# Patient Record
Sex: MALE | Race: WHITE | NOT HISPANIC OR LATINO | URBAN - METROPOLITAN AREA
[De-identification: names, ages, dates, MRNs, and addresses within clinical notes are randomized per-mention and may not be internally consistent; named-entity substitution may affect disease eponyms.]

---

## 2017-04-21 ENCOUNTER — INPATIENT (INPATIENT)
Facility: HOSPITAL | Age: 62
LOS: 0 days | Discharge: ANOTHER HOSPITAL | End: 2017-04-22
Attending: INTERNAL MEDICINE | Admitting: INTERNAL MEDICINE
Payer: MEDICARE

## 2017-04-21 VITALS
WEIGHT: 240.08 LBS | OXYGEN SATURATION: 98 % | HEIGHT: 68 IN | RESPIRATION RATE: 34 BRPM | HEART RATE: 88 BPM | SYSTOLIC BLOOD PRESSURE: 235 MMHG | DIASTOLIC BLOOD PRESSURE: 146 MMHG

## 2017-04-21 LAB
ALBUMIN SERPL ELPH-MCNC: 3.7 G/DL — SIGNIFICANT CHANGE UP (ref 3.3–5)
ALP SERPL-CCNC: 191 U/L — HIGH (ref 40–120)
ALT FLD-CCNC: 44 U/L — SIGNIFICANT CHANGE UP (ref 12–78)
ANION GAP SERPL CALC-SCNC: 13 MMOL/L — SIGNIFICANT CHANGE UP (ref 5–17)
APTT BLD: 38.2 SEC — HIGH (ref 27.5–37.4)
AST SERPL-CCNC: 46 U/L — HIGH (ref 15–37)
BASE EXCESS BLDA CALC-SCNC: -6.7 MMOL/L — LOW (ref -2–2)
BASOPHILS # BLD AUTO: 0.2 K/UL — SIGNIFICANT CHANGE UP (ref 0–0.2)
BASOPHILS NFR BLD AUTO: 1.4 % — SIGNIFICANT CHANGE UP (ref 0–2)
BILIRUB SERPL-MCNC: 0.7 MG/DL — SIGNIFICANT CHANGE UP (ref 0.2–1.2)
BLOOD GAS COMMENTS: SIGNIFICANT CHANGE UP
BLOOD GAS COMMENTS: SIGNIFICANT CHANGE UP
BLOOD GAS SOURCE: SIGNIFICANT CHANGE UP
BUN SERPL-MCNC: 31 MG/DL — HIGH (ref 7–23)
CALCIUM SERPL-MCNC: 8.6 MG/DL — SIGNIFICANT CHANGE UP (ref 8.5–10.1)
CHLORIDE SERPL-SCNC: 108 MMOL/L — SIGNIFICANT CHANGE UP (ref 96–108)
CK MB BLD-MCNC: 3.3 % — SIGNIFICANT CHANGE UP (ref 0–3.5)
CK MB CFR SERPL CALC: 5.1 NG/ML — HIGH (ref 0.5–3.6)
CK SERPL-CCNC: 155 U/L — SIGNIFICANT CHANGE UP (ref 26–308)
CO2 SERPL-SCNC: 23 MMOL/L — SIGNIFICANT CHANGE UP (ref 22–31)
CREAT SERPL-MCNC: 2.31 MG/DL — HIGH (ref 0.5–1.3)
EOSINOPHIL # BLD AUTO: 0.5 K/UL — SIGNIFICANT CHANGE UP (ref 0–0.5)
EOSINOPHIL NFR BLD AUTO: 4.4 % — SIGNIFICANT CHANGE UP (ref 0–6)
GLUCOSE SERPL-MCNC: 216 MG/DL — HIGH (ref 70–99)
HCO3 BLDA-SCNC: 24 MMOL/L — SIGNIFICANT CHANGE UP (ref 21–29)
HCT VFR BLD CALC: 53.1 % — HIGH (ref 39–50)
HGB BLD-MCNC: 18 G/DL — HIGH (ref 13–17)
HOROWITZ INDEX BLDA+IHG-RTO: 70 — SIGNIFICANT CHANGE UP
INR BLD: 1.07 RATIO — SIGNIFICANT CHANGE UP (ref 0.88–1.16)
LYMPHOCYTES # BLD AUTO: 2.5 K/UL — SIGNIFICANT CHANGE UP (ref 1–3.3)
LYMPHOCYTES # BLD AUTO: 20.9 % — SIGNIFICANT CHANGE UP (ref 13–44)
MCHC RBC-ENTMCNC: 30.2 PG — SIGNIFICANT CHANGE UP (ref 27–34)
MCHC RBC-ENTMCNC: 33.9 GM/DL — SIGNIFICANT CHANGE UP (ref 32–36)
MCV RBC AUTO: 89.3 FL — SIGNIFICANT CHANGE UP (ref 80–100)
MONOCYTES # BLD AUTO: 0.8 K/UL — SIGNIFICANT CHANGE UP (ref 0–0.9)
MONOCYTES NFR BLD AUTO: 6.6 % — SIGNIFICANT CHANGE UP (ref 2–14)
NEUTROPHILS # BLD AUTO: 7.9 K/UL — HIGH (ref 1.8–7.4)
NEUTROPHILS NFR BLD AUTO: 66.7 % — SIGNIFICANT CHANGE UP (ref 43–77)
NT-PROBNP SERPL-SCNC: 2610 PG/ML — HIGH (ref 0–125)
PCO2 BLDA: 66 MMHG — HIGH (ref 32–46)
PH BLD: 7.18 — CRITICAL LOW (ref 7.35–7.45)
PLATELET # BLD AUTO: 226 K/UL — SIGNIFICANT CHANGE UP (ref 150–400)
PO2 BLDA: 116 MMHG — HIGH (ref 74–108)
POTASSIUM SERPL-MCNC: 4.2 MMOL/L — SIGNIFICANT CHANGE UP (ref 3.5–5.3)
POTASSIUM SERPL-SCNC: 4.2 MMOL/L — SIGNIFICANT CHANGE UP (ref 3.5–5.3)
PROT SERPL-MCNC: 8.7 GM/DL — HIGH (ref 6–8.3)
PROTHROM AB SERPL-ACNC: 11.7 SEC — SIGNIFICANT CHANGE UP (ref 9.8–12.7)
RBC # BLD: 5.95 M/UL — HIGH (ref 4.2–5.8)
RBC # FLD: 14.9 % — SIGNIFICANT CHANGE UP (ref 11–15)
SAO2 % BLDA: 96 % — SIGNIFICANT CHANGE UP (ref 92–96)
SODIUM SERPL-SCNC: 144 MMOL/L — SIGNIFICANT CHANGE UP (ref 135–145)
TROPONIN I SERPL-MCNC: 0.83 NG/ML — HIGH (ref 0.01–0.04)
WBC # BLD: 11.8 K/UL — HIGH (ref 3.8–10.5)
WBC # FLD AUTO: 11.8 K/UL — HIGH (ref 3.8–10.5)

## 2017-04-21 PROCEDURE — 99291 CRITICAL CARE FIRST HOUR: CPT

## 2017-04-21 PROCEDURE — 71010: CPT | Mod: 26

## 2017-04-21 RX ORDER — HEPARIN SODIUM 5000 [USP'U]/ML
6000 INJECTION INTRAVENOUS; SUBCUTANEOUS EVERY 6 HOURS
Qty: 0 | Refills: 0 | Status: DISCONTINUED | OUTPATIENT
Start: 2017-04-21 | End: 2017-04-22

## 2017-04-21 RX ORDER — HEPARIN SODIUM 5000 [USP'U]/ML
INJECTION INTRAVENOUS; SUBCUTANEOUS
Qty: 25000 | Refills: 0 | Status: DISCONTINUED | OUTPATIENT
Start: 2017-04-21 | End: 2017-04-22

## 2017-04-21 RX ORDER — NITROGLYCERIN 6.5 MG
5 CAPSULE, EXTENDED RELEASE ORAL
Qty: 50 | Refills: 0 | Status: DISCONTINUED | OUTPATIENT
Start: 2017-04-21 | End: 2017-04-22

## 2017-04-21 RX ORDER — CLOPIDOGREL BISULFATE 75 MG/1
600 TABLET, FILM COATED ORAL ONCE
Qty: 0 | Refills: 0 | Status: COMPLETED | OUTPATIENT
Start: 2017-04-21 | End: 2017-04-21

## 2017-04-21 RX ORDER — NITROGLYCERIN 6.5 MG
0.4 CAPSULE, EXTENDED RELEASE ORAL
Qty: 0 | Refills: 0 | Status: DISCONTINUED | OUTPATIENT
Start: 2017-04-21 | End: 2017-04-22

## 2017-04-21 RX ORDER — SODIUM CHLORIDE 9 MG/ML
3 INJECTION INTRAMUSCULAR; INTRAVENOUS; SUBCUTANEOUS ONCE
Qty: 0 | Refills: 0 | Status: COMPLETED | OUTPATIENT
Start: 2017-04-21 | End: 2017-04-21

## 2017-04-21 RX ORDER — HEPARIN SODIUM 5000 [USP'U]/ML
5000 INJECTION INTRAVENOUS; SUBCUTANEOUS ONCE
Qty: 0 | Refills: 0 | Status: COMPLETED | OUTPATIENT
Start: 2017-04-21 | End: 2017-04-21

## 2017-04-21 RX ORDER — ASPIRIN/CALCIUM CARB/MAGNESIUM 324 MG
325 TABLET ORAL ONCE
Qty: 0 | Refills: 0 | Status: COMPLETED | OUTPATIENT
Start: 2017-04-21 | End: 2017-04-21

## 2017-04-21 RX ORDER — FUROSEMIDE 40 MG
20 TABLET ORAL ONCE
Qty: 0 | Refills: 0 | Status: COMPLETED | OUTPATIENT
Start: 2017-04-21 | End: 2017-04-21

## 2017-04-21 RX ADMIN — HEPARIN SODIUM 5000 UNIT(S): 5000 INJECTION INTRAVENOUS; SUBCUTANEOUS at 23:09

## 2017-04-21 RX ADMIN — CLOPIDOGREL BISULFATE 600 MILLIGRAM(S): 75 TABLET, FILM COATED ORAL at 22:57

## 2017-04-21 RX ADMIN — Medication 0.4 MILLIGRAM(S): at 22:57

## 2017-04-21 RX ADMIN — Medication 20 MILLIGRAM(S): at 23:55

## 2017-04-21 RX ADMIN — Medication 325 MILLIGRAM(S): at 22:57

## 2017-04-21 RX ADMIN — SODIUM CHLORIDE 3 MILLILITER(S): 9 INJECTION INTRAMUSCULAR; INTRAVENOUS; SUBCUTANEOUS at 22:57

## 2017-04-21 RX ADMIN — Medication 1.5 MICROGRAM(S)/MIN: at 23:17

## 2017-04-21 RX ADMIN — HEPARIN SODIUM 1000 UNIT(S)/HR: 5000 INJECTION INTRAVENOUS; SUBCUTANEOUS at 23:57

## 2017-04-21 NOTE — ED ADULT NURSE NOTE - OBJECTIVE STATEMENT
Patient BIBA in acute respiratory distress. Severely diaphoretic. Denies pain. + retractions: rales bilaterally to apices, no cough. B/P elevated . CPAP switched to BiPAP on arrival. Patient denies Pain. EKG consistent with STEMI shown to Dr. Michelle. Right sided EKG done.

## 2017-04-21 NOTE — ED ADULT TRIAGE NOTE - CHIEF COMPLAINT QUOTE
biba sudden onset sob denies chest pain pt diaphoretic b/l rales audible pt in respiratory distress cpap in place per ems state r/a sat 60's increased to 92% on 100% o2 via cpap sl ntg x 1

## 2017-04-21 NOTE — ED PROVIDER NOTE - MEDICAL DECISION MAKING DETAILS
Patient with APE secondary to STEMI. Labs sent. Patient on BiPAP, received nitro, asa and plavix with improvement. Patient now transferred to [facility] CCU for further management. Patient with APE secondary to Hypertensive Emergency. Labs and imaging reviewed. Patient on BiPAP, received nitro, asa and plavix with improvement. Patient was to be transferred to cath lab for inferior STEMI however most recent EKG has normalized along with condition of patient. Patient now admitted to ICU for further management.

## 2017-04-21 NOTE — ED PROVIDER NOTE - CARE PLAN
Principal Discharge DX:	STEMI (ST elevation myocardial infarction)  Secondary Diagnosis:	Acute pulmonary edema Principal Discharge DX:	Hypertensive emergency  Secondary Diagnosis:	Acute pulmonary edema

## 2017-04-21 NOTE — ED ADULT NURSE REASSESSMENT NOTE - NS ED NURSE REASSESS COMMENT FT1
Patient breathing and B/P improved with Tridal drip. Heparin bolus of 5000unnits given IVP as per Dr. Michelle

## 2017-04-21 NOTE — ED ADULT NURSE REASSESSMENT NOTE - NS ED NURSE REASSESS COMMENT FT1
Patient transfer cancelled after patient improved and EKG normalized with Tridil at 40mcg/min . Patient breathing easier. Evaluated by ICU PA. For admission to ICU

## 2017-04-21 NOTE — PROVIDER CONTACT NOTE (EICU) - RECOMMENDATIONS
-- Admit to ICU   -- BP management with Nitro gtt  -- medical management of ACS -- ASA, BB, Hep gtt, Echo -- cardiology consult, serial EKGs.  Pt needs LHC once medically stable in the setting of reversible EKG changes. continue to follow cardiac enzymes  -- NARCISO - likely due to Hypertensive crisis but send urine studies, + parks  -- pt critical, needs close ICU monitoring for BP management, respiratory management and concern for arrhythmia.   -- discussed with ICU PA

## 2017-04-21 NOTE — PROVIDER CONTACT NOTE (EICU) - BACKGROUND
60yo with history of HTN, CAD s/p CABG 2005, Active smoker brought to the ED in respiratory distress.  Pt called EMS after developing SOB this evening, by the time EMS arrived he was in respiratory failure, tripoding, tachypnic.  Supported with BIPAP on transfer to the ED.  He was also hypertensive with SBP > 220/120, treated with nitro.  EKG in the ED also showed ST Elevation in inferior leads per ED physician.  STEMI code was called.  Pt was medically managed with ASA/Plavix, Nitro gtt and BiPAP support. Heparin bolus was given.  ST elevation reverted and at this time transfer for to cath lab is being deferred.  ICU team called for further management of patient.     Current VS reviewed /90 RR improved to 20s.  Pt on BiPAP  Labs reviewed ABG 7  18/66/116  TNI 0.8  BNP >2000  CXR pulm infiltrates.  Cr 2.3

## 2017-04-21 NOTE — ED PROVIDER NOTE - OBJECTIVE STATEMENT
Pertinent PMH/PSH/FHx/SHx and Review of Systems contained within:  62 yo m with PMH MI s/p CABG in 2005 BIBEMS for respiratory distress. As per patient, he doing housework when he suddenly became short of breath. Patient denies chest pain. No fever/chills, No photophobia/eye pain/changes in vision, No ear pain/sore throat/dysphagia, No chest pain/palpitations, no cough/wheeze/stridor, No abdominal pain, No N/V/D, no dysuria/frequency/discharge, No neck/back pain, no rash, no changes in neurological status/function.

## 2017-04-21 NOTE — ED ADULT NURSE REASSESSMENT NOTE - NS ED NURSE REASSESS COMMENT FT1
Report  Given to Marti from [facility];  Mel  from Transfer center and Olivia from the CCU.

## 2017-04-21 NOTE — ED PROVIDER NOTE - PSH
History of coronary artery stent placement  1 stent 2008, 2 stents 2009 in [facility]  S/P quadruple vessel bypass  [facility]

## 2017-04-21 NOTE — ED PROVIDER NOTE - CRITICAL CARE PROVIDED
interpretation of diagnostic studies/documentation/consult w/ pt's family directly relating to pts condition/additional history taking/direct patient care (not related to procedure)/consultation with other physicians

## 2017-04-21 NOTE — ED ADULT NURSE NOTE - PSH
History of coronary artery stent placement  1 stent 2008, 2 stents 2009 in [facility]  S/P quadruple vessel bypass  [facility]

## 2017-04-22 ENCOUNTER — INPATIENT (INPATIENT)
Facility: HOSPITAL | Age: 62
LOS: 8 days | Discharge: HOME | DRG: 280 | End: 2017-05-01
Attending: INTERNAL MEDICINE | Admitting: INTERNAL MEDICINE
Payer: MEDICARE

## 2017-04-22 VITALS
OXYGEN SATURATION: 100 % | HEIGHT: 67 IN | DIASTOLIC BLOOD PRESSURE: 95 MMHG | TEMPERATURE: 98 F | WEIGHT: 227.96 LBS | RESPIRATION RATE: 24 BRPM | SYSTOLIC BLOOD PRESSURE: 175 MMHG | HEART RATE: 56 BPM

## 2017-04-22 VITALS
SYSTOLIC BLOOD PRESSURE: 155 MMHG | HEART RATE: 65 BPM | OXYGEN SATURATION: 99 % | TEMPERATURE: 97 F | DIASTOLIC BLOOD PRESSURE: 95 MMHG | RESPIRATION RATE: 20 BRPM

## 2017-04-22 DIAGNOSIS — I21.4 NON-ST ELEVATION (NSTEMI) MYOCARDIAL INFARCTION: ICD-10-CM

## 2017-04-22 DIAGNOSIS — I16.1 HYPERTENSIVE EMERGENCY: ICD-10-CM

## 2017-04-22 DIAGNOSIS — J81.0 ACUTE PULMONARY EDEMA: ICD-10-CM

## 2017-04-22 DIAGNOSIS — N17.9 ACUTE KIDNEY FAILURE, UNSPECIFIED: ICD-10-CM

## 2017-04-22 DIAGNOSIS — I21.01 ST ELEVATION (STEMI) MYOCARDIAL INFARCTION INVOLVING LEFT MAIN CORONARY ARTERY: ICD-10-CM

## 2017-04-22 DIAGNOSIS — Z29.9 ENCOUNTER FOR PROPHYLACTIC MEASURES, UNSPECIFIED: ICD-10-CM

## 2017-04-22 DIAGNOSIS — E78.5 HYPERLIPIDEMIA, UNSPECIFIED: ICD-10-CM

## 2017-04-22 LAB
ALBUMIN SERPL ELPH-MCNC: 3.3 G/DL — SIGNIFICANT CHANGE UP (ref 3.3–5)
ALBUMIN SERPL ELPH-MCNC: 3.9 G/DL — SIGNIFICANT CHANGE UP (ref 3.3–5)
ALBUMIN SERPL ELPH-MCNC: 3.9 G/DL — SIGNIFICANT CHANGE UP (ref 3.3–5)
ALP SERPL-CCNC: 143 U/L — HIGH (ref 40–120)
ALP SERPL-CCNC: 154 U/L — HIGH (ref 40–120)
ALP SERPL-CCNC: 158 U/L — HIGH (ref 40–120)
ALT FLD-CCNC: 31 U/L RC — SIGNIFICANT CHANGE UP (ref 10–45)
ALT FLD-CCNC: 33 U/L RC — SIGNIFICANT CHANGE UP (ref 10–45)
ALT FLD-CCNC: 40 U/L — SIGNIFICANT CHANGE UP (ref 12–78)
ANION GAP SERPL CALC-SCNC: 11 MMOL/L — SIGNIFICANT CHANGE UP (ref 5–17)
ANION GAP SERPL CALC-SCNC: 16 MMOL/L — SIGNIFICANT CHANGE UP (ref 5–17)
ANION GAP SERPL CALC-SCNC: 18 MMOL/L — HIGH (ref 5–17)
APTT BLD: 43.1 SEC — HIGH (ref 27.5–37.4)
APTT BLD: 55.7 SEC — HIGH (ref 27.5–37.4)
APTT BLD: 60.9 SEC — HIGH (ref 27.5–37.4)
AST SERPL-CCNC: 52 U/L — HIGH (ref 10–40)
AST SERPL-CCNC: 67 U/L — HIGH (ref 10–40)
AST SERPL-CCNC: 91 U/L — HIGH (ref 15–37)
BASE EXCESS BLDA CALC-SCNC: -0.4 MMOL/L — SIGNIFICANT CHANGE UP (ref -2–2)
BASOPHILS # BLD AUTO: 0 K/UL — SIGNIFICANT CHANGE UP (ref 0–0.2)
BASOPHILS # BLD AUTO: 0.1 K/UL — SIGNIFICANT CHANGE UP (ref 0–0.2)
BASOPHILS # BLD AUTO: 0.1 K/UL — SIGNIFICANT CHANGE UP (ref 0–0.2)
BASOPHILS NFR BLD AUTO: 0.2 % — SIGNIFICANT CHANGE UP (ref 0–2)
BASOPHILS NFR BLD AUTO: 0.5 % — SIGNIFICANT CHANGE UP (ref 0–2)
BASOPHILS NFR BLD AUTO: 0.7 % — SIGNIFICANT CHANGE UP (ref 0–2)
BILIRUB SERPL-MCNC: 0.9 MG/DL — SIGNIFICANT CHANGE UP (ref 0.2–1.2)
BILIRUB SERPL-MCNC: 1.2 MG/DL — SIGNIFICANT CHANGE UP (ref 0.2–1.2)
BILIRUB SERPL-MCNC: 1.2 MG/DL — SIGNIFICANT CHANGE UP (ref 0.2–1.2)
BLD GP AB SCN SERPL QL: NEGATIVE — SIGNIFICANT CHANGE UP
BLOOD GAS COMMENTS: SIGNIFICANT CHANGE UP
BLOOD GAS SOURCE: SIGNIFICANT CHANGE UP
BUN SERPL-MCNC: 33 MG/DL — HIGH (ref 7–23)
BUN SERPL-MCNC: 35 MG/DL — HIGH (ref 7–23)
BUN SERPL-MCNC: 35 MG/DL — HIGH (ref 7–23)
CALCIUM SERPL-MCNC: 8.3 MG/DL — LOW (ref 8.5–10.1)
CALCIUM SERPL-MCNC: 9.2 MG/DL — SIGNIFICANT CHANGE UP (ref 8.4–10.5)
CALCIUM SERPL-MCNC: 9.3 MG/DL — SIGNIFICANT CHANGE UP (ref 8.4–10.5)
CHLORIDE SERPL-SCNC: 105 MMOL/L — SIGNIFICANT CHANGE UP (ref 96–108)
CHLORIDE SERPL-SCNC: 107 MMOL/L — SIGNIFICANT CHANGE UP (ref 96–108)
CHLORIDE SERPL-SCNC: 110 MMOL/L — HIGH (ref 96–108)
CHOLEST SERPL-MCNC: 167 MG/DL — SIGNIFICANT CHANGE UP (ref 10–199)
CHOLEST SERPL-MCNC: 167 MG/DL — SIGNIFICANT CHANGE UP (ref 10–199)
CK MB BLD-MCNC: 12.2 % — HIGH (ref 0–3.5)
CK MB BLD-MCNC: 13.7 % — HIGH (ref 0–3.5)
CK MB CFR SERPL CALC: 31.7 NG/ML — HIGH (ref 0–6.7)
CK MB CFR SERPL CALC: 61.5 NG/ML — HIGH (ref 0–6.7)
CK SERPL-CCNC: 260 U/L — HIGH (ref 30–200)
CK SERPL-CCNC: 449 U/L — HIGH (ref 30–200)
CK SERPL-CCNC: 590 U/L — HIGH (ref 26–308)
CO2 SERPL-SCNC: 19 MMOL/L — LOW (ref 22–31)
CO2 SERPL-SCNC: 21 MMOL/L — LOW (ref 22–31)
CO2 SERPL-SCNC: 24 MMOL/L — SIGNIFICANT CHANGE UP (ref 22–31)
CREAT SERPL-MCNC: 2.16 MG/DL — HIGH (ref 0.5–1.3)
CREAT SERPL-MCNC: 2.17 MG/DL — HIGH (ref 0.5–1.3)
CREAT SERPL-MCNC: 2.18 MG/DL — HIGH (ref 0.5–1.3)
EOSINOPHIL # BLD AUTO: 0.1 K/UL — SIGNIFICANT CHANGE UP (ref 0–0.5)
EOSINOPHIL # BLD AUTO: 0.2 K/UL — SIGNIFICANT CHANGE UP (ref 0–0.5)
EOSINOPHIL # BLD AUTO: 0.2 K/UL — SIGNIFICANT CHANGE UP (ref 0–0.5)
EOSINOPHIL NFR BLD AUTO: 1.2 % — SIGNIFICANT CHANGE UP (ref 0–6)
EOSINOPHIL NFR BLD AUTO: 1.6 % — SIGNIFICANT CHANGE UP (ref 0–6)
EOSINOPHIL NFR BLD AUTO: 1.9 % — SIGNIFICANT CHANGE UP (ref 0–6)
GLUCOSE SERPL-MCNC: 105 MG/DL — HIGH (ref 70–99)
GLUCOSE SERPL-MCNC: 119 MG/DL — HIGH (ref 70–99)
GLUCOSE SERPL-MCNC: 89 MG/DL — SIGNIFICANT CHANGE UP (ref 70–99)
HBA1C BLD-MCNC: 5.6 % — SIGNIFICANT CHANGE UP (ref 4–5.6)
HBA1C BLD-MCNC: 5.8 % — HIGH (ref 4–5.6)
HCO3 BLDA-SCNC: 24 MMOL/L — SIGNIFICANT CHANGE UP (ref 21–29)
HCT VFR BLD CALC: 45.8 % — SIGNIFICANT CHANGE UP (ref 39–50)
HCT VFR BLD CALC: 47.4 % — SIGNIFICANT CHANGE UP (ref 39–50)
HCT VFR BLD CALC: 47.7 % — SIGNIFICANT CHANGE UP (ref 39–50)
HDLC SERPL-MCNC: 36 MG/DL — LOW (ref 40–125)
HDLC SERPL-MCNC: 37 MG/DL — LOW (ref 40–125)
HGB BLD-MCNC: 15.4 G/DL — SIGNIFICANT CHANGE UP (ref 13–17)
HGB BLD-MCNC: 15.9 G/DL — SIGNIFICANT CHANGE UP (ref 13–17)
HGB BLD-MCNC: 16 G/DL — SIGNIFICANT CHANGE UP (ref 13–17)
HOROWITZ INDEX BLDA+IHG-RTO: 40 — SIGNIFICANT CHANGE UP
INR BLD: 1.21 RATIO — HIGH (ref 0.88–1.16)
INR BLD: 1.22 RATIO — HIGH (ref 0.88–1.16)
LIPID PNL WITH DIRECT LDL SERPL: 116 MG/DL — SIGNIFICANT CHANGE UP
LIPID PNL WITH DIRECT LDL SERPL: 119 MG/DL — SIGNIFICANT CHANGE UP
LYMPHOCYTES # BLD AUTO: 1.3 K/UL — SIGNIFICANT CHANGE UP (ref 1–3.3)
LYMPHOCYTES # BLD AUTO: 1.5 K/UL — SIGNIFICANT CHANGE UP (ref 1–3.3)
LYMPHOCYTES # BLD AUTO: 1.5 K/UL — SIGNIFICANT CHANGE UP (ref 1–3.3)
LYMPHOCYTES # BLD AUTO: 10.4 % — LOW (ref 13–44)
LYMPHOCYTES # BLD AUTO: 10.8 % — LOW (ref 13–44)
LYMPHOCYTES # BLD AUTO: 12.1 % — LOW (ref 13–44)
MAGNESIUM SERPL-MCNC: 2.1 MG/DL — SIGNIFICANT CHANGE UP (ref 1.6–2.6)
MAGNESIUM SERPL-MCNC: 2.2 MG/DL — SIGNIFICANT CHANGE UP (ref 1.6–2.6)
MAGNESIUM SERPL-MCNC: 2.3 MG/DL — SIGNIFICANT CHANGE UP (ref 1.8–2.4)
MCHC RBC-ENTMCNC: 30 PG — SIGNIFICANT CHANGE UP (ref 27–34)
MCHC RBC-ENTMCNC: 30 PG — SIGNIFICANT CHANGE UP (ref 27–34)
MCHC RBC-ENTMCNC: 30.1 PG — SIGNIFICANT CHANGE UP (ref 27–34)
MCHC RBC-ENTMCNC: 33.4 GM/DL — SIGNIFICANT CHANGE UP (ref 32–36)
MCHC RBC-ENTMCNC: 33.6 GM/DL — SIGNIFICANT CHANGE UP (ref 32–36)
MCHC RBC-ENTMCNC: 33.6 GM/DL — SIGNIFICANT CHANGE UP (ref 32–36)
MCV RBC AUTO: 89.2 FL — SIGNIFICANT CHANGE UP (ref 80–100)
MCV RBC AUTO: 89.6 FL — SIGNIFICANT CHANGE UP (ref 80–100)
MCV RBC AUTO: 89.7 FL — SIGNIFICANT CHANGE UP (ref 80–100)
MONOCYTES # BLD AUTO: 0.8 K/UL — SIGNIFICANT CHANGE UP (ref 0–0.9)
MONOCYTES # BLD AUTO: 0.8 K/UL — SIGNIFICANT CHANGE UP (ref 0–0.9)
MONOCYTES # BLD AUTO: 1 K/UL — HIGH (ref 0–0.9)
MONOCYTES NFR BLD AUTO: 6.5 % — SIGNIFICANT CHANGE UP (ref 2–14)
MONOCYTES NFR BLD AUTO: 6.6 % — SIGNIFICANT CHANGE UP (ref 2–14)
MONOCYTES NFR BLD AUTO: 7.2 % — SIGNIFICANT CHANGE UP (ref 2–14)
NEUTROPHILS # BLD AUTO: 11.5 K/UL — HIGH (ref 1.8–7.4)
NEUTROPHILS # BLD AUTO: 9.4 K/UL — HIGH (ref 1.8–7.4)
NEUTROPHILS # BLD AUTO: 9.6 K/UL — HIGH (ref 1.8–7.4)
NEUTROPHILS NFR BLD AUTO: 79.2 % — HIGH (ref 43–77)
NEUTROPHILS NFR BLD AUTO: 80.4 % — HIGH (ref 43–77)
NEUTROPHILS NFR BLD AUTO: 80.8 % — HIGH (ref 43–77)
NT-PROBNP SERPL-SCNC: 5392 PG/ML — HIGH (ref 0–125)
PCO2 BLDA: 40 MMHG — SIGNIFICANT CHANGE UP (ref 32–46)
PH BLD: 7.39 — SIGNIFICANT CHANGE UP (ref 7.35–7.45)
PHOSPHATE SERPL-MCNC: 2.4 MG/DL — LOW (ref 2.5–4.5)
PHOSPHATE SERPL-MCNC: 3.3 MG/DL — SIGNIFICANT CHANGE UP (ref 2.5–4.5)
PHOSPHATE SERPL-MCNC: 3.8 MG/DL — SIGNIFICANT CHANGE UP (ref 2.5–4.5)
PLATELET # BLD AUTO: 158 K/UL — SIGNIFICANT CHANGE UP (ref 150–400)
PLATELET # BLD AUTO: 168 K/UL — SIGNIFICANT CHANGE UP (ref 150–400)
PLATELET # BLD AUTO: 168 K/UL — SIGNIFICANT CHANGE UP (ref 150–400)
PO2 BLDA: 110 MMHG — HIGH (ref 74–108)
POTASSIUM SERPL-MCNC: 3.3 MMOL/L — LOW (ref 3.5–5.3)
POTASSIUM SERPL-MCNC: 3.7 MMOL/L — SIGNIFICANT CHANGE UP (ref 3.5–5.3)
POTASSIUM SERPL-MCNC: 3.7 MMOL/L — SIGNIFICANT CHANGE UP (ref 3.5–5.3)
POTASSIUM SERPL-SCNC: 3.3 MMOL/L — LOW (ref 3.5–5.3)
POTASSIUM SERPL-SCNC: 3.7 MMOL/L — SIGNIFICANT CHANGE UP (ref 3.5–5.3)
POTASSIUM SERPL-SCNC: 3.7 MMOL/L — SIGNIFICANT CHANGE UP (ref 3.5–5.3)
PROCALCITONIN SERPL-MCNC: 0.17 NG/ML — HIGH (ref 0–0.05)
PROT SERPL-MCNC: 7.5 G/DL — SIGNIFICANT CHANGE UP (ref 6–8.3)
PROT SERPL-MCNC: 7.7 GM/DL — SIGNIFICANT CHANGE UP (ref 6–8.3)
PROT SERPL-MCNC: 7.8 G/DL — SIGNIFICANT CHANGE UP (ref 6–8.3)
PROTHROM AB SERPL-ACNC: 13.2 SEC — HIGH (ref 9.8–12.7)
PROTHROM AB SERPL-ACNC: 13.4 SEC — HIGH (ref 9.8–12.7)
RBC # BLD: 5.11 M/UL — SIGNIFICANT CHANGE UP (ref 4.2–5.8)
RBC # BLD: 5.28 M/UL — SIGNIFICANT CHANGE UP (ref 4.2–5.8)
RBC # BLD: 5.35 M/UL — SIGNIFICANT CHANGE UP (ref 4.2–5.8)
RBC # FLD: 14 % — SIGNIFICANT CHANGE UP (ref 10.3–14.5)
RBC # FLD: 14 % — SIGNIFICANT CHANGE UP (ref 10.3–14.5)
RBC # FLD: 14.9 % — SIGNIFICANT CHANGE UP (ref 11–15)
RH IG SCN BLD-IMP: POSITIVE — SIGNIFICANT CHANGE UP
SAO2 % BLDA: 98 % — HIGH (ref 92–96)
SODIUM SERPL-SCNC: 142 MMOL/L — SIGNIFICANT CHANGE UP (ref 135–145)
SODIUM SERPL-SCNC: 144 MMOL/L — SIGNIFICANT CHANGE UP (ref 135–145)
SODIUM SERPL-SCNC: 145 MMOL/L — SIGNIFICANT CHANGE UP (ref 135–145)
TOTAL CHOLESTEROL/HDL RATIO MEASUREMENT: 4.5 RATIO — SIGNIFICANT CHANGE UP (ref 3.4–9.6)
TOTAL CHOLESTEROL/HDL RATIO MEASUREMENT: 4.6 RATIO — SIGNIFICANT CHANGE UP (ref 3.4–9.6)
TRIGL SERPL-MCNC: 55 MG/DL — SIGNIFICANT CHANGE UP (ref 10–149)
TRIGL SERPL-MCNC: 74 MG/DL — SIGNIFICANT CHANGE UP (ref 10–149)
TROPONIN I SERPL-MCNC: 27 NG/ML — HIGH (ref 0.01–0.04)
TROPONIN T SERPL-MCNC: 1.2 NG/ML — HIGH (ref 0–0.06)
TROPONIN T SERPL-MCNC: 1.45 NG/ML — HIGH (ref 0–0.06)
TSH SERPL-MCNC: 1.28 UIU/ML — SIGNIFICANT CHANGE UP (ref 0.27–4.2)
WBC # BLD: 11.6 K/UL — HIGH (ref 3.8–10.5)
WBC # BLD: 12.1 K/UL — HIGH (ref 3.8–10.5)
WBC # BLD: 14.4 K/UL — HIGH (ref 3.8–10.5)
WBC # FLD AUTO: 11.6 K/UL — HIGH (ref 3.8–10.5)
WBC # FLD AUTO: 12.1 K/UL — HIGH (ref 3.8–10.5)
WBC # FLD AUTO: 14.4 K/UL — HIGH (ref 3.8–10.5)

## 2017-04-22 PROCEDURE — 93306 TTE W/DOPPLER COMPLETE: CPT | Mod: 26

## 2017-04-22 PROCEDURE — 99223 1ST HOSP IP/OBS HIGH 75: CPT | Mod: GC

## 2017-04-22 PROCEDURE — 93010 ELECTROCARDIOGRAM REPORT: CPT

## 2017-04-22 RX ORDER — CLOPIDOGREL BISULFATE 75 MG/1
75 TABLET, FILM COATED ORAL DAILY
Qty: 0 | Refills: 0 | Status: DISCONTINUED | OUTPATIENT
Start: 2017-04-22 | End: 2017-05-01

## 2017-04-22 RX ORDER — ATORVASTATIN CALCIUM 80 MG/1
1 TABLET, FILM COATED ORAL
Qty: 0 | Refills: 0 | COMMUNITY

## 2017-04-22 RX ORDER — NICARDIPINE HYDROCHLORIDE 30 MG/1
5 CAPSULE, EXTENDED RELEASE ORAL
Qty: 40 | Refills: 0 | Status: DISCONTINUED | OUTPATIENT
Start: 2017-04-22 | End: 2017-04-22

## 2017-04-22 RX ORDER — ASPIRIN/CALCIUM CARB/MAGNESIUM 324 MG
325 TABLET ORAL DAILY
Qty: 0 | Refills: 0 | Status: DISCONTINUED | OUTPATIENT
Start: 2017-04-22 | End: 2017-04-22

## 2017-04-22 RX ORDER — ATORVASTATIN CALCIUM 80 MG/1
40 TABLET, FILM COATED ORAL AT BEDTIME
Qty: 0 | Refills: 0 | Status: DISCONTINUED | OUTPATIENT
Start: 2017-04-22 | End: 2017-04-22

## 2017-04-22 RX ORDER — NICARDIPINE HYDROCHLORIDE 30 MG/1
5 CAPSULE, EXTENDED RELEASE ORAL
Qty: 50 | Refills: 0 | Status: DISCONTINUED | OUTPATIENT
Start: 2017-04-22 | End: 2017-04-22

## 2017-04-22 RX ORDER — ASPIRIN/CALCIUM CARB/MAGNESIUM 324 MG
1 TABLET ORAL
Qty: 0 | Refills: 0 | COMMUNITY
Start: 2017-04-22

## 2017-04-22 RX ORDER — HEPARIN SODIUM 5000 [USP'U]/ML
INJECTION INTRAVENOUS; SUBCUTANEOUS
Qty: 25000 | Refills: 0 | Status: DISCONTINUED | OUTPATIENT
Start: 2017-04-22 | End: 2017-04-22

## 2017-04-22 RX ORDER — POTASSIUM CHLORIDE 20 MEQ
40 PACKET (EA) ORAL DAILY
Qty: 0 | Refills: 0 | Status: COMPLETED | OUTPATIENT
Start: 2017-04-22 | End: 2017-04-22

## 2017-04-22 RX ORDER — CLOPIDOGREL BISULFATE 75 MG/1
75 TABLET, FILM COATED ORAL DAILY
Qty: 0 | Refills: 0 | Status: DISCONTINUED | OUTPATIENT
Start: 2017-04-22 | End: 2017-04-22

## 2017-04-22 RX ORDER — HEPARIN SODIUM 5000 [USP'U]/ML
6000 INJECTION INTRAVENOUS; SUBCUTANEOUS EVERY 6 HOURS
Qty: 0 | Refills: 0 | Status: DISCONTINUED | OUTPATIENT
Start: 2017-04-22 | End: 2017-04-22

## 2017-04-22 RX ORDER — ISOSORBIDE DINITRATE 5 MG/1
10 TABLET ORAL
Qty: 0 | Refills: 0 | COMMUNITY

## 2017-04-22 RX ORDER — HEPARIN SODIUM 5000 [USP'U]/ML
4000 INJECTION INTRAVENOUS; SUBCUTANEOUS EVERY 6 HOURS
Qty: 0 | Refills: 0 | Status: DISCONTINUED | OUTPATIENT
Start: 2017-04-22 | End: 2017-04-24

## 2017-04-22 RX ORDER — IBUPROFEN 200 MG
1 TABLET ORAL
Qty: 0 | Refills: 0 | COMMUNITY

## 2017-04-22 RX ORDER — NYSTATIN CREAM 100000 [USP'U]/G
1 CREAM TOPICAL EVERY 8 HOURS
Qty: 0 | Refills: 0 | Status: DISCONTINUED | OUTPATIENT
Start: 2017-04-22 | End: 2017-05-01

## 2017-04-22 RX ORDER — INFLUENZA VIRUS VACCINE 15; 15; 15; 15 UG/.5ML; UG/.5ML; UG/.5ML; UG/.5ML
0.5 SUSPENSION INTRAMUSCULAR ONCE
Qty: 0 | Refills: 0 | Status: DISCONTINUED | OUTPATIENT
Start: 2017-04-22 | End: 2017-04-22

## 2017-04-22 RX ORDER — MORPHINE SULFATE 50 MG/1
2 CAPSULE, EXTENDED RELEASE ORAL ONCE
Qty: 0 | Refills: 0 | Status: DISCONTINUED | OUTPATIENT
Start: 2017-04-22 | End: 2017-04-22

## 2017-04-22 RX ORDER — CARVEDILOL PHOSPHATE 80 MG/1
25 CAPSULE, EXTENDED RELEASE ORAL EVERY 12 HOURS
Qty: 0 | Refills: 0 | Status: DISCONTINUED | OUTPATIENT
Start: 2017-04-22 | End: 2017-04-22

## 2017-04-22 RX ORDER — LISINOPRIL 2.5 MG/1
1 TABLET ORAL
Qty: 0 | Refills: 0 | COMMUNITY

## 2017-04-22 RX ORDER — HYDRALAZINE HCL 50 MG
10 TABLET ORAL ONCE
Qty: 0 | Refills: 0 | Status: COMPLETED | OUTPATIENT
Start: 2017-04-22 | End: 2017-04-22

## 2017-04-22 RX ORDER — HEPARIN SODIUM 5000 [USP'U]/ML
INJECTION INTRAVENOUS; SUBCUTANEOUS
Qty: 25000 | Refills: 0 | Status: DISCONTINUED | OUTPATIENT
Start: 2017-04-22 | End: 2017-04-24

## 2017-04-22 RX ORDER — NITROGLYCERIN 6.5 MG
0 CAPSULE, EXTENDED RELEASE ORAL
Qty: 0 | Refills: 0 | COMMUNITY
Start: 2017-04-22

## 2017-04-22 RX ORDER — ACETAMINOPHEN 500 MG
650 TABLET ORAL EVERY 6 HOURS
Qty: 0 | Refills: 0 | Status: DISCONTINUED | OUTPATIENT
Start: 2017-04-22 | End: 2017-05-01

## 2017-04-22 RX ORDER — HEPARIN SODIUM 5000 [USP'U]/ML
0 INJECTION INTRAVENOUS; SUBCUTANEOUS
Qty: 0 | Refills: 0 | COMMUNITY
Start: 2017-04-22

## 2017-04-22 RX ORDER — SODIUM,POTASSIUM PHOSPHATES 278-250MG
1 POWDER IN PACKET (EA) ORAL
Qty: 0 | Refills: 0 | Status: DISCONTINUED | OUTPATIENT
Start: 2017-04-22 | End: 2017-04-23

## 2017-04-22 RX ORDER — SODIUM NITROPRUSSIDE 50 MG/2ML
10 INJECTION INTRAVENOUS
Qty: 200 | Refills: 0 | Status: DISCONTINUED | OUTPATIENT
Start: 2017-04-22 | End: 2017-04-22

## 2017-04-22 RX ORDER — ATORVASTATIN CALCIUM 80 MG/1
40 TABLET, FILM COATED ORAL AT BEDTIME
Qty: 0 | Refills: 0 | Status: DISCONTINUED | OUTPATIENT
Start: 2017-04-22 | End: 2017-04-23

## 2017-04-22 RX ORDER — NITROGLYCERIN 6.5 MG
60 CAPSULE, EXTENDED RELEASE ORAL
Qty: 50 | Refills: 0 | Status: DISCONTINUED | OUTPATIENT
Start: 2017-04-22 | End: 2017-04-24

## 2017-04-22 RX ADMIN — ATORVASTATIN CALCIUM 40 MILLIGRAM(S): 80 TABLET, FILM COATED ORAL at 21:59

## 2017-04-22 RX ADMIN — HEPARIN SODIUM 1200 UNIT(S)/HR: 5000 INJECTION INTRAVENOUS; SUBCUTANEOUS at 21:21

## 2017-04-22 RX ADMIN — HEPARIN SODIUM 1000 UNIT(S)/HR: 5000 INJECTION INTRAVENOUS; SUBCUTANEOUS at 07:39

## 2017-04-22 RX ADMIN — Medication 18 MICROGRAM(S)/MIN: at 21:42

## 2017-04-22 RX ADMIN — Medication 1.5 MICROGRAM(S)/MIN: at 08:05

## 2017-04-22 RX ADMIN — CLOPIDOGREL BISULFATE 75 MILLIGRAM(S): 75 TABLET, FILM COATED ORAL at 08:32

## 2017-04-22 RX ADMIN — HEPARIN SODIUM 1000 UNIT(S)/HR: 5000 INJECTION INTRAVENOUS; SUBCUTANEOUS at 14:14

## 2017-04-22 RX ADMIN — Medication 18 MICROGRAM(S)/MIN: at 16:41

## 2017-04-22 RX ADMIN — Medication 10 MILLIGRAM(S): at 08:05

## 2017-04-22 RX ADMIN — Medication 1 TABLET(S): at 21:59

## 2017-04-22 RX ADMIN — Medication 40 MILLIEQUIVALENT(S): at 21:43

## 2017-04-22 RX ADMIN — MORPHINE SULFATE 2 MILLIGRAM(S): 50 CAPSULE, EXTENDED RELEASE ORAL at 08:05

## 2017-04-22 RX ADMIN — MORPHINE SULFATE 2 MILLIGRAM(S): 50 CAPSULE, EXTENDED RELEASE ORAL at 07:35

## 2017-04-22 RX ADMIN — Medication 650 MILLIGRAM(S): at 23:47

## 2017-04-22 RX ADMIN — Medication 325 MILLIGRAM(S): at 08:07

## 2017-04-22 RX ADMIN — NYSTATIN CREAM 1 APPLICATION(S): 100000 CREAM TOPICAL at 21:42

## 2017-04-22 NOTE — H&P ADULT. - FAMILY HISTORY
Father  Still living? No  Family history of early CAD, Age at diagnosis: Age Unknown  Family history of cerebrovascular accident (CVA), Age at diagnosis: Age Unknown

## 2017-04-22 NOTE — DISCHARGE NOTE ADULT - HOSPITAL COURSE
Patient is a 61 year old male with pMH of HTN, HLD, and CAD s/p CABG in 2005 (@[facility])  with stents x3, (last time about 1-2 years ago), Ex smoker stopped 2 weeks ago, presented to ED with acute shortness of breath, and diaphoresis. As per patient, was watching television and eating pizza (2 slices), at around 9pm and suddenly became worsening sob, and had prior cough with productive sputum. EMS put patient on bipap machine and brought patient to ED. On arrival to ED, with BP of 235/146 and EKG initial with inferior leads ST elevations, pt was given nitro SL , and started on nitro drip and ASA 325mg and loading plavix,  and heparin bolus given. Transfer center was called for cath. However after all tx initiated, on repeat of EKG, ST elevation  inferior wall had resolved. Per ER provider transfer for cathlab is now deferred after spoken to [facility] cardiology fellow. Pt is being admitted to critical care for further observation. Patient this am 0715 with chest pain non radiating over left chest. on Heparin and nitroglycerin gtt. EKG changes AVL V5 V6,  Given Morphine 2 mg ivp pain improved. Troponin to 27.0 -  Transfer center notified and patient to transfer to [place] for cath.

## 2017-04-22 NOTE — DISCHARGE NOTE ADULT - PLAN OF CARE
to be chest pain free as per cardiology at [facility] maintain sbp < 135 low salt diet when appropriate , follow up with cardiology

## 2017-04-22 NOTE — H&P ADULT. - PROBLEM SELECTOR PLAN 3
Suspected 2/2 from HTN emergency  - c/w BiPAP. get ABG. titrate to lower oxygen supplementation w/ goal SpO2 94-96%

## 2017-04-22 NOTE — H&P ADULT. - HISTORY OF PRESENT ILLNESS
61M w/ CAD s/p CABG(2005,Adrián) w/ subsequent stents (2008,2010,Lady), 40pack year smoker(quit 2wks prior), HTN, & HLD presented to [facility] for acute sob when lifting a box and was found to have HTN emergency (235/146), EKG w/ initial inferior lead ST elevations which resolved, and troponemia and therefore was given , plavix load, nitro gtt, and heparin gtt. 61M w/ CAD s/p CABG(2005,Adrián) w/ subsequent stents (2008,2010,Esther-), 40pack year smoker(quit 2wks prior), HTN, & HLD presented to [facility] for acute sob when lifting a box and was found to have HTN emergency (235/146), EKG w/ initial inferior lead ST elevations which resolved, and troponemia and therefore was given , plavix load, nitro gtt, heparin gtt, BiPAP and was transferred to [facility] CCU to optimize prior to cardiac catheterization. Mr. Colon describes his presenting illness beginning approx 2 week ago when he noticed he was developing a dry cough after [employer] which became progressive w/ associated sob. Because of the cough he stopped smoking cigarettes 2 weeks ago. Also during this time, he noticed muscle pain in his legs for which he started taking 800mg of advil daily through presentation to [place]. The night prior to transfer the pt was lifting a box which he described as not too heavy and developed acute sob which was associated with an episode of diarrhea but not associated w/ CP. On review, the patient did noticed decreased visual acuity b/l over the last couple of weeks. He denies fevers, chills, sick contacts, dysuria, hematuria, diarrhea prior previous night or bleeding from rectum, or swelling in his leg. 61M w/ CAD s/p CABG(2005,Adrián) w/ subsequent stents (2008,2010,Esther-), 40pack year smoker(quit 2wks prior), HTN, & HLD presented to [facility] for acute sob when lifting a box and was found to have HTN emergency (235/146), EKG w/ initial inferior lead ST elevations which resolved, and troponemia and therefore was given , plavix load, nitro gtt, heparin gtt, BiPAP and was transferred to [facility] CCU to optimize prior to cardiac catheterization. Mr. Colon describes his presenting illness beginning approx 2 week ago when he noticed he was developing a dry cough after [employer] which became progressive w/ associated sob. Because of the cough he stopped smoking cigarettes 2 weeks ago. Also during this time, he noticed muscle pain in his legs for which he started taking 800mg of advil daily through presentation to [place]. The night prior to transfer the pt was lifting a box which he described as not too heavy and developed acute sob which was associated with an episode of diarrhea but not associated w/ CP. On review, the patient did noticed decreased visual acuity b/l over the last couple of weeks. He denies fevers, chills, sick contacts, dysuria, hematuria, diarrhea prior previous night or bleeding from rectum, or swelling in his leg. The patient is compliant w/ his meds and notes being told to stop ASA approx 3mo prior.

## 2017-04-22 NOTE — H&P ADULT. - PROBLEM SELECTOR PLAN 3
- on heparin drip  -resume atorvastatin, ACE, BB  -ECHO  -serial cardiac enzymes, ekg  -transfer center called, advised to defer cath lab now  -will call cardiology for consult.

## 2017-04-22 NOTE — H&P ADULT. - ASSESSMENT
Patient is a 61 year old male with pMH of HTN, HLD, and CAD s/p CABG in 2005 (@[facility])  with stents x3, (last time about 1-2 years ago), Ex smoker , being admitted to critical care for Hypertensive Emergency with NARCISO, Pulmonary edema, and NSTEMI.

## 2017-04-22 NOTE — DISCHARGE NOTE ADULT - CARE PLAN
Principal Discharge DX:	NSTEMI (non-ST elevated myocardial infarction)  Goal:	to be chest pain free  Instructions for follow-up, activity and diet:	as per cardiology at [facility]  Secondary Diagnosis:	Hypertensive emergency  Goal:	maintain sbp < 135  Instructions for follow-up, activity and diet:	low salt diet when appropriate , follow up with cardiology

## 2017-04-22 NOTE — H&P ADULT. - PROBLEM SELECTOR PLAN 1
Pt noted to have HTN emergency w/ presenting BP of 235/146 w/ SOB, EKG changes w/ elevated CE, & NARCISO on CKD  - started on Nitroprusside gtt w/ BP presenting at 175/95 and w/ BiPAP for mixed hypercarbic hypoxic respiratory failure.  - c/w nitroprusside gtt w/ plan to titrate to PO med. goal is 20% reduction in first 24hr  - c/w BiPAP for now. repeating abg and if corrected will titrate down w/ goal SpO2 of 94-96% Pt noted to have HTN emergency w/ presenting BP of 235/146 w/ SOB, EKG changes w/ elevated CE, & NARCISO on CKD, & tranaminitis  - started on Nitroprusside gtt w/ BP presenting at 175/95 and w/ BiPAP for mixed hypercarbic hypoxic respiratory failure.  - c/w nitroprusside gtt w/ plan to titrate to PO med. goal is 20% reduction in first 24hr  - c/w BiPAP for now. repeating abg and if corrected will titrate down w/ goal SpO2 of 94-96%  - monitor transaminitis w/ cmp Pt noted to have HTN emergency w/ presenting BP of 235/146 w/ SOB, EKG changes w/ elevated CE, & NARCISO on CKD, & tranaminitis  - started on Nitroglycerin gtt w/ BP presenting at 175/95 and w/ BiPAP for mixed hypercarbic hypoxic respiratory failure.  - c/w nitroprusside gtt w/ plan to titrate to PO med. goal is 20% reduction in first 24hr  - c/w BiPAP for now. repeating abg and if corrected will titrate down w/ goal SpO2 of 94-96%  - monitor transaminitis w/ cmp

## 2017-04-22 NOTE — DISCHARGE NOTE ADULT - PATIENT PORTAL LINK FT
“You can access the [facility] Patient Portal, offered by [facility], by registering with the following website: [email or web address]”

## 2017-04-22 NOTE — H&P ADULT. - PROBLEM SELECTOR PLAN 2
Patient noted to have elevated CE w/ EKG changes as described.  - c/w heparin gtt and monitor CE every 6hr, cont w/ tele, cont monitor for CP or worsened sob  -pt received [id number] & palvix load and currently feels better. When he can tolerate will pursue cardiac catheterization

## 2017-04-22 NOTE — H&P ADULT. - HISTORY OF PRESENT ILLNESS
Patient is a 61 year old male with pMH of HTN, HLD, and CAD s/p CABG in 2005 (@[facility])  with stents x3, (last time about 1-2 years ago), Ex smoker stopped 2 weeks ago, presented to ED with acute shortness of breath, and diaphoresis. As per patient, was watching television and eating pizza (2 slices), at around 9pm and suddenly became worsening sob, and had prior cough with productive sputum. EMS put patient on bipap machine and brought patient to ED. On arrival to ED, with BP of 235/146 and EKG initial with inferior leads ST elevations, pt was given nitro SL , and started on nitro drip and ASA 325mg and loading plavix,  and heparin bolus given. Transfer center was called for cath. However after all tx initiated, on repeat of EKG, ST elevation had resolved. Per ER provider transfer for cathlab is now deferred after spoken to [facility] cardiology fellow. Pt is being admitted to critical care for further observation.

## 2017-04-22 NOTE — H&P ADULT. - PSH
History of coronary artery stent placement  1 stent 2008, 2 stents 2009 in [facility]  S/P quadruple vessel bypass  [facility]

## 2017-04-22 NOTE — H&P ADULT. - PROBLEM SELECTOR PLAN 4
Hx of CAD s/p CABG and subsequent stents  - pt has received ASA and loading dose plavix in anticipation for cath  - post-cath care to include asa, plavix, statin. hold ace-i for heather. hold beta blocker until decipher if HF given CXR from [facility]

## 2017-04-22 NOTE — H&P ADULT. - RS GEN PE MLT RESP DETAILS PC
clear to auscultation bilaterally/respirations non-labored/breath sounds equal/good air movement/normal/airway patent

## 2017-04-22 NOTE — H&P ADULT. - ASSESSMENT
61M w/ CAD s/p CABG(2005,Adrián) w/ subsequent stents (2008,2010,Lady), 40pack year smoker(quit 2wks prior), HTN, & HLD presented to [facility] w/ sob & found to have HTN emergency (235/146), EKG w/ initial inferior lead ST elevations which resolved, and troponemia and therefore was given , plavix load, nitro gtt, heparin gtt, BiPAP and was transferred to [facility] CCU to optimize prior to cardiac catheterization 61M w/ CAD s/p CABG(2005,Adrián) w/ subsequent stents (2008,2010,Lady), 40pack year smoker(quit 2wks prior), HTN, & HLD presented to [facility] w/ sob & found to have HTN emergency (235/146) w/ NSTEMI and therefore transferred to [facility] CCU to optimize prior to cardiac catheterization w/ continued monitoring of NARCISO and mixed hypercarbic/hypoxic respiratory failure 61M w/ CAD s/p CABG(2005,Adrián) w/ subsequent stents (2008,2010,Esther-), 40pack year smoker(quit 2wks prior), HTN, & HLD presented to [facility] w/ sob & found to have HTN emergency (235/146) w/ NSTEMI and therefore transferred to [facility] CCU to optimize prior to cardiac catheterization w/ continued monitoring of NARCISO, transaminitis and mixed hypercarbic/hypoxic respiratory failure

## 2017-04-22 NOTE — H&P ADULT. - PROBLEM SELECTOR PLAN 1
-admit to critical care  -on nitro drip to titrate to SBP between 160-170 for the first 24hours  -and resume home bp meds

## 2017-04-22 NOTE — H&P ADULT. - PROBLEM SELECTOR PROBLEM 4
R/O Coronary artery disease involving native heart without angina pectoris, unspecified vessel or lesion type

## 2017-04-22 NOTE — H&P ADULT. - PSH
History of coronary artery stent placement  1 stent 2008, 2 stents 2009 in [facility]  S/P quadruple vessel bypass  [facility]

## 2017-04-22 NOTE — H&P ADULT. - PROBLEM SELECTOR PLAN 5
NARCISO on CKD Stage III(suspected per prev Cr charted)  - pt likely narciso from pre-renal physiology/ATN related to HTN emergency  - avoid nephrotoxic agents when possible. monitor BUN/Cr and strict I/O

## 2017-04-22 NOTE — PROVIDER CONTACT NOTE (EICU) - BACKGROUND
Pt admitted with hypertensive emergency, ACS, Resp failure  Currently having chest pain and rising trop fro 0.8 to 27  transfer center called for transfer to [facility] for LHC.    [facility] and si[facility] transfer being arranged for transfer to [facility] CCU and cardiac intervention,   Case discussed with Dr Villalba.  Transfer process underway,

## 2017-04-23 LAB
ALBUMIN SERPL ELPH-MCNC: 3.5 G/DL — SIGNIFICANT CHANGE UP (ref 3.3–5)
ALBUMIN SERPL ELPH-MCNC: 3.7 G/DL — SIGNIFICANT CHANGE UP (ref 3.3–5)
ALP SERPL-CCNC: 134 U/L — HIGH (ref 40–120)
ALP SERPL-CCNC: 135 U/L — HIGH (ref 40–120)
ALT FLD-CCNC: 25 U/L RC — SIGNIFICANT CHANGE UP (ref 10–45)
ALT FLD-CCNC: 27 U/L RC — SIGNIFICANT CHANGE UP (ref 10–45)
ANION GAP SERPL CALC-SCNC: 14 MMOL/L — SIGNIFICANT CHANGE UP (ref 5–17)
ANION GAP SERPL CALC-SCNC: 15 MMOL/L — SIGNIFICANT CHANGE UP (ref 5–17)
APPEARANCE UR: ABNORMAL
APTT BLD: 55.2 SEC — HIGH (ref 27.5–37.4)
APTT BLD: 58.6 SEC — HIGH (ref 27.5–37.4)
AST SERPL-CCNC: 34 U/L — SIGNIFICANT CHANGE UP (ref 10–40)
AST SERPL-CCNC: 40 U/L — SIGNIFICANT CHANGE UP (ref 10–40)
BASOPHILS # BLD AUTO: 0.1 K/UL — SIGNIFICANT CHANGE UP (ref 0–0.2)
BASOPHILS NFR BLD AUTO: 0.5 % — SIGNIFICANT CHANGE UP (ref 0–2)
BILIRUB SERPL-MCNC: 1.4 MG/DL — HIGH (ref 0.2–1.2)
BILIRUB SERPL-MCNC: 1.5 MG/DL — HIGH (ref 0.2–1.2)
BILIRUB UR-MCNC: NEGATIVE — SIGNIFICANT CHANGE UP
BUN SERPL-MCNC: 29 MG/DL — HIGH (ref 7–23)
BUN SERPL-MCNC: 33 MG/DL — HIGH (ref 7–23)
CALCIUM SERPL-MCNC: 9 MG/DL — SIGNIFICANT CHANGE UP (ref 8.4–10.5)
CALCIUM SERPL-MCNC: 9.1 MG/DL — SIGNIFICANT CHANGE UP (ref 8.4–10.5)
CHLORIDE SERPL-SCNC: 104 MMOL/L — SIGNIFICANT CHANGE UP (ref 96–108)
CHLORIDE SERPL-SCNC: 105 MMOL/L — SIGNIFICANT CHANGE UP (ref 96–108)
CK MB BLD-MCNC: 9.6 % — HIGH (ref 0–3.5)
CK MB CFR SERPL CALC: 16.6 NG/ML — HIGH (ref 0–6.7)
CK SERPL-CCNC: 173 U/L — SIGNIFICANT CHANGE UP (ref 30–200)
CO2 SERPL-SCNC: 21 MMOL/L — LOW (ref 22–31)
CO2 SERPL-SCNC: 21 MMOL/L — LOW (ref 22–31)
COLOR SPEC: YELLOW — SIGNIFICANT CHANGE UP
CREAT SERPL-MCNC: 1.98 MG/DL — HIGH (ref 0.5–1.3)
CREAT SERPL-MCNC: 2.08 MG/DL — HIGH (ref 0.5–1.3)
DIFF PNL FLD: NEGATIVE — SIGNIFICANT CHANGE UP
EOSINOPHIL # BLD AUTO: 0.2 K/UL — SIGNIFICANT CHANGE UP (ref 0–0.5)
EOSINOPHIL NFR BLD AUTO: 2 % — SIGNIFICANT CHANGE UP (ref 0–6)
EPI CELLS # UR: SIGNIFICANT CHANGE UP /HPF
GLUCOSE SERPL-MCNC: 105 MG/DL — HIGH (ref 70–99)
GLUCOSE SERPL-MCNC: 132 MG/DL — HIGH (ref 70–99)
GLUCOSE UR QL: NEGATIVE — SIGNIFICANT CHANGE UP
HCT VFR BLD CALC: 44.1 % — SIGNIFICANT CHANGE UP (ref 39–50)
HGB BLD-MCNC: 14.8 G/DL — SIGNIFICANT CHANGE UP (ref 13–17)
INR BLD: 1.22 RATIO — HIGH (ref 0.88–1.16)
KETONES UR-MCNC: NEGATIVE — SIGNIFICANT CHANGE UP
LEUKOCYTE ESTERASE UR-ACNC: NEGATIVE — SIGNIFICANT CHANGE UP
LYMPHOCYTES # BLD AUTO: 1.6 K/UL — SIGNIFICANT CHANGE UP (ref 1–3.3)
LYMPHOCYTES # BLD AUTO: 13.8 % — SIGNIFICANT CHANGE UP (ref 13–44)
MAGNESIUM SERPL-MCNC: 2 MG/DL — SIGNIFICANT CHANGE UP (ref 1.6–2.6)
MAGNESIUM SERPL-MCNC: 2.1 MG/DL — SIGNIFICANT CHANGE UP (ref 1.6–2.6)
MCHC RBC-ENTMCNC: 30 PG — SIGNIFICANT CHANGE UP (ref 27–34)
MCHC RBC-ENTMCNC: 33.7 GM/DL — SIGNIFICANT CHANGE UP (ref 32–36)
MCV RBC AUTO: 89.1 FL — SIGNIFICANT CHANGE UP (ref 80–100)
MONOCYTES # BLD AUTO: 0.8 K/UL — SIGNIFICANT CHANGE UP (ref 0–0.9)
MONOCYTES NFR BLD AUTO: 7.4 % — SIGNIFICANT CHANGE UP (ref 2–14)
NEUTROPHILS # BLD AUTO: 8.5 K/UL — HIGH (ref 1.8–7.4)
NEUTROPHILS NFR BLD AUTO: 76.3 % — SIGNIFICANT CHANGE UP (ref 43–77)
NITRITE UR-MCNC: NEGATIVE — SIGNIFICANT CHANGE UP
PH UR: 5.5 — SIGNIFICANT CHANGE UP (ref 5–8)
PHOSPHATE SERPL-MCNC: 2.4 MG/DL — LOW (ref 2.5–4.5)
PHOSPHATE SERPL-MCNC: 3.2 MG/DL — SIGNIFICANT CHANGE UP (ref 2.5–4.5)
PLATELET # BLD AUTO: 160 K/UL — SIGNIFICANT CHANGE UP (ref 150–400)
POTASSIUM SERPL-MCNC: 3.4 MMOL/L — LOW (ref 3.5–5.3)
POTASSIUM SERPL-MCNC: 3.8 MMOL/L — SIGNIFICANT CHANGE UP (ref 3.5–5.3)
POTASSIUM SERPL-SCNC: 3.4 MMOL/L — LOW (ref 3.5–5.3)
POTASSIUM SERPL-SCNC: 3.8 MMOL/L — SIGNIFICANT CHANGE UP (ref 3.5–5.3)
PROT SERPL-MCNC: 6.9 G/DL — SIGNIFICANT CHANGE UP (ref 6–8.3)
PROT SERPL-MCNC: 7.1 G/DL — SIGNIFICANT CHANGE UP (ref 6–8.3)
PROT UR-MCNC: 100 MG/DL
PROTHROM AB SERPL-ACNC: 13.3 SEC — HIGH (ref 9.8–12.7)
RBC # BLD: 4.95 M/UL — SIGNIFICANT CHANGE UP (ref 4.2–5.8)
RBC # FLD: 14 % — SIGNIFICANT CHANGE UP (ref 10.3–14.5)
RBC CASTS # UR COMP ASSIST: ABNORMAL /HPF (ref 0–2)
SODIUM SERPL-SCNC: 139 MMOL/L — SIGNIFICANT CHANGE UP (ref 135–145)
SODIUM SERPL-SCNC: 141 MMOL/L — SIGNIFICANT CHANGE UP (ref 135–145)
SP GR SPEC: 1.01 — SIGNIFICANT CHANGE UP (ref 1.01–1.02)
TROPONIN T SERPL-MCNC: 1.19 NG/ML — HIGH (ref 0–0.06)
UROBILINOGEN FLD QL: NEGATIVE — SIGNIFICANT CHANGE UP
WBC # BLD: 11.2 K/UL — HIGH (ref 3.8–10.5)
WBC # FLD AUTO: 11.2 K/UL — HIGH (ref 3.8–10.5)

## 2017-04-23 PROCEDURE — 93010 ELECTROCARDIOGRAM REPORT: CPT

## 2017-04-23 PROCEDURE — 71010: CPT | Mod: 26

## 2017-04-23 PROCEDURE — 99233 SBSQ HOSP IP/OBS HIGH 50: CPT | Mod: GC

## 2017-04-23 RX ORDER — CARVEDILOL PHOSPHATE 80 MG/1
6.25 CAPSULE, EXTENDED RELEASE ORAL ONCE
Qty: 0 | Refills: 0 | Status: COMPLETED | OUTPATIENT
Start: 2017-04-23 | End: 2017-04-23

## 2017-04-23 RX ORDER — POTASSIUM CHLORIDE 20 MEQ
40 PACKET (EA) ORAL ONCE
Qty: 0 | Refills: 0 | Status: COMPLETED | OUTPATIENT
Start: 2017-04-23 | End: 2017-04-23

## 2017-04-23 RX ORDER — CARVEDILOL PHOSPHATE 80 MG/1
6.25 CAPSULE, EXTENDED RELEASE ORAL EVERY 12 HOURS
Qty: 0 | Refills: 0 | Status: DISCONTINUED | OUTPATIENT
Start: 2017-04-23 | End: 2017-04-23

## 2017-04-23 RX ORDER — AMLODIPINE BESYLATE 2.5 MG/1
5 TABLET ORAL EVERY 12 HOURS
Qty: 0 | Refills: 0 | Status: DISCONTINUED | OUTPATIENT
Start: 2017-04-23 | End: 2017-04-23

## 2017-04-23 RX ORDER — ISOSORBIDE DINITRATE 5 MG/1
10 TABLET ORAL THREE TIMES A DAY
Qty: 0 | Refills: 0 | Status: DISCONTINUED | OUTPATIENT
Start: 2017-04-23 | End: 2017-04-23

## 2017-04-23 RX ORDER — HYDRALAZINE HCL 50 MG
50 TABLET ORAL THREE TIMES A DAY
Qty: 0 | Refills: 0 | Status: DISCONTINUED | OUTPATIENT
Start: 2017-04-23 | End: 2017-04-23

## 2017-04-23 RX ORDER — ALPRAZOLAM 0.25 MG
0.25 TABLET ORAL ONCE
Qty: 0 | Refills: 0 | Status: DISCONTINUED | OUTPATIENT
Start: 2017-04-23 | End: 2017-04-23

## 2017-04-23 RX ORDER — ATORVASTATIN CALCIUM 80 MG/1
80 TABLET, FILM COATED ORAL AT BEDTIME
Qty: 0 | Refills: 0 | Status: DISCONTINUED | OUTPATIENT
Start: 2017-04-23 | End: 2017-05-01

## 2017-04-23 RX ORDER — HYDRALAZINE HCL 50 MG
5 TABLET ORAL ONCE
Qty: 0 | Refills: 0 | Status: COMPLETED | OUTPATIENT
Start: 2017-04-23 | End: 2017-04-23

## 2017-04-23 RX ORDER — CARVEDILOL PHOSPHATE 80 MG/1
12.5 CAPSULE, EXTENDED RELEASE ORAL EVERY 12 HOURS
Qty: 0 | Refills: 0 | Status: DISCONTINUED | OUTPATIENT
Start: 2017-04-23 | End: 2017-04-24

## 2017-04-23 RX ORDER — HYDRALAZINE HCL 50 MG
100 TABLET ORAL EVERY 8 HOURS
Qty: 0 | Refills: 0 | Status: DISCONTINUED | OUTPATIENT
Start: 2017-04-23 | End: 2017-04-27

## 2017-04-23 RX ORDER — ASPIRIN/CALCIUM CARB/MAGNESIUM 324 MG
81 TABLET ORAL DAILY
Qty: 0 | Refills: 0 | Status: DISCONTINUED | OUTPATIENT
Start: 2017-04-23 | End: 2017-05-01

## 2017-04-23 RX ORDER — ISOSORBIDE DINITRATE 5 MG/1
20 TABLET ORAL THREE TIMES A DAY
Qty: 0 | Refills: 0 | Status: DISCONTINUED | OUTPATIENT
Start: 2017-04-23 | End: 2017-05-01

## 2017-04-23 RX ORDER — SODIUM CHLORIDE 9 MG/ML
500 INJECTION INTRAMUSCULAR; INTRAVENOUS; SUBCUTANEOUS ONCE
Qty: 0 | Refills: 0 | Status: DISCONTINUED | OUTPATIENT
Start: 2017-04-23 | End: 2017-04-23

## 2017-04-23 RX ORDER — HYDRALAZINE HCL 50 MG
25 TABLET ORAL THREE TIMES A DAY
Qty: 0 | Refills: 0 | Status: DISCONTINUED | OUTPATIENT
Start: 2017-04-23 | End: 2017-04-23

## 2017-04-23 RX ADMIN — ISOSORBIDE DINITRATE 20 MILLIGRAM(S): 5 TABLET ORAL at 14:55

## 2017-04-23 RX ADMIN — Medication 40 MILLIEQUIVALENT(S): at 04:27

## 2017-04-23 RX ADMIN — HEPARIN SODIUM 1200 UNIT(S)/HR: 5000 INJECTION INTRAVENOUS; SUBCUTANEOUS at 04:27

## 2017-04-23 RX ADMIN — Medication 50 MILLIGRAM(S): at 09:05

## 2017-04-23 RX ADMIN — Medication 81 MILLIGRAM(S): at 10:53

## 2017-04-23 RX ADMIN — NYSTATIN CREAM 1 APPLICATION(S): 100000 CREAM TOPICAL at 05:07

## 2017-04-23 RX ADMIN — ISOSORBIDE DINITRATE 10 MILLIGRAM(S): 5 TABLET ORAL at 09:57

## 2017-04-23 RX ADMIN — ISOSORBIDE DINITRATE 20 MILLIGRAM(S): 5 TABLET ORAL at 21:10

## 2017-04-23 RX ADMIN — CARVEDILOL PHOSPHATE 12.5 MILLIGRAM(S): 80 CAPSULE, EXTENDED RELEASE ORAL at 17:16

## 2017-04-23 RX ADMIN — Medication 650 MILLIGRAM(S): at 12:02

## 2017-04-23 RX ADMIN — ATORVASTATIN CALCIUM 80 MILLIGRAM(S): 80 TABLET, FILM COATED ORAL at 21:09

## 2017-04-23 RX ADMIN — Medication 650 MILLIGRAM(S): at 06:15

## 2017-04-23 RX ADMIN — Medication 650 MILLIGRAM(S): at 13:21

## 2017-04-23 RX ADMIN — Medication 650 MILLIGRAM(S): at 00:17

## 2017-04-23 RX ADMIN — Medication 18 MICROGRAM(S)/MIN: at 21:10

## 2017-04-23 RX ADMIN — Medication 18 MICROGRAM(S)/MIN: at 05:08

## 2017-04-23 RX ADMIN — CLOPIDOGREL BISULFATE 75 MILLIGRAM(S): 75 TABLET, FILM COATED ORAL at 10:53

## 2017-04-23 RX ADMIN — NYSTATIN CREAM 1 APPLICATION(S): 100000 CREAM TOPICAL at 21:11

## 2017-04-23 RX ADMIN — Medication 650 MILLIGRAM(S): at 05:45

## 2017-04-23 RX ADMIN — Medication 50 MILLIGRAM(S): at 17:16

## 2017-04-23 RX ADMIN — Medication 25 MILLIGRAM(S): at 01:30

## 2017-04-23 RX ADMIN — CARVEDILOL PHOSPHATE 6.25 MILLIGRAM(S): 80 CAPSULE, EXTENDED RELEASE ORAL at 10:53

## 2017-04-23 RX ADMIN — HEPARIN SODIUM 1200 UNIT(S)/HR: 5000 INJECTION INTRAVENOUS; SUBCUTANEOUS at 13:15

## 2017-04-23 RX ADMIN — Medication 0.25 MILLIGRAM(S): at 21:32

## 2017-04-23 RX ADMIN — Medication 5 MILLIGRAM(S): at 05:45

## 2017-04-23 RX ADMIN — NYSTATIN CREAM 1 APPLICATION(S): 100000 CREAM TOPICAL at 13:17

## 2017-04-24 LAB
ALBUMIN SERPL ELPH-MCNC: 3.8 G/DL — SIGNIFICANT CHANGE UP (ref 3.3–5)
ALP SERPL-CCNC: 136 U/L — HIGH (ref 40–120)
ALT FLD-CCNC: 20 U/L RC — SIGNIFICANT CHANGE UP (ref 10–45)
ANION GAP SERPL CALC-SCNC: 16 MMOL/L — SIGNIFICANT CHANGE UP (ref 5–17)
APTT BLD: 57.2 SEC — HIGH (ref 27.5–37.4)
AST SERPL-CCNC: 20 U/L — SIGNIFICANT CHANGE UP (ref 10–40)
BILIRUB SERPL-MCNC: 1.4 MG/DL — HIGH (ref 0.2–1.2)
BUN SERPL-MCNC: 24 MG/DL — HIGH (ref 7–23)
CALCIUM SERPL-MCNC: 9.2 MG/DL — SIGNIFICANT CHANGE UP (ref 8.4–10.5)
CHLORIDE SERPL-SCNC: 104 MMOL/L — SIGNIFICANT CHANGE UP (ref 96–108)
CO2 SERPL-SCNC: 18 MMOL/L — LOW (ref 22–31)
CREAT SERPL-MCNC: 1.86 MG/DL — HIGH (ref 0.5–1.3)
GLUCOSE SERPL-MCNC: 102 MG/DL — HIGH (ref 70–99)
HCT VFR BLD CALC: 48.9 % — SIGNIFICANT CHANGE UP (ref 39–50)
HGB BLD-MCNC: 15.6 G/DL — SIGNIFICANT CHANGE UP (ref 13–17)
MAGNESIUM SERPL-MCNC: 2.2 MG/DL — SIGNIFICANT CHANGE UP (ref 1.6–2.6)
MCHC RBC-ENTMCNC: 28.4 PG — SIGNIFICANT CHANGE UP (ref 27–34)
MCHC RBC-ENTMCNC: 31.8 GM/DL — LOW (ref 32–36)
MCV RBC AUTO: 89.1 FL — SIGNIFICANT CHANGE UP (ref 80–100)
PHOSPHATE SERPL-MCNC: 3 MG/DL — SIGNIFICANT CHANGE UP (ref 2.5–4.5)
PLATELET # BLD AUTO: 169 K/UL — SIGNIFICANT CHANGE UP (ref 150–400)
POTASSIUM SERPL-MCNC: 3.6 MMOL/L — SIGNIFICANT CHANGE UP (ref 3.5–5.3)
POTASSIUM SERPL-SCNC: 3.6 MMOL/L — SIGNIFICANT CHANGE UP (ref 3.5–5.3)
PROT SERPL-MCNC: 7.7 G/DL — SIGNIFICANT CHANGE UP (ref 6–8.3)
RBC # BLD: 5.49 M/UL — SIGNIFICANT CHANGE UP (ref 4.2–5.8)
RBC # FLD: 14.5 % — SIGNIFICANT CHANGE UP (ref 10.3–14.5)
SODIUM SERPL-SCNC: 138 MMOL/L — SIGNIFICANT CHANGE UP (ref 135–145)
WBC # BLD: 10.5 K/UL — SIGNIFICANT CHANGE UP (ref 3.8–10.5)
WBC # FLD AUTO: 10.5 K/UL — SIGNIFICANT CHANGE UP (ref 3.8–10.5)

## 2017-04-24 PROCEDURE — 93010 ELECTROCARDIOGRAM REPORT: CPT

## 2017-04-24 PROCEDURE — 99233 SBSQ HOSP IP/OBS HIGH 50: CPT | Mod: GC

## 2017-04-24 RX ORDER — HYDRALAZINE HCL 50 MG
10 TABLET ORAL ONCE
Qty: 0 | Refills: 0 | Status: COMPLETED | OUTPATIENT
Start: 2017-04-24 | End: 2017-04-24

## 2017-04-24 RX ORDER — HYDRALAZINE HCL 50 MG
100 TABLET ORAL ONCE
Qty: 0 | Refills: 0 | Status: COMPLETED | OUTPATIENT
Start: 2017-04-24 | End: 2017-04-24

## 2017-04-24 RX ORDER — CARVEDILOL PHOSPHATE 80 MG/1
25 CAPSULE, EXTENDED RELEASE ORAL EVERY 12 HOURS
Qty: 0 | Refills: 0 | Status: DISCONTINUED | OUTPATIENT
Start: 2017-04-24 | End: 2017-05-01

## 2017-04-24 RX ORDER — POTASSIUM CHLORIDE 20 MEQ
40 PACKET (EA) ORAL ONCE
Qty: 0 | Refills: 0 | Status: COMPLETED | OUTPATIENT
Start: 2017-04-24 | End: 2017-04-24

## 2017-04-24 RX ADMIN — CARVEDILOL PHOSPHATE 25 MILLIGRAM(S): 80 CAPSULE, EXTENDED RELEASE ORAL at 17:04

## 2017-04-24 RX ADMIN — NYSTATIN CREAM 1 APPLICATION(S): 100000 CREAM TOPICAL at 20:22

## 2017-04-24 RX ADMIN — Medication 650 MILLIGRAM(S): at 01:30

## 2017-04-24 RX ADMIN — ISOSORBIDE DINITRATE 20 MILLIGRAM(S): 5 TABLET ORAL at 20:26

## 2017-04-24 RX ADMIN — Medication 10 MILLIGRAM(S): at 06:06

## 2017-04-24 RX ADMIN — Medication 100 MILLIGRAM(S): at 13:11

## 2017-04-24 RX ADMIN — Medication 81 MILLIGRAM(S): at 11:31

## 2017-04-24 RX ADMIN — CARVEDILOL PHOSPHATE 25 MILLIGRAM(S): 80 CAPSULE, EXTENDED RELEASE ORAL at 06:06

## 2017-04-24 RX ADMIN — Medication 100 MILLIGRAM(S): at 08:31

## 2017-04-24 RX ADMIN — HEPARIN SODIUM 1200 UNIT(S)/HR: 5000 INJECTION INTRAVENOUS; SUBCUTANEOUS at 06:37

## 2017-04-24 RX ADMIN — ISOSORBIDE DINITRATE 20 MILLIGRAM(S): 5 TABLET ORAL at 13:11

## 2017-04-24 RX ADMIN — NYSTATIN CREAM 1 APPLICATION(S): 100000 CREAM TOPICAL at 06:06

## 2017-04-24 RX ADMIN — CLOPIDOGREL BISULFATE 75 MILLIGRAM(S): 75 TABLET, FILM COATED ORAL at 11:31

## 2017-04-24 RX ADMIN — Medication 200 MILLIGRAM(S): at 17:44

## 2017-04-24 RX ADMIN — Medication 100 MILLIGRAM(S): at 20:28

## 2017-04-24 RX ADMIN — ISOSORBIDE DINITRATE 20 MILLIGRAM(S): 5 TABLET ORAL at 05:35

## 2017-04-24 RX ADMIN — Medication 100 MILLIGRAM(S): at 00:50

## 2017-04-24 RX ADMIN — ATORVASTATIN CALCIUM 80 MILLIGRAM(S): 80 TABLET, FILM COATED ORAL at 20:25

## 2017-04-24 RX ADMIN — Medication 650 MILLIGRAM(S): at 00:50

## 2017-04-24 RX ADMIN — NYSTATIN CREAM 1 APPLICATION(S): 100000 CREAM TOPICAL at 13:11

## 2017-04-24 RX ADMIN — Medication 40 MILLIEQUIVALENT(S): at 06:37

## 2017-04-24 NOTE — DIETITIAN INITIAL EVALUATION ADULT. - NS AS NUTRI INTERV ED CONTENT
Discussed heart healthy diet; identified foods high in sodium and fats, limit sodium intake, increased consumption of lean meats, fruit and vegetables, healthy fats and oils, cooking tips, and healthy snacking options.

## 2017-04-24 NOTE — DIETITIAN INITIAL EVALUATION ADULT. - ORAL INTAKE PTA
good/Pt reports consuming regular diet at home, states he has been eating more than usual 2/2 recent Easter celebration

## 2017-04-24 NOTE — DIETITIAN INITIAL EVALUATION ADULT. - PROBLEM SELECTOR PLAN 1
Pt noted to have HTN emergency w/ presenting BP of 235/146 w/ SOB, EKG changes w/ elevated CE, & NARCISO on CKD, & tranaminitis  - started on Nitroglycerin gtt w/ BP presenting at 175/95 and w/ BiPAP for mixed hypercarbic hypoxic respiratory failure.  - c/w nitroprusside gtt w/ plan to titrate to PO med. goal is 20% reduction in first 24hr  - c/w BiPAP for now. repeating abg and if corrected will titrate down w/ goal SpO2 of 94-96%  - monitor transaminitis w/ cmp

## 2017-04-24 NOTE — DIETITIAN INITIAL EVALUATION ADULT. - OTHER INFO
Pt seen in CCU for LOS, reports UBW of 230pounds and denies any recent wt fluctuations. Pt reports good appetite and po intakes, noted 75% per flowsheet. No GI distress, but last BM 4 days ago. Pt denies feeling constipated and states he is not having BM 2/2 feeling uncomfortable using the bathroom in the hospital. pt denies chewing/swallowing difficulties. NKFA. PTA did not take any supplements. Pt receptive to [facility] diet education provided.

## 2017-04-24 NOTE — DIETITIAN INITIAL EVALUATION ADULT. - ENERGY NEEDS
Height: 67 inches, Weight: 227 pounds  BMI: 35.5kg/m2 IBW: 148 pounds (+/-10%), %IBW: 153%  Pertinent Info: Per chart, 60 y/o male admitted with HTN urgency and NSTEMI, pending cardiac cath. +1 generalized edema, no pressure ulcers noted at this time.

## 2017-04-24 NOTE — DIETITIAN INITIAL EVALUATION ADULT. - PROBLEM SELECTOR PLAN 2
Patient noted to have elevated CE w/ EKG changes as described.  - c/w heparin gtt and monitor CE every 6hr, cont w/ tele, cont monitor for CP or worsened sob  -pt received [id number] & palvix load and currently feels better. When he can tolerate will pursue cardiac catheterization

## 2017-04-25 DIAGNOSIS — N17.9 ACUTE KIDNEY FAILURE, UNSPECIFIED: ICD-10-CM

## 2017-04-25 DIAGNOSIS — Z87.891 PERSONAL HISTORY OF NICOTINE DEPENDENCE: ICD-10-CM

## 2017-04-25 DIAGNOSIS — I16.0 HYPERTENSIVE URGENCY: ICD-10-CM

## 2017-04-25 DIAGNOSIS — I25.2 OLD MYOCARDIAL INFARCTION: ICD-10-CM

## 2017-04-25 DIAGNOSIS — I25.10 ATHEROSCLEROTIC HEART DISEASE OF NATIVE CORONARY ARTERY WITHOUT ANGINA PECTORIS: ICD-10-CM

## 2017-04-25 DIAGNOSIS — Z95.5 PRESENCE OF CORONARY ANGIOPLASTY IMPLANT AND GRAFT: ICD-10-CM

## 2017-04-25 DIAGNOSIS — Z95.1 PRESENCE OF AORTOCORONARY BYPASS GRAFT: ICD-10-CM

## 2017-04-25 DIAGNOSIS — I21.4 NON-ST ELEVATION (NSTEMI) MYOCARDIAL INFARCTION: ICD-10-CM

## 2017-04-25 DIAGNOSIS — J81.1 CHRONIC PULMONARY EDEMA: ICD-10-CM

## 2017-04-25 DIAGNOSIS — R61 GENERALIZED HYPERHIDROSIS: ICD-10-CM

## 2017-04-25 DIAGNOSIS — J96.90 RESPIRATORY FAILURE, UNSPECIFIED, UNSPECIFIED WHETHER WITH HYPOXIA OR HYPERCAPNIA: ICD-10-CM

## 2017-04-25 DIAGNOSIS — E78.5 HYPERLIPIDEMIA, UNSPECIFIED: ICD-10-CM

## 2017-04-25 DIAGNOSIS — L40.9 PSORIASIS, UNSPECIFIED: ICD-10-CM

## 2017-04-25 LAB
ALBUMIN SERPL ELPH-MCNC: 3.8 G/DL — SIGNIFICANT CHANGE UP (ref 3.3–5)
ALP SERPL-CCNC: 137 U/L — HIGH (ref 40–120)
ALT FLD-CCNC: 22 U/L — SIGNIFICANT CHANGE UP (ref 10–45)
ANION GAP SERPL CALC-SCNC: 17 MMOL/L — SIGNIFICANT CHANGE UP (ref 5–17)
APPEARANCE UR: CLEAR — SIGNIFICANT CHANGE UP
AST SERPL-CCNC: 37 U/L — SIGNIFICANT CHANGE UP (ref 10–40)
BILIRUB SERPL-MCNC: 1.4 MG/DL — HIGH (ref 0.2–1.2)
BILIRUB UR-MCNC: NEGATIVE — SIGNIFICANT CHANGE UP
BUN SERPL-MCNC: 29 MG/DL — HIGH (ref 7–23)
CALCIUM SERPL-MCNC: 9.2 MG/DL — SIGNIFICANT CHANGE UP (ref 8.4–10.5)
CHLORIDE SERPL-SCNC: 105 MMOL/L — SIGNIFICANT CHANGE UP (ref 96–108)
CO2 SERPL-SCNC: 18 MMOL/L — LOW (ref 22–31)
COLOR SPEC: YELLOW — SIGNIFICANT CHANGE UP
CREAT SERPL-MCNC: 1.93 MG/DL — HIGH (ref 0.5–1.3)
DIFF PNL FLD: NEGATIVE — SIGNIFICANT CHANGE UP
GLUCOSE SERPL-MCNC: 91 MG/DL — SIGNIFICANT CHANGE UP (ref 70–99)
GLUCOSE UR QL: NEGATIVE MG/DL — SIGNIFICANT CHANGE UP
HCT VFR BLD CALC: 47.4 % — SIGNIFICANT CHANGE UP (ref 39–50)
HGB BLD-MCNC: 15.9 G/DL — SIGNIFICANT CHANGE UP (ref 13–17)
KETONES UR-MCNC: NEGATIVE — SIGNIFICANT CHANGE UP
LEUKOCYTE ESTERASE UR-ACNC: NEGATIVE — SIGNIFICANT CHANGE UP
MAGNESIUM SERPL-MCNC: 2.2 MG/DL — SIGNIFICANT CHANGE UP (ref 1.6–2.6)
MCHC RBC-ENTMCNC: 30.3 PG — SIGNIFICANT CHANGE UP (ref 27–34)
MCHC RBC-ENTMCNC: 33.5 GM/DL — SIGNIFICANT CHANGE UP (ref 32–36)
MCV RBC AUTO: 90.5 FL — SIGNIFICANT CHANGE UP (ref 80–100)
NITRITE UR-MCNC: NEGATIVE — SIGNIFICANT CHANGE UP
PH UR: 5.5 — SIGNIFICANT CHANGE UP (ref 5–8)
PHOSPHATE SERPL-MCNC: 4 MG/DL — SIGNIFICANT CHANGE UP (ref 2.5–4.5)
PLATELET # BLD AUTO: 185 K/UL — SIGNIFICANT CHANGE UP (ref 150–400)
POTASSIUM SERPL-MCNC: 4.4 MMOL/L — SIGNIFICANT CHANGE UP (ref 3.5–5.3)
POTASSIUM SERPL-SCNC: 4.4 MMOL/L — SIGNIFICANT CHANGE UP (ref 3.5–5.3)
PROT SERPL-MCNC: 8 G/DL — SIGNIFICANT CHANGE UP (ref 6–8.3)
PROT UR-MCNC: 30 MG/DL
RBC # BLD: 5.24 M/UL — SIGNIFICANT CHANGE UP (ref 4.2–5.8)
RBC # FLD: 15.8 % — HIGH (ref 10.3–14.5)
SODIUM SERPL-SCNC: 140 MMOL/L — SIGNIFICANT CHANGE UP (ref 135–145)
SP GR SPEC: 1.01 — SIGNIFICANT CHANGE UP (ref 1.01–1.02)
UROBILINOGEN FLD QL: NEGATIVE MG/DL — SIGNIFICANT CHANGE UP
WBC # BLD: 10.12 K/UL — SIGNIFICANT CHANGE UP (ref 3.8–10.5)
WBC # FLD AUTO: 10.12 K/UL — SIGNIFICANT CHANGE UP (ref 3.8–10.5)

## 2017-04-25 RX ORDER — FUROSEMIDE 40 MG
20 TABLET ORAL
Qty: 0 | Refills: 0 | Status: COMPLETED | OUTPATIENT
Start: 2017-04-25 | End: 2017-04-26

## 2017-04-25 RX ORDER — AMLODIPINE BESYLATE 2.5 MG/1
5 TABLET ORAL DAILY
Qty: 0 | Refills: 0 | Status: DISCONTINUED | OUTPATIENT
Start: 2017-04-25 | End: 2017-05-01

## 2017-04-25 RX ADMIN — Medication 100 MILLIGRAM(S): at 21:19

## 2017-04-25 RX ADMIN — Medication 650 MILLIGRAM(S): at 08:29

## 2017-04-25 RX ADMIN — CARVEDILOL PHOSPHATE 25 MILLIGRAM(S): 80 CAPSULE, EXTENDED RELEASE ORAL at 17:38

## 2017-04-25 RX ADMIN — Medication 100 MILLIGRAM(S): at 14:38

## 2017-04-25 RX ADMIN — CLOPIDOGREL BISULFATE 75 MILLIGRAM(S): 75 TABLET, FILM COATED ORAL at 11:31

## 2017-04-25 RX ADMIN — Medication 20 MILLIGRAM(S): at 17:40

## 2017-04-25 RX ADMIN — Medication 81 MILLIGRAM(S): at 11:32

## 2017-04-25 RX ADMIN — CARVEDILOL PHOSPHATE 25 MILLIGRAM(S): 80 CAPSULE, EXTENDED RELEASE ORAL at 05:14

## 2017-04-25 RX ADMIN — NYSTATIN CREAM 1 APPLICATION(S): 100000 CREAM TOPICAL at 05:15

## 2017-04-25 RX ADMIN — ISOSORBIDE DINITRATE 20 MILLIGRAM(S): 5 TABLET ORAL at 14:38

## 2017-04-25 RX ADMIN — Medication 100 MILLIGRAM(S): at 05:14

## 2017-04-25 RX ADMIN — Medication 650 MILLIGRAM(S): at 09:05

## 2017-04-25 RX ADMIN — ISOSORBIDE DINITRATE 20 MILLIGRAM(S): 5 TABLET ORAL at 21:19

## 2017-04-25 RX ADMIN — ATORVASTATIN CALCIUM 80 MILLIGRAM(S): 80 TABLET, FILM COATED ORAL at 21:19

## 2017-04-25 RX ADMIN — ISOSORBIDE DINITRATE 20 MILLIGRAM(S): 5 TABLET ORAL at 05:14

## 2017-04-25 RX ADMIN — AMLODIPINE BESYLATE 5 MILLIGRAM(S): 2.5 TABLET ORAL at 08:29

## 2017-04-26 LAB
ANION GAP SERPL CALC-SCNC: 18 MMOL/L — HIGH (ref 5–17)
BUN SERPL-MCNC: 36 MG/DL — HIGH (ref 7–23)
CALCIUM SERPL-MCNC: 9.4 MG/DL — SIGNIFICANT CHANGE UP (ref 8.4–10.5)
CHLORIDE SERPL-SCNC: 105 MMOL/L — SIGNIFICANT CHANGE UP (ref 96–108)
CO2 SERPL-SCNC: 18 MMOL/L — LOW (ref 22–31)
CREAT ?TM UR-MCNC: 104 MG/DL — SIGNIFICANT CHANGE UP
CREAT ?TM UR-MCNC: 106 MG/DL — SIGNIFICANT CHANGE UP
CREAT SERPL-MCNC: 2.16 MG/DL — HIGH (ref 0.5–1.3)
EOSINOPHIL NFR URNS MANUAL: NEGATIVE — SIGNIFICANT CHANGE UP
GLUCOSE SERPL-MCNC: 89 MG/DL — SIGNIFICANT CHANGE UP (ref 70–99)
HCT VFR BLD CALC: 48.6 % — SIGNIFICANT CHANGE UP (ref 39–50)
HGB BLD-MCNC: 15.9 G/DL — SIGNIFICANT CHANGE UP (ref 13–17)
MCHC RBC-ENTMCNC: 29.5 PG — SIGNIFICANT CHANGE UP (ref 27–34)
MCHC RBC-ENTMCNC: 32.7 GM/DL — SIGNIFICANT CHANGE UP (ref 32–36)
MCV RBC AUTO: 90.2 FL — SIGNIFICANT CHANGE UP (ref 80–100)
PLATELET # BLD AUTO: 203 K/UL — SIGNIFICANT CHANGE UP (ref 150–400)
POTASSIUM SERPL-MCNC: 4.7 MMOL/L — SIGNIFICANT CHANGE UP (ref 3.5–5.3)
POTASSIUM SERPL-SCNC: 4.7 MMOL/L — SIGNIFICANT CHANGE UP (ref 3.5–5.3)
PROT ?TM UR-MCNC: 62 MG/DL — HIGH (ref 0–12)
PROT/CREAT UR-RTO: 0.6 RATIO — HIGH (ref 0–0.2)
RBC # BLD: 5.39 M/UL — SIGNIFICANT CHANGE UP (ref 4.2–5.8)
RBC # FLD: 15.7 % — HIGH (ref 10.3–14.5)
SODIUM SERPL-SCNC: 141 MMOL/L — SIGNIFICANT CHANGE UP (ref 135–145)
UUN UR-MCNC: 544 MG/DL — SIGNIFICANT CHANGE UP
UUN UR-MCNC: 650 MG/DL — SIGNIFICANT CHANGE UP
WBC # BLD: 10.95 K/UL — HIGH (ref 3.8–10.5)
WBC # FLD AUTO: 10.95 K/UL — HIGH (ref 3.8–10.5)

## 2017-04-26 PROCEDURE — 76770 US EXAM ABDO BACK WALL COMP: CPT | Mod: 26

## 2017-04-26 RX ADMIN — ISOSORBIDE DINITRATE 20 MILLIGRAM(S): 5 TABLET ORAL at 13:04

## 2017-04-26 RX ADMIN — CLOPIDOGREL BISULFATE 75 MILLIGRAM(S): 75 TABLET, FILM COATED ORAL at 11:38

## 2017-04-26 RX ADMIN — ATORVASTATIN CALCIUM 80 MILLIGRAM(S): 80 TABLET, FILM COATED ORAL at 21:06

## 2017-04-26 RX ADMIN — Medication 20 MILLIGRAM(S): at 05:27

## 2017-04-26 RX ADMIN — CARVEDILOL PHOSPHATE 25 MILLIGRAM(S): 80 CAPSULE, EXTENDED RELEASE ORAL at 17:07

## 2017-04-26 RX ADMIN — ISOSORBIDE DINITRATE 20 MILLIGRAM(S): 5 TABLET ORAL at 21:07

## 2017-04-26 RX ADMIN — Medication 100 MILLIGRAM(S): at 05:26

## 2017-04-26 RX ADMIN — ISOSORBIDE DINITRATE 20 MILLIGRAM(S): 5 TABLET ORAL at 05:26

## 2017-04-26 RX ADMIN — Medication 100 MILLIGRAM(S): at 21:06

## 2017-04-26 RX ADMIN — Medication 81 MILLIGRAM(S): at 11:38

## 2017-04-26 RX ADMIN — Medication 100 MILLIGRAM(S): at 13:04

## 2017-04-26 RX ADMIN — AMLODIPINE BESYLATE 5 MILLIGRAM(S): 2.5 TABLET ORAL at 05:27

## 2017-04-26 RX ADMIN — CARVEDILOL PHOSPHATE 25 MILLIGRAM(S): 80 CAPSULE, EXTENDED RELEASE ORAL at 05:26

## 2017-04-27 LAB
ANION GAP SERPL CALC-SCNC: 10 MMOL/L — SIGNIFICANT CHANGE UP (ref 5–17)
BUN SERPL-MCNC: 45 MG/DL — HIGH (ref 7–23)
CALCIUM SERPL-MCNC: 9.6 MG/DL — SIGNIFICANT CHANGE UP (ref 8.4–10.5)
CHLORIDE SERPL-SCNC: 102 MMOL/L — SIGNIFICANT CHANGE UP (ref 96–108)
CO2 SERPL-SCNC: 23 MMOL/L — SIGNIFICANT CHANGE UP (ref 22–31)
CREAT SERPL-MCNC: 2.38 MG/DL — HIGH (ref 0.5–1.3)
GLUCOSE SERPL-MCNC: 99 MG/DL — SIGNIFICANT CHANGE UP (ref 70–99)
HCT VFR BLD CALC: 46.8 % — SIGNIFICANT CHANGE UP (ref 39–50)
HGB BLD-MCNC: 15.4 G/DL — SIGNIFICANT CHANGE UP (ref 13–17)
MCHC RBC-ENTMCNC: 28.9 PG — SIGNIFICANT CHANGE UP (ref 27–34)
MCHC RBC-ENTMCNC: 32.9 GM/DL — SIGNIFICANT CHANGE UP (ref 32–36)
MCV RBC AUTO: 88 FL — SIGNIFICANT CHANGE UP (ref 80–100)
PLATELET # BLD AUTO: 211 K/UL — SIGNIFICANT CHANGE UP (ref 150–400)
POTASSIUM SERPL-MCNC: 4.2 MMOL/L — SIGNIFICANT CHANGE UP (ref 3.5–5.3)
POTASSIUM SERPL-SCNC: 4.2 MMOL/L — SIGNIFICANT CHANGE UP (ref 3.5–5.3)
RBC # BLD: 5.32 M/UL — SIGNIFICANT CHANGE UP (ref 4.2–5.8)
RBC # FLD: 15.6 % — HIGH (ref 10.3–14.5)
SODIUM SERPL-SCNC: 135 MMOL/L — SIGNIFICANT CHANGE UP (ref 135–145)
WBC # BLD: 11.25 K/UL — HIGH (ref 3.8–10.5)
WBC # FLD AUTO: 11.25 K/UL — HIGH (ref 3.8–10.5)

## 2017-04-27 PROCEDURE — 71020: CPT | Mod: 26

## 2017-04-27 RX ORDER — HYDRALAZINE HCL 50 MG
50 TABLET ORAL THREE TIMES A DAY
Qty: 0 | Refills: 0 | Status: DISCONTINUED | OUTPATIENT
Start: 2017-04-27 | End: 2017-05-01

## 2017-04-27 RX ORDER — SODIUM CHLORIDE 9 MG/ML
1000 INJECTION INTRAMUSCULAR; INTRAVENOUS; SUBCUTANEOUS
Qty: 0 | Refills: 0 | Status: COMPLETED | OUTPATIENT
Start: 2017-04-27 | End: 2017-04-27

## 2017-04-27 RX ADMIN — SODIUM CHLORIDE 75 MILLILITER(S): 9 INJECTION INTRAMUSCULAR; INTRAVENOUS; SUBCUTANEOUS at 18:43

## 2017-04-27 RX ADMIN — Medication 81 MILLIGRAM(S): at 12:57

## 2017-04-27 RX ADMIN — AMLODIPINE BESYLATE 5 MILLIGRAM(S): 2.5 TABLET ORAL at 05:18

## 2017-04-27 RX ADMIN — ISOSORBIDE DINITRATE 20 MILLIGRAM(S): 5 TABLET ORAL at 12:57

## 2017-04-27 RX ADMIN — ATORVASTATIN CALCIUM 80 MILLIGRAM(S): 80 TABLET, FILM COATED ORAL at 21:07

## 2017-04-27 RX ADMIN — Medication 100 MILLIGRAM(S): at 05:18

## 2017-04-27 RX ADMIN — CARVEDILOL PHOSPHATE 25 MILLIGRAM(S): 80 CAPSULE, EXTENDED RELEASE ORAL at 05:18

## 2017-04-27 RX ADMIN — CLOPIDOGREL BISULFATE 75 MILLIGRAM(S): 75 TABLET, FILM COATED ORAL at 12:57

## 2017-04-27 RX ADMIN — Medication 50 MILLIGRAM(S): at 21:06

## 2017-04-27 RX ADMIN — ISOSORBIDE DINITRATE 20 MILLIGRAM(S): 5 TABLET ORAL at 05:18

## 2017-04-27 RX ADMIN — ISOSORBIDE DINITRATE 20 MILLIGRAM(S): 5 TABLET ORAL at 21:06

## 2017-04-27 RX ADMIN — CARVEDILOL PHOSPHATE 25 MILLIGRAM(S): 80 CAPSULE, EXTENDED RELEASE ORAL at 16:58

## 2017-04-28 LAB
ANION GAP SERPL CALC-SCNC: 15 MMOL/L — SIGNIFICANT CHANGE UP (ref 5–17)
BUN SERPL-MCNC: 41 MG/DL — HIGH (ref 7–23)
CALCIUM SERPL-MCNC: 8.6 MG/DL — SIGNIFICANT CHANGE UP (ref 8.4–10.5)
CHLORIDE SERPL-SCNC: 102 MMOL/L — SIGNIFICANT CHANGE UP (ref 96–108)
CK MB CFR SERPL CALC: 2.2 NG/ML — SIGNIFICANT CHANGE UP (ref 0–6.7)
CK SERPL-CCNC: 88 U/L — SIGNIFICANT CHANGE UP (ref 30–200)
CO2 SERPL-SCNC: 19 MMOL/L — LOW (ref 22–31)
CREAT SERPL-MCNC: 2.23 MG/DL — HIGH (ref 0.5–1.3)
GLUCOSE SERPL-MCNC: 97 MG/DL — SIGNIFICANT CHANGE UP (ref 70–99)
HCT VFR BLD CALC: 44.9 % — SIGNIFICANT CHANGE UP (ref 39–50)
HGB BLD-MCNC: 14.4 G/DL — SIGNIFICANT CHANGE UP (ref 13–17)
MCHC RBC-ENTMCNC: 28.4 PG — SIGNIFICANT CHANGE UP (ref 27–34)
MCHC RBC-ENTMCNC: 32.1 GM/DL — SIGNIFICANT CHANGE UP (ref 32–36)
MCV RBC AUTO: 88.6 FL — SIGNIFICANT CHANGE UP (ref 80–100)
PLATELET # BLD AUTO: 205 K/UL — SIGNIFICANT CHANGE UP (ref 150–400)
POTASSIUM SERPL-MCNC: 4.2 MMOL/L — SIGNIFICANT CHANGE UP (ref 3.5–5.3)
POTASSIUM SERPL-SCNC: 4.2 MMOL/L — SIGNIFICANT CHANGE UP (ref 3.5–5.3)
RBC # BLD: 5.07 M/UL — SIGNIFICANT CHANGE UP (ref 4.2–5.8)
RBC # FLD: 15.5 % — HIGH (ref 10.3–14.5)
SODIUM SERPL-SCNC: 136 MMOL/L — SIGNIFICANT CHANGE UP (ref 135–145)
TROPONIN T SERPL-MCNC: 0.56 NG/ML — HIGH (ref 0–0.06)
WBC # BLD: 10.61 K/UL — HIGH (ref 3.8–10.5)
WBC # FLD AUTO: 10.61 K/UL — HIGH (ref 3.8–10.5)

## 2017-04-28 RX ORDER — HEPARIN SODIUM 5000 [USP'U]/ML
5000 INJECTION INTRAVENOUS; SUBCUTANEOUS EVERY 12 HOURS
Qty: 0 | Refills: 0 | Status: DISCONTINUED | OUTPATIENT
Start: 2017-04-28 | End: 2017-05-01

## 2017-04-28 RX ADMIN — HEPARIN SODIUM 5000 UNIT(S): 5000 INJECTION INTRAVENOUS; SUBCUTANEOUS at 17:01

## 2017-04-28 RX ADMIN — Medication 650 MILLIGRAM(S): at 13:44

## 2017-04-28 RX ADMIN — Medication 81 MILLIGRAM(S): at 08:18

## 2017-04-28 RX ADMIN — Medication 650 MILLIGRAM(S): at 13:02

## 2017-04-28 RX ADMIN — ATORVASTATIN CALCIUM 80 MILLIGRAM(S): 80 TABLET, FILM COATED ORAL at 21:10

## 2017-04-28 RX ADMIN — Medication 50 MILLIGRAM(S): at 21:12

## 2017-04-28 RX ADMIN — ISOSORBIDE DINITRATE 20 MILLIGRAM(S): 5 TABLET ORAL at 05:04

## 2017-04-28 RX ADMIN — ISOSORBIDE DINITRATE 20 MILLIGRAM(S): 5 TABLET ORAL at 13:02

## 2017-04-28 RX ADMIN — Medication 50 MILLIGRAM(S): at 13:02

## 2017-04-28 RX ADMIN — CARVEDILOL PHOSPHATE 25 MILLIGRAM(S): 80 CAPSULE, EXTENDED RELEASE ORAL at 05:04

## 2017-04-28 RX ADMIN — CLOPIDOGREL BISULFATE 75 MILLIGRAM(S): 75 TABLET, FILM COATED ORAL at 08:18

## 2017-04-28 RX ADMIN — AMLODIPINE BESYLATE 5 MILLIGRAM(S): 2.5 TABLET ORAL at 05:04

## 2017-04-28 RX ADMIN — CARVEDILOL PHOSPHATE 25 MILLIGRAM(S): 80 CAPSULE, EXTENDED RELEASE ORAL at 17:01

## 2017-04-28 RX ADMIN — Medication 50 MILLIGRAM(S): at 05:04

## 2017-04-28 RX ADMIN — ISOSORBIDE DINITRATE 20 MILLIGRAM(S): 5 TABLET ORAL at 21:10

## 2017-04-29 LAB
ANION GAP SERPL CALC-SCNC: 21 MMOL/L — HIGH (ref 5–17)
BUN SERPL-MCNC: 35 MG/DL — HIGH (ref 7–23)
CALCIUM SERPL-MCNC: 9.5 MG/DL — SIGNIFICANT CHANGE UP (ref 8.4–10.5)
CHLORIDE SERPL-SCNC: 104 MMOL/L — SIGNIFICANT CHANGE UP (ref 96–108)
CO2 SERPL-SCNC: 13 MMOL/L — LOW (ref 22–31)
CREAT SERPL-MCNC: 2.01 MG/DL — HIGH (ref 0.5–1.3)
GLUCOSE SERPL-MCNC: 82 MG/DL — SIGNIFICANT CHANGE UP (ref 70–99)
HCT VFR BLD CALC: 44.5 % — SIGNIFICANT CHANGE UP (ref 39–50)
HGB BLD-MCNC: 14.6 G/DL — SIGNIFICANT CHANGE UP (ref 13–17)
MCHC RBC-ENTMCNC: 30 PG — SIGNIFICANT CHANGE UP (ref 27–34)
MCHC RBC-ENTMCNC: 32.8 GM/DL — SIGNIFICANT CHANGE UP (ref 32–36)
MCV RBC AUTO: 91.6 FL — SIGNIFICANT CHANGE UP (ref 80–100)
PLATELET # BLD AUTO: 196 K/UL — SIGNIFICANT CHANGE UP (ref 150–400)
POTASSIUM SERPL-MCNC: 4.7 MMOL/L — SIGNIFICANT CHANGE UP (ref 3.5–5.3)
POTASSIUM SERPL-SCNC: 4.7 MMOL/L — SIGNIFICANT CHANGE UP (ref 3.5–5.3)
RBC # BLD: 4.86 M/UL — SIGNIFICANT CHANGE UP (ref 4.2–5.8)
RBC # FLD: 15.6 % — HIGH (ref 10.3–14.5)
SODIUM SERPL-SCNC: 138 MMOL/L — SIGNIFICANT CHANGE UP (ref 135–145)
WBC # BLD: 9.9 K/UL — SIGNIFICANT CHANGE UP (ref 3.8–10.5)
WBC # FLD AUTO: 9.9 K/UL — SIGNIFICANT CHANGE UP (ref 3.8–10.5)

## 2017-04-29 RX ADMIN — AMLODIPINE BESYLATE 5 MILLIGRAM(S): 2.5 TABLET ORAL at 05:06

## 2017-04-29 RX ADMIN — ISOSORBIDE DINITRATE 20 MILLIGRAM(S): 5 TABLET ORAL at 13:59

## 2017-04-29 RX ADMIN — ISOSORBIDE DINITRATE 20 MILLIGRAM(S): 5 TABLET ORAL at 21:00

## 2017-04-29 RX ADMIN — HEPARIN SODIUM 5000 UNIT(S): 5000 INJECTION INTRAVENOUS; SUBCUTANEOUS at 05:08

## 2017-04-29 RX ADMIN — Medication 50 MILLIGRAM(S): at 13:59

## 2017-04-29 RX ADMIN — CARVEDILOL PHOSPHATE 25 MILLIGRAM(S): 80 CAPSULE, EXTENDED RELEASE ORAL at 18:53

## 2017-04-29 RX ADMIN — CARVEDILOL PHOSPHATE 25 MILLIGRAM(S): 80 CAPSULE, EXTENDED RELEASE ORAL at 05:06

## 2017-04-29 RX ADMIN — CLOPIDOGREL BISULFATE 75 MILLIGRAM(S): 75 TABLET, FILM COATED ORAL at 12:56

## 2017-04-29 RX ADMIN — HEPARIN SODIUM 5000 UNIT(S): 5000 INJECTION INTRAVENOUS; SUBCUTANEOUS at 18:53

## 2017-04-29 RX ADMIN — ATORVASTATIN CALCIUM 80 MILLIGRAM(S): 80 TABLET, FILM COATED ORAL at 21:00

## 2017-04-29 RX ADMIN — Medication 50 MILLIGRAM(S): at 05:06

## 2017-04-29 RX ADMIN — ISOSORBIDE DINITRATE 20 MILLIGRAM(S): 5 TABLET ORAL at 05:06

## 2017-04-29 RX ADMIN — Medication 50 MILLIGRAM(S): at 21:00

## 2017-04-29 RX ADMIN — Medication 81 MILLIGRAM(S): at 12:56

## 2017-04-30 LAB
ANION GAP SERPL CALC-SCNC: 14 MMOL/L — SIGNIFICANT CHANGE UP (ref 5–17)
ANION GAP SERPL CALC-SCNC: 17 MMOL/L — SIGNIFICANT CHANGE UP (ref 5–17)
BUN SERPL-MCNC: 32 MG/DL — HIGH (ref 7–23)
BUN SERPL-MCNC: 33 MG/DL — HIGH (ref 7–23)
CALCIUM SERPL-MCNC: 8.9 MG/DL — SIGNIFICANT CHANGE UP (ref 8.4–10.5)
CALCIUM SERPL-MCNC: 9.5 MG/DL — SIGNIFICANT CHANGE UP (ref 8.4–10.5)
CHLORIDE SERPL-SCNC: 102 MMOL/L — SIGNIFICANT CHANGE UP (ref 96–108)
CHLORIDE SERPL-SCNC: 102 MMOL/L — SIGNIFICANT CHANGE UP (ref 96–108)
CO2 SERPL-SCNC: 17 MMOL/L — LOW (ref 22–31)
CO2 SERPL-SCNC: 20 MMOL/L — LOW (ref 22–31)
CREAT SERPL-MCNC: 1.88 MG/DL — HIGH (ref 0.5–1.3)
CREAT SERPL-MCNC: 1.89 MG/DL — HIGH (ref 0.5–1.3)
GLUCOSE SERPL-MCNC: 159 MG/DL — HIGH (ref 70–99)
GLUCOSE SERPL-MCNC: 90 MG/DL — SIGNIFICANT CHANGE UP (ref 70–99)
HCT VFR BLD CALC: 45.3 % — SIGNIFICANT CHANGE UP (ref 39–50)
HGB BLD-MCNC: 14.6 G/DL — SIGNIFICANT CHANGE UP (ref 13–17)
MCHC RBC-ENTMCNC: 28.3 PG — SIGNIFICANT CHANGE UP (ref 27–34)
MCHC RBC-ENTMCNC: 32.2 GM/DL — SIGNIFICANT CHANGE UP (ref 32–36)
MCV RBC AUTO: 88 FL — SIGNIFICANT CHANGE UP (ref 80–100)
PLATELET # BLD AUTO: 234 K/UL — SIGNIFICANT CHANGE UP (ref 150–400)
POTASSIUM SERPL-MCNC: 4.1 MMOL/L — SIGNIFICANT CHANGE UP (ref 3.5–5.3)
POTASSIUM SERPL-MCNC: 4.4 MMOL/L — SIGNIFICANT CHANGE UP (ref 3.5–5.3)
POTASSIUM SERPL-SCNC: 4.1 MMOL/L — SIGNIFICANT CHANGE UP (ref 3.5–5.3)
POTASSIUM SERPL-SCNC: 4.4 MMOL/L — SIGNIFICANT CHANGE UP (ref 3.5–5.3)
RBC # BLD: 5.15 M/UL — SIGNIFICANT CHANGE UP (ref 4.2–5.8)
RBC # FLD: 15.1 % — HIGH (ref 10.3–14.5)
SODIUM SERPL-SCNC: 136 MMOL/L — SIGNIFICANT CHANGE UP (ref 135–145)
SODIUM SERPL-SCNC: 136 MMOL/L — SIGNIFICANT CHANGE UP (ref 135–145)
TROPONIN T SERPL-MCNC: 0.16 NG/ML — HIGH (ref 0–0.06)
WBC # BLD: 8.92 K/UL — SIGNIFICANT CHANGE UP (ref 3.8–10.5)
WBC # FLD AUTO: 8.92 K/UL — SIGNIFICANT CHANGE UP (ref 3.8–10.5)

## 2017-04-30 PROCEDURE — 78452 HT MUSCLE IMAGE SPECT MULT: CPT | Mod: 26

## 2017-04-30 PROCEDURE — 93018 CV STRESS TEST I&R ONLY: CPT

## 2017-04-30 PROCEDURE — 93016 CV STRESS TEST SUPVJ ONLY: CPT

## 2017-04-30 RX ORDER — ACETYLCYSTEINE 200 MG/ML
1200 VIAL (ML) MISCELLANEOUS
Qty: 0 | Refills: 0 | Status: DISCONTINUED | OUTPATIENT
Start: 2017-04-30 | End: 2017-05-01

## 2017-04-30 RX ADMIN — ISOSORBIDE DINITRATE 20 MILLIGRAM(S): 5 TABLET ORAL at 16:28

## 2017-04-30 RX ADMIN — CLOPIDOGREL BISULFATE 75 MILLIGRAM(S): 75 TABLET, FILM COATED ORAL at 11:45

## 2017-04-30 RX ADMIN — Medication 81 MILLIGRAM(S): at 11:45

## 2017-04-30 RX ADMIN — HEPARIN SODIUM 5000 UNIT(S): 5000 INJECTION INTRAVENOUS; SUBCUTANEOUS at 18:12

## 2017-04-30 RX ADMIN — Medication 1200 MILLIGRAM(S): at 21:06

## 2017-04-30 RX ADMIN — CARVEDILOL PHOSPHATE 25 MILLIGRAM(S): 80 CAPSULE, EXTENDED RELEASE ORAL at 18:12

## 2017-04-30 RX ADMIN — Medication 1200 MILLIGRAM(S): at 12:38

## 2017-04-30 RX ADMIN — Medication 50 MILLIGRAM(S): at 21:06

## 2017-04-30 RX ADMIN — ISOSORBIDE DINITRATE 20 MILLIGRAM(S): 5 TABLET ORAL at 21:06

## 2017-04-30 RX ADMIN — HEPARIN SODIUM 5000 UNIT(S): 5000 INJECTION INTRAVENOUS; SUBCUTANEOUS at 05:43

## 2017-04-30 RX ADMIN — Medication 50 MILLIGRAM(S): at 16:28

## 2017-04-30 RX ADMIN — AMLODIPINE BESYLATE 5 MILLIGRAM(S): 2.5 TABLET ORAL at 05:43

## 2017-04-30 RX ADMIN — ATORVASTATIN CALCIUM 80 MILLIGRAM(S): 80 TABLET, FILM COATED ORAL at 21:06

## 2017-04-30 RX ADMIN — Medication 50 MILLIGRAM(S): at 05:43

## 2017-04-30 RX ADMIN — NYSTATIN CREAM 1 APPLICATION(S): 100000 CREAM TOPICAL at 16:28

## 2017-04-30 RX ADMIN — NYSTATIN CREAM 1 APPLICATION(S): 100000 CREAM TOPICAL at 21:06

## 2017-04-30 RX ADMIN — ISOSORBIDE DINITRATE 20 MILLIGRAM(S): 5 TABLET ORAL at 05:43

## 2017-05-01 ENCOUNTER — TRANSCRIPTION ENCOUNTER (OUTPATIENT)
Age: 62
End: 2017-05-01

## 2017-05-01 VITALS
SYSTOLIC BLOOD PRESSURE: 134 MMHG | RESPIRATION RATE: 18 BRPM | OXYGEN SATURATION: 96 % | DIASTOLIC BLOOD PRESSURE: 74 MMHG | TEMPERATURE: 98 F | HEART RATE: 54 BPM

## 2017-05-01 PROCEDURE — 87205 SMEAR GRAM STAIN: CPT

## 2017-05-01 PROCEDURE — 85027 COMPLETE CBC AUTOMATED: CPT

## 2017-05-01 PROCEDURE — 86850 RBC ANTIBODY SCREEN: CPT

## 2017-05-01 PROCEDURE — 82570 ASSAY OF URINE CREATININE: CPT

## 2017-05-01 PROCEDURE — 78452 HT MUSCLE IMAGE SPECT MULT: CPT

## 2017-05-01 PROCEDURE — 86900 BLOOD TYPING SEROLOGIC ABO: CPT

## 2017-05-01 PROCEDURE — 76770 US EXAM ABDO BACK WALL COMP: CPT

## 2017-05-01 PROCEDURE — 93005 ELECTROCARDIOGRAM TRACING: CPT

## 2017-05-01 PROCEDURE — 80048 BASIC METABOLIC PNL TOTAL CA: CPT

## 2017-05-01 PROCEDURE — 93306 TTE W/DOPPLER COMPLETE: CPT

## 2017-05-01 PROCEDURE — 84156 ASSAY OF PROTEIN URINE: CPT

## 2017-05-01 PROCEDURE — 94660 CPAP INITIATION&MGMT: CPT

## 2017-05-01 PROCEDURE — 84484 ASSAY OF TROPONIN QUANT: CPT

## 2017-05-01 PROCEDURE — 71046 X-RAY EXAM CHEST 2 VIEWS: CPT

## 2017-05-01 PROCEDURE — 80053 COMPREHEN METABOLIC PANEL: CPT

## 2017-05-01 PROCEDURE — 82553 CREATINE MB FRACTION: CPT

## 2017-05-01 PROCEDURE — 83735 ASSAY OF MAGNESIUM: CPT

## 2017-05-01 PROCEDURE — 84443 ASSAY THYROID STIM HORMONE: CPT

## 2017-05-01 PROCEDURE — 85730 THROMBOPLASTIN TIME PARTIAL: CPT

## 2017-05-01 PROCEDURE — 71045 X-RAY EXAM CHEST 1 VIEW: CPT

## 2017-05-01 PROCEDURE — 80061 LIPID PANEL: CPT

## 2017-05-01 PROCEDURE — 81001 URINALYSIS AUTO W/SCOPE: CPT

## 2017-05-01 PROCEDURE — 85610 PROTHROMBIN TIME: CPT

## 2017-05-01 PROCEDURE — 82550 ASSAY OF CK (CPK): CPT

## 2017-05-01 PROCEDURE — 86901 BLOOD TYPING SEROLOGIC RH(D): CPT

## 2017-05-01 PROCEDURE — 83036 HEMOGLOBIN GLYCOSYLATED A1C: CPT

## 2017-05-01 PROCEDURE — 84100 ASSAY OF PHOSPHORUS: CPT

## 2017-05-01 PROCEDURE — 84540 ASSAY OF URINE/UREA-N: CPT

## 2017-05-01 PROCEDURE — 93017 CV STRESS TEST TRACING ONLY: CPT

## 2017-05-01 RX ORDER — ATORVASTATIN CALCIUM 80 MG/1
1 TABLET, FILM COATED ORAL
Qty: 30 | Refills: 0
Start: 2017-05-01 | End: 2017-05-31

## 2017-05-01 RX ORDER — ATORVASTATIN CALCIUM 80 MG/1
1 TABLET, FILM COATED ORAL
Qty: 0 | Refills: 0 | COMMUNITY
Start: 2017-05-01

## 2017-05-01 RX ORDER — ACETAMINOPHEN 500 MG
2 TABLET ORAL
Qty: 0 | Refills: 0 | COMMUNITY
Start: 2017-05-01

## 2017-05-01 RX ORDER — ASPIRIN/CALCIUM CARB/MAGNESIUM 324 MG
1 TABLET ORAL
Qty: 0 | Refills: 0 | DISCHARGE
Start: 2017-05-01

## 2017-05-01 RX ORDER — ISOSORBIDE DINITRATE 5 MG/1
1 TABLET ORAL
Qty: 90 | Refills: 0 | OUTPATIENT
Start: 2017-05-01 | End: 2017-05-31

## 2017-05-01 RX ORDER — HYDRALAZINE HCL 50 MG
1 TABLET ORAL
Qty: 0 | Refills: 0 | COMMUNITY
Start: 2017-05-01 | End: 2017-05-31

## 2017-05-01 RX ORDER — AMLODIPINE BESYLATE 2.5 MG/1
1 TABLET ORAL
Qty: 0 | Refills: 0 | COMMUNITY
Start: 2017-05-01

## 2017-05-01 RX ORDER — AMLODIPINE BESYLATE 2.5 MG/1
1 TABLET ORAL
Qty: 30 | Refills: 0 | OUTPATIENT
Start: 2017-05-01 | End: 2017-05-31

## 2017-05-01 RX ORDER — HYDRALAZINE HCL 50 MG
1 TABLET ORAL
Qty: 90 | Refills: 0 | OUTPATIENT
Start: 2017-05-01 | End: 2017-05-31

## 2017-05-01 RX ORDER — HYDRALAZINE HCL 50 MG
1 TABLET ORAL
Qty: 0 | Refills: 0 | COMMUNITY
Start: 2017-05-01

## 2017-05-01 RX ORDER — AMLODIPINE BESYLATE 2.5 MG/1
10 TABLET ORAL DAILY
Qty: 0 | Refills: 0 | Status: DISCONTINUED | OUTPATIENT
Start: 2017-05-01 | End: 2017-05-01

## 2017-05-01 RX ADMIN — HEPARIN SODIUM 5000 UNIT(S): 5000 INJECTION INTRAVENOUS; SUBCUTANEOUS at 05:59

## 2017-05-01 RX ADMIN — NYSTATIN CREAM 1 APPLICATION(S): 100000 CREAM TOPICAL at 06:00

## 2017-05-01 RX ADMIN — Medication 50 MILLIGRAM(S): at 06:00

## 2017-05-01 RX ADMIN — AMLODIPINE BESYLATE 10 MILLIGRAM(S): 2.5 TABLET ORAL at 05:59

## 2017-05-01 RX ADMIN — CARVEDILOL PHOSPHATE 25 MILLIGRAM(S): 80 CAPSULE, EXTENDED RELEASE ORAL at 05:59

## 2017-05-01 RX ADMIN — Medication 50 MILLIGRAM(S): at 13:57

## 2017-05-01 RX ADMIN — ISOSORBIDE DINITRATE 20 MILLIGRAM(S): 5 TABLET ORAL at 13:57

## 2017-05-01 RX ADMIN — CLOPIDOGREL BISULFATE 75 MILLIGRAM(S): 75 TABLET, FILM COATED ORAL at 13:58

## 2017-05-01 RX ADMIN — ISOSORBIDE DINITRATE 20 MILLIGRAM(S): 5 TABLET ORAL at 06:00

## 2017-05-01 RX ADMIN — Medication 81 MILLIGRAM(S): at 13:57

## 2017-05-01 RX ADMIN — Medication 1200 MILLIGRAM(S): at 13:58

## 2017-05-01 NOTE — DISCHARGE NOTE ADULT - CARE PROVIDER_API CALL
Bijan Benavidez (BRIGITTE), Cardiology  [address] [place] [address]  Phone: [phone]  Fax: [phone]    Elliot Mayen (BRIGITTE), Internal Medicine; Nephrology  [address] [place] [address]  Phone: [phone]  Fax: [phone]

## 2017-05-01 NOTE — DISCHARGE NOTE ADULT - MEDICATION SUMMARY - MEDICATIONS TO TAKE
I will START or STAY ON the medications listed below when I get home from the hospital:    acetaminophen 325 mg oral tablet  -- 2 tab(s) by mouth every 6 hours, As needed, Moderate Pain (4 - 6)  -- Indication: For Pain    aspirin 81 mg oral delayed release tablet  -- 1 tab(s) by mouth once a day  -- Indication: For CAD (coronary artery disease)    isosorbide dinitrate 20 mg oral tablet  -- 1 tab(s) by mouth 3 times a day  -- Indication: For NSTEMI (non-ST elevated myocardial infarction)    atorvastatin 80 mg oral tablet  -- 1 tab(s) by mouth once a day (at bedtime)  -- Indication: For Hyperlipidemia, unspecified hyperlipidemia type    clopidogrel 75 mg oral tablet  -- 1 tab(s) by mouth once a day  -- Indication: For CAD (coronary artery disease)    carvedilol 25 mg oral tablet  -- 1 tab(s) by mouth every 12 hours  -- Indication: For NSTEMI (non-ST elevated myocardial infarction)    amLODIPine 10 mg oral tablet  -- 1 tab(s) by mouth once a day  -- Indication: For High Blood Pressure    hydrALAZINE 50 mg oral tablet  -- 1 tab(s) by mouth 3 times a day  -- Indication: For High Blood pressure

## 2017-05-01 NOTE — DISCHARGE NOTE ADULT - REASON FOR ADMISSION
61M w/ progressive SOB on exertion with hypertensive emergency. 61M w/ progressive SOB on exertion with hypertensive emergency and NSTEMI

## 2017-05-01 NOTE — DISCHARGE NOTE ADULT - ADDITIONAL INSTRUCTIONS
Follow up with Dr. Thakkar (cardiology) [phone] within 1 week of discharge     Follow up with Dr. Moyer (Nephrology) [phone] with 10 days of discharge

## 2017-05-01 NOTE — DISCHARGE NOTE ADULT - MEDICATION SUMMARY - MEDICATIONS TO STOP TAKING
I will STOP taking the medications listed below when I get home from the hospital:    aspirin 325 mg oral tablet  -- 1 tab(s) by mouth once a day    heparin 100 units/mL-D5% intravenous solution  --  intravenous    lisinopril 20 mg oral tablet  -- 1 tab(s) by mouth once a day    atorvastatin 20 mg oral tablet  -- 1 tab(s) by mouth once a day    isosorbide dinitrate 10 mg oral tablet  -- 10 milligram(s) by mouth 3 times a day    Advil 200 mg oral tablet  -- 1 tab(s) by mouth every 6 hours, As Needed for pain

## 2017-05-01 NOTE — DISCHARGE NOTE ADULT - PLAN OF CARE
Resolution of symptoms, medication compliance Call your doctor if you have unusual chest pain, pressure, or discomfort, shortness of breath, nausea, vomiting, burping, heartburn, tingling upper body parts, sweating, cold, clammy skin, racing heartbeat  Call 911 if you think you are having a heart attack  Take all cardiac medications as prescribed - notify your doctor if you have any side effects  Follow cardiac diet - avoid fatty & fried foods, don't eat too much red meat, eat lots of fruits & vegetables, dairy products should be low fat  Lose weight if you are overweight  Become more active with walking, gardening, or any other activity that gets you to move Low salt diet  Activity as tolerated.  Take all medication as prescribed.  Follow up with your medical doctor for routine blood pressure monitoring at your next visit.  Notify your doctor if you have any of the following symptoms:   Dizziness, Lightheadedness, Blurry vision, Headache, Chest pain, Shortness of breath Coronary artery disease is a condition where the arteries the supply the heart muscle get clogges with fatty deposits & puts you at risk for a heart attack  Call your doctor if you have any new pain, pressure, or discomfort in the center of your chest, pain, tingling or discomfort in arms, back, neck, jaw, or stomach, shortness of breath, nausea, vomiting, burping or heartburn, sweating, cold and clammy skin, racing or abnormal heartbeat for more than 10 minutes or if they keep coming & going.  Call 911 and do not tr to get to hospital by care  You can help yourself with lefestyle changes (quitting smoking if you smoke), eat lots of fruits & vegetables & low fat dairy products, not a lot of meat & fatty foods, walk or some form of physical activity most days of the week, lose weight if you are overweight  Take your cardiac medication as prescribed to lower cholesterol, to lower blood pressure, aspirin to prevent blood clots, and diabetes control  Make sure to keep appointments with doctor for cardiac follow up care Avoid taking (NSAIDs) - (ex: Ibuprofen, Advil, Celebrex, Naprosyn)  Avoid taking any nephrotoxic agents (can harm kidneys) - Intravenous contrast for diagnostic testing, combination cold medications.  Have all medications adjusted for your renal function by your Health Care Provider.  Blood pressure control is important.  Take all medication as prescribed.

## 2017-05-01 NOTE — DISCHARGE NOTE ADULT - CARE PLAN
Principal Discharge DX:	NSTEMI (non-ST elevated myocardial infarction)  Goal:	Resolution of symptoms, medication compliance  Instructions for follow-up, activity and diet:	Call your doctor if you have unusual chest pain, pressure, or discomfort, shortness of breath, nausea, vomiting, burping, heartburn, tingling upper body parts, sweating, cold, clammy skin, racing heartbeat  Call 911 if you think you are having a heart attack  Take all cardiac medications as prescribed - notify your doctor if you have any side effects  Follow cardiac diet - avoid fatty & fried foods, don't eat too much red meat, eat lots of fruits & vegetables, dairy products should be low fat  Lose weight if you are overweight  Become more active with walking, gardening, or any other activity that gets you to move  Secondary Diagnosis:	Hypertensive emergency  Secondary Diagnosis:	CAD (coronary artery disease)  Secondary Diagnosis:	NARCISO (acute kidney injury) Principal Discharge DX:	NSTEMI (non-ST elevated myocardial infarction)  Goal:	Resolution of symptoms, medication compliance  Instructions for follow-up, activity and diet:	Call your doctor if you have unusual chest pain, pressure, or discomfort, shortness of breath, nausea, vomiting, burping, heartburn, tingling upper body parts, sweating, cold, clammy skin, racing heartbeat  Call 911 if you think you are having a heart attack  Take all cardiac medications as prescribed - notify your doctor if you have any side effects  Follow cardiac diet - avoid fatty & fried foods, don't eat too much red meat, eat lots of fruits & vegetables, dairy products should be low fat  Lose weight if you are overweight  Become more active with walking, gardening, or any other activity that gets you to move  Secondary Diagnosis:	Hypertensive emergency  Instructions for follow-up, activity and diet:	Low salt diet  Activity as tolerated.  Take all medication as prescribed.  Follow up with your medical doctor for routine blood pressure monitoring at your next visit.  Notify your doctor if you have any of the following symptoms:   Dizziness, Lightheadedness, Blurry vision, Headache, Chest pain, Shortness of breath  Secondary Diagnosis:	CAD (coronary artery disease)  Instructions for follow-up, activity and diet:	Coronary artery disease is a condition where the arteries the supply the heart muscle get clogges with fatty deposits & puts you at risk for a heart attack  Call your doctor if you have any new pain, pressure, or discomfort in the center of your chest, pain, tingling or discomfort in arms, back, neck, jaw, or stomach, shortness of breath, nausea, vomiting, burping or heartburn, sweating, cold and clammy skin, racing or abnormal heartbeat for more than 10 minutes or if they keep coming & going.  Call 911 and do not tr to get to hospital by care  You can help yourself with lefestyle changes (quitting smoking if you smoke), eat lots of fruits & vegetables & low fat dairy products, not a lot of meat & fatty foods, walk or some form of physical activity most days of the week, lose weight if you are overweight  Take your cardiac medication as prescribed to lower cholesterol, to lower blood pressure, aspirin to prevent blood clots, and diabetes control  Make sure to keep appointments with doctor for cardiac follow up care  Secondary Diagnosis:	NARCISO (acute kidney injury)  Instructions for follow-up, activity and diet:	Avoid taking (NSAIDs) - (ex: Ibuprofen, Advil, Celebrex, Naprosyn)  Avoid taking any nephrotoxic agents (can harm kidneys) - Intravenous contrast for diagnostic testing, combination cold medications.  Have all medications adjusted for your renal function by your Health Care Provider.  Blood pressure control is important.  Take all medication as prescribed.

## 2017-05-01 NOTE — DISCHARGE NOTE ADULT - MEDICATION SUMMARY - MEDICATIONS TO CHANGE
I will SWITCH the dose or number of times a day I take the medications listed below when I get home from the hospital:    nitroglycerin  --

## 2017-05-01 NOTE — DISCHARGE NOTE ADULT - PATIENT PORTAL LINK FT
“You can access the [facility] Patient Portal, offered by [facility], by registering with the following website: [email or web address]”

## 2017-05-01 NOTE — DISCHARGE NOTE ADULT - HOSPITAL COURSE
Attending to complete 60 y/o male PMH CAD s/p CABG(2005,Adrián) with subsequent stents (2008,2010,Lady), 40pack year smoker (quit 2wks prior), HTN, & HLD presented to [facility] w/ sob & found to have HTN emergency (235/146) w/ NSTEMI and therefore transferred to [facility] CCU to optimize prior to cardiac catheterization w/ continued monitoring of NARCISO and mixed hypercarbic/hypoxic respiratory failure    Dx: HTN emergency - improved        NSTEMI        NARCISO on CKD 3, awaiting improvement for cardiac cath.    s/p ccu stay, pt had echo / stress test which were consistent with ischemic cardiomyopathy , cardiac meds were optimized and dec ed with out pt cards f/u , poss cardiac cath in future if siymptoms recur as per cards , cardiac cath deferred at present as per cards and pt

## 2017-12-13 ENCOUNTER — INPATIENT (INPATIENT)
Facility: HOSPITAL | Age: 62
LOS: 5 days | Discharge: HOME | DRG: 280 | End: 2017-12-19
Attending: INTERNAL MEDICINE | Admitting: INTERNAL MEDICINE
Payer: MEDICARE

## 2017-12-13 VITALS
HEART RATE: 96 BPM | RESPIRATION RATE: 38 BRPM | DIASTOLIC BLOOD PRESSURE: 109 MMHG | TEMPERATURE: 98 F | SYSTOLIC BLOOD PRESSURE: 137 MMHG | OXYGEN SATURATION: 89 %

## 2017-12-13 LAB
ALBUMIN SERPL ELPH-MCNC: 4.1 G/DL — SIGNIFICANT CHANGE UP (ref 3.3–5)
ALP SERPL-CCNC: 172 U/L — HIGH (ref 40–120)
ALT FLD-CCNC: 159 U/L RC — HIGH (ref 10–45)
ANION GAP SERPL CALC-SCNC: 20 MMOL/L — HIGH (ref 5–17)
APTT BLD: 29.5 SEC — SIGNIFICANT CHANGE UP (ref 27.5–37.4)
AST SERPL-CCNC: 197 U/L — HIGH (ref 10–40)
BASOPHILS # BLD AUTO: 0 K/UL — SIGNIFICANT CHANGE UP (ref 0–0.2)
BASOPHILS NFR BLD AUTO: 0.3 % — SIGNIFICANT CHANGE UP (ref 0–2)
BILIRUB SERPL-MCNC: 0.8 MG/DL — SIGNIFICANT CHANGE UP (ref 0.2–1.2)
BUN SERPL-MCNC: 26 MG/DL — HIGH (ref 7–23)
CALCIUM SERPL-MCNC: 8.8 MG/DL — SIGNIFICANT CHANGE UP (ref 8.4–10.5)
CHLORIDE SERPL-SCNC: 104 MMOL/L — SIGNIFICANT CHANGE UP (ref 96–108)
CO2 SERPL-SCNC: 16 MMOL/L — LOW (ref 22–31)
CREAT SERPL-MCNC: 1.86 MG/DL — HIGH (ref 0.5–1.3)
EOSINOPHIL # BLD AUTO: 0.1 K/UL — SIGNIFICANT CHANGE UP (ref 0–0.5)
EOSINOPHIL NFR BLD AUTO: 0.3 % — SIGNIFICANT CHANGE UP (ref 0–6)
GAS PNL BLDV: SIGNIFICANT CHANGE UP
GLUCOSE SERPL-MCNC: 239 MG/DL — HIGH (ref 70–99)
HCT VFR BLD CALC: 44.1 % — SIGNIFICANT CHANGE UP (ref 39–50)
HGB BLD-MCNC: 14.2 G/DL — SIGNIFICANT CHANGE UP (ref 13–17)
INR BLD: 1.07 RATIO — SIGNIFICANT CHANGE UP (ref 0.88–1.16)
LYMPHOCYTES # BLD AUTO: 1.9 K/UL — SIGNIFICANT CHANGE UP (ref 1–3.3)
LYMPHOCYTES # BLD AUTO: 11 % — LOW (ref 13–44)
MCHC RBC-ENTMCNC: 32.1 GM/DL — SIGNIFICANT CHANGE UP (ref 32–36)
MCHC RBC-ENTMCNC: 32.3 PG — SIGNIFICANT CHANGE UP (ref 27–34)
MCV RBC AUTO: 101 FL — HIGH (ref 80–100)
MONOCYTES # BLD AUTO: 0.6 K/UL — SIGNIFICANT CHANGE UP (ref 0–0.9)
MONOCYTES NFR BLD AUTO: 3.6 % — SIGNIFICANT CHANGE UP (ref 2–14)
NEUTROPHILS # BLD AUTO: 14.6 K/UL — HIGH (ref 1.8–7.4)
NEUTROPHILS NFR BLD AUTO: 84.8 % — HIGH (ref 43–77)
NT-PROBNP SERPL-SCNC: 2712 PG/ML — HIGH (ref 0–300)
PLATELET # BLD AUTO: 230 K/UL — SIGNIFICANT CHANGE UP (ref 150–400)
POTASSIUM SERPL-MCNC: 4.1 MMOL/L — SIGNIFICANT CHANGE UP (ref 3.5–5.3)
POTASSIUM SERPL-SCNC: 4.1 MMOL/L — SIGNIFICANT CHANGE UP (ref 3.5–5.3)
PROT SERPL-MCNC: 7.4 G/DL — SIGNIFICANT CHANGE UP (ref 6–8.3)
PROTHROM AB SERPL-ACNC: 11.6 SEC — SIGNIFICANT CHANGE UP (ref 9.8–12.7)
RBC # BLD: 4.39 M/UL — SIGNIFICANT CHANGE UP (ref 4.2–5.8)
RBC # FLD: 15.7 % — HIGH (ref 10.3–14.5)
SODIUM SERPL-SCNC: 140 MMOL/L — SIGNIFICANT CHANGE UP (ref 135–145)
TROPONIN T SERPL-MCNC: 0.02 NG/ML — SIGNIFICANT CHANGE UP (ref 0–0.06)
WBC # BLD: 17.2 K/UL — HIGH (ref 3.8–10.5)
WBC # FLD AUTO: 17.2 K/UL — HIGH (ref 3.8–10.5)

## 2017-12-13 PROCEDURE — 71010: CPT | Mod: 26

## 2017-12-13 PROCEDURE — 99291 CRITICAL CARE FIRST HOUR: CPT

## 2017-12-13 RX ORDER — FUROSEMIDE 40 MG
40 TABLET ORAL ONCE
Qty: 0 | Refills: 0 | Status: COMPLETED | OUTPATIENT
Start: 2017-12-13 | End: 2017-12-13

## 2017-12-13 RX ORDER — ASPIRIN/CALCIUM CARB/MAGNESIUM 324 MG
325 TABLET ORAL ONCE
Qty: 0 | Refills: 0 | Status: DISCONTINUED | OUTPATIENT
Start: 2017-12-13 | End: 2017-12-13

## 2017-12-13 RX ORDER — ASPIRIN/CALCIUM CARB/MAGNESIUM 324 MG
243 TABLET ORAL DAILY
Qty: 0 | Refills: 0 | Status: DISCONTINUED | OUTPATIENT
Start: 2017-12-13 | End: 2017-12-14

## 2017-12-13 RX ORDER — NITROGLYCERIN 6.5 MG
0.4 CAPSULE, EXTENDED RELEASE ORAL ONCE
Qty: 0 | Refills: 0 | Status: COMPLETED | OUTPATIENT
Start: 2017-12-13 | End: 2017-12-13

## 2017-12-13 RX ORDER — TICAGRELOR 90 MG/1
180 TABLET ORAL ONCE
Qty: 0 | Refills: 0 | Status: COMPLETED | OUTPATIENT
Start: 2017-12-13 | End: 2017-12-13

## 2017-12-13 RX ADMIN — Medication 243 MILLIGRAM(S): at 20:29

## 2017-12-13 RX ADMIN — Medication 0.4 MILLIGRAM(S): at 19:59

## 2017-12-13 RX ADMIN — Medication 40 MILLIGRAM(S): at 19:41

## 2017-12-13 RX ADMIN — TICAGRELOR 180 MILLIGRAM(S): 90 TABLET ORAL at 20:04

## 2017-12-13 RX ADMIN — Medication 40 MILLIGRAM(S): at 22:06

## 2017-12-13 NOTE — ED PROVIDER NOTE - CRITICAL CARE PROVIDED
direct patient care (not related to procedure)/interpretation of diagnostic studies/consultation with other physicians/consult w/ pt's family directly relating to pts condition

## 2017-12-13 NOTE — ED PROVIDER NOTE - PROGRESS NOTE DETAILS
MAREN Liz MD: Per Cardiology, we will obtain emergent echo to help determine whether pt requires cath tonight or tomorrow. Patient stable, breathing comfortably and speaking in complete sentences on bipap. Admitted to Dr. Vieyra.  Jackelyn Fowler, PGY3 Noam KUMAR: Patient rassessed and reprots improvement of his resp status. No new symptoms, Denies CP or palpitations at this time. Dr Vieyra called and she is asking to call Hospitalists as they are doing the admission for her. Lab called with trop of 5.  Alerted cardiology.  Ordered repeat EKG.  Assessed patient ad bedside, complains of general discomfort and dyspnea but no chest pain. Told about elevated troponin, cardiology to see shortly.  Jackelyn Fowler, PGY3

## 2017-12-13 NOTE — ED PROVIDER NOTE - MEDICAL DECISION MAKING DETAILS
MAREN Liz MD: 61 y/o male with PMH CAD s/p CABG and stents is BIB EMS for respiratory distress. Per pt, he had CP since early this AM and gradually worsening dyspnea. He called daughter this evening to say he wasn't feeling well, and she found him in respiratory distress, appearing cyanotic. EMS stated SpO2 in 70's despite CPAP. EKG with ASHOK in AVR, V1, V2, V3, STEMI alert called and cardiology arrived within minutes. They recommended ASA, brillinta for possible cath.  DDx: ACS, flash pulmonary edema, CHF exacerbation  Plan: Cardiology STEMI consult, basic labs, trop, bnp, cxr

## 2017-12-13 NOTE — ED ADULT NURSE NOTE - OBJECTIVE STATEMENT
63 yo M pmh HLD, HTN, CAD, CABG, came to ED by ambulance from home c/o chest pain, SOB, tachypnea starting this morning, 12/13/17.  Pt states that he has been having SOB for the past two weeks and the left pectoral chest pain had started in the morning around the time he woke up, and has progressively gotten worse throughout the day until he called EMS.   Pt states that he also felt nauseous as the symptoms progressed. As per EMS, pt was sating in the low 80s and placed pt on CPAP for transport to ED.  Pt received 1 baby ASA prior to arrival to ED.  Denies Back pain, radiation of pain, fevers/chills, v/d, lightheadedness, dizziness.  A&Ox4, pt placed on cardiac monitor, EKG done at bedside.  Pt visible in respiratory distress, placed on BiPAP.  diminished sound in lungs bilaterally.  +peripheral pulses bilaterally.   Family present at bedside.  Safety and comfort maintained.  Will continue to monitor.

## 2017-12-13 NOTE — CHART NOTE - NSCHARTNOTEFT_GEN_A_CORE
Re-evaluated patient, he appears more comfortable and is easily speaking in full sentences without any observed distress. Reports improvement in SOB and near resolution of chest discomfort. Initial Trop 0.02; TTE done  - Updated Dr. Block re initial negative troponin; No plan for cardiac cath overnight.   - f/u TTE   - Trend enzymes and serial EKGs  - c/w diuresis and will titrate off bipap when able   - Telemetry   - No need for CCU at this time.   - Cardiology to follow    MARYURI Bah [id number]

## 2017-12-13 NOTE — CONSULT NOTE ADULT - SUBJECTIVE AND OBJECTIVE BOX
Registration Time: 7:35pm  Called by ED: 7:45pm  Saw patient at bedside: 7:48pm  Called Cath Attendin:52pm     Patient seen and evaluated at bedside    Outpatient Cardiologist:  Dr. KARLA Mckeon    Chief Complaint: SOB and CP    HPI:    60 yo gentleman w/CAD s/p CABG(,WANDAJudyJean) w/ subsequent stents (,,[facility]-), heavy smoker,  HTN, & HLD p/w CP and SOB. Reports substernal CP started earlier this morning, intermittent nonexertional. The pain has lasted on/off all day. Associated with nausea. No diaphoresis, lightheadedness, or palpitations. Reports associated SOB and notes LE edema and increasing orthopnea/SOB for weeks.   ROS otherwise negative for fevers/chills, sick contacts, medication changes, dysuria, or change in urination.      PMH:   Psoriasis  HLD (hyperlipidemia)  CAD (coronary artery disease)  Hypertension      PSH:   History of coronary artery stent placement  S/P quadruple vessel bypass      Medications:   aspirin  chewable 243 milliGRAM(s) Oral daily    Home Meds:   ASA 81mg qd  Atorvastatin 80mg qd  Coreg 25mg BID  Lasix 40mg qd  Hydralazine 50mg TID  Isosorbide dinitrate 20mg TID  Prednisone 30mg qd  Pantoprazole 20mg qd       Allergies:  No Known Allergies      FAMILY HISTORY:  Family history of cerebrovascular accident (CVA) (Father)  Family history of early CAD (Father): father who  of CVA.      Social: Heavy smoking history     Review of Systems:  REVIEW OF SYSTEMS:    CONSTITUTIONAL: No weakness, fevers or chills  EYES/ENT: No visual changes;  No dysphagia  NECK: No pain or stiffness  RESPIRATORY: No cough, wheezing, hemoptysis;   CARDIOVASCULAR: No palpitations;   GASTROINTESTINAL: No abdominal or epigastric pain. No  vomiting, or hematemesis; No diarrhea or constipation. No melena or hematochezia.  BACK: No back pain  GENITOURINARY: No dysuria, frequency or hematuria  NEUROLOGICAL: No numbness or weakness  SKIN: No itching, burning, rashes, or lesions   All other review of systems is negative unless indicated above.    Physical Exam:  T(F): 97.7 (12-13), Max: 97.7 (12-13)  HR: 81 (12-13) (81 - 96)  BP: 143/91 (12-13) (122/87 - 143/91)  RR: 26 (12-13)  SpO2: 99% (12-13)  GENERAL: Able to speak in full sentences on bipap, family member is at bedside  HEAD:  Atraumatic, Normocephalic  ENT: EOMI, PERRLA, conjunctiva and sclera clear, Neck supple, Difficult to appreciate JVD, moist mucosa  CHEST/LUNG: Good air entry, slightly decreased at bases   BACK: No spinal tenderness  HEART: Regular rate and rhythm; No murmurs, rubs, or gallops  ABDOMEN: Soft, Nontender, Nondistended; Bowel sounds present  EXTREMITIES:  No clubbing, cyanosis, or edema  PSYCH: Nl behavior, nl affect  NEUROLOGY: AAOx3, non-focal, cranial nerves intact  SKIN: Normal color, No rashes or lesions  LINES:    Cardiovascular Diagnostic Testing:    ECG: Personally reviewed; NSR HR 87 noted to have ASHOK in V2 and AVR     Stress Testing:    Cath: < from: Cardiac Cath Lab - Adult (03.23.15 @ 09:16) >    LM:   --  LM: The vessel was medium to large sized. Angiography showed mild  atherosclerosis with no flow limiting lesions.  LAD:   --  Mid LAD: There was a diffuse 100 % stenosis at a site with no  prior intervention, just after S1. There was TIFFANIE grade 0 flow through the  vessel (no flow) and a moderate-sized vascular territory distal to the  lesion. This lesion is achronic total occlusion.  --  D1: There was a diffuse 95 % stenosis at a site with no prior  intervention. The lesion was irregularly contoured. There was TIFFANIE grade 3  flow through the vessel (brisk flow) and a small vascular territory distal  to the lesion.  CX:   --  Mid circumflex: There was a diffuse 99 % stenosis at a site with  no prior intervention. The lesion was irregularly contoured and hazy.  There was TIFFANIE grade 2 flow through the vessel (partial perfusion) and a  moderate-sized vascular territory distal to the lesion.  RCA:   --  Proximal RCA: There was a diffuse 95 % stenosis at a site with  no prior intervention. There was TIFFANIE grade 3 flow through the vessel  (brisk flow) and a moderate-sized vascular territory distal to the lesion.  It does not appear amenable to intervention.  --  RPDA: There was a tubular 95 % stenosis at a site with no prior  intervention. The lesion was irregularly contoured and hazy. There was  TIFFANIE grade 3 flow through the vessel (brisk flow) and asmall vascular  territory distal to the lesion. It does not appear amenable to  intervention.  --  RPLS: There was a diffuse 99 % stenosis at a site with no prior  intervention. The lesion was irregularly contoured and hazy. There was  TIFFANIE grade 2 flow through the vessel (partial perfusion) and a  moderate-sized vascular territory distal to the lesion. It does not appear  amenable to intervention.  GRAFTS:   --  Graft to the LAD: The graft was a medium sized LIMA. Graft  angiography showed no evidence of disease and no degeneration. Distal  vessel angiography showed a medium sized vessel and multiple discrete  lesions. Distal LAD feeds collaterals to the diagonal and the distal RCA.  --  Graft to the 2nd diagonal: The graft was a saphenous vein graft from  the aorta. There was a 100 % stenosis at the proximal anastomosis.  --  Graft to the 1st obtuse marginal: The graft was a saphenous vein graft  from the aorta. There was a 100 % stenosis at the proximal anastomosis.  There was a 100 % stenosis in the middle third of the graft.  COMPLICATIONS: There were no complications.  DIAGNOSTIC IMPRESSIONS: Severe native vessel disease  LIMA to LAD patent, but native LAD has severe tandem lesions  All remaining grafts are occluded  DIAGNOSTIC RECOMMENDATIONS: In view of severe diffuse native vessel disease  recommend to optimize anti-anginal therapy.  If patient fails medical therapy then PCI to the native vessels can be  considered.  INTERVENTIONAL IMPRESSIONS: Severe native vessel disease  LIMA to LAD patent, but native LAD has severe tandem lesions  All remaining grafts are occluded  INTERVENTIONAL RECOMMENDATIONS: In view of severe diffuse native vessel  disease recommend to optimize anti-anginal therapy.  If patient fails medical therapy then PCI to the native vessels can be  considered.    < from: Transthoracic Echocardiogram (17 @ 14:08) >  ------------------------------------------------------------------------  Dimensions:    Normal Values:  LA:     4.6    2.0 - 4.0 cm  Ao:     3.4    2.0 - 3.8 cm  SEPTUM: 1.0    0.6 - 1.2 cm  PWT:    1.2    0.6 - 1.1 cm  LVIDd:  5.7    3.0 - 5.6 cm  LVIDs:  4.5    1.8 - 4.0 cm  Derived variables:  LVMI: 121 g/m2  RWT: 0.42  Doppler Peak Velocity (m/sec): AoV=1.6  ------------------------------------------------------------------------  Observations:  Mitral Valve: Mitral annular calcification. Tethered mitral  valve leaflets. Minimal mitral regurgitation.  Aortic Valve/Aorta: Calcified trileaflet aortic valve with  normal opening. Mild-moderate aorticregurgitation.  Aortic Root: 3.4 cm.  Left Atrium: Normal left atrium.  LA volume index = 34  cc/m2.  Left Ventricle: Moderate to severe segmental left  ventricular systolic dysfunction. The lateral, basal to mid  inferior and basal to mid inferoseptal walls are  hypokinetic.  Eccentric left ventricular hypertrophy  (dilated left ventricle with normal relative wall  thickness).  Right Heart: Normal right atrium. The right ventricle is  not well visualized grossly decreased function. Normal  tricuspid valve. Normal pulmonic valve. Minimal pulmonic  regurgitation.  Pericardium/Pleura: Normal pericardium with no pericardial  effusion.  Hemodynamic: Estimated right atrial pressure is 8 mm Hg.  ------------------------------------------------------------------------  Conclusions:  1. Mitral annular calcification. Tethered mitral valve  leaflets. Minimal mitral regurgitation.  2. Calcified trileaflet aortic valve with normal opening.  Mild-moderate aortic regurgitation.  3. Eccentric left ventricular hypertrophy (dilated left  ventricle with normal relative wall thickness).  4. Moderate to severe segmental left ventricular systolic  dysfunction. The lateral, basal to mid inferior and basal  to mid inferoseptal walls are hypokinetic.  5. Theright ventricle is not well visualized grossly  decreased function.    CXR w/cephalization concerning for volume overload; no consolidates     Labs: Personally reviewed                        14.2   17.2  )-----------( 230      ( 13 Dec 2017 20:03 )             44.1         140  |  104  |  26<H>  ----------------------------<  239<H>  4.1   |  16<L>  |  1.86<H>    Ca    8.8      13 Dec 2017 20:03    TPro  7.4  /  Alb  4.1  /  TBili  0.8  /  DBili  x   /  AST  197<H>  /  ALT  159<H>  /  AlkPhos  172<H>  12    PT/INR - ( 13 Dec 2017 20:03 )   PT: 11.6 sec;   INR: 1.07 ratio         PTT - ( 13 Dec 2017 20:03 )  PTT:29.5 sec  CARDIAC MARKERS ( 13 Dec 2017 20:03 )  x     / 0.02 ng/mL / x     / x     / x          Serum Pro-Brain Natriuretic Peptide: 2712 pg/mL ( @ 20:03)    A/P:   61M w/CAD s/p CABG(,Adrián) last PCI , sCHF,  HTN, & HLD p/w CP and SOB cardiology consulted for cath consult for ECG changes found to be in respiratory distress requiring bipap with elevated BNP and EKG with ASHOK in AVR, V1 likely constellation of symptoms related to volume overload.     #Chest Pain- may be related to ischemia in setting of volume overload (on exam and elev BNP); EKG does not meet STEMI criteria; Known CAD per last cath  with plan to attempt optimal medical management. Cr 1.8  - Recommend Lasix 80mg x1 and monitor strict I&Os  - Telemetry   - c/w home antihypertensive regimen   - Stat TTE to eval if any wall motion abnormalities  - Trend troponins and serial EKGs  - ACS management-- s/p ASA load; Brillanta load and rec heparin gtt; High dose statin Atorva 80mg   - Case d/w interventionalist Dr. Block; no role for urgent cardiac cath at this time  - Recommendations and plan of care discussed with ED team Dr. Gloria Bah   [id number] Registration Time: 7:35pm  Called by ED: 7:45pm  Saw patient at bedside: 7:48pm  Called Cath Attendin:52pm     Patient seen and evaluated at bedside    Outpatient Cardiologist:  Dr. KARLA Mckeon    Chief Complaint: SOB and CP    HPI:    62 yo gentleman w/CAD s/p CABG(,WANDAJudyJean) w/ subsequent stents (,,[facility]-), heavy smoker,  HTN, & HLD p/w CP and SOB. Reports substernal CP started earlier this morning, intermittent nonexertional. The pain has lasted on/off all day. Associated with nausea. No diaphoresis, lightheadedness, or palpitations. Reports associated SOB and notes LE edema and increasing orthopnea/SOB for weeks.   ROS otherwise negative for fevers/chills, sick contacts, medication changes, dysuria, or change in urination.      PMH:   Psoriasis  HLD (hyperlipidemia)  CAD (coronary artery disease)  Hypertension      PSH:   History of coronary artery stent placement  S/P quadruple vessel bypass      Medications:   aspirin  chewable 243 milliGRAM(s) Oral daily    Home Meds:   ASA 81mg qd  Atorvastatin 80mg qd  Coreg 25mg BID  Lasix 40mg qd  Hydralazine 50mg TID  Isosorbide dinitrate 20mg TID  Prednisone 30mg qd  Pantoprazole 20mg qd       Allergies:  No Known Allergies      FAMILY HISTORY:  Family history of cerebrovascular accident (CVA) (Father)  Family history of early CAD (Father): father who  of CVA.      Social: Heavy smoking history     Review of Systems:  REVIEW OF SYSTEMS:    CONSTITUTIONAL: No weakness, fevers or chills  EYES/ENT: No visual changes;  No dysphagia  NECK: No pain or stiffness  RESPIRATORY: No cough, wheezing, hemoptysis;   CARDIOVASCULAR: No palpitations;   GASTROINTESTINAL: No abdominal or epigastric pain. No  vomiting, or hematemesis; No diarrhea or constipation. No melena or hematochezia.  BACK: No back pain  GENITOURINARY: No dysuria, frequency or hematuria  NEUROLOGICAL: No numbness or weakness  SKIN: No itching, burning, rashes, or lesions   All other review of systems is negative unless indicated above.    Physical Exam:  T(F): 97.7 (12-13), Max: 97.7 (12-13)  HR: 81 (12-13) (81 - 96)  BP: 143/91 (12-13) (122/87 - 143/91)  RR: 26 (12-13)  SpO2: 99% (12-13)  GENERAL: Able to speak in full sentences on bipap, family member is at bedside  HEAD:  Atraumatic, Normocephalic  ENT: EOMI, PERRLA, conjunctiva and sclera clear, Neck supple, Difficult to appreciate JVD, moist mucosa  CHEST/LUNG: Good air entry, slightly decreased at bases   BACK: No spinal tenderness  HEART: Regular rate and rhythm; No murmurs, rubs, or gallops  ABDOMEN: Soft, Nontender, Nondistended; Bowel sounds present  EXTREMITIES:  No clubbing, cyanosis, or edema  PSYCH: Nl behavior, nl affect  NEUROLOGY: AAOx3, non-focal, cranial nerves intact  SKIN: Normal color, No rashes or lesions  LINES:    Cardiovascular Diagnostic Testing:    ECG: Personally reviewed; NSR HR 87 noted to have ASHOK in V2 and AVR     Stress Testing:    Cath: < from: Cardiac Cath Lab - Adult (03.23.15 @ 09:16) >    LM:   --  LM: The vessel was medium to large sized. Angiography showed mild  atherosclerosis with no flow limiting lesions.  LAD:   --  Mid LAD: There was a diffuse 100 % stenosis at a site with no  prior intervention, just after S1. There was TIFFANIE grade 0 flow through the  vessel (no flow) and a moderate-sized vascular territory distal to the  lesion. This lesion is achronic total occlusion.  --  D1: There was a diffuse 95 % stenosis at a site with no prior  intervention. The lesion was irregularly contoured. There was TIFFANIE grade 3  flow through the vessel (brisk flow) and a small vascular territory distal  to the lesion.  CX:   --  Mid circumflex: There was a diffuse 99 % stenosis at a site with  no prior intervention. The lesion was irregularly contoured and hazy.  There was TIFFANIE grade 2 flow through the vessel (partial perfusion) and a  moderate-sized vascular territory distal to the lesion.  RCA:   --  Proximal RCA: There was a diffuse 95 % stenosis at a site with  no prior intervention. There was TIFFANIE grade 3 flow through the vessel  (brisk flow) and a moderate-sized vascular territory distal to the lesion.  It does not appear amenable to intervention.  --  RPDA: There was a tubular 95 % stenosis at a site with no prior  intervention. The lesion was irregularly contoured and hazy. There was  TIFFANIE grade 3 flow through the vessel (brisk flow) and asmall vascular  territory distal to the lesion. It does not appear amenable to  intervention.  --  RPLS: There was a diffuse 99 % stenosis at a site with no prior  intervention. The lesion was irregularly contoured and hazy. There was  TIFFANIE grade 2 flow through the vessel (partial perfusion) and a  moderate-sized vascular territory distal to the lesion. It does not appear  amenable to intervention.  GRAFTS:   --  Graft to the LAD: The graft was a medium sized LIMA. Graft  angiography showed no evidence of disease and no degeneration. Distal  vessel angiography showed a medium sized vessel and multiple discrete  lesions. Distal LAD feeds collaterals to the diagonal and the distal RCA.  --  Graft to the 2nd diagonal: The graft was a saphenous vein graft from  the aorta. There was a 100 % stenosis at the proximal anastomosis.  --  Graft to the 1st obtuse marginal: The graft was a saphenous vein graft  from the aorta. There was a 100 % stenosis at the proximal anastomosis.  There was a 100 % stenosis in the middle third of the graft.  COMPLICATIONS: There were no complications.  DIAGNOSTIC IMPRESSIONS: Severe native vessel disease  LIMA to LAD patent, but native LAD has severe tandem lesions  All remaining grafts are occluded  DIAGNOSTIC RECOMMENDATIONS: In view of severe diffuse native vessel disease  recommend to optimize anti-anginal therapy.  If patient fails medical therapy then PCI to the native vessels can be  considered.  INTERVENTIONAL IMPRESSIONS: Severe native vessel disease  LIMA to LAD patent, but native LAD has severe tandem lesions  All remaining grafts are occluded  INTERVENTIONAL RECOMMENDATIONS: In view of severe diffuse native vessel  disease recommend to optimize anti-anginal therapy.  If patient fails medical therapy then PCI to the native vessels can be  considered.    < from: Transthoracic Echocardiogram (17 @ 14:08) >  ------------------------------------------------------------------------  Dimensions:    Normal Values:  LA:     4.6    2.0 - 4.0 cm  Ao:     3.4    2.0 - 3.8 cm  SEPTUM: 1.0    0.6 - 1.2 cm  PWT:    1.2    0.6 - 1.1 cm  LVIDd:  5.7    3.0 - 5.6 cm  LVIDs:  4.5    1.8 - 4.0 cm  Derived variables:  LVMI: 121 g/m2  RWT: 0.42  Doppler Peak Velocity (m/sec): AoV=1.6  ------------------------------------------------------------------------  Observations:  Mitral Valve: Mitral annular calcification. Tethered mitral  valve leaflets. Minimal mitral regurgitation.  Aortic Valve/Aorta: Calcified trileaflet aortic valve with  normal opening. Mild-moderate aorticregurgitation.  Aortic Root: 3.4 cm.  Left Atrium: Normal left atrium.  LA volume index = 34  cc/m2.  Left Ventricle: Moderate to severe segmental left  ventricular systolic dysfunction. The lateral, basal to mid  inferior and basal to mid inferoseptal walls are  hypokinetic.  Eccentric left ventricular hypertrophy  (dilated left ventricle with normal relative wall  thickness).  Right Heart: Normal right atrium. The right ventricle is  not well visualized grossly decreased function. Normal  tricuspid valve. Normal pulmonic valve. Minimal pulmonic  regurgitation.  Pericardium/Pleura: Normal pericardium with no pericardial  effusion.  Hemodynamic: Estimated right atrial pressure is 8 mm Hg.  ------------------------------------------------------------------------  Conclusions:  1. Mitral annular calcification. Tethered mitral valve  leaflets. Minimal mitral regurgitation.  2. Calcified trileaflet aortic valve with normal opening.  Mild-moderate aortic regurgitation.  3. Eccentric left ventricular hypertrophy (dilated left  ventricle with normal relative wall thickness).  4. Moderate to severe segmental left ventricular systolic  dysfunction. The lateral, basal to mid inferior and basal  to mid inferoseptal walls are hypokinetic.  5. Theright ventricle is not well visualized grossly  decreased function.    CXR w/cephalization concerning for volume overload; no consolidates     Labs: Personally reviewed                        14.2   17.2  )-----------( 230      ( 13 Dec 2017 20:03 )             44.1         140  |  104  |  26<H>  ----------------------------<  239<H>  4.1   |  16<L>  |  1.86<H>    Ca    8.8      13 Dec 2017 20:03    TPro  7.4  /  Alb  4.1  /  TBili  0.8  /  DBili  x   /  AST  197<H>  /  ALT  159<H>  /  AlkPhos  172<H>  12    PT/INR - ( 13 Dec 2017 20:03 )   PT: 11.6 sec;   INR: 1.07 ratio         PTT - ( 13 Dec 2017 20:03 )  PTT:29.5 sec  CARDIAC MARKERS ( 13 Dec 2017 20:03 )  x     / 0.02 ng/mL / x     / x     / x          Serum Pro-Brain Natriuretic Peptide: 2712 pg/mL ( @ 20:03)    A/P:   61M w/CAD s/p CABG(,Adrián) last PCI , sCHF,  HTN, & HLD p/w CP and SOB cardiology consulted for cath consult for ECG changes found to be in respiratory distress requiring bipap with elevated BNP and EKG with ASHOK in AVR, V1 likely constellation of symptoms related to volume overload.     #Chest Pain- may be related to demand ischemia in setting of volume overload (on exam and elev BNP); EKG does not meet STEMI criteria; Known CAD per last cath  with plan to attempt optimal medical management. Cr 1.8; Elevated LFTs may suggest congestive hepatopathy  - Recommend Lasix 80mg BID and monitor strict I&Os;  - Telemetry   - c/w home antihypertensive regimen   - Stat TTE to eval if any wall motion abnormalities  - Initial trop 0.02; Trend troponins and serial EKGs  - ACS management-- s/p ASA load; Brillanta load and rec heparin gtt; High dose statin Atorva 80mg   - Case d/w interventionalist Dr. Block; no role for urgent cardiac cath at this time  - Recommendations and plan of care discussed with ED team Dr. Gloria Bah   [id number]

## 2017-12-14 DIAGNOSIS — E87.6 HYPOKALEMIA: ICD-10-CM

## 2017-12-14 DIAGNOSIS — N18.3 CHRONIC KIDNEY DISEASE, STAGE 3 (MODERATE): ICD-10-CM

## 2017-12-14 DIAGNOSIS — R07.9 CHEST PAIN, UNSPECIFIED: ICD-10-CM

## 2017-12-14 DIAGNOSIS — I50.9 HEART FAILURE, UNSPECIFIED: ICD-10-CM

## 2017-12-14 DIAGNOSIS — Z29.9 ENCOUNTER FOR PROPHYLACTIC MEASURES, UNSPECIFIED: ICD-10-CM

## 2017-12-14 DIAGNOSIS — Z79.899 OTHER LONG TERM (CURRENT) DRUG THERAPY: ICD-10-CM

## 2017-12-14 DIAGNOSIS — I10 ESSENTIAL (PRIMARY) HYPERTENSION: ICD-10-CM

## 2017-12-14 DIAGNOSIS — I49.9 CARDIAC ARRHYTHMIA, UNSPECIFIED: ICD-10-CM

## 2017-12-14 DIAGNOSIS — D72.829 ELEVATED WHITE BLOOD CELL COUNT, UNSPECIFIED: ICD-10-CM

## 2017-12-14 DIAGNOSIS — I25.10 ATHEROSCLEROTIC HEART DISEASE OF NATIVE CORONARY ARTERY WITHOUT ANGINA PECTORIS: ICD-10-CM

## 2017-12-14 LAB
ALBUMIN SERPL ELPH-MCNC: 4.1 G/DL — SIGNIFICANT CHANGE UP (ref 3.3–5)
ALP SERPL-CCNC: 163 U/L — HIGH (ref 40–120)
ALT FLD-CCNC: 156 U/L — HIGH (ref 10–45)
AMYLASE P1 CFR SERPL: 64 U/L — SIGNIFICANT CHANGE UP (ref 25–125)
ANION GAP SERPL CALC-SCNC: 16 MMOL/L — SIGNIFICANT CHANGE UP (ref 5–17)
ANION GAP SERPL CALC-SCNC: 23 MMOL/L — HIGH (ref 5–17)
APPEARANCE UR: CLEAR — SIGNIFICANT CHANGE UP
APTT BLD: 31.7 SEC — SIGNIFICANT CHANGE UP (ref 27.5–37.4)
AST SERPL-CCNC: 302 U/L — HIGH (ref 10–40)
BACTERIA # UR AUTO: ABNORMAL /HPF
BASE EXCESS BLDA CALC-SCNC: 5.1 MMOL/L — HIGH (ref -2–2)
BASOPHILS # BLD AUTO: 0.02 K/UL — SIGNIFICANT CHANGE UP (ref 0–0.2)
BASOPHILS NFR BLD AUTO: 0.1 % — SIGNIFICANT CHANGE UP (ref 0–2)
BILIRUB SERPL-MCNC: 0.8 MG/DL — SIGNIFICANT CHANGE UP (ref 0.2–1.2)
BILIRUB UR-MCNC: NEGATIVE — SIGNIFICANT CHANGE UP
BUN SERPL-MCNC: 27 MG/DL — HIGH (ref 7–23)
BUN SERPL-MCNC: 31 MG/DL — HIGH (ref 7–23)
CALCIUM SERPL-MCNC: 8.8 MG/DL — SIGNIFICANT CHANGE UP (ref 8.4–10.5)
CALCIUM SERPL-MCNC: 9.2 MG/DL — SIGNIFICANT CHANGE UP (ref 8.4–10.5)
CHLORIDE SERPL-SCNC: 103 MMOL/L — SIGNIFICANT CHANGE UP (ref 96–108)
CHLORIDE SERPL-SCNC: 99 MMOL/L — SIGNIFICANT CHANGE UP (ref 96–108)
CK MB BLD-MCNC: 16.2 % — HIGH (ref 0–3.5)
CK MB CFR SERPL CALC: 3.8 NG/ML — SIGNIFICANT CHANGE UP (ref 0–6.7)
CK MB CFR SERPL CALC: 383.9 NG/ML — HIGH (ref 0–6.7)
CK SERPL-CCNC: 2374 U/L — HIGH (ref 30–200)
CK SERPL-CCNC: 64 U/L — SIGNIFICANT CHANGE UP (ref 30–200)
CO2 BLDA-SCNC: 30 MMOL/L — SIGNIFICANT CHANGE UP (ref 22–30)
CO2 SERPL-SCNC: 17 MMOL/L — LOW (ref 22–31)
CO2 SERPL-SCNC: 26 MMOL/L — SIGNIFICANT CHANGE UP (ref 22–31)
COLOR SPEC: SIGNIFICANT CHANGE UP
CREAT SERPL-MCNC: 1.69 MG/DL — HIGH (ref 0.5–1.3)
CREAT SERPL-MCNC: 2 MG/DL — HIGH (ref 0.5–1.3)
DIFF PNL FLD: NEGATIVE — SIGNIFICANT CHANGE UP
EOSINOPHIL # BLD AUTO: 0.11 K/UL — SIGNIFICANT CHANGE UP (ref 0–0.5)
EOSINOPHIL NFR BLD AUTO: 0.6 % — SIGNIFICANT CHANGE UP (ref 0–6)
EPI CELLS # UR: SIGNIFICANT CHANGE UP /HPF
GAS PNL BLDA: SIGNIFICANT CHANGE UP
GAS PNL BLDA: SIGNIFICANT CHANGE UP
GLUCOSE SERPL-MCNC: 107 MG/DL — HIGH (ref 70–99)
GLUCOSE SERPL-MCNC: 132 MG/DL — HIGH (ref 70–99)
GLUCOSE UR QL: NEGATIVE — SIGNIFICANT CHANGE UP
HCO3 BLDA-SCNC: 28 MMOL/L — SIGNIFICANT CHANGE UP (ref 21–29)
HCT VFR BLD CALC: 40.6 % — SIGNIFICANT CHANGE UP (ref 39–50)
HCT VFR BLD CALC: 43.9 % — SIGNIFICANT CHANGE UP (ref 39–50)
HGB BLD-MCNC: 13.3 G/DL — SIGNIFICANT CHANGE UP (ref 13–17)
HGB BLD-MCNC: 13.7 G/DL — SIGNIFICANT CHANGE UP (ref 13–17)
HOROWITZ INDEX BLDA+IHG-RTO: 32 — SIGNIFICANT CHANGE UP
HYALINE CASTS # UR AUTO: ABNORMAL
IMM GRANULOCYTES NFR BLD AUTO: 0.3 % — SIGNIFICANT CHANGE UP (ref 0–1.5)
KETONES UR-MCNC: NEGATIVE — SIGNIFICANT CHANGE UP
LEUKOCYTE ESTERASE UR-ACNC: NEGATIVE — SIGNIFICANT CHANGE UP
LIDOCAIN IGE QN: 122 U/L — HIGH (ref 7–60)
LYMPHOCYTES # BLD AUTO: 1.42 K/UL — SIGNIFICANT CHANGE UP (ref 1–3.3)
LYMPHOCYTES # BLD AUTO: 7.8 % — LOW (ref 13–44)
MAGNESIUM SERPL-MCNC: 2.1 MG/DL — SIGNIFICANT CHANGE UP (ref 1.6–2.6)
MAGNESIUM SERPL-MCNC: 2.3 MG/DL — SIGNIFICANT CHANGE UP (ref 1.6–2.6)
MCHC RBC-ENTMCNC: 30 PG — SIGNIFICANT CHANGE UP (ref 27–34)
MCHC RBC-ENTMCNC: 31.2 GM/DL — LOW (ref 32–36)
MCHC RBC-ENTMCNC: 31.5 PG — SIGNIFICANT CHANGE UP (ref 27–34)
MCHC RBC-ENTMCNC: 32.7 GM/DL — SIGNIFICANT CHANGE UP (ref 32–36)
MCV RBC AUTO: 96.3 FL — SIGNIFICANT CHANGE UP (ref 80–100)
MCV RBC AUTO: 96.3 FL — SIGNIFICANT CHANGE UP (ref 80–100)
MONOCYTES # BLD AUTO: 0.55 K/UL — SIGNIFICANT CHANGE UP (ref 0–0.9)
MONOCYTES NFR BLD AUTO: 3 % — SIGNIFICANT CHANGE UP (ref 2–14)
NEUTROPHILS # BLD AUTO: 16.15 K/UL — HIGH (ref 1.8–7.4)
NEUTROPHILS NFR BLD AUTO: 88.2 % — HIGH (ref 43–77)
NITRITE UR-MCNC: NEGATIVE — SIGNIFICANT CHANGE UP
PCO2 BLDA: 38 MMHG — SIGNIFICANT CHANGE UP (ref 32–46)
PH BLDA: 7.48 — HIGH (ref 7.35–7.45)
PH UR: 6 — SIGNIFICANT CHANGE UP (ref 5–8)
PHOSPHATE SERPL-MCNC: 5.1 MG/DL — HIGH (ref 2.5–4.5)
PLATELET # BLD AUTO: 164 K/UL — SIGNIFICANT CHANGE UP (ref 150–400)
PLATELET # BLD AUTO: 218 K/UL — SIGNIFICANT CHANGE UP (ref 150–400)
PO2 BLDA: 75 MMHG — SIGNIFICANT CHANGE UP (ref 74–108)
POTASSIUM SERPL-MCNC: 3 MMOL/L — LOW (ref 3.5–5.3)
POTASSIUM SERPL-MCNC: 3.2 MMOL/L — LOW (ref 3.5–5.3)
POTASSIUM SERPL-MCNC: 3.4 MMOL/L — LOW (ref 3.5–5.3)
POTASSIUM SERPL-SCNC: 3 MMOL/L — LOW (ref 3.5–5.3)
POTASSIUM SERPL-SCNC: 3.2 MMOL/L — LOW (ref 3.5–5.3)
POTASSIUM SERPL-SCNC: 3.4 MMOL/L — LOW (ref 3.5–5.3)
PROT SERPL-MCNC: 7.4 G/DL — SIGNIFICANT CHANGE UP (ref 6–8.3)
PROT UR-MCNC: 30 MG/DL
RAPID RVP RESULT: SIGNIFICANT CHANGE UP
RBC # BLD: 4.22 M/UL — SIGNIFICANT CHANGE UP (ref 4.2–5.8)
RBC # BLD: 4.56 M/UL — SIGNIFICANT CHANGE UP (ref 4.2–5.8)
RBC # FLD: 15.8 % — HIGH (ref 10.3–14.5)
RBC # FLD: 16.6 % — HIGH (ref 10.3–14.5)
RBC CASTS # UR COMP ASSIST: SIGNIFICANT CHANGE UP /HPF (ref 0–2)
SAO2 % BLDA: 96 % — SIGNIFICANT CHANGE UP (ref 92–96)
SODIUM SERPL-SCNC: 141 MMOL/L — SIGNIFICANT CHANGE UP (ref 135–145)
SODIUM SERPL-SCNC: 143 MMOL/L — SIGNIFICANT CHANGE UP (ref 135–145)
SP GR SPEC: 1.01 — LOW (ref 1.01–1.02)
TROPONIN T SERPL-MCNC: 5.18 NG/ML — HIGH (ref 0–0.06)
UROBILINOGEN FLD QL: NEGATIVE — SIGNIFICANT CHANGE UP
WBC # BLD: 14 K/UL — HIGH (ref 3.8–10.5)
WBC # BLD: 18.31 K/UL — HIGH (ref 3.8–10.5)
WBC # FLD AUTO: 14 K/UL — HIGH (ref 3.8–10.5)
WBC # FLD AUTO: 18.31 K/UL — HIGH (ref 3.8–10.5)
WBC UR QL: SIGNIFICANT CHANGE UP /HPF (ref 0–5)

## 2017-12-14 PROCEDURE — 93455 CORONARY ART/GRFT ANGIO S&I: CPT | Mod: 26

## 2017-12-14 PROCEDURE — 99223 1ST HOSP IP/OBS HIGH 75: CPT

## 2017-12-14 PROCEDURE — 93567 NJX CAR CTH SPRVLV AORTGRPHY: CPT

## 2017-12-14 PROCEDURE — 99222 1ST HOSP IP/OBS MODERATE 55: CPT | Mod: GC

## 2017-12-14 PROCEDURE — 99223 1ST HOSP IP/OBS HIGH 75: CPT | Mod: GC

## 2017-12-14 PROCEDURE — 93010 ELECTROCARDIOGRAM REPORT: CPT

## 2017-12-14 RX ORDER — CARVEDILOL PHOSPHATE 80 MG/1
25 CAPSULE, EXTENDED RELEASE ORAL EVERY 12 HOURS
Qty: 0 | Refills: 0 | Status: DISCONTINUED | OUTPATIENT
Start: 2017-12-14 | End: 2017-12-19

## 2017-12-14 RX ORDER — POTASSIUM CHLORIDE 20 MEQ
20 PACKET (EA) ORAL ONCE
Qty: 0 | Refills: 0 | Status: COMPLETED | OUTPATIENT
Start: 2017-12-14 | End: 2017-12-14

## 2017-12-14 RX ORDER — HYDRALAZINE HCL 50 MG
50 TABLET ORAL
Qty: 0 | Refills: 0 | Status: DISCONTINUED | OUTPATIENT
Start: 2017-12-14 | End: 2017-12-15

## 2017-12-14 RX ORDER — ALPRAZOLAM 0.25 MG
0.5 TABLET ORAL ONCE
Qty: 0 | Refills: 0 | Status: DISCONTINUED | OUTPATIENT
Start: 2017-12-14 | End: 2017-12-14

## 2017-12-14 RX ORDER — HEPARIN SODIUM 5000 [USP'U]/ML
4000 INJECTION INTRAVENOUS; SUBCUTANEOUS EVERY 6 HOURS
Qty: 0 | Refills: 0 | Status: DISCONTINUED | OUTPATIENT
Start: 2017-12-14 | End: 2017-12-14

## 2017-12-14 RX ORDER — TICAGRELOR 90 MG/1
90 TABLET ORAL
Qty: 0 | Refills: 0 | Status: DISCONTINUED | OUTPATIENT
Start: 2017-12-14 | End: 2017-12-19

## 2017-12-14 RX ORDER — ISOSORBIDE MONONITRATE 60 MG/1
60 TABLET, EXTENDED RELEASE ORAL DAILY
Qty: 0 | Refills: 0 | Status: DISCONTINUED | OUTPATIENT
Start: 2017-12-14 | End: 2017-12-19

## 2017-12-14 RX ORDER — ASPIRIN/CALCIUM CARB/MAGNESIUM 324 MG
81 TABLET ORAL DAILY
Qty: 0 | Refills: 0 | Status: DISCONTINUED | OUTPATIENT
Start: 2017-12-14 | End: 2017-12-19

## 2017-12-14 RX ORDER — HEPARIN SODIUM 5000 [USP'U]/ML
4000 INJECTION INTRAVENOUS; SUBCUTANEOUS ONCE
Qty: 0 | Refills: 0 | Status: COMPLETED | OUTPATIENT
Start: 2017-12-14 | End: 2017-12-14

## 2017-12-14 RX ORDER — HEPARIN SODIUM 5000 [USP'U]/ML
INJECTION INTRAVENOUS; SUBCUTANEOUS
Qty: 25000 | Refills: 0 | Status: DISCONTINUED | OUTPATIENT
Start: 2017-12-14 | End: 2017-12-14

## 2017-12-14 RX ORDER — PANTOPRAZOLE SODIUM 20 MG/1
40 TABLET, DELAYED RELEASE ORAL
Qty: 0 | Refills: 0 | Status: DISCONTINUED | OUTPATIENT
Start: 2017-12-14 | End: 2017-12-19

## 2017-12-14 RX ORDER — SERTRALINE 25 MG/1
0 TABLET, FILM COATED ORAL
Qty: 30 | Refills: 0 | COMMUNITY

## 2017-12-14 RX ORDER — FUROSEMIDE 40 MG
80 TABLET ORAL EVERY 12 HOURS
Qty: 0 | Refills: 0 | Status: DISCONTINUED | OUTPATIENT
Start: 2017-12-14 | End: 2017-12-17

## 2017-12-14 RX ORDER — CARVEDILOL PHOSPHATE 80 MG/1
12.5 CAPSULE, EXTENDED RELEASE ORAL EVERY 12 HOURS
Qty: 0 | Refills: 0 | Status: DISCONTINUED | OUTPATIENT
Start: 2017-12-14 | End: 2017-12-14

## 2017-12-14 RX ORDER — FUROSEMIDE 40 MG
80 TABLET ORAL EVERY 12 HOURS
Qty: 0 | Refills: 0 | Status: DISCONTINUED | OUTPATIENT
Start: 2017-12-14 | End: 2017-12-14

## 2017-12-14 RX ORDER — POTASSIUM CHLORIDE 20 MEQ
40 PACKET (EA) ORAL ONCE
Qty: 0 | Refills: 0 | Status: COMPLETED | OUTPATIENT
Start: 2017-12-14 | End: 2017-12-14

## 2017-12-14 RX ORDER — SERTRALINE 25 MG/1
50 TABLET, FILM COATED ORAL DAILY
Qty: 0 | Refills: 0 | Status: DISCONTINUED | OUTPATIENT
Start: 2017-12-14 | End: 2017-12-19

## 2017-12-14 RX ORDER — ACETYLCYSTEINE 200 MG/ML
1200 VIAL (ML) MISCELLANEOUS EVERY 12 HOURS
Qty: 0 | Refills: 0 | Status: COMPLETED | OUTPATIENT
Start: 2017-12-14 | End: 2017-12-15

## 2017-12-14 RX ORDER — CLOPIDOGREL BISULFATE 75 MG/1
75 TABLET, FILM COATED ORAL DAILY
Qty: 0 | Refills: 0 | Status: DISCONTINUED | OUTPATIENT
Start: 2017-12-14 | End: 2017-12-14

## 2017-12-14 RX ORDER — ATORVASTATIN CALCIUM 80 MG/1
80 TABLET, FILM COATED ORAL AT BEDTIME
Qty: 0 | Refills: 0 | Status: DISCONTINUED | OUTPATIENT
Start: 2017-12-14 | End: 2017-12-19

## 2017-12-14 RX ADMIN — CARVEDILOL PHOSPHATE 25 MILLIGRAM(S): 80 CAPSULE, EXTENDED RELEASE ORAL at 17:35

## 2017-12-14 RX ADMIN — Medication 81 MILLIGRAM(S): at 17:01

## 2017-12-14 RX ADMIN — HEPARIN SODIUM 4000 UNIT(S): 5000 INJECTION INTRAVENOUS; SUBCUTANEOUS at 07:38

## 2017-12-14 RX ADMIN — HEPARIN SODIUM 1000 UNIT(S)/HR: 5000 INJECTION INTRAVENOUS; SUBCUTANEOUS at 07:39

## 2017-12-14 RX ADMIN — PANTOPRAZOLE SODIUM 40 MILLIGRAM(S): 20 TABLET, DELAYED RELEASE ORAL at 17:35

## 2017-12-14 RX ADMIN — Medication 20 MILLIEQUIVALENT(S): at 21:21

## 2017-12-14 RX ADMIN — Medication 50 MILLIGRAM(S): at 17:35

## 2017-12-14 RX ADMIN — Medication 0.5 MILLIGRAM(S): at 17:54

## 2017-12-14 RX ADMIN — Medication 20 MILLIEQUIVALENT(S): at 23:33

## 2017-12-14 RX ADMIN — Medication 80 MILLIGRAM(S): at 06:19

## 2017-12-14 RX ADMIN — Medication 80 MILLIGRAM(S): at 18:23

## 2017-12-14 RX ADMIN — Medication 50 MILLIGRAM(S): at 06:19

## 2017-12-14 RX ADMIN — CARVEDILOL PHOSPHATE 12.5 MILLIGRAM(S): 80 CAPSULE, EXTENDED RELEASE ORAL at 02:44

## 2017-12-14 RX ADMIN — Medication 1200 MILLIGRAM(S): at 21:20

## 2017-12-14 RX ADMIN — SERTRALINE 50 MILLIGRAM(S): 25 TABLET, FILM COATED ORAL at 17:02

## 2017-12-14 RX ADMIN — ISOSORBIDE MONONITRATE 60 MILLIGRAM(S): 60 TABLET, EXTENDED RELEASE ORAL at 17:01

## 2017-12-14 RX ADMIN — Medication 40 MILLIEQUIVALENT(S): at 18:23

## 2017-12-14 RX ADMIN — ATORVASTATIN CALCIUM 80 MILLIGRAM(S): 80 TABLET, FILM COATED ORAL at 21:21

## 2017-12-14 RX ADMIN — TICAGRELOR 90 MILLIGRAM(S): 90 TABLET ORAL at 17:35

## 2017-12-14 NOTE — H&P ADULT - PROBLEM SELECTOR PLAN 2
Patient symptoms concerning for ACS. Cath consult called by ED team.  Spoke with cardiology fellow, Dr. Bah patient s/p Aspirin and Brilinta load with possible cath yet patient did not need urgent cath after assessment of cardiology. Ok for patient to continue Aspirin and Plavix. Trend CE. If, CE elevated, per cardiology initiate heparin trip.  EKG concerning for changes. Trop 0.02  Trend CE Patient symptoms concerning for ACS. Cath consult called by ED team.  Spoke with cardiology fellow, Dr. Bah patient s/p Aspirin and Brilinta load in anticipation of possible cath prior to their assessment. Yet patient did not need urgent cath after their evaluation.  Per cards, ok for patient to continue Aspirin and Plavix. Trend CE. If, CE elevated, per cardiology, initiate heparin trip.  EKG concerning for changes. Trop 0.02  Trend CE

## 2017-12-14 NOTE — CONSULT NOTE ADULT - SUBJECTIVE AND OBJECTIVE BOX
Patient is a 62y old  Male who presents with a chief complaint of Chest pain, shortness of breath (14 Dec 2017 04:31)    From HPI" HPI:  61 yo man with PMH of CAD, with history of CABG  with history stent (last stent in ), HTN, psoriasis, pemphigus vulgaris? (recently diagnosed after last hospitalization), presenting from home in setting of chest pain and shortness of breath. Chest pain new onset today, located substernally, not related to exertion and has been intermittent throughout the day. Patient also endorses associated symptoms of nausea without episodes of emesis. With regards to shortness of breath it has been present ~ 2 weeks and is usually noticeable with exertion. Endorses abdominal bloating, LE edema, and symptoms of orthopnea. Patient compliant with medications (14 Dec 2017 04:31)  "    Above verified-agree with above unless noted below.    ID consulted     PAST MEDICAL & SURGICAL HISTORY:  Psoriasis  HLD (hyperlipidemia)  CAD (coronary artery disease): 3 stents  Hypertension  History of coronary artery stent placement: 1 stent , 2 stents  in [facility]  S/P quadruple vessel bypass:       Social history:     Smoking,    ETOH,      IVDU       FAMILY HISTORY:  Family history of cerebrovascular accident (CVA)  Family history of early CAD (Father): father who  of CVA.  - Reviewed,Non contributory   REVIEW OF SYSTEMS  General:	Denies any malaise fatigue or chills. Fevers absent    Skin:No rash  	  Ophthalmologic:Denies any visual complaints,discharge redness or photophobia  	  ENMT:No nasal discharge,headache,sinus congestion or throat pain.No dental complaints    Respiratory and Thorax:No cough,sputum or chest pain.Denies shortness of breath  	  Cardiovascular:	No chest pain,palpitaions or dizziness    Gastrointestinal:	NO nausea,abdominal pain or diarrhea.    Genitourinary:	No dysuria,frequency. No flank pain    Musculoskeletal:	No joint swelling or pain.No weakness    Neurological:No confusion,diziness.No extremity weakness.No bladder or bowel incontinence	    Psychiatric:No delusions or hallucinations	    Hematology/Lymphatics:	No LN swelling.No gum bleeding     Endocrine:	No recent weight gain or loss.No abnormal heat/cold intolerance    Allergic/Immunologic:	No hives or rash   Allergies    No Known Allergies    Intolerances        Antimicrobials:      Other medications reviewed      Vital Signs Last 24 Hrs  T(C): 36.3 (14 Dec 2017 05:51), Max: 36.5 (13 Dec 2017 19:38)  T(F): 97.4 (14 Dec 2017 05:51), Max: 97.7 (13 Dec 2017 19:38)  HR: 74 (14 Dec 2017 07:41) (67 - 96)  BP: 127/71 (14 Dec 2017 07:41) (121/95 - 143/91)  BP(mean): --  RR: 26 (14 Dec 2017 07:41) (17 - 38)  SpO2: 100% (14 Dec 2017 07:41) (89% - 100%)    PHYSICAL EXAM:Pleasant patient in no acute distress.      Constitutional:Comfortable.Awake and alert  No cachexia     Eyes:PERRL EOMI.NO discharge or conjunctival injection    ENMT:No sinus tenderness.No thrush.No pharyngeal exudate or erythema.Fair dental hygiene    Neck:Supple,No LN,no JVD      Respiratory:Good air entry bilaterally,CTA    Cardiovascular:S1 S2 wnl, No murmurs,rub or gallops    Gastrointestinal:Soft BS(+) no tenderness no masses ,No rebound or guarding    Genitourinary:No CVA tendereness     Rectal:    Extremities:No cyanosis,clubbing or edema.    Vascular:peripheral pulses felt    Neurological:AAO X 3,No grossly focal deficits    Skin:No rash     Lymph Nodes:No palpable LNs    Musculoskeletal:No joint swelling or LOM    Psychiatric:Affect normal.          Labs:                            13.7   18.31 )-----------( 218      ( 14 Dec 2017 07:51 )             43.9             140  |  104  |  26<H>  ----------------------------<  239<H>  4.1   |  16<L>  |  1.86<H>    Ca    8.8      13 Dec 2017 20:03    TPro  7.4  /  Alb  4.1  /  TBili  0.8  /  DBili  x   /  AST  197<H>  /  ALT  159<H>  /  AlkPhos  172<H>   HPI: 61 yo man with PMH of CAD, s/p CABG and PCI, HTN, pemphigus vulgaris, presented from home with new onset chest pain and shortness of breath. ID service consulted for leukocytosis. Chest pain started yesterday, located substernally and has been intermittent throughout the day. Patient also endorses associated symptoms of nausea without episodes of emesis. SOB has been present ~ 2 weeks and is usually exertional. Patient also endorses chronic intermittent nonproductive cough, LE edema and orthopnea. Patient compliant with medications and has no recent sick contact or travel history. Patient otherwise denied f/c, diarrhea, constipation.    In the ER, patient was initially diagnosed with HF exacerbation with demand ischemia. Trop continue to trend up along with persistent sx and EKG changes. Taken to cath lab given suspicion for ant. MI. No abx given in the ER.    PAST MEDICAL & SURGICAL HISTORY:  Psoriasis  HLD (hyperlipidemia)  CAD (coronary artery disease): 3 stents  Hypertension  History of coronary artery stent placement: 1 stent , 2 stents  in [facility]  S/P quadruple vessel bypass:     Social history:       40 pack year smoking       FAMILY HISTORY:  Family history of cerebrovascular accident (CVA)  Family history of early CAD (Father): father who  of CVA.    REVIEW OF SYSTEMS:    CONSTITUTIONAL: No weakness, fevers or chills  EYES/ENT: No visual changes;  No vertigo or throat pain   NECK: No pain or stiffness  RESPIRATORY: exertional dyspnea orthopnea  CARDIOVASCULAR: substernal chest pain  GASTROINTESTINAL: No abdominal or epigastric pain. No nausea, vomiting, or hematemesis; No diarrhea or constipation. No melena or hematochezia.  GENITOURINARY: No dysuria, frequency or hematuria  NEUROLOGICAL: No numbness or weakness  SKIN: No itching, burning, rashes, or lesions   All other review of systems is negative unless indicated above.    Allergies  No Known Allergies    Antimicrobials: None  Other medications reviewed    Vital Signs Last 24 Hrs  T(C): 36.3 (14 Dec 2017 05:51), Max: 36.5 (13 Dec 2017 19:38)  T(F): 97.4 (14 Dec 2017 05:51), Max: 97.7 (13 Dec 2017 19:38)  HR: 74 (14 Dec 2017 07:41) (67 - 96)  BP: 127/71 (14 Dec 2017 07:41) (121/95 - 143/91)  BP(mean): --  GENERAL: NAD  HEAD:  Atraumatic, Normocephalic  EYES: EOMI, PERRLA, conjunctiva and sclera clear  NECK: Supple, No JVD  CHEST/LUNG: Clear to auscultation bilaterally; no crackles ausculated  HEART: Regular rate and rhythm; No murmurs, rubs, or gallops  ABDOMEN: Soft, Nontender, Nondistended; Bowel sounds present  EXTREMITIES:  1+ LE swelling bilaterally  PSYCH: AAOx3  NEUROLOGY: non-focal  SKIN: No rashes or lesions    Labs:                   13.7   18.31 )-----------( 218      ( 14 Dec 2017 07:51 )             43.9     12-    140  |  104  |  26<H>  ----------------------------<  239<H>  4.1   |  16<L>  |  1.86<H>    Ca    8.8      13 Dec 2017 20:03    TPro  7.4  /  Alb  4.1  /  TBili  0.8  /  DBili  x   /  AST  197<H>  /  ALT  159<H>  /  AlkPhos  172<H>      CXR: pulm edema without focal consolidation.

## 2017-12-14 NOTE — H&P ADULT - PSH
History of coronary artery stent placement  1 stent 2008, 2 stents 2009 in [facility]  S/P quadruple vessel bypass  [facility]

## 2017-12-14 NOTE — H&P ADULT - ATTENDING COMMENTS
Dr. Emile Vieyra accepted patient's case from the ED and requested in house hospitalist team to complete admission. Patient previously unknown to me and I was assigned to case. Dr. Vieyra to assume care. Case discussed in detail with overnight ED NP, [phone]

## 2017-12-14 NOTE — ED ADULT NURSE REASSESSMENT NOTE - NS ED NURSE REASSESS COMMENT FT1
As per conversation with ED Attending Shalonda, pt is receiving second dose of lasix as per Cardiology orders.  Pt /84, Attending made aware, and is okay with administering medication.  Pt admitting status pending echo results.  Will continue to monitor.
Portable echocardiogram in progress.  Patient's daughter at bedside.
Pt got a skin tear on the left posterior aspect of the forearm, minimally bleeding.  Wound cleaned and dressed.  Pt denies pain at site.  Safety and comfort maintained.
Pt had an episode of non-sustained V-tach on cardiac monitor that lasted about 30 seconds.  Pt A&Ox4 with no new onset of symptoms.  ED Attending Shalonda made aware, and cardiology contacted.  No intervention needed at this time.  Will continue to monitor.
As per ED Attending Hunter, pt to have BiPAP taken off.  Pt placed on nasal cannula 4 L, tolerated well, sating at 98%
As per conversation with ED Attending Hunter, Pt EKGs were taken be cardiology team.  most recent EKG is in the pt's chart.
ED MD Fowler made aware of pt reoccurring non-sustained Vtach episodes.  MD Fowler will follow up with cardiology.  Pt is asymptomatic, resting comfortably.
Patient tachypneic and diaphoretic and no longer tolerating nasal canula.  ED Attending Hunter notified.  Respiratory called to place patient back on BIPAP.  Repeat EKG in progress.  Patient placed on non-rebreather awaiting respiratory therapist to arrive.
Pt lab results came back with elevated troponin and Creatine.  ED MD Fowler made aware.  Awaiting consult from cardiology.
Pt resting in room, no chest pain at this time, VSS, IV sites clear no redness or swelling, remains a&ox4, waiting for room disposition.

## 2017-12-14 NOTE — H&P ADULT - NSHPLABSRESULTS_GEN_ALL_CORE
Personally reviewed labs and noted in detail below    Personally reviewed CXR: no appreciable focal consolidations    Personally reviewed EKG

## 2017-12-14 NOTE — CHART NOTE - NSCHARTNOTEFT_GEN_A_CORE
2nd troponin 5.18, CKMB 3839 at 526am     Saw and evaluated the patient; He reports that overnight the chest pain improved and then he noted that recurred this morning describing now as substernal chest tightness without associated nausea/vomiting, lightheadedness or palpitations. Reports SOB, but improved from presentation.     Repeat EKG NSR HR 71 w/TWI on V2     Given ongoing chest discomfort and concerning EKG with now elevated troponins plan for [facility] this am; Case d/w Dr. Block and will f/u primary team.    - Consent in the chart for coronary angiogram; Given elevated Cr 1.8 discussed potential risk for further kidney injury related to contrast. Patient understood the procedure and agreed understanding risks/benefits.   - c/w ACS management   - Trend serial enzymes

## 2017-12-14 NOTE — CHART NOTE - NSCHARTNOTEFT_GEN_A_CORE
Medicine NP Episodic Note    Cardiac enzymes elevated, trop uptrended from 0.02 --> 5.18.  CCU consult and Pulmonary consult called per attending Dr. Vieyra.  Heparin drip initiated stat per Cardiology recommendations.  Plan is for emergent cath.  Close monitoring in progress.  Endorsed to primary team to f/u.    AMADA Kim-BC  [phone]

## 2017-12-14 NOTE — H&P ADULT - ASSESSMENT
63 yo man with PMH of CAD, with history of CABG 2005 with history stent (last stent in 2010), HTN, psoriasis, presenting from home in setting of chest pain and shortness of breath.

## 2017-12-14 NOTE — CONSULT NOTE ADULT - SUBJECTIVE AND OBJECTIVE BOX
HPI:  61 yo man with PMH of CAD, with history of CABG  with history stent (last stent in ), HTN, psoriasis, pemphigus vulgaris? (recently diagnosed after last hospitalization), presenting from home in setting of chest pain and shortness of breath. Chest pain new onset today, located substernally, not related to exertion and has been intermittent throughout the day. Patient also endorses associated symptoms of nausea without episodes of emesis. With regards to shortness of breath it has been present ~ 2 weeks and is usually noticeable with exertion. Endorses abdominal bloating, LE edema, and symptoms of orthopnea.    PAST MEDICAL & SURGICAL HISTORY:  Psoriasis  HLD (hyperlipidemia)  CAD (coronary artery disease): 3 stents  Hypertension  History of coronary artery stent placement: 1 stent , 2 stents  in [facility]  S/P quadruple vessel bypass:       FAMILY HISTORY:  Family history of cerebrovascular accident (CVA)  Family history of early CAD (Father): father who  of CVA.      SOCIAL HISTORY:  Smoking: [ ] Never Smoked [ ] Former Smoker (__ packs x ___ years) [ ] Current Smoker  (__ packs x ___ years)  Substance Use: [ ] Never Used [ ] Used ____  EtOH Use:  Marital Status: [ ] Single [ ]  [ ]  [ ]   Sexual History:   Occupation:  Recent Travel:  Country of Birth:  Advance Directives:    Allergies    No Known Allergies    Intolerances      HOME MEDICATIONS: As per Med rec    REVIEW OF SYSTEMS:  Constitutional: [ ] fevers [ ] chills [ ] weight loss [ ] weight gain  HEENT: [ ] dry eyes [ ] eye irritation [ ] postnasal drip [ ] nasal congestion  CV: [ ] chest pain [ ] orthopnea [ ] palpitations [ ] murmur  Resp: [ ] cough [ ] shortness of breath [ ] dyspnea [ ] wheezing [ ] sputum [ ] hemoptysis  GI: [ ] nausea [ ] vomiting [ ] diarrhea [ ] constipation [ ] abd pain [ ] dysphagia   : [ ] dysuria [ ] nocturia [ ] hematuria [ ] increased urinary frequency  Musculoskeletal: [ ] back pain [ ] myalgias [ ] arthralgias [ ] fracture  Skin: [ ] rash [ ] itch  Neurological: [ ] headache [ ] dizziness [ ] syncope [ ] weakness [ ] numbness  Psychiatric: [ ] anxiety [ ] depression  Endocrine: [ ] diabetes [ ] thyroid problem  Hematologic/Lymphatic: [ ] anemia [ ] bleeding problem  Allergic/Immunologic: [ ] itchy eyes [ ] nasal discharge [ ] hives [ ] angioedema  [ ] All other systems negative  [ ] Unable to assess ROS because ________    OBJECTIVE:  ICU Vital Signs Last 24 Hrs  T(C): 36.3 (14 Dec 2017 05:51), Max: 36.5 (13 Dec 2017 19:38)  T(F): 97.4 (14 Dec 2017 05:51), Max: 97.7 (13 Dec 2017 19:38)  HR: 74 (14 Dec 2017 07:41) (67 - 96)  BP: 127/71 (14 Dec 2017 07:41) (121/95 - 143/91)  BP(mean): --  ABP: --  ABP(mean): --  RR: 26 (14 Dec 2017 07:41) (17 - 38)  SpO2: 100% (14 Dec 2017 07:41) (89% - 100%)        CAPILLARY BLOOD GLUCOSE          PHYSICAL EXAM:  General:   HEENT:   Lymph Nodes:  Neck:   Respiratory:   Cardiovascular:   Abdomen:   Extremities:   Skin:   Neurological:  Psychiatry:    HOSPITAL MEDICATIONS:  MEDICATIONS  (STANDING):  aspirin enteric coated 81 milliGRAM(s) Oral daily  atorvastatin 80 milliGRAM(s) Oral at bedtime  carvedilol 25 milliGRAM(s) Oral every 12 hours  furosemide   Injectable 80 milliGRAM(s) IV Push every 12 hours  heparin  Infusion.  Unit(s)/Hr (10 mL/Hr) IV Continuous <Continuous>  hydrALAZINE 50 milliGRAM(s) Oral two times a day  isosorbide   mononitrate ER Tablet (IMDUR) 60 milliGRAM(s) Oral daily  sertraline 50 milliGRAM(s) Oral daily  ticagrelor 90 milliGRAM(s) Oral two times a day    MEDICATIONS  (PRN):  heparin  Injectable 4000 Unit(s) IV Push every 6 hours PRN For aPTT less than 40      LABS:                        13.7   18.31 )-----------( 218      ( 14 Dec 2017 07:51 )             43.9     Hgb Trend: 13.7<--, 14.2<--  12-13    140  |  104  |  26<H>  ----------------------------<  239<H>  4.1   |  16<L>  |  1.86<H>    Ca    8.8      13 Dec 2017 20:03    TPro  7.4  /  Alb  4.1  /  TBili  0.8  /  DBili  x   /  AST  197<H>  /  ALT  159<H>  /  AlkPhos  172<H>      Creatinine Trend: 1.86<--  PT/INR - ( 13 Dec 2017 20:03 )   PT: 11.6 sec;   INR: 1.07 ratio         PTT - ( 13 Dec 2017 20:03 )  PTT:29.5 sec  Urinalysis Basic - ( 14 Dec 2017 05:53 )    Color: PL Yellow / Appearance: Clear / S.008 / pH: x  Gluc: x / Ketone: Negative  / Bili: Negative / Urobili: Negative   Blood: x / Protein: 30 mg/dL / Nitrite: Negative   Leuk Esterase: Negative / RBC: 0-2 /HPF / WBC 0-2 /HPF   Sq Epi: x / Non Sq Epi: Occasional /HPF / Bacteria: Few /HPF      Arterial Blood Gas:   @ 05:26  7.44/35/102/24/97/.2  ABG lactate: --    Venous Blood Gas:   @ 20:02  7.23/49/69/20/89  VBG Lactate: 5.7      MICROBIOLOGY:     RADIOLOGY:  [ ] Reviewed and interpreted by me    PULMONARY FUNCTION TESTS:    EKG: HPI:  63 yo man with PMH of CAD, with history of CABG  with history stent (last stent in ), HTN, psoriasis, pemphigus vulgaris? (recently diagnosed after last hospitalization), presenting from home in setting of chest pain and shortness of breath. Chest pain new onset today, located substernally, not related to exertion and has been intermittent throughout the day. Patient also endorses associated symptoms of nausea without episodes of emesis. With regards to shortness of breath it has been present ~ 2 weeks and is usually noticeable with exertion. Endorses abdominal bloating, LE edema, and symptoms of orthopnea.    PAST MEDICAL & SURGICAL HISTORY:  Psoriasis  HLD (hyperlipidemia)  CAD (coronary artery disease): 3 stents  Hypertension  History of coronary artery stent placement: 1 stent , 2 stents  in [facility]  S/P quadruple vessel bypass:       FAMILY HISTORY:  Family history of cerebrovascular accident (CVA)  Family history of early CAD (Father): father who  of CVA.      SOCIAL HISTORY:  Smoking: [x] Never Smoked [ ] Former Smoker (__ packs x ___ years) [ ] Current Smoker  (__ packs x ___ years)  Substance Use: [ x] Never Used [ ] Used ____  EtOH Use: Denies  Marital Status: [ ] Single [x ]  [ ]  [ ]   Sexual History: Non contributory  Occupation: Worked in [employer]  Recent Travel: None  Country of Birth: [place]  Advance Directives: Full    Allergies    No Known Allergies    Intolerances      HOME MEDICATIONS: As per Med rec    REVIEW OF SYSTEMS:  Constitutional: [ ] fevers [ ] chills [ ] weight loss [x ] weight gain  HEENT: [ ] dry eyes [ ] eye irritation [ ] postnasal drip [ ] nasal congestion  CV: [x ] chest pain [ ] orthopnea [ ] palpitations [ ] murmur  Resp:  [ x] cough [x] shortness of breath [ ] dyspnea [ ] wheezing [ ] sputum [ ] hemoptysis  GI: [ ] nausea [ ] vomiting [ ] diarrhea [ ] constipation [ ] abd pain [ ] dysphagia   : [ ] dysuria [ ] nocturia [ ] hematuria [ ] increased urinary frequency  Musculoskeletal: [ ] back pain [ ] myalgias [ ] arthralgias [ ] fracture  Skin: [ ] rash [ ] itch  Neurological: [ ] headache [ ] dizziness [ ] syncope [ ] weakness [ ] numbness  Psychiatric: [ ] anxiety [ ] depression  Endocrine: [ ] diabetes [ ] thyroid problem  Hematologic/Lymphatic: [ ] anemia [ ] bleeding problem  Allergic/Immunologic: [ ] itchy eyes [ ] nasal discharge [ ] hives [ ] angioedema  [x ] All other systems negative  [ ] Unable to assess ROS because ________    OBJECTIVE:  ICU Vital Signs Last 24 Hrs  T(C): 36.3 (14 Dec 2017 05:51), Max: 36.5 (13 Dec 2017 19:38)  T(F): 97.4 (14 Dec 2017 05:51), Max: 97.7 (13 Dec 2017 19:38)  HR: 74 (14 Dec 2017 07:41) (67 - 96)  BP: 127/71 (14 Dec 2017 07:41) (121/95 - 143/91)  BP(mean): --  ABP: --  ABP(mean): --  RR: 26 (14 Dec 2017 07:41) (17 - 38)  SpO2: 100% (14 Dec 2017 07:41) (89% - 100%)        CAPILLARY BLOOD GLUCOSE          PHYSICAL EXAM:  General: Mild respiratory distress  HEENT: PERRLA  Lymph Nodes: No lymphadenopathy  Neck:  Supple  Respiratory: Crackles at the bases bilaterally  Cardiovascular: RRR  Abdomen: Distended abdomen  Extremities: 2/4 edema in LE bilaterally  Neurological: AAOx3, Nonfocal    HOSPITAL MEDICATIONS:  MEDICATIONS  (STANDING):  aspirin enteric coated 81 milliGRAM(s) Oral daily  atorvastatin 80 milliGRAM(s) Oral at bedtime  carvedilol 25 milliGRAM(s) Oral every 12 hours  furosemide   Injectable 80 milliGRAM(s) IV Push every 12 hours  heparin  Infusion.  Unit(s)/Hr (10 mL/Hr) IV Continuous <Continuous>  hydrALAZINE 50 milliGRAM(s) Oral two times a day  isosorbide   mononitrate ER Tablet (IMDUR) 60 milliGRAM(s) Oral daily  sertraline 50 milliGRAM(s) Oral daily  ticagrelor 90 milliGRAM(s) Oral two times a day    MEDICATIONS  (PRN):  heparin  Injectable 4000 Unit(s) IV Push every 6 hours PRN For aPTT less than 40      LABS:                        13.7   18.31 )-----------( 218      ( 14 Dec 2017 07:51 )             43.9     Hgb Trend: 13.7<--, 14.2<--      140  |  104  |  26<H>  ----------------------------<  239<H>  4.1   |  16<L>  |  1.86<H>    Ca    8.8      13 Dec 2017 20:03    TPro  7.4  /  Alb  4.1  /  TBili  0.8  /  DBili  x   /  AST  197<H>  /  ALT  159<H>  /  AlkPhos  172<H>      Creatinine Trend: 1.86<--  PT/INR - ( 13 Dec 2017 20:03 )   PT: 11.6 sec;   INR: 1.07 ratio         PTT - ( 13 Dec 2017 20:03 )  PTT:29.5 sec  Urinalysis Basic - ( 14 Dec 2017 05:53 )    Color: PL Yellow / Appearance: Clear / S.008 / pH: x  Gluc: x / Ketone: Negative  / Bili: Negative / Urobili: Negative   Blood: x / Protein: 30 mg/dL / Nitrite: Negative   Leuk Esterase: Negative / RBC: 0-2 /HPF / WBC 0-2 /HPF   Sq Epi: x / Non Sq Epi: Occasional /HPF / Bacteria: Few /HPF      Arterial Blood Gas:   @ 05:26    7.44/35/102/24/97/.2  ABG lactate: --    Venous Blood Gas:   @ 20:02  7.23/49/69/20/89  VBG Lactate: 5.7      MICROBIOLOGY:     RADIOLOGY:  [x ] Reviewed and interpreted by me    < from: Xray Chest 1 View AP/PA (17 @ 19:55) >  INTERPRETATION:  CLINICAL INDICATION: Respiratory distress, history of   cardiac disease.    EXAM: Frontal view of the chest with comparison made to chest radiograph   on 2017 2017    FINDINGS:   The left lower thorax is not fully imaged.  Status post median sternotomy. Multiple surgical clips overlie the mid   thorax.  The hilar vasculature is indistinct with mild prominence of the pulmonary   vasculature. There is no pleural effusion or pneumothorax.  The heart size cannot be accurately assessed in this projection.   No acute bony pathology.    IMPRESSION:   Mild pulmonary edema.    < end of copied text >

## 2017-12-14 NOTE — CONSULT NOTE ADULT - ATTENDING COMMENTS
Thank you for the courtesy of the consultation,I would be available for any further discussion if needed.  Bijan Benavidez MD,FACC.  [address] [place], [place] [id number] [phone]
61 yo M with CAD, Hx CABG, htn, pemphigus vulgars, CKD with NSTEMI and pulm edema, s/p LHC today (no PCI).    --dyspnea from pulm edema and dCHF  --IV diuresis for negative fluid balence  --BiPAP prn increased work of breathing  --plan discussed with pt
Pt seen and examined  Above verified  CAD-?NSTEMI s/p cath  pemphigus on steroids  No clinical s/s infectious process  Leucocytosis likely from steroids vs reactive to cardiac event/procedure  Hemodynamically stable  UA CXR negative  Would Check blood Cx  Observe clinically  No empiric abx for now  Tailor plan per course,results.  Will Follow.  Beeper [id number]/afterhours/No response-[id number]

## 2017-12-14 NOTE — H&P ADULT - NEUROLOGICAL DETAILS
cranial nerves intact/normal strength/alert and oriented x 3/responds to verbal commands/responds to pain

## 2017-12-14 NOTE — PROVIDER CONTACT NOTE (OTHER) - BACKGROUND
s/p dx cardiac cath left radial site benign, no hematoma, no bleeding, positive b/l radial pulses. pmhx heart failure, HTN, CAD, HLD

## 2017-12-14 NOTE — H&P ADULT - FAMILY HISTORY
Family history of cerebrovascular accident (CVA)     Father  Still living? Unknown  Family history of early CAD, Age at diagnosis: Age Unknown

## 2017-12-14 NOTE — CONSULT NOTE ADULT - PROBLEM SELECTOR RECOMMENDATION 9
- Likely reactive in nature in the context of no other clinical sx suggesting infection.  - CXR clear, UA negative, chronic mild cough likely associated with HF execration edema.  - Will hold off on abx at this time and continue to monitor for fever/leukocytosis trending.  - Will discuss plan with attending on rounds. - Likely reactive in nature in the context of no other clinical sx suggesting infection or chronic steroid use at home.  - CXR clear, UA negative, chronic mild cough likely associated with HF execration edema.  - Will hold off on abx at this time and continue to monitor for fever/leukocytosis trending.  - Recommend sending 2 x BCx.  - Plan discussed with ID attending Dr. Reyes.

## 2017-12-14 NOTE — CONSULT NOTE ADULT - SUBJECTIVE AND OBJECTIVE BOX
Patient is a 62y old  Male who presents with a chief complaint of CP and SOB (14 Dec 2017 16:13)  HPI:  61 yo man with PMH of CAD, with history of CABG in 2005 with history of PCI (last stent in ), HTN, psoriasis, pemphigus vulgaris? (recently diagnosed after last hospitalization), presenting from home in setting of chest pain and shortness of breath. Chest pain was of new onset today, located substernally, not related to exertion and has been intermittent throughout the day. Patient also endorses associated symptoms of nausea without episodes of emesis. With regards to shortness of breath it has been present ~ 2 weeks and is usually noticeable with exertion. Endorses abdominal bloating, LE edema, and symptoms of orthopnea. Patient compliant with medications. He was seen by Dr. Mckeon in the office on 17 and Zaroxylyn was added to his regimen due to increasing LE edema with SOB. Echo done 2017 with low nl EF and hypokinesis of the basal/mid inferolateral , inferior and lateral walls. (14 Dec 2017 04:31)      PAST MEDICAL & SURGICAL HISTORY:  Psoriasis  HLD (hyperlipidemia)  CAD (coronary artery disease): 3 stents  Hypertension  History of coronary artery stent placement: 1 stent , 2 stents  in [facility]  S/P quadruple vessel bypass: 2006  PAD  Pemphigus vulgaris    MEDICATIONS  (STANDING):  acetylcysteine  Oral Solution 1200 milliGRAM(s) Oral every 12 hours  aspirin enteric coated 81 milliGRAM(s) Oral daily  atorvastatin 80 milliGRAM(s) Oral at bedtime  carvedilol 25 milliGRAM(s) Oral every 12 hours  furosemide   Injectable 80 milliGRAM(s) IV Push every 12 hours  heparin  Infusion.  Unit(s)/Hr (10 mL/Hr) IV Continuous <Continuous>  hydrALAZINE 50 milliGRAM(s) Oral two times a day  isosorbide   mononitrate ER Tablet (IMDUR) 60 milliGRAM(s) Oral daily  pantoprazole    Tablet 40 milliGRAM(s) Oral two times a day before meals  potassium chloride    Tablet ER 20 milliEquivalent(s) Oral once  sertraline 50 milliGRAM(s) Oral daily  ticagrelor 90 milliGRAM(s) Oral two times a day  Prednisone 15 mg daily    MEDICATIONS  (PRN):  heparin  Injectable 4000 Unit(s) IV Push every 6 hours PRN For aPTT less than 40      FAMILY HISTORY:  Family history of cerebrovascular accident (CVA)  Family history of early CAD (Father): father who  of CVA.  Sister  at age 40 of an MI    SOCIAL HISTORY:     CIGARETTES: former smoker    REVIEW OF SYSTEMS  General: overall malaise	  Skin/Breast: psoriasis  Respiratory and Thorax: see HPI	  Cardiovascular: see HPI  Gastrointestinal:	 nausea  Genitourinary: nl  Musculoskeletal:	 nl  Neurological: nl  Psychiatric: h/o depression  	  Vital Signs Last 24 Hrs  T(C): 36.3 (14 Dec 2017 05:51), Max: 36.5 (13 Dec 2017 19:38)  T(F): 97.4 (14 Dec 2017 05:51), Max: 97.7 (13 Dec 2017 19:38)  HR: 78 (14 Dec 2017 18:05) (67 - 96)  BP: 125/94 (14 Dec 2017 18:05) (120/70 - 166/90)  BP(mean): --  RR: 17 (14 Dec 2017 18:05) (16 - 38)  SpO2: 96% (14 Dec 2017 18:05) (89% - 100%)    PHYSICAL EXAM:    Constitutional: WD, WN, obese in mild respiratory distress  Neck: no JVD noted  Respiratory: clear lungs anteriorly  Cardiovascular: RRR, 1/6 KATHERYN  Gastrointestinal: obese, mild distension, NT, no masses  Extremities: 2+ judi pitting edema  Vascular: intact pulses  Neurological: sleepy, but arousable, grossly nonfocal    TELEMETRY: RSR with PVC's and PAC's    ECG:< from: 12 Lead ECG (17 @ 19:47) >  Ventricular Rate 85 BPM    Atrial Rate 85 BPM    P-R Interval 186 ms    QRS Duration 102 ms     ms    QTc 487 ms    P Axis 80 degrees    R Axis 76 degrees    T Axis 179 degrees    Diagnosis Line NORMAL SINUS RHYTHM  ST ELEVATION CONSIDER ANTERIOR INJURY OR ACUTE INFARCT  PROLONGED QT  *** ** ** ** * ACUTE MI  ** ** ** **    < end of copied text >      LABS:                        13.7   18.31 )-----------( 218      ( 14 Dec 2017 07:51 )             43.9     12-14    143   |  x   |  x   ----------------------------<  x   3.2<L>   |  x   |  1.69    Ca    9.2      14 Dec 2017 07:25  Phos  5.1     12-14  Mg     2.1     12-14    TPro  7.4  /  Alb  4.1  /  TBili  0.8  /  DBili  x   /  AST  302<H>  /  ALT  156<H>  /  AlkPhos  163<H>  12-14    CARDIAC MARKERS ( 14 Dec 2017 05:26 )  x     / 5.18 ng/mL / 2374 U/L / x     / 383.9 ng/mL  CARDIAC MARKERS ( 13 Dec 2017 20:03 )  x     / 0.02 ng/mL / 64 U/L / x     / 3.8 ng/mL      PT/INR - ( 13 Dec 2017 20:03 )   PT: 11.6 sec;   INR: 1.07 ratio         PTT - ( 14 Dec 2017 14:02 )  PTT:31.7 sec  Urinalysis Basic - ( 14 Dec 2017 05:53 )    Color: PL Yellow / Appearance: Clear / S.008 / pH: x  Gluc: x / Ketone: Negative  / Bili: Negative / Urobili: Negative   Blood: x / Protein: 30 mg/dL / Nitrite: Negative   Leuk Esterase: Negative / RBC: 0-2 /HPF / WBC 0-2 /HPF   Sq Epi: x / Non Sq Epi: Occasional /HPF / Bacteria: Few /HPF      I&O's Summary    14 Dec 2017 07:01  -  14 Dec 2017 19:35  --------------------------------------------------------  IN: 600 mL / OUT: 2050 mL / NET: -1450 mL      BNPSerum Pro-Brain Natriuretic Peptide: 2712 pg/mL ( @ 20:03)    RADIOLOGY & ADDITIONAL STUDIES:< from: Xray Chest 1 View AP/PA (17 @ 19:55) >  EXAM:  CHEST SINGLE AP OR PA                            PROCEDURE DATE:  2017            INTERPRETATION:  CLINICAL INDICATION: Respiratory distress, history of   cardiac disease.    EXAM: Frontal view of the chest with comparison made to chest radiograph   on 2017    FINDINGS:   The left lower thorax is not fully imaged.  Status post median sternotomy. Multiple surgical clips overlie the mid   thorax.  The hilar vasculature is indistinct with mild prominence of the pulmonary   vasculature. There is no pleural effusion or pneumothorax.  The heart size cannot be accurately assessed in this projection.   No acute bony pathology.    IMPRESSION:   Mild pulmonary edema.                DARRION RIVAS M.D., RADIOLOGY RESIDENT  This document has been electronically signed.  JASWANT DYE M.D., ATTENDING RADIOLOGIST  This document has been electronically signed. Dec 14 2017  9:16AM    < end of copied text >    < from: Transthoracic Echocardiogram (17 @ 19:58) >  Patient name: THONY ANAYA  YOB: 1955   Age: 62 (M)   MR#: [id number]  Study Date: 2017  Location: [place] [address]  DSonographer: Jesus Ball RDCS  Study quality: Technically difficult  Referring Physician: Adali Liz MD  Blood Pressure: 143/98 mmHg  Height: 170 cm  Weight: 102 kg  BSA: 2.1 m2  Heart Rate: 94 mmHg  ------------------------------------------------------------------------  PROCEDURE: Transthoracic echocardiogram with 2-D, M-Mode  and complete spectral and color flow Doppler.  INDICATION: Abnormal electrocardiogram (ECG) (EKG) (R94.31)  ------------------------------------------------------------------------  Dimensions:    Normal Values:  LA:     5.4    2.0 - 4.0 cm  Ao:     3.0    2.0 - 3.8 cm  SEPTUM:        0.6 - 1.2 cm  PWT:           0.6 - 1.1 cm  LVIDd:         3.0 - 5.6 cm  LVIDs:         1.8 - 4.0 cm  Doppler Peak Velocity (m/sec): AoV=1.2  ------------------------------------------------------------------------  Observations:  Mitral Valve: Mitral valve not well visualized. Mild mitral  regurgitation.  Aortic Valve/Aorta: Aortic valve not well visualized;  appears calcified. Mild aortic regurgitation.  Peak left  ventricular outflow tract gradient equals 2 mm Hg.  Aortic Root: 3 cm.  Left Atrium: Mildly dilated left atrium.  LA volume index =  39 cc/m2.  Left Ventricle: Endocardium not well visualized; unable to  evaluate left ventricular systolic function. Consider  further evaluation with echo contrast , Definity if  clinically indicated.  Septal motion consistent with  cardiac surgery. The basal inferolateral wall is  hypokinetic.  The left ventricle appears enlarged.  Mild  diastolic dysfunction (Stage I).  Right Heart: Right atrium not well visualized. The right  ventricle is not well visualized. Tricuspid valve not well  visualized. No tricuspid regurgitation. Pulmonic valve not  well visualized. Minimal pulmonic regurgitation.  Pericardium/Pleura: Normal pericardium with trace  pericardial effusion.  ------------------------------------------------------------------------  Conclusions:  1. Mitral valve not well visualized. Mild mitral  regurgitation.  2. Aortic valve not well visualized; appears calcified.  Peak transaortic valve gradient equals 6 mm Hg. Mild aortic  regurgitation.  3. Mildly dilated left atrium.  LA volume index = 39 cc/m2.  4. The left ventricle appears enlarged.  5. Endocardium not well visualized; unable to evaluate left  ventricular systolic function. Consider further evaluation  withecho contrast , Definity if clinically indicated.  Septal motion consistent with cardiac surgery. The basal  inferolateral wall is hypokinetic.  6. Mild diastolic dysfunction (Stage I).  7. The right ventricle is not well visualized.  8. Normal pericardium with trace pericardial effusion.  *** Due to poor endocardial visualization on the current  study, direct comparison with echocardiogram of 2017,  is not possible.  Results discussed with Dr Bah.  ------------------------------------------------------------------------  Confirmed on  2017 - 12:56:41 by Ruben Thomas M.D.  ------------------------------------------------------------------------    < end of copied text >  < from: Cardiac Cath Lab - Adult (17 @ 12:57) >  Health system  Department of Cardiology  [address] [place] [address]  [phone]  Cath Lab Report -- Comprehensive Report  Patient: THONY ANAYA  Study date: 2017  Account number: [id number]  MR number: [id number]  : 1955  Gender: Male  Race: W  Case Physician(s):  Javad Block M.D.  Fellow:  Jesus Alberto Clifton M.D.  Referring Physician:  Charan Mckeon M.D.  INDICATIONS: Unstable angina - CCS4.  HISTORY: The patient has a history of coronary artery disease. The patient  has hypertension and medication-treated dyslipidemia.  PROCEDURE:  --  Left coronary angiography.  --  Right coronary angiography.  --  Bypass graft angiography.  --  Aortography.  TECHNIQUE: The risks and alternatives of theprocedures and conscious  sedation were explained to the patient and informed consent was obtained.  Cardiac catheterization performed electively.  Local anesthetic given. Left radial artery access. Left coronary artery  angiography. The vessel was injected utilizing a catheter. Right coronary  artery angiography. The vessel was injected utilizing a catheter. Graft  angiography. The vessel was injected utilizing a catheter. Aortography. A  catheter was placed and contrast was injected. RADIATION EXPOSURE: 5.9  min.  CONTRAST GIVEN: Omnipaque 71 ml.  MEDICATIONS GIVEN: Verapamil (Isoptin, Calan, Covera), 2.5 mg, IA.  CORONARY VESSELS: The coronary circulation is right dominant.  LM:   --  LM: Angiography showed minor luminal irregularities with no flow  limiting lesions.  LAD:   --  Proximal LAD: There was a 100 % stenosis.  CX:   --  Proximal circumflex: There was a 100 % stenosis.  RCA:   --  Mid RCA: There was a 100 % stenosis.  GRAFTS:   --  Graft to the mid LAD: The graft was a LIMA. Graft angiography  showed minor luminal irregularities.  AORTA: The root exhibited mild fibrocalcific change. No grafts visualized  COMPLICATIONS: There were no complications.  DIAGNOSTIC RECOMMENDATIONS: The patient should continue with the present  medications.  Prepared and signed by  Javad Block M.D.  Signed 2017 15:24:02  HEMODYNAMIC TABLES  Pressures:  Baseline  Pressures:  - HR: 75  Pressures:  - Rhythm:  Pressures:  -- Aortic Pressure (S/D/M): 136/93/112  O2 Sats:  Baseline  O2 Sats:  - HR: 75  O2 Sats:  - Rhythm:  O2 Sats:  -- AO: 13.6/99.5/18.4  Outputs:  Baseline  Outputs:  -- CALCULATIONS[age]  Outputs:  -- CALCULATIONS: Body Surface Area: 2.29  Outputs:  -- CALCULATIONS: Height in cm: 173.00  Outputs:  -- CALCULATIONS: Sex: Male  Outputs:  -- CALCULATIONS: Weight in k.90  Outputs:  -- OUTPUTS: O2 consumption: 306.86  Outputs:  -- OUTPUTS: Vo2 Indexed: 134.15    < end of copied text >    IMPRESSION:  Acute on chronic likely diastolic HF  NSTEMI  CAD  HTN  HLD     RECOMMENDATIONS:  Aggressive medical therapy for CAD/angina- no indication for PCI intervention  f/u CBC, BMP post cath  IV Lasix BID  Trend CE/troponins  Titrate up on antianginal if BP permits  Bipap/CPAP overnight  Repeat CXR in AM

## 2017-12-14 NOTE — H&P ADULT - NSHPSOCIALHISTORY_GEN_ALL_CORE
Lives alone  Daughter involved in care  Former smoker quit in April 2017 1ppd ~40 years  Occasional EtOH use

## 2017-12-14 NOTE — CONSULT NOTE ADULT - SUBJECTIVE AND OBJECTIVE BOX
Dr. Mayen  Office [phone]  Cell [phone]  Lorena CHEW  Cell [phone]    HPI:  63 yo man with PMH of CAD, with history of CABG  with history stent (last stent in ), HTN, psoriasis, pemphigus vulgaris? (recently diagnosed after last hospitalization), presenting from home in setting of chest pain and shortness of breath. Chest pain new onset today, located substernally, not related to exertion and has been intermittent throughout the day. Patient also endorses associated symptoms of nausea without episodes of emesis. With regards to shortness of breath it has been present ~ 2 weeks and is usually noticeable with exertion. Endorses abdominal bloating, LE edema, and symptoms of orthopnea. Patient compliant with medications        Allergies:  No Known Allergies      PAST MEDICAL & SURGICAL HISTORY:  Psoriasis  HLD (hyperlipidemia)  CAD (coronary artery disease): 3 stents  Hypertension  History of coronary artery stent placement: 1 stent , 2 stents  in [facility]  S/P quadruple vessel bypass:       Home Medications Reviewed    Hospital Medications:   MEDICATIONS  (STANDING):  acetylcysteine  Oral Solution 1200 milliGRAM(s) Oral every 12 hours  aspirin enteric coated 81 milliGRAM(s) Oral daily  atorvastatin 80 milliGRAM(s) Oral at bedtime  carvedilol 25 milliGRAM(s) Oral every 12 hours  furosemide   Injectable 80 milliGRAM(s) IV Push every 12 hours  heparin  Infusion.  Unit(s)/Hr (10 mL/Hr) IV Continuous <Continuous>  hydrALAZINE 50 milliGRAM(s) Oral two times a day  isosorbide   mononitrate ER Tablet (IMDUR) 60 milliGRAM(s) Oral daily  sertraline 50 milliGRAM(s) Oral daily  ticagrelor 90 milliGRAM(s) Oral two times a day      SOCIAL HISTORY:  Denies ETOh, Smoking,     FAMILY HISTORY:  Family history of cerebrovascular accident (CVA)  Family history of early CAD (Father): father who  of CVA.      REVIEW OF SYSTEMS:  CONSTITUTIONAL: No weakness, fevers or chills  EYES/ENT: No visual changes;  No vertigo or throat pain   NECK: No pain or stiffness  RESPIRATORY: as per HPI  CARDIOVASCULAR: as per HPI  GASTROINTESTINAL: No abdominal or epigastric pain. No nausea, vomiting, or hematemesis; No diarrhea or constipation. No melena or hematochezia.  GENITOURINARY: No dysuria, frequency, foamy urine, urinary urgency, incontinence or hematuria  NEUROLOGICAL: No numbness or weakness  SKIN: No itching, burning, rashes, or lesions   VASCULAR: No bilateral lower extremity edema.   All other review of systems is negative unless indicated above.    VITALS:  T(F): 97.4 (17 @ 05:51), Max: 97.7 (17 @ 19:38)  HR: 86 (17 @ 15:05)  BP: 129/89 (17 @ 15:05)  RR: 16 (17 @ 15:05)  SpO2: 97% (17 @ 15:05)  Wt(kg): --     @ 07:01  -   @ 15:14  --------------------------------------------------------  IN: 480 mL / OUT: 300 mL / NET: 180 mL        Weight (kg): 118.5 ( @ 07:16)    PHYSICAL EXAM:  Constitutional: NAD  HEENT: anicteric sclera, oropharynx clear, MMM  Neck: No JVD  Respiratory: bilateral basal crackles+  Cardiovascular: S1, S2, RRR  Gastrointestinal: BS+, soft, NT/ND  Extremities: No cyanosis or clubbing. 2+ peripheral edema  Neurological: A/O x 3, no focal deficits  Psychiatric: Normal mood, normal affect  : No CVA tenderness. No parks.   Skin: No rashes      LABS:      143  |  103  |  27<H>  ----------------------------<  107<H>  3.4<L>   |  17<L>  |  1.69<H>    Ca    9.2      14 Dec 2017 07:25  Phos  5.1       Mg     2.3         TPro  7.4  /  Alb  4.1  /  TBili  0.8  /  DBili      /  AST  302<H>  /  ALT  156<H>  /  AlkPhos  163<H>  12-14    Creatinine Trend: 1.69 <--, 1.86 <--                        13.7   18.31 )-----------( 218      ( 14 Dec 2017 07:51 )             43.9     Urine Studies:  Urinalysis Basic - ( 14 Dec 2017 05:53 )    Color: PL Yellow / Appearance: Clear / S.008 / pH:   Gluc:  / Ketone: Negative  / Bili: Negative / Urobili: Negative   Blood:  / Protein: 30 mg/dL / Nitrite: Negative   Leuk Esterase: Negative / RBC: 0-2 /HPF / WBC 0-2 /HPF   Sq Epi:  / Non Sq Epi: Occasional /HPF / Bacteria: Few /HPF          RADIOLOGY & ADDITIONAL STUDIES:

## 2017-12-14 NOTE — PROGRESS NOTE ADULT - SUBJECTIVE AND OBJECTIVE BOX
pt was evaluated by hospitalist earlier during the day , labs vitals, consults reviewed. cardiac cath today , ID/ RENAL/ Cards/ pulm f/u

## 2017-12-14 NOTE — CONSULT NOTE ADULT - PROBLEM SELECTOR RECOMMENDATION 9
Baseline Scr 1.8  Scr today is better than baseline likely sec to increased fluid status  Monitor renal function

## 2017-12-14 NOTE — CHART NOTE - NSCHARTNOTEFT_GEN_A_CORE
Pt s/p diagnostic [facility] cardiac cath LIMA to LAD up and SVG to OM1 and D@ down ( chronic ) hospital course c/b mild pulmonary edema s/p diuresed 2L now hypotensive SBP 80-90s and SOB. ABG ordered along with CBC, BMP and EKG. Dr Vieyra notified suggested call Cardiology.

## 2017-12-14 NOTE — H&P ADULT - PROBLEM SELECTOR PLAN 4
Patient with episodes of Vtach ranging from 8-13 beats and multiple PVCs  Monitor lytes  Continue to monitor on tele  Continue with coreg. Per cardiology team, can consider increasing coreg dose if needed.  Consider EP consult in AM

## 2017-12-14 NOTE — H&P ADULT - PROBLEM SELECTOR PLAN 5
Unclear source of leukocytosis. Afrebrile. + Cough  Check RVP  Check UA  F/U Final read of Xray. Primary day team to re-eval need for CT Chest in AM Unclear source of leukocytosis. Afrebrile. + Cough  Check RVP  Check UA and Urine culture  Monitor off abx as patient afebrile.   F/U Final read of Xray. Primary day team to re-eval need for CT Chest in AM

## 2017-12-14 NOTE — CONSULT NOTE ADULT - ASSESSMENT
61 yo man with PMH of CAD, with history of CABG 2005 with history stent (last stent in 2010), HTN, CKD, psoriasis, pemphigus vulgaris? (recently diagnosed after last hospitalization), presenting from home in setting of chest pain and shortness of breath.
66 year old male with hx of CAD and psoriasis who comes in with chest pain and found to have an NSTEMI. Currently awaiting cardiac cath.    - Please continue to diurese for heart failure IV and not PO.   - No primary pulmonary disease noted on CXR.  - Bipap PRN if needed.   - If unable to lay flat would sedate as per cardiology or get anesthesia involved for cath. The patient should not wait to have his cath because of dyspnea laying flat.     Edwardo Spicer DO MPH  Pulmonary Consult Service  [id number]
report of cath negative per patient , medical managment.  Has been having epigastric pain and bloating,likely stress induced gastritis, will rx bid ppi and follow with you.
· Assessment		  61 yo man with PMH of CAD, with history of CABG 2005 with history stent (last stent in 2010), HTN, psoriasis, presenting from home in setting of chest pain and shortness of breath.         Problem/Plan - 1:  ·  Problem: Acute decompensated heart failure.  Plan: Patient with prior nuclear stress test with EF 46% with reported hypokinesis  Possible baseline systolic heart failure  Patient's symptomology suggestive of acute HF in setting of elevated BNP      Problem/Plan - 2:  ·  Problem: Chest pain.  Plan: Patient symptoms concerning for ACS. Cath consult called by ED team.  Elevated troponins suggestive for NSTEMI-consideration for cath-intervention to native vessels once renal function improves.  Contonue on Brilinta 90mg BID
61 yo man with PMH of CAD, s/p CABG and PCI, HTN, pemphigus vulgaris, presented from home with new onset chest pain and shortness of breath. ID service consulted for leukocytosis

## 2017-12-14 NOTE — H&P ADULT - RS GEN PE MLT RESP DETAILS PC
airway patent/comfortable on BiPAP/no intercostal retractions/no rhonchi/good air movement/no wheezes/respirations non-labored/rales

## 2017-12-14 NOTE — H&P ADULT - HISTORY OF PRESENT ILLNESS
61 yo man with PMH of CAD, with history of CABG 2005 with history stent (last stent in 2010), HTN, psoriasis, pemphigus? (recently diagnosed after last hospitalization), presenting from home in setting of chest pain and shortness of breath. Chest pain new onset today, located substernally, not related to exertion and has been intermittent throughout the day. Patient also endorses associated symptoms of nausea without episodes of emesis. With regards to shortness of breath it has been present ~ 2 weeks and is usually noticeable with exertion. 61 yo man with PMH of CAD, with history of CABG 2005 with history stent (last stent in 2010), HTN, psoriasis, pemphigus vulgaris? (recently diagnosed after last hospitalization), presenting from home in setting of chest pain and shortness of breath. Chest pain new onset today, located substernally, not related to exertion and has been intermittent throughout the day. Patient also endorses associated symptoms of nausea without episodes of emesis. With regards to shortness of breath it has been present ~ 2 weeks and is usually noticeable with exertion. Endorses abdominal bloating, LE edema, and symptoms of orthopnea. Patient compliant with medications

## 2017-12-14 NOTE — H&P ADULT - PROBLEM SELECTOR PLAN 9
Primary day team to reconcile meds with PMD, Family, Pharmacy in AM with regards to medications. Meds obtained from electronic import. Unclear if patient on Prednisone or other medication for skin: pemphigus vulgaris

## 2017-12-14 NOTE — CONSULT NOTE ADULT - SUBJECTIVE AND OBJECTIVE BOX
Patient is a 62y old  Male who presents with a chief complaint of cp/sob (14 Dec 2017 16:13)      HPI:  63 yo man with PMH of CAD, with history of CABG  with history stent (last stent in ), HTN, psoriasis, pemphigus vulgaris? (recently diagnosed after last hospitalization), presenting from home in setting of chest pain and shortness of breath. Chest pain new onset today, located substernally, not related to exertion and has been intermittent throughout the day. Patient also endorses associated symptoms of nausea without episodes of emesis. With regards to shortness of breath it has been present ~ 2 weeks and is usually noticeable with exertion. Endorses abdominal bloating, LE edema, and symptoms of orthopnea. Patient compliant with medications (14 Dec 2017 04:31)      PAST MEDICAL & SURGICAL HISTORY:  Psoriasis  HLD (hyperlipidemia)  CAD (coronary artery disease): 3 stents  Hypertension  History of coronary artery stent placement: 1 stent , 2 stents  in [facility]  S/P quadruple vessel bypass:       MEDICATIONS  (STANDING):  acetylcysteine  Oral Solution 1200 milliGRAM(s) Oral every 12 hours  aspirin enteric coated 81 milliGRAM(s) Oral daily  atorvastatin 80 milliGRAM(s) Oral at bedtime  carvedilol 25 milliGRAM(s) Oral every 12 hours  furosemide   Injectable 80 milliGRAM(s) IV Push every 12 hours  heparin  Infusion.  Unit(s)/Hr (10 mL/Hr) IV Continuous <Continuous>  hydrALAZINE 50 milliGRAM(s) Oral two times a day  isosorbide   mononitrate ER Tablet (IMDUR) 60 milliGRAM(s) Oral daily  sertraline 50 milliGRAM(s) Oral daily  ticagrelor 90 milliGRAM(s) Oral two times a day      Allergies    No Known Allergies    Intolerances        SOCIAL HISTORY:  Denies ETOh,Smoking,     FAMILY HISTORY:  Family history of cerebrovascular accident (CVA)  Family history of early CAD (Father): father who  of CVA.      REVIEW OF SYSTEMS:    CONSTITUTIONAL: No weakness, fevers or chills  EYES/ENT: No visual changes;  No vertigo or throat pain   NECK: No pain or stiffness  RESPIRATORY: No cough, wheezing, hemoptysis; No shortness of breath  CARDIOVASCULAR: No chest pain or palpitations  GASTROINTESTINAL: No abdominal or epigastric pain. No nausea, vomiting, or hematemesis; No diarrhea or constipation. No melena or hematochezia.  GENITOURINARY: No dysuria, frequency or hematuria  NEUROLOGICAL: No numbness or weakness  SKIN: No itching, burning, rashes, or lesions   All other review of systems is negative unless indicated above.    VITAL:  T(C): , Max: 36.5 (17 @ 19:38)  T(F): , Max: 97.7 (17 @ 19:38)  HR: 79 (17 @ 16:05)  BP: 139/86 (17 @ 16:05)  BP(mean): --  RR: 16 (17 @ 16:05)  SpO2: 99% (17 @ 16:05)  Wt(kg): --    I and O's:     @ 07:01  -   @ 16:30  --------------------------------------------------------  IN: 480 mL / OUT: 1550 mL / NET: -1070 mL        Weight (kg): 118.5 ( @ 07:16)    PHYSICAL EXAM:    Constitutional: NAD  HEENT: PERRLA,   Neck: No JVD  Respiratory: CTA B/L  Cardiovascular: S1 and S2  Gastrointestinal: BS+, soft, NT/ND  Extremities: No peripheral edema  Neurological: A/O x 3, no focal deficits  Psychiatric: Normal mood, normal affect  : No Curtis  Skin: No rashes  Access: Not applicable  Back: No CVA tenderness    LABS:                        13.7   18.31 )-----------( 218      ( 14 Dec 2017 07:51 )             43.9         143  |  103  |  27<H>  ----------------------------<  107<H>  3.4<L>   |  17<L>  |  1.69<H>    Ca    9.2      14 Dec 2017 07:25  Phos  5.1       Mg     2.3         TPro  7.4  /  Alb  4.1  /  TBili  0.8  /  DBili  x   /  AST  302<H>  /  ALT  156<H>  /  AlkPhos  163<H>  12-14          RADIOLOGY & ADDITIONAL STUDIES:

## 2017-12-14 NOTE — CONSULT NOTE ADULT - SUBJECTIVE AND OBJECTIVE BOX
CHIEF COMPLAINT:Dyspnea/chest pain    HPI:-63 yo man well known from office and prior hospitalizations with PMH of CAD, with history of CABG  s/p [id number] in May with NSTEMI-cath deferred 2/2 NARCISO and NST suggestive for Ischemic cardiomyopathy-medical management, HTN, psoriasis, pemphigus vulgaris? (recently diagnosed after last hospitalization), presenting from home in setting of chest pain and shortness of breath. Chest pain new onset today, located substernally, not related to exertion and has been intermittent throughout the day. Patient also endorses associated symptoms of nausea without episodes of emesis. With regards to shortness of breath it has been present ~ 2 weeks and is usually noticeable with exertion. Endorses abdominal bloating, LE edema, and symptoms of orthopnea. Patient compliant with medications .Noted elevated troponins is on BiPAP  for possible cardiac Cath,prior cath-Severe native vessel disease GIAN to LAD patent, but native LAD has severe tandem lesions All remaining grafts occluded.      PAST MEDICAL & SURGICAL HISTORY:  Psoriasis  HLD (hyperlipidemia)  CAD (coronary artery disease): 3 stents  Hypertension  History of coronary artery stent placement: 1 stent , 2 stents  in [facility]  S/P quadruple vessel bypass:       MEDICATIONS  (STANDING):  aspirin enteric coated 81 milliGRAM(s) Oral daily  atorvastatin 80 milliGRAM(s) Oral at bedtime  carvedilol 25 milliGRAM(s) Oral every 12 hours  furosemide   Injectable 80 milliGRAM(s) IV Push every 12 hours  heparin  Infusion.  Unit(s)/Hr (10 mL/Hr) IV Continuous <Continuous>  hydrALAZINE 50 milliGRAM(s) Oral two times a day  isosorbide   mononitrate ER Tablet (IMDUR) 60 milliGRAM(s) Oral daily  sertraline 50 milliGRAM(s) Oral daily  ticagrelor 90 milliGRAM(s) Oral two times a day    MEDICATIONS  (PRN):  heparin  Injectable 4000 Unit(s) IV Push every 6 hours PRN For aPTT less than 40      FAMILY HISTORY:  Family history of cerebrovascular accident (CVA)  Family history of early CAD (Father): father who  of CVA.    No family history of premature coronary artery disease or sudden cardiac death    SOCIAL HISTORY:  Smoking-Former Smoker  Alcohol-Social  Ilicit Drug use-Denies    REVIEW OF SYSTEMS:  Constitutional: [ ] fever, [ ]weight loss, [x]fatigue Activity [ ] Bedbound,[ ] Ambulates [ ] Unassisted[ ] Cane/Walker [ ] Assistence.  Eyes: [ ] visual changes  Respiratory: [x]shortness of breath;  [ ] cough, [ ]wheezing, [ ]chills, [ ]hemoptysis  Cardiovascular: [x] chest pain, [ ]palpitations, [ ]dizziness,  [x]leg swelling[ ]orthopnea [ ]PND  Gastrointestinal: [ ] abdominal pain, [ ]nausea, [ ]vomiting,  [ ]diarrhea,[ ]constipation  Genitourinary: [ ] dysuria, [ ] hematuria  Neurologic: [ ] headaches [ ] tremors[ ] weakness  Skin: [ ] itching, [ ]burning, [ ] rashes  Endocrine: [ ] heat or cold intolerance  Musculoskeletal: [ ] joint pain or swelling; [ ] muscle, back, or extremity pain  Psychiatric: [ ] depression, [ ]anxiety, [ ]mood swings, or [ ]difficulty sleeping  Hematologic: [ ] easy bruising, [ ] bleeding gums       [ x] All others negative	  [ ] Unable to obtain    Vital Signs Last 24 Hrs  T(C): 36.3 (14 Dec 2017 05:51), Max: 36.5 (13 Dec 2017 19:38)  T(F): 97.4 (14 Dec 2017 05:51), Max: 97.7 (13 Dec 2017 19:38)  HR: 74 (14 Dec 2017 07:41) (67 - 96)  BP: 127/71 (14 Dec 2017 07:41) (121/95 - 143/91)  RR: 26 (14 Dec 2017 07:41) (17 - 38)  SpO2: 100% (14 Dec 2017 07:41) (89% - 100%)  I&O's Summary      PHYSICAL EXAM:  General: No acute distress BMI-33  HEENT: EOMI, PERRL[ ] Icteric  Neck: Supple, No JVD  Lungs: Fair air entry     [ ] Rhonchi [x] Wheezing  Heart: Regular rate and rhythm;[x] Murmurs- 3 /6 [x Systolic [ ] Diastolic [ ] Radiation,No rubs, or gallops  Abdomen: Nontender, bowel sounds present  Extremities: No clubbing, cyanosis, 1+ edema   Alert & Oriented X3, no focal deficits  Psychiatric: Normal affect  Skin: No rashes or lesions      LABS:  12-13    140  |  104  |  26<H>  ----------------------------<  239<H>  4.1   |  16<L>  |  1.86<H>    Ca    8.8      13 Dec 2017 20:03    TPro  7.4  /  Alb  4.1  /  TBili  0.8  /  DBili  x   /  AST  197<H>  /  ALT  159<H>  /  AlkPhos  172<H>      Creatinine Trend: 1.86<--                        13.7   18.31 )-----------( 218      ( 14 Dec 2017 07:51 )             43.9     PT/INR - ( 13 Dec 2017 20:03 )   PT: 11.6 sec;   INR: 1.07 ratio         PTT - ( 13 Dec 2017 20:03 )  PTT:29.5 sec    Lipid Panel:   Cardiac Enzymes: CARDIAC MARKERS ( 14 Dec 2017 05:26 )  x     / 5.18 ng/mL / 2374 U/L / x     / 383.9 ng/mL  CARDIAC MARKERS ( 13 Dec 2017 20:03 )  x     / 0.02 ng/mL / 64 U/L / x     / 3.8 ng/mL      Serum Pro-Brain Natriuretic Peptide: 2712 pg/mL (17 @ 20:03)        RADIOLOGY:   Xray Chest 1 View AP/PA (17 @ 19:55) >Low l;roger volumes No focal consolidation      ECG [my interpretation]:Sinus Rhythm at 92 bpm ASHOK v1-v3 ST dep V5-6    ECHO: Sudy Date: 2017 Moderate to severe segmental left entricular systolic dysfunction. The lateral, basal to mid nferior and basal to mid inferoseptal walls are hpokinetic.  Eccentric left ventricular hypertroph      STRESS TEST: he Lft ventricle was markdely dilated at baseline. evere, fixed defects in the inferolateral, lateral and nterolateral walls consistent with a large area of nfarction. The mid to distal anterior wall defects are ostly fixed with some reversibility consistent with nfarction and some dilma-infarct ischemia.LVEF =46%LVEDV = 210 ml.), revealing hypokinesis in aterolateral, inferolateral, lateral wall(s).

## 2017-12-15 DIAGNOSIS — N17.9 ACUTE KIDNEY FAILURE, UNSPECIFIED: ICD-10-CM

## 2017-12-15 LAB
ALBUMIN SERPL ELPH-MCNC: 3.5 G/DL — SIGNIFICANT CHANGE UP (ref 3.3–5)
ALP SERPL-CCNC: 121 U/L — HIGH (ref 40–120)
ALT FLD-CCNC: 93 U/L RC — HIGH (ref 10–45)
ANION GAP SERPL CALC-SCNC: 17 MMOL/L — SIGNIFICANT CHANGE UP (ref 5–17)
AST SERPL-CCNC: 115 U/L — HIGH (ref 10–40)
BASOPHILS # BLD AUTO: 0 K/UL — SIGNIFICANT CHANGE UP (ref 0–0.2)
BASOPHILS NFR BLD AUTO: 0 % — SIGNIFICANT CHANGE UP (ref 0–2)
BILIRUB DIRECT SERPL-MCNC: 0.3 MG/DL — HIGH (ref 0–0.2)
BILIRUB INDIRECT FLD-MCNC: 1.2 MG/DL — HIGH (ref 0.2–1)
BILIRUB SERPL-MCNC: 1.5 MG/DL — HIGH (ref 0.2–1.2)
BUN SERPL-MCNC: 34 MG/DL — HIGH (ref 7–23)
BUN SERPL-MCNC: 36 MG/DL — HIGH (ref 7–23)
CALCIUM SERPL-MCNC: 9 MG/DL — SIGNIFICANT CHANGE UP (ref 8.4–10.5)
CALCIUM SERPL-MCNC: 9.2 MG/DL — SIGNIFICANT CHANGE UP (ref 8.4–10.5)
CHLORIDE SERPL-SCNC: 101 MMOL/L — SIGNIFICANT CHANGE UP (ref 96–108)
CHLORIDE SERPL-SCNC: 101 MMOL/L — SIGNIFICANT CHANGE UP (ref 96–108)
CO2 SERPL-SCNC: 25 MMOL/L — SIGNIFICANT CHANGE UP (ref 22–31)
CO2 SERPL-SCNC: 28 MMOL/L — SIGNIFICANT CHANGE UP (ref 22–31)
CREAT SERPL-MCNC: 2.19 MG/DL — HIGH (ref 0.5–1.3)
CREAT SERPL-MCNC: 2.28 MG/DL — HIGH (ref 0.5–1.3)
CULTURE RESULTS: SIGNIFICANT CHANGE UP
EOSINOPHIL # BLD AUTO: 0.2 K/UL — SIGNIFICANT CHANGE UP (ref 0–0.5)
EOSINOPHIL NFR BLD AUTO: 1.8 % — SIGNIFICANT CHANGE UP (ref 0–6)
GLUCOSE SERPL-MCNC: 146 MG/DL — HIGH (ref 70–99)
GLUCOSE SERPL-MCNC: 84 MG/DL — SIGNIFICANT CHANGE UP (ref 70–99)
HCT VFR BLD CALC: 40.8 % — SIGNIFICANT CHANGE UP (ref 39–50)
HGB BLD-MCNC: 13.2 G/DL — SIGNIFICANT CHANGE UP (ref 13–17)
LYMPHOCYTES # BLD AUTO: 1.3 K/UL — SIGNIFICANT CHANGE UP (ref 1–3.3)
LYMPHOCYTES # BLD AUTO: 10.7 % — LOW (ref 13–44)
MAGNESIUM SERPL-MCNC: 2.2 MG/DL — SIGNIFICANT CHANGE UP (ref 1.6–2.6)
MCHC RBC-ENTMCNC: 31.5 PG — SIGNIFICANT CHANGE UP (ref 27–34)
MCHC RBC-ENTMCNC: 32.3 GM/DL — SIGNIFICANT CHANGE UP (ref 32–36)
MCV RBC AUTO: 97.4 FL — SIGNIFICANT CHANGE UP (ref 80–100)
MONOCYTES # BLD AUTO: 0.7 K/UL — SIGNIFICANT CHANGE UP (ref 0–0.9)
MONOCYTES NFR BLD AUTO: 5.5 % — SIGNIFICANT CHANGE UP (ref 2–14)
NEUTROPHILS # BLD AUTO: 10.1 K/UL — HIGH (ref 1.8–7.4)
NEUTROPHILS NFR BLD AUTO: 82 % — HIGH (ref 43–77)
PLATELET # BLD AUTO: 159 K/UL — SIGNIFICANT CHANGE UP (ref 150–400)
POTASSIUM SERPL-MCNC: 3.3 MMOL/L — LOW (ref 3.5–5.3)
POTASSIUM SERPL-MCNC: 3.8 MMOL/L — SIGNIFICANT CHANGE UP (ref 3.5–5.3)
POTASSIUM SERPL-SCNC: 3.3 MMOL/L — LOW (ref 3.5–5.3)
POTASSIUM SERPL-SCNC: 3.8 MMOL/L — SIGNIFICANT CHANGE UP (ref 3.5–5.3)
PROT SERPL-MCNC: 6.5 G/DL — SIGNIFICANT CHANGE UP (ref 6–8.3)
RBC # BLD: 4.19 M/UL — LOW (ref 4.2–5.8)
RBC # FLD: 15.7 % — HIGH (ref 10.3–14.5)
SODIUM SERPL-SCNC: 143 MMOL/L — SIGNIFICANT CHANGE UP (ref 135–145)
SODIUM SERPL-SCNC: 145 MMOL/L — SIGNIFICANT CHANGE UP (ref 135–145)
SPECIMEN SOURCE: SIGNIFICANT CHANGE UP
WBC # BLD: 12.4 K/UL — HIGH (ref 3.8–10.5)
WBC # FLD AUTO: 12.4 K/UL — HIGH (ref 3.8–10.5)

## 2017-12-15 PROCEDURE — 99233 SBSQ HOSP IP/OBS HIGH 50: CPT | Mod: GC

## 2017-12-15 PROCEDURE — 99232 SBSQ HOSP IP/OBS MODERATE 35: CPT

## 2017-12-15 PROCEDURE — 71010: CPT | Mod: 26

## 2017-12-15 PROCEDURE — 76700 US EXAM ABDOM COMPLETE: CPT | Mod: 26

## 2017-12-15 RX ORDER — ALPRAZOLAM 0.25 MG
0.5 TABLET ORAL EVERY 12 HOURS
Qty: 0 | Refills: 0 | Status: DISCONTINUED | OUTPATIENT
Start: 2017-12-15 | End: 2017-12-19

## 2017-12-15 RX ORDER — POTASSIUM CHLORIDE 20 MEQ
20 PACKET (EA) ORAL ONCE
Qty: 0 | Refills: 0 | Status: COMPLETED | OUTPATIENT
Start: 2017-12-15 | End: 2017-12-15

## 2017-12-15 RX ORDER — HEPARIN SODIUM 5000 [USP'U]/ML
5000 INJECTION INTRAVENOUS; SUBCUTANEOUS EVERY 12 HOURS
Qty: 0 | Refills: 0 | Status: DISCONTINUED | OUTPATIENT
Start: 2017-12-15 | End: 2017-12-19

## 2017-12-15 RX ORDER — POTASSIUM CHLORIDE 20 MEQ
40 PACKET (EA) ORAL ONCE
Qty: 0 | Refills: 0 | Status: COMPLETED | OUTPATIENT
Start: 2017-12-15 | End: 2017-12-15

## 2017-12-15 RX ADMIN — CARVEDILOL PHOSPHATE 25 MILLIGRAM(S): 80 CAPSULE, EXTENDED RELEASE ORAL at 17:58

## 2017-12-15 RX ADMIN — TICAGRELOR 90 MILLIGRAM(S): 90 TABLET ORAL at 05:16

## 2017-12-15 RX ADMIN — PANTOPRAZOLE SODIUM 40 MILLIGRAM(S): 20 TABLET, DELAYED RELEASE ORAL at 17:58

## 2017-12-15 RX ADMIN — TICAGRELOR 90 MILLIGRAM(S): 90 TABLET ORAL at 18:02

## 2017-12-15 RX ADMIN — ATORVASTATIN CALCIUM 80 MILLIGRAM(S): 80 TABLET, FILM COATED ORAL at 21:56

## 2017-12-15 RX ADMIN — SERTRALINE 50 MILLIGRAM(S): 25 TABLET, FILM COATED ORAL at 15:35

## 2017-12-15 RX ADMIN — CARVEDILOL PHOSPHATE 25 MILLIGRAM(S): 80 CAPSULE, EXTENDED RELEASE ORAL at 05:20

## 2017-12-15 RX ADMIN — Medication 0.5 MILLIGRAM(S): at 21:56

## 2017-12-15 RX ADMIN — Medication 81 MILLIGRAM(S): at 05:16

## 2017-12-15 RX ADMIN — Medication 1200 MILLIGRAM(S): at 05:16

## 2017-12-15 RX ADMIN — Medication 0.5 MILLIGRAM(S): at 09:21

## 2017-12-15 RX ADMIN — Medication 20 MILLIEQUIVALENT(S): at 08:15

## 2017-12-15 RX ADMIN — Medication 80 MILLIGRAM(S): at 09:21

## 2017-12-15 RX ADMIN — Medication 80 MILLIGRAM(S): at 21:56

## 2017-12-15 RX ADMIN — PANTOPRAZOLE SODIUM 40 MILLIGRAM(S): 20 TABLET, DELAYED RELEASE ORAL at 05:16

## 2017-12-15 RX ADMIN — HEPARIN SODIUM 5000 UNIT(S): 5000 INJECTION INTRAVENOUS; SUBCUTANEOUS at 17:58

## 2017-12-15 RX ADMIN — Medication 50 MILLIGRAM(S): at 05:20

## 2017-12-15 RX ADMIN — ISOSORBIDE MONONITRATE 60 MILLIGRAM(S): 60 TABLET, EXTENDED RELEASE ORAL at 15:34

## 2017-12-15 RX ADMIN — Medication 40 MILLIEQUIVALENT(S): at 09:20

## 2017-12-15 RX ADMIN — Medication 20 MILLIEQUIVALENT(S): at 17:59

## 2017-12-15 NOTE — PROGRESS NOTE ADULT - SUBJECTIVE AND OBJECTIVE BOX
Patient is a 62y old  Male who presents with a chief complaint of cp/sob (14 Dec 2017 16:13)    Being followed by ID for leucocytosis    Interval history:some chest tightness  No acute events      ROS:  No cough,SOB,CP  No N/V/D./abd pain  No other complaints      Antimicrobials:  None     Other medications reviewed    Vital Signs Last 24 Hrs  T(C): 36.6 (12-15-17 @ 12:12), Max: 36.6 (12-15-17 @ 12:12)  T(F): 97.9 (12-15-17 @ 12:12), Max: 97.9 (12-15-17 @ 12:12)  HR: 59 (12-15-17 @ 12:12) (59 - 86)  BP: 120/36 (12-15-17 @ 12:12) (77/63 - 166/90)  BP(mean): --  RR: 18 (12-15-17 @ 12:12) (16 - 18)  SpO2: 99% (12-15-17 @ 12:12) (95% - 100%)    Physical Exam:        HEENT PERRLA EOMI    No oral exudate or erythema    Chest Good AE,CTA    CVS RRR S1 S2 WNl No murmur or rub or gallop    Abd soft BS normal No tenderness no masses    IV site no erythema tenderness or discharge  cath site no erythema or tenderness    CNS AAO X 3 no focal    Lab Data:                          13.2   12.4  )-----------( 159      ( 15 Dec 2017 07:12 )             40.8       12-15    143  |  101  |  34<H>  ----------------------------<  146<H>  3.3<L>   |  25  |  2.19<H>    Ca    9.0      15 Dec 2017 07:12  Phos  5.1     12-14  Mg     2.1     12-14    TPro  6.5  /  Alb  3.5  /  TBili  1.5<H>  /  DBili  0.3<H>  /  AST  115<H>  /  ALT  93<H>  /  AlkPhos  121<H>  12-15        Culture - Urine (collected 14 Dec 2017 08:24)  Source: .Urine Clean Catch (Midstream)  Final Report (15 Dec 2017 12:31):    10,000 - 49,000 CFU/mL Coag Negative Staphylococcus

## 2017-12-15 NOTE — PROGRESS NOTE ADULT - SUBJECTIVE AND OBJECTIVE BOX
Patient is a 62y old  Male who presents with a chief complaint of cp/sob (14 Dec 2017 16:13) now s/p diagnostic cardiac cath via right radial          Allergies    No Known Allergies    Intolerances        Medications:  aspirin enteric coated 81 milliGRAM(s) Oral daily  atorvastatin 80 milliGRAM(s) Oral at bedtime  carvedilol 25 milliGRAM(s) Oral every 12 hours  furosemide   Injectable 80 milliGRAM(s) IV Push every 12 hours  hydrALAZINE 50 milliGRAM(s) Oral two times a day  isosorbide   mononitrate ER Tablet (IMDUR) 60 milliGRAM(s) Oral daily  pantoprazole    Tablet 40 milliGRAM(s) Oral two times a day before meals  sertraline 50 milliGRAM(s) Oral daily  ticagrelor 90 milliGRAM(s) Oral two times a day      Vitals:  T(C): 36.4 (12-15-17 @ 06:01), Max: 36.4 (12-15-17 @ 06:01)  HR: 65 (12-15-17 @ 06:01) (59 - 86)  BP: 114/66 (12-15-17 @ 06:01) (90/67 - 166/90)  BP(mean): --  RR: 18 (12-15-17 @ 06:01) (16 - 26)  SpO2: 97% (12-15-17 @ 06:01) (96% - 100%)  Wt(kg): --  Daily     Daily   I&O's Summary    14 Dec 2017 07:01  -  15 Dec 2017 07:00  --------------------------------------------------------  IN: 1340 mL / OUT: 2800 mL / NET: -1460 mL          Physical Exam:  Appearance: Normal  Eyes: PERRL, EOMI  Cardiovascular: S1S2, RRR, No M/R/G, no JVD, No Lower extremity edema  Procedural Access Site: Right radial No hematoma, Non-tender to palpation, 2+ pulse, No bruit, No Ecchymosis  Respiratory: Clear to auscultation bilaterally  Gastrointestinal: Soft, Non tender, Normal Bowel Sounds  Musculoskeletal: No clubbing, No joint deformity   Neurologic: Non-focal  Psychiatry: AAOx3, Mood & affect appropriate  Skin: No rashes, No ecchymoses, No cyanosis    12-14    141  |  99  |  31<H>  ----------------------------<  132<H>  3.0<L>   |  26  |  2.00<H>    Ca    8.8      14 Dec 2017 20:41  Phos  5.1     12-14  Mg     2.1     12-14    TPro  7.4  /  Alb  4.1  /  TBili  0.8  /  DBili  x   /  AST  302<H>  /  ALT  156<H>  /  AlkPhos  163<H>  12-14    PT/INR - ( 13 Dec 2017 20:03 )   PT: 11.6 sec;   INR: 1.07 ratio         PTT - ( 14 Dec 2017 14:02 )  PTT:31.7 sec  CARDIAC MARKERS ( 14 Dec 2017 05:26 )  x     / 5.18 ng/mL / 2374 U/L / x     / 383.9 ng/mL  CARDIAC MARKERS ( 13 Dec 2017 20:03 )  x     / 0.02 ng/mL / 64 U/L / x     / 3.8 ng/mL      Serum Pro-Brain Natriuretic Peptide: 2712 pg/mL (12-13 @ 20:03)    Interpretation of Telemetry: SB-SR 50-70bpm     ASSESSMENT/PLAN   Patient is a 62y old  Male who presents with a chief complaint of cp/sob (14 Dec 2017 16:13) now s/p diagnostic cardiac cath via right radial. Pt tolerated the procedure well. cardiac cath site benign. Overnight hypotensive 80-90SBP HR 70'S with SOB, ABG normal and occasional WCT  5-10beats throughout the night .  K+ 3 given potassium 40meQ, Mg 2.1. Creatinine 2.0  Pt placed on bipap overnight. No further events. Discharge planning pending

## 2017-12-15 NOTE — PROGRESS NOTE ADULT - ATTENDING COMMENTS
61 yo M with CAD, Hx CABG, htn, pemphigus vulgars, CKD with NSTEMI and pulm edema, s/p LHC 12/14 (medical management, no PCI).  Now with mild NARCISO.    --this evening dyspnea was markedly improved  continue BiPAP prn and during sleep  --cont IV diuresis for negative fluid balence  --likely has undiagnosed CHER and/or OHS, would benefit from home CPAP or BiPAP  will need outpt pulm f/u for sleep study  --plan discussed with pt

## 2017-12-15 NOTE — PROGRESS NOTE ADULT - SUBJECTIVE AND OBJECTIVE BOX
INTERVAL HPI/OVERNIGHT EVENTS: c/o abd bloating otherwise no GI complaints, no h/o liver issues    MEDICATIONS  (STANDING):  aspirin enteric coated 81 milliGRAM(s) Oral daily  atorvastatin 80 milliGRAM(s) Oral at bedtime  carvedilol 25 milliGRAM(s) Oral every 12 hours  furosemide   Injectable 80 milliGRAM(s) IV Push every 12 hours  hydrALAZINE 50 milliGRAM(s) Oral two times a day  isosorbide   mononitrate ER Tablet (IMDUR) 60 milliGRAM(s) Oral daily  pantoprazole    Tablet 40 milliGRAM(s) Oral two times a day before meals  potassium chloride    Tablet ER 20 milliEquivalent(s) Oral once  sertraline 50 milliGRAM(s) Oral daily  ticagrelor 90 milliGRAM(s) Oral two times a day    MEDICATIONS  (PRN):  ALPRAZolam 0.5 milliGRAM(s) Oral every 12 hours PRN anxiety      Allergies    No Known Allergies    Intolerances            PHYSICAL EXAM:   Vital Signs:  Vital Signs Last 24 Hrs  T(C): 36.4 (15 Dec 2017 06:01), Max: 36.4 (15 Dec 2017 06:01)  T(F): 97.6 (15 Dec 2017 06:01), Max: 97.6 (15 Dec 2017 06:01)  HR: 60 (15 Dec 2017 07:31) (59 - 86)  BP: 77/63 (15 Dec 2017 07:31) (77/63 - 166/90)  BP(mean): --  RR: 18 (15 Dec 2017 06:01) (16 - 18)  SpO2: 95% (15 Dec 2017 07:31) (95% - 100%)  Daily     Daily     GENERAL:  no distress  HEENT:  NC/AT,  anicteric  CHEST:   no increased effort, breath sounds clear  HEART:  Regular rhythm  ABDOMEN:  Soft, non-tender, mildly distended but difficult to asses as pt sitting up, normoactive bowel sounds,  no masses no obvious fluid wave  EXTEREMITIES:  no cyanosis      LABS:                        13.2   12.4  )-----------( 159      ( 15 Dec 2017 07:12 )             40.8     12-14    141  |  99  |  31<H>  ----------------------------<  132<H>  3.0<L>   |  26  |  2.00<H>    Ca    8.8      14 Dec 2017 20:41  Phos  5.1       Mg     2.1         TPro  7.4  /  Alb  4.1  /  TBili  0.8  /  DBili  x   /  AST  302<H>  /  ALT  156<H>  /  AlkPhos  163<H>      PT/INR - ( 13 Dec 2017 20:03 )   PT: 11.6 sec;   INR: 1.07 ratio         PTT - ( 14 Dec 2017 14:02 )  PTT:31.7 sec  Urinalysis Basic - ( 14 Dec 2017 05:53 )    Color: PL Yellow / Appearance: Clear / S.008 / pH: x  Gluc: x / Ketone: Negative  / Bili: Negative / Urobili: Negative   Blood: x / Protein: 30 mg/dL / Nitrite: Negative   Leuk Esterase: Negative / RBC: 0-2 /HPF / WBC 0-2 /HPF   Sq Epi: x / Non Sq Epi: Occasional /HPF / Bacteria: Few /HPF        RADIOLOGY & ADDITIONAL TESTS:

## 2017-12-15 NOTE — PROGRESS NOTE ADULT - SUBJECTIVE AND OBJECTIVE BOX
Dr. Mayen (Nephrology)  Office [phone]  Cell [phone]  [facility] CHEW  Cell [phone]      Patient is a 62y old  Male who presents with a chief complaint of cp/sob (14 Dec 2017 16:13)      Patient seen and examined at bedside. No chest pain/sob    VITALS:  T(F): 97.9 (12-15-17 @ 12:12), Max: 97.9 (12-15-17 @ 12:12)  HR: 63 (12-15-17 @ 15:52)  BP: 120/36 (12-15-17 @ 12:12)  RR: 18 (12-15-17 @ 12:12)  SpO2: 98% (12-15-17 @ 15:52)  Wt(kg): --    12-14 @ 07:01  -  12-15 @ 07:00  --------------------------------------------------------  IN: 1340 mL / OUT: 2800 mL / NET: -1460 mL    12-15 @ 07:01  -  12-15 @ 16:49  --------------------------------------------------------  IN: 240 mL / OUT: 1250 mL / NET: -1010 mL          PHYSICAL EXAM:  Constitutional: NAD  Neck: No JVD  Respiratory: CTAB, no wheezes, rales or rhonchi  Cardiovascular: S1, S2, RRR  Gastrointestinal: BS+, soft, NT/ND  Extremities: No peripheral edema    Hospital Medications:   MEDICATIONS  (STANDING):  aspirin enteric coated 81 milliGRAM(s) Oral daily  atorvastatin 80 milliGRAM(s) Oral at bedtime  carvedilol 25 milliGRAM(s) Oral every 12 hours  furosemide   Injectable 80 milliGRAM(s) IV Push every 12 hours  heparin  Injectable 5000 Unit(s) SubCutaneous every 12 hours  isosorbide   mononitrate ER Tablet (IMDUR) 60 milliGRAM(s) Oral daily  pantoprazole    Tablet 40 milliGRAM(s) Oral two times a day before meals  potassium chloride    Tablet ER 20 milliEquivalent(s) Oral once  sertraline 50 milliGRAM(s) Oral daily  ticagrelor 90 milliGRAM(s) Oral two times a day      LABS:  12-15    145  |  101  |  36<H>  ----------------------------<  84  3.8   |  28  |  2.28<H>    Ca    9.2      15 Dec 2017 14:37  Phos  5.1     12-14  Mg     2.2     12-15    TPro  6.5  /  Alb  3.5  /  TBili  1.5<H>  /  DBili  0.3<H>  /  AST  115<H>  /  ALT  93<H>  /  AlkPhos  121<H>  12-15    Creatinine Trend: 2.28 <--, 2.19 <--, 2.00 <--, 1.69 <--, 1.86 <--    Albumin, Serum: 3.5 g/dL (12-15 @ 07:12)                              13.2   12.4  )-----------( 159      ( 15 Dec 2017 07:12 )             40.8     Urine Studies:  Urinalysis - [12-14-17 @ 05:53]      Color PL Yellow / Appearance Clear / SG 1.008 / pH 6.0      Gluc Negative / Ketone Negative  / Bili Negative / Urobili Negative       Blood Negative / Protein 30 / Leuk Est Negative / Nitrite Negative      RBC 0-2 / WBC 0-2 / Hyaline 0-2 / Gran  / Sq Epi  / Non Sq Epi Occasional / Bacteria Few      HbA1c 5.6      [04-22-17 @ 15:04]  TSH 1.28      [04-22-17 @ 15:04]  Lipid: chol 167, TG 55, HDL 37,       [04-22-17 @ 15:04]        RADIOLOGY & ADDITIONAL STUDIES:

## 2017-12-15 NOTE — PROGRESS NOTE ADULT - PROBLEM SELECTOR PLAN 1
renal function worsening likely sec to contrast s/p angiogram 12/14, +parks good urine output. continue monitor

## 2017-12-15 NOTE — PROGRESS NOTE ADULT - SUBJECTIVE AND OBJECTIVE BOX
Interval Events: Patient was seen and examined at bedside. Continues to have episodes of ectopy on tele and continues to have dyspnea.     REVIEW OF SYSTEMS:  Constitutional: [ ] fevers [ ] chills [ ] weight loss [ ] weight gain  CV: [ ] chest pain [ ] orthopnea [ ] palpitations [ ] murmur  Resp: [ ] cough [ ] shortness of breath [ ] dyspnea [ ] wheezing [ ] sputum [ ] hemoptysis  [x ] All other systems negative  [ ] Unable to assess ROS because ________    OBJECTIVE:  ICU Vital Signs Last 24 Hrs  T(C): 36.6 (15 Dec 2017 12:12), Max: 36.6 (15 Dec 2017 12:12)  T(F): 97.9 (15 Dec 2017 12:12), Max: 97.9 (15 Dec 2017 12:12)  HR: 63 (15 Dec 2017 15:52) (59 - 78)  BP: 120/36 (15 Dec 2017 12:12) (77/63 - 127/81)  BP(mean): --  ABP: --  ABP(mean): --  RR: 18 (15 Dec 2017 12:12) (16 - 18)  SpO2: 98% (15 Dec 2017 15:52) (95% - 100%)        14 @ 07:  -  12-15 @ 07:00  --------------------------------------------------------  IN: 1340 mL / OUT: 2800 mL / NET: -1460 mL    12-15 @ 07:  -  12-15 @ 16:08  --------------------------------------------------------  IN: 240 mL / OUT: 1250 mL / NET: -1010 mL      CAPILLARY BLOOD GLUCOSE          PHYSICAL EXAM:  General: UNAD  HEENT: PERRLA  Lymph Nodes: No palpable lymphadenopathy  Neck: Supple  Respiratory: Crackles at the bases   Cardiovascular: RRR, Normal S1 and S2  Abdomen: SNT, +BS, No R/G/R  Extremities: no C/c/E  Neurological: AAOx3, No focal deficits      HOSPITAL MEDICATIONS:  MEDICATIONS  (STANDING):  aspirin enteric coated 81 milliGRAM(s) Oral daily  atorvastatin 80 milliGRAM(s) Oral at bedtime  carvedilol 25 milliGRAM(s) Oral every 12 hours  furosemide   Injectable 80 milliGRAM(s) IV Push every 12 hours  heparin  Injectable 5000 Unit(s) SubCutaneous every 12 hours  isosorbide   mononitrate ER Tablet (IMDUR) 60 milliGRAM(s) Oral daily  pantoprazole    Tablet 40 milliGRAM(s) Oral two times a day before meals  sertraline 50 milliGRAM(s) Oral daily  ticagrelor 90 milliGRAM(s) Oral two times a day    MEDICATIONS  (PRN):  ALPRAZolam 0.5 milliGRAM(s) Oral every 12 hours PRN anxiety      LABS:                        13.2   12.4  )-----------( 159      ( 15 Dec 2017 07:12 )             40.8     Hgb Trend: 13.2<--, 13.3<--, 13.7<--, 14.2<--  12-15    145  |  101  |  36<H>  ----------------------------<  84  3.8   |  28  |  2.28<H>    Ca    9.2      15 Dec 2017 14:37  Phos  5.1     12-14  Mg     2.2     12-15    TPro  6.5  /  Alb  3.5  /  TBili  1.5<H>  /  DBili  0.3<H>  /  AST  115<H>  /  ALT  93<H>  /  AlkPhos  121<H>  12-15    Creatinine Trend: 2.28<--, 2.19<--, 2.00<--, 1.69<--, 1.86<--  PT/INR - ( 13 Dec 2017 20:03 )   PT: 11.6 sec;   INR: 1.07 ratio         PTT - ( 14 Dec 2017 14:02 )  PTT:31.7 sec  Urinalysis Basic - ( 14 Dec 2017 05:53 )    Color: PL Yellow / Appearance: Clear / S.008 / pH: x  Gluc: x / Ketone: Negative  / Bili: Negative / Urobili: Negative   Blood: x / Protein: 30 mg/dL / Nitrite: Negative   Leuk Esterase: Negative / RBC: 0-2 /HPF / WBC 0-2 /HPF   Sq Epi: x / Non Sq Epi: Occasional /HPF / Bacteria: Few /HPF      Arterial Blood Gas:   @ 20:06  7.48/38/75/28/96/5.1  ABG lactate: --  Arterial Blood Gas:   @ 05:26  7.44/35/102/24/97/.2  ABG lactate: --    Venous Blood Gas:   @ 20:02  7.23/49/69/20/89  VBG Lactate: 5.7    MICROBIOLOGY:     RADIOLOGY:  [ ] Reviewed and interpreted by me

## 2017-12-15 NOTE — PROGRESS NOTE ADULT - ASSESSMENT
66 year old male with hx of CAD and psoriasis who comes in with chest pain and found to have an NSTEMI. Currently awaiting cardiac cath.    - Please continue to diurese for heart failure IV and not PO.   - No primary pulmonary disease noted on CXR.  - Bipap PRN if needed.   - No further pulmonary intervention as dyspnea is due to heart failure and NSTEMI.     Please call back with further questions.     Edwardo Spicer DO MPH  Pulmonary Consult Service  [phone]

## 2017-12-15 NOTE — PROGRESS NOTE ADULT - ASSESSMENT
61 yo man with PMH of CAD, with history of CABG 2005 with history stent (last stent in 2010), HTN, CKD, psoriasis, pemphigus vulgaris? (recently diagnosed after last hospitalization), presenting from home in setting of chest pain and shortness of breath.

## 2017-12-15 NOTE — PROGRESS NOTE ADULT - ASSESSMENT
66 year old male with hx of CAD and psoriasis who comes in with chest pain and found to have an NSTEMI.     cards - s/p cardiac cath - medical management as per cards - cont diuresis CHF team eval    hypoxic resp failure - bipap prn, supplemental oxygen , pt former smoker high likely copd + chf at present, discuss with pulm about nebs and if pts resp status doesn't improve poss ? steroids    Renal - monitor closely sec to diuresis     discussed management plan with pt

## 2017-12-15 NOTE — PROGRESS NOTE ADULT - SUBJECTIVE AND OBJECTIVE BOX
Subjective: Pt. now on Bipap due to hypoxia with hypotension and  increased ectopy. Denies CP and SOB improved on Bipap.    MEDICATIONS  (STANDING):  aspirin enteric coated 81 milliGRAM(s) Oral daily  atorvastatin 80 milliGRAM(s) Oral at bedtime  carvedilol 25 milliGRAM(s) Oral every 12 hours  furosemide   Injectable 80 milliGRAM(s) IV Push every 12 hours  hydrALAZINE 50 milliGRAM(s) Oral two times a day  isosorbide   mononitrate ER Tablet (IMDUR) 60 milliGRAM(s) Oral daily  pantoprazole    Tablet 40 milliGRAM(s) Oral two times a day before meals  sertraline 50 milliGRAM(s) Oral daily  ticagrelor 90 milliGRAM(s) Oral two times a day    MEDICATIONS  (PRN):  ALPRAZolam 0.5 milliGRAM(s) Oral every 12 hours PRN anxiety      Vital Signs Last 24 Hrs  T(C): 36.4 (15 Dec 2017 06:01), Max: 36.4 (15 Dec 2017 06:01)  T(F): 97.6 (15 Dec 2017 06:01), Max: 97.6 (15 Dec 2017 06:01)  HR: 59 (15 Dec 2017 09:47) (59 - 86)  BP: 77/63 (15 Dec 2017 07:31) (77/63 - 166/90)  BP(mean): --  RR: 18 (15 Dec 2017 06:01) (16 - 18)  SpO2: 100% (15 Dec 2017 09:47) (95% - 100%)    PHYSICAL EXAM:    Constitutional: Obese M in NAD  Neck: JVD not appreciated  Respiratory: ? basilar crackles justin  Cardiovascular: RRR with skips, 1/6 KATHERYN  Gastrointestinal: obese, NT  Extremities: Justin pitting edema  Neurological: alert, grossly nonfocal    TELEMETRY: SB with PVC's and PAC's; short runs of NSVT    ECG:      LABS:                        13.2   12.4  )-----------( 159      ( 15 Dec 2017 07:12 )             40.8     12-15    143  |  101  |  34<H>  ----------------------------<  146<H>  3.3<L>   |  25  |  2.19<H>    Ca    9.0      15 Dec 2017 07:12  Phos  5.1     12-14  Mg     2.1     12-14    TPro  6.5  /  Alb  3.5  /  TBili  1.5<H>  /  DBili  0.3<H>  /  AST  115<H>  /  ALT  93<H>  /  AlkPhos  121<H>  12-15    CARDIAC MARKERS ( 14 Dec 2017 05:26 )  x     / 5.18 ng/mL / 2374 U/L / x     / 383.9 ng/mL  CARDIAC MARKERS ( 13 Dec 2017 20:03 )  x     / 0.02 ng/mL / 64 U/L / x     / 3.8 ng/mL      PT/INR - ( 13 Dec 2017 20:03 )   PT: 11.6 sec;   INR: 1.07 ratio         PTT - ( 14 Dec 2017 14:02 )  PTT:31.7 sec  Urinalysis Basic - ( 14 Dec 2017 05:53 )    Color: PL Yellow / Appearance: Clear / S.008 / pH: x  Gluc: x / Ketone: Negative  / Bili: Negative / Urobili: Negative   Blood: x / Protein: 30 mg/dL / Nitrite: Negative   Leuk Esterase: Negative / RBC: 0-2 /HPF / WBC 0-2 /HPF   Sq Epi: x / Non Sq Epi: Occasional /HPF / Bacteria: Few /HPF      I&O's Summary    14 Dec 2017 07:01  -  15 Dec 2017 07:00  --------------------------------------------------------  IN: 1340 mL / OUT: 2800 mL / NET: -1460 mL      BNP  RADIOLOGY & ADDITIONAL STUDIES:    IMPRESSION:  Acute on chronic combined HF  NSTEMI  CAD  HLD  Obesity  CKD-stage 3-4    RECOMMENDATIONS:  Continue current meds with IV Lasix  Continue with Bipap  f/u CBC and BMP  Telemetry  Pulm f/u  Renal f/u  EP consult if recurrent NSVT 48 hours post event

## 2017-12-15 NOTE — PROGRESS NOTE ADULT - SUBJECTIVE AND OBJECTIVE BOX
chief complaint : shortness of breath         SUBJECTIVE / OVERNIGHT EVENTS: pt says hew feels better than before, denies chest pain, shortness of breath, nausea,v  pts family next to pts bedside      MEDICATIONS  (STANDING):  aspirin enteric coated 81 milliGRAM(s) Oral daily  atorvastatin 80 milliGRAM(s) Oral at bedtime  carvedilol 25 milliGRAM(s) Oral every 12 hours  furosemide   Injectable 80 milliGRAM(s) IV Push every 12 hours  heparin  Injectable 5000 Unit(s) SubCutaneous every 12 hours  isosorbide   mononitrate ER Tablet (IMDUR) 60 milliGRAM(s) Oral daily  pantoprazole    Tablet 40 milliGRAM(s) Oral two times a day before meals  sertraline 50 milliGRAM(s) Oral daily  ticagrelor 90 milliGRAM(s) Oral two times a day    MEDICATIONS  (PRN):  ALPRAZolam 0.5 milliGRAM(s) Oral every 12 hours PRN anxiety        CAPILLARY BLOOD GLUCOSE        I&O's Summary    14 Dec 2017 07:  -  15 Dec 2017 07:00  --------------------------------------------------------  IN: 1340 mL / OUT: 2800 mL / NET: -1460 mL    15 Dec 2017 07:  -  15 Dec 2017 22:56  --------------------------------------------------------  IN: 720 mL / OUT: 1800 mL / NET: -1080 mL        Constitutional: No fever, fatigue  Skin: No rash.  Eyes: No recent vision problems or eye pain.  ENT: No congestion, ear pain, or sore throat.  Cardiovascular: No chest pain or palpation.  Respiratory: No cough, shortness of breath, congestion, or wheezing.  Gastrointestinal: No abdominal pain, nausea, vomiting, or diarrhea.  Genitourinary: No dysuria.  Musculoskeletal: No joint swelling.  Neurologic: No headache.    PHYSICAL EXAM:  GENERAL: NAD  EYES: EOMI, PERRLA  NECK: Supple, No JVD  CHEST/LUNG: dec breath sounds at bases, fine ext wheezing + left upper  HEART:  S1 , S2 +  ABDOMEN: soft, bs+  EXTREMITIES:  no edema  NEUROLOGY:alert awake oriented       LABS:                        13.2   12.4  )-----------( 159      ( 15 Dec 2017 07:12 )             40.8     12-15    145  |  101  |  36<H>  ----------------------------<  84  3.8   |  28  |  2.28<H>    Ca    9.2      15 Dec 2017 14:37  Phos  5.1       Mg     2.2     12-15    TPro  6.5  /  Alb  3.5  /  TBili  1.5<H>  /  DBili  0.3<H>  /  AST  115<H>  /  ALT  93<H>  /  AlkPhos  121<H>  12-15    PTT - ( 14 Dec 2017 14:02 )  PTT:31.7 sec  CARDIAC MARKERS ( 14 Dec 2017 05:26 )  x     / 5.18 ng/mL / 2374 U/L / x     / 383.9 ng/mL      Urinalysis Basic - ( 14 Dec 2017 05:53 )    Color: PL Yellow / Appearance: Clear / S.008 / pH: x  Gluc: x / Ketone: Negative  / Bili: Negative / Urobili: Negative   Blood: x / Protein: 30 mg/dL / Nitrite: Negative   Leuk Esterase: Negative / RBC: 0-2 /HPF / WBC 0-2 /HPF   Sq Epi: x / Non Sq Epi: Occasional /HPF / Bacteria: Few /HPF        RADIOLOGY & ADDITIONAL TESTS:    Imaging Personally Reviewed:    Consultant(s) Notes Reviewed:      Care Discussed with Consultants/Other Providers:

## 2017-12-15 NOTE — PROGRESS NOTE ADULT - ASSESSMENT
61 yo   CAD-?NSTEMI s/p cath  pemphigus on steroids  No clinical s/s infectious process  Leucocytosis likely from steroids vs reactive to cardiac event/procedure  Hemodynamically stable  UA CXR negative  urine cx with CNSS but no urinary complaints-?colonization/contaminant  Would Check blood Cx  Observe clinically  No empiric abx for now  Tailor plan per course,results.  Infectious Diseases Service will cover over weekend.  Please call [phone] if issues

## 2017-12-16 LAB
ANA TITR SER: NEGATIVE — SIGNIFICANT CHANGE UP
ANION GAP SERPL CALC-SCNC: 14 MMOL/L — SIGNIFICANT CHANGE UP (ref 5–17)
BUN SERPL-MCNC: 36 MG/DL — HIGH (ref 7–23)
CALCIUM SERPL-MCNC: 8.8 MG/DL — SIGNIFICANT CHANGE UP (ref 8.4–10.5)
CHLORIDE SERPL-SCNC: 102 MMOL/L — SIGNIFICANT CHANGE UP (ref 96–108)
CO2 SERPL-SCNC: 26 MMOL/L — SIGNIFICANT CHANGE UP (ref 22–31)
CREAT SERPL-MCNC: 2.16 MG/DL — HIGH (ref 0.5–1.3)
FERRITIN SERPL-MCNC: 233 NG/ML — SIGNIFICANT CHANGE UP (ref 30–400)
GLUCOSE SERPL-MCNC: 126 MG/DL — HIGH (ref 70–99)
HAV IGM SER-ACNC: SIGNIFICANT CHANGE UP
HBV CORE IGM SER-ACNC: SIGNIFICANT CHANGE UP
HBV SURFACE AG SER-ACNC: SIGNIFICANT CHANGE UP
HCT VFR BLD CALC: 39 % — SIGNIFICANT CHANGE UP (ref 39–50)
HCV AB S/CO SERPL IA: 0.09 S/CO — SIGNIFICANT CHANGE UP
HCV AB SERPL-IMP: SIGNIFICANT CHANGE UP
HGB BLD-MCNC: 12.9 G/DL — LOW (ref 13–17)
IRON SATN MFR SERPL: 13 % — LOW (ref 16–55)
IRON SATN MFR SERPL: 30 UG/DL — LOW (ref 45–165)
MCHC RBC-ENTMCNC: 32 PG — SIGNIFICANT CHANGE UP (ref 27–34)
MCHC RBC-ENTMCNC: 33 GM/DL — SIGNIFICANT CHANGE UP (ref 32–36)
MCV RBC AUTO: 96.7 FL — SIGNIFICANT CHANGE UP (ref 80–100)
MITOCHONDRIA AB SER-ACNC: SIGNIFICANT CHANGE UP
PLATELET # BLD AUTO: 141 K/UL — LOW (ref 150–400)
POTASSIUM SERPL-MCNC: 3.2 MMOL/L — LOW (ref 3.5–5.3)
POTASSIUM SERPL-SCNC: 3.2 MMOL/L — LOW (ref 3.5–5.3)
RBC # BLD: 4.03 M/UL — LOW (ref 4.2–5.8)
RBC # FLD: 15.6 % — HIGH (ref 10.3–14.5)
SMOOTH MUSCLE AB SER-ACNC: SIGNIFICANT CHANGE UP
SODIUM SERPL-SCNC: 142 MMOL/L — SIGNIFICANT CHANGE UP (ref 135–145)
TIBC SERPL-MCNC: 227 UG/DL — SIGNIFICANT CHANGE UP (ref 220–430)
UIBC SERPL-MCNC: 197 UG/DL — SIGNIFICANT CHANGE UP (ref 110–370)
WBC # BLD: 12.6 K/UL — HIGH (ref 3.8–10.5)
WBC # FLD AUTO: 12.6 K/UL — HIGH (ref 3.8–10.5)

## 2017-12-16 RX ORDER — INFLUENZA VIRUS VACCINE 15; 15; 15; 15 UG/.5ML; UG/.5ML; UG/.5ML; UG/.5ML
0.5 SUSPENSION INTRAMUSCULAR ONCE
Qty: 0 | Refills: 0 | Status: DISCONTINUED | OUTPATIENT
Start: 2017-12-16 | End: 2017-12-19

## 2017-12-16 RX ORDER — POTASSIUM BICARBONATE 978 MG/1
25 TABLET, EFFERVESCENT ORAL ONCE
Qty: 0 | Refills: 0 | Status: COMPLETED | OUTPATIENT
Start: 2017-12-16 | End: 2017-12-16

## 2017-12-16 RX ADMIN — SERTRALINE 50 MILLIGRAM(S): 25 TABLET, FILM COATED ORAL at 13:48

## 2017-12-16 RX ADMIN — PANTOPRAZOLE SODIUM 40 MILLIGRAM(S): 20 TABLET, DELAYED RELEASE ORAL at 16:38

## 2017-12-16 RX ADMIN — HEPARIN SODIUM 5000 UNIT(S): 5000 INJECTION INTRAVENOUS; SUBCUTANEOUS at 16:42

## 2017-12-16 RX ADMIN — Medication 80 MILLIGRAM(S): at 22:08

## 2017-12-16 RX ADMIN — ISOSORBIDE MONONITRATE 60 MILLIGRAM(S): 60 TABLET, EXTENDED RELEASE ORAL at 12:06

## 2017-12-16 RX ADMIN — HEPARIN SODIUM 5000 UNIT(S): 5000 INJECTION INTRAVENOUS; SUBCUTANEOUS at 05:26

## 2017-12-16 RX ADMIN — Medication 80 MILLIGRAM(S): at 10:16

## 2017-12-16 RX ADMIN — Medication 81 MILLIGRAM(S): at 05:26

## 2017-12-16 RX ADMIN — CARVEDILOL PHOSPHATE 25 MILLIGRAM(S): 80 CAPSULE, EXTENDED RELEASE ORAL at 16:38

## 2017-12-16 RX ADMIN — ATORVASTATIN CALCIUM 80 MILLIGRAM(S): 80 TABLET, FILM COATED ORAL at 22:08

## 2017-12-16 RX ADMIN — POTASSIUM BICARBONATE 25 MILLIEQUIVALENT(S): 978 TABLET, EFFERVESCENT ORAL at 09:02

## 2017-12-16 RX ADMIN — TICAGRELOR 90 MILLIGRAM(S): 90 TABLET ORAL at 05:26

## 2017-12-16 RX ADMIN — Medication 0.5 MILLIGRAM(S): at 22:08

## 2017-12-16 RX ADMIN — POTASSIUM BICARBONATE 25 MILLIEQUIVALENT(S): 978 TABLET, EFFERVESCENT ORAL at 10:16

## 2017-12-16 RX ADMIN — PANTOPRAZOLE SODIUM 40 MILLIGRAM(S): 20 TABLET, DELAYED RELEASE ORAL at 05:26

## 2017-12-16 RX ADMIN — TICAGRELOR 90 MILLIGRAM(S): 90 TABLET ORAL at 16:38

## 2017-12-16 NOTE — PROGRESS NOTE ADULT - SUBJECTIVE AND OBJECTIVE BOX
THONY ANAYA  62y  Patient is a 62y old  Male who presents with a chief complaint of cp/sob (14 Dec 2017 16:13)    HPI:  This is a patient with chest pains and dyspnea. has CKD and likely has NARCISO also. He is passing urine well. No new complaints.  HEALTH ISSUES - PROBLEM Dx:  NARCISO (acute kidney injury): NARCISO (acute kidney injury)  Hypokalemia: Hypokalemia  Acute on chronic congestive heart failure, unspecified congestive heart failure type: Acute on chronic congestive heart failure, unspecified congestive heart failure type  Essential hypertension: Essential hypertension  Medication management: Medication management  Prophylactic measure: Prophylactic measure  CKD (chronic kidney disease) stage 3, GFR 30-59 ml/min: CKD (chronic kidney disease) stage 3, GFR 30-59 ml/min  Hypertension: Hypertension  Leukocytosis: Leukocytosis  Arrhythmia: Arrhythmia  CAD (coronary artery disease): CAD (coronary artery disease)  Chest pain: Chest pain  Acute decompensated heart failure: Acute decompensated heart failure        MEDICATIONS  (STANDING):  aspirin enteric coated 81 milliGRAM(s) Oral daily  atorvastatin 80 milliGRAM(s) Oral at bedtime  carvedilol 25 milliGRAM(s) Oral every 12 hours  furosemide   Injectable 80 milliGRAM(s) IV Push every 12 hours  heparin  Injectable 5000 Unit(s) SubCutaneous every 12 hours  influenza   Vaccine 0.5 milliLiter(s) IntraMuscular once  isosorbide   mononitrate ER Tablet (IMDUR) 60 milliGRAM(s) Oral daily  pantoprazole    Tablet 40 milliGRAM(s) Oral two times a day before meals  sertraline 50 milliGRAM(s) Oral daily  ticagrelor 90 milliGRAM(s) Oral two times a day    MEDICATIONS  (PRN):  ALPRAZolam 0.5 milliGRAM(s) Oral every 12 hours PRN anxiety    Vital Signs Last 24 Hrs  T(C): 36.6 (16 Dec 2017 15:45), Max: 37.2 (16 Dec 2017 14:41)  T(F): 97.9 (16 Dec 2017 15:45), Max: 98.9 (16 Dec 2017 14:41)  HR: 66 (16 Dec 2017 15:45) (58 - 71)  BP: 125/78 (16 Dec 2017 15:45) (102/62 - 128/84)  BP(mean): --  RR: 16 (16 Dec 2017 15:45) (16 - 18)  SpO2: 100% (16 Dec 2017 15:45) (97% - 100%)  Daily     Daily     PHYSICAL EXAM:  Constitutional:  He appears comfortable and not distressed. Not diaphoretic.    Neck:  The thyroid is normal. Trachea is midline.     Breasts: Normal examination.    Respiratory: The lungs are clear to auscultation. No dullness and expansion is normal.    Cardiovascular: S1 and S2 are normal. No mummurs, rubs or gallops are present.    Gastrointestinal: The abdomen is soft. No tenderness is present. No masses are present. Bowel sounds are normal.    Genitourinary: The bladder is not distended. No CVA tenderness is present.    Extremities: No edema is noted. No deformities are present.    Neurological: Cognition is normal. Tone, power and sensation are normal. Gait is steady.    Skin: No leasions are seen  or palpated.    Lymph Nodes: No lymphadenopathy is present.    Psychiatric: Mood is appropriate. No hallucinations or flight of ideas are noted.                              12.9   12.6  )-----------( 141      ( 16 Dec 2017 06:15 )             39.0     12-16    142  |  102  |  36<H>  ----------------------------<  126<H>  3.2<L>   |  26  |  2.16<H>    Ca    8.8      16 Dec 2017 06:15  Mg     2.2     12-15    TPro  6.5  /  Alb  3.5  /  TBili  1.5<H>  /  DBili  0.3<H>  /  AST  115<H>  /  ALT  93<H>  /  AlkPhos  121<H>  12-15    Urine Microscopic-Add On (NC) (12.14.17 @ 05:53)    Bacteria: Few /HPF    Epithelial Cells: Occasional /HPF    Red Blood Cell - Urine: 0-2 /HPF    White Blood Cell - Urine: 0-2 /HPF    Hyaline Casts: 0-2    Urinalysis (12.14.17 @ 05:53)    Glucose Qualitative, Urine: Negative    Blood, Urine: Negative    pH Urine: 6.0    Color: PL Yellow    Urine Appearance: Clear    Bilirubin: Negative    Ketone - Urine: Negative    Specific Gravity: 1.008    Protein, Urine: 30 mg/dL    Urobilinogen: Negative    Nitrite: Negative    Leukocyte Esterase Concentration: Negative    < from: US Abdomen Complete (12.15.17 @ 14:25) >  FINDINGS:    Liver: Within normal limits.    Bile ducts: Normal caliber. Common bile duct measures 3 mm.     Gallbladder: Gallstones. Negative sonographic Bagley sign.        Pancreas: Visualized portions are within normal limits.    Spleen: 12 cm. Within normal limits.    Right kidney: 12 cm. No hydronephrosis.        Left kidney: 13 cm.  No hydronephrosis. Multiple cysts measuring up to   3.5 cm.    Ascites: None.    Aorta and IVC: Visualized portions are within normal limits.    IMPRESSION:     Gallstones.      < end of copied text >

## 2017-12-16 NOTE — PROGRESS NOTE ADULT - ASSESSMENT
66 year old male with hx of CAD and psoriasis who comes in with chest pain and found to have an NSTEMI.     cards - s/p cardiac cath - medical management as per cards - cont diuresis,    hypoxic resp failure - bipap prn, supplemental oxygen , pt former smoker high likely copd + chf at present, discuss with pulm about nebs and if pts resp status doesn't improve poss ? steroids    Renal - monitor closely sec to diuresis     discussed management plan with pt

## 2017-12-16 NOTE — PROGRESS NOTE ADULT - SUBJECTIVE AND OBJECTIVE BOX
HPI:  61 yo male HTN, HLD, ASHD only recently dc'd smoking, recently started on Repatha s/p CABG 2005, s/p multiple remote PCI presented  with cp/sob +NSTEMI. Cardiac cath revealed LIMA patent all other grafts occluded-medical therapy.  Pt taken off bipap today-Feeling better.  PAST MEDICAL & SURGICAL HISTORY:  Psoriasis  HLD (hyperlipidemia)  CAD (coronary artery disease): 3 stents  Hypertension  History of coronary artery stent placement: 1 stent 2008, 2 stents 2009 in [facility]  S/P quadruple vessel bypass: 2006      Allergies    No Known Allergies    Intolerances          MEDICATIONS  (STANDING):  aspirin enteric coated 81 milliGRAM(s) Oral daily  atorvastatin 80 milliGRAM(s) Oral at bedtime  carvedilol 25 milliGRAM(s) Oral every 12 hours  furosemide   Injectable 80 milliGRAM(s) IV Push every 12 hours  heparin  Injectable 5000 Unit(s) SubCutaneous every 12 hours  influenza   Vaccine 0.5 milliLiter(s) IntraMuscular once  isosorbide   mononitrate ER Tablet (IMDUR) 60 milliGRAM(s) Oral daily  pantoprazole    Tablet 40 milliGRAM(s) Oral two times a day before meals  sertraline 50 milliGRAM(s) Oral daily  ticagrelor 90 milliGRAM(s) Oral two times a day    MEDICATIONS  (PRN):  ALPRAZolam 0.5 milliGRAM(s) Oral every 12 hours PRN anxiety            Vital Signs Last 24 Hrs  T(C): 36.6 (16 Dec 2017 15:45), Max: 37.2 (16 Dec 2017 14:41)  T(F): 97.9 (16 Dec 2017 15:45), Max: 98.9 (16 Dec 2017 14:41)  HR: 66 (16 Dec 2017 15:45) (58 - 71)  BP: 125/78 (16 Dec 2017 15:45) (102/62 - 128/84)  BP(mean): --  RR: 16 (16 Dec 2017 15:45) (16 - 18)  SpO2: 100% (16 Dec 2017 15:45) (97% - 100%)  Daily     Daily   I&O's Detail    15 Dec 2017 07:01  -  16 Dec 2017 07:00  --------------------------------------------------------  IN:    Oral Fluid: 820 mL  Total IN: 820 mL    OUT:    Indwelling Catheter - Urethral: 2600 mL    Voided: 500 mL  Total OUT: 3100 mL    Total NET: -2280 mL      16 Dec 2017 07:01  -  16 Dec 2017 16:16  --------------------------------------------------------  IN:    Oral Fluid: 480 mL  Total IN: 480 mL    OUT:    Indwelling Catheter - Urethral: 1200 mL  Total OUT: 1200 mL    Total NET: -720 mL          Physical Exam  Neck without obvious JVD  Lungs dec BS bases otherwise clear  cor s1s2 2/6 sys m llsb  Abd soft  Ext 2+edema bilaterally  LABS          CBC Full  -  ( 16 Dec 2017 06:15 )  WBC Count : 12.6 K/uL  Hemoglobin : 12.9 g/dL  Hematocrit : 39.0 %  Platelet Count - Automated : 141 K/uL  Mean Cell Volume : 96.7 fl  Mean Cell Hemoglobin : 32.0 pg  Mean Cell Hemoglobin Concentration : 33.0 gm/dL  Auto Neutrophil # : x  Auto Lymphocyte # : x  Auto Monocyte # : x  Auto Eosinophil # : x  Auto Basophil # : x  Auto Neutrophil % : x  Auto Lymphocyte % : x  Auto Monocyte % : x  Auto Eosinophil % : x  Auto Basophil % : x    12-16    142  |  102  |  36<H>  ----------------------------<  126<H>  3.2<L>   |  26  |  2.16<H>    Ca    8.8      16 Dec 2017 06:15  Mg     2.2     12-15    TPro  6.5  /  Alb  3.5  /  TBili  1.5<H>  /  DBili  0.3<H>  /  AST  115<H>  /  ALT  93<H>  /  AlkPhos  121<H>  12-15    ABG - ( 14 Dec 2017 20:06 )  pH: 7.48  /  pCO2: 38    /  pO2: 75    / HCO3: 28    / Base Excess: 5.1   /  SaO2: 96                  EKG:  < from: 12 Lead ECG (12.14.17 @ 20:01) >    Ventricular Rate 64 BPM    Atrial Rate 64 BPM    P-R Interval 140 ms    QRS Duration 114 ms     ms    QTc 542 ms    P Axis 59 degrees    R Axis -78 degrees    T Axis 34 degrees    Diagnosis Line SINUS RHYTHM WITH OCCASIONAL , AND CONSECUTIVE PREMATURE VENTRICULAR COMPLEXES AND FUSION COMPLEXES  RIGHT BUNDLE BRANCH BLOCK  LEFT ANTERIOR FASCICULAR BLOCK  *** BIFASCICULAR BLOCK ***  SEPTAL INFARCT , AGE UNDETERMINED  POSSIBLE LATERAL INFARCT , AGE UNDETERMINED  ABNORMAL ECG    < end of copied text >    CXR:  < from: Xray Chest 1 View AP -PORTABLE-Routine (12.15.17 @ 08:30) >    EXAM:  CHEST PORTABLE ROUTINE                            PROCEDURE DATE:  12/15/2017            INTERPRETATION:  CLINICAL INFORMATION: Shortness of breath; acute heart   failure    EXAM: AP view of the chest  12/15/2017 at 8:31 AM    COMPARISON: Radiograph from  12/13/2017 at 7:54 PM      FINDINGS:   The cardiomediastinal silhouette is not accurately evaluated on this   projection.   Status post median sternotomy.   Multiple surgical clips overlie the mid and left hemithorax.   The hilar vasculature is indistinct consistent with mild interstitial   pulmonary edema.  There is no pleural effusion or pneumothorax.    Degenerative changes of the thoracic spine are present.     IMPRESSION:   Mild pulmonary edema.     TTE  < from: Transthoracic Echocardiogram (12.13.17 @ 19:58) >    Patient name: THONY ANAYA  YOB: 1955   Age: 62 (M)   MR#: [id number]  Study Date: 12/13/2017  Location: [place] [address]  DSonographer: Jesus Ball RDCS  Study quality: Technically difficult  Referring Physician: Adali Liz MD  Blood Pressure: 143/98 mmHg  Height: 170 cm  Weight: 102 kg  BSA: 2.1 m2  Heart Rate: 94 mmHg  ------------------------------------------------------------------------  PROCEDURE: Transthoracic echocardiogram with 2-D, M-Mode  and complete spectral and color flow Doppler.  INDICATION: Abnormal electrocardiogram (ECG) (EKG) (R94.31)  ------------------------------------------------------------------------  Dimensions:    Normal Values:  LA:     5.4    2.0 - 4.0 cm  Ao:     3.0    2.0 - 3.8 cm  SEPTUM:        0.6 - 1.2 cm  PWT:           0.6 - 1.1 cm  LVIDd:         3.0 - 5.6 cm  LVIDs:         1.8 - 4.0 cm  Doppler Peak Velocity (m/sec): AoV=1.2  ------------------------------------------------------------------------  Observations:  Mitral Valve: Mitral valve not well visualized. Mild mitral  regurgitation.  Aortic Valve/Aorta: Aortic valve not well visualized;  appears calcified. Mild aortic regurgitation.  Peak left  ventricular outflow tract gradient equals 2 mm Hg.  Aortic Root: 3 cm.  Left Atrium: Mildly dilated left atrium.  LA volume index =  39 cc/m2.  Left Ventricle: Endocardium not well visualized; unable to  evaluate left ventricular systolic function. Consider  further evaluation with echo contrast , Definity if  clinically indicated.  Septal motion consistent with  cardiac surgery. The basal inferolateral wall is  hypokinetic.  The left ventricle appears enlarged.  Mild  diastolic dysfunction (Stage I).  Right Heart: Right atrium not well visualized. The right  ventricle is not well visualized. Tricuspid valve not well  visualized. No tricuspid regurgitation. Pulmonic valve not  well visualized. Minimal pulmonic regurgitation.  Pericardium/Pleura: Normal pericardium with trace  pericardial effusion.  ------------------------------------------------------------------------  Conclusions:  1. Mitral valve not well visualized. Mild mitral  regurgitation.  2. Aortic valve not well visualized; appears calcified.  Peak transaortic valve gradient equals 6 mm Hg. Mild aortic  regurgitation.  3. Mildly dilated left atrium.  LA volume index = 39 cc/m2.  4. The left ventricle appears enlarged.  5. Endocardium not well visualized; unable to evaluate left  ventricular systolic function. Consider further evaluation  withecho contrast , Definity if clinically indicated.    Previous echo EF 49%        Assessment and Plan:  61 yo male HTN, HLD, ASHD only recently dc'd smoking, recently started on Repatha s/p CABG 2005, s/p multiple remote PCI presented  with cp/sob +NSTEMI. Cardiac cath revealed LIMA patent all other grafts occluded-medical therapy.  Pt taken off bipap today-Feeling better.  Agree with current rx. Will evaluate need for IV lasix daily  Resume repatha as outpatient  KCL repletion

## 2017-12-16 NOTE — PROGRESS NOTE ADULT - SUBJECTIVE AND OBJECTIVE BOX
INTERVAL HPI/OVERNIGHT EVENTS: Feels better. Discussed sono shows gallstones, normal liver, no ascites, abd distention also improved    MEDICATIONS  (STANDING):  aspirin enteric coated 81 milliGRAM(s) Oral daily  atorvastatin 80 milliGRAM(s) Oral at bedtime  carvedilol 25 milliGRAM(s) Oral every 12 hours  furosemide   Injectable 80 milliGRAM(s) IV Push every 12 hours  heparin  Injectable 5000 Unit(s) SubCutaneous every 12 hours  influenza   Vaccine 0.5 milliLiter(s) IntraMuscular once  isosorbide   mononitrate ER Tablet (IMDUR) 60 milliGRAM(s) Oral daily  pantoprazole    Tablet 40 milliGRAM(s) Oral two times a day before meals  sertraline 50 milliGRAM(s) Oral daily  ticagrelor 90 milliGRAM(s) Oral two times a day    MEDICATIONS  (PRN):  ALPRAZolam 0.5 milliGRAM(s) Oral every 12 hours PRN anxiety      Allergies    No Known Allergies    Intolerances            PHYSICAL EXAM:   Vital Signs:  Vital Signs Last 24 Hrs  T(C): 36.4 (16 Dec 2017 05:25), Max: 36.4 (16 Dec 2017 05:25)  T(F): 97.5 (16 Dec 2017 05:25), Max: 97.5 (16 Dec 2017 05:25)  HR: 63 (16 Dec 2017 12:07) (58 - 71)  BP: 116/69 (16 Dec 2017 12:07) (102/62 - 128/84)  BP(mean): --  RR: 18 (16 Dec 2017 05:25) (17 - 19)  SpO2: 100% (16 Dec 2017 07:21) (95% - 100%)  Daily     Daily     GENERAL:  no distress  HEENT:  NC/AT,  anicteric  CHEST:   no increased effort, breath sounds clear  HEART:  Regular rhythm  ABDOMEN:  Soft, non-tender, non-distended, normoactive bowel sounds,  no masses ,limited exam due to obesity  EXTEREMITIES:  no cyanosis      LABS:                        12.9   12.6  )-----------( 141      ( 16 Dec 2017 06:15 )             39.0     12-16    142  |  102  |  36<H>  ----------------------------<  126<H>  3.2<L>   |  26  |  2.16<H>    Ca    8.8      16 Dec 2017 06:15  Mg     2.2     12-15    TPro  6.5  /  Alb  3.5  /  TBili  1.5<H>  /  DBili  0.3<H>  /  AST  115<H>  /  ALT  93<H>  /  AlkPhos  121<H>  12-15    PTT - ( 14 Dec 2017 14:02 )  PTT:31.7 sec      RADIOLOGY & ADDITIONAL TESTS:

## 2017-12-16 NOTE — PROGRESS NOTE ADULT - SUBJECTIVE AND OBJECTIVE BOX
chief complaint : shortness of breath         SUBJECTIVE / OVERNIGHT EVENTS: pt says hew feels better than before, denies chest pain, shortness of breath, nausea,v  pts family next to pts bedside    MEDICATIONS  (STANDING):  aspirin enteric coated 81 milliGRAM(s) Oral daily  atorvastatin 80 milliGRAM(s) Oral at bedtime  carvedilol 25 milliGRAM(s) Oral every 12 hours  furosemide   Injectable 80 milliGRAM(s) IV Push every 12 hours  heparin  Injectable 5000 Unit(s) SubCutaneous every 12 hours  influenza   Vaccine 0.5 milliLiter(s) IntraMuscular once  isosorbide   mononitrate ER Tablet (IMDUR) 60 milliGRAM(s) Oral daily  pantoprazole    Tablet 40 milliGRAM(s) Oral two times a day before meals  sertraline 50 milliGRAM(s) Oral daily  ticagrelor 90 milliGRAM(s) Oral two times a day    MEDICATIONS  (PRN):  ALPRAZolam 0.5 milliGRAM(s) Oral every 12 hours PRN anxiety      Vital Signs Last 24 Hrs  T(C): 36.7 (16 Dec 2017 21:02), Max: 37.2 (16 Dec 2017 14:41)  T(F): 98.1 (16 Dec 2017 21:02), Max: 98.9 (16 Dec 2017 14:41)  HR: 65 (16 Dec 2017 21:02) (58 - 66)  BP: 134/76 (16 Dec 2017 21:02) (102/62 - 134/76)  BP(mean): --  RR: 17 (16 Dec 2017 21:02) (16 - 18)  SpO2: 97% (16 Dec 2017 21:02) (97% - 100%)    Constitutional: No fever, fatigue  Skin: No rash.  Eyes: No recent vision problems or eye pain.  ENT: No congestion, ear pain, or sore throat.  Cardiovascular: No chest pain or palpation.  Respiratory: No cough, shortness of breath, congestion, or wheezing.  Gastrointestinal: No abdominal pain, nausea, vomiting, or diarrhea.  Genitourinary: No dysuria.  Musculoskeletal: No joint swelling.  Neurologic: No headache.    PHYSICAL EXAM:  GENERAL: NAD  EYES: EOMI, PERRLA  NECK: Supple, No JVD  CHEST/LUNG: dec breath sounds at bases, fine ext wheezing + left upper  HEART:  S1 , S2 +  ABDOMEN: soft, bs+  EXTREMITIES:  no edema  NEUROLOGY:alert awake oriented       LABS:      142  |  102  |  36<H>  ----------------------------<  126<H>  3.2<L>   |  26  |  2.16<H>    Ca    8.8      16 Dec 2017 06:15  Mg     2.2     12-15    TPro  6.5  /  Alb  3.5  /  TBili  1.5<H>  /  DBili  0.3<H>  /  AST  115<H>  /  ALT  93<H>  /  AlkPhos  121<H>  12-15    Creatinine Trend: 2.16 <--, 2.28 <--, 2.19 <--, 2.00 <--, 1.69 <--, 1.86 <--                        12.9   12.6  )-----------( 141      ( 16 Dec 2017 06:15 )             39.0     Urine Studies:  Urinalysis Basic - ( 14 Dec 2017 05:53 )    Color: PL Yellow / Appearance: Clear / S.008 / pH:   Gluc:  / Ketone: Negative  / Bili: Negative / Urobili: Negative   Blood:  / Protein: 30 mg/dL / Nitrite: Negative   Leuk Esterase: Negative / RBC: 0-2 /HPF / WBC 0-2 /HPF   Sq Epi:  / Non Sq Epi: Occasional /HPF / Bacteria: Few /HPF              LIVER FUNCTIONS - ( 15 Dec 2017 07:12 )  Alb: 3.5 g/dL / Pro: 6.5 g/dL / ALK PHOS: 121 U/L / ALT: 93 U/L RC / AST: 115 U/L / GGT: x

## 2017-12-16 NOTE — PROGRESS NOTE ADULT - ASSESSMENT
1.	NARCISO, stable. NON-Oliguric with clear urine.  2.	CKD of uncertain etiology.  3.	CAD, s/p cath.  4.	Hypertension.  5.	Abnormal LFTs, etiology uncertain.    PLAN:  1.	Follow up BMP.  2.	Repeat UA.  3.	Avoid nephrotoxins.  4.	Will follow up with you.

## 2017-12-16 NOTE — PROGRESS NOTE ADULT - ASSESSMENT
await remainder of serologic testing for other causes of liver disease  asymptomatic gallstones  iron deficiency

## 2017-12-17 LAB
ALBUMIN SERPL ELPH-MCNC: 3.8 G/DL — SIGNIFICANT CHANGE UP (ref 3.3–5)
ALP SERPL-CCNC: 111 U/L — SIGNIFICANT CHANGE UP (ref 40–120)
ALT FLD-CCNC: 59 U/L RC — HIGH (ref 10–45)
ANION GAP SERPL CALC-SCNC: 15 MMOL/L — SIGNIFICANT CHANGE UP (ref 5–17)
ANION GAP SERPL CALC-SCNC: 21 MMOL/L — HIGH (ref 5–17)
AST SERPL-CCNC: 36 U/L — SIGNIFICANT CHANGE UP (ref 10–40)
BILIRUB SERPL-MCNC: 0.9 MG/DL — SIGNIFICANT CHANGE UP (ref 0.2–1.2)
BUN SERPL-MCNC: 32 MG/DL — HIGH (ref 7–23)
BUN SERPL-MCNC: 34 MG/DL — HIGH (ref 7–23)
CALCIUM SERPL-MCNC: 8.8 MG/DL — SIGNIFICANT CHANGE UP (ref 8.4–10.5)
CALCIUM SERPL-MCNC: 9.4 MG/DL — SIGNIFICANT CHANGE UP (ref 8.4–10.5)
CHLORIDE SERPL-SCNC: 97 MMOL/L — SIGNIFICANT CHANGE UP (ref 96–108)
CHLORIDE SERPL-SCNC: 99 MMOL/L — SIGNIFICANT CHANGE UP (ref 96–108)
CO2 SERPL-SCNC: 26 MMOL/L — SIGNIFICANT CHANGE UP (ref 22–31)
CO2 SERPL-SCNC: 28 MMOL/L — SIGNIFICANT CHANGE UP (ref 22–31)
CREAT SERPL-MCNC: 2.26 MG/DL — HIGH (ref 0.5–1.3)
CREAT SERPL-MCNC: 2.26 MG/DL — HIGH (ref 0.5–1.3)
GLUCOSE SERPL-MCNC: 110 MG/DL — HIGH (ref 70–99)
GLUCOSE SERPL-MCNC: 96 MG/DL — SIGNIFICANT CHANGE UP (ref 70–99)
HCT VFR BLD CALC: 41.1 % — SIGNIFICANT CHANGE UP (ref 39–50)
HGB BLD-MCNC: 12.6 G/DL — LOW (ref 13–17)
MAGNESIUM SERPL-MCNC: 2.1 MG/DL — SIGNIFICANT CHANGE UP (ref 1.6–2.6)
MCHC RBC-ENTMCNC: 29.4 PG — SIGNIFICANT CHANGE UP (ref 27–34)
MCHC RBC-ENTMCNC: 30.7 GM/DL — LOW (ref 32–36)
MCV RBC AUTO: 95.8 FL — SIGNIFICANT CHANGE UP (ref 80–100)
PHOSPHATE SERPL-MCNC: 3.8 MG/DL — SIGNIFICANT CHANGE UP (ref 2.5–4.5)
PLATELET # BLD AUTO: 153 K/UL — SIGNIFICANT CHANGE UP (ref 150–400)
POTASSIUM SERPL-MCNC: 3.1 MMOL/L — LOW (ref 3.5–5.3)
POTASSIUM SERPL-MCNC: 3.4 MMOL/L — LOW (ref 3.5–5.3)
POTASSIUM SERPL-SCNC: 3.1 MMOL/L — LOW (ref 3.5–5.3)
POTASSIUM SERPL-SCNC: 3.4 MMOL/L — LOW (ref 3.5–5.3)
PROT SERPL-MCNC: 6.9 G/DL — SIGNIFICANT CHANGE UP (ref 6–8.3)
RBC # BLD: 4.29 M/UL — SIGNIFICANT CHANGE UP (ref 4.2–5.8)
RBC # FLD: 16.4 % — HIGH (ref 10.3–14.5)
SODIUM SERPL-SCNC: 142 MMOL/L — SIGNIFICANT CHANGE UP (ref 135–145)
SODIUM SERPL-SCNC: 144 MMOL/L — SIGNIFICANT CHANGE UP (ref 135–145)
WBC # BLD: 11.1 K/UL — HIGH (ref 3.8–10.5)
WBC # FLD AUTO: 11.1 K/UL — HIGH (ref 3.8–10.5)

## 2017-12-17 RX ORDER — FUROSEMIDE 40 MG
80 TABLET ORAL
Qty: 0 | Refills: 0 | Status: DISCONTINUED | OUTPATIENT
Start: 2017-12-17 | End: 2017-12-19

## 2017-12-17 RX ORDER — POTASSIUM CHLORIDE 20 MEQ
40 PACKET (EA) ORAL ONCE
Qty: 0 | Refills: 0 | Status: COMPLETED | OUTPATIENT
Start: 2017-12-17 | End: 2017-12-17

## 2017-12-17 RX ORDER — POTASSIUM CHLORIDE 20 MEQ
40 PACKET (EA) ORAL ONCE
Qty: 0 | Refills: 0 | Status: DISCONTINUED | OUTPATIENT
Start: 2017-12-17 | End: 2017-12-17

## 2017-12-17 RX ORDER — AMLODIPINE BESYLATE 2.5 MG/1
5 TABLET ORAL DAILY
Qty: 0 | Refills: 0 | Status: DISCONTINUED | OUTPATIENT
Start: 2017-12-17 | End: 2017-12-19

## 2017-12-17 RX ADMIN — PANTOPRAZOLE SODIUM 40 MILLIGRAM(S): 20 TABLET, DELAYED RELEASE ORAL at 18:03

## 2017-12-17 RX ADMIN — SERTRALINE 50 MILLIGRAM(S): 25 TABLET, FILM COATED ORAL at 12:07

## 2017-12-17 RX ADMIN — CARVEDILOL PHOSPHATE 25 MILLIGRAM(S): 80 CAPSULE, EXTENDED RELEASE ORAL at 18:04

## 2017-12-17 RX ADMIN — HEPARIN SODIUM 5000 UNIT(S): 5000 INJECTION INTRAVENOUS; SUBCUTANEOUS at 06:24

## 2017-12-17 RX ADMIN — HEPARIN SODIUM 5000 UNIT(S): 5000 INJECTION INTRAVENOUS; SUBCUTANEOUS at 18:04

## 2017-12-17 RX ADMIN — AMLODIPINE BESYLATE 5 MILLIGRAM(S): 2.5 TABLET ORAL at 12:06

## 2017-12-17 RX ADMIN — Medication 0.5 MILLIGRAM(S): at 18:04

## 2017-12-17 RX ADMIN — ISOSORBIDE MONONITRATE 60 MILLIGRAM(S): 60 TABLET, EXTENDED RELEASE ORAL at 12:07

## 2017-12-17 RX ADMIN — PANTOPRAZOLE SODIUM 40 MILLIGRAM(S): 20 TABLET, DELAYED RELEASE ORAL at 06:24

## 2017-12-17 RX ADMIN — Medication 40 MILLIEQUIVALENT(S): at 12:06

## 2017-12-17 RX ADMIN — Medication 40 MILLIEQUIVALENT(S): at 06:23

## 2017-12-17 RX ADMIN — ATORVASTATIN CALCIUM 80 MILLIGRAM(S): 80 TABLET, FILM COATED ORAL at 21:03

## 2017-12-17 RX ADMIN — Medication 80 MILLIGRAM(S): at 12:06

## 2017-12-17 RX ADMIN — TICAGRELOR 90 MILLIGRAM(S): 90 TABLET ORAL at 06:23

## 2017-12-17 RX ADMIN — TICAGRELOR 90 MILLIGRAM(S): 90 TABLET ORAL at 18:04

## 2017-12-17 RX ADMIN — CARVEDILOL PHOSPHATE 25 MILLIGRAM(S): 80 CAPSULE, EXTENDED RELEASE ORAL at 06:24

## 2017-12-17 RX ADMIN — Medication 80 MILLIGRAM(S): at 21:03

## 2017-12-17 RX ADMIN — Medication 81 MILLIGRAM(S): at 12:07

## 2017-12-17 NOTE — PROGRESS NOTE ADULT - ASSESSMENT
66 year old male with hx of CAD and psoriasis who comes in with chest pain and found to have an NSTEMI.     cards - s/p cardiac cath - medical management as per cards - cont diuresis,    hypoxic resp failure - improved significantly , o2 sat on ra/ exertion    Renal - monitor closely sec to diuresis     void trial tomorrow    discussed management plan with pt

## 2017-12-17 NOTE — PROGRESS NOTE ADULT - ASSESSMENT
asymptomatic gallstones, elevated LFTs resolving with diuresis likely due to passive congestion  low iron level  continue current care

## 2017-12-17 NOTE — PROGRESS NOTE ADULT - SUBJECTIVE AND OBJECTIVE BOX
INTERVAL HPI/OVERNIGHT EVENTS: feels better, LFTs almost normal, discussed gallstones, negative serologic evaluation    MEDICATIONS  (STANDING):  amLODIPine   Tablet 5 milliGRAM(s) Oral daily  aspirin enteric coated 81 milliGRAM(s) Oral daily  atorvastatin 80 milliGRAM(s) Oral at bedtime  carvedilol 25 milliGRAM(s) Oral every 12 hours  furosemide    Tablet 80 milliGRAM(s) Oral two times a day  heparin  Injectable 5000 Unit(s) SubCutaneous every 12 hours  influenza   Vaccine 0.5 milliLiter(s) IntraMuscular once  isosorbide   mononitrate ER Tablet (IMDUR) 60 milliGRAM(s) Oral daily  pantoprazole    Tablet 40 milliGRAM(s) Oral two times a day before meals  potassium chloride    Tablet ER 40 milliEquivalent(s) Oral once  sertraline 50 milliGRAM(s) Oral daily  ticagrelor 90 milliGRAM(s) Oral two times a day    MEDICATIONS  (PRN):  ALPRAZolam 0.5 milliGRAM(s) Oral every 12 hours PRN anxiety      Allergies    No Known Allergies    Intolerances            PHYSICAL EXAM:   Vital Signs:  Vital Signs Last 24 Hrs  T(C): 36.4 (17 Dec 2017 11:55), Max: 37.2 (16 Dec 2017 14:41)  T(F): 97.6 (17 Dec 2017 11:55), Max: 98.9 (16 Dec 2017 14:41)  HR: 57 (17 Dec 2017 11:55) (57 - 72)  BP: 142/89 (17 Dec 2017 11:55) (119/72 - 148/82)  BP(mean): --  RR: 18 (17 Dec 2017 11:55) (16 - 18)  SpO2: 99% (17 Dec 2017 11:55) (97% - 100%)  Daily     Daily Weight in k.7 (17 Dec 2017 07:23)    GENERAL:  no distress  HEENT:  NC/AT,  anicteric  CHEST:   no increased effort, breath sounds clear  HEART:  Regular rhythm  ABDOMEN:  Soft, non-tender, non-distended, normoactive bowel sounds,  no masses ,no hepato-splenomegaly, no signs of chronic liver disease  EXTEREMITIES:  no cyanosis      LABS:                        12.6   11.10 )-----------( 153      ( 17 Dec 2017 08:50 )             41.1         144  |  97  |  32<H>  ----------------------------<  96  3.4<L>   |  26  |  2.26<H>    Ca    9.4      17 Dec 2017 08:50  Phos  3.8       Mg     2.1         TPro  6.9  /  Alb  3.8  /  TBili  0.9  /  DBili  x   /  AST  36  /  ALT  59<H>  /  AlkPhos  111            RADIOLOGY & ADDITIONAL TESTS:

## 2017-12-17 NOTE — PROGRESS NOTE ADULT - ASSESSMENT
1.	NARCISO, likley due to decompensated CHF and diuresis. ATN is also possible as he had acute transaminitis. This is more likely due to congestive rather than ischemic, CardioRenal is more likely then ATN. This appears to be stable and the Diuretics are switched to PO now.  2.	CKD without significant proteinuria.  3.	Decompensated CHF. He is now on PO lasix.  4.	Suspected CHER, feels better with BiPAP.  5.	Suspected Pulmonary HTN.  6.	Acute Transaminitis, improving daily.    PLAN:  1.	Lasix PO.  2.	Supplement K and monitor Mg.  3.	Continue BiPAP.  4.	No RAAS blockade for nor now but it can be considered later once his Renal function is stable at baseline.

## 2017-12-17 NOTE — PROGRESS NOTE ADULT - SUBJECTIVE AND OBJECTIVE BOX
HPI:  61 yo male HTN, HLD, CKD, HFmrEF ASHD only recently dc'd smoking likely CHER recently started on Repatha s/p CABG , s/p multiple remote PCI presented  with cp/sob +NSTEMI. Cardiac cath revealed LIMA patent all other grafts occluded-medical therapy.  Pt required bipap at night and feels better this AM.  Was scheduled for sleep study prior to admission.	.  PAST MEDICAL & SURGICAL HISTORY:  Psoriasis  HLD (hyperlipidemia)  CAD (coronary artery disease): 3 stents  Hypertension  History of coronary artery stent placement: 1 stent , 2 stents  in [facility]  S/P quadruple vessel bypass:       Allergies    No Known Allergies    Intolerances          MEDICATIONS  (STANDING):  aspirin enteric coated 81 milliGRAM(s) Oral daily  atorvastatin 80 milliGRAM(s) Oral at bedtime  carvedilol 25 milliGRAM(s) Oral every 12 hours  furosemide   Injectable 80 milliGRAM(s) IV Push every 12 hours  heparin  Injectable 5000 Unit(s) SubCutaneous every 12 hours  influenza   Vaccine 0.5 milliLiter(s) IntraMuscular once  isosorbide   mononitrate ER Tablet (IMDUR) 60 milliGRAM(s) Oral daily  pantoprazole    Tablet 40 milliGRAM(s) Oral two times a day before meals  sertraline 50 milliGRAM(s) Oral daily  ticagrelor 90 milliGRAM(s) Oral two times a day    MEDICATIONS  (PRN):  ALPRAZolam 0.5 milliGRAM(s) Oral every 12 hours PRN anxiety            Vital Signs Last 24 Hrs  T(C): 36.9 (17 Dec 2017 04:53), Max: 37.2 (16 Dec 2017 14:41)  T(F): 98.5 (17 Dec 2017 04:53), Max: 98.9 (16 Dec 2017 14:41)  HR: 72 (17 Dec 2017 06:58) (60 - 72)  BP: 148/82 (17 Dec 2017 04:53) (116/69 - 148/82)  BP(mean): --  RR: 18 (17 Dec 2017 04:53) (16 - 18)  SpO2: 98% (17 Dec 2017 06:58) (97% - 100%)  Daily     Daily Weight in k.7 (17 Dec 2017 07:23)  I&O's Detail    16 Dec 2017 07:01  -  17 Dec 2017 07:00  --------------------------------------------------------  IN:    Oral Fluid: 950 mL  Total IN: 950 mL    OUT:    Indwelling Catheter - Urethral: 4400 mL  Total OUT: 4400 mL    Total NET: -3450 mL      17 Dec 2017 07:01  -  17 Dec 2017 11:01  --------------------------------------------------------  IN:    Oral Fluid: 360 mL  Total IN: 360 mL    OUT:  Total OUT: 0 mL    Total NET: 360 mL          Physical Exam  Neck without obvious JVD  Lungs dec BS bases otherwise clear  cor s1s2 2/6 sys m llsb  Abd soft  Ext trace edema bilaterally  LABS          CBC Full  -  ( 17 Dec 2017 08:50 )  WBC Count : 11.10 K/uL  Hemoglobin : 12.6 g/dL  Hematocrit : 41.1 %  Platelet Count - Automated : 153 K/uL  Mean Cell Volume : 95.8 fl  Mean Cell Hemoglobin : 29.4 pg  Mean Cell Hemoglobin Concentration : 30.7 gm/dL  Auto Neutrophil # : x  Auto Lymphocyte # : x  Auto Monocyte # : x  Auto Eosinophil # : x  Auto Basophil # : x  Auto Neutrophil % : x  Auto Lymphocyte % : x  Auto Monocyte % : x  Auto Eosinophil % : x  Auto Basophil % : x        144  |  97  |  32<H>  ----------------------------<  96  3.4<L>   |  26  |  2.26<H>    Ca    9.4      17 Dec 2017 08:50  Phos  3.8       Mg     2.1         TPro  6.9  /  Alb  3.8  /  TBili  0.9  /  DBili  x   /  AST  36  /  ALT  59<H>  /  AlkPhos  111          Assessment and Plan:  61 yo male HTN, HLD, CKD, HFmeEF, ASHD only recently dc'd smoking likely CHER recently started on Repatha s/p CABG , s/p multiple remote PCI presented  with cp/sob +NSTEMI. Cardiac cath revealed LIMA patent all other grafts occluded-medical therapy.  Pt required bipap at night and feels better this AM.  Was scheduled for sleep study prior to admission.  Change lasix to 80mg po bid  Hold ACE/ARB/MRA for now 2/2 acute on chronic CKD  Add amlodipine 5mg daily   Sleep study TBS as outpatient  KCL repletion

## 2017-12-17 NOTE — PROGRESS NOTE ADULT - SUBJECTIVE AND OBJECTIVE BOX
chief complaint : shortness of breath         SUBJECTIVE / OVERNIGHT EVENTS: pt says hew feels better than before, denies chest pain, shortness of breath, nausea,v  pts family next to pts bedside    MEDICATIONS  (STANDING):  amLODIPine   Tablet 5 milliGRAM(s) Oral daily  aspirin enteric coated 81 milliGRAM(s) Oral daily  atorvastatin 80 milliGRAM(s) Oral at bedtime  carvedilol 25 milliGRAM(s) Oral every 12 hours  furosemide    Tablet 80 milliGRAM(s) Oral two times a day  heparin  Injectable 5000 Unit(s) SubCutaneous every 12 hours  influenza   Vaccine 0.5 milliLiter(s) IntraMuscular once  isosorbide   mononitrate ER Tablet (IMDUR) 60 milliGRAM(s) Oral daily  pantoprazole    Tablet 40 milliGRAM(s) Oral two times a day before meals  sertraline 50 milliGRAM(s) Oral daily  ticagrelor 90 milliGRAM(s) Oral two times a day    MEDICATIONS  (PRN):  ALPRAZolam 0.5 milliGRAM(s) Oral every 12 hours PRN anxiety      Vital Signs Last 24 Hrs  T(C): 36.6 (17 Dec 2017 21:00), Max: 36.9 (17 Dec 2017 04:53)  T(F): 97.8 (17 Dec 2017 21:00), Max: 98.5 (17 Dec 2017 04:53)  HR: 61 (17 Dec 2017 21:00) (57 - 82)  BP: 124/74 (17 Dec 2017 21:00) (124/74 - 148/82)  BP(mean): --  RR: 18 (17 Dec 2017 21:00) (18 - 18)  SpO2: 98% (17 Dec 2017 21:00) (96% - 100%)    Constitutional: No fever, fatigue  Skin: No rash.  Eyes: No recent vision problems or eye pain.  ENT: No congestion, ear pain, or sore throat.  Cardiovascular: No chest pain or palpation.  Respiratory: No cough, shortness of breath, congestion, or wheezing.  Gastrointestinal: No abdominal pain, nausea, vomiting, or diarrhea.  Genitourinary: No dysuria.  Musculoskeletal: No joint swelling.  Neurologic: No headache.    PHYSICAL EXAM:  GENERAL: NAD  EYES: EOMI, PERRLA  NECK: Supple, No JVD  CHEST/LUNG: dec breath sounds at bases, fine ext wheezing + left upper  HEART:  S1 , S2 +  ABDOMEN: soft, bs+  EXTREMITIES:  no edema  NEUROLOGY:alert awake oriented       LABS:      144  |  97  |  32<H>  ----------------------------<  96  3.4<L>   |  26  |  2.26<H>    Ca    9.4      17 Dec 2017 08:50  Phos  3.8       Mg     2.1         TPro  6.9  /  Alb  3.8  /  TBili  0.9  /  DBili      /  AST  36  /  ALT  59<H>  /  AlkPhos  111      Creatinine Trend: 2.26 <--, 2.26 <--, 2.16 <--, 2.28 <--, 2.19 <--, 2.00 <--, 1.69 <--, 1.86 <--                        12.6   11.10 )-----------( 153      ( 17 Dec 2017 08:50 )             41.1     Urine Studies:  Urinalysis Basic - ( 14 Dec 2017 05:53 )    Color: PL Yellow / Appearance: Clear / S.008 / pH:   Gluc:  / Ketone: Negative  / Bili: Negative / Urobili: Negative   Blood:  / Protein: 30 mg/dL / Nitrite: Negative   Leuk Esterase: Negative / RBC: 0-2 /HPF / WBC 0-2 /HPF   Sq Epi:  / Non Sq Epi: Occasional /HPF / Bacteria: Few /HPF              LIVER FUNCTIONS - ( 17 Dec 2017 00:15 )  Alb: 3.8 g/dL / Pro: 6.9 g/dL / ALK PHOS: 111 U/L / ALT: 59 U/L RC / AST: 36 U/L / GGT: x

## 2017-12-18 LAB
ANION GAP SERPL CALC-SCNC: 17 MMOL/L — SIGNIFICANT CHANGE UP (ref 5–17)
BUN SERPL-MCNC: 30 MG/DL — HIGH (ref 7–23)
CALCIUM SERPL-MCNC: 9.8 MG/DL — SIGNIFICANT CHANGE UP (ref 8.4–10.5)
CHLORIDE SERPL-SCNC: 98 MMOL/L — SIGNIFICANT CHANGE UP (ref 96–108)
CO2 SERPL-SCNC: 26 MMOL/L — SIGNIFICANT CHANGE UP (ref 22–31)
CREAT SERPL-MCNC: 2.24 MG/DL — HIGH (ref 0.5–1.3)
GLUCOSE SERPL-MCNC: 102 MG/DL — HIGH (ref 70–99)
HCT VFR BLD CALC: 40.1 % — SIGNIFICANT CHANGE UP (ref 39–50)
HGB BLD-MCNC: 12.7 G/DL — LOW (ref 13–17)
MCHC RBC-ENTMCNC: 30.1 PG — SIGNIFICANT CHANGE UP (ref 27–34)
MCHC RBC-ENTMCNC: 31.7 GM/DL — LOW (ref 32–36)
MCV RBC AUTO: 95 FL — SIGNIFICANT CHANGE UP (ref 80–100)
PLATELET # BLD AUTO: 156 K/UL — SIGNIFICANT CHANGE UP (ref 150–400)
POTASSIUM SERPL-MCNC: 3.6 MMOL/L — SIGNIFICANT CHANGE UP (ref 3.5–5.3)
POTASSIUM SERPL-SCNC: 3.6 MMOL/L — SIGNIFICANT CHANGE UP (ref 3.5–5.3)
RBC # BLD: 4.22 M/UL — SIGNIFICANT CHANGE UP (ref 4.2–5.8)
RBC # FLD: 16.4 % — HIGH (ref 10.3–14.5)
SODIUM SERPL-SCNC: 141 MMOL/L — SIGNIFICANT CHANGE UP (ref 135–145)
WBC # BLD: 12.32 K/UL — HIGH (ref 3.8–10.5)
WBC # FLD AUTO: 12.32 K/UL — HIGH (ref 3.8–10.5)

## 2017-12-18 PROCEDURE — 99232 SBSQ HOSP IP/OBS MODERATE 35: CPT

## 2017-12-18 RX ADMIN — SERTRALINE 50 MILLIGRAM(S): 25 TABLET, FILM COATED ORAL at 11:33

## 2017-12-18 RX ADMIN — TICAGRELOR 90 MILLIGRAM(S): 90 TABLET ORAL at 18:29

## 2017-12-18 RX ADMIN — Medication 0.5 MILLIGRAM(S): at 21:11

## 2017-12-18 RX ADMIN — PANTOPRAZOLE SODIUM 40 MILLIGRAM(S): 20 TABLET, DELAYED RELEASE ORAL at 18:29

## 2017-12-18 RX ADMIN — ATORVASTATIN CALCIUM 80 MILLIGRAM(S): 80 TABLET, FILM COATED ORAL at 21:11

## 2017-12-18 RX ADMIN — HEPARIN SODIUM 5000 UNIT(S): 5000 INJECTION INTRAVENOUS; SUBCUTANEOUS at 06:22

## 2017-12-18 RX ADMIN — HEPARIN SODIUM 5000 UNIT(S): 5000 INJECTION INTRAVENOUS; SUBCUTANEOUS at 18:29

## 2017-12-18 RX ADMIN — TICAGRELOR 90 MILLIGRAM(S): 90 TABLET ORAL at 06:23

## 2017-12-18 RX ADMIN — CARVEDILOL PHOSPHATE 25 MILLIGRAM(S): 80 CAPSULE, EXTENDED RELEASE ORAL at 18:29

## 2017-12-18 RX ADMIN — CARVEDILOL PHOSPHATE 25 MILLIGRAM(S): 80 CAPSULE, EXTENDED RELEASE ORAL at 06:23

## 2017-12-18 RX ADMIN — Medication 80 MILLIGRAM(S): at 06:23

## 2017-12-18 RX ADMIN — Medication 81 MILLIGRAM(S): at 11:33

## 2017-12-18 RX ADMIN — ISOSORBIDE MONONITRATE 60 MILLIGRAM(S): 60 TABLET, EXTENDED RELEASE ORAL at 11:33

## 2017-12-18 RX ADMIN — Medication 80 MILLIGRAM(S): at 18:29

## 2017-12-18 RX ADMIN — PANTOPRAZOLE SODIUM 40 MILLIGRAM(S): 20 TABLET, DELAYED RELEASE ORAL at 06:23

## 2017-12-18 RX ADMIN — AMLODIPINE BESYLATE 5 MILLIGRAM(S): 2.5 TABLET ORAL at 06:23

## 2017-12-18 NOTE — PROGRESS NOTE ADULT - SUBJECTIVE AND OBJECTIVE BOX
Patient is a 62y old  Male who presents with a chief complaint of cp/sob (14 Dec 2017 16:13)    Being followed by ID for leucocytosis    Interval history:still on bipap but feels better  No acute events      ROS:  No cough,SOB,CP  No N/V/D./abd pain  No other complaints      Antimicrobials:None       Other medications reviewed    Vital Signs Last 24 Hrs  T(C): 36.6 (12-18-17 @ 11:29), Max: 37 (12-18-17 @ 04:37)  T(F): 97.9 (12-18-17 @ 11:29), Max: 98.6 (12-18-17 @ 04:37)  HR: 74 (12-18-17 @ 11:29) (55 - 82)  BP: 102/64 (12-18-17 @ 11:29) (102/64 - 147/92)  BP(mean): --  RR: 17 (12-18-17 @ 11:29) (17 - 18)  SpO2: 100% (12-18-17 @ 11:29) (95% - 100%)    Physical Exam:        HEENT PERRLA EOMI    No oral exudate or erythema    Chest Good AE,CTA    CVS RRR S1 S2 WNl No murmur or rub or gallop    Abd soft BS normal No tenderness no masses    IV site no erythema tenderness or discharge    Ext LE stasis -no tenderness    CNS AAO X 3 no focal    Lab Data:                          12.7   12.32 )-----------( 156      ( 18 Dec 2017 07:12 )             40.1       12-18    141  |  98  |  30<H>  ----------------------------<  102<H>  3.6   |  26  |  2.24<H>    Ca    9.8      18 Dec 2017 07:12  Phos  3.8     12-17  Mg     2.1     12-17    TPro  6.9  /  Alb  3.8  /  TBili  0.9  /  DBili  x   /  AST  36  /  ALT  59<H>  /  AlkPhos  111  12-17        Culture - Blood (collected 14 Dec 2017 21:53)  Source: .Blood Blood  Preliminary Report (15 Dec 2017 22:01):    No growth to date.    Culture - Blood (collected 14 Dec 2017 21:51)  Source: .Blood Blood-Peripheral  Preliminary Report (15 Dec 2017 22:01):    No growth to date.    Culture - Urine (collected 14 Dec 2017 08:24)  Source: .Urine Clean Catch (Midstream)  Final Report (15 Dec 2017 12:31):    10,000 - 49,000 CFU/mL Coag Negative Staphylococcus

## 2017-12-18 NOTE — PROGRESS NOTE ADULT - SUBJECTIVE AND OBJECTIVE BOX
chief complaint : shortness of breath         SUBJECTIVE / OVERNIGHT EVENTS: pt says hew feels better than before, denies chest pain, shortness of breath, nausea,v  pts family next to pts bedside    MEDICATIONS  (STANDING):  amLODIPine   Tablet 5 milliGRAM(s) Oral daily  aspirin enteric coated 81 milliGRAM(s) Oral daily  atorvastatin 80 milliGRAM(s) Oral at bedtime  carvedilol 25 milliGRAM(s) Oral every 12 hours  furosemide    Tablet 80 milliGRAM(s) Oral two times a day  heparin  Injectable 5000 Unit(s) SubCutaneous every 12 hours  influenza   Vaccine 0.5 milliLiter(s) IntraMuscular once  isosorbide   mononitrate ER Tablet (IMDUR) 60 milliGRAM(s) Oral daily  pantoprazole    Tablet 40 milliGRAM(s) Oral two times a day before meals  sertraline 50 milliGRAM(s) Oral daily  ticagrelor 90 milliGRAM(s) Oral two times a day    MEDICATIONS  (PRN):  ALPRAZolam 0.5 milliGRAM(s) Oral every 12 hours PRN anxiety      Vital Signs Last 24 Hrs  T(C): 36.9 (18 Dec 2017 20:45), Max: 37 (18 Dec 2017 04:37)  T(F): 98.5 (18 Dec 2017 20:45), Max: 98.6 (18 Dec 2017 04:37)  HR: 62 (18 Dec 2017 20:45) (55 - 81)  BP: 115/68 (18 Dec 2017 20:45) (102/64 - 147/92)  BP(mean): --  RR: 20 (18 Dec 2017 20:45) (17 - 24)  SpO2: 96% (18 Dec 2017 20:45) (90% - 100%)    Constitutional: No fever, fatigue  Skin: No rash.  Eyes: No recent vision problems or eye pain.  ENT: No congestion, ear pain, or sore throat.  Cardiovascular: No chest pain or palpation.  Respiratory: No cough, shortness of breath, congestion, or wheezing.  Gastrointestinal: No abdominal pain, nausea, vomiting, or diarrhea.  Genitourinary: No dysuria.  Musculoskeletal: No joint swelling.  Neurologic: No headache.    PHYSICAL EXAM:  GENERAL: NAD  EYES: EOMI, PERRLA  NECK: Supple, No JVD  CHEST/LUNG: dec breath sounds at bases, fine ext wheezing + left upper  HEART:  S1 , S2 +  ABDOMEN: soft, bs+  EXTREMITIES:  no edema  NEUROLOGY:alert awake oriented     LABS:      141  |  98  |  30<H>  ----------------------------<  102<H>  3.6   |  26  |  2.24<H>    Ca    9.8      18 Dec 2017 07:12  Phos  3.8       Mg     2.1         TPro  6.9  /  Alb  3.8  /  TBili  0.9  /  DBili      /  AST  36  /  ALT  59<H>  /  AlkPhos  111      Creatinine Trend: 2.24 <--, 2.26 <--, 2.26 <--, 2.16 <--, 2.28 <--, 2.19 <--, 2.00 <--, 1.69 <--, 1.86 <--                        12.7   12.32 )-----------( 156      ( 18 Dec 2017 07:12 )             40.1     Urine Studies:  Urinalysis Basic - ( 14 Dec 2017 05:53 )    Color: PL Yellow / Appearance: Clear / S.008 / pH:   Gluc:  / Ketone: Negative  / Bili: Negative / Urobili: Negative   Blood:  / Protein: 30 mg/dL / Nitrite: Negative   Leuk Esterase: Negative / RBC: 0-2 /HPF / WBC 0-2 /HPF   Sq Epi:  / Non Sq Epi: Occasional /HPF / Bacteria: Few /HPF              LIVER FUNCTIONS - ( 17 Dec 2017 00:15 )  Alb: 3.8 g/dL / Pro: 6.9 g/dL / ALK PHOS: 111 U/L / ALT: 59 U/L RC / AST: 36 U/L / GGT: x

## 2017-12-18 NOTE — PROGRESS NOTE ADULT - PROBLEM SELECTOR PLAN 1
possible sec to contrast, renal function is stable now. continue monitor possible sec to contrast, renal function is stable now. May be a new baseline. continue to monitor

## 2017-12-18 NOTE — PROGRESS NOTE ADULT - ASSESSMENT
66 year old male with hx of CAD and psoriasis who comes in with chest pain and found to have an NSTEMI.     cards - s/p cardiac cath - medical management as per cards - cont diuresis,    hypoxic resp failure - improved significantly , o2 sat on ra/ exertion    Renal - monitor closely sec to diuresis     void trial today    discussed management plan with pt

## 2017-12-18 NOTE — PROGRESS NOTE ADULT - SUBJECTIVE AND OBJECTIVE BOX
Dr. Mayen (Nephrology)  Office [phone]  Cell [phone]  Lore[facility] CHEW  Cell [phone]      Patient is a 62y old  Male who presents with a chief complaint of cp/sob (14 Dec 2017 16:13)      Patient seen and examined at bedside. No chest pain/sob    VITALS:  T(F): 97.9 (12-18-17 @ 11:29), Max: 98.6 (12-18-17 @ 04:37)  HR: 74 (12-18-17 @ 11:29)  BP: 102/64 (12-18-17 @ 11:29)  RR: 17 (12-18-17 @ 11:29)  SpO2: 100% (12-18-17 @ 11:29)  Wt(kg): --    12-17 @ 07:01  -  12-18 @ 07:00  --------------------------------------------------------  IN: 850 mL / OUT: 2600 mL / NET: -1750 mL    12-18 @ 07:01  -  12-18 @ 16:32  --------------------------------------------------------  IN: 720 mL / OUT: 250 mL / NET: 470 mL          PHYSICAL EXAM:  Constitutional: NAD  Neck: No JVD  Respiratory: CTAB, no wheezes, rales or rhonchi  Cardiovascular: S1, S2, RRR  Gastrointestinal: BS+, soft, NT/ND  Extremities: No peripheral edema    Hospital Medications:   MEDICATIONS  (STANDING):  amLODIPine   Tablet 5 milliGRAM(s) Oral daily  aspirin enteric coated 81 milliGRAM(s) Oral daily  atorvastatin 80 milliGRAM(s) Oral at bedtime  carvedilol 25 milliGRAM(s) Oral every 12 hours  furosemide    Tablet 80 milliGRAM(s) Oral two times a day  heparin  Injectable 5000 Unit(s) SubCutaneous every 12 hours  influenza   Vaccine 0.5 milliLiter(s) IntraMuscular once  isosorbide   mononitrate ER Tablet (IMDUR) 60 milliGRAM(s) Oral daily  pantoprazole    Tablet 40 milliGRAM(s) Oral two times a day before meals  sertraline 50 milliGRAM(s) Oral daily  ticagrelor 90 milliGRAM(s) Oral two times a day      LABS:  12-18    141  |  98  |  30<H>  ----------------------------<  102<H>  3.6   |  26  |  2.24<H>    Ca    9.8      18 Dec 2017 07:12  Phos  3.8     12-17  Mg     2.1     12-17    TPro  6.9  /  Alb  3.8  /  TBili  0.9  /  DBili      /  AST  36  /  ALT  59<H>  /  AlkPhos  111  12-17    Creatinine Trend: 2.24 <--, 2.26 <--, 2.26 <--, 2.16 <--, 2.28 <--, 2.19 <--, 2.00 <--, 1.69 <--, 1.86 <--                                12.7   12.32 )-----------( 156      ( 18 Dec 2017 07:12 )             40.1     Urine Studies:  Urinalysis - [12-14-17 @ 05:53]      Color PL Yellow / Appearance Clear / SG 1.008 / pH 6.0      Gluc Negative / Ketone Negative  / Bili Negative / Urobili Negative       Blood Negative / Protein 30 / Leuk Est Negative / Nitrite Negative      RBC 0-2 / WBC 0-2 / Hyaline 0-2 / Gran  / Sq Epi  / Non Sq Epi Occasional / Bacteria Few      Iron 30, TIBC 227, %sat 13      [12-16-17 @ 07:50]  Ferritin 233.0      [12-16-17 @ 07:50]  HbA1c 5.6      [04-22-17 @ 15:04]  TSH 1.28      [04-22-17 @ 15:04]  Lipid: chol 167, TG 55, HDL 37,       [04-22-17 @ 15:04]    HBsAg Nonreact      [12-16-17 @ 07:50]  HCV 0.09, Nonreact      [12-16-17 @ 07:50]    HELEN: titer Negative, pattern --      [12-16-17 @ 07:52]    RADIOLOGY & ADDITIONAL STUDIES:

## 2017-12-18 NOTE — PROGRESS NOTE ADULT - ASSESSMENT
63 yo   CAD-?NSTEMI s/p cath  pemphigus on steroids  No clinical s/s infectious process  Leucocytosis likely from steroids vs reactive to cardiac event/procedure  Hemodynamically stable  UA CXR negative  urine cx with CNS but no urinary complaints-?colonization/contaminant  Continue observation off abx  Tailor plan per course,results.  ID will follow as needed,please call [phone] if any questions or issues.

## 2017-12-18 NOTE — PROGRESS NOTE ADULT - SUBJECTIVE AND OBJECTIVE BOX
THONY ANAYA:[id number],   62yMale followed for:  No Known Allergies    PAST MEDICAL & SURGICAL HISTORY:  Psoriasis  HLD (hyperlipidemia)  CAD (coronary artery disease): 3 stents  Hypertension  History of coronary artery stent placement: 1 stent , 2 stents  in [facility]  S/P quadruple vessel bypass:     FAMILY HISTORY:  Family history of cerebrovascular accident (CVA)  Family history of early CAD (Father): father who  of CVA.    MEDICATIONS  (STANDING):  amLODIPine   Tablet 5 milliGRAM(s) Oral daily  aspirin enteric coated 81 milliGRAM(s) Oral daily  atorvastatin 80 milliGRAM(s) Oral at bedtime  carvedilol 25 milliGRAM(s) Oral every 12 hours  furosemide    Tablet 80 milliGRAM(s) Oral two times a day  heparin  Injectable 5000 Unit(s) SubCutaneous every 12 hours  influenza   Vaccine 0.5 milliLiter(s) IntraMuscular once  isosorbide   mononitrate ER Tablet (IMDUR) 60 milliGRAM(s) Oral daily  pantoprazole    Tablet 40 milliGRAM(s) Oral two times a day before meals  sertraline 50 milliGRAM(s) Oral daily  ticagrelor 90 milliGRAM(s) Oral two times a day    MEDICATIONS  (PRN):  ALPRAZolam 0.5 milliGRAM(s) Oral every 12 hours PRN anxiety      Vital Signs Last 24 Hrs  T(C): 36.6 (18 Dec 2017 11:29), Max: 37 (18 Dec 2017 04:37)  T(F): 97.9 (18 Dec 2017 11:29), Max: 98.6 (18 Dec 2017 04:37)  HR: 74 (18 Dec 2017 11:29) (55 - 82)  BP: 102/64 (18 Dec 2017 11:29) (102/64 - 147/92)  BP(mean): --  RR: 17 (18 Dec 2017 11:29) (17 - 18)  SpO2: 100% (18 Dec 2017 11:29) (95% - 100%)  nc/at  s1s2  cta  soft, nt, nd no guarding or rebound  no c/c/e    CBC Full  -  ( 18 Dec 2017 07:12 )  WBC Count : 12.32 K/uL  Hemoglobin : 12.7 g/dL  Hematocrit : 40.1 %  Platelet Count - Automated : 156 K/uL  Mean Cell Volume : 95.0 fl  Mean Cell Hemoglobin : 30.1 pg  Mean Cell Hemoglobin Concentration : 31.7 gm/dL  Auto Neutrophil # : x  Auto Lymphocyte # : x  Auto Monocyte # : x  Auto Eosinophil # : x  Auto Basophil # : x  Auto Neutrophil % : x  Auto Lymphocyte % : x  Auto Monocyte % : x  Auto Eosinophil % : x  Auto Basophil % : x        141  |  98  |  30<H>  ----------------------------<  102<H>  3.6   |  26  |  2.24<H>    Ca    9.8      18 Dec 2017 07:12  Phos  3.8       Mg     2.1         TPro  6.9  /  Alb  3.8  /  TBili  0.9  /  DBili  x   /  AST  36  /  ALT  59<H>  /  AlkPhos  111

## 2017-12-18 NOTE — PROGRESS NOTE ADULT - SUBJECTIVE AND OBJECTIVE BOX
Subjective; Pt. feeling much better with les SOB and no CP.  Continues on Bipap at night and would like to have at home.    MEDICATIONS  (STANDING):  amLODIPine   Tablet 5 milliGRAM(s) Oral daily  aspirin enteric coated 81 milliGRAM(s) Oral daily  atorvastatin 80 milliGRAM(s) Oral at bedtime  carvedilol 25 milliGRAM(s) Oral every 12 hours  furosemide    Tablet 80 milliGRAM(s) Oral two times a day  heparin  Injectable 5000 Unit(s) SubCutaneous every 12 hours  influenza   Vaccine 0.5 milliLiter(s) IntraMuscular once  isosorbide   mononitrate ER Tablet (IMDUR) 60 milliGRAM(s) Oral daily  pantoprazole    Tablet 40 milliGRAM(s) Oral two times a day before meals  sertraline 50 milliGRAM(s) Oral daily  ticagrelor 90 milliGRAM(s) Oral two times a day    MEDICATIONS  (PRN):  ALPRAZolam 0.5 milliGRAM(s) Oral every 12 hours PRN anxiety      Vital Signs Last 24 Hrs  T(C): 37 (18 Dec 2017 04:37), Max: 37 (18 Dec 2017 04:37)  T(F): 98.6 (18 Dec 2017 04:37), Max: 98.6 (18 Dec 2017 04:37)  HR: 81 (18 Dec 2017 05:52) (55 - 82)  BP: 147/92 (18 Dec 2017 04:37) (124/74 - 147/92)  BP(mean): --  RR: 17 (18 Dec 2017 04:37) (17 - 18)  SpO2: 98% (18 Dec 2017 05:52) (96% - 99%)    PHYSICAL EXAM:    Constitutional: WD, WN  Neck: no JVD  Respiratory: clear lungs  Cardiovascular: RRR with 1/6 KATHERYN  Gastrointestinal: obese  Extremities: Trace to 1+ judi edema  Neurological: nonfocal grossly    TELEMETRY: RSR with PVC's    ECG:      LABS:                        12.7   12.32 )-----------( 156      ( 18 Dec 2017 07:12 )             40.1     12-18    141  |  98  |  30<H>  ----------------------------<  102<H>  3.6   |  26  |  2.24<H>    Ca    9.8      18 Dec 2017 07:12  Phos  3.8     12-17  Mg     2.1     12-17    TPro  6.9  /  Alb  3.8  /  TBili  0.9  /  DBili  x   /  AST  36  /  ALT  59<H>  /  AlkPhos  111  12-17            I&O's Summary    17 Dec 2017 07:01  -  18 Dec 2017 07:00  --------------------------------------------------------  IN: 850 mL / OUT: 2600 mL / NET: -1750 mL    18 Dec 2017 07:01  -  18 Dec 2017 10:54  --------------------------------------------------------  IN: 240 mL / OUT: 0 mL / NET: 240 mL      BNP  RADIOLOGY & ADDITIONAL STUDIES:    IMPRESSION:  NSTEMI with mild LV dysfunction  HFrEF  HTN  NSVT post MI  HLD    RECOMMENDATIONS:  Continue current meds  f/u telemetry for further NSVT  f/u CBC and BMP  Now on po Lasix  Consider Bipap as outpt

## 2017-12-19 ENCOUNTER — TRANSCRIPTION ENCOUNTER (OUTPATIENT)
Age: 62
End: 2017-12-19

## 2017-12-19 VITALS — HEART RATE: 75 BPM | OXYGEN SATURATION: 97 %

## 2017-12-19 LAB
ANION GAP SERPL CALC-SCNC: 16 MMOL/L — SIGNIFICANT CHANGE UP (ref 5–17)
BUN SERPL-MCNC: 36 MG/DL — HIGH (ref 7–23)
CALCIUM SERPL-MCNC: 9.2 MG/DL — SIGNIFICANT CHANGE UP (ref 8.4–10.5)
CHLORIDE SERPL-SCNC: 96 MMOL/L — SIGNIFICANT CHANGE UP (ref 96–108)
CO2 SERPL-SCNC: 29 MMOL/L — SIGNIFICANT CHANGE UP (ref 22–31)
CREAT SERPL-MCNC: 2.46 MG/DL — HIGH (ref 0.5–1.3)
CULTURE RESULTS: SIGNIFICANT CHANGE UP
CULTURE RESULTS: SIGNIFICANT CHANGE UP
GLUCOSE SERPL-MCNC: 95 MG/DL — SIGNIFICANT CHANGE UP (ref 70–99)
HCT VFR BLD CALC: 41.8 % — SIGNIFICANT CHANGE UP (ref 39–50)
HGB BLD-MCNC: 13.8 G/DL — SIGNIFICANT CHANGE UP (ref 13–17)
MCHC RBC-ENTMCNC: 31.4 PG — SIGNIFICANT CHANGE UP (ref 27–34)
MCHC RBC-ENTMCNC: 32.9 GM/DL — SIGNIFICANT CHANGE UP (ref 32–36)
MCV RBC AUTO: 95.3 FL — SIGNIFICANT CHANGE UP (ref 80–100)
PLATELET # BLD AUTO: 149 K/UL — LOW (ref 150–400)
POTASSIUM SERPL-MCNC: 3.5 MMOL/L — SIGNIFICANT CHANGE UP (ref 3.5–5.3)
POTASSIUM SERPL-SCNC: 3.5 MMOL/L — SIGNIFICANT CHANGE UP (ref 3.5–5.3)
RBC # BLD: 4.38 M/UL — SIGNIFICANT CHANGE UP (ref 4.2–5.8)
RBC # FLD: 15.1 % — HIGH (ref 10.3–14.5)
SODIUM SERPL-SCNC: 141 MMOL/L — SIGNIFICANT CHANGE UP (ref 135–145)
SPECIMEN SOURCE: SIGNIFICANT CHANGE UP
SPECIMEN SOURCE: SIGNIFICANT CHANGE UP
WBC # BLD: 11.8 K/UL — HIGH (ref 3.8–10.5)
WBC # FLD AUTO: 11.8 K/UL — HIGH (ref 3.8–10.5)

## 2017-12-19 PROCEDURE — 84295 ASSAY OF SERUM SODIUM: CPT

## 2017-12-19 PROCEDURE — 83690 ASSAY OF LIPASE: CPT

## 2017-12-19 PROCEDURE — 83605 ASSAY OF LACTIC ACID: CPT

## 2017-12-19 PROCEDURE — 83735 ASSAY OF MAGNESIUM: CPT

## 2017-12-19 PROCEDURE — 82947 ASSAY GLUCOSE BLOOD QUANT: CPT

## 2017-12-19 PROCEDURE — 82728 ASSAY OF FERRITIN: CPT

## 2017-12-19 PROCEDURE — 85027 COMPLETE CBC AUTOMATED: CPT

## 2017-12-19 PROCEDURE — 76700 US EXAM ABDOM COMPLETE: CPT

## 2017-12-19 PROCEDURE — 87798 DETECT AGENT NOS DNA AMP: CPT

## 2017-12-19 PROCEDURE — 80048 BASIC METABOLIC PNL TOTAL CA: CPT

## 2017-12-19 PROCEDURE — 94660 CPAP INITIATION&MGMT: CPT

## 2017-12-19 PROCEDURE — 96376 TX/PRO/DX INJ SAME DRUG ADON: CPT

## 2017-12-19 PROCEDURE — 99291 CRITICAL CARE FIRST HOUR: CPT | Mod: 25

## 2017-12-19 PROCEDURE — 85730 THROMBOPLASTIN TIME PARTIAL: CPT

## 2017-12-19 PROCEDURE — 86381 MITOCHONDRIAL ANTIBODY EACH: CPT

## 2017-12-19 PROCEDURE — 82550 ASSAY OF CK (CPK): CPT

## 2017-12-19 PROCEDURE — 82150 ASSAY OF AMYLASE: CPT

## 2017-12-19 PROCEDURE — 80053 COMPREHEN METABOLIC PANEL: CPT

## 2017-12-19 PROCEDURE — 87633 RESP VIRUS 12-25 TARGETS: CPT

## 2017-12-19 PROCEDURE — 82553 CREATINE MB FRACTION: CPT

## 2017-12-19 PROCEDURE — 86038 ANTINUCLEAR ANTIBODIES: CPT

## 2017-12-19 PROCEDURE — 80076 HEPATIC FUNCTION PANEL: CPT

## 2017-12-19 PROCEDURE — 87581 M.PNEUMON DNA AMP PROBE: CPT

## 2017-12-19 PROCEDURE — 87486 CHLMYD PNEUM DNA AMP PROBE: CPT

## 2017-12-19 PROCEDURE — 82803 BLOOD GASES ANY COMBINATION: CPT

## 2017-12-19 PROCEDURE — 96374 THER/PROPH/DIAG INJ IV PUSH: CPT

## 2017-12-19 PROCEDURE — 81001 URINALYSIS AUTO W/SCOPE: CPT

## 2017-12-19 PROCEDURE — 99232 SBSQ HOSP IP/OBS MODERATE 35: CPT

## 2017-12-19 PROCEDURE — 93567 NJX CAR CTH SPRVLV AORTGRPHY: CPT

## 2017-12-19 PROCEDURE — 84100 ASSAY OF PHOSPHORUS: CPT

## 2017-12-19 PROCEDURE — 83550 IRON BINDING TEST: CPT

## 2017-12-19 PROCEDURE — C1769: CPT

## 2017-12-19 PROCEDURE — 82435 ASSAY OF BLOOD CHLORIDE: CPT

## 2017-12-19 PROCEDURE — C1887: CPT

## 2017-12-19 PROCEDURE — C1894: CPT

## 2017-12-19 PROCEDURE — 85014 HEMATOCRIT: CPT

## 2017-12-19 PROCEDURE — 80074 ACUTE HEPATITIS PANEL: CPT

## 2017-12-19 PROCEDURE — 93306 TTE W/DOPPLER COMPLETE: CPT

## 2017-12-19 PROCEDURE — 82330 ASSAY OF CALCIUM: CPT

## 2017-12-19 PROCEDURE — 84132 ASSAY OF SERUM POTASSIUM: CPT

## 2017-12-19 PROCEDURE — 93455 CORONARY ART/GRFT ANGIO S&I: CPT

## 2017-12-19 PROCEDURE — 93005 ELECTROCARDIOGRAM TRACING: CPT

## 2017-12-19 PROCEDURE — 71045 X-RAY EXAM CHEST 1 VIEW: CPT

## 2017-12-19 PROCEDURE — 83880 ASSAY OF NATRIURETIC PEPTIDE: CPT

## 2017-12-19 PROCEDURE — 84484 ASSAY OF TROPONIN QUANT: CPT

## 2017-12-19 PROCEDURE — 87040 BLOOD CULTURE FOR BACTERIA: CPT

## 2017-12-19 PROCEDURE — 85610 PROTHROMBIN TIME: CPT

## 2017-12-19 PROCEDURE — 87086 URINE CULTURE/COLONY COUNT: CPT

## 2017-12-19 PROCEDURE — 86255 FLUORESCENT ANTIBODY SCREEN: CPT

## 2017-12-19 RX ORDER — ALPRAZOLAM 0.25 MG
0 TABLET ORAL
Qty: 60 | Refills: 0 | COMMUNITY

## 2017-12-19 RX ORDER — FUROSEMIDE 40 MG
1 TABLET ORAL
Qty: 60 | Refills: 0 | OUTPATIENT
Start: 2017-12-19 | End: 2018-01-17

## 2017-12-19 RX ORDER — MIRTAZAPINE 45 MG/1
0 TABLET, ORALLY DISINTEGRATING ORAL
Qty: 30 | Refills: 0 | COMMUNITY

## 2017-12-19 RX ORDER — ALPRAZOLAM 0.25 MG
1 TABLET ORAL
Qty: 0 | Refills: 0 | DISCHARGE
Start: 2017-12-19

## 2017-12-19 RX ORDER — FUROSEMIDE 40 MG
40 TABLET ORAL
Qty: 0 | Refills: 0 | Status: DISCONTINUED | OUTPATIENT
Start: 2017-12-19 | End: 2017-12-19

## 2017-12-19 RX ORDER — TICAGRELOR 90 MG/1
1 TABLET ORAL
Qty: 60 | Refills: 0
Start: 2017-12-19 | End: 2018-01-17

## 2017-12-19 RX ORDER — FUROSEMIDE 40 MG
1 TABLET ORAL
Qty: 15 | Refills: 0
Start: 2017-12-19 | End: 2018-01-17

## 2017-12-19 RX ADMIN — PANTOPRAZOLE SODIUM 40 MILLIGRAM(S): 20 TABLET, DELAYED RELEASE ORAL at 06:52

## 2017-12-19 RX ADMIN — Medication 80 MILLIGRAM(S): at 06:51

## 2017-12-19 RX ADMIN — TICAGRELOR 90 MILLIGRAM(S): 90 TABLET ORAL at 06:52

## 2017-12-19 RX ADMIN — SERTRALINE 50 MILLIGRAM(S): 25 TABLET, FILM COATED ORAL at 11:44

## 2017-12-19 RX ADMIN — Medication 81 MILLIGRAM(S): at 11:44

## 2017-12-19 RX ADMIN — ISOSORBIDE MONONITRATE 60 MILLIGRAM(S): 60 TABLET, EXTENDED RELEASE ORAL at 11:44

## 2017-12-19 RX ADMIN — AMLODIPINE BESYLATE 5 MILLIGRAM(S): 2.5 TABLET ORAL at 06:52

## 2017-12-19 RX ADMIN — CARVEDILOL PHOSPHATE 25 MILLIGRAM(S): 80 CAPSULE, EXTENDED RELEASE ORAL at 06:52

## 2017-12-19 NOTE — DISCHARGE NOTE ADULT - SECONDARY DIAGNOSIS.
CAD (coronary artery disease) CKD (chronic kidney disease) stage 3, GFR 30-59 ml/min Essential hypertension Chest pain

## 2017-12-19 NOTE — DISCHARGE NOTE ADULT - MEDICATION SUMMARY - MEDICATIONS TO STOP TAKING
I will STOP taking the medications listed below when I get home from the hospital:    clopidogrel 75 mg oral tablet  -- 1 tab(s) by mouth once a day    hydrALAZINE 50 mg oral tablet  -- 1 tab(s) by mouth 2 times a day    MIRTAZAPINE 30 MG TABS    PREDNISONE 5 MG TABS    TORSEMIDE 20 MG TABS  -- orally once a day I will STOP taking the medications listed below when I get home from the hospital:    clopidogrel 75 mg oral tablet  -- 1 tab(s) by mouth once a day    hydrALAZINE 50 mg oral tablet  -- 1 tab(s) by mouth 2 times a day    MIRTAZAPINE 30 MG TABS    TORSEMIDE 20 MG TABS  -- orally once a day

## 2017-12-19 NOTE — DISCHARGE NOTE ADULT - CARE PROVIDER_API CALL
Min Quach), Cardiology; Internal Medicine  [address] [place] [address]  [address]  [place] [address]  Phone: [phone]  Fax: [phone]

## 2017-12-19 NOTE — PROGRESS NOTE ADULT - ASSESSMENT
63 yo   CAD-?NSTEMI s/p cath  pemphigus on steroids  No clinical s/s infectious process  Leucocytosis likely from steroids vs reactive to cardiac event/procedure  Hemodynamically stable  Continue observation off abx  Tailor plan per course,results.  ID will follow as needed,please call [phone] if any questions or issues.

## 2017-12-19 NOTE — PROGRESS NOTE ADULT - SUBJECTIVE AND OBJECTIVE BOX
HPI:    61 yo male HTN, HLD, CKD, HFmrEF ASHD only recently dc'd smoking likely CHER recently started on Repatha s/p CABG , s/p multiple remote PCI presented  with cp/sob +NSTEMI. Cardiac cath revealed LIMA patent all other grafts occluded-medical therapy.  Pt using bipap at night   PAST MEDICAL & SURGICAL HISTORY:  Psoriasis  HLD (hyperlipidemia)  CAD (coronary artery disease): 3 stents  Hypertension  History of coronary artery stent placement: 1 stent , 2 stents  in [facility]  S/P quadruple vessel bypass:       Allergies    No Known Allergies    Intolerances          MEDICATIONS  (STANDING):  amLODIPine   Tablet 5 milliGRAM(s) Oral daily  aspirin enteric coated 81 milliGRAM(s) Oral daily  atorvastatin 80 milliGRAM(s) Oral at bedtime  carvedilol 25 milliGRAM(s) Oral every 12 hours  furosemide    Tablet 80 milliGRAM(s) Oral two times a day  heparin  Injectable 5000 Unit(s) SubCutaneous every 12 hours  influenza   Vaccine 0.5 milliLiter(s) IntraMuscular once  isosorbide   mononitrate ER Tablet (IMDUR) 60 milliGRAM(s) Oral daily  pantoprazole    Tablet 40 milliGRAM(s) Oral two times a day before meals  sertraline 50 milliGRAM(s) Oral daily  ticagrelor 90 milliGRAM(s) Oral two times a day    MEDICATIONS  (PRN):  ALPRAZolam 0.5 milliGRAM(s) Oral every 12 hours PRN anxiety            Vital Signs Last 24 Hrs  T(C): 36.7 (19 Dec 2017 05:55), Max: 36.9 (18 Dec 2017 20:45)  T(F): 98.1 (19 Dec 2017 05:55), Max: 98.5 (18 Dec 2017 20:45)  HR: 62 (19 Dec 2017 05:55) (60 - 74)  BP: 125/78 (19 Dec 2017 05:55) (102/64 - 138/80)  BP(mean): --  RR: 20 (19 Dec 2017 05:55) (17 - 24)  SpO2: 98% (19 Dec 2017 05:55) (90% - 100%)  Daily     Daily Weight in k.5 (19 Dec 2017 07:21)  I&O's Detail    18 Dec 2017 07:01  -  19 Dec 2017 07:00  --------------------------------------------------------  IN:    Oral Fluid: 1350 mL  Total IN: 1350 mL    OUT:    Voided: 700 mL  Total OUT: 700 mL    Total NET: 650 mL      19 Dec 2017 07:01  -  19 Dec 2017 09:47  --------------------------------------------------------  IN:    Oral Fluid: 360 mL  Total IN: 360 mL    OUT:    Voided: 250 mL  Total OUT: 250 mL    Total NET: 110 mL          Physical Exam  Neck without JVD  Lungs clear  cor s1s2 2/6 nacho rusb  abd soft  ext without edema  LABS          CBC Full  -  ( 19 Dec 2017 06:04 )  WBC Count : 11.8 K/uL  Hemoglobin : 13.8 g/dL  Hematocrit : 41.8 %  Platelet Count - Automated : 149 K/uL  Mean Cell Volume : 95.3 fl  Mean Cell Hemoglobin : 31.4 pg  Mean Cell Hemoglobin Concentration : 32.9 gm/dL  Auto Neutrophil # : x  Auto Lymphocyte # : x  Auto Monocyte # : x  Auto Eosinophil # : x  Auto Basophil # : x  Auto Neutrophil % : x  Auto Lymphocyte % : x  Auto Monocyte % : x  Auto Eosinophil % : x  Auto Basophil % : x    12-19    141  |  96  |  36<H>  ----------------------------<  95  3.5   |  29  |  2.46<H>    Ca    9.2      19 Dec 2017 06:04        Assessment and Plan:  61 yo male HTN, HLD, CKD, HFmrEF ASHD only recently dc'd smoking likely CHER recently started on Repatha s/p CABG , s/p multiple remote PCI presented  with cp/sob +NSTEMI. Cardiac cath revealed LIMA patent all other grafts occluded-medical therapy.  Pt using bipap at night   Renal injury likely multifactorial 2/2 HF, contrrast, and diuresis  Decrease lasix to 40mg bid  Continue toHold ACE/ARB  Will likely require cpap as outpatient for presumed CHER

## 2017-12-19 NOTE — DISCHARGE NOTE ADULT - MEDICATION SUMMARY - MEDICATIONS TO CHANGE
I will SWITCH the dose or number of times a day I take the medications listed below when I get home from the hospital:    ALPRAZOLAM .25 MG TABS

## 2017-12-19 NOTE — DISCHARGE NOTE ADULT - ADDITIONAL INSTRUCTIONS
cath revealed LIMA patent all other grafts occluded-medical therapy.  Pt required bipap at night and feels better this AM.  Was scheduled for sleep study prior to admission.  Change lasix to 80mg po bid  Hold ACE/ARB/MRA for now 2/2 acute on chronic CKD  Add amlodipine 5mg daily   Follow up with PCP / Cardiology within 5-6 days to repeat BMP

## 2017-12-19 NOTE — PROGRESS NOTE ADULT - SUBJECTIVE AND OBJECTIVE BOX
INTERVAL HPI/OVERNIGHT EVENTS: Feels much better, looking forward to d/c today    MEDICATIONS  (STANDING):  amLODIPine   Tablet 5 milliGRAM(s) Oral daily  aspirin enteric coated 81 milliGRAM(s) Oral daily  atorvastatin 80 milliGRAM(s) Oral at bedtime  carvedilol 25 milliGRAM(s) Oral every 12 hours  furosemide    Tablet 80 milliGRAM(s) Oral two times a day  heparin  Injectable 5000 Unit(s) SubCutaneous every 12 hours  influenza   Vaccine 0.5 milliLiter(s) IntraMuscular once  isosorbide   mononitrate ER Tablet (IMDUR) 60 milliGRAM(s) Oral daily  pantoprazole    Tablet 40 milliGRAM(s) Oral two times a day before meals  sertraline 50 milliGRAM(s) Oral daily  ticagrelor 90 milliGRAM(s) Oral two times a day    MEDICATIONS  (PRN):  ALPRAZolam 0.5 milliGRAM(s) Oral every 12 hours PRN anxiety      Allergies    No Known Allergies    Intolerances            PHYSICAL EXAM:   Vital Signs:  Vital Signs Last 24 Hrs  T(C): 36.7 (19 Dec 2017 05:55), Max: 36.9 (18 Dec 2017 20:45)  T(F): 98.1 (19 Dec 2017 05:55), Max: 98.5 (18 Dec 2017 20:45)  HR: 62 (19 Dec 2017 05:55) (60 - 74)  BP: 125/78 (19 Dec 2017 05:55) (102/64 - 138/80)  BP(mean): --  RR: 20 (19 Dec 2017 05:55) (17 - 24)  SpO2: 98% (19 Dec 2017 05:55) (90% - 100%)  Daily     Daily Weight in k.5 (19 Dec 2017 07:21)    GENERAL:  no distress  HEENT:  NC/AT,  anicteric  CHEST:   no increased effort, breath sounds clear  HEART:  Regular rhythm  ABDOMEN:  Soft, non-tender, non-distended, normoactive bowel sounds,  no masses ,no hepato-splenomegaly, no signs of chronic liver disease  EXTEREMITIES:  no cyanosis      LABS:                        13.8   11.8  )-----------( 149      ( 19 Dec 2017 06:04 )             41.8     12-    141  |  96  |  36<H>  ----------------------------<  95  3.5   |  29  |  2.46<H>    Ca    9.2      19 Dec 2017 06:04  Phos  3.8     12-17  Mg     2.1     12-17            RADIOLOGY & ADDITIONAL TESTS:

## 2017-12-19 NOTE — PROGRESS NOTE ADULT - ASSESSMENT
stable for d/c from GI perspective, LFTs almost normal likely due to passive congestion in setting of MI and CHF  can f/u as outpt

## 2017-12-19 NOTE — DISCHARGE NOTE ADULT - PLAN OF CARE
Weigh yourself daily.  If you gain 3lbs in 3 days, or 5lbs in a week call your Health Care Provider.  Do not eat or drink foods containing more than 2000mg of salt (sodium) in your diet every day.  Call your Health Care Provider if you have any swelling or increased swelling in your feet, ankles, and/or stomach.  Take all of your medication as directed.  If you become dizzy call your Health Care Provider. Coronary artery disease is a condition where the arteries the supply the heart muscle get clogges with fatty deposits & puts you at risk for a heart attack  Call your doctor if you have any new pain, pressure, or discomfort in the center of your chest, pain, tingling or discomfort in arms, back, neck, jaw, or stomach, shortness of breath, nausea, vomiting, burping or heartburn, sweating, cold and clammy skin, racing or abnormal heartbeat for more than 10 minutes or if they keep coming & going.  Call 911 and do not tr to get to hospital by care  You can help yourself with lefestyle changes (quitting smoking if you smoke), eat lots of fruits & vegetables & low fat dairy products, not a lot of meat & fatty foods, walk or some form of physical activity most days of the week, lose weight if you are overweight  Take your cardiac medication as prescribed to lower cholesterol, to lower blood pressure, aspirin to prevent blood clots, and diabetes control  Make sure to keep appointments with doctor for cardiac follow up care Avoid taking (NSAIDs) - (ex: Ibuprofen, Advil, Celebrex, Naprosyn)  Avoid taking any nephrotoxic agents (can harm kidneys) - Intravenous contrast for diagnostic testing, combination cold medications.  Have all medications adjusted for your renal function by your Health Care Provider.  Blood pressure control is important.  Take all medication as prescribed. Low salt diet  Activity as tolerated.  Take all medication as prescribed.  Follow up with your medical doctor for routine blood pressure monitoring at your next visit.  Notify your doctor if you have any of the following symptoms:   Dizziness, Lightheadedness, Blurry vision, Headache, Chest pain, Shortness of breath HOME CARE INSTRUCTIONS  For the next few days, avoid physical activities that bring on chest pain. Continue physical activities as directed.  Do not smoke.  Avoid drinking alcohol.   Only take over-the-counter or prescription medicine for pain, discomfort, or fever as directed by your caregiver.  Follow your caregiver's suggestions for further testing if your chest pain does not go away.  Keep any follow-up appointments you made. If you do not go to an appointment, you could develop lasting (chronic) problems with pain. If there is any problem keeping an appointment, you must call to reschedule.   SEEK MEDICAL CARE IF:  You think you are having problems from the medicine you are taking. Read your medicine instructions carefully.  Your chest pain does not go away, even after treatment.  You develop a rash with blisters on your chest.  SEEK IMMEDIATE MEDICAL CARE IF:  You have increased chest pain or pain that spreads to your arm, neck, jaw, back, or abdomen.   You develop shortness of breath, an increasing cough, or you are coughing up blood.  You have severe back or abdominal pain, feel nauseous, or vomit.  You develop severe weakness, fainting, or chills.  You have a fever.  THIS IS AN EMERGENCY. Do not wait to see if the pain will go away. Get medical help at once. Call your local emergency services (_____________________). Do not drive yourself to the hospital. Resolution of symptom

## 2017-12-19 NOTE — DISCHARGE NOTE ADULT - CARE PLAN
Principal Discharge DX:	CHF exacerbation  Goal:	Resolution of symptom  Instructions for follow-up, activity and diet:	Weigh yourself daily.  If you gain 3lbs in 3 days, or 5lbs in a week call your Health Care Provider.  Do not eat or drink foods containing more than 2000mg of salt (sodium) in your diet every day.  Call your Health Care Provider if you have any swelling or increased swelling in your feet, ankles, and/or stomach.  Take all of your medication as directed.  If you become dizzy call your Health Care Provider.  Secondary Diagnosis:	CAD (coronary artery disease)  Instructions for follow-up, activity and diet:	Coronary artery disease is a condition where the arteries the supply the heart muscle get clogges with fatty deposits & puts you at risk for a heart attack  Call your doctor if you have any new pain, pressure, or discomfort in the center of your chest, pain, tingling or discomfort in arms, back, neck, jaw, or stomach, shortness of breath, nausea, vomiting, burping or heartburn, sweating, cold and clammy skin, racing or abnormal heartbeat for more than 10 minutes or if they keep coming & going.  Call 911 and do not tr to get to hospital by care  You can help yourself with lefestyle changes (quitting smoking if you smoke), eat lots of fruits & vegetables & low fat dairy products, not a lot of meat & fatty foods, walk or some form of physical activity most days of the week, lose weight if you are overweight  Take your cardiac medication as prescribed to lower cholesterol, to lower blood pressure, aspirin to prevent blood clots, and diabetes control  Make sure to keep appointments with doctor for cardiac follow up care  Secondary Diagnosis:	CKD (chronic kidney disease) stage 3, GFR 30-59 ml/min  Instructions for follow-up, activity and diet:	Avoid taking (NSAIDs) - (ex: Ibuprofen, Advil, Celebrex, Naprosyn)  Avoid taking any nephrotoxic agents (can harm kidneys) - Intravenous contrast for diagnostic testing, combination cold medications.  Have all medications adjusted for your renal function by your Health Care Provider.  Blood pressure control is important.  Take all medication as prescribed.  Secondary Diagnosis:	Essential hypertension  Instructions for follow-up, activity and diet:	Low salt diet  Activity as tolerated.  Take all medication as prescribed.  Follow up with your medical doctor for routine blood pressure monitoring at your next visit.  Notify your doctor if you have any of the following symptoms:   Dizziness, Lightheadedness, Blurry vision, Headache, Chest pain, Shortness of breath  Secondary Diagnosis:	Chest pain  Instructions for follow-up, activity and diet:	HOME CARE INSTRUCTIONS  For the next few days, avoid physical activities that bring on chest pain. Continue physical activities as directed.  Do not smoke.  Avoid drinking alcohol.   Only take over-the-counter or prescription medicine for pain, discomfort, or fever as directed by your caregiver.  Follow your caregiver's suggestions for further testing if your chest pain does not go away.  Keep any follow-up appointments you made. If you do not go to an appointment, you could develop lasting (chronic) problems with pain. If there is any problem keeping an appointment, you must call to reschedule.   SEEK MEDICAL CARE IF:  You think you are having problems from the medicine you are taking. Read your medicine instructions carefully.  Your chest pain does not go away, even after treatment.  You develop a rash with blisters on your chest.  SEEK IMMEDIATE MEDICAL CARE IF:  You have increased chest pain or pain that spreads to your arm, neck, jaw, back, or abdomen.   You develop shortness of breath, an increasing cough, or you are coughing up blood.  You have severe back or abdominal pain, feel nauseous, or vomit.  You develop severe weakness, fainting, or chills.  You have a fever.  THIS IS AN EMERGENCY. Do not wait to see if the pain will go away. Get medical help at once. Call your local emergency services (_____________________). Do not drive yourself to the hospital.

## 2017-12-19 NOTE — DISCHARGE NOTE ADULT - HOSPITAL COURSE
61 yo man with PMH of CAD, with history of CABG 2005 with history stent (last stent in 2010), HTN, psoriasis, pemphigus vulgaris? (recently diagnosed after last hospitalization), presenting from home in setting of chest pain and shortness of breath. Chest pain new onset today, located substernally, not related to exertion and has been intermittent throughout the day. Patient also endorses associated symptoms of nausea without episodes of emesis. With regards to shortness of breath it has been present ~ 2 weeks and is usually noticeable with exertion. Endorses abdominal bloating, LE edema, and symptoms of orthopnea.  DX: NSTEMI -  Cardiac cath revealed LIMA patent all other grafts occluded-medical therapy. Plavix changed to Brilinta. Continue with medical management. Pt required bipap at night and sleep apnea study will be done as outpatient feels better this AM.    CHF - Changed Lasix to 40 mg po every other day due to chronic CKD. BMP to be done 5-6 days after discharge.  Hold ACE/ARB/MRA for now 2/2 acute on chronic CKD. Add amlodipine 5mg daily. Patient symptomatically improved. Stable for discharge with follow up with PCP / Cardiology. within 5 days. 61 yo man with PMH of CAD, with history of CABG 2005 with history stent (last stent in 2010), HTN, psoriasis, pemphigus vulgaris? (recently diagnosed after last hospitalization), presenting from home in setting of chest pain and shortness of breath. Chest pain new onset today, located substernally, not related to exertion and has been intermittent throughout the day. Patient also endorses associated symptoms of nausea without episodes of emesis. With regards to shortness of breath it has been present ~ 2 weeks and is usually noticeable with exertion. Endorses abdominal bloating, LE edema, and symptoms of orthopnea.  DX: NSTEMI -  Cardiac cath revealed LIMA patent all other grafts occluded-medical therapy. Plavix changed to Brilinta. Continue with medical management. Pt required bipap at night and sleep apnea study will be done as outpatient feels better this AM.    CHF - Changed Lasix to 40 mg po every other day due to chronic CKD. BMP to be done 5-6 days after discath revealed LIMA patent all other grafts occluded-medical therapy.  Pt required bipap at night and feels better this AM.  Was scheduled for sleep study prior to admission.  Change lasix to 80mg po bid  Hold ACE/ARB/MRA for now 2/2 acute on chronic CKD  Add amlodipine 5mg daily   Sleep study TBS as outpatient  KCL repletion  charge.  Hold ACE/ARB/MRA for now 2/2 acute on chronic CKD. Add amlodipine 5mg daily. Patient symptomatically improved. Stable for discharge with follow up with PCP / Cardiology. within 5 days.

## 2017-12-19 NOTE — DISCHARGE NOTE ADULT - PATIENT PORTAL LINK FT
“You can access the [facility] Patient Portal, offered by [facility], by registering with the following website: [email or web address]”

## 2017-12-19 NOTE — PROGRESS NOTE ADULT - SUBJECTIVE AND OBJECTIVE BOX
Dr. Mayen (Nephrology)  Office [phone]  Cell [phone]  Lorena CHEW  Cell [phone]      Patient is a 62y old  Male who presents with a chief complaint of cp/sob (19 Dec 2017 13:02)      Patient seen and examined at bedside. No chest pain/sob    VITALS:  T(F): 98.1 (12-19-17 @ 11:15), Max: 98.5 (12-18-17 @ 20:45)  HR: 75 (12-19-17 @ 15:18)  BP: 135/77 (12-19-17 @ 11:15)  RR: 17 (12-19-17 @ 11:15)  SpO2: 97% (12-19-17 @ 15:18)  Wt(kg): --    12-18 @ 07:01  -  12-19 @ 07:00  --------------------------------------------------------  IN: 1350 mL / OUT: 700 mL / NET: 650 mL    12-19 @ 07:01  -  12-19 @ 15:53  --------------------------------------------------------  IN: 660 mL / OUT: 450 mL / NET: 210 mL          PHYSICAL EXAM:  Constitutional: NAD  Neck: No JVD  Respiratory: CTAB, no wheezes, rales or rhonchi  Cardiovascular: S1, S2, RRR  Gastrointestinal: BS+, soft, NT/ND  Extremities: No peripheral edema    Hospital Medications:   MEDICATIONS  (STANDING):  amLODIPine   Tablet 5 milliGRAM(s) Oral daily  aspirin enteric coated 81 milliGRAM(s) Oral daily  atorvastatin 80 milliGRAM(s) Oral at bedtime  carvedilol 25 milliGRAM(s) Oral every 12 hours  furosemide    Tablet 40 milliGRAM(s) Oral two times a day  heparin  Injectable 5000 Unit(s) SubCutaneous every 12 hours  influenza   Vaccine 0.5 milliLiter(s) IntraMuscular once  isosorbide   mononitrate ER Tablet (IMDUR) 60 milliGRAM(s) Oral daily  pantoprazole    Tablet 40 milliGRAM(s) Oral two times a day before meals  sertraline 50 milliGRAM(s) Oral daily  ticagrelor 90 milliGRAM(s) Oral two times a day      LABS:  12-19    141  |  96  |  36<H>  ----------------------------<  95  3.5   |  29  |  2.46<H>    Ca    9.2      19 Dec 2017 06:04      Creatinine Trend: 2.46 <--, 2.24 <--, 2.26 <--, 2.26 <--, 2.16 <--, 2.28 <--, 2.19 <--, 2.00 <--, 1.69 <--, 1.86 <--                                13.8   11.8  )-----------( 149      ( 19 Dec 2017 06:04 )             41.8     Urine Studies:  Urinalysis - [12-14-17 @ 05:53]      Color PL Yellow / Appearance Clear / SG 1.008 / pH 6.0      Gluc Negative / Ketone Negative  / Bili Negative / Urobili Negative       Blood Negative / Protein 30 / Leuk Est Negative / Nitrite Negative      RBC 0-2 / WBC 0-2 / Hyaline 0-2 / Gran  / Sq Epi  / Non Sq Epi Occasional / Bacteria Few      Iron 30, TIBC 227, %sat 13      [12-16-17 @ 07:50]  Ferritin 233.0      [12-16-17 @ 07:50]  HbA1c 5.6      [04-22-17 @ 15:04]  TSH 1.28      [04-22-17 @ 15:04]  Lipid: chol 167, TG 55, HDL 37,       [04-22-17 @ 15:04]    HBsAg Nonreact      [12-16-17 @ 07:50]  HCV 0.09, Nonreact      [12-16-17 @ 07:50]    HELEN: titer Negative, pattern --      [12-16-17 @ 07:52]    RADIOLOGY & ADDITIONAL STUDIES:

## 2017-12-19 NOTE — DISCHARGE NOTE ADULT - MEDICATION SUMMARY - MEDICATIONS TO TAKE
I will START or STAY ON the medications listed below when I get home from the hospital:    aspirin 81 mg oral delayed release tablet  -- 1 tab(s) by mouth once a day  -- Indication: For Prophylactic measure    isosorbide mononitrate 60 mg oral tablet, extended release  -- 1 tab(s) by mouth once a day (in the morning)  -- Indication: For CAD (coronary artery disease)    SERTRALINE HCL 50 MG TABS  -- orally once a day  -- Indication: For Depression    atorvastatin 80 mg oral tablet  -- 1 tab(s) by mouth once a day (at bedtime)  -- Indication: For Hypelipidemia    ticagrelor 90 mg oral tablet  -- 1 tab(s) by mouth 2 times a day  -- Indication: For Prophylactic measure    ALPRAZolam 0.5 mg oral tablet  -- 1 tab(s) by mouth every 12 hours, As needed, anxiety  -- Indication: For Anxiety    carvedilol 25 mg oral tablet  -- 1 tab(s) by mouth every 12 hours  -- Indication: For CHF exacerbation    amLODIPine 5 mg oral tablet  -- 1 tab(s) by mouth once a day  -- Indication: For Hypertension    furosemide 40 mg oral tablet  -- 1 tab(s) by mouth 2 times a day  -- Indication: For CHF exacerbation    pantoprazole 40 mg oral delayed release tablet  -- 1 tab(s) by mouth 2 times a day (before meals)  -- Indication: For Prophylactic measure GI I will START or STAY ON the medications listed below when I get home from the hospital:    PREDNISONE 5 MG TABS  -- 1  by mouth once a day   -- Indication: For Pemphigus vulgaris    aspirin 81 mg oral delayed release tablet  -- 1 tab(s) by mouth once a day  -- Indication: For Prophylactic measure    isosorbide mononitrate 60 mg oral tablet, extended release  -- 1 tab(s) by mouth once a day (in the morning)  -- Indication: For CAD (coronary artery disease)    SERTRALINE HCL 50 MG TABS  -- orally once a day  -- Indication: For Depression    atorvastatin 80 mg oral tablet  -- 1 tab(s) by mouth once a day (at bedtime)  -- Indication: For Hypelipidemia    ticagrelor 90 mg oral tablet  -- 1 tab(s) by mouth 2 times a day  -- Indication: For Prophylactic measure    ALPRAZolam 0.5 mg oral tablet  -- 1 tab(s) by mouth every 12 hours, As needed, anxiety  -- Indication: For Anxiety    carvedilol 25 mg oral tablet  -- 1 tab(s) by mouth every 12 hours  -- Indication: For CHF exacerbation    amLODIPine 5 mg oral tablet  -- 1 tab(s) by mouth once a day  -- Indication: For Hypertension    furosemide 40 mg oral tablet  -- 1 tab(s) by mouth every other day x 30 days   -- Indication: For Acute on chronic congestive heart failure, unspecified congestive heart failure type    pantoprazole 40 mg oral delayed release tablet  -- 1 tab(s) by mouth 2 times a day (before meals)  -- Indication: For Prophylactic measure GI

## 2017-12-19 NOTE — PROGRESS NOTE ADULT - SUBJECTIVE AND OBJECTIVE BOX
Patient is a 62y old  Male who presents with a chief complaint of cp/sob (14 Dec 2017 16:13)    Being followed by ID for leucocytosis    Interval history:feels better  No acute events      ROS:  No cough,SOB,CP  No N/V/D./abd pain  No other complaints      Antimicrobials:None       Other medications reviewed    Vital Signs Last 24 Hrs  T(C): 36.7 (12-19-17 @ 05:55), Max: 36.9 (12-18-17 @ 20:45)  T(F): 98.1 (12-19-17 @ 05:55), Max: 98.5 (12-18-17 @ 20:45)  HR: 62 (12-19-17 @ 05:55) (60 - 74)  BP: 125/78 (12-19-17 @ 05:55) (102/64 - 138/80)  BP(mean): --  RR: 20 (12-19-17 @ 05:55) (17 - 24)  SpO2: 98% (12-19-17 @ 05:55) (90% - 100%)    Physical Exam:        HEENT PERRLA EOMI    No oral exudate or erythema    Chest Good AE,CTA    CVS RRR S1 S2 WNl No murmur or rub or gallop    Abd soft BS normal No tenderness no masses    IV site no erythema tenderness or discharge    CNS AAO X 3 no focal    Lab Data:                          13.8   11.8  )-----------( 149      ( 19 Dec 2017 06:04 )             41.8       12-19    141  |  96  |  36<H>  ----------------------------<  95  3.5   |  29  |  2.46<H>    Ca    9.2      19 Dec 2017 06:04          Culture - Blood (collected 14 Dec 2017 21:53)  Source: .Blood Blood  Preliminary Report (15 Dec 2017 22:01):    No growth to date.    Culture - Blood (collected 14 Dec 2017 21:51)  Source: .Blood Blood-Peripheral  Preliminary Report (15 Dec 2017 22:01):    No growth to date.

## 2017-12-19 NOTE — PROGRESS NOTE ADULT - ASSESSMENT
63 yo man with PMH of CAD, with history of CABG 2005 with history stent (last stent in 2010), HTN, CKD, psoriasis, pemphigus vulgaris? (recently diagnosed after last hospitalization), presenting from home in setting of chest pain and shortness of breath.

## 2018-06-22 ENCOUNTER — APPOINTMENT (OUTPATIENT)
Dept: PULMONOLOGY | Facility: CLINIC | Age: 63
End: 2018-06-22
Payer: MEDICARE

## 2018-06-22 PROCEDURE — 36600 WITHDRAWAL OF ARTERIAL BLOOD: CPT | Mod: 59

## 2018-06-22 PROCEDURE — 82803 BLOOD GASES ANY COMBINATION: CPT

## 2019-01-28 NOTE — PATIENT PROFILE ADULT. - ASSIST WITH
Chief Complaint   Patient presents with     Pre-Op Exam     needing to complete physical for 4 month shot, will be going into OR     URI     on and off daily for x2 weeks     Mj Butler, EMT 1:45 PM on 1/28/2019.   standing/walking/toileting

## 2019-06-01 NOTE — ED PROVIDER NOTE - OBJECTIVE STATEMENT
61 yo m with history of stents, CAD, HTN, CABG presents with chief complaint of chest pain, shortness of breath 61 yo m with history of stents, CAD, HTN, CABG presents with chief complaint of chest pain, shortness of breath for the past 2 weeks. Patient developed worsening chest pain today. Took 81 mg of aspirin at home before calling EMS. EMS placed on cpap and brought to the ED. Patient in critical condition and required immediate attention due to possibility of life threatening condition. normal...

## 2019-09-09 NOTE — ED ADULT NURSE NOTE - CAS DISCH CONDITION
Called and requested refill(s): Patient  Medications: Amlodipine  Amount: 90 day supply  Pharmacy to be sent to: [facility], [place]  Call back number: [phone]  Okay to leave detailed voice message: yes     Improved

## 2019-12-24 ENCOUNTER — APPOINTMENT (OUTPATIENT)
Dept: DERMATOLOGY | Facility: CLINIC | Age: 64
End: 2019-12-24

## 2019-12-27 ENCOUNTER — INPATIENT (INPATIENT)
Facility: HOSPITAL | Age: 64
LOS: 13 days | Discharge: NURSING, REHAB OR LONG-TERM CARE | End: 2020-01-10
Attending: INTERNAL MEDICINE | Admitting: INTERNAL MEDICINE
Payer: MEDICARE

## 2019-12-27 VITALS — RESPIRATION RATE: 30 BRPM | DIASTOLIC BLOOD PRESSURE: 91 MMHG | HEART RATE: 57 BPM | SYSTOLIC BLOOD PRESSURE: 119 MMHG

## 2019-12-27 DIAGNOSIS — L10.0 PEMPHIGUS VULGARIS: ICD-10-CM

## 2019-12-27 DIAGNOSIS — I10 ESSENTIAL (PRIMARY) HYPERTENSION: ICD-10-CM

## 2019-12-27 DIAGNOSIS — J96.02 ACUTE RESPIRATORY FAILURE WITH HYPERCAPNIA: ICD-10-CM

## 2019-12-27 DIAGNOSIS — E78.5 HYPERLIPIDEMIA, UNSPECIFIED: ICD-10-CM

## 2019-12-27 DIAGNOSIS — I25.10 ATHEROSCLEROTIC HEART DISEASE OF NATIVE CORONARY ARTERY WITHOUT ANGINA PECTORIS: ICD-10-CM

## 2019-12-27 DIAGNOSIS — F32.9 MAJOR DEPRESSIVE DISORDER, SINGLE EPISODE, UNSPECIFIED: ICD-10-CM

## 2019-12-27 DIAGNOSIS — I50.9 HEART FAILURE, UNSPECIFIED: ICD-10-CM

## 2019-12-27 DIAGNOSIS — Z29.9 ENCOUNTER FOR PROPHYLACTIC MEASURES, UNSPECIFIED: ICD-10-CM

## 2019-12-27 DIAGNOSIS — N18.9 CHRONIC KIDNEY DISEASE, UNSPECIFIED: ICD-10-CM

## 2019-12-27 DIAGNOSIS — J81.0 ACUTE PULMONARY EDEMA: ICD-10-CM

## 2019-12-27 DIAGNOSIS — F41.9 ANXIETY DISORDER, UNSPECIFIED: ICD-10-CM

## 2019-12-27 LAB
ACANTHOCYTES BLD QL SMEAR: SIGNIFICANT CHANGE UP
ALBUMIN SERPL ELPH-MCNC: 3.5 G/DL — SIGNIFICANT CHANGE UP (ref 3.3–5)
ALBUMIN SERPL ELPH-MCNC: 3.6 G/DL — SIGNIFICANT CHANGE UP (ref 3.3–5)
ALP SERPL-CCNC: 219 U/L — HIGH (ref 40–120)
ALP SERPL-CCNC: 235 U/L — HIGH (ref 40–120)
ALT FLD-CCNC: 140 U/L — HIGH (ref 4–41)
ALT FLD-CCNC: 141 U/L — HIGH (ref 4–41)
ANION GAP SERPL CALC-SCNC: 15 MMO/L — HIGH (ref 7–14)
ANION GAP SERPL CALC-SCNC: 19 MMO/L — HIGH (ref 7–14)
ANION GAP SERPL CALC-SCNC: 20 MMO/L — HIGH (ref 7–14)
ANTIBODY ID 1_1: SIGNIFICANT CHANGE UP
APPEARANCE UR: SIGNIFICANT CHANGE UP
APTT BLD: 24.6 SEC — LOW (ref 27.5–36.3)
APTT BLD: 25.8 SEC — LOW (ref 27.5–36.3)
APTT BLD: 62.3 SEC — HIGH (ref 27.5–36.3)
AST SERPL-CCNC: 147 U/L — HIGH (ref 4–40)
AST SERPL-CCNC: 86 U/L — HIGH (ref 4–40)
B PERT DNA SPEC QL NAA+PROBE: NOT DETECTED — SIGNIFICANT CHANGE UP
BACTERIA # UR AUTO: NEGATIVE — SIGNIFICANT CHANGE UP
BASE EXCESS BLDA CALC-SCNC: 0.5 MMOL/L — SIGNIFICANT CHANGE UP
BASE EXCESS BLDA CALC-SCNC: 1.2 MMOL/L — SIGNIFICANT CHANGE UP
BASE EXCESS BLDV CALC-SCNC: -0.4 MMOL/L — SIGNIFICANT CHANGE UP
BASE EXCESS BLDV CALC-SCNC: -5.8 MMOL/L — SIGNIFICANT CHANGE UP
BASE EXCESS BLDV CALC-SCNC: 1.2 MMOL/L — SIGNIFICANT CHANGE UP
BASOPHILS # BLD AUTO: 0.05 K/UL — SIGNIFICANT CHANGE UP (ref 0–0.2)
BASOPHILS # BLD AUTO: 0.17 K/UL — SIGNIFICANT CHANGE UP (ref 0–0.2)
BASOPHILS NFR BLD AUTO: 0.2 % — SIGNIFICANT CHANGE UP (ref 0–2)
BASOPHILS NFR BLD AUTO: 0.9 % — SIGNIFICANT CHANGE UP (ref 0–2)
BASOPHILS NFR SPEC: 0 % — SIGNIFICANT CHANGE UP (ref 0–2)
BILIRUB SERPL-MCNC: 0.6 MG/DL — SIGNIFICANT CHANGE UP (ref 0.2–1.2)
BILIRUB SERPL-MCNC: 0.7 MG/DL — SIGNIFICANT CHANGE UP (ref 0.2–1.2)
BILIRUB UR-MCNC: NEGATIVE — SIGNIFICANT CHANGE UP
BLASTS # FLD: 0 % — SIGNIFICANT CHANGE UP (ref 0–0)
BLD GP AB SCN SERPL QL: POSITIVE — SIGNIFICANT CHANGE UP
BLOOD GAS ARTERIAL - FIO2: 30 — SIGNIFICANT CHANGE UP
BLOOD GAS ARTERIAL - FIO2: 30 — SIGNIFICANT CHANGE UP
BLOOD GAS VENOUS - CREATININE: 2.36 MG/DL — HIGH (ref 0.5–1.3)
BLOOD GAS VENOUS - FIO2: 21 — SIGNIFICANT CHANGE UP
BLOOD GAS VENOUS - FIO2: 21 — SIGNIFICANT CHANGE UP
BLOOD UR QL VISUAL: HIGH
BUN SERPL-MCNC: 33 MG/DL — HIGH (ref 7–23)
BUN SERPL-MCNC: 42 MG/DL — HIGH (ref 7–23)
BUN SERPL-MCNC: 43 MG/DL — HIGH (ref 7–23)
BURR CELLS BLD QL SMEAR: PRESENT — SIGNIFICANT CHANGE UP
C PNEUM DNA SPEC QL NAA+PROBE: NOT DETECTED — SIGNIFICANT CHANGE UP
CALCIUM SERPL-MCNC: 8 MG/DL — LOW (ref 8.4–10.5)
CALCIUM SERPL-MCNC: 8.5 MG/DL — SIGNIFICANT CHANGE UP (ref 8.4–10.5)
CALCIUM SERPL-MCNC: 8.7 MG/DL — SIGNIFICANT CHANGE UP (ref 8.4–10.5)
CHLORIDE BLDA-SCNC: 108 MMOL/L — SIGNIFICANT CHANGE UP (ref 96–108)
CHLORIDE BLDV-SCNC: 105 MMOL/L — SIGNIFICANT CHANGE UP (ref 96–108)
CHLORIDE BLDV-SCNC: 105 MMOL/L — SIGNIFICANT CHANGE UP (ref 96–108)
CHLORIDE SERPL-SCNC: 100 MMOL/L — SIGNIFICANT CHANGE UP (ref 98–107)
CHLORIDE SERPL-SCNC: 103 MMOL/L — SIGNIFICANT CHANGE UP (ref 98–107)
CHLORIDE SERPL-SCNC: 104 MMOL/L — SIGNIFICANT CHANGE UP (ref 98–107)
CK MB BLD-MCNC: 14.32 NG/ML — HIGH (ref 1–6.6)
CK MB BLD-MCNC: SIGNIFICANT CHANGE UP (ref 0–2.5)
CK SERPL-CCNC: 87 U/L — SIGNIFICANT CHANGE UP (ref 30–200)
CO2 SERPL-SCNC: 18 MMOL/L — LOW (ref 22–31)
CO2 SERPL-SCNC: 19 MMOL/L — LOW (ref 22–31)
CO2 SERPL-SCNC: 22 MMOL/L — SIGNIFICANT CHANGE UP (ref 22–31)
COLOR SPEC: YELLOW — SIGNIFICANT CHANGE UP
CORTIS SERPL-MCNC: 4.9 UG/DL — SIGNIFICANT CHANGE UP (ref 2.7–18.4)
CREAT SERPL-MCNC: 1.93 MG/DL — HIGH (ref 0.5–1.3)
CREAT SERPL-MCNC: 2.52 MG/DL — HIGH (ref 0.5–1.3)
CREAT SERPL-MCNC: 2.59 MG/DL — HIGH (ref 0.5–1.3)
DAT POLY-SP REAG RBC QL: NEGATIVE — SIGNIFICANT CHANGE UP
ELLIPTOCYTES BLD QL SMEAR: SLIGHT — SIGNIFICANT CHANGE UP
EOSINOPHIL # BLD AUTO: 0.05 K/UL — SIGNIFICANT CHANGE UP (ref 0–0.5)
EOSINOPHIL # BLD AUTO: 0.12 K/UL — SIGNIFICANT CHANGE UP (ref 0–0.5)
EOSINOPHIL NFR BLD AUTO: 0.2 % — SIGNIFICANT CHANGE UP (ref 0–6)
EOSINOPHIL NFR BLD AUTO: 0.6 % — SIGNIFICANT CHANGE UP (ref 0–6)
EOSINOPHIL NFR FLD: 1.8 % — SIGNIFICANT CHANGE UP (ref 0–6)
FLUAV H1 2009 PAND RNA SPEC QL NAA+PROBE: NOT DETECTED — SIGNIFICANT CHANGE UP
FLUAV H1 RNA SPEC QL NAA+PROBE: NOT DETECTED — SIGNIFICANT CHANGE UP
FLUAV H3 RNA SPEC QL NAA+PROBE: DETECTED — HIGH
FLUBV RNA SPEC QL NAA+PROBE: NOT DETECTED — SIGNIFICANT CHANGE UP
GAS PNL BLDV: 135 MMOL/L — LOW (ref 136–146)
GAS PNL BLDV: 138 MMOL/L — SIGNIFICANT CHANGE UP (ref 136–146)
GAS PNL BLDV: 140 MMOL/L — SIGNIFICANT CHANGE UP (ref 136–146)
GIANT PLATELETS BLD QL SMEAR: PRESENT — SIGNIFICANT CHANGE UP
GLUCOSE BLDA-MCNC: 74 MG/DL — SIGNIFICANT CHANGE UP (ref 70–99)
GLUCOSE BLDA-MCNC: 95 MG/DL — SIGNIFICANT CHANGE UP (ref 70–99)
GLUCOSE BLDC GLUCOMTR-MCNC: 97 MG/DL — SIGNIFICANT CHANGE UP (ref 70–99)
GLUCOSE BLDV-MCNC: 179 MG/DL — HIGH (ref 70–99)
GLUCOSE BLDV-MCNC: 315 MG/DL — HIGH (ref 70–99)
GLUCOSE BLDV-MCNC: 84 MG/DL — SIGNIFICANT CHANGE UP (ref 70–99)
GLUCOSE SERPL-MCNC: 123 MG/DL — HIGH (ref 70–99)
GLUCOSE SERPL-MCNC: 303 MG/DL — HIGH (ref 70–99)
GLUCOSE SERPL-MCNC: 95 MG/DL — SIGNIFICANT CHANGE UP (ref 70–99)
GLUCOSE UR-MCNC: NEGATIVE — SIGNIFICANT CHANGE UP
HADV DNA SPEC QL NAA+PROBE: NOT DETECTED — SIGNIFICANT CHANGE UP
HCO3 BLDA-SCNC: 23 MMOL/L — SIGNIFICANT CHANGE UP (ref 22–26)
HCO3 BLDA-SCNC: 24 MMOL/L — SIGNIFICANT CHANGE UP (ref 22–26)
HCO3 BLDV-SCNC: 17 MMOL/L — LOW (ref 20–27)
HCO3 BLDV-SCNC: 21 MMOL/L — SIGNIFICANT CHANGE UP (ref 20–27)
HCO3 BLDV-SCNC: 23 MMOL/L — SIGNIFICANT CHANGE UP (ref 20–27)
HCOV PNL SPEC NAA+PROBE: SIGNIFICANT CHANGE UP
HCT VFR BLD CALC: 48.8 % — SIGNIFICANT CHANGE UP (ref 39–50)
HCT VFR BLD CALC: 51.4 % — HIGH (ref 39–50)
HCT VFR BLDA CALC: 42.9 % — SIGNIFICANT CHANGE UP (ref 39–51)
HCT VFR BLDA CALC: 45.3 % — SIGNIFICANT CHANGE UP (ref 39–51)
HCT VFR BLDV CALC: 43.1 % — SIGNIFICANT CHANGE UP (ref 39–51)
HCT VFR BLDV CALC: 47 % — SIGNIFICANT CHANGE UP (ref 39–51)
HCT VFR BLDV CALC: 47.2 % — SIGNIFICANT CHANGE UP (ref 39–51)
HGB BLD-MCNC: 14.4 G/DL — SIGNIFICANT CHANGE UP (ref 13–17)
HGB BLD-MCNC: 15.1 G/DL — SIGNIFICANT CHANGE UP (ref 13–17)
HGB BLDA-MCNC: 14 G/DL — SIGNIFICANT CHANGE UP (ref 13–17)
HGB BLDA-MCNC: 14.8 G/DL — SIGNIFICANT CHANGE UP (ref 13–17)
HGB BLDV-MCNC: 14 G/DL — SIGNIFICANT CHANGE UP (ref 13–17)
HGB BLDV-MCNC: 15.3 G/DL — SIGNIFICANT CHANGE UP (ref 13–17)
HGB BLDV-MCNC: 15.4 G/DL — SIGNIFICANT CHANGE UP (ref 13–17)
HMPV RNA SPEC QL NAA+PROBE: DETECTED — HIGH
HPIV1 RNA SPEC QL NAA+PROBE: NOT DETECTED — SIGNIFICANT CHANGE UP
HPIV2 RNA SPEC QL NAA+PROBE: NOT DETECTED — SIGNIFICANT CHANGE UP
HPIV3 RNA SPEC QL NAA+PROBE: NOT DETECTED — SIGNIFICANT CHANGE UP
HPIV4 RNA SPEC QL NAA+PROBE: NOT DETECTED — SIGNIFICANT CHANGE UP
HYALINE CASTS # UR AUTO: HIGH
IMM GRANULOCYTES NFR BLD AUTO: 1 % — SIGNIFICANT CHANGE UP (ref 0–1.5)
IMM GRANULOCYTES NFR BLD AUTO: 4.8 % — HIGH (ref 0–1.5)
INR BLD: 0.97 — SIGNIFICANT CHANGE UP (ref 0.88–1.17)
INR BLD: 0.97 — SIGNIFICANT CHANGE UP (ref 0.88–1.17)
KETONES UR-MCNC: NEGATIVE — SIGNIFICANT CHANGE UP
LACTATE BLDA-SCNC: 2 MMOL/L — SIGNIFICANT CHANGE UP (ref 0.5–2)
LACTATE BLDA-SCNC: 2 MMOL/L — SIGNIFICANT CHANGE UP (ref 0.5–2)
LACTATE BLDA-SCNC: 2.3 MMOL/L — HIGH (ref 0.5–2)
LACTATE BLDV-MCNC: 1.6 MMOL/L — SIGNIFICANT CHANGE UP (ref 0.5–2)
LACTATE BLDV-MCNC: 5.7 MMOL/L — CRITICAL HIGH (ref 0.5–2)
LACTATE SERPL-SCNC: 1.3 MMOL/L — SIGNIFICANT CHANGE UP (ref 0.5–2)
LEUKOCYTE ESTERASE UR-ACNC: NEGATIVE — SIGNIFICANT CHANGE UP
LYMPHOCYTES # BLD AUTO: 18.8 % — SIGNIFICANT CHANGE UP (ref 13–44)
LYMPHOCYTES # BLD AUTO: 2.01 K/UL — SIGNIFICANT CHANGE UP (ref 1–3.3)
LYMPHOCYTES # BLD AUTO: 3.54 K/UL — HIGH (ref 1–3.3)
LYMPHOCYTES # BLD AUTO: 9.6 % — LOW (ref 13–44)
LYMPHOCYTES NFR SPEC AUTO: 8.1 % — LOW (ref 13–44)
MAGNESIUM SERPL-MCNC: 2.2 MG/DL — SIGNIFICANT CHANGE UP (ref 1.6–2.6)
MAGNESIUM SERPL-MCNC: 2.4 MG/DL — SIGNIFICANT CHANGE UP (ref 1.6–2.6)
MCHC RBC-ENTMCNC: 26.3 PG — LOW (ref 27–34)
MCHC RBC-ENTMCNC: 26.8 PG — LOW (ref 27–34)
MCHC RBC-ENTMCNC: 29.4 % — LOW (ref 32–36)
MCHC RBC-ENTMCNC: 29.5 % — LOW (ref 32–36)
MCV RBC AUTO: 89.2 FL — SIGNIFICANT CHANGE UP (ref 80–100)
MCV RBC AUTO: 91.1 FL — SIGNIFICANT CHANGE UP (ref 80–100)
METAMYELOCYTES # FLD: 2.7 % — HIGH (ref 0–1)
MONOCYTES # BLD AUTO: 0.32 K/UL — SIGNIFICANT CHANGE UP (ref 0–0.9)
MONOCYTES # BLD AUTO: 1.16 K/UL — HIGH (ref 0–0.9)
MONOCYTES NFR BLD AUTO: 1.5 % — LOW (ref 2–14)
MONOCYTES NFR BLD AUTO: 6.2 % — SIGNIFICANT CHANGE UP (ref 2–14)
MONOCYTES NFR BLD: 3.6 % — SIGNIFICANT CHANGE UP (ref 2–9)
MYELOCYTES NFR BLD: 0 % — SIGNIFICANT CHANGE UP (ref 0–0)
NEUTROPHIL AB SER-ACNC: 72.1 % — SIGNIFICANT CHANGE UP (ref 43–77)
NEUTROPHILS # BLD AUTO: 12.95 K/UL — HIGH (ref 1.8–7.4)
NEUTROPHILS # BLD AUTO: 18.39 K/UL — HIGH (ref 1.8–7.4)
NEUTROPHILS NFR BLD AUTO: 68.7 % — SIGNIFICANT CHANGE UP (ref 43–77)
NEUTROPHILS NFR BLD AUTO: 87.5 % — HIGH (ref 43–77)
NEUTS BAND # BLD: 0.9 % — SIGNIFICANT CHANGE UP (ref 0–6)
NITRITE UR-MCNC: NEGATIVE — SIGNIFICANT CHANGE UP
NRBC # BLD: 1 /100WBC — SIGNIFICANT CHANGE UP
NRBC # FLD: 0 K/UL — SIGNIFICANT CHANGE UP (ref 0–0)
NRBC # FLD: 0.02 K/UL — SIGNIFICANT CHANGE UP (ref 0–0)
NT-PROBNP SERPL-SCNC: 6710 PG/ML — SIGNIFICANT CHANGE UP
OTHER - HEMATOLOGY %: 0 — SIGNIFICANT CHANGE UP
OVALOCYTES BLD QL SMEAR: SLIGHT — SIGNIFICANT CHANGE UP
PCO2 BLDA: 54 MMHG — HIGH (ref 35–48)
PCO2 BLDA: 72 MMHG — CRITICAL HIGH (ref 35–48)
PCO2 BLDV: 75 MMHG — HIGH (ref 41–51)
PCO2 BLDV: 87 MMHG — CRITICAL HIGH (ref 41–51)
PCO2 BLDV: 91 MMHG — CRITICAL HIGH (ref 41–51)
PH BLDA: 7.22 PH — LOW (ref 7.35–7.45)
PH BLDA: 7.31 PH — LOW (ref 7.35–7.45)
PH BLDV: 7.05 PH — CRITICAL LOW (ref 7.32–7.43)
PH BLDV: 7.13 PH — CRITICAL LOW (ref 7.32–7.43)
PH BLDV: 7.21 PH — LOW (ref 7.32–7.43)
PH UR: 6 — SIGNIFICANT CHANGE UP (ref 5–8)
PHOSPHATE SERPL-MCNC: 5.2 MG/DL — HIGH (ref 2.5–4.5)
PHOSPHATE SERPL-MCNC: 5.2 MG/DL — HIGH (ref 2.5–4.5)
PLATELET # BLD AUTO: 239 K/UL — SIGNIFICANT CHANGE UP (ref 150–400)
PLATELET # BLD AUTO: 269 K/UL — SIGNIFICANT CHANGE UP (ref 150–400)
PLATELET COUNT - ESTIMATE: NORMAL — SIGNIFICANT CHANGE UP
PMV BLD: 10.8 FL — SIGNIFICANT CHANGE UP (ref 7–13)
PMV BLD: 11.4 FL — SIGNIFICANT CHANGE UP (ref 7–13)
PO2 BLDA: 106 MMHG — SIGNIFICANT CHANGE UP (ref 83–108)
PO2 BLDA: 119 MMHG — HIGH (ref 83–108)
PO2 BLDV: 111 MMHG — HIGH (ref 35–40)
PO2 BLDV: 56 MMHG — HIGH (ref 35–40)
PO2 BLDV: 61 MMHG — HIGH (ref 35–40)
POIKILOCYTOSIS BLD QL AUTO: SIGNIFICANT CHANGE UP
POTASSIUM BLDA-SCNC: 3.4 MMOL/L — SIGNIFICANT CHANGE UP (ref 3.4–4.5)
POTASSIUM BLDA-SCNC: 3.7 MMOL/L — SIGNIFICANT CHANGE UP (ref 3.4–4.5)
POTASSIUM BLDV-SCNC: 3.1 MMOL/L — LOW (ref 3.4–4.5)
POTASSIUM BLDV-SCNC: 4.2 MMOL/L — SIGNIFICANT CHANGE UP (ref 3.4–4.5)
POTASSIUM BLDV-SCNC: 6.2 MMOL/L — CRITICAL HIGH (ref 3.4–4.5)
POTASSIUM SERPL-MCNC: 3.6 MMOL/L — SIGNIFICANT CHANGE UP (ref 3.5–5.3)
POTASSIUM SERPL-MCNC: 4.7 MMOL/L — SIGNIFICANT CHANGE UP (ref 3.5–5.3)
POTASSIUM SERPL-MCNC: 5.3 MMOL/L — SIGNIFICANT CHANGE UP (ref 3.5–5.3)
POTASSIUM SERPL-SCNC: 3.6 MMOL/L — SIGNIFICANT CHANGE UP (ref 3.5–5.3)
POTASSIUM SERPL-SCNC: 4.7 MMOL/L — SIGNIFICANT CHANGE UP (ref 3.5–5.3)
POTASSIUM SERPL-SCNC: 5.3 MMOL/L — SIGNIFICANT CHANGE UP (ref 3.5–5.3)
PROMYELOCYTES # FLD: 0 % — SIGNIFICANT CHANGE UP (ref 0–0)
PROT SERPL-MCNC: 6.3 G/DL — SIGNIFICANT CHANGE UP (ref 6–8.3)
PROT SERPL-MCNC: 6.6 G/DL — SIGNIFICANT CHANGE UP (ref 6–8.3)
PROT UR-MCNC: 70 — SIGNIFICANT CHANGE UP
PROTHROM AB SERPL-ACNC: 10.8 SEC — SIGNIFICANT CHANGE UP (ref 9.8–13.1)
PROTHROM AB SERPL-ACNC: 11 SEC — SIGNIFICANT CHANGE UP (ref 9.8–13.1)
RBC # BLD: 5.47 M/UL — SIGNIFICANT CHANGE UP (ref 4.2–5.8)
RBC # BLD: 5.64 M/UL — SIGNIFICANT CHANGE UP (ref 4.2–5.8)
RBC # FLD: 17.9 % — HIGH (ref 10.3–14.5)
RBC # FLD: 18.1 % — HIGH (ref 10.3–14.5)
RBC CASTS # UR COMP ASSIST: HIGH (ref 0–?)
REVIEW TO FOLLOW: YES — SIGNIFICANT CHANGE UP
RH IG SCN BLD-IMP: POSITIVE — SIGNIFICANT CHANGE UP
RSV RNA SPEC QL NAA+PROBE: DETECTED — HIGH
RV+EV RNA SPEC QL NAA+PROBE: NOT DETECTED — SIGNIFICANT CHANGE UP
SAO2 % BLDA: 96.8 % — SIGNIFICANT CHANGE UP (ref 95–99)
SAO2 % BLDA: 98 % — SIGNIFICANT CHANGE UP (ref 95–99)
SAO2 % BLDV: 80.8 % — SIGNIFICANT CHANGE UP (ref 60–85)
SAO2 % BLDV: 81.3 % — SIGNIFICANT CHANGE UP (ref 60–85)
SAO2 % BLDV: 94.3 % — HIGH (ref 60–85)
SCHISTOCYTES BLD QL AUTO: SLIGHT — SIGNIFICANT CHANGE UP
SMUDGE CELLS # BLD: PRESENT — SIGNIFICANT CHANGE UP
SODIUM BLDA-SCNC: 138 MMOL/L — SIGNIFICANT CHANGE UP (ref 136–146)
SODIUM BLDA-SCNC: 143 MMOL/L — SIGNIFICANT CHANGE UP (ref 136–146)
SODIUM SERPL-SCNC: 138 MMOL/L — SIGNIFICANT CHANGE UP (ref 135–145)
SODIUM SERPL-SCNC: 141 MMOL/L — SIGNIFICANT CHANGE UP (ref 135–145)
SODIUM SERPL-SCNC: 141 MMOL/L — SIGNIFICANT CHANGE UP (ref 135–145)
SP GR SPEC: 1.01 — SIGNIFICANT CHANGE UP (ref 1–1.04)
SQUAMOUS # UR AUTO: SIGNIFICANT CHANGE UP
TROPONIN T, HIGH SENSITIVITY: 306 NG/L — CRITICAL HIGH (ref ?–14)
TROPONIN T, HIGH SENSITIVITY: 61 NG/L — CRITICAL HIGH (ref ?–14)
UROBILINOGEN FLD QL: NORMAL — SIGNIFICANT CHANGE UP
VARIANT LYMPHS # BLD: 10.8 % — SIGNIFICANT CHANGE UP
WBC # BLD: 18.85 K/UL — HIGH (ref 3.8–10.5)
WBC # BLD: 21.02 K/UL — HIGH (ref 3.8–10.5)
WBC # FLD AUTO: 18.85 K/UL — HIGH (ref 3.8–10.5)
WBC # FLD AUTO: 21.02 K/UL — HIGH (ref 3.8–10.5)
WBC UR QL: SIGNIFICANT CHANGE UP (ref 0–?)

## 2019-12-27 PROCEDURE — 93306 TTE W/DOPPLER COMPLETE: CPT | Mod: 26

## 2019-12-27 PROCEDURE — 71045 X-RAY EXAM CHEST 1 VIEW: CPT | Mod: 26

## 2019-12-27 PROCEDURE — 86077 PHYS BLOOD BANK SERV XMATCH: CPT

## 2019-12-27 PROCEDURE — 71045 X-RAY EXAM CHEST 1 VIEW: CPT | Mod: 26,77

## 2019-12-27 RX ORDER — ACETAMINOPHEN 500 MG
650 TABLET ORAL ONCE
Refills: 0 | Status: COMPLETED | OUTPATIENT
Start: 2019-12-27 | End: 2019-12-27

## 2019-12-27 RX ORDER — FENTANYL CITRATE 50 UG/ML
50 INJECTION INTRAVENOUS ONCE
Refills: 0 | Status: DISCONTINUED | OUTPATIENT
Start: 2019-12-27 | End: 2019-12-27

## 2019-12-27 RX ORDER — PHENYLEPHRINE HYDROCHLORIDE 10 MG/ML
0.1 INJECTION INTRAVENOUS
Qty: 40 | Refills: 0 | Status: DISCONTINUED | OUTPATIENT
Start: 2019-12-27 | End: 2019-12-30

## 2019-12-27 RX ORDER — FUROSEMIDE 40 MG
80 TABLET ORAL ONCE
Refills: 0 | Status: COMPLETED | OUTPATIENT
Start: 2019-12-27 | End: 2019-12-27

## 2019-12-27 RX ORDER — DEXTROSE 50 % IN WATER 50 %
15 SYRINGE (ML) INTRAVENOUS ONCE
Refills: 0 | Status: DISCONTINUED | OUTPATIENT
Start: 2019-12-27 | End: 2019-12-31

## 2019-12-27 RX ORDER — HYDROCORTISONE 20 MG
100 TABLET ORAL EVERY 8 HOURS
Refills: 0 | Status: DISCONTINUED | OUTPATIENT
Start: 2019-12-27 | End: 2020-01-03

## 2019-12-27 RX ORDER — TICAGRELOR 90 MG/1
90 TABLET ORAL EVERY 12 HOURS
Refills: 0 | Status: DISCONTINUED | OUTPATIENT
Start: 2019-12-27 | End: 2020-01-10

## 2019-12-27 RX ORDER — PIPERACILLIN AND TAZOBACTAM 4; .5 G/20ML; G/20ML
3.38 INJECTION, POWDER, LYOPHILIZED, FOR SOLUTION INTRAVENOUS ONCE
Refills: 0 | Status: DISCONTINUED | OUTPATIENT
Start: 2019-12-27 | End: 2019-12-27

## 2019-12-27 RX ORDER — SODIUM BICARBONATE 1 MEQ/ML
50 SYRINGE (ML) INTRAVENOUS ONCE
Refills: 0 | Status: COMPLETED | OUTPATIENT
Start: 2019-12-27 | End: 2019-12-27

## 2019-12-27 RX ORDER — SODIUM CHLORIDE 9 MG/ML
1000 INJECTION, SOLUTION INTRAVENOUS
Refills: 0 | Status: DISCONTINUED | OUTPATIENT
Start: 2019-12-27 | End: 2019-12-31

## 2019-12-27 RX ORDER — NOREPINEPHRINE BITARTRATE/D5W 8 MG/250ML
0.05 PLASTIC BAG, INJECTION (ML) INTRAVENOUS
Qty: 16 | Refills: 0 | Status: DISCONTINUED | OUTPATIENT
Start: 2019-12-27 | End: 2019-12-27

## 2019-12-27 RX ORDER — HEPARIN SODIUM 5000 [USP'U]/ML
10 INJECTION INTRAVENOUS; SUBCUTANEOUS
Qty: 25000 | Refills: 0 | Status: DISCONTINUED | OUTPATIENT
Start: 2019-12-27 | End: 2019-12-30

## 2019-12-27 RX ORDER — PIPERACILLIN AND TAZOBACTAM 4; .5 G/20ML; G/20ML
3.38 INJECTION, POWDER, LYOPHILIZED, FOR SOLUTION INTRAVENOUS EVERY 8 HOURS
Refills: 0 | Status: DISCONTINUED | OUTPATIENT
Start: 2019-12-27 | End: 2020-01-01

## 2019-12-27 RX ORDER — MIDAZOLAM HYDROCHLORIDE 1 MG/ML
0.02 INJECTION, SOLUTION INTRAMUSCULAR; INTRAVENOUS
Qty: 100 | Refills: 0 | Status: DISCONTINUED | OUTPATIENT
Start: 2019-12-27 | End: 2019-12-30

## 2019-12-27 RX ORDER — ASPIRIN/CALCIUM CARB/MAGNESIUM 324 MG
81 TABLET ORAL DAILY
Refills: 0 | Status: DISCONTINUED | OUTPATIENT
Start: 2019-12-27 | End: 2020-01-01

## 2019-12-27 RX ORDER — DEXTROSE 50 % IN WATER 50 %
25 SYRINGE (ML) INTRAVENOUS ONCE
Refills: 0 | Status: DISCONTINUED | OUTPATIENT
Start: 2019-12-27 | End: 2019-12-31

## 2019-12-27 RX ORDER — FENTANYL CITRATE 50 UG/ML
0.5 INJECTION INTRAVENOUS
Qty: 2500 | Refills: 0 | Status: DISCONTINUED | OUTPATIENT
Start: 2019-12-27 | End: 2019-12-30

## 2019-12-27 RX ORDER — ATORVASTATIN CALCIUM 80 MG/1
80 TABLET, FILM COATED ORAL AT BEDTIME
Refills: 0 | Status: DISCONTINUED | OUTPATIENT
Start: 2019-12-27 | End: 2020-01-10

## 2019-12-27 RX ORDER — FUROSEMIDE 40 MG
80 TABLET ORAL EVERY 12 HOURS
Refills: 0 | Status: DISCONTINUED | OUTPATIENT
Start: 2019-12-27 | End: 2019-12-29

## 2019-12-27 RX ORDER — INSULIN LISPRO 100/ML
VIAL (ML) SUBCUTANEOUS EVERY 6 HOURS
Refills: 0 | Status: DISCONTINUED | OUTPATIENT
Start: 2019-12-27 | End: 2019-12-31

## 2019-12-27 RX ORDER — PIPERACILLIN AND TAZOBACTAM 4; .5 G/20ML; G/20ML
3.38 INJECTION, POWDER, LYOPHILIZED, FOR SOLUTION INTRAVENOUS ONCE
Refills: 0 | Status: COMPLETED | OUTPATIENT
Start: 2019-12-27 | End: 2019-12-27

## 2019-12-27 RX ORDER — ISOSORBIDE MONONITRATE 60 MG/1
1 TABLET, EXTENDED RELEASE ORAL
Qty: 0 | Refills: 0 | DISCHARGE

## 2019-12-27 RX ORDER — CHLORHEXIDINE GLUCONATE 213 G/1000ML
15 SOLUTION TOPICAL EVERY 12 HOURS
Refills: 0 | Status: DISCONTINUED | OUTPATIENT
Start: 2019-12-27 | End: 2020-01-04

## 2019-12-27 RX ORDER — PIPERACILLIN AND TAZOBACTAM 4; .5 G/20ML; G/20ML
3.38 INJECTION, POWDER, LYOPHILIZED, FOR SOLUTION INTRAVENOUS EVERY 8 HOURS
Refills: 0 | Status: DISCONTINUED | OUTPATIENT
Start: 2019-12-27 | End: 2019-12-27

## 2019-12-27 RX ORDER — CHLORHEXIDINE GLUCONATE 213 G/1000ML
1 SOLUTION TOPICAL DAILY
Refills: 0 | Status: DISCONTINUED | OUTPATIENT
Start: 2019-12-27 | End: 2020-01-10

## 2019-12-27 RX ORDER — VANCOMYCIN HCL 1 G
1000 VIAL (EA) INTRAVENOUS ONCE
Refills: 0 | Status: COMPLETED | OUTPATIENT
Start: 2019-12-27 | End: 2019-12-27

## 2019-12-27 RX ORDER — LISINOPRIL 2.5 MG/1
1 TABLET ORAL
Qty: 0 | Refills: 0 | DISCHARGE

## 2019-12-27 RX ORDER — DEXTROSE 50 % IN WATER 50 %
12.5 SYRINGE (ML) INTRAVENOUS ONCE
Refills: 0 | Status: DISCONTINUED | OUTPATIENT
Start: 2019-12-27 | End: 2019-12-31

## 2019-12-27 RX ORDER — CALCIUM GLUCONATE 100 MG/ML
1 VIAL (ML) INTRAVENOUS ONCE
Refills: 0 | Status: COMPLETED | OUTPATIENT
Start: 2019-12-27 | End: 2019-12-27

## 2019-12-27 RX ORDER — PROPOFOL 10 MG/ML
10 INJECTION, EMULSION INTRAVENOUS
Qty: 1000 | Refills: 0 | Status: DISCONTINUED | OUTPATIENT
Start: 2019-12-27 | End: 2019-12-27

## 2019-12-27 RX ORDER — SERTRALINE 25 MG/1
50 TABLET, FILM COATED ORAL DAILY
Refills: 0 | Status: DISCONTINUED | OUTPATIENT
Start: 2019-12-27 | End: 2020-01-01

## 2019-12-27 RX ORDER — ETOMIDATE 2 MG/ML
20 INJECTION INTRAVENOUS ONCE
Refills: 0 | Status: COMPLETED | OUTPATIENT
Start: 2019-12-27 | End: 2019-12-27

## 2019-12-27 RX ORDER — PANTOPRAZOLE SODIUM 20 MG/1
40 TABLET, DELAYED RELEASE ORAL DAILY
Refills: 0 | Status: DISCONTINUED | OUTPATIENT
Start: 2019-12-27 | End: 2020-01-01

## 2019-12-27 RX ORDER — FUROSEMIDE 40 MG
80 TABLET ORAL ONCE
Refills: 0 | Status: DISCONTINUED | OUTPATIENT
Start: 2019-12-27 | End: 2019-12-27

## 2019-12-27 RX ORDER — ROCURONIUM BROMIDE 10 MG/ML
100 VIAL (ML) INTRAVENOUS ONCE
Refills: 0 | Status: COMPLETED | OUTPATIENT
Start: 2019-12-27 | End: 2019-12-27

## 2019-12-27 RX ORDER — GLUCAGON INJECTION, SOLUTION 0.5 MG/.1ML
1 INJECTION, SOLUTION SUBCUTANEOUS ONCE
Refills: 0 | Status: DISCONTINUED | OUTPATIENT
Start: 2019-12-27 | End: 2019-12-31

## 2019-12-27 RX ORDER — TICAGRELOR 90 MG/1
60 TABLET ORAL EVERY 12 HOURS
Refills: 0 | Status: DISCONTINUED | OUTPATIENT
Start: 2019-12-27 | End: 2019-12-27

## 2019-12-27 RX ADMIN — Medication 80 MILLIGRAM(S): at 14:50

## 2019-12-27 RX ADMIN — PANTOPRAZOLE SODIUM 40 MILLIGRAM(S): 20 TABLET, DELAYED RELEASE ORAL at 18:29

## 2019-12-27 RX ADMIN — Medication 80 MILLIGRAM(S): at 22:19

## 2019-12-27 RX ADMIN — FENTANYL CITRATE 6 MICROGRAM(S)/KG/HR: 50 INJECTION INTRAVENOUS at 22:19

## 2019-12-27 RX ADMIN — HEPARIN SODIUM 0.1 UNIT(S)/HR: 5000 INJECTION INTRAVENOUS; SUBCUTANEOUS at 14:51

## 2019-12-27 RX ADMIN — FENTANYL CITRATE 50 MICROGRAM(S): 50 INJECTION INTRAVENOUS at 10:27

## 2019-12-27 RX ADMIN — Medication 100 GRAM(S): at 08:44

## 2019-12-27 RX ADMIN — TICAGRELOR 90 MILLIGRAM(S): 90 TABLET ORAL at 18:29

## 2019-12-27 RX ADMIN — FENTANYL CITRATE 50 MICROGRAM(S): 50 INJECTION INTRAVENOUS at 11:13

## 2019-12-27 RX ADMIN — PROPOFOL 7.2 MICROGRAM(S)/KG/MIN: 10 INJECTION, EMULSION INTRAVENOUS at 07:57

## 2019-12-27 RX ADMIN — Medication 80 MILLIGRAM(S): at 07:26

## 2019-12-27 RX ADMIN — PIPERACILLIN AND TAZOBACTAM 200 GRAM(S): 4; .5 INJECTION, POWDER, LYOPHILIZED, FOR SOLUTION INTRAVENOUS at 11:09

## 2019-12-27 RX ADMIN — MIDAZOLAM HYDROCHLORIDE 2.4 MG/KG/HR: 1 INJECTION, SOLUTION INTRAMUSCULAR; INTRAVENOUS at 14:36

## 2019-12-27 RX ADMIN — Medication 100 MILLIGRAM(S): at 14:35

## 2019-12-27 RX ADMIN — Medication 30 MILLIGRAM(S): at 19:59

## 2019-12-27 RX ADMIN — Medication 250 MILLIGRAM(S): at 14:50

## 2019-12-27 RX ADMIN — Medication 100 MILLIGRAM(S): at 06:45

## 2019-12-27 RX ADMIN — PHENYLEPHRINE HYDROCHLORIDE 4.5 MICROGRAM(S)/KG/MIN: 10 INJECTION INTRAVENOUS at 22:00

## 2019-12-27 RX ADMIN — ETOMIDATE 20 MILLIGRAM(S): 2 INJECTION INTRAVENOUS at 06:42

## 2019-12-27 RX ADMIN — Medication 650 MILLIGRAM(S): at 10:00

## 2019-12-27 RX ADMIN — PIPERACILLIN AND TAZOBACTAM 25 GRAM(S): 4; .5 INJECTION, POWDER, LYOPHILIZED, FOR SOLUTION INTRAVENOUS at 18:08

## 2019-12-27 RX ADMIN — FENTANYL CITRATE 50 MICROGRAM(S): 50 INJECTION INTRAVENOUS at 10:10

## 2019-12-27 RX ADMIN — Medication 100 MILLIGRAM(S): at 22:19

## 2019-12-27 RX ADMIN — PHENYLEPHRINE HYDROCHLORIDE 4.5 MICROGRAM(S)/KG/MIN: 10 INJECTION INTRAVENOUS at 18:46

## 2019-12-27 RX ADMIN — Medication 50 MILLIEQUIVALENT(S): at 12:00

## 2019-12-27 RX ADMIN — Medication 650 MILLIGRAM(S): at 09:30

## 2019-12-27 RX ADMIN — FENTANYL CITRATE 6 MICROGRAM(S)/KG/HR: 50 INJECTION INTRAVENOUS at 14:36

## 2019-12-27 RX ADMIN — ATORVASTATIN CALCIUM 80 MILLIGRAM(S): 80 TABLET, FILM COATED ORAL at 22:18

## 2019-12-27 RX ADMIN — CHLORHEXIDINE GLUCONATE 15 MILLILITER(S): 213 SOLUTION TOPICAL at 18:08

## 2019-12-27 NOTE — ED PROCEDURE NOTE - CPROC ED INDICATIONS1
critical patient/mental status change/respiratory failure airway protection/critical patient/mental status change/respiratory failure

## 2019-12-27 NOTE — DIETITIAN INITIAL EVALUATION ADULT. - ENERGY NEEDS
IBW: 66.6 kg  Estimated Nutrition Need (11 - 14 kcals/kg ABW): 1320 - 1680 kcals/day  Estimated Protein Need (2.0 gms/kg IBW): 133 gms/day

## 2019-12-27 NOTE — ED PROVIDER NOTE - NS_BEDUNITTYPES_ED_ALL_ED
Received phone call from patient's wife regarding inability to have Aspira catheter placed. Wife reports the pigtail is causing pain and she's had to change the dressing twice. Rescheduled procedure for 3/27. Patient needs to arrive to [facility] Admitting at 10 am. Wife instructed to drain patient until Saturday, then clamp tubing. Patient is scheduled for a Hospice initial visit tomorrow. Phone call placed to Hospice to discuss rescheduling of procedure and admission into Hospice. Plan to keep appointment as scheduled tomorrow for patient/family to receive information. Will then determine if patient is able to be admitted into Hospice at hat time or will need to delay admission until Tuesday.
CCU

## 2019-12-27 NOTE — DIETITIAN INITIAL EVALUATION ADULT. - PERTINENT LABORATORY DATA
12-27 Na 141 mmol/L Glu 95 mg/dL K+ 3.6 mmol/L Cr 2.52 mg/dL<H> BUN 42 mg/dL<H> Phos 5.2 mg/dL<H>  12-27 @ 06:34 POCT 78 mg/dL

## 2019-12-27 NOTE — DIETITIAN INITIAL EVALUATION ADULT. - ADD RECOMMEND
1). Monitor tolerance to enteral feeding, once initiated. 2). Monitor weights, labs, BM's, skin integrity and edema. 3). Follow pt as per protocol.

## 2019-12-27 NOTE — CONSULT NOTE ADULT - ASSESSMENT
64M with CAD, stents and CABG (last [facility] was 12/2017 with patent LIMA but other grafts occluded, medical management), HTN, HLD, CKD, CHER, ?HF, and pemphygus vulgaris presents with acute SOB. admitted to ccu for acute resp failure s/p intubation    CKD (chronic kidney disease) stage 3-4  baseline ~ 1.8-2.2 fluctuates likely sec to chf  CKD likely sec to HTN/HF  currently at baseline  check UA, urine p/c ratio  monitor for now    Essential hypertension.    controlled.   care per CCU    acute resp failure  s/p intubated  agree with IV lasix  monitor i/o, weight  f/u cardio    CKD-MBD  check pth, phos level  monitor phos and calcemia daily 64M with CAD, stents and CABG (last [facility] was 12/2017 with patent LIMA but other grafts occluded, medical management), HTN, HLD, CKD, CHER, ?HF, and pemphygus vulgaris presents with acute SOB. admitted to ccu for acute resp failure s/p intubation    CKD (chronic kidney disease) stage 3-4  baseline ~ 1.8-2.2 fluctuates likely sec to chf  CKD likely sec to HTN/HF  currently at baseline  check UA, urine p/c ratio  monitor for now    Essential hypertension.    controlled.   care per CCU    Acute resp failure/ Hypervolemia  s/p intubated  agree with IV lasix  monitor i/o, weight  f/u cardio    CKD-MBD  check pth, phos level  monitor phos and calcemia daily

## 2019-12-27 NOTE — ED ADULT NURSE NOTE - PSH
History of coronary artery stent placement  1 stent 2008, 2 stents 2009 in [facility]  S/P quadruple vessel bypass  [facility] [place]

## 2019-12-27 NOTE — H&P ADULT - PROBLEM SELECTOR PLAN 7
As per family, patient reportedly received new treatment last week, rituxan? Will need to clarify. Also per med rec, is on prednisone 5mg qd  -Add on cortisol  -Cont to monitor

## 2019-12-27 NOTE — CONSULT NOTE ADULT - SUBJECTIVE AND OBJECTIVE BOX
[facility]   MD YENNY PIERCE DO MARYANNE SOURIAL, JEEVAN MARTINS    TEL:  OFFICE: [phone]  DR BARNHART CELL: [phone]  DR. STEVENSON CELL: [phone]  DR. ACEVEDO CELL: [phone]  YOANNA CUNNINGHAM CELL: [phone]    HPI:  Obtained via family and chart.   64M with CAD, stents and CABG (last [facility] was 2017 with patent LIMA but other grafts occluded, medical management), HTN, HLD, CKD, CHER, ?HF, and pemphygus vulgaris presents with acute SOB. Family reports patient was last seen well  at 5AM today. He was driving his son in law to work when he needed to pull over due to ? chest burning which was then relieved with water however he developed acute SOB and was brought to ER. In ER he was noted to be acutely SOB with diffuse crackles and minimally responsive, intubated for airway protection. Intubation was reported as traumatic and afterward he briefly lost pulses, received CPR with quick ROSC. Lasix 80 mg IV given X 1 and propofol started for sedation. Family states no recent sick contacts, cough, cold, or flulike symptoms, denies chest pain or SOB. EKG in ER notable for atrial fibrillation with VR 120s but no ischemic changes noted. Per his cardiologist Dr. Mckeon this is new a fib for him. Labs significant for elevated WBC and lactate, with rectal temp 100. K 6.2. Admitted to CCU for multifactorial acute resp failure.    patient follow up with dr. barnhart for ckd management however have not been following up since 2017      Allergies:  No Known Allergies      PAST MEDICAL & SURGICAL HISTORY:  CHF (congestive heart failure)  Psoriasis  HLD (hyperlipidemia)  CAD (coronary artery disease): 3 stents  Hypertension  History of coronary artery stent placement: 1 stent , 2 stents  in [facility]  S/P quadruple vessel bypass:       Home Medications Reviewed    Hospital Medications:   MEDICATIONS  (STANDING):  aspirin enteric coated 81 milliGRAM(s) Oral daily  atorvastatin 80 milliGRAM(s) Oral at bedtime  chlorhexidine 0.12% Liquid 15 milliLiter(s) Oral Mucosa every 12 hours  chlorhexidine 4% Liquid 1 Application(s) Topical daily  dextrose 5%. 1000 milliLiter(s) (50 mL/Hr) IV Continuous <Continuous>  dextrose 50% Injectable 12.5 Gram(s) IV Push once  dextrose 50% Injectable 25 Gram(s) IV Push once  dextrose 50% Injectable 25 Gram(s) IV Push once  fentaNYL   Infusion. 0.5 MICROgram(s)/kG/Hr (6 mL/Hr) IV Continuous <Continuous>  furosemide   Injectable 80 milliGRAM(s) IV Push every 12 hours  furosemide   Injectable 80 milliGRAM(s) IV Push once  heparin  Infusion 10 Unit(s)/Hr (0.1 mL/Hr) IV Continuous <Continuous>  hydrocortisone sodium succinate Injectable 100 milliGRAM(s) IV Push every 8 hours  insulin lispro (HumaLOG) corrective regimen sliding scale   SubCutaneous every 6 hours  midazolam Infusion 0.02 mG/kG/Hr (2.4 mL/Hr) IV Continuous <Continuous>  pantoprazole  Injectable 40 milliGRAM(s) IV Push daily  phenylephrine    Infusion 0.1 MICROgram(s)/kG/Min (4.5 mL/Hr) IV Continuous <Continuous>  piperacillin/tazobactam IVPB.. 3.375 Gram(s) IV Intermittent every 8 hours  sertraline 50 milliGRAM(s) Oral daily  ticagrelor 90 milliGRAM(s) Oral every 12 hours  vancomycin  IVPB 1000 milliGRAM(s) IV Intermittent once      SOCIAL HISTORY:  Denies ETOh, Smoking,     FAMILY HISTORY:  Family history of cerebrovascular accident (CVA)  Family history of early CAD: father who  of CVA.      REVIEW OF SYSTEMS:  patient is intubated and sedated, unable to obtain    VITALS:  T(F): 100 (19 @ 07:26), Max: 100 (19 @ 07:26)  HR: 78 (19 @ 13:45)  BP: 103/72 (19 @ 13:10)  RR: 17 (19 @ 13:45)  SpO2: 92% (19 @ 13:45)  Wt(kg): --     @ 07:01  -   @ 14:29  --------------------------------------------------------  IN: 100 mL / OUT: 325 mL / NET: -225 mL        Weight (kg): 120 ( @ 07:48)    PHYSICAL EXAM:  Constitutional: intubated  HEENT: anicteric sclera, oropharynx clear, MMM  Neck: No JVD  Respiratory: diffused rhonchi/wheeze  Cardiovascular: S1, S2, RRR  Gastrointestinal: BS+, soft, ND  Extremities: No cyanosis or clubbing. No peripheral edema  Neurological: A/O x 0  : No CVA tenderness. No parks.   Skin: No rashes    LABS:      138  |  100  |  33<H>  ----------------------------<  303<H>  5.3   |  18<L>  |  1.93<H>    Ca    8.7      27 Dec 2019 07:00    TPro  6.6  /  Alb  3.6  /  TBili  0.6  /  DBili      /  AST  147<H>  /  ALT  141<H>  /  AlkPhos  219<H>      Creatinine Trend: 1.93 <--                        14.4   21.02 )-----------( 269      ( 27 Dec 2019 13:00 )             48.8     Urine Studies:        RADIOLOGY & ADDITIONAL STUDIES:

## 2019-12-27 NOTE — CONSULT NOTE ADULT - SUBJECTIVE AND OBJECTIVE BOX
Date of Admission: 2019    CHIEF COMPLAINT: acute SOB    HISTORY OF PRESENT ILLNESS: Obtained via family and previous chart.   64M with CAD, stents and CABG (last [facility] was 2017 with patent LIMA but other grafts occluded, medical management), HTN, HLD, CKD, CHER, ?HF, and pemphygus vulgaris presents with acute SOB. Family reports patient was last seen well  at 5AM today. He was driving his son in law to work when he needed to pull over due to ? chest burning which was then relieved with water however he developed acute SOB and was brought to ER. In ER he was noted to be acutely SOB with diffuse crackles and minimally responsive, intubated for airway protection. Intubation was reported as traumatic and afterward he briefly lost pulses, received CPR with quick ROSC. Lasix 80 mg IV given X 1 and propofol started for sedation. Family states no recent sick contacts, cough, cold, or flulike symptoms, denies chest pain or SOB. EKG in ER notable for atrial fibrillation with VR 120s but no ischemic changes noted. Labs significant for elevated WBC and lactate, with rectal temp 100. K 6.2.      Allergies    No Known Allergies    Intolerances    	    MEDICATIONS:    piperacillin/tazobactam IVPB. 3.375 Gram(s) IV Intermittent Once  vancomycin  IVPB 1000 milliGRAM(s) IV Intermittent once      propofol Infusion 10 MICROgram(s)/kG/Min IV Continuous <Continuous>        calcium gluconate IVPB 1 Gram(s) IV Intermittent Once  chlorhexidine 0.12% Liquid 15 milliLiter(s) Oral Mucosa every 12 hours      PAST MEDICAL & SURGICAL HISTORY:  CHF (congestive heart failure)  Psoriasis  HLD (hyperlipidemia)  CAD (coronary artery disease): 3 stents  Hypertension  History of coronary artery stent placement: 1 stent , 2 stents  in [facility]  S/P quadruple vessel bypass:       FAMILY HISTORY:  Family history of cerebrovascular accident (CVA)  Family history of early CAD (Father): father who  of CVA.      SOCIAL HISTORY:    Smoker  unknown  Alcohol unknown  Drugs unknown      REVIEW OF SYSTEMS:  See HPI. Otherwise, 10 point ROS done and otherwise negative.    PHYSICAL EXAM:  T(C): 37.8 (19 @ 07:26), Max: 37.8 (19 @ 07:26)  HR: 118 (19 @ 08:19) (57 - 135)  BP: 168/102 (19 @ 08:19) (119/91 - 178/115)  RR: 16 (19 @ 08:19) (16 - 30)  SpO2: 96% (19 @ 08:19) (93% - 99%)  Wt(kg): --  I&O's Summary    27 Dec 2019 07:01  -  27 Dec 2019 08:32  --------------------------------------------------------  IN: 0 mL / OUT: 75 mL / NET: -75 mL        Appearance: intubated and sedated	  HEENT:   Normal oral mucosa, PERRL, EOMI	  Cardiovascular: nl S1S2, no M/R/C  Respiratory: diffuse crackles  Gastrointestinal:  Soft, Non-tender, + BS	  Skin: No rashes, No ecchymoses, No cyanosis	  Neurologic: sedated  Extremities: Normal range of motion, No clubbing, cyanosis or edema          LABS:	 	                        15.1   18.85 )-----------( 239      ( 27 Dec 2019 07:00 )             51.4       TELEMETRY: 	atrial fibrillation with       EC. 2017: NSR with RBBB/LAD           2. 2019: atrial fibrillation/flutter  	  PREVIOUS DIAGNOSTIC TESTING:    [ ] Echocardiogram:    Patient name: THONY ANAYA  YOB: 1955   Age: 62 (M)   MR#: [id number]  Study Date: 2017  Location: [place] [address]  DSonographer: Jesus Ball RDCS  Study quality: Technically difficult  Referring Physician: Adali Liz MD  Blood Pressure: 143/98 mmHg  Height: 170 cm  Weight: 102 kg  BSA: 2.1 m2  Heart Rate: 94 mmHg  ------------------------------------------------------------------------  PROCEDURE: Transthoracic echocardiogram with 2-D, M-Mode  and complete spectral and color flow Doppler.  INDICATION: Abnormal electrocardiogram (ECG) (EKG) (R94.31)  ------------------------------------------------------------------------  Dimensions:    Normal Values:  LA:     5.4    2.0 - 4.0 cm  Ao:     3.0    2.0 - 3.8 cm  SEPTUM:        0.6 - 1.2 cm  PWT:           0.6 - 1.1 cm  LVIDd:         3.0 - 5.6 cm  LVIDs:         1.8 - 4.0 cm  Doppler Peak Velocity (m/sec): AoV=1.2  ------------------------------------------------------------------------  Observations:  Mitral Valve: Mitral valve not well visualized. Mild mitral  regurgitation.  Aortic Valve/Aorta: Aortic valve not well visualized;  appears calcified. Mild aortic regurgitation.  Peak left  ventricular outflow tract gradient equals 2 mm Hg.  Aortic Root: 3 cm.  Left Atrium: Mildly dilated left atrium.  LA volume index =  39 cc/m2.  Left Ventricle: Endocardium not well visualized; unable to  evaluate left ventricular systolic function. Consider  further evaluation with echo contrast , Definity if  clinically indicated.  Septal motion consistent with  cardiac surgery. The basal inferolateral wall is  hypokinetic.  The left ventricle appears enlarged.  Mild  diastolic dysfunction (Stage I).  Right Heart: Right atrium not well visualized. The right  ventricle is not well visualized. Tricuspid valve not well  visualized. No tricuspid regurgitation. Pulmonic valve not  well visualized. Minimal pulmonic regurgitation.  Pericardium/Pleura: Normal pericardium with trace  pericardial effusion.  ------------------------------------------------------------------------  Conclusions:  1. Mitral valve not well visualized. Mild mitral  regurgitation.  2. Aortic valve not well visualized; appears calcified.  Peak transaortic valve gradient equals 6 mm Hg. Mild aortic  regurgitation.  3. Mildly dilated left atrium.  LA volume index = 39 cc/m2.  4. The left ventricle appears enlarged.  5. Endocardium not well visualized; unable to evaluate left  ventricular systolic function. Consider further evaluation  withecho contrast , Definity if clinically indicated.  Septal motion consistent with cardiac surgery. The basal  inferolateral wall is hypokinetic.  6. Mild diastolic dysfunction (Stage I).  7. The right ventricle is not well visualized.  8. Normal pericardium with trace pericardial effusion.  *** Due to poor endocardial visualization on the current  study, direct comparison with echocardiogram of 2017,  is not possible.  Results discussed with Dr Bah.  ------------------------------------------------------------------------  Confirmed on  2017 - 12:56:41 by Ruben Thomas M.D.  ------------------------------------------------------------------------    [ ]  Catheterization:  [facility]  Department of Cardiology  [address] [place] [address]  [phone]  Cath Lab Report -- Comprehensive Report  Patient: THONY ANAYA  Study date: 2017  Account number: [id number]  MR number: [id number]  : 1955  Gender: Male  Race: W  Case Physician(s):  Javad Block M.D.  Fellow:  Jesus Alberto Clifton M.D.  Referring Physician:  hCaran Mckeon M.D.  INDICATIONS: Unstable angina - CCS4.  HISTORY: The patient has a history of coronary artery disease. The patient  has hypertension and medication-treated dyslipidemia.  PROCEDURE:  --  Left coronary angiography.  --  Right coronary angiography.  --  Bypass graft angiography.  --  Aortography.  TECHNIQUE: The risks and alternatives of theprocedures and conscious  sedation were explained to the patient and informed consent was obtained.  Cardiac catheterization performed electively.  Local anesthetic given. Left radial artery access. Left coronary artery  angiography. The vessel was injected utilizing a catheter. Right coronary  artery angiography. The vessel was injected utilizing a catheter. Graft  angiography. The vessel was injected utilizing a catheter. Aortography. A  catheter was placed and contrast was injected. RADIATION EXPOSURE: 5.9  min.  CONTRAST GIVEN: Omnipaque 71 ml.  MEDICATIONS GIVEN: Verapamil (Isoptin, Calan, Covera), 2.5 mg, IA.  CORONARY VESSELS: The coronary circulation is right dominant.  LM:   --  LM: Angiography showed minor luminal irregularities with no flow  limiting lesions.  LAD:   --  Proximal LAD: There was a 100 % stenosis.  CX:   --  Proximal circumflex: There was a 100 % stenosis.  RCA:   --  Mid RCA: There was a 100 % stenosis.  GRAFTS:   --  Graft to the mid LAD: The graft was a LIMA. Graft angiography  showed minor luminal irregularities.  AORTA: The root exhibited mild fibrocalcific change. No grafts visualized  COMPLICATIONS: There were no complications.  DIAGNOSTIC RECOMMENDATIONS: The patient should continue with the present  medications.  Prepared and signed by  Javad Block M.D.  Signed 2017 15:24:02  HEMODYNAMIC TABLES  Pressures:  Baseline  Pressures:  - HR: 75  Pressures:  - Rhythm:  Pressures:  -- Aortic Pressure (S/D/M): 136/93/112  O2 Sats:  Baseline  O2 Sats:  - HR: 75  O2 Sats:  - Rhythm:  O2 Sats:  -- AO: 13.6/99.5/18.4  Outputs:  Baseline  Outputs:  -- CALCULATIONS[age]  Outputs:  -- CALCULATIONS: Body Surface Area: 2.29  Outputs:  -- CALCULATIONS: Height in cm: 173.00  Outputs:  -- CALCULATIONS: Sex: Male  Outputs:  -- CALCULATIONS: Weight in k.90  Outputs:  -- OUTPUTS: O2 consumption: 306.86  Outputs:  -- OUTPUTS: Vo2 Indexed: 134.15    [ ] Stress Test:  	  	  ASSESSMENT/PLAN: Date of Admission: 2019    CHIEF COMPLAINT: acute SOB    HISTORY OF PRESENT ILLNESS: Obtained via family and previous chart.   64M with CAD, stents and CABG (last [facility] was 2017 with patent LIMA but other grafts occluded, medical management), HTN, HLD, CKD, CHER, ?HF, and pemphigus vulgaris (s/p "infusion" last week per family, drug unclear) presents with acute SOB. Family reports patient was last seen well  at 5AM today. He was driving his son in law to work when he needed to pull over due to ? chest burning which was then relieved with water however he developed acute SOB and was brought to ER. In ER he was noted to be acutely SOB with diffuse crackles and minimally responsive, intubated for airway protection. Intubation was reported as traumatic and afterward he briefly lost pulses, received CPR with quick ROSC. Lasix 80 mg IV given X 1 and propofol started for sedation. Family states no recent sick contacts, cough, cold, or flulike symptoms, denies chest pain or SOB. EKG in ER notable for atrial fibrillation with VR 120s but no ischemic changes noted. Labs significant for elevated WBC and lactate, with rectal temp 100. K 6.2.      Allergies    No Known Allergies    Intolerances    	    MEDICATIONS:    piperacillin/tazobactam IVPB. 3.375 Gram(s) IV Intermittent Once  vancomycin  IVPB 1000 milliGRAM(s) IV Intermittent once      propofol Infusion 10 MICROgram(s)/kG/Min IV Continuous <Continuous>        calcium gluconate IVPB 1 Gram(s) IV Intermittent Once  chlorhexidine 0.12% Liquid 15 milliLiter(s) Oral Mucosa every 12 hours      PAST MEDICAL & SURGICAL HISTORY:  CHF (congestive heart failure)  Psoriasis  HLD (hyperlipidemia)  CAD (coronary artery disease): 3 stents  Hypertension  History of coronary artery stent placement: 1 stent , 2 stents  in [facility]  S/P quadruple vessel bypass:       FAMILY HISTORY:  Family history of cerebrovascular accident (CVA)  Family history of early CAD (Father): father who  of CVA.      SOCIAL HISTORY:    Smoker  unknown  Alcohol unknown  Drugs unknown      REVIEW OF SYSTEMS:  See HPI. Otherwise, 10 point ROS done and otherwise negative.    PHYSICAL EXAM:  T(C): 37.8 (19 @ 07:26), Max: 37.8 (19 @ 07:26)  HR: 118 (19 @ 08:19) (57 - 135)  BP: 168/102 (19 @ 08:19) (119/91 - 178/115)  RR: 16 (19 @ 08:19) (16 - 30)  SpO2: 96% (19 @ 08:19) (93% - 99%)  Wt(kg): --  I&O's Summary    27 Dec 2019 07:01  -  27 Dec 2019 08:32  --------------------------------------------------------  IN: 0 mL / OUT: 75 mL / NET: -75 mL        Appearance: intubated and sedated	  HEENT:   Normal oral mucosa, PERRL, EOMI	  Cardiovascular: nl S1S2, no M/R/C  Respiratory: diffuse crackles  Gastrointestinal:  Soft, Non-tender, + BS	  Skin: No rashes, No ecchymoses, No cyanosis	  Neurologic: sedated  Extremities: Normal range of motion, No clubbing, cyanosis or edema          LABS:	 	                        15.1   18.85 )-----------( 239      ( 27 Dec 2019 07:00 )             51.4       TELEMETRY: 	atrial fibrillation with       EC. 2017: NSR with RBBB/LAD           2. 2019: atrial fibrillation/flutter  	  PREVIOUS DIAGNOSTIC TESTING:    [ ] Echocardiogram:    Patient name: THONY ANAYA  YOB: 1955   Age: 62 (M)   MR#: [id number]  Study Date: 2017  Location: [place] [address]  DSonographer: Jesus Ball RDCS  Study quality: Technically difficult  Referring Physician: Adali Liz MD  Blood Pressure: 143/98 mmHg  Height: 170 cm  Weight: 102 kg  BSA: 2.1 m2  Heart Rate: 94 mmHg  ------------------------------------------------------------------------  PROCEDURE: Transthoracic echocardiogram with 2-D, M-Mode  and complete spectral and color flow Doppler.  INDICATION: Abnormal electrocardiogram (ECG) (EKG) (R94.31)  ------------------------------------------------------------------------  Dimensions:    Normal Values:  LA:     5.4    2.0 - 4.0 cm  Ao:     3.0    2.0 - 3.8 cm  SEPTUM:        0.6 - 1.2 cm  PWT:           0.6 - 1.1 cm  LVIDd:         3.0 - 5.6 cm  LVIDs:         1.8 - 4.0 cm  Doppler Peak Velocity (m/sec): AoV=1.2  ------------------------------------------------------------------------  Observations:  Mitral Valve: Mitral valve not well visualized. Mild mitral  regurgitation.  Aortic Valve/Aorta: Aortic valve not well visualized;  appears calcified. Mild aortic regurgitation.  Peak left  ventricular outflow tract gradient equals 2 mm Hg.  Aortic Root: 3 cm.  Left Atrium: Mildly dilated left atrium.  LA volume index =  39 cc/m2.  Left Ventricle: Endocardium not well visualized; unable to  evaluate left ventricular systolic function. Consider  further evaluation with echo contrast , Definity if  clinically indicated.  Septal motion consistent with  cardiac surgery. The basal inferolateral wall is  hypokinetic.  The left ventricle appears enlarged.  Mild  diastolic dysfunction (Stage I).  Right Heart: Right atrium not well visualized. The right  ventricle is not well visualized. Tricuspid valve not well  visualized. No tricuspid regurgitation. Pulmonic valve not  well visualized. Minimal pulmonic regurgitation.  Pericardium/Pleura: Normal pericardium with trace  pericardial effusion.  ------------------------------------------------------------------------  Conclusions:  1. Mitral valve not well visualized. Mild mitral  regurgitation.  2. Aortic valve not well visualized; appears calcified.  Peak transaortic valve gradient equals 6 mm Hg. Mild aortic  regurgitation.  3. Mildly dilated left atrium.  LA volume index = 39 cc/m2.  4. The left ventricle appears enlarged.  5. Endocardium not well visualized; unable to evaluate left  ventricular systolic function. Consider further evaluation  withecho contrast , Definity if clinically indicated.  Septal motion consistent with cardiac surgery. The basal  inferolateral wall is hypokinetic.  6. Mild diastolic dysfunction (Stage I).  7. The right ventricle is not well visualized.  8. Normal pericardium with trace pericardial effusion.  *** Due to poor endocardial visualization on the current  study, direct comparison with echocardiogram of 2017,  is not possible.  Results discussed with Dr Bah.  ------------------------------------------------------------------------  Confirmed on  2017 - 12:56:41 by Ruben Thomas M.D.  ------------------------------------------------------------------------    [ ]  Catheterization:  [facility]  Department of Cardiology  [address] [place] [address]  [phone]  Cath Lab Report -- Comprehensive Report  Patient: THONY ANAYA  Study date: 2017  Account number: [id number]  MR number: [id number]  : 1955  Gender: Male  Race: W  Case Physician(s):  Javad Block M.D.  Fellow:  Jesus Alberto Clifton M.D.  Referring Physician:  Charan Mckeon M.D.  INDICATIONS: Unstable angina - CCS4.  HISTORY: The patient has a history of coronary artery disease. The patient  has hypertension and medication-treated dyslipidemia.  PROCEDURE:  --  Left coronary angiography.  --  Right coronary angiography.  --  Bypass graft angiography.  --  Aortography.  TECHNIQUE: The risks and alternatives of theprocedures and conscious  sedation were explained to the patient and informed consent was obtained.  Cardiac catheterization performed electively.  Local anesthetic given. Left radial artery access. Left coronary artery  angiography. The vessel was injected utilizing a catheter. Right coronary  artery angiography. The vessel was injected utilizing a catheter. Graft  angiography. The vessel was injected utilizing a catheter. Aortography. A  catheter was placed and contrast was injected. RADIATION EXPOSURE: 5.9  min.  CONTRAST GIVEN: Omnipaque 71 ml.  MEDICATIONS GIVEN: Verapamil (Isoptin, Calan, Covera), 2.5 mg, IA.  CORONARY VESSELS: The coronary circulation is right dominant.  LM:   --  LM: Angiography showed minor luminal irregularities with no flow  limiting lesions.  LAD:   --  Proximal LAD: There was a 100 % stenosis.  CX:   --  Proximal circumflex: There was a 100 % stenosis.  RCA:   --  Mid RCA: There was a 100 % stenosis.  GRAFTS:   --  Graft to the mid LAD: The graft was a LIMA. Graft angiography  showed minor luminal irregularities.  AORTA: The root exhibited mild fibrocalcific change. No grafts visualized  COMPLICATIONS: There were no complications.  DIAGNOSTIC RECOMMENDATIONS: The patient should continue with the present  medications.  Prepared and signed by  Javad Block M.D.  Signed 2017 15:24:02  HEMODYNAMIC TABLES  Pressures:  Baseline  Pressures:  - HR: 75  Pressures:  - Rhythm:  Pressures:  -- Aortic Pressure (S/D/M): 136/93/112  O2 Sats:  Baseline  O2 Sats:  - HR: 75  O2 Sats:  - Rhythm:  O2 Sats:  -- AO: 13.6/99.5/18.4  Outputs:  Baseline  Outputs:  -- CALCULATIONS[age]  Outputs:  -- CALCULATIONS: Body Surface Area: 2.29  Outputs:  -- CALCULATIONS: Height in cm: 173.00  Outputs:  -- CALCULATIONS: Sex: Male  Outputs:  -- CALCULATIONS: Weight in k.90  Outputs:  -- OUTPUTS: O2 consumption: 306.86  Outputs:  -- OUTPUTS: Vo2 Indexed: 134.15    [ ] Stress Test:  	  	  ASSESSMENT/PLAN:

## 2019-12-27 NOTE — H&P ADULT - ASSESSMENT
64M with CAD, stents and CABG (last [facility] was 12/2017 with patent LIMA but other grafts occluded, medical management), HTN, HLD, CKD, CHER, ?HF, and pemphygus vulgaris presents with acute SOB found to be in multifactorial acute hypercapnic resp failure s/p intubation in the ED on 12/27. Patient with traumatic intubation, found to be in PEA with ROSC briefly per team (unknown duration). 64M with CAD, stents and CABG (last [facility] was 12/2017 with patent LIMA but other grafts occluded, medical management), HTN, HLD, CKD, CHER, ?HF, and pemphygus vulgaris presents with acute SOB found to be in multifactorial acute hypercapnic resp failure s/p intubation in the ED on 12/27. Patient with traumatic intubation, found to be in PEA with ROSC briefly per team (unknown duration) and now with new a fib.

## 2019-12-27 NOTE — DIETITIAN INITIAL EVALUATION ADULT. - PERTINENT MEDS FT
MEDICATIONS  (STANDING):  aspirin enteric coated 81 milliGRAM(s) Oral daily  atorvastatin 80 milliGRAM(s) Oral at bedtime  chlorhexidine 0.12% Liquid 15 milliLiter(s) Oral Mucosa every 12 hours  chlorhexidine 4% Liquid 1 Application(s) Topical daily  dextrose 5%. 1000 milliLiter(s) (50 mL/Hr) IV Continuous <Continuous>  dextrose 50% Injectable 12.5 Gram(s) IV Push once  dextrose 50% Injectable 25 Gram(s) IV Push once  dextrose 50% Injectable 25 Gram(s) IV Push once  fentaNYL   Infusion. 0.5 MICROgram(s)/kG/Hr (6 mL/Hr) IV Continuous <Continuous>  furosemide   Injectable 80 milliGRAM(s) IV Push every 12 hours  heparin  Infusion 10 Unit(s)/Hr (0.1 mL/Hr) IV Continuous <Continuous>  hydrocortisone sodium succinate Injectable 100 milliGRAM(s) IV Push every 8 hours  insulin lispro (HumaLOG) corrective regimen sliding scale   SubCutaneous every 6 hours  midazolam Infusion 0.02 mG/kG/Hr (2.4 mL/Hr) IV Continuous <Continuous>  pantoprazole  Injectable 40 milliGRAM(s) IV Push daily  phenylephrine    Infusion 0.1 MICROgram(s)/kG/Min (4.5 mL/Hr) IV Continuous <Continuous>  piperacillin/tazobactam IVPB.. 3.375 Gram(s) IV Intermittent every 8 hours  sertraline 50 milliGRAM(s) Oral daily  ticagrelor 90 milliGRAM(s) Oral every 12 hours

## 2019-12-27 NOTE — ED ADULT NURSE REASSESSMENT NOTE - NS ED NURSE REASSESS COMMENT FT1
Pt remains sedated. Family educated using teach back on need for central line, care of central line, and indication for removal. -ILDA Astudillo RN

## 2019-12-27 NOTE — ED ADULT TRIAGE NOTE - CHIEF COMPLAINT QUOTE
Pt arrives via family.  Respirations labored.  gurgling breaths.  unable to speak in full sentences.  unable to obtain SPO2.  pt diaphoretic.  % 02 applied.  CN aware.  taken to rm 13

## 2019-12-27 NOTE — ED ADULT NURSE NOTE - EXTENSIONS OF SELF_ADULT
None personally reviewed labs: CBC wnl  Comp wnl  trop negative  BNP 1854 VBG wnl except for lactate 2.6  UA large LE, +nitrite, 25-50wbc, few bacteria and epithelial cells

## 2019-12-27 NOTE — ED ADULT NURSE REASSESSMENT NOTE - NS ED NURSE REASSESS COMMENT FT1
Nurse Manager: Received patient in spot 13 at 0820. Pt sedated on Propofol infusing at 10mcg/kg/min. Intubated with 7.0 ETT that is at 23cm at the lip. Suctioned scant amount of blood tinged sputum. No gag reflex noted. Tolerating vent settings 16/500/40/10. Pt in atrial flutter on cardiac monitor at rate of 115-130. As per prior RN, no treatment ordered for BP at this time. Curtis to bedside drainage bag with 75cc clear yellow urine output. Daughter at beside. Emotional support provided. Awaiting disposition. APRIL Astudillo RN Nurse Manager: Received patient in spot 13 at 0820. Pt sedated on Propofol infusing at 10mcg/kg/min. Intubated with 7.0 ETT that is at 23cm at the lip. Suctioned scant amount of blood tinged sputum. No gag reflex noted. Tolerating vent settings 16/500/100/10. Pt in atrial flutter on cardiac monitor at rate of 115-130. As per prior RN, no treatment ordered for BP at this time. Curtis to bedside drainage bag with 75cc clear yellow urine output. Daughter at beside. Emotional support provided. Awaiting disposition. APRIL Astudillo RN

## 2019-12-27 NOTE — ED PROVIDER NOTE - PROGRESS NOTE DETAILS
patients hr ranging between 90's-130's, narrow complex irregular rhythm, o2 sat ok, bp initally elevated now improving (last sbp 150's), cbc returned with wbc 18, rectal temp performed after stabilization 100, cxr shows ett in good position, with pulmonary edema. Given critical status-will cover empirically with abx though initial presentation with acuity of onset of symptoms less c/w infection. vanc/zosyn ordered. Will not bolus fluids given pulmonary edema. Initial labs drawn during code-will repeat. CCU requesting formal stat echo in ed-micu evaluated-dispo will depend on echo findings. plan of care s/o to dr. blake. Eric KUMAR: Patient accepted by CCU; central line placed in ER aurelia garcia- patients hr ranging between 90's-130's, narrow complex irregular rhythm, o2 sat ok, bp initally elevated now improving (last sbp 150's), cbc returned with wbc 18, rectal temp performed after stabilization 100, cxr shows ett in good position, with pulmonary edema. Given critical status-will cover empirically with abx though initial presentation with acuity of onset of symptoms less c/w infection. vanc/zosyn ordered. Will not bolus fluids given pulmonary edema. Initial labs drawn during code-will repeat. CCU requesting formal stat echo in ed-micu evaluated-dispo will depend on echo findings. plan of care s/o to dr. blake.

## 2019-12-27 NOTE — CONSULT NOTE ADULT - ASSESSMENT
64M with CAD, stents and CABG (last [facility] was 12/2017 with patent LIMA but other grafts occluded, medical management), HTN, HLD, CKD, CHER, ?HF, and pemphygus vulgaris presents with acute SOB requiring intubation for airway protection with ? new atrial fibrillation in setting of unclear etiology.    D/W Dr. Mccall, CCU fellow:  unclear cardiac indication for CCU at this time. Would await repeat labs and check ECHO and then re-assess. 64M with CAD, stents and CABG (last [facility] was 12/2017 with patent LIMA but other grafts occluded, medical management), HTN, HLD, CKD, CHER, ?HF, and pemphigus vulgaris presents with acute SOB requiring intubation for airway protection with ? new atrial fibrillation in setting of unclear etiology.    D/W Dr. Mccall, CCU fellow:  unclear cardiac indication for CCU at this time. Would await repeat labs and check ECHO and then re-assess.

## 2019-12-27 NOTE — H&P ADULT - NSICDXPASTMEDICALHX_GEN_ALL_CORE_FT
PAST MEDICAL HISTORY:  CAD (coronary artery disease) 3 stents    CHF (congestive heart failure)     HLD (hyperlipidemia)     Hypertension     Psoriasis

## 2019-12-27 NOTE — ED ADULT NURSE REASSESSMENT NOTE - NS ED NURSE REASSESS COMMENT FT1
[id number] Facilitator Note:  pt received in room 13, intubated ET 7.0, Lip Line 23 Vent settings  FIO2 100%, RR 16, Peep 10.  Pt noted to be restless and alert, Propofol gtt to titration in progress via left AC 18g. Triple lumen central line placed as per protocol with time out prior to procedure.  Pt noted to be A Flutter on monitor.  Curtis to bedside drainage, draining clear, yellow urine.  Central line placement confirmed.  Positive blood return, flushing without difficulties. Propofol in progress via central line. Endorsed to ICU nurse, and nurse in the area.

## 2019-12-27 NOTE — ED PROVIDER NOTE - PMH
CAD (coronary artery disease)  3 stents  CHF (congestive heart failure)    HLD (hyperlipidemia)    Hypertension    Psoriasis

## 2019-12-27 NOTE — ED PROVIDER NOTE - CLINICAL SUMMARY MEDICAL DECISION MAKING FREE TEXT BOX
SEE HPI  Respiratory distress, Cardiac arrest-ACLS initiated with ROSC achieved  Likely in setting of flash pulmonary edema given diffuse rales and acuity of onset with patients history  Diurese. Will check labs, ekg, cxr, ccu consulted for recs and dispo, monitor, reass. SEE HPI  Respiratory distress, Cardiac arrest-ACLS initiated with ROSC achieved  Possibly in setting of flash pulmonary edema given diffuse rales and acuity of onset with patients history  Diurese. Will check labs, ekg, cxr, ccu consulted for recs and dispo, monitor, reass.

## 2019-12-27 NOTE — CHART NOTE - NSCHARTNOTEFT_GEN_A_CORE
Spoke with patient's outside cardiologist Dr. Charan Mckeon. He has no prior history of a fib prior to this presentation. He was seen in the clinic with Dr. Mckeon on 12/11/2019. Pt developed recurrent pemphigus 11/19 and was started on prednisone 60 QD. He also received a dose of rituxin last week for his pemphigus. He follows up with dermatologist Dr. Deloris Lay. ACS 4/2017; NSTEMI 12/2017. Last echo in 2017 with diastolic/systolic HF EF 42%. Baseline Cr was 1.66 at last office visit. A1c=5.9. WBC 12.57 on steroids. Holter 12/2017 with RSR, frequent PVC, PAC no runs. Nuclear ST 10/2018. Cardiac cath 12/2017: LIMA patent, 100% stenosis of LAD, mid RCA, and circumflex. ECHO 12/2017: LV normal in size with mildly reduced systolic function and hypokinesis of the basal-mid inferior, and lateral walls. EF 42%, moderate LVDD, LA moderately dilated, mild AR, mild MR, mild MO.

## 2019-12-27 NOTE — H&P ADULT - PROBLEM SELECTOR PLAN 1
Patient presented to ED with acute SOB with crackles and pCO2 elevated to 80-90's. Patient with hx of CHER per chart review, no formal diagnosis.   -Intubated 12/27. Initial intubation traumatic, patient was in PEA with ROSC after a reported few minutes although exact duration unknown. Patient with desat during intubation.   -Resp failure can be multifactorial 2/2 CHF vs sepsis vs CHER  -Brandin PRN    #A-fib  -New? family unaware of any prior diagnosis. Chart review does not mention either  -hep gtt  -Tele monitoring  -Daily serial EKG Patient presented to ED with acute SOB with crackles and pCO2 elevated to 80-90's. Patient with hx of CHER per chart review, no formal diagnosis.   -Intubated 12/27. Initial intubation traumatic, patient was in PEA with ROSC after a reported few minutes although exact duration unknown. Patient with desat during intubation.   -Resp failure can be multifactorial 2/2 CHF vs sepsis vs CHER  -Brandin PRN    #A-fib  -Per his cardiologist Dr. Mckeon this is new a fib for him.  -hep gtt  -Tele monitoring  -Daily serial EKG

## 2019-12-27 NOTE — H&P ADULT - NSICDXFAMILYHX_GEN_ALL_CORE_FT
FAMILY HISTORY:  Family history of cerebrovascular accident (CVA)  Family history of early CAD, father who  of CVA.

## 2019-12-27 NOTE — H&P ADULT - NSHPPHYSICALEXAM_GEN_ALL_CORE
Appearance: intubated and sedated	  HEENT:   Normal oral mucosa, PERRL, EOMI	  Cardiovascular: nl S1S2, no M/R/C  Respiratory: diffuse crackles  Gastrointestinal:  Soft, Non-tender, + BS	  Skin: No rashes, No ecchymoses, No cyanosis	  Neurologic: sedated  Extremities: Normal range of motion, No clubbing, cyanosis or edema

## 2019-12-27 NOTE — ED ADULT NURSE NOTE - OBJECTIVE STATEMENT
Float RN: Ambulatory to triage from home complaining of extreme SOB and MELCHOR. Patient has PMH of CHF. Breath sounds wet and gurgling, unable to obtain SPO2 despite patient arriving on NRB. Patient skin mottled and diaphoretic. Family members at bedside. Originally BiPAP was ordered with MD providers at bedside. Medicated with 100mg Rocuronium and 20 of Etomidate. Patient neck is short, tongue is large and was a difficult intubation. Chords visualized with glidescope, excessive mucus and blood. Second attempt at intubation successful by resident. Patient has L 20g UA PIV, 18g L AC, and R 22g IV in the top of his foot. Patient lost cardiac rhythm at 651, ACLS protocol activated, compressions started. Patient received 3 epi's, bicarb, 1" dermal nitro, ROSC at 658. Given 80mg lasix IVP post arrest, parks inserted for output measurement, Primary and float RN at bedside. Continuous cardiac monitoring in place, medicated as ordered with 10mcg/kg/min for sedation maintenance, safety maintained, needs attended, will continue to monitor.

## 2019-12-27 NOTE — ED PROVIDER NOTE - PSH
History of coronary artery stent placement  1 stent 2008, 2 stents 2009 in [facility]  S/P quadruple vessel bypass  [facility]– [place]

## 2019-12-27 NOTE — H&P ADULT - PROBLEM SELECTOR PLAN 4
Patient s/p CABG in 2006 with most recent cath in 2017 showing patent LIMA and grafts occluded, following with Dr. Mckeon  -tao/w Yamilka MCGRATH, statin

## 2019-12-27 NOTE — H&P ADULT - ATTENDING COMMENTS
Wesley Colon is a 64 year old man with CAD, prior PCI with stents, CABG (angiogram 12/2017 showed patent LIMA but occlusion of other grafts, treated with medical management), HTN, HLD, CKD, CHER, possible HF and pemphigus vulgaris (s/p rituximab infusion one week ago). He presented with acute chest pain and SOB while driving his son in law to work. In ED examination noted diffuse pulmonary crackles decreased responsiveness. He was intubated for airway protection. After difficult intubation, he was briefly pulseless (PEA), received CPR with quick ROSC. Lasix 80 mg IV given X 1 and propofol was started for sedation. ECG noted atrial fibrillation / flutter with RVR (120s bpm) with no ischemic ST-segment changes. Labs noted elevated WBC, lactate and initial serum potassium 6.2 mmol/L. Viral panel was positive for influenza AH1, respiratory syncytial virus, and hMVP. Initial blood cultures showed no growth at 24 hours. He received oseltamivir (Tamiflu) and was empirically treated with vancomycin and Zosyn. Phenylephrine infusion was started for hypotension and sedation was changed from propofol to fentanyl and midazolam. Telemetry in CCU noted atrial flutter with HR mostly 70-80’s bpm. IV heparin was initiated for anticoagulation. The plan for 12/28/19 included decreasing PEEP and FIO2 on respirator, maintaining furosemide at 80 mg IV twice daily and tapering phenylephrine as tolerable by BP. Standing regimen: EC aspirin enteric coated 81 mg daily, atorvastatin 80 mg daily at bedtime, fentanyl 0.5 mCg/kG/Hr (6 mL/Hr) continuous IV, furosemide 80 mg IV Push every 12 hours, heparin  infusion 10 Unit(s)/Hr (0.1 mL/Hr) continuous IV, hydrocortisone sodium succinate injectable 100 mg IV Push every 8 hours, insulin sliding scale coverage, midazolam infusion 0.02 mG/kG/Hr (2.4 mL/Hr) IV Continuous, oseltamivir Tamiflu) Suspension 30 mg by mouth twice daily, pantoprazole injectable 40 mg IV Push daily, phenylephrine infusion 0.1 mCg/kG/Min (4.5 mL/Hr) IV Continuous, piperacillin / tazobactam IVPB. 3.375 gm IV every 8 hours, sertraline 50 mg daily and ticagrelor (Brilinta) 90 mg by mouth twice daily

## 2019-12-27 NOTE — ED PROVIDER NOTE - OBJECTIVE STATEMENT
65 y/o m presenting with shortness of breath, ams. Patient was recently admitted for chf exacerbation. Was doing ok after dc-until last night. This morning was noted by family to appear very short of breath. Brought him to ed-on arrival to ed, patient was noted to be gurgling, minimally responsive. Bipap was called prior to patient arrival to room. On arrival to room, patient with eyes open, unresponsive, gurgling, crackles diffuse  throughout, irregular narrow complex tachycardic rhythm on monitor, mottled with purple discoloration to extremities and chest. Decision was made to intubate patient due to unresponsiveness, wob, need for ppv. Patient positioned appropriately-first attempt gastric intubation, removed and ventilated. During ventilation-patient became bradycardic-lost pulses-cpr immediately initiated with pea, successfully intubated, meds given as per code sheet-rosc achieved. CCU called. 65 y/o m presenting with shortness of breath, ams. Patient was recently admitted for chf exacerbation. Was doing ok after dc-until last night. This morning, was last seen normal approx 5am, as morning progressed was noted by family to appear very short of breath. Brought him to ed-on arrival to ed, patient was noted to be gurgling, minimally responsive. Bipap was called prior to patient arrival to room. On arrival to room, patient with eyes open, unresponsive, gurgling with secretions from his mouth, crackles diffuse  throughout, irregular narrow complex tachycardic rhythm on monitor, mottled with purple discoloration to extremities and chest. Decision was made to intubate patient due to unresponsiveness, wob, need for ppv. During ventilation/intubation-patient became bradycardic-lost pulses-cpr immediately initiated with pea, successfully intubated, meds given as per code sheet-rosc achieved. CCU/MICU called.

## 2019-12-27 NOTE — ED PROCEDURE NOTE - PROCEDURE ADDITIONAL DETAILS
Initial intubation attempted without good views with DL 2/2 body habitus and secretions, 2nd attempt with VL with good visualization of vocal cords; however mouth was full of secretions, cords were edematous and pt desaturated quickly, eventually losing pulses requiring compressions- Dr. Weil used VL for the 3rd attempt, cricoid pressure and suctioning allowed for successful intubation, ROSC occurred and pt was eventually stabilized  Leann Granados, PGY-3 EM Patient with profuse secretions despite suctioning.

## 2019-12-27 NOTE — ED PROCEDURE NOTE - ATTENDING CONTRIBUTION TO CARE
DONTAE Cisneros: I have personally performed a face to face bedside history and physical examination of this patient. I have discussed the history, examination, review of systems, assessment and plan of management with the resident. I have reviewed the electronic medical record and amended it to reflect my history, review of systems, physical exam, assessment and plan. I personally supervised this procedure performed by the resident and I agree with the above documentation.

## 2019-12-27 NOTE — ED PROVIDER NOTE - PHYSICAL EXAMINATION
GEN - obese, unresponsive  HEAD - NC/AT   EYES- PERRL  ENT: Airway patent, mmm, Oral cavity and pharynx normal. Blood noted in mouth and nares on initial exam.  NECK: Neck supple  PULMONARY - crackles diffusely, tachypneic, +accessory muscle use  CARDIAC -s1s2, tachy, irregular R  ABDOMEN - no mass, soft  BACK - normal spine  EXTREMITIES - FROM, symmetric pulses, capillary refill < 2 seconds, no edema   SKIN - +purple discoloration to hands/feet/chest, mottled, grey  NEUROLOGIC - unresponsive GEN - obese, unresponsive  HEAD - NC/AT   EYES- PERRL  ENT: Airway patent, mmm, Oral cavity and pharynx normal. Blood noted in mouth and nares on initial exam.  NECK: Neck supple  PULMONARY - crackles diffusely, tachypneic, +accessory muscle use  CARDIAC -s1s2, tachy, irregular R  ABDOMEN - no mass, soft  BACK - normal spine  EXTREMITIES - no deformity, mottled  SKIN - +purple discoloration to hands/feet/chest, mottled, grey  NEUROLOGIC - unresponsive

## 2019-12-27 NOTE — H&P ADULT - NSHPLABSRESULTS_GEN_ALL_CORE
CBC Full  -  ( 27 Dec 2019 07:00 )  WBC Count : 18.85 K/uL  RBC Count : 5.64 M/uL  Hemoglobin : 15.1 g/dL  Hematocrit : 51.4 %  Platelet Count - Automated : 239 K/uL  Mean Cell Volume : 91.1 fL  Mean Cell Hemoglobin : 26.8 pg  Mean Cell Hemoglobin Concentration : 29.4 %  Auto Neutrophil # : 12.95 K/uL  Auto Lymphocyte # : 3.54 K/uL  Auto Monocyte # : 1.16 K/uL  Auto Eosinophil # : 0.12 K/uL  Auto Basophil # : 0.17 K/uL  Auto Neutrophil % : 68.7 %  Auto Lymphocyte % : 18.8 %  Auto Monocyte % : 6.2 %  Auto Eosinophil % : 0.6 %  Auto Basophil % : 0.9 %    12-27    138  |  100  |  33<H>  ----------------------------<  303<H>  5.3   |  18<L>  |  1.93<H>    Ca    8.7      27 Dec 2019 07:00    TPro  6.6  /  Alb  3.6  /  TBili  0.6  /  DBili  x   /  AST  147<H>  /  ALT  141<H>  /  AlkPhos  219<H>  12-27    PT/INR - ( 27 Dec 2019 07:00 )   PT: 11.0 SEC;   INR: 0.97          PTT - ( 27 Dec 2019 07:00 )  PTT:24.6 SEC    VBG: pH - 7.13 pCO2 - 87 HCO3 - 21    CXR: Mild pulm edema

## 2019-12-27 NOTE — H&P ADULT - HISTORY OF PRESENT ILLNESS
Obtained via family and previous chart.   64M with CAD, stents and CABG (last [facility] was 12/2017 with patent LIMA but other grafts occluded, medical management), HTN, HLD, CKD, CHER, ?HF, and pemphygus vulgaris presents with acute SOB. Family reports patient was last seen well  at 5AM today. He was driving his son in law to work when he needed to pull over due to ? chest burning which was then relieved with water however he developed acute SOB and was brought to ER. In ER he was noted to be acutely SOB with diffuse crackles and minimally responsive, intubated for airway protection. Intubation was reported as traumatic and afterward he briefly lost pulses, received CPR with quick ROSC. Lasix 80 mg IV given X 1 and propofol started for sedation. Family states no recent sick contacts, cough, cold, or flulike symptoms, denies chest pain or SOB. EKG in ER notable for atrial fibrillation with VR 120s but no ischemic changes noted. Labs significant for elevated WBC and lactate, with rectal temp 100. K 6.2. Admitted to CCU for multifactorial acute resp failure. Obtained via family and previous chart.   64M with CAD, stents and CABG (last [facility] was 12/2017 with patent LIMA but other grafts occluded, medical management), HTN, HLD, CKD, CHER, ?HF, and pemphygus vulgaris presents with acute SOB. Family reports patient was last seen well  at 5AM today. He was driving his son in law to work when he needed to pull over due to ? chest burning which was then relieved with water however he developed acute SOB and was brought to ER. In ER he was noted to be acutely SOB with diffuse crackles and minimally responsive, intubated for airway protection. Intubation was reported as traumatic and afterward he briefly lost pulses, received CPR with quick ROSC. Lasix 80 mg IV given X 1 and propofol started for sedation. Family states no recent sick contacts, cough, cold, or flulike symptoms, denies chest pain or SOB. EKG in ER notable for atrial fibrillation with VR 120s but no ischemic changes noted. Per his cardiologist Dr. Mckeon this is new a fib for him. Labs significant for elevated WBC and lactate, with rectal temp 100. K 6.2. Admitted to CCU for multifactorial acute resp failure.

## 2019-12-27 NOTE — DIETITIAN INITIAL EVALUATION ADULT. - OTHER INFO
63 y/o male admitted with dx of acute respiratory failure. Pt is intubated and sedated - unable to be interviewed and family unavailable. Pt currently NPO, however MD note indicates possible plan for enteral feeding. If feeding is indicated, would suggest Nepro with a goal rate of 38 ml/hr x 24 hrs. This would provide 1641 kcals, 74 gms protein, 663 ml free H2O in a total volume of 912 ml/day. Recommend Prosource protein powder x 3/day (15 gms protein/pkg) to better meet pt's estimated protein need. This enteral regimen would offer pt 13.6 kcals/kg and 1.0 gm protein/kg of current wt. Pt has a h/o CKD with elevated BUN/Crea. RDN to remain available.

## 2019-12-27 NOTE — H&P ADULT - PROBLEM SELECTOR PLAN 2
Pt with TTE in the past with no reported EF. Based on NST with EF 46%. Patient presented w/ crackles  -c/w Lasix  -Hold BB, ACEi due to hypotension  -Obtain TTE  -Strict I/O  -Daily weight

## 2019-12-27 NOTE — H&P ADULT - NSICDXPASTSURGICALHX_GEN_ALL_CORE_FT
PAST SURGICAL HISTORY:  History of coronary artery stent placement 1 stent 2008, 2 stents 2009 in [facility]    S/P quadruple vessel bypass [facility] [place][note 2]

## 2019-12-28 DIAGNOSIS — I48.91 UNSPECIFIED ATRIAL FIBRILLATION: ICD-10-CM

## 2019-12-28 DIAGNOSIS — J11.1 INFLUENZA DUE TO UNIDENTIFIED INFLUENZA VIRUS WITH OTHER RESPIRATORY MANIFESTATIONS: ICD-10-CM

## 2019-12-28 DIAGNOSIS — I50.9 HEART FAILURE, UNSPECIFIED: ICD-10-CM

## 2019-12-28 LAB
ALBUMIN SERPL ELPH-MCNC: 3.2 G/DL — LOW (ref 3.3–5)
ALP SERPL-CCNC: 194 U/L — HIGH (ref 40–120)
ALT FLD-CCNC: 112 U/L — HIGH (ref 4–41)
ANION GAP SERPL CALC-SCNC: 16 MMO/L — HIGH (ref 7–14)
ANION GAP SERPL CALC-SCNC: 21 MMO/L — HIGH (ref 7–14)
APTT BLD: 87 SEC — HIGH (ref 27.5–36.3)
APTT BLD: 88.4 SEC — HIGH (ref 27.5–36.3)
AST SERPL-CCNC: 47 U/L — HIGH (ref 4–40)
BASE EXCESS BLDA CALC-SCNC: 0.6 MMOL/L — SIGNIFICANT CHANGE UP
BASE EXCESS BLDA CALC-SCNC: 1.7 MMOL/L — SIGNIFICANT CHANGE UP
BASE EXCESS BLDA CALC-SCNC: 3.5 MMOL/L — SIGNIFICANT CHANGE UP
BASE EXCESS BLDV CALC-SCNC: 3.4 MMOL/L — SIGNIFICANT CHANGE UP
BASOPHILS # BLD AUTO: 0.03 K/UL — SIGNIFICANT CHANGE UP (ref 0–0.2)
BASOPHILS NFR BLD AUTO: 0.2 % — SIGNIFICANT CHANGE UP (ref 0–2)
BILIRUB SERPL-MCNC: 1 MG/DL — SIGNIFICANT CHANGE UP (ref 0.2–1.2)
BLOOD GAS ARTERIAL - FIO2: 40 — SIGNIFICANT CHANGE UP
BLOOD GAS ARTERIAL - FIO2: 40 — SIGNIFICANT CHANGE UP
BLOOD GAS ARTERIAL - FIO2: 60 — SIGNIFICANT CHANGE UP
BUN SERPL-MCNC: 46 MG/DL — HIGH (ref 7–23)
BUN SERPL-MCNC: 49 MG/DL — HIGH (ref 7–23)
CALCIUM SERPL-MCNC: 7.9 MG/DL — LOW (ref 8.4–10.5)
CALCIUM SERPL-MCNC: 8 MG/DL — LOW (ref 8.4–10.5)
CHLORIDE BLDA-SCNC: 106 MMOL/L — SIGNIFICANT CHANGE UP (ref 96–108)
CHLORIDE SERPL-SCNC: 100 MMOL/L — SIGNIFICANT CHANGE UP (ref 98–107)
CHLORIDE SERPL-SCNC: 103 MMOL/L — SIGNIFICANT CHANGE UP (ref 98–107)
CO2 SERPL-SCNC: 20 MMOL/L — LOW (ref 22–31)
CO2 SERPL-SCNC: 24 MMOL/L — SIGNIFICANT CHANGE UP (ref 22–31)
CREAT SERPL-MCNC: 2.67 MG/DL — HIGH (ref 0.5–1.3)
CREAT SERPL-MCNC: 2.84 MG/DL — HIGH (ref 0.5–1.3)
EOSINOPHIL # BLD AUTO: 0.01 K/UL — SIGNIFICANT CHANGE UP (ref 0–0.5)
EOSINOPHIL NFR BLD AUTO: 0.1 % — SIGNIFICANT CHANGE UP (ref 0–6)
GAS PNL BLDV: 140 MMOL/L — SIGNIFICANT CHANGE UP (ref 136–146)
GLUCOSE BLDA-MCNC: 118 MG/DL — HIGH (ref 70–99)
GLUCOSE BLDA-MCNC: 127 MG/DL — HIGH (ref 70–99)
GLUCOSE BLDC GLUCOMTR-MCNC: 103 MG/DL — HIGH (ref 70–99)
GLUCOSE BLDC GLUCOMTR-MCNC: 111 MG/DL — HIGH (ref 70–99)
GLUCOSE BLDC GLUCOMTR-MCNC: 94 MG/DL — SIGNIFICANT CHANGE UP (ref 70–99)
GLUCOSE BLDC GLUCOMTR-MCNC: 95 MG/DL — SIGNIFICANT CHANGE UP (ref 70–99)
GLUCOSE BLDV-MCNC: 115 MG/DL — HIGH (ref 70–99)
GLUCOSE SERPL-MCNC: 121 MG/DL — HIGH (ref 70–99)
GLUCOSE SERPL-MCNC: 127 MG/DL — HIGH (ref 70–99)
HBA1C BLD-MCNC: 6.1 % — HIGH (ref 4–5.6)
HCO3 BLDA-SCNC: 24 MMOL/L — SIGNIFICANT CHANGE UP (ref 22–26)
HCO3 BLDA-SCNC: 25 MMOL/L — SIGNIFICANT CHANGE UP (ref 22–26)
HCO3 BLDA-SCNC: 27 MMOL/L — HIGH (ref 22–26)
HCO3 BLDV-SCNC: 26 MMOL/L — SIGNIFICANT CHANGE UP (ref 20–27)
HCT VFR BLD CALC: 40.8 % — SIGNIFICANT CHANGE UP (ref 39–50)
HCT VFR BLD CALC: 44.4 % — SIGNIFICANT CHANGE UP (ref 39–50)
HCT VFR BLDA CALC: 42.7 % — SIGNIFICANT CHANGE UP (ref 39–51)
HCT VFR BLDA CALC: 42.9 % — SIGNIFICANT CHANGE UP (ref 39–51)
HCT VFR BLDV CALC: 42.6 % — SIGNIFICANT CHANGE UP (ref 39–51)
HCV AB S/CO SERPL IA: 0.14 S/CO — SIGNIFICANT CHANGE UP (ref 0–0.99)
HCV AB SERPL-IMP: SIGNIFICANT CHANGE UP
HGB BLD-MCNC: 12.7 G/DL — LOW (ref 13–17)
HGB BLD-MCNC: 13.7 G/DL — SIGNIFICANT CHANGE UP (ref 13–17)
HGB BLDA-MCNC: 13.9 G/DL — SIGNIFICANT CHANGE UP (ref 13–17)
HGB BLDA-MCNC: 14 G/DL — SIGNIFICANT CHANGE UP (ref 13–17)
HGB BLDV-MCNC: 13.9 G/DL — SIGNIFICANT CHANGE UP (ref 13–17)
IMM GRANULOCYTES NFR BLD AUTO: 0.6 % — SIGNIFICANT CHANGE UP (ref 0–1.5)
INR BLD: 1.16 — SIGNIFICANT CHANGE UP (ref 0.88–1.17)
LYMPHOCYTES # BLD AUTO: 0.47 K/UL — LOW (ref 1–3.3)
LYMPHOCYTES # BLD AUTO: 2.6 % — LOW (ref 13–44)
MAGNESIUM SERPL-MCNC: 2.1 MG/DL — SIGNIFICANT CHANGE UP (ref 1.6–2.6)
MAGNESIUM SERPL-MCNC: 2.2 MG/DL — SIGNIFICANT CHANGE UP (ref 1.6–2.6)
MCHC RBC-ENTMCNC: 26.7 PG — LOW (ref 27–34)
MCHC RBC-ENTMCNC: 27.3 PG — SIGNIFICANT CHANGE UP (ref 27–34)
MCHC RBC-ENTMCNC: 30.9 % — LOW (ref 32–36)
MCHC RBC-ENTMCNC: 31.1 % — LOW (ref 32–36)
MCV RBC AUTO: 85.9 FL — SIGNIFICANT CHANGE UP (ref 80–100)
MCV RBC AUTO: 88.6 FL — SIGNIFICANT CHANGE UP (ref 80–100)
MONOCYTES # BLD AUTO: 0.33 K/UL — SIGNIFICANT CHANGE UP (ref 0–0.9)
MONOCYTES NFR BLD AUTO: 1.8 % — LOW (ref 2–14)
NEUTROPHILS # BLD AUTO: 16.98 K/UL — HIGH (ref 1.8–7.4)
NEUTROPHILS NFR BLD AUTO: 94.7 % — HIGH (ref 43–77)
NRBC # FLD: 0 K/UL — SIGNIFICANT CHANGE UP (ref 0–0)
NRBC # FLD: 0 K/UL — SIGNIFICANT CHANGE UP (ref 0–0)
PCO2 BLDA: 45 MMHG — SIGNIFICANT CHANGE UP (ref 35–48)
PCO2 BLDA: 47 MMHG — SIGNIFICANT CHANGE UP (ref 35–48)
PCO2 BLDA: 49 MMHG — HIGH (ref 35–48)
PCO2 BLDV: 56 MMHG — HIGH (ref 41–51)
PH BLDA: 7.36 PH — SIGNIFICANT CHANGE UP (ref 7.35–7.45)
PH BLDA: 7.36 PH — SIGNIFICANT CHANGE UP (ref 7.35–7.45)
PH BLDA: 7.41 PH — SIGNIFICANT CHANGE UP (ref 7.35–7.45)
PH BLDV: 7.33 PH — SIGNIFICANT CHANGE UP (ref 7.32–7.43)
PHOSPHATE SERPL-MCNC: 5.1 MG/DL — HIGH (ref 2.5–4.5)
PHOSPHATE SERPL-MCNC: 5.5 MG/DL — HIGH (ref 2.5–4.5)
PLATELET # BLD AUTO: 172 K/UL — SIGNIFICANT CHANGE UP (ref 150–400)
PLATELET # BLD AUTO: 200 K/UL — SIGNIFICANT CHANGE UP (ref 150–400)
PMV BLD: 10.8 FL — SIGNIFICANT CHANGE UP (ref 7–13)
PMV BLD: 10.9 FL — SIGNIFICANT CHANGE UP (ref 7–13)
PO2 BLDA: 70 MMHG — LOW (ref 83–108)
PO2 BLDA: 84 MMHG — SIGNIFICANT CHANGE UP (ref 83–108)
PO2 BLDA: 84 MMHG — SIGNIFICANT CHANGE UP (ref 83–108)
PO2 BLDV: 40 MMHG — SIGNIFICANT CHANGE UP (ref 35–40)
POTASSIUM BLDA-SCNC: 3.9 MMOL/L — SIGNIFICANT CHANGE UP (ref 3.4–4.5)
POTASSIUM BLDA-SCNC: 4.4 MMOL/L — SIGNIFICANT CHANGE UP (ref 3.4–4.5)
POTASSIUM BLDV-SCNC: 3.9 MMOL/L — SIGNIFICANT CHANGE UP (ref 3.4–4.5)
POTASSIUM SERPL-MCNC: 3.6 MMOL/L — SIGNIFICANT CHANGE UP (ref 3.5–5.3)
POTASSIUM SERPL-MCNC: 4.2 MMOL/L — SIGNIFICANT CHANGE UP (ref 3.5–5.3)
POTASSIUM SERPL-SCNC: 3.6 MMOL/L — SIGNIFICANT CHANGE UP (ref 3.5–5.3)
POTASSIUM SERPL-SCNC: 4.2 MMOL/L — SIGNIFICANT CHANGE UP (ref 3.5–5.3)
PROT SERPL-MCNC: 6 G/DL — SIGNIFICANT CHANGE UP (ref 6–8.3)
PROTHROM AB SERPL-ACNC: 12.9 SEC — SIGNIFICANT CHANGE UP (ref 9.8–13.1)
RBC # BLD: 4.75 M/UL — SIGNIFICANT CHANGE UP (ref 4.2–5.8)
RBC # BLD: 5.01 M/UL — SIGNIFICANT CHANGE UP (ref 4.2–5.8)
RBC # FLD: 17.8 % — HIGH (ref 10.3–14.5)
RBC # FLD: 17.8 % — HIGH (ref 10.3–14.5)
SAO2 % BLDA: 93.2 % — LOW (ref 95–99)
SAO2 % BLDA: 95.7 % — SIGNIFICANT CHANGE UP (ref 95–99)
SAO2 % BLDA: 96.1 % — SIGNIFICANT CHANGE UP (ref 95–99)
SAO2 % BLDV: 69.3 % — SIGNIFICANT CHANGE UP (ref 60–85)
SODIUM BLDA-SCNC: 139 MMOL/L — SIGNIFICANT CHANGE UP (ref 136–146)
SODIUM BLDA-SCNC: 139 MMOL/L — SIGNIFICANT CHANGE UP (ref 136–146)
SODIUM SERPL-SCNC: 141 MMOL/L — SIGNIFICANT CHANGE UP (ref 135–145)
SODIUM SERPL-SCNC: 143 MMOL/L — SIGNIFICANT CHANGE UP (ref 135–145)
SPECIMEN SOURCE: SIGNIFICANT CHANGE UP
VANCOMYCIN FLD-MCNC: 10.3 UG/ML — SIGNIFICANT CHANGE UP
WBC # BLD: 11.1 K/UL — HIGH (ref 3.8–10.5)
WBC # BLD: 17.93 K/UL — HIGH (ref 3.8–10.5)
WBC # FLD AUTO: 11.1 K/UL — HIGH (ref 3.8–10.5)
WBC # FLD AUTO: 17.93 K/UL — HIGH (ref 3.8–10.5)

## 2019-12-28 PROCEDURE — 99233 SBSQ HOSP IP/OBS HIGH 50: CPT | Mod: GC

## 2019-12-28 PROCEDURE — 71045 X-RAY EXAM CHEST 1 VIEW: CPT | Mod: 26

## 2019-12-28 RX ORDER — VANCOMYCIN HCL 1 G
1000 VIAL (EA) INTRAVENOUS ONCE
Refills: 0 | Status: COMPLETED | OUTPATIENT
Start: 2019-12-28 | End: 2019-12-28

## 2019-12-28 RX ORDER — DIPHENHYDRAMINE HYDROCHLORIDE AND LIDOCAINE HYDROCHLORIDE AND ALUMINUM HYDROXIDE AND MAGNESIUM HYDRO
15 KIT EVERY 6 HOURS
Refills: 0 | Status: DISCONTINUED | OUTPATIENT
Start: 2019-12-28 | End: 2019-12-28

## 2019-12-28 RX ORDER — VANCOMYCIN HCL 1 G
1000 VIAL (EA) INTRAVENOUS ONCE
Refills: 0 | Status: DISCONTINUED | OUTPATIENT
Start: 2019-12-28 | End: 2019-12-28

## 2019-12-28 RX ORDER — POTASSIUM CHLORIDE 20 MEQ
20 PACKET (EA) ORAL ONCE
Refills: 0 | Status: COMPLETED | OUTPATIENT
Start: 2019-12-28 | End: 2019-12-28

## 2019-12-28 RX ADMIN — PIPERACILLIN AND TAZOBACTAM 25 GRAM(S): 4; .5 INJECTION, POWDER, LYOPHILIZED, FOR SOLUTION INTRAVENOUS at 17:39

## 2019-12-28 RX ADMIN — HEPARIN SODIUM 0.1 UNIT(S)/HR: 5000 INJECTION INTRAVENOUS; SUBCUTANEOUS at 08:17

## 2019-12-28 RX ADMIN — ATORVASTATIN CALCIUM 80 MILLIGRAM(S): 80 TABLET, FILM COATED ORAL at 22:33

## 2019-12-28 RX ADMIN — Medication 80 MILLIGRAM(S): at 22:33

## 2019-12-28 RX ADMIN — Medication 30 MILLIGRAM(S): at 22:33

## 2019-12-28 RX ADMIN — TICAGRELOR 90 MILLIGRAM(S): 90 TABLET ORAL at 17:39

## 2019-12-28 RX ADMIN — PIPERACILLIN AND TAZOBACTAM 25 GRAM(S): 4; .5 INJECTION, POWDER, LYOPHILIZED, FOR SOLUTION INTRAVENOUS at 09:45

## 2019-12-28 RX ADMIN — Medication 30 MILLIGRAM(S): at 09:44

## 2019-12-28 RX ADMIN — TICAGRELOR 90 MILLIGRAM(S): 90 TABLET ORAL at 05:51

## 2019-12-28 RX ADMIN — MIDAZOLAM HYDROCHLORIDE 2.4 MG/KG/HR: 1 INJECTION, SOLUTION INTRAMUSCULAR; INTRAVENOUS at 08:19

## 2019-12-28 RX ADMIN — PHENYLEPHRINE HYDROCHLORIDE 4.5 MICROGRAM(S)/KG/MIN: 10 INJECTION INTRAVENOUS at 08:17

## 2019-12-28 RX ADMIN — Medication 100 MILLIGRAM(S): at 22:33

## 2019-12-28 RX ADMIN — Medication 20 MILLIEQUIVALENT(S): at 17:38

## 2019-12-28 RX ADMIN — Medication 100 MILLIGRAM(S): at 13:37

## 2019-12-28 RX ADMIN — Medication 250 MILLIGRAM(S): at 11:49

## 2019-12-28 RX ADMIN — PHENYLEPHRINE HYDROCHLORIDE 4.5 MICROGRAM(S)/KG/MIN: 10 INJECTION INTRAVENOUS at 02:12

## 2019-12-28 RX ADMIN — CHLORHEXIDINE GLUCONATE 1 APPLICATION(S): 213 SOLUTION TOPICAL at 09:00

## 2019-12-28 RX ADMIN — FENTANYL CITRATE 6 MICROGRAM(S)/KG/HR: 50 INJECTION INTRAVENOUS at 05:51

## 2019-12-28 RX ADMIN — Medication 100 MILLIGRAM(S): at 05:52

## 2019-12-28 RX ADMIN — Medication 80 MILLIGRAM(S): at 09:30

## 2019-12-28 RX ADMIN — HEPARIN SODIUM 0.1 UNIT(S)/HR: 5000 INJECTION INTRAVENOUS; SUBCUTANEOUS at 09:44

## 2019-12-28 RX ADMIN — Medication 81 MILLIGRAM(S): at 11:49

## 2019-12-28 RX ADMIN — PANTOPRAZOLE SODIUM 40 MILLIGRAM(S): 20 TABLET, DELAYED RELEASE ORAL at 11:46

## 2019-12-28 RX ADMIN — SERTRALINE 50 MILLIGRAM(S): 25 TABLET, FILM COATED ORAL at 11:47

## 2019-12-28 RX ADMIN — FENTANYL CITRATE 6 MICROGRAM(S)/KG/HR: 50 INJECTION INTRAVENOUS at 08:19

## 2019-12-28 RX ADMIN — PIPERACILLIN AND TAZOBACTAM 25 GRAM(S): 4; .5 INJECTION, POWDER, LYOPHILIZED, FOR SOLUTION INTRAVENOUS at 01:28

## 2019-12-28 RX ADMIN — CHLORHEXIDINE GLUCONATE 15 MILLILITER(S): 213 SOLUTION TOPICAL at 05:51

## 2019-12-28 NOTE — CONSULT NOTE ADULT - SUBJECTIVE AND OBJECTIVE BOX
64M with CAD, stents and CABG (last [facility] was 12/2017 with patent LIMA but other grafts occluded, medical management), HTN, HLD, CKD, CHER, ?HF, and pemphigus vulgaris presents with acute SOB requiring intubation for airway protection with new atrial fibrillation in setting of + influenza.    ENT called for evaluation of oral bleeding. Per primary team, nurse has been suctioning blood from oral cavity since yesterday, approximately 200cc suctioned. gb dropped from 13.7 to 12.7 over last 24 hours. Of note primary team describe the intubation as traumatic, unclear if there was any injury in oral cavity. Also, patient has history or pemphigus vulgaris with oral lesions that frequenly bleed, per family. Currently patient remains intubated with blood oral cavity drainage. Patient is also on a heparing drip for new onset a-fib    Vital Signs Last 24 Hrs  T(C): 36.6 (28 Dec 2019 17:00), Max: 37 (27 Dec 2019 20:00)  T(F): 97.8 (28 Dec 2019 17:00), Max: 98.6 (27 Dec 2019 20:00)  HR: 73 (28 Dec 2019 19:02) (64 - 87)  BP: 97/58 (27 Dec 2019 22:30) (97/58 - 97/58)  BP(mean): 65 (27 Dec 2019 22:30) (65 - 65)  RR: 20 (28 Dec 2019 18:00) (11 - 20)  SpO2: 94% (28 Dec 2019 19:02) (93% - 100%)    Intubated and sedated  [facility]  Nose clear bilaterally, no bleeding anteriorly  OC/OP: ETT in place, clots removed from oral cavity with suction, area of mucosal tear in left alveolobuccal groove actively bleeding  attempted to control bleed with silver nitrate, although bleeding slowed down, remained active  packed with wet kerlix  No other bleeding area noted    A/P 64M with oral cavity bleed after intubation. Source of bleeding identified in left alveolobuccal groove. packed with wet kerlix.  -instructed nurse to keep packing there for at least 24 hours, remove tomorrow, if patient still bleeding, can repack in the same area for another 24 hours, if not bleeding do not repack  -no further ENT intervention needed  -anticoagulation per primary team  -wean to extubate per primary team  -call/page with questions

## 2019-12-28 NOTE — PROGRESS NOTE ADULT - ASSESSMENT
64M with CAD, stents and CABG (last [facility] was 12/2017 with patent LIMA but other grafts occluded, medical management), HTN, HLD, CKD, CHER, ?HF, and pemphygus vulgaris presents with acute SOB. admitted to ccu for acute resp failure s/p intubation    CKD (chronic kidney disease) stage 3-4  baseline ~ 1.8-2.2 fluctuates likely sec to chf  CKD likely sec to HTN/HF  Slightly elevated today  Pt on IV diuretics  Pt non-oliguric. Advise to continue at present   UA with moderate blood and minimal protein   check urine p/c ratio  monitor for now    Essential hypertension.    controlled.   care per CCU    Acute resp failure/ Hypervolemia  s/p intubated  agree with diuretics   monitor i/o, weight, urine output   f/u cardio    CKD-MBD  check pth, Check vitamin D  Phos  borderline  Calcium low  monitor phos and wolf daily

## 2019-12-28 NOTE — PROGRESS NOTE ADULT - SUBJECTIVE AND OBJECTIVE BOX
[facility] [place] [facility]   MD Katerina Gallardo D.O. Fatima Sheikh, D.O. Ruoru Wong, PA    From 7 AM - 5 PM:  OFFICE: [phone]  Dr. Mayen cell: [phone]  Dr. Flowers cell: [phone]  Dr. Giordano cell: [phone]  JEEVAN Mccall cell: [phone]    From 5 PM - 7 AM: Answering Service: [phone]      RENAL FOLLOW UP NOTE  --------------------------------------------------------------------------------  HPI:  Pt seen and examined at bedside. Non-oliguric with good urine outout. Remains intubated     PAST HISTORY  --------------------------------------------------------------------------------  No significant changes to PMH, PSH, FHx, SHx, unless otherwise noted    ALLERGIES & MEDICATIONS  --------------------------------------------------------------------------------  Allergies    No Known Allergies    Intolerances      Standing Inpatient Medications  aspirin enteric coated 81 milliGRAM(s) Oral daily  atorvastatin 80 milliGRAM(s) Oral at bedtime  chlorhexidine 0.12% Liquid 15 milliLiter(s) Oral Mucosa every 12 hours  chlorhexidine 4% Liquid 1 Application(s) Topical daily  dextrose 5%. 1000 milliLiter(s) IV Continuous <Continuous>  dextrose 50% Injectable 12.5 Gram(s) IV Push once  dextrose 50% Injectable 25 Gram(s) IV Push once  dextrose 50% Injectable 25 Gram(s) IV Push once  fentaNYL   Infusion. 0.5 MICROgram(s)/kG/Hr IV Continuous <Continuous>  furosemide   Injectable 80 milliGRAM(s) IV Push every 12 hours  heparin  Infusion 10 Unit(s)/Hr IV Continuous <Continuous>  hydrocortisone sodium succinate Injectable 100 milliGRAM(s) IV Push every 8 hours  insulin lispro (HumaLOG) corrective regimen sliding scale   SubCutaneous every 6 hours  midazolam Infusion 0.02 mG/kG/Hr IV Continuous <Continuous>  oseltamivir Suspension 30 milliGRAM(s) Oral two times a day  pantoprazole  Injectable 40 milliGRAM(s) IV Push daily  phenylephrine    Infusion 0.1 MICROgram(s)/kG/Min IV Continuous <Continuous>  piperacillin/tazobactam IVPB.. 3.375 Gram(s) IV Intermittent every 8 hours  sertraline 50 milliGRAM(s) Oral daily  ticagrelor 90 milliGRAM(s) Oral every 12 hours    PRN Inpatient Medications  dextrose 40% Gel 15 Gram(s) Oral once PRN  glucagon  Injectable 1 milliGRAM(s) IntraMuscular once PRN      REVIEW OF SYSTEMS  --------------------------------------------------------------------------------  Unable to obtain     VITALS/PHYSICAL EXAM  --------------------------------------------------------------------------------  T(C): 36.6 (12-28-19 @ 17:00), Max: 37 (12-27-19 @ 20:00)  HR: 87 (12-28-19 @ 18:00) (64 - 87)  BP: 97/58 (12-27-19 @ 22:30) (97/58 - 97/58)  RR: 20 (12-28-19 @ 18:00) (11 - 20)  SpO2: 95% (12-28-19 @ 18:00) (93% - 100%)  Wt(kg): --  Height (cm): 170.2 (12-27-19 @ 14:00)  Weight (kg): 120 (12-27-19 @ 07:48)  BMI (kg/m2): 41.4 (12-27-19 @ 14:00)  BSA (m2): 2.28 (12-27-19 @ 14:00)      12-27-19 @ 07:01  -  12-28-19 @ 07:00  --------------------------------------------------------  IN: 1660.7 mL / OUT: 2695 mL / NET: -1034.3 mL    12-28-19 @ 07:01  -  12-28-19 @ 18:34  --------------------------------------------------------  IN: 1306.6 mL / OUT: 1675 mL / NET: -368.4 mL      Physical Exam:  	Gen: NAD +ET   	HEENT: MMM  	Pulm: Course BS  B/L  	CV: S1S2	Abd: Soft, +BS  	Ext: No LE edema B/L  	Skin: Warm and Dry   	    LABS/STUDIES  --------------------------------------------------------------------------------              12.7   11.10 >-----------<  172      [12-28-19 @ 15:45]              40.8     143  |  103  |  49  ----------------------------<  127      [12-28-19 @ 13:50]  3.6   |  24  |  2.84        Ca     7.9     [12-28-19 @ 13:50]      Mg     2.1     [12-28-19 @ 13:50]      Phos  5.1     [12-28-19 @ 13:50]    TPro  6.0  /  Alb  3.2  /  TBili  1.0  /  DBili  x   /  AST  47  /  ALT  112  /  AlkPhos  194  [12-28-19 @ 03:30]    PT/INR: PT 12.9 , INR 1.16       [12-28-19 @ 03:30]  PTT: 88.4       [12-28-19 @ 15:45]    CK 87      [12-27-19 @ 13:20]    Creatinine Trend:  SCr 2.84 [12-28 @ 13:50]  SCr 2.67 [12-28 @ 03:30]  SCr 2.59 [12-27 @ 21:15]  SCr 2.52 [12-27 @ 13:20]  SCr 1.93 [12-27 @ 07:00]    Urinalysis - [12-27-19 @ 15:54]      Color YELLOW / Appearance Lt TURBID / SG 1.015 / pH 6.0      Gluc NEGATIVE / Ketone NEGATIVE  / Bili NEGATIVE / Urobili NORMAL       Blood MODERATE / Protein 70 / Leuk Est NEGATIVE / Nitrite NEGATIVE      RBC 11-25 / WBC 5-10 / Hyaline 2+ / Gran  / Sq Epi FEW / Non Sq Epi  / Bacteria NEGATIVE      HbA1c 6.1      [12-28-19 @ 03:30]    HCV 0.14, Nonreactive Hepatitis C AB  S/CO Ratio                        Interpretation  < 1.00                                   Non-Reactive  1.00 - 4.99                         Weakly-Reactive  >= 5.00                                Reactive  Non-Reactive: [place] with a non-reactive HCV antibody  result is considered uninfected.  No further action is  needed unless recent infection is suspected.  In these  cases, consider repeat testing later to detect  seroconversion..  Weakly-Reactive: HCV antibody test is abnormal, HCV RNA  Qualitative test will follow.  Reactive: HCV antibody test is abnormal, HCV RNA  Qualitative test will follow.  Note: HCV antibody testing is performed on the [facility] system.      [12-28-19 @ 03:30]

## 2019-12-28 NOTE — PROGRESS NOTE ADULT - PROBLEM SELECTOR PLAN 7
Tamiflu started. Remains afebrile, blod cultures pending. Will continue empiric zosyn and vancomycin at present. Tamiflu started. Remains afebrile, blood cultures pending. Will continue empiric zosyn and vancomycin at present.

## 2019-12-28 NOTE — PROGRESS NOTE ADULT - ASSESSMENT
64M with CAD, stents and CABG (last [facility] was 12/2017 with patent LIMA but other grafts occluded, medical management), HTN, HLD, CKD, CHER, ?HF, and pemphigus vulgaris presents with acute SOB requiring intubation for airway protection with new atrial fibrillation in setting of + influenza.

## 2019-12-28 NOTE — PROGRESS NOTE ADULT - PROBLEM SELECTOR PLAN 1
Remains intubated and sedated, on fentanyl and versed.  Will wean ventilator settings today. PEEP and FiO2. Monitor ABGs and sats.

## 2019-12-28 NOTE — PROGRESS NOTE ADULT - PROBLEM SELECTOR PLAN 2
Continue lasix 80 mg IV BID, strict I & O please. CVP today 8-10 with goal of >5 and net negative output goal of 1L. Continue to hold ACE and BB at present until more euvolemic. ECHO with worsening LV function.

## 2019-12-28 NOTE — PROGRESS NOTE ADULT - SUBJECTIVE AND OBJECTIVE BOX
Subjective/Objective: Patient remains intubated and sedated, no overnight events reported.     MEDICATIONS  (STANDING):  aspirin enteric coated 81 milliGRAM(s) Oral daily  atorvastatin 80 milliGRAM(s) Oral at bedtime  chlorhexidine 0.12% Liquid 15 milliLiter(s) Oral Mucosa every 12 hours  chlorhexidine 4% Liquid 1 Application(s) Topical daily  dextrose 5%. 1000 milliLiter(s) (50 mL/Hr) IV Continuous <Continuous>  dextrose 50% Injectable 12.5 Gram(s) IV Push once  dextrose 50% Injectable 25 Gram(s) IV Push once  dextrose 50% Injectable 25 Gram(s) IV Push once  fentaNYL   Infusion. 0.5 MICROgram(s)/kG/Hr (6 mL/Hr) IV Continuous <Continuous>  furosemide   Injectable 80 milliGRAM(s) IV Push every 12 hours  heparin  Infusion 10 Unit(s)/Hr (0.1 mL/Hr) IV Continuous <Continuous>  hydrocortisone sodium succinate Injectable 100 milliGRAM(s) IV Push every 8 hours  insulin lispro (HumaLOG) corrective regimen sliding scale   SubCutaneous every 6 hours  midazolam Infusion 0.02 mG/kG/Hr (2.4 mL/Hr) IV Continuous <Continuous>  oseltamivir Suspension 30 milliGRAM(s) Oral two times a day  pantoprazole  Injectable 40 milliGRAM(s) IV Push daily  phenylephrine    Infusion 0.1 MICROgram(s)/kG/Min (4.5 mL/Hr) IV Continuous <Continuous>  piperacillin/tazobactam IVPB.. 3.375 Gram(s) IV Intermittent every 8 hours  sertraline 50 milliGRAM(s) Oral daily  ticagrelor 90 milliGRAM(s) Oral every 12 hours    MEDICATIONS  (PRN):  dextrose 40% Gel 15 Gram(s) Oral once PRN Blood Glucose LESS THAN 70 milliGRAM(s)/deciliter  glucagon  Injectable 1 milliGRAM(s) IntraMuscular once PRN Glucose LESS THAN 70 milligrams/deciliter          Vital Signs Last 24 Hrs  T(C): 36.7 (28 Dec 2019 08:00), Max: 37.1 (27 Dec 2019 13:45)  T(F): 98.1 (28 Dec 2019 08:00), Max: 98.7 (27 Dec 2019 13:45)  HR: 69 (28 Dec 2019 08:00) (64 - 109)  BP: 97/58 (27 Dec 2019 22:30) (94/58 - 151/107)  BP(mean): 65 (27 Dec 2019 22:30) (65 - 79)  RR: 20 (28 Dec 2019 08:00) (15 - 23)  SpO2: 99% (28 Dec 2019 08:00) (92% - 100%)  I&O's Detail    27 Dec 2019 07:01  -  28 Dec 2019 07:00  --------------------------------------------------------  IN:    fentaNYL Infusion.: 490 mL    heparin Infusion: 150 mL    IV PiggyBack: 200 mL    midazolam Infusion: 54.6 mL    phenylephrine   Infusion: 766.1 mL  Total IN: 1660.7 mL    OUT:    Indwelling Catheter - Urethral: 2695 mL  Total OUT: 2695 mL    Total NET: -1034.3 mL      28 Dec 2019 07:01  -  28 Dec 2019 08:51  --------------------------------------------------------  IN:    fentaNYL Infusion.: 31.8 mL    heparin Infusion: 10 mL    midazolam Infusion: 4.2 mL    phenylephrine   Infusion: 22.5 mL  Total IN: 68.5 mL    OUT:    Indwelling Catheter - Urethral: 200 mL  Total OUT: 200 mL    Total NET: -131.5 mL    PHYSICAL EXAM  GEN: NAD, skin W & D. Intubated and sedated  RESP: CTA ant  CV: S1S2 irreg, no M/C/R  GI: soft, NT/ND, BS +  EXT: no C/C/E  NEURO: intubated and sedated    EKG/ TELEM: afib/flutter with occ VPCs    LABS:                          13.7   17.93 )-----------( 200      ( 28 Dec 2019 03:30 )             44.4     PT/INR - ( 28 Dec 2019 03:30 )   PT: 12.9 SEC;   INR: 1.16          PTT - ( 28 Dec 2019 03:30 )  PTT:87.0 SEC  28 Dec 2019 03:30    141    |  100    |  46<H>  ----------------------------<  121<H>  4.2     |  20<L>  |  2.67<H>    27 Dec 2019 21:15    141    |  103    |  43<H>  ----------------------------<  123<H>  4.7     |  19<L>  |  2.59<H>    Ca    8.0<L>      28 Dec 2019 03:30  Ca    8.0<L>      27 Dec 2019 21:15  Phos  5.5<H>     28 Dec 2019 03:30  Phos  5.2<H>     27 Dec 2019 21:15  Mg     2.2       28 Dec 2019 03:30  Mg     2.2       27 Dec 2019 21:15    TPro  6.0    /  Alb  3.2<L>  /  TBili  1.0    /  DBili  x      /  AST  47<H>  /  ALT  112<H>  /  AlkPhos  194<H>  28 Dec 2019 03:30  TPro  6.3    /  Alb  3.5    /  TBili  0.7    /  DBili  x      /  AST  86<H>  /  ALT  140<H>  /  AlkPhos  235<H>  27 Dec 2019 13:20    CARDIAC MARKERS ( 27 Dec 2019 13:20 )  x     / x     / 87 u/L / 14.32 ng/mL / x          Troponin T, High Sensitivity: 306 ng/L (12-27-19 @ 13:20)  Troponin T, High Sensitivity: 61 ng/L (12-27-19 @ 07:00)    Creatine Kinase, Serum: 87 u/L (12-27-19 @ 13:20)    CKMB: 14.32 ng/mL (12-27-19 @ 13:20)      Hemoglobin A1C, Whole Blood: 6.1 % (12-28-19 @ 03:30)    Diagnostic testing:      Patient name: THONY ANAYA  YOB: 1955   Age: 64 (M)   MR#: [id number]  Study Date: 12/27/2019  Location: O/PSonographer: HANNAH Guadalupe  Study quality: Technically Fair  Referring Physician: Not Available Doctor, MD  Blood Pressure: 147/117 mmHg  Height: 170 cm  Weight: 120 kg  BSA: 2.3 m2  ------------------------------------------------------------------------  PROCEDURE: Transthoracic echocardiogram with 2-D, M-Mode  and complete spectral and color flow Doppler.  INDICATION: Abnormal electrocardiogram (ECG) (EKG) (R94.31)  ------------------------------------------------------------------------  DIMENSIONS:  Dimensions:     Normal Values:  LA:     5.2 cm    2.0 - 4.0 cm  Ao:     3.5 cm    2.0 - 3.8 cm  SEPTUM: 1.1cm    0.6 - 1.2 cm  PWT:    1.2 cm    0.6 - 1.1 cm  LVIDd:  5.7 cm    3.0 - 5.6 cm  LVIDs:    ---     1.8 - 4.0 cm  Derived Variables:  LVMI: 120 g/m2  RWT: 0.42  ------------------------------------------------------------------------  OBSERVATIONS:  Mitral Valve: Tethered mitral valve leaflets. Moderate  mitral regurgitation.  Aortic Root: Normal aortic root.  Aortic Valve: Calcified trileaflet aortic valve with normal  opening. Mild-moderate aortic regurgitation.  Left Atrium: Normal left atrium.  Left Ventricle: Endocardium not well visualized; grossly  severe  left ventricular systolic dysfunction. Moderate  left ventricular enlargement.  Right Heart: Normal right atrium. The right ventricle is  not well visualized; grossly normal right ventricular  systolic function. Normal tricuspid valve. Moderate  tricuspid regurgitation. Normal pulmonic valve.  Pericardium/PleuraNormal pericardium with no pericardial  effusion.  ------------------------------------------------------------------------  CONCLUSIONS:  1. Tethered mitral valve leaflets. Moderate mitral  regurgitation.  2. Calcified trileaflet aortic valve with normal opening.  Mild-moderate aortic regurgitation.  3. Moderate left ventricular enlargement.  4. Endocardium not wellvisualized; grossly severe  left  ventricular systolic dysfunction.  5. The right ventricle is not well visualized; grossly  normal right ventricular systolic function.  ------------------------------------------------------------------------  Confirmed on  12/27/2019 - 16:42:20 by DANTE Bravo  ------------------------------------------------------------------------

## 2019-12-29 DIAGNOSIS — I95.9 HYPOTENSION, UNSPECIFIED: ICD-10-CM

## 2019-12-29 DIAGNOSIS — Z29.9 ENCOUNTER FOR PROPHYLACTIC MEASURES, UNSPECIFIED: ICD-10-CM

## 2019-12-29 LAB
ALBUMIN SERPL ELPH-MCNC: 3 G/DL — LOW (ref 3.3–5)
ALP SERPL-CCNC: 134 U/L — HIGH (ref 40–120)
ALT FLD-CCNC: 72 U/L — HIGH (ref 4–41)
ANION GAP SERPL CALC-SCNC: 15 MMO/L — HIGH (ref 7–14)
APTT BLD: 75.9 SEC — HIGH (ref 27.5–36.3)
AST SERPL-CCNC: 27 U/L — SIGNIFICANT CHANGE UP (ref 4–40)
BASE EXCESS BLDA CALC-SCNC: 3.9 MMOL/L — SIGNIFICANT CHANGE UP
BASE EXCESS BLDV CALC-SCNC: 5.9 MMOL/L — SIGNIFICANT CHANGE UP
BILIRUB SERPL-MCNC: 0.8 MG/DL — SIGNIFICANT CHANGE UP (ref 0.2–1.2)
BLOOD GAS ARTERIAL - FIO2: 30 — SIGNIFICANT CHANGE UP
BUN SERPL-MCNC: 55 MG/DL — HIGH (ref 7–23)
CALCIUM SERPL-MCNC: 8.2 MG/DL — LOW (ref 8.4–10.5)
CHLORIDE SERPL-SCNC: 103 MMOL/L — SIGNIFICANT CHANGE UP (ref 98–107)
CO2 SERPL-SCNC: 24 MMOL/L — SIGNIFICANT CHANGE UP (ref 22–31)
CREAT SERPL-MCNC: 3.05 MG/DL — HIGH (ref 0.5–1.3)
GLUCOSE BLDA-MCNC: 117 MG/DL — HIGH (ref 70–99)
GLUCOSE BLDC GLUCOMTR-MCNC: 110 MG/DL — HIGH (ref 70–99)
GLUCOSE BLDC GLUCOMTR-MCNC: 116 MG/DL — HIGH (ref 70–99)
GLUCOSE BLDC GLUCOMTR-MCNC: 127 MG/DL — HIGH (ref 70–99)
GLUCOSE BLDC GLUCOMTR-MCNC: 99 MG/DL — SIGNIFICANT CHANGE UP (ref 70–99)
GLUCOSE SERPL-MCNC: 119 MG/DL — HIGH (ref 70–99)
HCO3 BLDA-SCNC: 28 MMOL/L — HIGH (ref 22–26)
HCO3 BLDV-SCNC: 28 MMOL/L — HIGH (ref 20–27)
HCT VFR BLD CALC: 40.4 % — SIGNIFICANT CHANGE UP (ref 39–50)
HCT VFR BLDA CALC: 38.8 % — LOW (ref 39–51)
HGB BLD-MCNC: 12.3 G/DL — LOW (ref 13–17)
HGB BLDA-MCNC: 12.6 G/DL — LOW (ref 13–17)
INR BLD: 1.1 — SIGNIFICANT CHANGE UP (ref 0.88–1.17)
LACTATE BLDA-SCNC: 1.7 MMOL/L — SIGNIFICANT CHANGE UP (ref 0.5–2)
MAGNESIUM SERPL-MCNC: 2.2 MG/DL — SIGNIFICANT CHANGE UP (ref 1.6–2.6)
MCHC RBC-ENTMCNC: 26.9 PG — LOW (ref 27–34)
MCHC RBC-ENTMCNC: 30.4 % — LOW (ref 32–36)
MCV RBC AUTO: 88.2 FL — SIGNIFICANT CHANGE UP (ref 80–100)
NRBC # FLD: 0 K/UL — SIGNIFICANT CHANGE UP (ref 0–0)
PCO2 BLDA: 45 MMHG — SIGNIFICANT CHANGE UP (ref 35–48)
PCO2 BLDV: 54 MMHG — HIGH (ref 41–51)
PH BLDA: 7.41 PH — SIGNIFICANT CHANGE UP (ref 7.35–7.45)
PH BLDV: 7.37 PH — SIGNIFICANT CHANGE UP (ref 7.32–7.43)
PHOSPHATE SERPL-MCNC: 4.8 MG/DL — HIGH (ref 2.5–4.5)
PLATELET # BLD AUTO: 168 K/UL — SIGNIFICANT CHANGE UP (ref 150–400)
PMV BLD: 11.8 FL — SIGNIFICANT CHANGE UP (ref 7–13)
PO2 BLDA: 99 MMHG — SIGNIFICANT CHANGE UP (ref 83–108)
PO2 BLDV: 44 MMHG — HIGH (ref 35–40)
POTASSIUM BLDA-SCNC: 3.3 MMOL/L — LOW (ref 3.4–4.5)
POTASSIUM SERPL-MCNC: 3.4 MMOL/L — LOW (ref 3.5–5.3)
POTASSIUM SERPL-SCNC: 3.4 MMOL/L — LOW (ref 3.5–5.3)
PROT SERPL-MCNC: 5.6 G/DL — LOW (ref 6–8.3)
PROTHROM AB SERPL-ACNC: 12.6 SEC — SIGNIFICANT CHANGE UP (ref 9.8–13.1)
RBC # BLD: 4.58 M/UL — SIGNIFICANT CHANGE UP (ref 4.2–5.8)
RBC # FLD: 18.2 % — HIGH (ref 10.3–14.5)
SAO2 % BLDA: 97.1 % — SIGNIFICANT CHANGE UP (ref 95–99)
SAO2 % BLDV: 71.1 % — SIGNIFICANT CHANGE UP (ref 60–85)
SODIUM BLDA-SCNC: 142 MMOL/L — SIGNIFICANT CHANGE UP (ref 136–146)
SODIUM SERPL-SCNC: 142 MMOL/L — SIGNIFICANT CHANGE UP (ref 135–145)
VANCOMYCIN FLD-MCNC: 13.8 UG/ML — SIGNIFICANT CHANGE UP
WBC # BLD: 9.25 K/UL — SIGNIFICANT CHANGE UP (ref 3.8–10.5)
WBC # FLD AUTO: 9.25 K/UL — SIGNIFICANT CHANGE UP (ref 3.8–10.5)

## 2019-12-29 PROCEDURE — 99233 SBSQ HOSP IP/OBS HIGH 50: CPT | Mod: GC

## 2019-12-29 PROCEDURE — 71045 X-RAY EXAM CHEST 1 VIEW: CPT | Mod: 26

## 2019-12-29 RX ORDER — POTASSIUM CHLORIDE 20 MEQ
10 PACKET (EA) ORAL
Refills: 0 | Status: COMPLETED | OUTPATIENT
Start: 2019-12-29 | End: 2019-12-29

## 2019-12-29 RX ORDER — FENTANYL CITRATE 50 UG/ML
25 INJECTION INTRAVENOUS ONCE
Refills: 0 | Status: DISCONTINUED | OUTPATIENT
Start: 2019-12-29 | End: 2019-12-29

## 2019-12-29 RX ORDER — POTASSIUM CHLORIDE 20 MEQ
40 PACKET (EA) ORAL ONCE
Refills: 0 | Status: COMPLETED | OUTPATIENT
Start: 2019-12-29 | End: 2019-12-29

## 2019-12-29 RX ADMIN — PIPERACILLIN AND TAZOBACTAM 25 GRAM(S): 4; .5 INJECTION, POWDER, LYOPHILIZED, FOR SOLUTION INTRAVENOUS at 11:10

## 2019-12-29 RX ADMIN — TICAGRELOR 90 MILLIGRAM(S): 90 TABLET ORAL at 06:32

## 2019-12-29 RX ADMIN — CHLORHEXIDINE GLUCONATE 1 APPLICATION(S): 213 SOLUTION TOPICAL at 06:31

## 2019-12-29 RX ADMIN — CHLORHEXIDINE GLUCONATE 15 MILLILITER(S): 213 SOLUTION TOPICAL at 18:22

## 2019-12-29 RX ADMIN — SERTRALINE 50 MILLIGRAM(S): 25 TABLET, FILM COATED ORAL at 11:10

## 2019-12-29 RX ADMIN — Medication 40 MILLIEQUIVALENT(S): at 06:31

## 2019-12-29 RX ADMIN — Medication 100 MILLIEQUIVALENT(S): at 16:17

## 2019-12-29 RX ADMIN — FENTANYL CITRATE 6 MICROGRAM(S)/KG/HR: 50 INJECTION INTRAVENOUS at 14:56

## 2019-12-29 RX ADMIN — FENTANYL CITRATE 25 MICROGRAM(S): 50 INJECTION INTRAVENOUS at 01:35

## 2019-12-29 RX ADMIN — PIPERACILLIN AND TAZOBACTAM 25 GRAM(S): 4; .5 INJECTION, POWDER, LYOPHILIZED, FOR SOLUTION INTRAVENOUS at 01:09

## 2019-12-29 RX ADMIN — FENTANYL CITRATE 6 MICROGRAM(S)/KG/HR: 50 INJECTION INTRAVENOUS at 01:30

## 2019-12-29 RX ADMIN — ATORVASTATIN CALCIUM 80 MILLIGRAM(S): 80 TABLET, FILM COATED ORAL at 21:41

## 2019-12-29 RX ADMIN — FENTANYL CITRATE 25 MICROGRAM(S): 50 INJECTION INTRAVENOUS at 01:29

## 2019-12-29 RX ADMIN — Medication 100 MILLIGRAM(S): at 06:31

## 2019-12-29 RX ADMIN — TICAGRELOR 90 MILLIGRAM(S): 90 TABLET ORAL at 18:22

## 2019-12-29 RX ADMIN — Medication 30 MILLIGRAM(S): at 06:32

## 2019-12-29 RX ADMIN — PIPERACILLIN AND TAZOBACTAM 25 GRAM(S): 4; .5 INJECTION, POWDER, LYOPHILIZED, FOR SOLUTION INTRAVENOUS at 18:18

## 2019-12-29 RX ADMIN — Medication 80 MILLIGRAM(S): at 06:31

## 2019-12-29 RX ADMIN — Medication 81 MILLIGRAM(S): at 11:10

## 2019-12-29 RX ADMIN — Medication 30 MILLIGRAM(S): at 19:51

## 2019-12-29 RX ADMIN — HEPARIN SODIUM 0.1 UNIT(S)/HR: 5000 INJECTION INTRAVENOUS; SUBCUTANEOUS at 11:11

## 2019-12-29 RX ADMIN — CHLORHEXIDINE GLUCONATE 15 MILLILITER(S): 213 SOLUTION TOPICAL at 06:31

## 2019-12-29 RX ADMIN — MIDAZOLAM HYDROCHLORIDE 2.4 MG/KG/HR: 1 INJECTION, SOLUTION INTRAMUSCULAR; INTRAVENOUS at 13:07

## 2019-12-29 RX ADMIN — MIDAZOLAM HYDROCHLORIDE 2.4 MG/KG/HR: 1 INJECTION, SOLUTION INTRAMUSCULAR; INTRAVENOUS at 03:43

## 2019-12-29 RX ADMIN — Medication 100 MILLIEQUIVALENT(S): at 17:28

## 2019-12-29 RX ADMIN — MIDAZOLAM HYDROCHLORIDE 2.4 MG/KG/HR: 1 INJECTION, SOLUTION INTRAMUSCULAR; INTRAVENOUS at 21:51

## 2019-12-29 RX ADMIN — MIDAZOLAM HYDROCHLORIDE 2.4 MG/KG/HR: 1 INJECTION, SOLUTION INTRAMUSCULAR; INTRAVENOUS at 13:06

## 2019-12-29 RX ADMIN — Medication 100 MILLIGRAM(S): at 21:41

## 2019-12-29 RX ADMIN — Medication 100 MILLIGRAM(S): at 13:10

## 2019-12-29 RX ADMIN — Medication 100 MILLIEQUIVALENT(S): at 16:52

## 2019-12-29 RX ADMIN — PANTOPRAZOLE SODIUM 40 MILLIGRAM(S): 20 TABLET, DELAYED RELEASE ORAL at 11:10

## 2019-12-29 NOTE — PROGRESS NOTE ADULT - ASSESSMENT
64M with CAD, stents and CABG (last [facility] was 12/2017 with patent LIMA but other grafts occluded, medical management), HTN, HLD, CKD, CHER, ?HF, and pemphigus vulgaris presents with acute SOB requiring intubation for airway protection with new atrial fibrillation in setting of + influenza.

## 2019-12-29 NOTE — PROGRESS NOTE ADULT - PROBLEM SELECTOR PLAN 4
Baseline reported at 1.8 to 2.0. Renal recs noted and appreciated. Continue to monitor Scr. Remains in aflutter and afib but with good rate control. Continue Heparin gtt for anticoagulation.

## 2019-12-29 NOTE — PROGRESS NOTE ADULT - ASSESSMENT
64M with CAD, stents and CABG (last [facility] was 12/2017 with patent LIMA but other grafts occluded, medical management), HTN, HLD, CKD, CHER, ?HF, and pemphygus vulgaris presents with acute SOB. admitted to ccu for acute resp failure s/p intubation    CKD (chronic kidney disease) stage 3-4  baseline ~ 1.8-2.2 fluctuates likely sec to chf  CKD likely sec to HTN/HF    SCr elevated today  Pt non-oliguric. Advise to continue diuresis as needed at present     UA with moderate blood and minimal protein   check urine p/c ratio  monitor for now    Essential hypertension.    controlled.   care per CCU    Acute resp failure/ Hypervolemia  s/p intubated  Improving volume status s/p diuretics   agree with diuretics as needed at present  monitor i/o, weight, urine output   f/u cardio    CKD-MBD  check pth, Check vitamin D  Phos acceptable   Calcium low  monitor phos and wolf daily

## 2019-12-29 NOTE — PROGRESS NOTE ADULT - SUBJECTIVE AND OBJECTIVE BOX
Seen and examined at bedside. Still having bleeding from oral cavity    Vital Signs Last 24 Hrs  T(C): 37.7 (29 Dec 2019 04:00), Max: 37.7 (29 Dec 2019 04:00)  T(F): 99.8 (29 Dec 2019 04:00), Max: 99.8 (29 Dec 2019 04:00)  HR: 89 (29 Dec 2019 07:37) (70 - 89)  BP: --  BP(mean): --  RR: 20 (29 Dec 2019 06:00) (11 - 20)  SpO2: 99% (29 Dec 2019 07:37) (93% - 99%)    Intubated and sedated  On [facility] vent  Nose clear bilaterally, no bleeding anteriorly  OC/OP: ETT in place, clots removed from oral cavity with suction, area of mucosal tear in left alveolobuccal groove actively bleeding  attempted to control bleed with silver nitrate, although bleeding slowed down, remained active  packed with wet kerlix  No other bleeding area noted    A/P 64M with oral cavity bleed after intubation. Source of bleeding identified in left alveolobuccal groove. packed with wet kerlix.  -leave packing in place, ENT will remove tommorw  -continue abx while packing in place  -anticoagulation per primary team  -call/page with questions

## 2019-12-29 NOTE — PROGRESS NOTE ADULT - PROBLEM SELECTOR PLAN 7
Tamiflu started. Remains afebrile, blood cultures pending. Will continue empiric zosyn and vancomycin at present. Continue solucortef.

## 2019-12-29 NOTE — PROGRESS NOTE ADULT - PROBLEM SELECTOR PLAN 2
Continue lasix 80 mg IV BID, strict I & O please. CVP today 8-10 with goal of >5 and net negative output goal of 1L. Continue to hold ACE and BB at present until more euvolemic. ECHO with worsening LV function. sepsis vs worsening ADHF   -leukocytosis on presentation, s/p vanc and zosyn. now on zosyn alone   -leukocytosis improved, Blood cx NGTD, UA negative nitrate, and no bacteria   -on Levophed, down titrate AT, consider giving ?

## 2019-12-29 NOTE — PROGRESS NOTE ADULT - PROBLEM SELECTOR PLAN 1
Remains intubated and sedated, on fentanyl and versed.  Will wean ventilator settings today. PEEP and FiO2. Monitor ABGs and sats. Remains intubated and sedated, on fentanyl and versed.  Will wean ventilator settings, PEEP and FiO2. Monitor ABGs and sats.  -currently on 40% FiO2 and PEEP 8, paO2 99  -will decrease to 35% and peep 5

## 2019-12-29 NOTE — PROGRESS NOTE ADULT - SUBJECTIVE AND OBJECTIVE BOX
Cheyanne Soriano PGY1   Page: [phone]    PATIENT: THONY ANAYA, MRN: [id number]    CHIEF COMPLAINT: Patient is a 64y old  Male who presents with a chief complaint of Acute resp failure (28 Dec 2019 19:41)      INTERVAL HISTORY/OVERNIGHT EVENTS: No overnight events. At bedside, patient has been sleeping and eating well. Denies N/V/D/C. Last BM x1 regular yesterday. Denies abdominal pain. Denies chest pain or SOB, cough. Has been ambulating with assistance. Oriented to person, place, and time. Breathing comfortably on room air.    REVIEW OF SYSTEMS:    Constitutional:     [ ] negative [ ] fevers [ ] chills [ ] weight loss [ ] weight gain  HEENT:                  [ ] negative [ ] dry eyes [ ] eye irritation [ ] postnasal drip [ ] nasal congestion  CV:                         [ ] negative  [ ] chest pain [ ] orthopnea [ ] palpitations [ ] murmur  Resp:                     [ ] negative [ ] cough [ ] shortness of breath [ ] dyspnea [ ] wheezing [ ] sputum [ ] hemoptysis  GI:                          [ ] negative [ ] nausea [ ] vomiting [ ] diarrhea [ ] constipation [ ] abd pain [ ] dysphagia   :                        [ ] negative [ ] dysuria [ ] nocturia [ ] hematuria [ ] increased urinary frequency  Musculoskeletal: [ ] negative [ ] back pain [ ] myalgias [ ] arthralgias [ ] fracture  Skin:                       [ ] negative [ ] rash [ ] itch  Neurological:        [ ] negative [ ] headache [ ] dizziness [ ] syncope [ ] weakness [ ] numbness  Psychiatric:           [ ] negative [ ] anxiety [ ] depression  Endocrine:            [ ] negative [ ] diabetes [ ] thyroid problem  Heme/Lymph:      [ ] negative [ ] anemia [ ] bleeding problem  Allergic/Immune: [ ] negative [ ] itchy eyes [ ] nasal discharge [ ] hives [ ] angioedema    [ ] All other systems negative  [ ] Unable to assess ROS because ________.    MEDICATIONS:  MEDICATIONS  (STANDING):  aspirin enteric coated 81 milliGRAM(s) Oral daily  atorvastatin 80 milliGRAM(s) Oral at bedtime  chlorhexidine 0.12% Liquid 15 milliLiter(s) Oral Mucosa every 12 hours  chlorhexidine 4% Liquid 1 Application(s) Topical daily  dextrose 5%. 1000 milliLiter(s) (50 mL/Hr) IV Continuous <Continuous>  dextrose 50% Injectable 12.5 Gram(s) IV Push once  dextrose 50% Injectable 25 Gram(s) IV Push once  dextrose 50% Injectable 25 Gram(s) IV Push once  fentaNYL   Infusion. 0.5 MICROgram(s)/kG/Hr (6 mL/Hr) IV Continuous <Continuous>  furosemide   Injectable 80 milliGRAM(s) IV Push every 12 hours  heparin  Infusion 10 Unit(s)/Hr (0.1 mL/Hr) IV Continuous <Continuous>  hydrocortisone sodium succinate Injectable 100 milliGRAM(s) IV Push every 8 hours  insulin lispro (HumaLOG) corrective regimen sliding scale   SubCutaneous every 6 hours  midazolam Infusion 0.02 mG/kG/Hr (2.4 mL/Hr) IV Continuous <Continuous>  oseltamivir Suspension 30 milliGRAM(s) Oral two times a day  pantoprazole  Injectable 40 milliGRAM(s) IV Push daily  phenylephrine    Infusion 0.1 MICROgram(s)/kG/Min (4.5 mL/Hr) IV Continuous <Continuous>  piperacillin/tazobactam IVPB.. 3.375 Gram(s) IV Intermittent every 8 hours  sertraline 50 milliGRAM(s) Oral daily  ticagrelor 90 milliGRAM(s) Oral every 12 hours    MEDICATIONS  (PRN):  dextrose 40% Gel 15 Gram(s) Oral once PRN Blood Glucose LESS THAN 70 milliGRAM(s)/deciliter  glucagon  Injectable 1 milliGRAM(s) IntraMuscular once PRN Glucose LESS THAN 70 milligrams/deciliter      ALLERGIES: Allergies    No Known Allergies    Intolerances        OBJECTIVE:  ICU Vital Signs Last 24 Hrs  T(C): 37.7 (29 Dec 2019 04:00), Max: 37.7 (29 Dec 2019 04:00)  T(F): 99.8 (29 Dec 2019 04:00), Max: 99.8 (29 Dec 2019 04:00)  HR: 89 (29 Dec 2019 07:37) (70 - 89)  BP: --  BP(mean): --  ABP: 110/68 (29 Dec 2019 06:00) (96/61 - 133/78)  ABP(mean): 82 (29 Dec 2019 06:00) (71 - 97)  RR: 20 (29 Dec 2019 06:00) (11 - 20)  SpO2: 99% (29 Dec 2019 07:37) (93% - 99%)    Mode: AC/ CMV (Assist Control/ Continuous Mandatory Ventilation)  RR (machine): 20  TV (machine): 500  FiO2: 40  PEEP: 8  ITime: 1  MAP: 15.5  PIP: 33    Adult Advanced Hemodynamics Last 24 Hrs  CVP(mm Hg): 9 (29 Dec 2019 06:00) (7 - 18)  CVP(cm H2O): --  CO: --  CI: --  PA: --  PA(mean): --  PCWP: --  SVR: --  SVRI: --  PVR: --  PVRI: --  CAPILLARY BLOOD GLUCOSE      POCT Blood Glucose.: 110 mg/dL (29 Dec 2019 06:27)  POCT Blood Glucose.: 103 mg/dL (28 Dec 2019 22:45)  POCT Blood Glucose.: 94 mg/dL (28 Dec 2019 17:24)  POCT Blood Glucose.: 111 mg/dL (28 Dec 2019 11:58)    CAPILLARY BLOOD GLUCOSE      POCT Blood Glucose.: 110 mg/dL (29 Dec 2019 06:27)    I&O's Summary    28 Dec 2019 07:01  -  29 Dec 2019 07:00  --------------------------------------------------------  IN: 2364.7 mL / OUT: 3465 mL / NET: -1100.3 mL      Daily     Daily Weight in k.8 (29 Dec 2019 06:00)    PHYSICAL EXAMINATION:  General: Comfortable, no acute distress, cooperative with exam.  HEENT: PERRLA, EOMI, moist mucous membranes.  Respiratory: CTAB, normal respiratory effort, no coughing, wheezes, crackles, or rales.  CV: RRR, S1S2, no murmurs, rubs or gallops. No JVD. Distal pulses intact.  Abdominal: Soft, nontender, nondistended, no rebound or guarding, normal bowel sounds.  Neurology: AOx3, no focal neuro defects, DOW x 4.  Extremities: No pitting edema, + Peripheral pulses.  Incisions:   Tubes:    LABS:  ABG - ( 29 Dec 2019 04:45 )  pH, Arterial: 7.41  pH, Blood: x     /  pCO2: 45    /  pO2: 99    / HCO3: 28    / Base Excess: 3.9   /  SaO2: 97.1                                    12.3   9.25  )-----------( 168      ( 29 Dec 2019 04:45 )             40.4         142  |  103  |  55<H>  ----------------------------<  119<H>  3.4<L>   |  24  |  3.05<H>    Ca    8.2<L>      29 Dec 2019 04:45  Phos  4.8       Mg     2.2         TPro  5.6<L>  /  Alb  3.0<L>  /  TBili  0.8  /  DBili  x   /  AST  27  /  ALT  72<H>  /  AlkPhos  134<H>      LIVER FUNCTIONS - ( 29 Dec 2019 04:45 )  Alb: 3.0 g/dL / Pro: 5.6 g/dL / ALK PHOS: 134 u/L / ALT: 72 u/L / AST: 27 u/L / GGT: x           PT/INR - ( 29 Dec 2019 04:45 )   PT: 12.6 SEC;   INR: 1.10          PTT - ( 29 Dec 2019 04:45 )  PTT:75.9 SEC    CARDIAC MARKERS ( 27 Dec 2019 13:20 )  x     / x     / 87 u/L / 14.32 ng/mL / x          Urinalysis Basic - ( 27 Dec 2019 15:54 )    Color: YELLOW / Appearance: Lt TURBID / S.015 / pH: 6.0  Gluc: NEGATIVE / Ketone: NEGATIVE  / Bili: NEGATIVE / Urobili: NORMAL   Blood: MODERATE / Protein: 70 / Nitrite: NEGATIVE   Leuk Esterase: NEGATIVE / RBC: 11-25 / WBC 5-10   Sq Epi: FEW / Non Sq Epi: x / Bacteria: NEGATIVE        TELEMETRY:     EKG:     IMAGING: Cheyanne Soriano PGY1   Page: [phone]    PATIENT: THONY ANAYA, MRN: [id number]    CHIEF COMPLAINT: Patient is a 64y old  Male who presents with a chief complaint of Acute resp failure (28 Dec 2019 19:41)      INTERVAL HISTORY/OVERNIGHT EVENTS: No overnight events    REVIEW OF SYSTEMS:  pt sedated and intubated     MEDICATIONS:  MEDICATIONS  (STANDING):  aspirin enteric coated 81 milliGRAM(s) Oral daily  atorvastatin 80 milliGRAM(s) Oral at bedtime  chlorhexidine 0.12% Liquid 15 milliLiter(s) Oral Mucosa every 12 hours  chlorhexidine 4% Liquid 1 Application(s) Topical daily  dextrose 5%. 1000 milliLiter(s) (50 mL/Hr) IV Continuous <Continuous>  dextrose 50% Injectable 12.5 Gram(s) IV Push once  dextrose 50% Injectable 25 Gram(s) IV Push once  dextrose 50% Injectable 25 Gram(s) IV Push once  fentaNYL   Infusion. 0.5 MICROgram(s)/kG/Hr (6 mL/Hr) IV Continuous <Continuous>  furosemide   Injectable 80 milliGRAM(s) IV Push every 12 hours  heparin  Infusion 10 Unit(s)/Hr (0.1 mL/Hr) IV Continuous <Continuous>  hydrocortisone sodium succinate Injectable 100 milliGRAM(s) IV Push every 8 hours  insulin lispro (HumaLOG) corrective regimen sliding scale   SubCutaneous every 6 hours  midazolam Infusion 0.02 mG/kG/Hr (2.4 mL/Hr) IV Continuous <Continuous>  oseltamivir Suspension 30 milliGRAM(s) Oral two times a day  pantoprazole  Injectable 40 milliGRAM(s) IV Push daily  phenylephrine    Infusion 0.1 MICROgram(s)/kG/Min (4.5 mL/Hr) IV Continuous <Continuous>  piperacillin/tazobactam IVPB.. 3.375 Gram(s) IV Intermittent every 8 hours  sertraline 50 milliGRAM(s) Oral daily  ticagrelor 90 milliGRAM(s) Oral every 12 hours    MEDICATIONS  (PRN):  dextrose 40% Gel 15 Gram(s) Oral once PRN Blood Glucose LESS THAN 70 milliGRAM(s)/deciliter  glucagon  Injectable 1 milliGRAM(s) IntraMuscular once PRN Glucose LESS THAN 70 milligrams/deciliter      ALLERGIES: Allergies    No Known Allergies    Intolerances        OBJECTIVE:  ICU Vital Signs Last 24 Hrs  T(C): 37.7 (29 Dec 2019 04:00), Max: 37.7 (29 Dec 2019 04:00)  T(F): 99.8 (29 Dec 2019 04:00), Max: 99.8 (29 Dec 2019 04:00)  HR: 89 (29 Dec 2019 07:37) (70 - 89)  BP: --  BP(mean): --  ABP: 110/68 (29 Dec 2019 06:00) (96/61 - 133/78)  ABP(mean): 82 (29 Dec 2019 06:00) (71 - 97)  RR: 20 (29 Dec 2019 06:00) (11 - 20)  SpO2: 99% (29 Dec 2019 07:37) (93% - 99%)    Mode: AC/ CMV (Assist Control/ Continuous Mandatory Ventilation)  RR (machine): 20  TV (machine): 500  FiO2: 40  PEEP: 8  ITime: 1  MAP: 15.5  PIP: 33      POCT Blood Glucose.: 110 mg/dL (29 Dec 2019 06:27)  POCT Blood Glucose.: 103 mg/dL (28 Dec 2019 22:45)  POCT Blood Glucose.: 94 mg/dL (28 Dec 2019 17:24)  POCT Blood Glucose.: 111 mg/dL (28 Dec 2019 11:58)    CAPILLARY BLOOD GLUCOSE      POCT Blood Glucose.: 110 mg/dL (29 Dec 2019 06:27)    I&O's Summary    28 Dec 2019 07:01  -  29 Dec 2019 07:00  --------------------------------------------------------  IN: 2364.7 mL / OUT: 3465 mL / NET: -1100.3 mL      Daily     Daily Weight in k.8 (29 Dec 2019 06:00)    PHYSICAL EXAMINATION:  General: sedated and intubated   HEENT: PERRLA, EOMI, moist mucous membranes.  Respiratory: intubated, CTAB   CV: Afib rhythm, S1S2, no murmurs, rubs or gallops. Distal pulses intact.  Abdominal: Soft, nondistended, no rebound or guarding, normal bowel sounds.  Neurology: pt sedated  Extremities: No pitting edema, + Peripheral pulses.  Incisions:   Tubes:    LABS:  ABG - ( 29 Dec 2019 04:45 )  pH, Arterial: 7.41  pH, Blood: x     /  pCO2: 45    /  pO2: 99    / HCO3: 28    / Base Excess: 3.9   /  SaO2: 97.1                                12.3   9.25  )-----------( 168      ( 29 Dec 2019 04:45 )             40.4         142  |  103  |  55<H>  ----------------------------<  119<H>  3.4<L>   |  24  |  3.05<H>    Ca    8.2<L>      29 Dec 2019 04:45  Phos  4.8       Mg     2.2         TPro  5.6<L>  /  Alb  3.0<L>  /  TBili  0.8  /  DBili  x   /  AST  27  /  ALT  72<H>  /  AlkPhos  134<H>      LIVER FUNCTIONS - ( 29 Dec 2019 04:45 )  Alb: 3.0 g/dL / Pro: 5.6 g/dL / ALK PHOS: 134 u/L / ALT: 72 u/L / AST: 27 u/L / GGT: x           PT/INR - ( 29 Dec 2019 04:45 )   PT: 12.6 SEC;   INR: 1.10          PTT - ( 29 Dec 2019 04:45 )  PTT:75.9 SEC    CARDIAC MARKERS ( 27 Dec 2019 13:20 )  x     / x     / 87 u/L / 14.32 ng/mL / x          Urinalysis Basic - ( 27 Dec 2019 15:54 )    Color: YELLOW / Appearance: Lt TURBID / S.015 / pH: 6.0  Gluc: NEGATIVE / Ketone: NEGATIVE  / Bili: NEGATIVE / Urobili: NORMAL   Blood: MODERATE / Protein: 70 / Nitrite: NEGATIVE   Leuk Esterase: NEGATIVE / RBC: 11-25 / WBC 5-10   Sq Epi: FEW / Non Sq Epi: x / Bacteria: NEGATIVE        TELEMETRY:     EKG:     IMAGING:

## 2019-12-29 NOTE — PROGRESS NOTE ADULT - SUBJECTIVE AND OBJECTIVE BOX
[facility] [place] [facility]   MD Katerina Gallardo D.O. Fatima Sheikh, D.O. Ruoru Wong, PA    From 7 AM - 5 PM:  OFFICE: [phone]  Dr. Mayen cell: [phone]  Dr. Flowers cell: [phone]  Dr. Giordano cell: [phone]  JEEVAN Mccall cell: [phone]    From 5 PM - 7 AM: Answering Service: [phone]      RENAL FOLLOW UP NOTE  --------------------------------------------------------------------------------  HPI: Pt seen and examined at bedside. Remains intubated. Still bleeding orally   Non-oliguric with good urine output     PAST HISTORY  --------------------------------------------------------------------------------  No significant changes to PMH, PSH, FHx, SHx, unless otherwise noted    ALLERGIES & MEDICATIONS  --------------------------------------------------------------------------------  Allergies    No Known Allergies    Intolerances      Standing Inpatient Medications  aspirin enteric coated 81 milliGRAM(s) Oral daily  atorvastatin 80 milliGRAM(s) Oral at bedtime  chlorhexidine 0.12% Liquid 15 milliLiter(s) Oral Mucosa every 12 hours  chlorhexidine 4% Liquid 1 Application(s) Topical daily  dextrose 5%. 1000 milliLiter(s) IV Continuous <Continuous>  dextrose 50% Injectable 12.5 Gram(s) IV Push once  dextrose 50% Injectable 25 Gram(s) IV Push once  dextrose 50% Injectable 25 Gram(s) IV Push once  fentaNYL   Infusion. 0.5 MICROgram(s)/kG/Hr IV Continuous <Continuous>  heparin  Infusion 10 Unit(s)/Hr IV Continuous <Continuous>  hydrocortisone sodium succinate Injectable 100 milliGRAM(s) IV Push every 8 hours  insulin lispro (HumaLOG) corrective regimen sliding scale   SubCutaneous every 6 hours  midazolam Infusion 0.02 mG/kG/Hr IV Continuous <Continuous>  oseltamivir Suspension 30 milliGRAM(s) Oral two times a day  pantoprazole  Injectable 40 milliGRAM(s) IV Push daily  phenylephrine    Infusion 0.1 MICROgram(s)/kG/Min IV Continuous <Continuous>  piperacillin/tazobactam IVPB.. 3.375 Gram(s) IV Intermittent every 8 hours  sertraline 50 milliGRAM(s) Oral daily  ticagrelor 90 milliGRAM(s) Oral every 12 hours    PRN Inpatient Medications  dextrose 40% Gel 15 Gram(s) Oral once PRN  glucagon  Injectable 1 milliGRAM(s) IntraMuscular once PRN      REVIEW OF SYSTEMS  --------------------------------------------------------------------------------  Unable to obtain     VITALS/PHYSICAL EXAM  --------------------------------------------------------------------------------  T(C): 37.7 (12-29-19 @ 04:00), Max: 37.7 (12-29-19 @ 04:00)  HR: 96 (12-29-19 @ 15:26) (71 - 105)  BP: --  RR: 20 (12-29-19 @ 16:00) (12 - 20)  SpO2: 96% (12-29-19 @ 16:00) (94% - 99%)  Wt(kg): --        12-28-19 @ 07:01  -  12-29-19 @ 07:00  --------------------------------------------------------  IN: 2464.1 mL / OUT: 3465 mL / NET: -1000.9 mL    12-29-19 @ 07:01  -  12-29-19 @ 17:50  --------------------------------------------------------  IN: 1097.4 mL / OUT: 1545 mL / NET: -447.6 mL      Physical Exam:  	Gen: NAD intuabted  	HEENT: MMM + ET blood  	Pulm: CTA B/L  	CV: S1S2  	Abd: Soft, +BS  	Ext: No LE edema B/L                      Neuro: Awake   	Skin: Warm and Dry   	    LABS/STUDIES--------------------------------------------------------------------------------              12.3   9.25  >-----------<  168      [12-29-19 @ 04:45]              40.4     142  |  103  |  55  ----------------------------<  119      [12-29-19 @ 04:45]  3.4   |  24  |  3.05        Ca     8.2     [12-29-19 @ 04:45]      Mg     2.2     [12-29-19 @ 04:45]      Phos  4.8     [12-29-19 @ 04:45]    TPro  5.6  /  Alb  3.0  /  TBili  0.8  /  DBili  x   /  AST  27  /  ALT  72  /  AlkPhos  134  [12-29-19 @ 04:45]    PT/INR: PT 12.6 , INR 1.10       [12-29-19 @ 04:45]  PTT: 75.9       [12-29-19 @ 04:45]    Creatinine Trend:  SCr 3.05 [12-29 @ 04:45]  SCr 2.84 [12-28 @ 13:50]  SCr 2.67 [12-28 @ 03:30]  SCr 2.59 [12-27 @ 21:15]  SCr 2.52 [12-27 @ 13:20]    Urinalysis - [12-27-19 @ 15:54]      Color YELLOW / Appearance Lt TURBID / SG 1.015 / pH 6.0      Gluc NEGATIVE / Ketone NEGATIVE  / Bili NEGATIVE / Urobili NORMAL       Blood MODERATE / Protein 70 / Leuk Est NEGATIVE / Nitrite NEGATIVE      RBC 11-25 / WBC 5-10 / Hyaline 2+ / Gran  / Sq Epi FEW / Non Sq Epi  / Bacteria NEGATIVE    HbA1c 6.1      [12-28-19 @ 03:30]    HCV 0.14, Nonreactive Hepatitis C AB  S/CO Ratio                        Interpretation  < 1.00                                   Non-Reactive  1.00 - 4.99                         Weakly-Reactive  >= 5.00                                Reactive  Non-Reactive: Aperson with a non-reactive HCV antibody  result is considered uninfected.  No further action is  needed unless recent infection is suspected.  In these  cases, consider repeat testing later to detect  seroconversion..  Weakly-Reactive: HCV antibody test is abnormal, HCV RNA  Qualitative test will follow.  Reactive: HCV antibody test is abnormal, HCV RNA  Qualitative test will follow.  Note: HCV antibody testing is performed on the [employer]   system.      [12-28-19 @ 03:30]

## 2019-12-29 NOTE — PROGRESS NOTE ADULT - PROBLEM SELECTOR PLAN 8
RVP +influenza A, +RSV, +hMPV   -Tamiflu started. Remains afebrile, blood cultures NGTD  - Will continue empiric zosyn at present given oral lesion that still requires packing

## 2019-12-29 NOTE — PROGRESS NOTE ADULT - PROBLEM SELECTOR PLAN 3
Remains in aflutter but with good rate control. Continue Heparin gtt for anticoagulation. ECHO with worsening LV function. S/p lasix 80 mg IV BID, CVP10 with goal of >5 and net negative.  -d/c lasix standing, will give as PRN   - Continue to hold ACE and BB at present given hypotension

## 2019-12-29 NOTE — PROGRESS NOTE ADULT - PROBLEM SELECTOR PLAN 6
Continue solucortef. Baseline reported at 1.8 to 2.0. Renal recs noted and appreciated. Continue to monitor Scr.  -avoid nephrotoxic agent

## 2019-12-30 LAB
ALBUMIN SERPL ELPH-MCNC: 2.7 G/DL — LOW (ref 3.3–5)
ALP SERPL-CCNC: 110 U/L — SIGNIFICANT CHANGE UP (ref 40–120)
ALT FLD-CCNC: 47 U/L — HIGH (ref 4–41)
ANION GAP SERPL CALC-SCNC: 15 MMO/L — HIGH (ref 7–14)
APPEARANCE UR: CLEAR — SIGNIFICANT CHANGE UP
APTT BLD: 76.4 SEC — HIGH (ref 27.5–36.3)
AST SERPL-CCNC: 21 U/L — SIGNIFICANT CHANGE UP (ref 4–40)
BACTERIA # UR AUTO: NEGATIVE — SIGNIFICANT CHANGE UP
BASE EXCESS BLDA CALC-SCNC: 4 MMOL/L — SIGNIFICANT CHANGE UP
BILIRUB SERPL-MCNC: 0.5 MG/DL — SIGNIFICANT CHANGE UP (ref 0.2–1.2)
BILIRUB UR-MCNC: NEGATIVE — SIGNIFICANT CHANGE UP
BLOOD UR QL VISUAL: HIGH
BUN SERPL-MCNC: 69 MG/DL — HIGH (ref 7–23)
CALCIUM SERPL-MCNC: 8.4 MG/DL — SIGNIFICANT CHANGE UP (ref 8.4–10.5)
CHLORIDE SERPL-SCNC: 107 MMOL/L — SIGNIFICANT CHANGE UP (ref 98–107)
CO2 SERPL-SCNC: 24 MMOL/L — SIGNIFICANT CHANGE UP (ref 22–31)
COLOR SPEC: YELLOW — SIGNIFICANT CHANGE UP
CREAT SERPL-MCNC: 3.27 MG/DL — HIGH (ref 0.5–1.3)
GLUCOSE BLDC GLUCOMTR-MCNC: 114 MG/DL — HIGH (ref 70–99)
GLUCOSE BLDC GLUCOMTR-MCNC: 114 MG/DL — HIGH (ref 70–99)
GLUCOSE BLDC GLUCOMTR-MCNC: 117 MG/DL — HIGH (ref 70–99)
GLUCOSE SERPL-MCNC: 128 MG/DL — HIGH (ref 70–99)
GLUCOSE UR-MCNC: NEGATIVE — SIGNIFICANT CHANGE UP
GRAM STN SPT: SIGNIFICANT CHANGE UP
HCO3 BLDA-SCNC: 28 MMOL/L — HIGH (ref 22–26)
HCT VFR BLD CALC: 37.7 % — LOW (ref 39–50)
HGB BLD-MCNC: 11.4 G/DL — LOW (ref 13–17)
HYALINE CASTS # UR AUTO: SIGNIFICANT CHANGE UP
KETONES UR-MCNC: NEGATIVE — SIGNIFICANT CHANGE UP
LACTATE SERPL-SCNC: 1.3 MMOL/L — SIGNIFICANT CHANGE UP (ref 0.5–2)
LEUKOCYTE ESTERASE UR-ACNC: NEGATIVE — SIGNIFICANT CHANGE UP
MAGNESIUM SERPL-MCNC: 2.6 MG/DL — SIGNIFICANT CHANGE UP (ref 1.6–2.6)
MCHC RBC-ENTMCNC: 26.6 PG — LOW (ref 27–34)
MCHC RBC-ENTMCNC: 30.2 % — LOW (ref 32–36)
MCV RBC AUTO: 87.9 FL — SIGNIFICANT CHANGE UP (ref 80–100)
NITRITE UR-MCNC: NEGATIVE — SIGNIFICANT CHANGE UP
NRBC # FLD: 0 K/UL — SIGNIFICANT CHANGE UP (ref 0–0)
PCO2 BLDA: 46 MMHG — SIGNIFICANT CHANGE UP (ref 35–48)
PH BLDA: 7.41 PH — SIGNIFICANT CHANGE UP (ref 7.35–7.45)
PH UR: 5.5 — SIGNIFICANT CHANGE UP (ref 5–8)
PHOSPHATE SERPL-MCNC: 3.8 MG/DL — SIGNIFICANT CHANGE UP (ref 2.5–4.5)
PLATELET # BLD AUTO: 125 K/UL — LOW (ref 150–400)
PMV BLD: 11.6 FL — SIGNIFICANT CHANGE UP (ref 7–13)
PO2 BLDA: 86 MMHG — SIGNIFICANT CHANGE UP (ref 83–108)
POTASSIUM SERPL-MCNC: 3.5 MMOL/L — SIGNIFICANT CHANGE UP (ref 3.5–5.3)
POTASSIUM SERPL-SCNC: 3.5 MMOL/L — SIGNIFICANT CHANGE UP (ref 3.5–5.3)
PROT SERPL-MCNC: 5.5 G/DL — LOW (ref 6–8.3)
PROT UR-MCNC: 20 — SIGNIFICANT CHANGE UP
RBC # BLD: 4.29 M/UL — SIGNIFICANT CHANGE UP (ref 4.2–5.8)
RBC # FLD: 18.2 % — HIGH (ref 10.3–14.5)
RBC CASTS # UR COMP ASSIST: HIGH (ref 0–?)
SAO2 % BLDA: 95.6 % — SIGNIFICANT CHANGE UP (ref 95–99)
SODIUM SERPL-SCNC: 146 MMOL/L — HIGH (ref 135–145)
SP GR SPEC: 1.02 — SIGNIFICANT CHANGE UP (ref 1–1.04)
SPECIMEN SOURCE: SIGNIFICANT CHANGE UP
SQUAMOUS # UR AUTO: SIGNIFICANT CHANGE UP
UROBILINOGEN FLD QL: NORMAL — SIGNIFICANT CHANGE UP
WBC # BLD: 7.47 K/UL — SIGNIFICANT CHANGE UP (ref 3.8–10.5)
WBC # FLD AUTO: 7.47 K/UL — SIGNIFICANT CHANGE UP (ref 3.8–10.5)
WBC UR QL: SIGNIFICANT CHANGE UP (ref 0–?)

## 2019-12-30 PROCEDURE — 99233 SBSQ HOSP IP/OBS HIGH 50: CPT

## 2019-12-30 PROCEDURE — 71045 X-RAY EXAM CHEST 1 VIEW: CPT | Mod: 26,76

## 2019-12-30 RX ORDER — POTASSIUM CHLORIDE 20 MEQ
40 PACKET (EA) ORAL ONCE
Refills: 0 | Status: COMPLETED | OUTPATIENT
Start: 2019-12-30 | End: 2019-12-30

## 2019-12-30 RX ORDER — ACETAMINOPHEN 500 MG
650 TABLET ORAL EVERY 6 HOURS
Refills: 0 | Status: DISCONTINUED | OUTPATIENT
Start: 2019-12-30 | End: 2020-01-10

## 2019-12-30 RX ORDER — HEPARIN SODIUM 5000 [USP'U]/ML
INJECTION INTRAVENOUS; SUBCUTANEOUS
Qty: 25000 | Refills: 0 | Status: DISCONTINUED | OUTPATIENT
Start: 2019-12-30 | End: 2019-12-30

## 2019-12-30 RX ORDER — FENTANYL CITRATE 50 UG/ML
0.5 INJECTION INTRAVENOUS
Qty: 2500 | Refills: 0 | Status: DISCONTINUED | OUTPATIENT
Start: 2019-12-30 | End: 2020-01-03

## 2019-12-30 RX ORDER — MIDAZOLAM HYDROCHLORIDE 1 MG/ML
0.02 INJECTION, SOLUTION INTRAMUSCULAR; INTRAVENOUS
Qty: 100 | Refills: 0 | Status: DISCONTINUED | OUTPATIENT
Start: 2019-12-30 | End: 2020-01-03

## 2019-12-30 RX ADMIN — Medication 81 MILLIGRAM(S): at 12:47

## 2019-12-30 RX ADMIN — TICAGRELOR 90 MILLIGRAM(S): 90 TABLET ORAL at 17:06

## 2019-12-30 RX ADMIN — MIDAZOLAM HYDROCHLORIDE 2.4 MG/KG/HR: 1 INJECTION, SOLUTION INTRAMUSCULAR; INTRAVENOUS at 16:52

## 2019-12-30 RX ADMIN — FENTANYL CITRATE 6 MICROGRAM(S)/KG/HR: 50 INJECTION INTRAVENOUS at 19:58

## 2019-12-30 RX ADMIN — Medication 650 MILLIGRAM(S): at 08:00

## 2019-12-30 RX ADMIN — Medication 650 MILLIGRAM(S): at 06:19

## 2019-12-30 RX ADMIN — CHLORHEXIDINE GLUCONATE 1 APPLICATION(S): 213 SOLUTION TOPICAL at 06:19

## 2019-12-30 RX ADMIN — TICAGRELOR 90 MILLIGRAM(S): 90 TABLET ORAL at 06:19

## 2019-12-30 RX ADMIN — Medication 100 MILLIGRAM(S): at 06:19

## 2019-12-30 RX ADMIN — PIPERACILLIN AND TAZOBACTAM 25 GRAM(S): 4; .5 INJECTION, POWDER, LYOPHILIZED, FOR SOLUTION INTRAVENOUS at 11:00

## 2019-12-30 RX ADMIN — PHENYLEPHRINE HYDROCHLORIDE 4.5 MICROGRAM(S)/KG/MIN: 10 INJECTION INTRAVENOUS at 08:05

## 2019-12-30 RX ADMIN — Medication 650 MILLIGRAM(S): at 20:00

## 2019-12-30 RX ADMIN — SERTRALINE 50 MILLIGRAM(S): 25 TABLET, FILM COATED ORAL at 12:48

## 2019-12-30 RX ADMIN — Medication 40 MILLIEQUIVALENT(S): at 10:05

## 2019-12-30 RX ADMIN — PANTOPRAZOLE SODIUM 40 MILLIGRAM(S): 20 TABLET, DELAYED RELEASE ORAL at 12:47

## 2019-12-30 RX ADMIN — PIPERACILLIN AND TAZOBACTAM 25 GRAM(S): 4; .5 INJECTION, POWDER, LYOPHILIZED, FOR SOLUTION INTRAVENOUS at 18:03

## 2019-12-30 RX ADMIN — PIPERACILLIN AND TAZOBACTAM 25 GRAM(S): 4; .5 INJECTION, POWDER, LYOPHILIZED, FOR SOLUTION INTRAVENOUS at 02:24

## 2019-12-30 RX ADMIN — Medication 650 MILLIGRAM(S): at 17:09

## 2019-12-30 RX ADMIN — Medication 30 MILLIGRAM(S): at 20:00

## 2019-12-30 RX ADMIN — Medication 100 MILLIGRAM(S): at 22:03

## 2019-12-30 RX ADMIN — FENTANYL CITRATE 6 MICROGRAM(S)/KG/HR: 50 INJECTION INTRAVENOUS at 07:55

## 2019-12-30 RX ADMIN — Medication 30 MILLIGRAM(S): at 06:18

## 2019-12-30 RX ADMIN — MIDAZOLAM HYDROCHLORIDE 2.4 MG/KG/HR: 1 INJECTION, SOLUTION INTRAMUSCULAR; INTRAVENOUS at 08:01

## 2019-12-30 RX ADMIN — ATORVASTATIN CALCIUM 80 MILLIGRAM(S): 80 TABLET, FILM COATED ORAL at 22:03

## 2019-12-30 RX ADMIN — MIDAZOLAM HYDROCHLORIDE 2.4 MG/KG/HR: 1 INJECTION, SOLUTION INTRAMUSCULAR; INTRAVENOUS at 19:59

## 2019-12-30 RX ADMIN — FENTANYL CITRATE 6 MICROGRAM(S)/KG/HR: 50 INJECTION INTRAVENOUS at 16:51

## 2019-12-30 RX ADMIN — HEPARIN SODIUM 0.1 UNIT(S)/HR: 5000 INJECTION INTRAVENOUS; SUBCUTANEOUS at 07:57

## 2019-12-30 RX ADMIN — Medication 100 MILLIGRAM(S): at 13:12

## 2019-12-30 NOTE — PROGRESS NOTE ADULT - PROBLEM SELECTOR PLAN 6
Baseline reported at 1.8 to 2.0. Renal recs noted and appreciated. Continue to monitor Scr.  -avoid nephrotoxic agent

## 2019-12-30 NOTE — PROGRESS NOTE ADULT - SUBJECTIVE AND OBJECTIVE BOX
Patient seen and examined, no acute events overnight. Continues to have L side mucosal bleeding. Febrile to 100.5 at 12a.    T(C): 38.1 (12-30-19 @ 00:00), Max: 38.1 (12-30-19 @ 00:00)  HR: 95 (12-30-19 @ 03:00) (72 - 105)  BP: --  RR: 25 (12-30-19 @ 03:00) (12 - 27)  SpO2: 96% (12-30-19 @ 03:00) (94% - 99%)  Intubated and sedated  On [facility] vent  Nose clear bilaterally, no bleeding anteriorly  OC/OP: ETT in place, area of mucosal tear in left buccal mucosa with persistent pooling, clots suctioned from posterior OP, no tongue laceration or trauma evident  Kerlix removed with significant saturation, L inferior alveolar mucosa anterior to RMT with diffuse bulky tissue/clot, removed with persistent ooze, surgicel packing placed at site - TAPED to face, kerlix replaced and TAPED to face superiorly  No other bleeding noted  Bite block placed to prevent trauma to tongue                          12.3   9.25  )-----------( 168      ( 29 Dec 2019 04:45 )             40.4     A/P 64M with oral cavity bleed after intubation. Source of bleeding identified in L inferior alveolar mucosa, packed with persistent slow ooze. On heparin gtt for new onset aflutter.   -consider holding AC if clinically appropriate due to persistent bleeding  -keep surgicel taped to face to prevent airway FB, will remove tomorrow or day after   -keep bite block  in place to prevent tongue edema  -oral care  -wet gauze to tongue to prevent dessication  -keep kerlix in place  -continue abx while packing in place  -call/page with questions  -ENT will follow

## 2019-12-30 NOTE — PROGRESS NOTE ADULT - SUBJECTIVE AND OBJECTIVE BOX
Cheyanne Soriano PGY1   Page: [phone]    PATIENT: THONY ANAYA, MRN: [id number]    CHIEF COMPLAINT: Patient is a 64y old  Male who presents with a chief complaint of Acute resp failure (30 Dec 2019 05:10)      INTERVAL HISTORY/OVERNIGHT EVENTS: No overnight events. At bedside, patient has been sleeping and eating well. Denies N/V/D/C. Last BM x1 regular yesterday. Denies abdominal pain. Denies chest pain or SOB, cough. Has been ambulating with assistance. Oriented to person, place, and time. Breathing comfortably on room air.    REVIEW OF SYSTEMS:    Constitutional:     [ ] negative [ ] fevers [ ] chills [ ] weight loss [ ] weight gain  HEENT:                  [ ] negative [ ] dry eyes [ ] eye irritation [ ] postnasal drip [ ] nasal congestion  CV:                         [ ] negative  [ ] chest pain [ ] orthopnea [ ] palpitations [ ] murmur  Resp:                     [ ] negative [ ] cough [ ] shortness of breath [ ] dyspnea [ ] wheezing [ ] sputum [ ] hemoptysis  GI:                          [ ] negative [ ] nausea [ ] vomiting [ ] diarrhea [ ] constipation [ ] abd pain [ ] dysphagia   :                        [ ] negative [ ] dysuria [ ] nocturia [ ] hematuria [ ] increased urinary frequency  Musculoskeletal: [ ] negative [ ] back pain [ ] myalgias [ ] arthralgias [ ] fracture  Skin:                       [ ] negative [ ] rash [ ] itch  Neurological:        [ ] negative [ ] headache [ ] dizziness [ ] syncope [ ] weakness [ ] numbness  Psychiatric:           [ ] negative [ ] anxiety [ ] depression  Endocrine:            [ ] negative [ ] diabetes [ ] thyroid problem  Heme/Lymph:      [ ] negative [ ] anemia [ ] bleeding problem  Allergic/Immune: [ ] negative [ ] itchy eyes [ ] nasal discharge [ ] hives [ ] angioedema    [ ] All other systems negative  [ ] Unable to assess ROS because ________.    MEDICATIONS:  MEDICATIONS  (STANDING):  aspirin enteric coated 81 milliGRAM(s) Oral daily  atorvastatin 80 milliGRAM(s) Oral at bedtime  chlorhexidine 0.12% Liquid 15 milliLiter(s) Oral Mucosa every 12 hours  chlorhexidine 4% Liquid 1 Application(s) Topical daily  dextrose 5%. 1000 milliLiter(s) (50 mL/Hr) IV Continuous <Continuous>  dextrose 50% Injectable 12.5 Gram(s) IV Push once  dextrose 50% Injectable 25 Gram(s) IV Push once  dextrose 50% Injectable 25 Gram(s) IV Push once  fentaNYL   Infusion. 0.5 MICROgram(s)/kG/Hr (6 mL/Hr) IV Continuous <Continuous>  heparin  Infusion 10 Unit(s)/Hr (0.1 mL/Hr) IV Continuous <Continuous>  hydrocortisone sodium succinate Injectable 100 milliGRAM(s) IV Push every 8 hours  insulin lispro (HumaLOG) corrective regimen sliding scale   SubCutaneous every 6 hours  midazolam Infusion 0.02 mG/kG/Hr (2.4 mL/Hr) IV Continuous <Continuous>  oseltamivir Suspension 30 milliGRAM(s) Oral two times a day  pantoprazole  Injectable 40 milliGRAM(s) IV Push daily  phenylephrine    Infusion 0.1 MICROgram(s)/kG/Min (4.5 mL/Hr) IV Continuous <Continuous>  piperacillin/tazobactam IVPB.. 3.375 Gram(s) IV Intermittent every 8 hours  potassium chloride   Powder 40 milliEquivalent(s) Oral once  sertraline 50 milliGRAM(s) Oral daily  ticagrelor 90 milliGRAM(s) Oral every 12 hours    MEDICATIONS  (PRN):  acetaminophen    Suspension .. 650 milliGRAM(s) Oral every 6 hours PRN Temp greater or equal to 38C (100.4F)  dextrose 40% Gel 15 Gram(s) Oral once PRN Blood Glucose LESS THAN 70 milliGRAM(s)/deciliter  glucagon  Injectable 1 milliGRAM(s) IntraMuscular once PRN Glucose LESS THAN 70 milligrams/deciliter      ALLERGIES: Allergies    No Known Allergies    Intolerances        OBJECTIVE:  ICU Vital Signs Last 24 Hrs  T(C): 38.1 (30 Dec 2019 04:00), Max: 38.1 (30 Dec 2019 00:00)  T(F): 100.5 (30 Dec 2019 04:00), Max: 100.5 (30 Dec 2019 00:00)  HR: 95 (30 Dec 2019 06:00) (76 - 105)  BP: --  BP(mean): --  ABP: 115/73 (30 Dec 2019 06:00) (94/63 - 123/74)  ABP(mean): 85 (30 Dec 2019 06:00) (70 - 89)  RR: 20 (30 Dec 2019 06:00) (12 - 27)  SpO2: 97% (30 Dec 2019 06:00) (94% - 99%)    Mode: AC/ CMV (Assist Control/ Continuous Mandatory Ventilation)  RR (machine): 20  TV (machine): 500  FiO2: 35  PEEP: 5  ITime: 1  MAP: 13  PIP: 33    Adult Advanced Hemodynamics Last 24 Hrs  CVP(mm Hg): 9 (30 Dec 2019 06:00) (8 - 17)  CVP(cm H2O): --  CO: --  CI: --  PA: --  PA(mean): --  PCWP: --  SVR: --  SVRI: --  PVR: --  PVRI: --  CAPILLARY BLOOD GLUCOSE      POCT Blood Glucose.: 117 mg/dL (30 Dec 2019 06:07)  POCT Blood Glucose.: 127 mg/dL (29 Dec 2019 23:35)  POCT Blood Glucose.: 116 mg/dL (29 Dec 2019 18:14)  POCT Blood Glucose.: 99 mg/dL (29 Dec 2019 11:24)    CAPILLARY BLOOD GLUCOSE      POCT Blood Glucose.: 117 mg/dL (30 Dec 2019 06:07)    I&O's Summary    29 Dec 2019 07:01  -  30 Dec 2019 07:00  --------------------------------------------------------  IN: 2762.9 mL / OUT: 2770 mL / NET: -7.1 mL      Daily     Daily Weight in k.2 (30 Dec 2019 05:00)    PHYSICAL EXAMINATION:  General: Comfortable, no acute distress, cooperative with exam.  HEENT: PERRLA, EOMI, moist mucous membranes.  Respiratory: CTAB, normal respiratory effort, no coughing, wheezes, crackles, or rales.  CV: RRR, S1S2, no murmurs, rubs or gallops. No JVD. Distal pulses intact.  Abdominal: Soft, nontender, nondistended, no rebound or guarding, normal bowel sounds.  Neurology: AOx3, no focal neuro defects, DOW x 4.  Extremities: No pitting edema, + Peripheral pulses.  Incisions:   Tubes:    LABS:  ABG - ( 30 Dec 2019 06:10 )  pH, Arterial: 7.41  pH, Blood: x     /  pCO2: 46    /  pO2: 86    / HCO3: 28    / Base Excess: 4.0   /  SaO2: 95.6                                    11.4   7.47  )-----------( 125      ( 30 Dec 2019 06:10 )             37.7         146<H>  |  107  |  69<H>  ----------------------------<  128<H>  3.5   |  24  |  3.27<H>    Ca    8.4      30 Dec 2019 06:10  Phos  3.8       Mg     2.6         TPro  5.5<L>  /  Alb  2.7<L>  /  TBili  0.5  /  DBili  x   /  AST  21  /  ALT  47<H>  /  AlkPhos  110      LIVER FUNCTIONS - ( 30 Dec 2019 06:10 )  Alb: 2.7 g/dL / Pro: 5.5 g/dL / ALK PHOS: 110 u/L / ALT: 47 u/L / AST: 21 u/L / GGT: x           PT/INR - ( 29 Dec 2019 04:45 )   PT: 12.6 SEC;   INR: 1.10          PTT - ( 30 Dec 2019 06:10 )  PTT:76.4 SEC        Urinalysis Basic - ( 30 Dec 2019 01:35 )    Color: YELLOW / Appearance: CLEAR / S.020 / pH: 5.5  Gluc: NEGATIVE / Ketone: NEGATIVE  / Bili: NEGATIVE / Urobili: NORMAL   Blood: MODERATE / Protein: 20 / Nitrite: NEGATIVE   Leuk Esterase: NEGATIVE / RBC: 26-50 / WBC 0-2   Sq Epi: OCC / Non Sq Epi: x / Bacteria: NEGATIVE        TELEMETRY:     EKG:     IMAGING: Cheyanne Soriano PGY1   Page: [phone]    PATIENT: THONY ANAYA, MRN: [id number]    CHIEF COMPLAINT: Patient is a 64y old  Male who presents with a chief complaint of Acute resp failure (30 Dec 2019 05:10)      INTERVAL HISTORY/OVERNIGHT EVENTS: No overnight events. off of pressors     REVIEW OF SYSTEMS:  limited 2/2 mental status     MEDICATIONS:  MEDICATIONS  (STANDING):  aspirin enteric coated 81 milliGRAM(s) Oral daily  atorvastatin 80 milliGRAM(s) Oral at bedtime  chlorhexidine 0.12% Liquid 15 milliLiter(s) Oral Mucosa every 12 hours  chlorhexidine 4% Liquid 1 Application(s) Topical daily  dextrose 5%. 1000 milliLiter(s) (50 mL/Hr) IV Continuous <Continuous>  dextrose 50% Injectable 12.5 Gram(s) IV Push once  dextrose 50% Injectable 25 Gram(s) IV Push once  dextrose 50% Injectable 25 Gram(s) IV Push once  fentaNYL   Infusion. 0.5 MICROgram(s)/kG/Hr (6 mL/Hr) IV Continuous <Continuous>  heparin  Infusion 10 Unit(s)/Hr (0.1 mL/Hr) IV Continuous <Continuous>  hydrocortisone sodium succinate Injectable 100 milliGRAM(s) IV Push every 8 hours  insulin lispro (HumaLOG) corrective regimen sliding scale   SubCutaneous every 6 hours  midazolam Infusion 0.02 mG/kG/Hr (2.4 mL/Hr) IV Continuous <Continuous>  oseltamivir Suspension 30 milliGRAM(s) Oral two times a day  pantoprazole  Injectable 40 milliGRAM(s) IV Push daily  phenylephrine    Infusion 0.1 MICROgram(s)/kG/Min (4.5 mL/Hr) IV Continuous <Continuous>  piperacillin/tazobactam IVPB.. 3.375 Gram(s) IV Intermittent every 8 hours  potassium chloride   Powder 40 milliEquivalent(s) Oral once  sertraline 50 milliGRAM(s) Oral daily  ticagrelor 90 milliGRAM(s) Oral every 12 hours    MEDICATIONS  (PRN):  acetaminophen    Suspension .. 650 milliGRAM(s) Oral every 6 hours PRN Temp greater or equal to 38C (100.4F)  dextrose 40% Gel 15 Gram(s) Oral once PRN Blood Glucose LESS THAN 70 milliGRAM(s)/deciliter  glucagon  Injectable 1 milliGRAM(s) IntraMuscular once PRN Glucose LESS THAN 70 milligrams/deciliter      ALLERGIES: Allergies    No Known Allergies    Intolerances        OBJECTIVE:  ICU Vital Signs Last 24 Hrs  T(C): 38.1 (30 Dec 2019 04:00), Max: 38.1 (30 Dec 2019 00:00)  T(F): 100.5 (30 Dec 2019 04:00), Max: 100.5 (30 Dec 2019 00:00)  HR: 95 (30 Dec 2019 06:00) (76 - 105)  BP: --  BP(mean): --  ABP: 115/73 (30 Dec 2019 06:00) (94/63 - 123/74)  ABP(mean): 85 (30 Dec 2019 06:00) (70 - 89)  RR: 20 (30 Dec 2019 06:00) (12 - 27)  SpO2: 97% (30 Dec 2019 06:00) (94% - 99%)    Mode: AC/ CMV (Assist Control/ Continuous Mandatory Ventilation)  RR (machine): 20  TV (machine): 500  FiO2: 35  PEEP: 5  ITime: 1  MAP: 13  PIP: 33    Adult Advanced Hemodynamics Last 24 Hrs  CVP(mm Hg): 9 (30 Dec 2019 06:00) (8 - 17)  CVP(cm H2O): --  CO: --  CI: --  PA: --  PA(mean): --  PCWP: --  SVR: --  SVRI: --  PVR: --  PVRI: --  CAPILLARY BLOOD GLUCOSE      POCT Blood Glucose.: 117 mg/dL (30 Dec 2019 06:07)  POCT Blood Glucose.: 127 mg/dL (29 Dec 2019 23:35)  POCT Blood Glucose.: 116 mg/dL (29 Dec 2019 18:14)  POCT Blood Glucose.: 99 mg/dL (29 Dec 2019 11:24)    CAPILLARY BLOOD GLUCOSE      POCT Blood Glucose.: 117 mg/dL (30 Dec 2019 06:07)    I&O's Summary    29 Dec 2019 07:01  -  30 Dec 2019 07:00  --------------------------------------------------------  IN: 2762.9 mL / OUT: 2770 mL / NET: -7.1 mL      Daily     Daily Weight in k.2 (30 Dec 2019 05:00)    PHYSICAL EXAMINATION:  General: intubated sedated   HEENT: oral cavity with packing in place  Respiratory: intubated, no coughing, wheezes, crackles, or rales.  CV: RRR, S1S2, no murmurs, rubs or gallops. No JVD. Distal pulses intact.  Abdominal: Soft, nontender, nondistended, no rebound or guarding, normal bowel sounds.  Neuro: sedated   Extremities: No pitting edema, + Peripheral pulses.  Incisions:   Tubes:    LABS:  ABG - ( 30 Dec 2019 06:10 )  pH, Arterial: 7.41  pH, Blood: x     /  pCO2: 46    /  pO2: 86    / HCO3: 28    / Base Excess: 4.0   /  SaO2: 95.6                                    11.4   7.47  )-----------( 125      ( 30 Dec 2019 06:10 )             37.7     12-30    146<H>  |  107  |  69<H>  ----------------------------<  128<H>  3.5   |  24  |  3.27<H>    Ca    8.4      30 Dec 2019 06:10  Phos  3.8     12-30  Mg     2.6     12-30    TPro  5.5<L>  /  Alb  2.7<L>  /  TBili  0.5  /  DBili  x   /  AST  21  /  ALT  47<H>  /  AlkPhos  110  12-30    LIVER FUNCTIONS - ( 30 Dec 2019 06:10 )  Alb: 2.7 g/dL / Pro: 5.5 g/dL / ALK PHOS: 110 u/L / ALT: 47 u/L / AST: 21 u/L / GGT: x           PT/INR - ( 29 Dec 2019 04:45 )   PT: 12.6 SEC;   INR: 1.10          PTT - ( 30 Dec 2019 06:10 )  PTT:76.4 SEC        Urinalysis Basic - ( 30 Dec 2019 01:35 )    Color: YELLOW / Appearance: CLEAR / S.020 / pH: 5.5  Gluc: NEGATIVE / Ketone: NEGATIVE  / Bili: NEGATIVE / Urobili: NORMAL   Blood: MODERATE / Protein: 20 / Nitrite: NEGATIVE   Leuk Esterase: NEGATIVE / RBC: 26-50 / WBC 0-2   Sq Epi: OCC / Non Sq Epi: x / Bacteria: NEGATIVE        TELEMETRY:     EKG:     IMAGING:

## 2019-12-30 NOTE — PROGRESS NOTE ADULT - PROBLEM SELECTOR PLAN 3
ECHO with worsening LV function. S/p lasix 80 mg IV BID, CVP10 with goal of >5 and net negative.  -d/c lasix standing, will give as PRN   - Continue to hold ACE and BB at present given hypotension ECHO with worsening LV function. S/p lasix 80 mg IV BID  - d/c lasix standing, will give as PRN   - Continue to hold ACE and BB at present given hypotension

## 2019-12-30 NOTE — PROGRESS NOTE ADULT - ASSESSMENT
64M with CAD, stents and CABG (last [facility] was 12/2017 with patent LIMA but other grafts occluded, medical management), HTN, HLD, CKD, CHER, ?HF, and pemphygus vulgaris presents with acute SOB. admitted to ccu for acute resp failure s/p intubation    CKD (chronic kidney disease) stage 3-4  baseline ~ 1.8-2.2 fluctuates likely sec to chf  CKD likely sec to HTN/HF    SCr elevated today  Pt non-oliguric. Advise to continue diuresis as needed at present   BP borderline. Avoid hypotension     UA with moderate blood and minimal protein   check urine p/c ratio  monitor for now  No role/ need for RRT at present     Essential hypertension.    controlled.   care per CCU    Acute resp failure/ Hypervolemia  s/p intubated  Improving volume status s/p diuretics   agree with diuretics as needed at present  monitor i/o, weight, urine output   f/u cardio    CKD-MBD  check pth, &  Vitamin D   Phos acceptable   Calcium low  monitor phos and wolf daily

## 2019-12-30 NOTE — PROGRESS NOTE ADULT - PROBLEM SELECTOR PLAN 2
sepsis vs worsening ADHF   -leukocytosis on presentation, s/p vanc and zosyn. now on zosyn alone   -leukocytosis improved, Blood cx NGTD, UA negative nitrate, and no bacteria   -on Levophed, down titrate AT, consider giving ? sepsis vs worsening ADHF   -RESOLVED  -leukocytosis on presentation, s/p vanc and zosyn. now on zosyn alone   -leukocytosis improved, Blood cx NGTD, UA negative nitrate, and no bacteria

## 2019-12-30 NOTE — PROGRESS NOTE ADULT - PROBLEM SELECTOR PLAN 4
Remains in aflutter and afib but with good rate control. Continue Heparin gtt for anticoagulation. Remains in aflutter and afib but with good rate control.   -have to hold Heparin gtt for anticoagulation because of bleeding

## 2019-12-30 NOTE — PROGRESS NOTE ADULT - ASSESSMENT
64M with CAD, stents and CABG (last [facility] was 12/2017 with patent LIMA but other grafts occluded, medical management), HTN, HLD, CKD, CHER, ?HF, and pemphigus vulgaris presents with acute SOB requiring intubation for airway protection with new atrial fibrillation in setting of + influenza.

## 2019-12-30 NOTE — PROGRESS NOTE ADULT - SUBJECTIVE AND OBJECTIVE BOX
[facility] [place] [facility]   MD Katerina Gallardo D.O. Fatima Sheikh, D.O. Ruoru Wong, PA    From 7 AM - 5 PM:  OFFICE: [phone]  Dr. Mayen cell: [phone]  Dr. Flowers cell: [phone]  Dr. Giordano cell: [phone]  JEEVAN Mccall cell: [phone]    From 5 PM - 7 AM: Answering Service: [phone]      RENAL FOLLOW UP NOTE  --------------------------------------------------------------------------------  HPI: Pt seen and examined at bedside in CCU    PAST HISTORY  --------------------------------------------------------------------------------  No significant changes to PMH, PSH, FHx, SHx, unless otherwise noted    ALLERGIES & MEDICATIONS  --------------------------------------------------------------------------------  Allergies    No Known Allergies    Intolerances      Standing Inpatient Medications  aspirin enteric coated 81 milliGRAM(s) Oral daily  atorvastatin 80 milliGRAM(s) Oral at bedtime  chlorhexidine 0.12% Liquid 15 milliLiter(s) Oral Mucosa every 12 hours  chlorhexidine 4% Liquid 1 Application(s) Topical daily  dextrose 5%. 1000 milliLiter(s) IV Continuous <Continuous>  dextrose 50% Injectable 12.5 Gram(s) IV Push once  dextrose 50% Injectable 25 Gram(s) IV Push once  dextrose 50% Injectable 25 Gram(s) IV Push once  fentaNYL   Infusion. 0.5 MICROgram(s)/kG/Hr IV Continuous <Continuous>  hydrocortisone sodium succinate Injectable 100 milliGRAM(s) IV Push every 8 hours  insulin lispro (HumaLOG) corrective regimen sliding scale   SubCutaneous every 6 hours  midazolam Infusion 0.02 mG/kG/Hr IV Continuous <Continuous>  oseltamivir Suspension 30 milliGRAM(s) Oral two times a day  pantoprazole  Injectable 40 milliGRAM(s) IV Push daily  phenylephrine    Infusion 0.1 MICROgram(s)/kG/Min IV Continuous <Continuous>  piperacillin/tazobactam IVPB.. 3.375 Gram(s) IV Intermittent every 8 hours  sertraline 50 milliGRAM(s) Oral daily  ticagrelor 90 milliGRAM(s) Oral every 12 hours    PRN Inpatient Medications  acetaminophen    Suspension .. 650 milliGRAM(s) Oral every 6 hours PRN  dextrose 40% Gel 15 Gram(s) Oral once PRN  glucagon  Injectable 1 milliGRAM(s) IntraMuscular once PRN      REVIEW OF SYSTEMS  --------------------------------------------------------------------------------  General: no fever  CVS: no chest pain  RESP: no sob, no cough  ABD: no abdominal pain  : no dysuria,  MSK: no edema     VITALS/PHYSICAL EXAM  --------------------------------------------------------------------------------  T(C): 37.9 (12-30-19 @ 10:00), Max: 38.1 (12-30-19 @ 00:00)  HR: 91 (12-30-19 @ 11:23) (82 - 105)  BP: --  RR: 27 (12-30-19 @ 10:00) (12 - 27)  SpO2: 97% (12-30-19 @ 11:23) (94% - 99%)  Wt(kg): --        12-29-19 @ 07:01  -  12-30-19 @ 07:00  --------------------------------------------------------  IN: 2800.4 mL / OUT: 2770 mL / NET: 30.4 mL    12-30-19 @ 07:01  -  12-30-19 @ 11:49  --------------------------------------------------------  IN: 56.6 mL / OUT: 0 mL / NET: 56.6 mL      Physical Exam:  	Gen: NAD intubated  	HEENT: MMM, bleeding at mouth   	Pulm: CTA B/L  	CV: S1S2  	Abd: Soft, +BS  	Ext: No LE edema B/L                      Neuro: Awake   	Skin: Warm and Dry       LABS/STUDIES  --------------------------------------------------------------------------------              11.4   7.47  >-----------<  125      [12-30-19 @ 06:10]              37.7     146  |  107  |  69  ----------------------------<  128      [12-30-19 @ 06:10]  3.5   |  24  |  3.27        Ca     8.4     [12-30-19 @ 06:10]      Mg     2.6     [12-30-19 @ 06:10]      Phos  3.8     [12-30-19 @ 06:10]    TPro  5.5  /  Alb  2.7  /  TBili  0.5  /  DBili  x   /  AST  21  /  ALT  47  /  AlkPhos  110  [12-30-19 @ 06:10]    PT/INR: PT 12.6 , INR 1.10       [12-29-19 @ 04:45]  PTT: 76.4       [12-30-19 @ 06:10]      Creatinine Trend:  SCr 3.27 [12-30 @ 06:10]  SCr 3.05 [12-29 @ 04:45]  SCr 2.84 [12-28 @ 13:50]  SCr 2.67 [12-28 @ 03:30]  SCr 2.59 [12-27 @ 21:15]    Urinalysis - [12-30-19 @ 01:35]      Color YELLOW / Appearance CLEAR / SG 1.020 / pH 5.5      Gluc NEGATIVE / Ketone NEGATIVE  / Bili NEGATIVE / Urobili NORMAL       Blood MODERATE / Protein 20 / Leuk Est NEGATIVE / Nitrite NEGATIVE      RBC 26-50 / WBC 0-2 / Hyaline 1+ / Gran  / Sq Epi OCC / Non Sq Epi  / Bacteria NEGATIVE      HbA1c 6.1      [12-28-19 @ 03:30]    HCV 0.14, Nonreactive Hepatitis C AB  S/CO Ratio                        Interpretation  < 1.00                                   Non-Reactive  1.00 - 4.99                         Weakly-Reactive  >= 5.00                                Reactive  Non-Reactive: [place] with a non-reactive HCV antibody  result is considered uninfected.  No further action is  needed unless recent infection is suspected.  In these  cases, consider repeat testing later to detect  seroconversion..  Weakly-Reactive: HCV antibody test is abnormal, HCV RNA  Qualitative test will follow.  Reactive: HCV antibody test is abnormal, HCV RNA  Qualitative test will follow.  Note: HCV antibody testing is performed on the [facility] system.      [12-28-19 @ 03:30]

## 2019-12-30 NOTE — PROGRESS NOTE ADULT - PROBLEM SELECTOR PLAN 1
Remains intubated and sedated, on fentanyl and versed.  Will wean ventilator settings, PEEP and FiO2. Monitor ABGs and sats.  -currently on 40% FiO2 and PEEP 8, paO2 99  -will decrease to 35% and peep 5 Remains intubated and sedated, on fentanyl and versed.  Will wean ventilator settings, PEEP and FiO2. Monitor ABGs and sats.  -currently on 35% FiO2 and PEEP 5, paO2 99  -will plan for cpap trial today

## 2019-12-31 DIAGNOSIS — E87.0 HYPEROSMOLALITY AND HYPERNATREMIA: ICD-10-CM

## 2019-12-31 LAB
ALBUMIN SERPL ELPH-MCNC: 2.9 G/DL — LOW (ref 3.3–5)
ALP SERPL-CCNC: 94 U/L — SIGNIFICANT CHANGE UP (ref 40–120)
ALT FLD-CCNC: 39 U/L — SIGNIFICANT CHANGE UP (ref 4–41)
ANION GAP SERPL CALC-SCNC: 15 MMO/L — HIGH (ref 7–14)
AST SERPL-CCNC: 30 U/L — SIGNIFICANT CHANGE UP (ref 4–40)
BASE EXCESS BLDA CALC-SCNC: 2.1 MMOL/L — SIGNIFICANT CHANGE UP
BASE EXCESS BLDV CALC-SCNC: 4.2 MMOL/L — SIGNIFICANT CHANGE UP
BILIRUB SERPL-MCNC: 0.4 MG/DL — SIGNIFICANT CHANGE UP (ref 0.2–1.2)
BLOOD GAS ARTERIAL - FIO2: 35 — SIGNIFICANT CHANGE UP
BUN SERPL-MCNC: 77 MG/DL — HIGH (ref 7–23)
CALCIUM SERPL-MCNC: 8.5 MG/DL — SIGNIFICANT CHANGE UP (ref 8.4–10.5)
CHLORIDE BLDA-SCNC: 111 MMOL/L — HIGH (ref 96–108)
CHLORIDE SERPL-SCNC: 110 MMOL/L — HIGH (ref 98–107)
CO2 SERPL-SCNC: 24 MMOL/L — SIGNIFICANT CHANGE UP (ref 22–31)
CREAT SERPL-MCNC: 2.99 MG/DL — HIGH (ref 0.5–1.3)
GLUCOSE BLDA-MCNC: 141 MG/DL — HIGH (ref 70–99)
GLUCOSE BLDC GLUCOMTR-MCNC: 114 MG/DL — HIGH (ref 70–99)
GLUCOSE BLDC GLUCOMTR-MCNC: 131 MG/DL — HIGH (ref 70–99)
GLUCOSE SERPL-MCNC: 142 MG/DL — HIGH (ref 70–99)
HCO3 BLDA-SCNC: 26 MMOL/L — SIGNIFICANT CHANGE UP (ref 22–26)
HCO3 BLDV-SCNC: 27 MMOL/L — SIGNIFICANT CHANGE UP (ref 20–27)
HCT VFR BLD CALC: 33.9 % — LOW (ref 39–50)
HCT VFR BLDA CALC: 33.3 % — LOW (ref 39–51)
HGB BLD-MCNC: 10.2 G/DL — LOW (ref 13–17)
HGB BLDA-MCNC: 10.8 G/DL — LOW (ref 13–17)
LACTATE BLDA-SCNC: 2.1 MMOL/L — HIGH (ref 0.5–2)
MAGNESIUM SERPL-MCNC: 2.7 MG/DL — HIGH (ref 1.6–2.6)
MCHC RBC-ENTMCNC: 26.5 PG — LOW (ref 27–34)
MCHC RBC-ENTMCNC: 30.1 % — LOW (ref 32–36)
MCV RBC AUTO: 88.1 FL — SIGNIFICANT CHANGE UP (ref 80–100)
NRBC # FLD: 0 K/UL — SIGNIFICANT CHANGE UP (ref 0–0)
PCO2 BLDA: 41 MMHG — SIGNIFICANT CHANGE UP (ref 35–48)
PCO2 BLDV: 51 MMHG — SIGNIFICANT CHANGE UP (ref 41–51)
PH BLDA: 7.42 PH — SIGNIFICANT CHANGE UP (ref 7.35–7.45)
PH BLDV: 7.37 PH — SIGNIFICANT CHANGE UP (ref 7.32–7.43)
PHOSPHATE SERPL-MCNC: 4.4 MG/DL — SIGNIFICANT CHANGE UP (ref 2.5–4.5)
PLATELET # BLD AUTO: 108 K/UL — LOW (ref 150–400)
PMV BLD: 11.4 FL — SIGNIFICANT CHANGE UP (ref 7–13)
PO2 BLDA: 201 MMHG — HIGH (ref 83–108)
PO2 BLDV: 42 MMHG — HIGH (ref 35–40)
POTASSIUM BLDA-SCNC: 3.4 MMOL/L — SIGNIFICANT CHANGE UP (ref 3.4–4.5)
POTASSIUM SERPL-MCNC: 3.7 MMOL/L — SIGNIFICANT CHANGE UP (ref 3.5–5.3)
POTASSIUM SERPL-SCNC: 3.7 MMOL/L — SIGNIFICANT CHANGE UP (ref 3.5–5.3)
PROT SERPL-MCNC: 5.3 G/DL — LOW (ref 6–8.3)
RBC # BLD: 3.85 M/UL — LOW (ref 4.2–5.8)
RBC # FLD: 17.9 % — HIGH (ref 10.3–14.5)
SAO2 % BLDA: 99.4 % — HIGH (ref 95–99)
SAO2 % BLDV: 69.2 % — SIGNIFICANT CHANGE UP (ref 60–85)
SODIUM BLDA-SCNC: 147 MMOL/L — HIGH (ref 136–146)
SODIUM SERPL-SCNC: 149 MMOL/L — HIGH (ref 135–145)
SPECIMEN SOURCE: SIGNIFICANT CHANGE UP
SPECIMEN SOURCE: SIGNIFICANT CHANGE UP
WBC # BLD: 8.08 K/UL — SIGNIFICANT CHANGE UP (ref 3.8–10.5)
WBC # FLD AUTO: 8.08 K/UL — SIGNIFICANT CHANGE UP (ref 3.8–10.5)

## 2019-12-31 PROCEDURE — 71045 X-RAY EXAM CHEST 1 VIEW: CPT | Mod: 26

## 2019-12-31 PROCEDURE — 99233 SBSQ HOSP IP/OBS HIGH 50: CPT

## 2019-12-31 RX ORDER — HYDRALAZINE HCL 50 MG
5 TABLET ORAL ONCE
Refills: 0 | Status: COMPLETED | OUTPATIENT
Start: 2019-12-31 | End: 2019-12-31

## 2019-12-31 RX ORDER — SODIUM CHLORIDE 9 MG/ML
1000 INJECTION INTRAMUSCULAR; INTRAVENOUS; SUBCUTANEOUS
Refills: 0 | Status: DISCONTINUED | OUTPATIENT
Start: 2019-12-31 | End: 2020-01-03

## 2019-12-31 RX ORDER — POTASSIUM CHLORIDE 20 MEQ
40 PACKET (EA) ORAL ONCE
Refills: 0 | Status: COMPLETED | OUTPATIENT
Start: 2019-12-31 | End: 2019-12-31

## 2019-12-31 RX ORDER — FENTANYL CITRATE 50 UG/ML
25 INJECTION INTRAVENOUS ONCE
Refills: 0 | Status: DISCONTINUED | OUTPATIENT
Start: 2019-12-31 | End: 2019-12-31

## 2019-12-31 RX ORDER — METOCLOPRAMIDE HCL 10 MG
5 TABLET ORAL EVERY 12 HOURS
Refills: 0 | Status: DISCONTINUED | OUTPATIENT
Start: 2019-12-31 | End: 2020-01-06

## 2019-12-31 RX ADMIN — Medication 40 MILLIEQUIVALENT(S): at 10:30

## 2019-12-31 RX ADMIN — PIPERACILLIN AND TAZOBACTAM 25 GRAM(S): 4; .5 INJECTION, POWDER, LYOPHILIZED, FOR SOLUTION INTRAVENOUS at 03:53

## 2019-12-31 RX ADMIN — PANTOPRAZOLE SODIUM 40 MILLIGRAM(S): 20 TABLET, DELAYED RELEASE ORAL at 11:14

## 2019-12-31 RX ADMIN — TICAGRELOR 90 MILLIGRAM(S): 90 TABLET ORAL at 17:51

## 2019-12-31 RX ADMIN — TICAGRELOR 90 MILLIGRAM(S): 90 TABLET ORAL at 06:29

## 2019-12-31 RX ADMIN — SERTRALINE 50 MILLIGRAM(S): 25 TABLET, FILM COATED ORAL at 11:15

## 2019-12-31 RX ADMIN — CHLORHEXIDINE GLUCONATE 1 APPLICATION(S): 213 SOLUTION TOPICAL at 11:14

## 2019-12-31 RX ADMIN — PIPERACILLIN AND TAZOBACTAM 25 GRAM(S): 4; .5 INJECTION, POWDER, LYOPHILIZED, FOR SOLUTION INTRAVENOUS at 17:50

## 2019-12-31 RX ADMIN — Medication 100 MILLIGRAM(S): at 13:02

## 2019-12-31 RX ADMIN — CHLORHEXIDINE GLUCONATE 15 MILLILITER(S): 213 SOLUTION TOPICAL at 17:50

## 2019-12-31 RX ADMIN — CHLORHEXIDINE GLUCONATE 15 MILLILITER(S): 213 SOLUTION TOPICAL at 06:40

## 2019-12-31 RX ADMIN — Medication 100 MILLIGRAM(S): at 22:05

## 2019-12-31 RX ADMIN — FENTANYL CITRATE 25 MICROGRAM(S): 50 INJECTION INTRAVENOUS at 00:56

## 2019-12-31 RX ADMIN — PIPERACILLIN AND TAZOBACTAM 25 GRAM(S): 4; .5 INJECTION, POWDER, LYOPHILIZED, FOR SOLUTION INTRAVENOUS at 10:54

## 2019-12-31 RX ADMIN — FENTANYL CITRATE 6 MICROGRAM(S)/KG/HR: 50 INJECTION INTRAVENOUS at 08:30

## 2019-12-31 RX ADMIN — MIDAZOLAM HYDROCHLORIDE 2.4 MG/KG/HR: 1 INJECTION, SOLUTION INTRAMUSCULAR; INTRAVENOUS at 08:33

## 2019-12-31 RX ADMIN — Medication 100 MILLIGRAM(S): at 06:29

## 2019-12-31 RX ADMIN — FENTANYL CITRATE 25 MICROGRAM(S): 50 INJECTION INTRAVENOUS at 01:56

## 2019-12-31 RX ADMIN — Medication 5 MILLIGRAM(S): at 23:56

## 2019-12-31 RX ADMIN — FENTANYL CITRATE 6 MICROGRAM(S)/KG/HR: 50 INJECTION INTRAVENOUS at 11:12

## 2019-12-31 RX ADMIN — Medication 5 MILLIGRAM(S): at 22:05

## 2019-12-31 RX ADMIN — MIDAZOLAM HYDROCHLORIDE 2.4 MG/KG/HR: 1 INJECTION, SOLUTION INTRAMUSCULAR; INTRAVENOUS at 22:09

## 2019-12-31 RX ADMIN — Medication 81 MILLIGRAM(S): at 12:57

## 2019-12-31 RX ADMIN — Medication 30 MILLIGRAM(S): at 08:32

## 2019-12-31 RX ADMIN — Medication 30 MILLIGRAM(S): at 17:51

## 2019-12-31 RX ADMIN — ATORVASTATIN CALCIUM 80 MILLIGRAM(S): 80 TABLET, FILM COATED ORAL at 22:06

## 2019-12-31 RX ADMIN — FENTANYL CITRATE 6 MICROGRAM(S)/KG/HR: 50 INJECTION INTRAVENOUS at 20:11

## 2019-12-31 NOTE — PROGRESS NOTE ADULT - SUBJECTIVE AND OBJECTIVE BOX
Cheyanne Soriano PGY1   Page: [phone]    PATIENT: THONY ANAYA, MRN: [id number]    CHIEF COMPLAINT: Patient is a 64y old  Male who presents with a chief complaint of Acute resp failure (30 Dec 2019 11:48)      INTERVAL HISTORY/OVERNIGHT EVENTS: No overnight events. At bedside, patient has been sleeping and eating well. Denies N/V/D/C. Last BM x1 regular yesterday. Denies abdominal pain. Denies chest pain or SOB, cough. Has been ambulating with assistance. Oriented to person, place, and time. Breathing comfortably on room air.    REVIEW OF SYSTEMS:    Constitutional:     [ ] negative [ ] fevers [ ] chills [ ] weight loss [ ] weight gain  HEENT:                  [ ] negative [ ] dry eyes [ ] eye irritation [ ] postnasal drip [ ] nasal congestion  CV:                         [ ] negative  [ ] chest pain [ ] orthopnea [ ] palpitations [ ] murmur  Resp:                     [ ] negative [ ] cough [ ] shortness of breath [ ] dyspnea [ ] wheezing [ ] sputum [ ] hemoptysis  GI:                          [ ] negative [ ] nausea [ ] vomiting [ ] diarrhea [ ] constipation [ ] abd pain [ ] dysphagia   :                        [ ] negative [ ] dysuria [ ] nocturia [ ] hematuria [ ] increased urinary frequency  Musculoskeletal: [ ] negative [ ] back pain [ ] myalgias [ ] arthralgias [ ] fracture  Skin:                       [ ] negative [ ] rash [ ] itch  Neurological:        [ ] negative [ ] headache [ ] dizziness [ ] syncope [ ] weakness [ ] numbness  Psychiatric:           [ ] negative [ ] anxiety [ ] depression  Endocrine:            [ ] negative [ ] diabetes [ ] thyroid problem  Heme/Lymph:      [ ] negative [ ] anemia [ ] bleeding problem  Allergic/Immune: [ ] negative [ ] itchy eyes [ ] nasal discharge [ ] hives [ ] angioedema    [ ] All other systems negative  [ ] Unable to assess ROS because ________.    MEDICATIONS:  MEDICATIONS  (STANDING):  aspirin enteric coated 81 milliGRAM(s) Oral daily  atorvastatin 80 milliGRAM(s) Oral at bedtime  chlorhexidine 0.12% Liquid 15 milliLiter(s) Oral Mucosa every 12 hours  chlorhexidine 4% Liquid 1 Application(s) Topical daily  dextrose 5%. 1000 milliLiter(s) (50 mL/Hr) IV Continuous <Continuous>  dextrose 50% Injectable 12.5 Gram(s) IV Push once  dextrose 50% Injectable 25 Gram(s) IV Push once  dextrose 50% Injectable 25 Gram(s) IV Push once  fentaNYL   Infusion. 0.5 MICROgram(s)/kG/Hr (6 mL/Hr) IV Continuous <Continuous>  hydrocortisone sodium succinate Injectable 100 milliGRAM(s) IV Push every 8 hours  insulin lispro (HumaLOG) corrective regimen sliding scale   SubCutaneous every 6 hours  midazolam Infusion 0.02 mG/kG/Hr (2.4 mL/Hr) IV Continuous <Continuous>  oseltamivir Suspension 30 milliGRAM(s) Oral two times a day  pantoprazole  Injectable 40 milliGRAM(s) IV Push daily  piperacillin/tazobactam IVPB.. 3.375 Gram(s) IV Intermittent every 8 hours  potassium chloride   Powder 40 milliEquivalent(s) Oral once  sertraline 50 milliGRAM(s) Oral daily  ticagrelor 90 milliGRAM(s) Oral every 12 hours    MEDICATIONS  (PRN):  acetaminophen    Suspension .. 650 milliGRAM(s) Oral every 6 hours PRN Temp greater or equal to 38C (100.4F)  dextrose 40% Gel 15 Gram(s) Oral once PRN Blood Glucose LESS THAN 70 milliGRAM(s)/deciliter  glucagon  Injectable 1 milliGRAM(s) IntraMuscular once PRN Glucose LESS THAN 70 milligrams/deciliter      ALLERGIES: Allergies    No Known Allergies    Intolerances        OBJECTIVE:  ICU Vital Signs Last 24 Hrs  T(C): 37.4 (31 Dec 2019 06:00), Max: 38.2 (30 Dec 2019 16:45)  T(F): 99.3 (31 Dec 2019 06:00), Max: 100.8 (30 Dec 2019 16:45)  HR: 74 (31 Dec 2019 07:09) (65 - 153)  BP: --  BP(mean): --  ABP: 137/75 (31 Dec 2019 07:00) (101/77 - 148/105)  ABP(mean): 95 (31 Dec 2019 07:00) (76 - 120)  RR: 20 (31 Dec 2019 07:00) (14 - 27)  SpO2: 98% (31 Dec 2019 07:09) (96% - 100%)    Mode: AC/ CMV (Assist Control/ Continuous Mandatory Ventilation)  RR (machine): 20  TV (machine): 500  FiO2: 35  PEEP: 5  MAP: 14  PIP: 38    Adult Advanced Hemodynamics Last 24 Hrs  CVP(mm Hg): 11 (31 Dec 2019 07:00) (8 - 34)  CVP(cm H2O): --  CO: --  CI: --  PA: --  PA(mean): --  PCWP: --  SVR: --  SVRI: --  PVR: --  PVRI: --  CAPILLARY BLOOD GLUCOSE      POCT Blood Glucose.: 131 mg/dL (31 Dec 2019 06:31)  POCT Blood Glucose.: 114 mg/dL (31 Dec 2019 01:12)  POCT Blood Glucose.: 114 mg/dL (30 Dec 2019 18:00)  POCT Blood Glucose.: 114 mg/dL (30 Dec 2019 11:40)    CAPILLARY BLOOD GLUCOSE      POCT Blood Glucose.: 131 mg/dL (31 Dec 2019 06:31)    I&O's Summary    30 Dec 2019 07:01  -  31 Dec 2019 07:00  --------------------------------------------------------  IN: 2211 mL / OUT: 1795 mL / NET: 416 mL      Daily     Daily     PHYSICAL EXAMINATION:  General: Comfortable, no acute distress, cooperative with exam.  HEENT: PERRLA, EOMI, moist mucous membranes.  Respiratory: CTAB, normal respiratory effort, no coughing, wheezes, crackles, or rales.  CV: RRR, S1S2, no murmurs, rubs or gallops. No JVD. Distal pulses intact.  Abdominal: Soft, nontender, nondistended, no rebound or guarding, normal bowel sounds.  Neurology: AOx3, no focal neuro defects, DOW x 4.  Extremities: No pitting edema, + Peripheral pulses.  Incisions:   Tubes:    LABS:  ABG - ( 31 Dec 2019 05:20 )  pH, Arterial: 7.42  pH, Blood: x     /  pCO2: 41    /  pO2: 201   / HCO3: 26    / Base Excess: 2.1   /  SaO2: 99.4                                    10.2   8.08  )-----------( 108      ( 31 Dec 2019 05:20 )             33.9     12-    149<H>  |  110<H>  |  77<H>  ----------------------------<  142<H>  3.7   |  24  |  2.99<H>    Ca    8.5      31 Dec 2019 05:20  Phos  4.4       Mg     2.7         TPro  5.3<L>  /  Alb  2.9<L>  /  TBili  0.4  /  DBili  x   /  AST  30  /  ALT  39  /  AlkPhos  94      LIVER FUNCTIONS - ( 31 Dec 2019 05:20 )  Alb: 2.9 g/dL / Pro: 5.3 g/dL / ALK PHOS: 94 u/L / ALT: 39 u/L / AST: 30 u/L / GGT: x           PTT - ( 30 Dec 2019 06:10 )  PTT:76.4 SEC        Urinalysis Basic - ( 30 Dec 2019 01:35 )    Color: YELLOW / Appearance: CLEAR / S.020 / pH: 5.5  Gluc: NEGATIVE / Ketone: NEGATIVE  / Bili: NEGATIVE / Urobili: NORMAL   Blood: MODERATE / Protein: 20 / Nitrite: NEGATIVE   Leuk Esterase: NEGATIVE / RBC: 26-50 / WBC 0-2   Sq Epi: OCC / Non Sq Epi: x / Bacteria: NEGATIVE        TELEMETRY:     EKG:     IMAGING: Cheyanne Soriano PGY1   Page: [phone]    PATIENT: THONY ANAYA, MRN: [id number]    CHIEF COMPLAINT: Patient is a 64y old  Male who presents with a chief complaint of Acute resp failure (30 Dec 2019 11:48)      INTERVAL HISTORY/OVERNIGHT EVENTS: febrile to 38.2, pan cx'ed     REVIEW OF SYSTEMS:  Limited 2/2 pt intubated and sedated       MEDICATIONS:  MEDICATIONS  (STANDING):  aspirin enteric coated 81 milliGRAM(s) Oral daily  atorvastatin 80 milliGRAM(s) Oral at bedtime  chlorhexidine 0.12% Liquid 15 milliLiter(s) Oral Mucosa every 12 hours  chlorhexidine 4% Liquid 1 Application(s) Topical daily  dextrose 5%. 1000 milliLiter(s) (50 mL/Hr) IV Continuous <Continuous>  dextrose 50% Injectable 12.5 Gram(s) IV Push once  dextrose 50% Injectable 25 Gram(s) IV Push once  dextrose 50% Injectable 25 Gram(s) IV Push once  fentaNYL   Infusion. 0.5 MICROgram(s)/kG/Hr (6 mL/Hr) IV Continuous <Continuous>  hydrocortisone sodium succinate Injectable 100 milliGRAM(s) IV Push every 8 hours  insulin lispro (HumaLOG) corrective regimen sliding scale   SubCutaneous every 6 hours  midazolam Infusion 0.02 mG/kG/Hr (2.4 mL/Hr) IV Continuous <Continuous>  oseltamivir Suspension 30 milliGRAM(s) Oral two times a day  pantoprazole  Injectable 40 milliGRAM(s) IV Push daily  piperacillin/tazobactam IVPB.. 3.375 Gram(s) IV Intermittent every 8 hours  potassium chloride   Powder 40 milliEquivalent(s) Oral once  sertraline 50 milliGRAM(s) Oral daily  ticagrelor 90 milliGRAM(s) Oral every 12 hours    MEDICATIONS  (PRN):  acetaminophen    Suspension .. 650 milliGRAM(s) Oral every 6 hours PRN Temp greater or equal to 38C (100.4F)  dextrose 40% Gel 15 Gram(s) Oral once PRN Blood Glucose LESS THAN 70 milliGRAM(s)/deciliter  glucagon  Injectable 1 milliGRAM(s) IntraMuscular once PRN Glucose LESS THAN 70 milligrams/deciliter      ALLERGIES: Allergies    No Known Allergies    Intolerances        OBJECTIVE:  ICU Vital Signs Last 24 Hrs  T(C): 37.4 (31 Dec 2019 06:00), Max: 38.2 (30 Dec 2019 16:45)  T(F): 99.3 (31 Dec 2019 06:00), Max: 100.8 (30 Dec 2019 16:45)  HR: 74 (31 Dec 2019 07:09) (65 - 153)  BP: --  BP(mean): --  ABP: 137/75 (31 Dec 2019 07:00) (101/77 - 148/105)  ABP(mean): 95 (31 Dec 2019 07:00) (76 - 120)  RR: 20 (31 Dec 2019 07:00) (14 - 27)  SpO2: 98% (31 Dec 2019 07:09) (96% - 100%)    Mode: AC/ CMV (Assist Control/ Continuous Mandatory Ventilation)  RR (machine): 20  TV (machine): 500  FiO2: 35  PEEP: 5  MAP: 14  PIP: 38      POCT Blood Glucose.: 131 mg/dL (31 Dec 2019 06:31)  POCT Blood Glucose.: 114 mg/dL (31 Dec 2019 01:12)  POCT Blood Glucose.: 114 mg/dL (30 Dec 2019 18:00)  POCT Blood Glucose.: 114 mg/dL (30 Dec 2019 11:40)    CAPILLARY BLOOD GLUCOSE      POCT Blood Glucose.: 131 mg/dL (31 Dec 2019 06:31)    I&O's Summary    30 Dec 2019 07:01  -  31 Dec 2019 07:00  --------------------------------------------------------  IN: 2211 mL / OUT: 1795 mL / NET: 416 mL      Daily     Daily     PHYSICAL EXAMINATION:  General: intubated and sedated   HEENT: PERRLA, EOMI, moist mucous membranes.  Respiratory: intubated, normal respiratory effort, no coughing, wheezes, crackles, or rales.  CV: afib rhythm, S1S2, no murmurs, rubs or gallops. No JVD. Distal pulses intact.  Abdominal: Soft, nontender, nondistended, no rebound or guarding, normal bowel sounds.  Neurology: sedated  Extremities: No pitting edema, + Peripheral pulses.  Incisions:   Tubes:    LABS:  ABG - ( 31 Dec 2019 05:20 )  pH, Arterial: 7.42  pH, Blood: x     /  pCO2: 41    /  pO2: 201   / HCO3: 26    / Base Excess: 2.1   /  SaO2: 99.4                                    10.2   8.08  )-----------( 108      ( 31 Dec 2019 05:20 )             33.9         149<H>  |  110<H>  |  77<H>  ----------------------------<  142<H>  3.7   |  24  |  2.99<H>    Ca    8.5      31 Dec 2019 05:20  Phos  4.4       Mg     2.7         TPro  5.3<L>  /  Alb  2.9<L>  /  TBili  0.4  /  DBili  x   /  AST  30  /  ALT  39  /  AlkPhos  94      LIVER FUNCTIONS - ( 31 Dec 2019 05:20 )  Alb: 2.9 g/dL / Pro: 5.3 g/dL / ALK PHOS: 94 u/L / ALT: 39 u/L / AST: 30 u/L / GGT: x           PTT - ( 30 Dec 2019 06:10 )  PTT:76.4 SEC        Urinalysis Basic - ( 30 Dec 2019 01:35 )    Color: YELLOW / Appearance: CLEAR / S.020 / pH: 5.5  Gluc: NEGATIVE / Ketone: NEGATIVE  / Bili: NEGATIVE / Urobili: NORMAL   Blood: MODERATE / Protein: 20 / Nitrite: NEGATIVE   Leuk Esterase: NEGATIVE / RBC: 26-50 / WBC 0-2   Sq Epi: OCC / Non Sq Epi: x / Bacteria: NEGATIVE        TELEMETRY:     EKG:     IMAGING: 20-Jan-2018 10:00

## 2019-12-31 NOTE — PROGRESS NOTE ADULT - PROBLEM SELECTOR PLAN 8
RVP +influenza A, +RSV, +hMPV   -Tamiflu started. Remains afebrile, blood cultures NGTD  - Will continue empiric zosyn at present given oral lesion that still requires packing RVP +influenza A, +RSV, +hMPV   -Tamiflu started. blood cultures NGTD  - Will continue empiric zosyn at present given oral lesion that still requires packing

## 2019-12-31 NOTE — PROGRESS NOTE ADULT - ASSESSMENT
64M with CAD, stents and CABG (last [facility] was 12/2017 with patent LIMA but other grafts occluded, medical management), HTN, HLD, CKD, CHER, ?HF, and pemphigus vulgaris presents with acute SOB requiring intubation for airway protection with new atrial fibrillation in setting of + influenza. 64M with CAD, stents and CABG (last [facility] was 12/2017 with patent LIMA but other grafts occluded, medical management), HTN, HLD, CKD, CHER, ?HF, and pemphigus vulgaris presents with acute SOB requiring intubation for airway protection with new atrial fibrillation in setting of + influenza. Hep gtt held because of oral bleeding, plan for possible extubation tomorrow

## 2019-12-31 NOTE — PROGRESS NOTE ADULT - SUBJECTIVE AND OBJECTIVE BOX
[facility]   MD Katerina Gallardo D.O. Fatima Sheikh, D.O. Ruoru Wong, PA    From 7 AM - 5 PM:  OFFICE: [phone]  Dr. Mayen cell: [phone]  Dr. Flowers cell: [phone]  Dr. Giordano cell: [phone]  JEEVAN Mccall cell: [phone]    From 5 PM - 7 AM: Answering Service: [phone]      RENAL FOLLOW UP NOTE  --------------------------------------------------------------------------------  HPI: Pt seen and examined at bedside. Remains intubated    PAST HISTORY  --------------------------------------------------------------------------------  No significant changes to PMH, PSH, FHx, SHx, unless otherwise noted    ALLERGIES & MEDICATIONS  --------------------------------------------------------------------------------  Allergies    No Known Allergies    Intolerances      Standing Inpatient Medications  aspirin enteric coated 81 milliGRAM(s) Oral daily  atorvastatin 80 milliGRAM(s) Oral at bedtime  chlorhexidine 0.12% Liquid 15 milliLiter(s) Oral Mucosa every 12 hours  chlorhexidine 4% Liquid 1 Application(s) Topical daily  fentaNYL   Infusion. 0.5 MICROgram(s)/kG/Hr IV Continuous <Continuous>  hydrocortisone sodium succinate Injectable 100 milliGRAM(s) IV Push every 8 hours  midazolam Infusion 0.02 mG/kG/Hr IV Continuous <Continuous>  oseltamivir Suspension 30 milliGRAM(s) Oral two times a day  pantoprazole  Injectable 40 milliGRAM(s) IV Push daily  piperacillin/tazobactam IVPB.. 3.375 Gram(s) IV Intermittent every 8 hours  sertraline 50 milliGRAM(s) Oral daily  ticagrelor 90 milliGRAM(s) Oral every 12 hours    PRN Inpatient Medications  acetaminophen    Suspension .. 650 milliGRAM(s) Oral every 6 hours PRN      REVIEW OF SYSTEMS  --------------------------------------------------------------------------------  Unable to obtain     VITALS/PHYSICAL EXAM  --------------------------------------------------------------------------------  T(C): 36.9 (12-31-19 @ 10:00), Max: 38.2 (12-30-19 @ 16:45)  HR: 69 (12-31-19 @ 11:50) (65 - 153)  BP: --  RR: 20 (12-31-19 @ 11:00) (14 - 22)  SpO2: 99% (12-31-19 @ 11:50) (96% - 100%)  Wt(kg): --        12-30-19 @ 07:01  -  12-31-19 @ 07:00  --------------------------------------------------------  IN: 2211 mL / OUT: 1795 mL / NET: 416 mL      Physical Exam:  	Gen: NAD intubated   	HEENT: MMM, blood oral cavity   	Pulm: CTA B/L  	CV: S1S2  	Abd: Soft, +BS  	Ext: No LE edema B/L                      Neuro: Arousable   	Skin: Warm and Dry       LABS/STUDIES  --------------------------------------------------------------------------------              10.2   8.08  >-----------<  108      [12-31-19 @ 05:20]              33.9     149  |  110  |  77  ----------------------------<  142      [12-31-19 @ 05:20]  3.7   |  24  |  2.99        Ca     8.5     [12-31-19 @ 05:20]      Mg     2.7     [12-31-19 @ 05:20]      Phos  4.4     [12-31-19 @ 05:20]    TPro  5.3  /  Alb  2.9  /  TBili  0.4  /  DBili  x   /  AST  30  /  ALT  39  /  AlkPhos  94  [12-31-19 @ 05:20]    PTT: 76.4       [12-30-19 @ 06:10]    Creatinine Trend:  SCr 2.99 [12-31 @ 05:20]  SCr 3.27 [12-30 @ 06:10]  SCr 3.05 [12-29 @ 04:45]  SCr 2.84 [12-28 @ 13:50]  SCr 2.67 [12-28 @ 03:30]    Urinalysis - [12-30-19 @ 01:35]      Color YELLOW / Appearance CLEAR / SG 1.020 / pH 5.5      Gluc NEGATIVE / Ketone NEGATIVE  / Bili NEGATIVE / Urobili NORMAL       Blood MODERATE / Protein 20 / Leuk Est NEGATIVE / Nitrite NEGATIVE      RBC 26-50 / WBC 0-2 / Hyaline 1+ / Gran  / Sq Epi OCC / Non Sq Epi  / Bacteria NEGATIVE      HbA1c 6.1      [12-28-19 @ 03:30]    HCV 0.14, Nonreactive Hepatitis C AB  S/CO Ratio                        Interpretation  < 1.00                                   Non-Reactive  1.00 - 4.99                         Weakly-Reactive  >= 5.00                                Reactive  Non-Reactive: Aperson with a non-reactive HCV antibody  result is considered uninfected.  No further action is  needed unless recent infection is suspected.  In these  cases, consider repeat testing later to detect  seroconversion..  Weakly-Reactive: HCV antibody test is abnormal, HCV RNA  Qualitative test will follow.  Reactive: HCV antibody test is abnormal, HCV RNA  Qualitative test will follow.  Note: HCV antibody testing is performed on the [employer]   system.      [12-28-19 @ 03:30]

## 2019-12-31 NOTE — PROGRESS NOTE ADULT - PROBLEM SELECTOR PLAN 5
Medical management at present. Continue DAPT with ASA and Brilinta, lipitor. -increase free water to 350cc q6hr through feeding tube

## 2019-12-31 NOTE — PROGRESS NOTE ADULT - PROBLEM SELECTOR PLAN 2
sepsis vs worsening ADHF   -RESOLVED  -leukocytosis on presentation, s/p vanc and zosyn. now on zosyn alone   -leukocytosis improved, Blood cx NGTD, UA negative nitrate, and no bacteria ECHO with worsening LV function. S/p lasix 80 mg IV BID  - d/c lasix standing, will give as PRN   - Continue to hold ACE and BB at present given hypotension

## 2019-12-31 NOTE — PROGRESS NOTE ADULT - SUBJECTIVE AND OBJECTIVE BOX
Patient seen and examined. Heparin gtt stopped for 24 hours. Will attempt extubation tomorrow if bleeding resolves.     T(C): 37.4 (12-31-19 @ 06:00), Max: 38.2 (12-30-19 @ 16:45)  HR: 68 (12-31-19 @ 08:00) (65 - 153)  BP: --  RR: 20 (12-31-19 @ 08:00) (14 - 27)  SpO2: 99% (12-31-19 @ 08:00) (96% - 100%)  Intubated and sedated  On [facility] vent  Nose clear bilaterally, no bleeding anteriorly  OC/OP: ETT in place, area of mucosal tear in left buccal mucosa with persistent pooling, clots suctioned from posterior OP, no tongue laceration or trauma evident  Kerlix removed with decreased amount of saturation, L inferior alveolar mucosa anterior to RMT with diffuse bulky tissue/clot, removed with persistent but significantly improved slow ooze, repacked with kerlix  No other bleeding noted  Bite block placed to prevent trauma to tongue                                   10.2   8.08  )-----------( 108      ( 31 Dec 2019 05:20 )             33.9       A/P 64M with oral cavity bleed after intubation. Source of bleeding is L inferior alveolar mucosa, improved but persistently oozing after stopping heparin gtt. Repacked.   -continue to hold hep gtt  - will re-eval tomorrow  -keep bite block  in place to prevent tongue edema  -oral care  -wet gauze to tongue to prevent dessication  -keep kerlix in place  -continue abx while packing in place  -call/page with questions  -ENT will follow

## 2019-12-31 NOTE — PROGRESS NOTE ADULT - ASSESSMENT
64M with CAD, stents and CABG (last [facility] was 12/2017 with patent LIMA but other grafts occluded, medical management), HTN, HLD, CKD, CHER, ?HF, and pemphygus vulgaris presents with acute SOB. admitted to ccu for acute resp failure s/p intubation    NARCISO on CKD (chronic kidney disease) stage 3-4  baseline ~ 1.8-2.2 fluctuates likely sec to chf  CKD likely sec to HTN/HF  Scr peaked at 3.27 on 12/30/19     SCr Improving today   Pt non-oliguric. Volume status improved  Advise to continue diuresis as needed at present. Off lasix   BP borderline. Avoid hypotension     UA with moderate blood and minimal protein   check urine p/c ratio  monitor for now  No role/ need for RRT at present     Essential hypertension.    controlled.   care per CCU    Acute resp failure/ Hypervolemia  s/p intubated  Improving volume status s/p diuretics   Remains intubated due to oral bleeding   agree with diuretics as needed at present  monitor i/o, weight, urine output   f/u cardio    CKD-MBD  check pth, &  Vitamin D   Phos acceptable   Calcium low  monitor phos and wolf daily

## 2019-12-31 NOTE — PROGRESS NOTE ADULT - PROBLEM SELECTOR PLAN 1
Remains intubated and sedated, on fentanyl and versed.  Will wean ventilator settings, PEEP and FiO2. Monitor ABGs and sats.  -currently on 35% FiO2 and PEEP 5, paO2 99  -will plan for cpap trial today Remains intubated and sedated, on fentanyl and versed.  Will wean ventilator settings, PEEP and FiO2. Monitor ABGs and sats.  -currently on 35% FiO2 and PEEP 5, paO2 99  - cpap trial

## 2019-12-31 NOTE — PROGRESS NOTE ADULT - PROBLEM SELECTOR PLAN 6
Baseline reported at 1.8 to 2.0. Renal recs noted and appreciated. Continue to monitor Scr.  -avoid nephrotoxic agent Baseline reported at 1.8 to 2.0. Renal recs noted and appreciated. Continue to monitor Scr. downtrending   -avoid nephrotoxic agent

## 2019-12-31 NOTE — PROGRESS NOTE ADULT - PROBLEM SELECTOR PLAN 4
Remains in aflutter and afib but with good rate control.   -have to hold Heparin gtt for anticoagulation because of bleeding Medical management at present. Continue DAPT with ASA and Brilinta, lipitor.

## 2019-12-31 NOTE — PROGRESS NOTE ADULT - PROBLEM SELECTOR PLAN 3
ECHO with worsening LV function. S/p lasix 80 mg IV BID  - d/c lasix standing, will give as PRN   - Continue to hold ACE and BB at present given hypotension Remains in aflutter and afib but with good rate control.   -have to hold Heparin gtt for anticoagulation because of bleeding

## 2020-01-01 DIAGNOSIS — I47.2 VENTRICULAR TACHYCARDIA: ICD-10-CM

## 2020-01-01 LAB
ALBUMIN SERPL ELPH-MCNC: 2.9 G/DL — LOW (ref 3.3–5)
ALBUMIN SERPL ELPH-MCNC: 3.2 G/DL — LOW (ref 3.3–5)
ALP SERPL-CCNC: 100 U/L — SIGNIFICANT CHANGE UP (ref 40–120)
ALP SERPL-CCNC: 94 U/L — SIGNIFICANT CHANGE UP (ref 40–120)
ALT FLD-CCNC: 39 U/L — SIGNIFICANT CHANGE UP (ref 4–41)
ALT FLD-CCNC: 41 U/L — SIGNIFICANT CHANGE UP (ref 4–41)
ANION GAP SERPL CALC-SCNC: 14 MMO/L — SIGNIFICANT CHANGE UP (ref 7–14)
ANION GAP SERPL CALC-SCNC: 14 MMO/L — SIGNIFICANT CHANGE UP (ref 7–14)
APTT BLD: 35.6 SEC — SIGNIFICANT CHANGE UP (ref 27.5–36.3)
AST SERPL-CCNC: 32 U/L — SIGNIFICANT CHANGE UP (ref 4–40)
AST SERPL-CCNC: 37 U/L — SIGNIFICANT CHANGE UP (ref 4–40)
BACTERIA BLD CULT: SIGNIFICANT CHANGE UP
BASE EXCESS BLDA CALC-SCNC: 1.1 MMOL/L — SIGNIFICANT CHANGE UP
BASE EXCESS BLDA CALC-SCNC: 2.8 MMOL/L — SIGNIFICANT CHANGE UP
BASE EXCESS BLDA CALC-SCNC: 3.9 MMOL/L — SIGNIFICANT CHANGE UP
BASE EXCESS BLDV CALC-SCNC: 4.9 MMOL/L — SIGNIFICANT CHANGE UP
BILIRUB SERPL-MCNC: 0.5 MG/DL — SIGNIFICANT CHANGE UP (ref 0.2–1.2)
BILIRUB SERPL-MCNC: 0.6 MG/DL — SIGNIFICANT CHANGE UP (ref 0.2–1.2)
BLOOD GAS ARTERIAL - FIO2: 35 — SIGNIFICANT CHANGE UP
BUN SERPL-MCNC: 70 MG/DL — HIGH (ref 7–23)
BUN SERPL-MCNC: 71 MG/DL — HIGH (ref 7–23)
CA-I BLDA-SCNC: 1.25 MMOL/L — SIGNIFICANT CHANGE UP (ref 1.15–1.29)
CALCIUM SERPL-MCNC: 8.6 MG/DL — SIGNIFICANT CHANGE UP (ref 8.4–10.5)
CALCIUM SERPL-MCNC: 8.6 MG/DL — SIGNIFICANT CHANGE UP (ref 8.4–10.5)
CHLORIDE BLDA-SCNC: 115 MMOL/L — HIGH (ref 96–108)
CHLORIDE SERPL-SCNC: 112 MMOL/L — HIGH (ref 98–107)
CHLORIDE SERPL-SCNC: 112 MMOL/L — HIGH (ref 98–107)
CK MB BLD-MCNC: 2.48 NG/ML — SIGNIFICANT CHANGE UP (ref 1–6.6)
CK MB BLD-MCNC: 3.28 NG/ML — SIGNIFICANT CHANGE UP (ref 1–6.6)
CK SERPL-CCNC: 279 U/L — HIGH (ref 30–200)
CK SERPL-CCNC: 289 U/L — HIGH (ref 30–200)
CO2 SERPL-SCNC: 24 MMOL/L — SIGNIFICANT CHANGE UP (ref 22–31)
CO2 SERPL-SCNC: 24 MMOL/L — SIGNIFICANT CHANGE UP (ref 22–31)
CREAT SERPL-MCNC: 2.19 MG/DL — HIGH (ref 0.5–1.3)
CREAT SERPL-MCNC: 2.45 MG/DL — HIGH (ref 0.5–1.3)
GLUCOSE BLDA-MCNC: 117 MG/DL — HIGH (ref 70–99)
GLUCOSE BLDA-MCNC: 148 MG/DL — HIGH (ref 70–99)
GLUCOSE SERPL-MCNC: 126 MG/DL — HIGH (ref 70–99)
GLUCOSE SERPL-MCNC: 152 MG/DL — HIGH (ref 70–99)
HCO3 BLDA-SCNC: 25 MMOL/L — SIGNIFICANT CHANGE UP (ref 22–26)
HCO3 BLDA-SCNC: 27 MMOL/L — HIGH (ref 22–26)
HCO3 BLDA-SCNC: 28 MMOL/L — HIGH (ref 22–26)
HCO3 BLDV-SCNC: 28 MMOL/L — HIGH (ref 20–27)
HCT VFR BLD CALC: 33.7 % — LOW (ref 39–50)
HCT VFR BLDA CALC: 33.8 % — LOW (ref 39–51)
HCT VFR BLDA CALC: 36 % — LOW (ref 39–51)
HGB BLD-MCNC: 10.5 G/DL — LOW (ref 13–17)
HGB BLDA-MCNC: 10.9 G/DL — LOW (ref 13–17)
HGB BLDA-MCNC: 11.7 G/DL — LOW (ref 13–17)
LACTATE BLDA-SCNC: 1.9 MMOL/L — SIGNIFICANT CHANGE UP (ref 0.5–2)
LACTATE BLDA-SCNC: 1.9 MMOL/L — SIGNIFICANT CHANGE UP (ref 0.5–2)
MAGNESIUM SERPL-MCNC: 2.7 MG/DL — HIGH (ref 1.6–2.6)
MAGNESIUM SERPL-MCNC: 2.7 MG/DL — HIGH (ref 1.6–2.6)
MCHC RBC-ENTMCNC: 27.1 PG — SIGNIFICANT CHANGE UP (ref 27–34)
MCHC RBC-ENTMCNC: 31.2 % — LOW (ref 32–36)
MCV RBC AUTO: 87.1 FL — SIGNIFICANT CHANGE UP (ref 80–100)
NRBC # FLD: 0 K/UL — SIGNIFICANT CHANGE UP (ref 0–0)
PCO2 BLDA: 37 MMHG — SIGNIFICANT CHANGE UP (ref 35–48)
PCO2 BLDA: 43 MMHG — SIGNIFICANT CHANGE UP (ref 35–48)
PCO2 BLDA: 47 MMHG — SIGNIFICANT CHANGE UP (ref 35–48)
PCO2 BLDV: 44 MMHG — SIGNIFICANT CHANGE UP (ref 41–51)
PH BLDA: 7.36 PH — SIGNIFICANT CHANGE UP (ref 7.35–7.45)
PH BLDA: 7.42 PH — SIGNIFICANT CHANGE UP (ref 7.35–7.45)
PH BLDA: 7.48 PH — HIGH (ref 7.35–7.45)
PH BLDV: 7.43 PH — SIGNIFICANT CHANGE UP (ref 7.32–7.43)
PHOSPHATE SERPL-MCNC: 3.2 MG/DL — SIGNIFICANT CHANGE UP (ref 2.5–4.5)
PHOSPHATE SERPL-MCNC: 5.3 MG/DL — HIGH (ref 2.5–4.5)
PLATELET # BLD AUTO: 102 K/UL — LOW (ref 150–400)
PMV BLD: 11.6 FL — SIGNIFICANT CHANGE UP (ref 7–13)
PO2 BLDA: 165 MMHG — HIGH (ref 83–108)
PO2 BLDA: 180 MMHG — HIGH (ref 83–108)
PO2 BLDA: 78 MMHG — LOW (ref 83–108)
PO2 BLDV: 37 MMHG — SIGNIFICANT CHANGE UP (ref 35–40)
POTASSIUM BLDA-SCNC: 3.4 MMOL/L — SIGNIFICANT CHANGE UP (ref 3.4–4.5)
POTASSIUM BLDA-SCNC: 3.6 MMOL/L — SIGNIFICANT CHANGE UP (ref 3.4–4.5)
POTASSIUM SERPL-MCNC: 3.6 MMOL/L — SIGNIFICANT CHANGE UP (ref 3.5–5.3)
POTASSIUM SERPL-MCNC: 3.6 MMOL/L — SIGNIFICANT CHANGE UP (ref 3.5–5.3)
POTASSIUM SERPL-SCNC: 3.6 MMOL/L — SIGNIFICANT CHANGE UP (ref 3.5–5.3)
POTASSIUM SERPL-SCNC: 3.6 MMOL/L — SIGNIFICANT CHANGE UP (ref 3.5–5.3)
PROT SERPL-MCNC: 5.6 G/DL — LOW (ref 6–8.3)
PROT SERPL-MCNC: 6 G/DL — SIGNIFICANT CHANGE UP (ref 6–8.3)
RBC # BLD: 3.87 M/UL — LOW (ref 4.2–5.8)
RBC # FLD: 17.4 % — HIGH (ref 10.3–14.5)
SAO2 % BLDA: 95.5 % — SIGNIFICANT CHANGE UP (ref 95–99)
SAO2 % BLDA: 98.5 % — SIGNIFICANT CHANGE UP (ref 95–99)
SAO2 % BLDA: 98.8 % — SIGNIFICANT CHANGE UP (ref 95–99)
SAO2 % BLDV: 64.3 % — SIGNIFICANT CHANGE UP (ref 60–85)
SODIUM BLDA-SCNC: 146 MMOL/L — SIGNIFICANT CHANGE UP (ref 136–146)
SODIUM BLDA-SCNC: 148 MMOL/L — HIGH (ref 136–146)
SODIUM SERPL-SCNC: 150 MMOL/L — HIGH (ref 135–145)
SODIUM SERPL-SCNC: 150 MMOL/L — HIGH (ref 135–145)
TROPONIN T, HIGH SENSITIVITY: 196 NG/L — CRITICAL HIGH (ref ?–14)
TROPONIN T, HIGH SENSITIVITY: 253 NG/L — CRITICAL HIGH (ref ?–14)
WBC # BLD: 8.38 K/UL — SIGNIFICANT CHANGE UP (ref 3.8–10.5)
WBC # FLD AUTO: 8.38 K/UL — SIGNIFICANT CHANGE UP (ref 3.8–10.5)

## 2020-01-01 PROCEDURE — 71045 X-RAY EXAM CHEST 1 VIEW: CPT | Mod: 26,76

## 2020-01-01 PROCEDURE — 99233 SBSQ HOSP IP/OBS HIGH 50: CPT

## 2020-01-01 RX ORDER — PANTOPRAZOLE SODIUM 20 MG/1
40 TABLET, DELAYED RELEASE ORAL DAILY
Refills: 0 | Status: DISCONTINUED | OUTPATIENT
Start: 2020-01-01 | End: 2020-01-06

## 2020-01-01 RX ORDER — AMIODARONE HYDROCHLORIDE 400 MG/1
150 TABLET ORAL ONCE
Refills: 0 | Status: COMPLETED | OUTPATIENT
Start: 2020-01-01 | End: 2020-01-01

## 2020-01-01 RX ORDER — ASPIRIN/CALCIUM CARB/MAGNESIUM 324 MG
81 TABLET ORAL DAILY
Refills: 0 | Status: DISCONTINUED | OUTPATIENT
Start: 2020-01-01 | End: 2020-01-10

## 2020-01-01 RX ORDER — MEROPENEM 1 G/30ML
1000 INJECTION INTRAVENOUS EVERY 12 HOURS
Refills: 0 | Status: DISCONTINUED | OUTPATIENT
Start: 2020-01-01 | End: 2020-01-04

## 2020-01-01 RX ORDER — LIDOCAINE HCL 20 MG/ML
2 VIAL (ML) INJECTION
Qty: 2 | Refills: 0 | Status: DISCONTINUED | OUTPATIENT
Start: 2020-01-01 | End: 2020-01-02

## 2020-01-01 RX ORDER — HEPARIN SODIUM 5000 [USP'U]/ML
9000 INJECTION INTRAVENOUS; SUBCUTANEOUS ONCE
Refills: 0 | Status: COMPLETED | OUTPATIENT
Start: 2020-01-01 | End: 2020-01-01

## 2020-01-01 RX ORDER — METOPROLOL TARTRATE 50 MG
25 TABLET ORAL
Refills: 0 | Status: DISCONTINUED | OUTPATIENT
Start: 2020-01-01 | End: 2020-01-02

## 2020-01-01 RX ORDER — POTASSIUM CHLORIDE 20 MEQ
10 PACKET (EA) ORAL
Refills: 0 | Status: COMPLETED | OUTPATIENT
Start: 2020-01-01 | End: 2020-01-01

## 2020-01-01 RX ORDER — HEPARIN SODIUM 5000 [USP'U]/ML
4000 INJECTION INTRAVENOUS; SUBCUTANEOUS EVERY 6 HOURS
Refills: 0 | Status: DISCONTINUED | OUTPATIENT
Start: 2020-01-01 | End: 2020-01-01

## 2020-01-01 RX ORDER — DEXMEDETOMIDINE HYDROCHLORIDE IN 0.9% SODIUM CHLORIDE 4 UG/ML
0.4 INJECTION INTRAVENOUS
Qty: 200 | Refills: 0 | Status: DISCONTINUED | OUTPATIENT
Start: 2020-01-01 | End: 2020-01-01

## 2020-01-01 RX ORDER — LIDOCAINE HCL 20 MG/ML
50 VIAL (ML) INJECTION ONCE
Refills: 0 | Status: COMPLETED | OUTPATIENT
Start: 2020-01-01 | End: 2020-01-01

## 2020-01-01 RX ORDER — LIDOCAINE HCL 20 MG/ML
100 VIAL (ML) INJECTION ONCE
Refills: 0 | Status: COMPLETED | OUTPATIENT
Start: 2020-01-01 | End: 2020-01-01

## 2020-01-01 RX ORDER — SERTRALINE 25 MG/1
50 TABLET, FILM COATED ORAL DAILY
Refills: 0 | Status: DISCONTINUED | OUTPATIENT
Start: 2020-01-01 | End: 2020-01-07

## 2020-01-01 RX ORDER — HEPARIN SODIUM 5000 [USP'U]/ML
1000 INJECTION INTRAVENOUS; SUBCUTANEOUS
Qty: 25000 | Refills: 0 | Status: DISCONTINUED | OUTPATIENT
Start: 2020-01-01 | End: 2020-01-02

## 2020-01-01 RX ORDER — HEPARIN SODIUM 5000 [USP'U]/ML
9000 INJECTION INTRAVENOUS; SUBCUTANEOUS EVERY 6 HOURS
Refills: 0 | Status: DISCONTINUED | OUTPATIENT
Start: 2020-01-01 | End: 2020-01-01

## 2020-01-01 RX ADMIN — Medication 25 MILLIGRAM(S): at 17:28

## 2020-01-01 RX ADMIN — Medication 5 MILLIGRAM(S): at 07:04

## 2020-01-01 RX ADMIN — Medication 100 MILLIGRAM(S): at 22:06

## 2020-01-01 RX ADMIN — ATORVASTATIN CALCIUM 80 MILLIGRAM(S): 80 TABLET, FILM COATED ORAL at 22:06

## 2020-01-01 RX ADMIN — Medication 100 MILLIEQUIVALENT(S): at 13:29

## 2020-01-01 RX ADMIN — PIPERACILLIN AND TAZOBACTAM 25 GRAM(S): 4; .5 INJECTION, POWDER, LYOPHILIZED, FOR SOLUTION INTRAVENOUS at 11:06

## 2020-01-01 RX ADMIN — Medication 15 MG/MIN: at 20:34

## 2020-01-01 RX ADMIN — Medication 30 MILLIGRAM(S): at 07:05

## 2020-01-01 RX ADMIN — PANTOPRAZOLE SODIUM 40 MILLIGRAM(S): 20 TABLET, DELAYED RELEASE ORAL at 13:11

## 2020-01-01 RX ADMIN — AMIODARONE HYDROCHLORIDE 618 MILLIGRAM(S): 400 TABLET ORAL at 08:55

## 2020-01-01 RX ADMIN — Medication 5 MILLIGRAM(S): at 17:27

## 2020-01-01 RX ADMIN — Medication 100 MILLIGRAM(S): at 08:54

## 2020-01-01 RX ADMIN — TICAGRELOR 90 MILLIGRAM(S): 90 TABLET ORAL at 07:04

## 2020-01-01 RX ADMIN — HEPARIN SODIUM 10 UNIT(S)/HR: 5000 INJECTION INTRAVENOUS; SUBCUTANEOUS at 22:06

## 2020-01-01 RX ADMIN — CHLORHEXIDINE GLUCONATE 1 APPLICATION(S): 213 SOLUTION TOPICAL at 12:16

## 2020-01-01 RX ADMIN — FENTANYL CITRATE 6 MICROGRAM(S)/KG/HR: 50 INJECTION INTRAVENOUS at 20:34

## 2020-01-01 RX ADMIN — TICAGRELOR 90 MILLIGRAM(S): 90 TABLET ORAL at 17:29

## 2020-01-01 RX ADMIN — Medication 100 MILLIEQUIVALENT(S): at 12:17

## 2020-01-01 RX ADMIN — MEROPENEM 100 MILLIGRAM(S): 1 INJECTION INTRAVENOUS at 17:27

## 2020-01-01 RX ADMIN — PIPERACILLIN AND TAZOBACTAM 25 GRAM(S): 4; .5 INJECTION, POWDER, LYOPHILIZED, FOR SOLUTION INTRAVENOUS at 02:27

## 2020-01-01 RX ADMIN — CHLORHEXIDINE GLUCONATE 15 MILLILITER(S): 213 SOLUTION TOPICAL at 17:27

## 2020-01-01 RX ADMIN — SERTRALINE 50 MILLIGRAM(S): 25 TABLET, FILM COATED ORAL at 13:29

## 2020-01-01 RX ADMIN — Medication 50 MILLIGRAM(S): at 08:55

## 2020-01-01 RX ADMIN — PANTOPRAZOLE SODIUM 40 MILLIGRAM(S): 20 TABLET, DELAYED RELEASE ORAL at 12:18

## 2020-01-01 RX ADMIN — Medication 81 MILLIGRAM(S): at 12:21

## 2020-01-01 RX ADMIN — Medication 100 MILLIEQUIVALENT(S): at 12:18

## 2020-01-01 RX ADMIN — Medication 100 MILLIGRAM(S): at 13:14

## 2020-01-01 RX ADMIN — HEPARIN SODIUM 9000 UNIT(S): 5000 INJECTION INTRAVENOUS; SUBCUTANEOUS at 13:35

## 2020-01-01 RX ADMIN — AMIODARONE HYDROCHLORIDE 618 MILLIGRAM(S): 400 TABLET ORAL at 08:56

## 2020-01-01 RX ADMIN — Medication 100 MILLIGRAM(S): at 07:04

## 2020-01-01 RX ADMIN — MIDAZOLAM HYDROCHLORIDE 2.4 MG/KG/HR: 1 INJECTION, SOLUTION INTRAMUSCULAR; INTRAVENOUS at 20:34

## 2020-01-01 NOTE — PROGRESS NOTE ADULT - SUBJECTIVE AND OBJECTIVE BOX
Patient seen and examined. Heparin gtt stopped. Packing removed. No further bleeding.     Intubated and sedated  On mech vent  Nose clear bilaterally, no bleeding anteriorly  OC/OP: ETT in place, dried blood, no further bleeding  No other bleeding noted    A/P 64M with oral cavity bleed after intubation. Source of bleeding is L inferior alveolar mucosa, improved but persistently oozing after stopping heparin gtt. Repacked.   - Extubate per CCU  -oral care  - No need for antibiotics from ORL perspective   -call/page with questions  -ENT will follow

## 2020-01-01 NOTE — PROVIDER CONTACT NOTE (CHANGE IN STATUS NOTIFICATION) - BACKGROUND
orally intubated lightly sedated on fentanyl and versed off when on CPAP was unable to tolerate desaturated RR 30 patient with numerous runs of V-Tach

## 2020-01-01 NOTE — PROGRESS NOTE ADULT - PROBLEM SELECTOR PLAN 4
Medical management at present. Continue DAPT with ASA and Brilinta, lipitor. Remains in aflutter and afib but with good rate control.   - Heparin gtt held for anticoagulation because of bleeding  -resumed today Remains in aflutter and afib but with good rate control.   - Heparin gtt held for anticoagulation because of bleeding, bleeding had stopped  -resumed hep gtt today, for added benefit for the ischemic heart dx

## 2020-01-01 NOTE — PROGRESS NOTE ADULT - PROBLEM SELECTOR PLAN 8
RVP +influenza A, +RSV, +hMPV   -Tamiflu started. blood cultures NGTD  - Will continue empiric zosyn at present given oral lesion that still requires packing Continue solucortef.

## 2020-01-01 NOTE — PROGRESS NOTE ADULT - PROBLEM SELECTOR PLAN 6
Baseline reported at 1.8 to 2.0. Renal recs noted and appreciated. Continue to monitor Scr. downtrending   -avoid nephrotoxic agent -increase free water to 500cc q6hr through feeding tube

## 2020-01-01 NOTE — PROVIDER CONTACT NOTE (CHANGE IN STATUS NOTIFICATION) - ACTION/TREATMENT ORDERED:
Amioderone 150 mg IVP times 2 given then Lido 100mg given then 50 mg given Placed Back on AC with fio2 of 70 % Zoll placed MD into assess and treat patient

## 2020-01-01 NOTE — CHART NOTE - NSCHARTNOTEFT_GEN_A_CORE
Patient with low urine output during day shift (60ml) and noted to have urine leaking around the parks.  Replaced with new parks using sterile technique by the CCU RN with 2000ml of urine output and several small blood clots.  Current parks patent with gabi urine noted most likely from clots will continue to monitor for urine to clear, patient in no acute distress.

## 2020-01-01 NOTE — PROVIDER CONTACT NOTE (CHANGE IN STATUS NOTIFICATION) - ACTION/TREATMENT ORDERED:
patient placed back on AC Fio2 increased to 50 mg , amioderone and lidocain given as ordered lido gtt ongoing ,

## 2020-01-01 NOTE — PROGRESS NOTE ADULT - PROBLEM SELECTOR PLAN 1
Remains intubated and sedated, on fentanyl and versed.  Will wean ventilator settings, PEEP and FiO2. Monitor ABGs and sats.  -currently on 35% FiO2 and PEEP 5, paO2 99  - cpap trial pt went into VT during CPAP trial, likely 2/2 hypoxia in the setting of severe ischemic disease  -s/p amio and lido bolus   -c/w lido drip at 2 will titrate to 1 by tonight   -rpt TTE, cxr, abg, cardiac enzyme  -switch zosyn to Cefepime and flagyl for salt reduction  -resume hep gtt   -resume bb pt went into VT during CPAP trial, likely 2/2 hypoxia in the setting of severe ischemic disease  -s/p amio and lido bolus   -c/w lido drip at 2 will titrate to 1 by tonight   -rpt TTE, cxr, abg, cardiac enzyme  -switch zosyn to meropenum for salt reduction  -resume hep gtt   -resume bb

## 2020-01-01 NOTE — PROGRESS NOTE ADULT - SUBJECTIVE AND OBJECTIVE BOX
Cheyanne Soriano PGY1   Page: [phone]    PATIENT: THONY ANAYA, MRN: [id number]    CHIEF COMPLAINT: Patient is a 64y old  Male who presents with a chief complaint of Acute resp failure (31 Dec 2019 12:41)      INTERVAL HISTORY/OVERNIGHT EVENTS: No overnight events. At bedside, patient has been sleeping and eating well. Denies N/V/D/C. Last BM x1 regular yesterday. Denies abdominal pain. Denies chest pain or SOB, cough. Has been ambulating with assistance. Oriented to person, place, and time. Breathing comfortably on room air.    REVIEW OF SYSTEMS:    Constitutional:     [ ] negative [ ] fevers [ ] chills [ ] weight loss [ ] weight gain  HEENT:                  [ ] negative [ ] dry eyes [ ] eye irritation [ ] postnasal drip [ ] nasal congestion  CV:                         [ ] negative  [ ] chest pain [ ] orthopnea [ ] palpitations [ ] murmur  Resp:                     [ ] negative [ ] cough [ ] shortness of breath [ ] dyspnea [ ] wheezing [ ] sputum [ ] hemoptysis  GI:                          [ ] negative [ ] nausea [ ] vomiting [ ] diarrhea [ ] constipation [ ] abd pain [ ] dysphagia   :                        [ ] negative [ ] dysuria [ ] nocturia [ ] hematuria [ ] increased urinary frequency  Musculoskeletal: [ ] negative [ ] back pain [ ] myalgias [ ] arthralgias [ ] fracture  Skin:                       [ ] negative [ ] rash [ ] itch  Neurological:        [ ] negative [ ] headache [ ] dizziness [ ] syncope [ ] weakness [ ] numbness  Psychiatric:           [ ] negative [ ] anxiety [ ] depression  Endocrine:            [ ] negative [ ] diabetes [ ] thyroid problem  Heme/Lymph:      [ ] negative [ ] anemia [ ] bleeding problem  Allergic/Immune: [ ] negative [ ] itchy eyes [ ] nasal discharge [ ] hives [ ] angioedema    [ ] All other systems negative  [ ] Unable to assess ROS because ________.    MEDICATIONS:  MEDICATIONS  (STANDING):  aspirin enteric coated 81 milliGRAM(s) Oral daily  atorvastatin 80 milliGRAM(s) Oral at bedtime  chlorhexidine 0.12% Liquid 15 milliLiter(s) Oral Mucosa every 12 hours  chlorhexidine 4% Liquid 1 Application(s) Topical daily  fentaNYL   Infusion. 0.5 MICROgram(s)/kG/Hr (6 mL/Hr) IV Continuous <Continuous>  hydrocortisone sodium succinate Injectable 100 milliGRAM(s) IV Push every 8 hours  metoclopramide Injectable 5 milliGRAM(s) IV Push every 12 hours  midazolam Infusion 0.02 mG/kG/Hr (2.4 mL/Hr) IV Continuous <Continuous>  pantoprazole  Injectable 40 milliGRAM(s) IV Push daily  piperacillin/tazobactam IVPB.. 3.375 Gram(s) IV Intermittent every 8 hours  sertraline 50 milliGRAM(s) Oral daily  sodium chloride 0.9%. 1000 milliLiter(s) (10 mL/Hr) IV Continuous <Continuous>  ticagrelor 90 milliGRAM(s) Oral every 12 hours    MEDICATIONS  (PRN):  acetaminophen    Suspension .. 650 milliGRAM(s) Oral every 6 hours PRN Temp greater or equal to 38C (100.4F)      ALLERGIES: Allergies    No Known Allergies    Intolerances        OBJECTIVE:  ICU Vital Signs Last 24 Hrs  T(C): 37.1 (2020 07:00), Max: 37.7 (2020 04:00)  T(F): 98.8 (2020 07:00), Max: 99.8 (2020 04:00)  HR: 101 (2020 08:11) (59 - 101)  BP: --  BP(mean): --  ABP: 166/99 (2020 08:00) (126/97 - 175/95)  ABP(mean): 121 (2020 08:00) (98 - 122)  RR: 20 (2020 08:00) (13 - 26)  SpO2: 100% (2020 08:11) (96% - 100%)    Mode: CPAP with PS  FiO2: 35  PEEP: 5  PS: 5  MAP: 6.3    Adult Advanced Hemodynamics Last 24 Hrs  CVP(mm Hg): 20 (2020 08:00) (9 - 23)  CVP(cm H2O): --  CO: --  CI: --  PA: --  PA(mean): --  PCWP: --  SVR: --  SVRI: --  PVR: --  PVRI: --  CAPILLARY BLOOD GLUCOSE        CAPILLARY BLOOD GLUCOSE      POCT Blood Glucose.: 131 mg/dL (31 Dec 2019 06:31)    I&O's Summary    31 Dec 2019 07:01  -  2020 07:00  --------------------------------------------------------  IN: 2529.5 mL / OUT: 940 mL / NET: 1589.5 mL      Daily     Daily Weight in k.4 (2020 06:00)    PHYSICAL EXAMINATION:  General: Comfortable, no acute distress, cooperative with exam.  HEENT: PERRLA, EOMI, moist mucous membranes.  Respiratory: CTAB, normal respiratory effort, no coughing, wheezes, crackles, or rales.  CV: RRR, S1S2, no murmurs, rubs or gallops. No JVD. Distal pulses intact.  Abdominal: Soft, nontender, nondistended, no rebound or guarding, normal bowel sounds.  Neurology: AOx3, no focal neuro defects, DOW x 4.  Extremities: No pitting edema, + Peripheral pulses.  Incisions:   Tubes:    LABS:  ABG - ( 2020 04:55 )  pH, Arterial: 7.48  pH, Blood: x     /  pCO2: 37    /  pO2: 78    / HCO3: 28    / Base Excess: 3.9   /  SaO2: 95.5                                    10.5   8.38  )-----------( 102      ( 2020 04:55 )             33.7     01-    150<H>  |  112<H>  |  70<H>  ----------------------------<  126<H>  3.6   |  24  |  2.19<H>    Ca    8.6      2020 04:55  Phos  3.2     01-  Mg     2.7     -    TPro  5.6<L>  /  Alb  2.9<L>  /  TBili  0.5  /  DBili  x   /  AST  32  /  ALT  39  /  AlkPhos  94      LIVER FUNCTIONS - ( 2020 04:55 )  Alb: 2.9 g/dL / Pro: 5.6 g/dL / ALK PHOS: 94 u/L / ALT: 39 u/L / AST: 32 u/L / GGT: x                       TELEMETRY:     EKG:     IMAGING: Cheyanne Soriano PGY1   Page: [phone]    PATIENT: THONY ANAYA, MRN: [id number]    CHIEF COMPLAINT: Patient is a 64y old  Male who presents with a chief complaint of Acute resp failure (31 Dec 2019 12:41)      INTERVAL HISTORY/OVERNIGHT EVENTS: No overnight events. Pt CPAP'ed this morning was initially pulling in good tidal volume, but quickly desats and went into VT, s/p Amio 150 x1 and lidocaine bolus, started on Lidocaine drip at 2     REVIEW OF SYSTEMS:  limited pt intubated and sedated       MEDICATIONS:  MEDICATIONS  (STANDING):  aspirin enteric coated 81 milliGRAM(s) Oral daily  atorvastatin 80 milliGRAM(s) Oral at bedtime  chlorhexidine 0.12% Liquid 15 milliLiter(s) Oral Mucosa every 12 hours  chlorhexidine 4% Liquid 1 Application(s) Topical daily  fentaNYL   Infusion. 0.5 MICROgram(s)/kG/Hr (6 mL/Hr) IV Continuous <Continuous>  hydrocortisone sodium succinate Injectable 100 milliGRAM(s) IV Push every 8 hours  metoclopramide Injectable 5 milliGRAM(s) IV Push every 12 hours  midazolam Infusion 0.02 mG/kG/Hr (2.4 mL/Hr) IV Continuous <Continuous>  pantoprazole  Injectable 40 milliGRAM(s) IV Push daily  piperacillin/tazobactam IVPB.. 3.375 Gram(s) IV Intermittent every 8 hours  sertraline 50 milliGRAM(s) Oral daily  sodium chloride 0.9%. 1000 milliLiter(s) (10 mL/Hr) IV Continuous <Continuous>  ticagrelor 90 milliGRAM(s) Oral every 12 hours    MEDICATIONS  (PRN):  acetaminophen    Suspension .. 650 milliGRAM(s) Oral every 6 hours PRN Temp greater or equal to 38C (100.4F)      ALLERGIES: Allergies    No Known Allergies    Intolerances        OBJECTIVE:  ICU Vital Signs Last 24 Hrs  T(C): 37.1 (2020 07:00), Max: 37.7 (2020 04:00)  T(F): 98.8 (2020 07:00), Max: 99.8 (2020 04:00)  HR: 101 (2020 08:11) (59 - 101)  BP: --  BP(mean): --  ABP: 166/99 (2020 08:00) (126/97 - 175/95)  ABP(mean): 121 (2020 08:00) (98 - 122)  RR: 20 (2020 08:00) (13 - 26)  SpO2: 100% (2020 08:11) (96% - 100%)    Mode: CPAP with PS  FiO2: 35  PEEP: 5  PS: 5  MAP: 6.3      POCT Blood Glucose.: 131 mg/dL (31 Dec 2019 06:31)    I&O's Summary    31 Dec 2019 07:01  -  2020 07:00  --------------------------------------------------------  IN: 2529.5 mL / OUT: 940 mL / NET: 1589.5 mL      Daily     Daily Weight in k.4 (2020 06:00)    PHYSICAL EXAMINATION:  General: Comfortable, no acute distress, cooperative with exam.  HEENT: PERRLA, EOMI, moist mucous membranes.  Respiratory: CTAB, normal respiratory effort, no coughing, wheezes, crackles, or rales.  CV: RRR, S1S2, no murmurs, rubs or gallops. No JVD. Distal pulses intact.  Abdominal: Soft, nontender, nondistended, no rebound or guarding, normal bowel sounds.  Neurology: AOx3, no focal neuro defects, DOW x 4.  Extremities: No pitting edema, + Peripheral pulses.  Incisions:   Tubes:    LABS:  ABG - ( 2020 04:55 )  pH, Arterial: 7.48  pH, Blood: x     /  pCO2: 37    /  pO2: 78    / HCO3: 28    / Base Excess: 3.9   /  SaO2: 95.5                       10.5   8.38  )-----------( 102      ( 2020 04:55 )             33.7         150<H>  |  112<H>  |  70<H>  ----------------------------<  126<H>  3.6   |  24  |  2.19<H>    Ca    8.6      2020 04:55  Phos  3.2       Mg     2.7         TPro  5.6<L>  /  Alb  2.9<L>  /  TBili  0.5  /  DBili  x   /  AST  32  /  ALT  39  /  AlkPhos  94      LIVER FUNCTIONS - ( 2020 04:55 )  Alb: 2.9 g/dL / Pro: 5.6 g/dL / ALK PHOS: 94 u/L / ALT: 39 u/L / AST: 32 u/L / GGT: x                       TELEMETRY:     EKG:     IMAGING: Cheyanne Soriano PGY1   Page: [phone]    PATIENT: THONY ANAYA, MRN: [id number]    CHIEF COMPLAINT: Patient is a 64y old  Male who presents with a chief complaint of Acute resp failure (31 Dec 2019 12:41)      INTERVAL HISTORY/OVERNIGHT EVENTS: No overnight events. Pt CPAP'ed this morning was initially pulling in good tidal volume, but quickly desats and went into VT, s/p Amio 150 x1 and lidocaine bolus, started on Lidocaine drip at 2     REVIEW OF SYSTEMS:  limited pt intubated and sedated       MEDICATIONS:  MEDICATIONS  (STANDING):  aspirin enteric coated 81 milliGRAM(s) Oral daily  atorvastatin 80 milliGRAM(s) Oral at bedtime  chlorhexidine 0.12% Liquid 15 milliLiter(s) Oral Mucosa every 12 hours  chlorhexidine 4% Liquid 1 Application(s) Topical daily  fentaNYL   Infusion. 0.5 MICROgram(s)/kG/Hr (6 mL/Hr) IV Continuous <Continuous>  hydrocortisone sodium succinate Injectable 100 milliGRAM(s) IV Push every 8 hours  metoclopramide Injectable 5 milliGRAM(s) IV Push every 12 hours  midazolam Infusion 0.02 mG/kG/Hr (2.4 mL/Hr) IV Continuous <Continuous>  pantoprazole  Injectable 40 milliGRAM(s) IV Push daily  piperacillin/tazobactam IVPB.. 3.375 Gram(s) IV Intermittent every 8 hours  sertraline 50 milliGRAM(s) Oral daily  sodium chloride 0.9%. 1000 milliLiter(s) (10 mL/Hr) IV Continuous <Continuous>  ticagrelor 90 milliGRAM(s) Oral every 12 hours    MEDICATIONS  (PRN):  acetaminophen    Suspension .. 650 milliGRAM(s) Oral every 6 hours PRN Temp greater or equal to 38C (100.4F)      ALLERGIES: Allergies    No Known Allergies    Intolerances        OBJECTIVE:  ICU Vital Signs Last 24 Hrs  T(C): 37.1 (2020 07:00), Max: 37.7 (2020 04:00)  T(F): 98.8 (2020 07:00), Max: 99.8 (2020 04:00)  HR: 101 (2020 08:11) (59 - 101)  BP: --  BP(mean): --  ABP: 166/99 (2020 08:00) (126/97 - 175/95)  ABP(mean): 121 (2020 08:00) (98 - 122)  RR: 20 (2020 08:00) (13 - 26)  SpO2: 100% (2020 08:11) (96% - 100%)    Mode: CPAP with PS  FiO2: 35  PEEP: 5  PS: 5  MAP: 6.3      POCT Blood Glucose.: 131 mg/dL (31 Dec 2019 06:31)    I&O's Summary    31 Dec 2019 07:01  -  2020 07:00  --------------------------------------------------------  IN: 2529.5 mL / OUT: 940 mL / NET: 1589.5 mL      Daily     Daily Weight in k.4 (2020 06:00)    PHYSICAL EXAMINATION:  General: Comfortable, no acute distress, cooperative with exam.  HEENT: PERRLA, EOMI, moist mucous membranes.  Respiratory: intubated normal respiratory effort, no coughing, wheezes, crackles, or rales.  CV: afib rhythm, no murmurs, rubs or gallops. No JVD. Distal pulses intact.  Abdominal: Soft, nontender, nondistended, no rebound or guarding, normal bowel sounds.  Neurology: sedated  Extremities: No pitting edema, + Peripheral pulses.  Incisions:   Tubes:    LABS:  ABG - ( 2020 04:55 )  pH, Arterial: 7.48  pH, Blood: x     /  pCO2: 37    /  pO2: 78    / HCO3: 28    / Base Excess: 3.9   /  SaO2: 95.5                       10.5   8.38  )-----------( 102      ( 2020 04:55 )             33.7     01-    150<H>  |  112<H>  |  70<H>  ----------------------------<  126<H>  3.6   |  24  |  2.19<H>    Ca    8.6      2020 04:55  Phos  3.2     -  Mg     2.7         TPro  5.6<L>  /  Alb  2.9<L>  /  TBili  0.5  /  DBili  x   /  AST  32  /  ALT  39  /  AlkPhos  94      LIVER FUNCTIONS - ( 2020 04:55 )  Alb: 2.9 g/dL / Pro: 5.6 g/dL / ALK PHOS: 94 u/L / ALT: 39 u/L / AST: 32 u/L / GGT: x                       TELEMETRY:     EKG:     IMAGING:

## 2020-01-01 NOTE — PROGRESS NOTE ADULT - PROBLEM SELECTOR PLAN 7
Continue solucortef. Baseline reported at 1.8 to 2.0. Renal recs noted and appreciated. Continue to monitor Scr. downtrending   -avoid nephrotoxic agent

## 2020-01-01 NOTE — PROGRESS NOTE ADULT - PROBLEM SELECTOR PLAN 2
ECHO with worsening LV function. S/p lasix 80 mg IV BID  - d/c lasix standing, will give as PRN   - Continue to hold ACE and BB at present given hypotension Remains intubated and sedated, on fentanyl and versed. pt couldn't tolerate cpap today.   -currently on 35% FiO2 and PEEP 5  -f/u abg   - cpap trial tomorrow Remains intubated and sedated, on fentanyl and versed. pt couldn't tolerate cpap today.   -currently on 50% FiO2 and PEEP 5  -f/u abg

## 2020-01-01 NOTE — PROVIDER CONTACT NOTE (CHANGE IN STATUS NOTIFICATION) - RECOMMENDATIONS
Zoll pads placed and kept at bedside Amioderone 150 mg IVP times two given Lido 100mg given followed by 50 mg given

## 2020-01-01 NOTE — PROGRESS NOTE ADULT - ASSESSMENT
64M with CAD, stents and CABG (last [facility] was 12/2017 with patent LIMA but other grafts occluded, medical management), HTN, HLD, CKD, CHER, ?HF, and pemphygus vulgaris presents with acute SOB. admitted to ccu for acute resp failure s/p intubation    NARCISO on CKD (chronic kidney disease) stage 3-4      SCr is improving   He is non-oliguric.   Advise to continue diuresis as needed at present. Off lasix   BP borderline. Avoid hypotension   No role/ need for RRT at present       Essential hypertension.    BP is controlled at present.  care per CCU    Acute Resp Failure.  Improving volume status s/p diuretics   Remains intubated due to oral bleeding   I agree with diuretics as needed at present  Continue to monitor his weight and urine output   f/u cardio    CKD-MBD  check pth, &  Vitamin D   Phos acceptable   Calcium low  monitor phos and wolf daily

## 2020-01-01 NOTE — PROGRESS NOTE ADULT - PROBLEM SELECTOR PLAN 3
Remains in aflutter and afib but with good rate control.   -have to hold Heparin gtt for anticoagulation because of bleeding ECHO with worsening LV function. S/p lasix 80 mg IV BID  - d/c lasix standing, will give as PRN   - resume bb today  - continue to hold ACEi

## 2020-01-01 NOTE — PROGRESS NOTE ADULT - ASSESSMENT
64M with CAD, stents and CABG (last [facility] was 12/2017 with patent LIMA but other grafts occluded, medical management), HTN, HLD, CKD, CHER, ?HF, and pemphigus vulgaris presents with acute SOB requiring intubation for airway protection with new atrial fibrillation in setting of + influenza. Hep gtt held because of oral bleeding, plan for possible extubation tomorrow 64M with CAD, stents and CABG (last [facility] was 12/2017 with patent LIMA but other grafts occluded, medical management), HTN, HLD, CKD, CHER, ?HF, and pemphigus vulgaris presents with acute SOB requiring intubation for airway protection with new atrial fibrillation in setting of + influenza.  Pt CPAP'ed this morning was initially pulling in good tidal volume, but quickly desats and went into VT, s/p Amio 150 x1 and lidocaine bolus, started on Lidocaine drip at 2, resumed hep gtt, switched zosyn to Cefepime and flagyl 64M with CAD, stents and CABG (last [facility] was 12/2017 with patent LIMA but other grafts occluded, medical management), HTN, HLD, CKD, CHER, ?HF, and pemphigus vulgaris presents with acute SOB requiring intubation for airway protection with new atrial fibrillation in setting of + influenza.  Pt CPAP'ed this morning was initially pulling in good tidal volume, but quickly desats and went into VT, s/p Amio 150 x1 and lidocaine bolus, started on Lidocaine drip at 2, resumed hep gtt, switched zosyn to meropenum for salt reduction

## 2020-01-01 NOTE — PROGRESS NOTE ADULT - PROBLEM SELECTOR PLAN 9
DVT: hep gtt held because of active bleeding   Diet: TF RVP +influenza A, +RSV, +hMPV   -Tamiflu started. blood cultures NGTD  - Will continue empiric zosyn at present given oral lesion that still requires packing

## 2020-01-01 NOTE — PROGRESS NOTE ADULT - PROBLEM SELECTOR PLAN 5
-increase free water to 350cc q6hr through feeding tube Medical management at present. Continue DAPT with ASA and Brilinta, lipitor.

## 2020-01-01 NOTE — PROGRESS NOTE ADULT - SUBJECTIVE AND OBJECTIVE BOX
THONY ANAYA  64y  Patient is a 64y old  Male who presents with a chief complaint of Acute resp failure (2020 09:22)    HPI:  This is a patient admitted for Acute Respiratory Failure due to CHF decompensation, developed NARCISO on CKD. Not Oliguric and is now Hypernatremic. He is still intubated.    HEALTH ISSUES - PROBLEM Dx:  Ventricular tachyarrhythmia: Ventricular tachyarrhythmia  Hypernatremia: Hypernatremia  Prophylactic measure: Prophylactic measure  Hypotension: Hypotension  Influenza: Influenza  Atrial fibrillation and flutter: Atrial fibrillation and flutter  Acute congestive heart failure, unspecified heart failure type: Acute congestive heart failure, unspecified heart failure type  Preventive measure: Preventive measure  Anxiety: Anxiety  Depression: Depression  Pemphigus vulgaris: Pemphigus vulgaris  Hypertension: Hypertension  HLD (hyperlipidemia): HLD (hyperlipidemia)  CAD (coronary artery disease): CAD (coronary artery disease)  CKD (chronic kidney disease): CKD (chronic kidney disease)  CHF (congestive heart failure): CHF (congestive heart failure)  Acute hypercapnic respiratory failure: Acute hypercapnic respiratory failure        MEDICATIONS  (STANDING):  aspirin  chewable 81 milliGRAM(s) Oral daily  atorvastatin 80 milliGRAM(s) Oral at bedtime  chlorhexidine 0.12% Liquid 15 milliLiter(s) Oral Mucosa every 12 hours  chlorhexidine 4% Liquid 1 Application(s) Topical daily  fentaNYL   Infusion. 0.5 MICROgram(s)/kG/Hr (6 mL/Hr) IV Continuous <Continuous>  hydrocortisone sodium succinate Injectable 100 milliGRAM(s) IV Push every 8 hours  lidocaine   Infusion 2 mG/Min (15 mL/Hr) IV Continuous <Continuous>  meropenem  IVPB 1000 milliGRAM(s) IV Intermittent every 12 hours  metoclopramide Injectable 5 milliGRAM(s) IV Push every 12 hours  metoprolol tartrate 25 milliGRAM(s) Oral two times a day  midazolam Infusion 0.02 mG/kG/Hr (2.4 mL/Hr) IV Continuous <Continuous>  pantoprazole  Injectable 40 milliGRAM(s) IV Push daily  sertraline Concentrate 50 milliGRAM(s) Oral daily  sodium chloride 0.9%. 1000 milliLiter(s) (10 mL/Hr) IV Continuous <Continuous>  ticagrelor 90 milliGRAM(s) Oral every 12 hours    MEDICATIONS  (PRN):  acetaminophen    Suspension .. 650 milliGRAM(s) Oral every 6 hours PRN Temp greater or equal to 38C (100.4F)    Vital Signs Last 24 Hrs  T(C): 37.3 (2020 16:00), Max: 37.7 (2020 04:00)  T(F): 99.2 (2020 16:00), Max: 99.8 (2020 04:00)  HR: 77 (2020 19:52) (59 - 137)  BP: 84/58 (2020 09:00) (84/58 - 84/58)  BP(mean): 64 (2020 09:00) (64 - 64)  RR: 20 (2020 19:00) (11 - 27)  SpO2: 100% (2020 19:52) (96% - 100%)  Daily     Daily Weight in k.4 (2020 06:00)    PHYSICAL EXAM:  Constitutional:  He appears comfortable and not distressed. Not diaphoretic.    Neck:  The thyroid is normal. Trachea is midline.     Respiratory: The lungs are clear to auscultation. No dullness and expansion is normal.    Cardiovascular: S1 and S2 are normal. No mummurs, rubs or gallops are present.    Gastrointestinal: The abdomen is soft. No tenderness is present. No masses are present. Bowel sounds are normal.    Genitourinary: The bladder is not distended. No CVA tenderness is present.    Extremities: No edema is noted. No deformities are present.    Neurological: Appears sedated. moves all ecxtremities.    Skin: No leasions are seen  or palpated.    Lymph Nodes: No lymphadenopathy is present.    Psychiatric: Mood is appropriate. No hallucinations or flight of ideas are noted.                              10.5   8.38  )-----------( 102      ( 2020 04:55 )             33.7     01-    150<H>  |  112<H>  |  71<H>  ----------------------------<  152<H>  3.6   |  24  |  2.45<H>    Ca    8.6      2020 09:15  Phos  5.3     -  Mg     2.7     -    TPro  6.0  /  Alb  3.2<L>  /  TBili  0.6  /  DBili  x   /  AST  37  /  ALT  41  /  AlkPhos  100

## 2020-01-01 NOTE — PROGRESS NOTE ADULT - PROBLEM SELECTOR PROBLEM 2
Acute congestive heart failure, unspecified heart failure type Acute hypercapnic respiratory failure

## 2020-01-02 LAB
ALBUMIN SERPL ELPH-MCNC: 2.9 G/DL — LOW (ref 3.3–5)
ALP SERPL-CCNC: 87 U/L — SIGNIFICANT CHANGE UP (ref 40–120)
ALT FLD-CCNC: 38 U/L — SIGNIFICANT CHANGE UP (ref 4–41)
ANION GAP SERPL CALC-SCNC: 13 MMO/L — SIGNIFICANT CHANGE UP (ref 7–14)
APPEARANCE UR: SIGNIFICANT CHANGE UP
APTT BLD: 50.6 SEC — HIGH (ref 27.5–36.3)
APTT BLD: 67.6 SEC — HIGH (ref 27.5–36.3)
AST SERPL-CCNC: 32 U/L — SIGNIFICANT CHANGE UP (ref 4–40)
BACTERIA # UR AUTO: NEGATIVE — SIGNIFICANT CHANGE UP
BASE EXCESS BLDA CALC-SCNC: 1.2 MMOL/L — SIGNIFICANT CHANGE UP
BILIRUB SERPL-MCNC: 0.5 MG/DL — SIGNIFICANT CHANGE UP (ref 0.2–1.2)
BILIRUB UR-MCNC: NEGATIVE — SIGNIFICANT CHANGE UP
BLOOD UR QL VISUAL: HIGH
BUN SERPL-MCNC: 76 MG/DL — HIGH (ref 7–23)
CA-I BLDA-SCNC: 1.2 MMOL/L — SIGNIFICANT CHANGE UP (ref 1.15–1.29)
CALCIUM SERPL-MCNC: 8.3 MG/DL — LOW (ref 8.4–10.5)
CHLORIDE SERPL-SCNC: 112 MMOL/L — HIGH (ref 98–107)
CK MB BLD-MCNC: 1.36 NG/ML — SIGNIFICANT CHANGE UP (ref 1–6.6)
CK MB BLD-MCNC: SIGNIFICANT CHANGE UP (ref 0–2.5)
CK SERPL-CCNC: 123 U/L — SIGNIFICANT CHANGE UP (ref 30–200)
CO2 SERPL-SCNC: 23 MMOL/L — SIGNIFICANT CHANGE UP (ref 22–31)
COLOR SPEC: YELLOW — SIGNIFICANT CHANGE UP
CREAT SERPL-MCNC: 2.67 MG/DL — HIGH (ref 0.5–1.3)
GLUCOSE BLDA-MCNC: 117 MG/DL — HIGH (ref 70–99)
GLUCOSE SERPL-MCNC: 115 MG/DL — HIGH (ref 70–99)
GLUCOSE UR-MCNC: NEGATIVE — SIGNIFICANT CHANGE UP
HCO3 BLDA-SCNC: 26 MMOL/L — SIGNIFICANT CHANGE UP (ref 22–26)
HCT VFR BLD CALC: 32.6 % — LOW (ref 39–50)
HCT VFR BLD CALC: 32.8 % — LOW (ref 39–50)
HCT VFR BLDA CALC: 31.6 % — LOW (ref 39–51)
HGB BLD-MCNC: 9.8 G/DL — LOW (ref 13–17)
HGB BLD-MCNC: 9.9 G/DL — LOW (ref 13–17)
HGB BLDA-MCNC: 10.2 G/DL — LOW (ref 13–17)
HYALINE CASTS # UR AUTO: NEGATIVE — SIGNIFICANT CHANGE UP
KETONES UR-MCNC: NEGATIVE — SIGNIFICANT CHANGE UP
LACTATE BLDA-SCNC: 1.7 MMOL/L — SIGNIFICANT CHANGE UP (ref 0.5–2)
LEUKOCYTE ESTERASE UR-ACNC: NEGATIVE — SIGNIFICANT CHANGE UP
MAGNESIUM SERPL-MCNC: 2.7 MG/DL — HIGH (ref 1.6–2.6)
MCHC RBC-ENTMCNC: 26.4 PG — LOW (ref 27–34)
MCHC RBC-ENTMCNC: 26.8 PG — LOW (ref 27–34)
MCHC RBC-ENTMCNC: 29.9 % — LOW (ref 32–36)
MCHC RBC-ENTMCNC: 30.4 % — LOW (ref 32–36)
MCV RBC AUTO: 88.3 FL — SIGNIFICANT CHANGE UP (ref 80–100)
MCV RBC AUTO: 88.4 FL — SIGNIFICANT CHANGE UP (ref 80–100)
NITRITE UR-MCNC: NEGATIVE — SIGNIFICANT CHANGE UP
NRBC # FLD: 0 K/UL — SIGNIFICANT CHANGE UP (ref 0–0)
NRBC # FLD: 0 K/UL — SIGNIFICANT CHANGE UP (ref 0–0)
PCO2 BLDA: 37 MMHG — SIGNIFICANT CHANGE UP (ref 35–48)
PH BLDA: 7.44 PH — SIGNIFICANT CHANGE UP (ref 7.35–7.45)
PH UR: 6 — SIGNIFICANT CHANGE UP (ref 5–8)
PHOSPHATE SERPL-MCNC: 4.7 MG/DL — HIGH (ref 2.5–4.5)
PLATELET # BLD AUTO: 106 K/UL — LOW (ref 150–400)
PLATELET # BLD AUTO: 98 K/UL — LOW (ref 150–400)
PMV BLD: 12 FL — SIGNIFICANT CHANGE UP (ref 7–13)
PMV BLD: 12.1 FL — SIGNIFICANT CHANGE UP (ref 7–13)
PO2 BLDA: 127 MMHG — HIGH (ref 83–108)
POTASSIUM BLDA-SCNC: 3.9 MMOL/L — SIGNIFICANT CHANGE UP (ref 3.4–4.5)
POTASSIUM SERPL-MCNC: 3.9 MMOL/L — SIGNIFICANT CHANGE UP (ref 3.5–5.3)
POTASSIUM SERPL-SCNC: 3.9 MMOL/L — SIGNIFICANT CHANGE UP (ref 3.5–5.3)
PROT SERPL-MCNC: 5.3 G/DL — LOW (ref 6–8.3)
PROT UR-MCNC: 30 — SIGNIFICANT CHANGE UP
RBC # BLD: 3.69 M/UL — LOW (ref 4.2–5.8)
RBC # BLD: 3.71 M/UL — LOW (ref 4.2–5.8)
RBC # FLD: 17.7 % — HIGH (ref 10.3–14.5)
RBC # FLD: 17.7 % — HIGH (ref 10.3–14.5)
RBC CASTS # UR COMP ASSIST: >50 — HIGH (ref 0–?)
SAO2 % BLDA: 98.4 % — SIGNIFICANT CHANGE UP (ref 95–99)
SODIUM BLDA-SCNC: 148 MMOL/L — HIGH (ref 136–146)
SODIUM SERPL-SCNC: 148 MMOL/L — HIGH (ref 135–145)
SP GR SPEC: 1.02 — SIGNIFICANT CHANGE UP (ref 1–1.04)
SQUAMOUS # UR AUTO: SIGNIFICANT CHANGE UP
TROPONIN T, HIGH SENSITIVITY: 248 NG/L — CRITICAL HIGH (ref ?–14)
UROBILINOGEN FLD QL: NORMAL — SIGNIFICANT CHANGE UP
WBC # BLD: 7.55 K/UL — SIGNIFICANT CHANGE UP (ref 3.8–10.5)
WBC # BLD: 8.27 K/UL — SIGNIFICANT CHANGE UP (ref 3.8–10.5)
WBC # FLD AUTO: 7.55 K/UL — SIGNIFICANT CHANGE UP (ref 3.8–10.5)
WBC # FLD AUTO: 8.27 K/UL — SIGNIFICANT CHANGE UP (ref 3.8–10.5)
WBC UR QL: SIGNIFICANT CHANGE UP (ref 0–?)

## 2020-01-02 PROCEDURE — 71045 X-RAY EXAM CHEST 1 VIEW: CPT | Mod: 26

## 2020-01-02 PROCEDURE — 99233 SBSQ HOSP IP/OBS HIGH 50: CPT

## 2020-01-02 PROCEDURE — 99223 1ST HOSP IP/OBS HIGH 75: CPT | Mod: GC

## 2020-01-02 PROCEDURE — 71045 X-RAY EXAM CHEST 1 VIEW: CPT | Mod: 26,77

## 2020-01-02 RX ORDER — METOPROLOL TARTRATE 50 MG
50 TABLET ORAL
Refills: 0 | Status: DISCONTINUED | OUTPATIENT
Start: 2020-01-02 | End: 2020-01-04

## 2020-01-02 RX ORDER — LIDOCAINE HCL 20 MG/ML
0.5 VIAL (ML) INJECTION
Qty: 2 | Refills: 0 | Status: DISCONTINUED | OUTPATIENT
Start: 2020-01-02 | End: 2020-01-02

## 2020-01-02 RX ORDER — LIDOCAINE HCL 20 MG/ML
1 VIAL (ML) INJECTION
Qty: 2 | Refills: 0 | Status: DISCONTINUED | OUTPATIENT
Start: 2020-01-02 | End: 2020-01-02

## 2020-01-02 RX ORDER — DEXMEDETOMIDINE HYDROCHLORIDE IN 0.9% SODIUM CHLORIDE 4 UG/ML
1 INJECTION INTRAVENOUS
Qty: 200 | Refills: 0 | Status: DISCONTINUED | OUTPATIENT
Start: 2020-01-02 | End: 2020-01-03

## 2020-01-02 RX ADMIN — MIDAZOLAM HYDROCHLORIDE 2.4 MG/KG/HR: 1 INJECTION, SOLUTION INTRAMUSCULAR; INTRAVENOUS at 06:26

## 2020-01-02 RX ADMIN — Medication 81 MILLIGRAM(S): at 11:29

## 2020-01-02 RX ADMIN — PANTOPRAZOLE SODIUM 40 MILLIGRAM(S): 20 TABLET, DELAYED RELEASE ORAL at 11:29

## 2020-01-02 RX ADMIN — Medication 15 MG/MIN: at 06:25

## 2020-01-02 RX ADMIN — Medication 5 MILLIGRAM(S): at 18:14

## 2020-01-02 RX ADMIN — FENTANYL CITRATE 6 MICROGRAM(S)/KG/HR: 50 INJECTION INTRAVENOUS at 03:56

## 2020-01-02 RX ADMIN — SERTRALINE 50 MILLIGRAM(S): 25 TABLET, FILM COATED ORAL at 14:38

## 2020-01-02 RX ADMIN — Medication 25 MILLIGRAM(S): at 06:25

## 2020-01-02 RX ADMIN — Medication 3.75 MG/MIN: at 14:00

## 2020-01-02 RX ADMIN — CHLORHEXIDINE GLUCONATE 15 MILLILITER(S): 213 SOLUTION TOPICAL at 18:14

## 2020-01-02 RX ADMIN — HEPARIN SODIUM 11 UNIT(S)/HR: 5000 INJECTION INTRAVENOUS; SUBCUTANEOUS at 08:17

## 2020-01-02 RX ADMIN — FENTANYL CITRATE 6 MICROGRAM(S)/KG/HR: 50 INJECTION INTRAVENOUS at 19:51

## 2020-01-02 RX ADMIN — CHLORHEXIDINE GLUCONATE 15 MILLILITER(S): 213 SOLUTION TOPICAL at 06:26

## 2020-01-02 RX ADMIN — FENTANYL CITRATE 6 MICROGRAM(S)/KG/HR: 50 INJECTION INTRAVENOUS at 08:17

## 2020-01-02 RX ADMIN — MIDAZOLAM HYDROCHLORIDE 2.4 MG/KG/HR: 1 INJECTION, SOLUTION INTRAMUSCULAR; INTRAVENOUS at 08:17

## 2020-01-02 RX ADMIN — Medication 650 MILLIGRAM(S): at 01:20

## 2020-01-02 RX ADMIN — TICAGRELOR 90 MILLIGRAM(S): 90 TABLET ORAL at 06:25

## 2020-01-02 RX ADMIN — Medication 100 MILLIGRAM(S): at 22:00

## 2020-01-02 RX ADMIN — TICAGRELOR 90 MILLIGRAM(S): 90 TABLET ORAL at 18:14

## 2020-01-02 RX ADMIN — FENTANYL CITRATE 6 MICROGRAM(S)/KG/HR: 50 INJECTION INTRAVENOUS at 11:28

## 2020-01-02 RX ADMIN — Medication 100 MILLIGRAM(S): at 14:39

## 2020-01-02 RX ADMIN — HEPARIN SODIUM 11 UNIT(S)/HR: 5000 INJECTION INTRAVENOUS; SUBCUTANEOUS at 11:28

## 2020-01-02 RX ADMIN — Medication 650 MILLIGRAM(S): at 12:52

## 2020-01-02 RX ADMIN — CHLORHEXIDINE GLUCONATE 1 APPLICATION(S): 213 SOLUTION TOPICAL at 11:29

## 2020-01-02 RX ADMIN — Medication 650 MILLIGRAM(S): at 04:44

## 2020-01-02 RX ADMIN — SODIUM CHLORIDE 10 MILLILITER(S): 9 INJECTION INTRAMUSCULAR; INTRAVENOUS; SUBCUTANEOUS at 06:26

## 2020-01-02 RX ADMIN — MIDAZOLAM HYDROCHLORIDE 2.4 MG/KG/HR: 1 INJECTION, SOLUTION INTRAMUSCULAR; INTRAVENOUS at 11:28

## 2020-01-02 RX ADMIN — MEROPENEM 100 MILLIGRAM(S): 1 INJECTION INTRAVENOUS at 06:24

## 2020-01-02 RX ADMIN — MEROPENEM 100 MILLIGRAM(S): 1 INJECTION INTRAVENOUS at 18:14

## 2020-01-02 RX ADMIN — Medication 15 MG/MIN: at 08:17

## 2020-01-02 RX ADMIN — SODIUM CHLORIDE 10 MILLILITER(S): 9 INJECTION INTRAMUSCULAR; INTRAVENOUS; SUBCUTANEOUS at 11:30

## 2020-01-02 RX ADMIN — Medication 100 MILLIGRAM(S): at 06:24

## 2020-01-02 RX ADMIN — Medication 50 MILLIGRAM(S): at 18:17

## 2020-01-02 RX ADMIN — ATORVASTATIN CALCIUM 80 MILLIGRAM(S): 80 TABLET, FILM COATED ORAL at 22:00

## 2020-01-02 RX ADMIN — Medication 7.5 MG/MIN: at 09:00

## 2020-01-02 RX ADMIN — Medication 5 MILLIGRAM(S): at 06:24

## 2020-01-02 RX ADMIN — Medication 650 MILLIGRAM(S): at 14:00

## 2020-01-02 NOTE — PROGRESS NOTE ADULT - PROBLEM SELECTOR PLAN 4
Remains in aflutter and afib but with good rate control.   - Heparin gtt held for anticoagulation because of bleeding, bleeding had stopped  -resumed hep gtt today, for added benefit for the ischemic heart dx Remains in aflutter and afib but with good rate control.   - Heparin gtt held for anticoagulation because of bleeding, bleeding had stopped  -resumed hep gtt, for added benefit for the ischemic heart dx

## 2020-01-02 NOTE — PROGRESS NOTE ADULT - SUBJECTIVE AND OBJECTIVE BOX
[facility]   MD YENNY PIERCE DO ANAM SIDDIQUI ANGELA WONG, PA    TEL:  OFFICE: [phone]  DR LEE CELL: [phone]  DR. STEVENSON CELL: [phone]  DR. GOODWIN CELL: [phone]  YOANNA CUNNINGHAM CELL: [phone]        Patient is a 64y old  Male who presents with a chief complaint of Acute resp failure (02 Jan 2020 08:52)      Patient seen and examined at bedside.    VITALS:  T(F): 101.3 (01-02-20 @ 12:00), Max: 101.3 (01-02-20 @ 12:00)  HR: 68 (01-02-20 @ 14:00)  BP: --  RR: 20 (01-02-20 @ 14:00)  SpO2: 99% (01-02-20 @ 14:00)  Wt(kg): --    01-01 @ 07:01  -  01-02 @ 07:00  --------------------------------------------------------  IN: 2310.7 mL / OUT: 3285 mL / NET: -974.3 mL    01-02 @ 07:01  -  01-02 @ 14:07  --------------------------------------------------------  IN: 305.8 mL / OUT: 450 mL / NET: -144.2 mL          PHYSICAL EXAM:  Constitutional: intubated  Neck: No JVD  Respiratory: CTAB, no wheezes, rales or rhonchi  Cardiovascular: S1, S2, RRR  Gastrointestinal: BS+, soft, NT/ND  Extremities: No peripheral edema    Hospital Medications:   MEDICATIONS  (STANDING):  aspirin  chewable 81 milliGRAM(s) Oral daily  atorvastatin 80 milliGRAM(s) Oral at bedtime  chlorhexidine 0.12% Liquid 15 milliLiter(s) Oral Mucosa every 12 hours  chlorhexidine 4% Liquid 1 Application(s) Topical daily  fentaNYL   Infusion. 0.5 MICROgram(s)/kG/Hr (6 mL/Hr) IV Continuous <Continuous>  heparin  Infusion 1000 Unit(s)/Hr (11 mL/Hr) IV Continuous <Continuous>  hydrocortisone sodium succinate Injectable 100 milliGRAM(s) IV Push every 8 hours  lidocaine   Infusion 0.5 mG/Min (3.75 mL/Hr) IV Continuous <Continuous>  meropenem  IVPB 1000 milliGRAM(s) IV Intermittent every 12 hours  metoclopramide Injectable 5 milliGRAM(s) IV Push every 12 hours  metoprolol tartrate 25 milliGRAM(s) Oral two times a day  midazolam Infusion 0.02 mG/kG/Hr (2.4 mL/Hr) IV Continuous <Continuous>  pantoprazole  Injectable 40 milliGRAM(s) IV Push daily  sertraline Concentrate 50 milliGRAM(s) Oral daily  sodium chloride 0.9%. 1000 milliLiter(s) (10 mL/Hr) IV Continuous <Continuous>  ticagrelor 90 milliGRAM(s) Oral every 12 hours      LABS:  01-02    148<H>  |  112<H>  |  76<H>  ----------------------------<  115<H>  3.9   |  23  |  2.67<H>    Ca    8.3<L>      02 Jan 2020 04:20  Phos  4.7     01-02  Mg     2.7     01-02    TPro  5.3<L>  /  Alb  2.9<L>  /  TBili  0.5  /  DBili      /  AST  32  /  ALT  38  /  AlkPhos  87  01-02    Creatinine Trend: 2.67 <--, 2.45 <--, 2.19 <--, 2.99 <--, 3.27 <--, 3.05 <--, 2.84 <--, 2.67 <--, 2.59 <--, 2.52 <--, 1.93 <--    Phosphorus Level, Serum: 4.7 mg/dL (01-02 @ 04:20)  Albumin, Serum: 2.9 g/dL (01-02 @ 04:20)                              9.8    8.27  )-----------( 106      ( 02 Jan 2020 04:20 )             32.8     Urine Studies:  Urinalysis - [01-02-20 @ 04:20]      Color YELLOW / Appearance Lt TURBID / SG 1.020 / pH 6.0      Gluc NEGATIVE / Ketone NEGATIVE  / Bili NEGATIVE / Urobili NORMAL       Blood LARGE / Protein 30 / Leuk Est NEGATIVE / Nitrite NEGATIVE      RBC >50 / WBC 3-5 / Hyaline NEGATIVE / Gran  / Sq Epi OCC / Non Sq Epi  / Bacteria NEGATIVE      HbA1c 6.1      [12-28-19 @ 03:30]    HCV 0.14, Nonreactive Hepatitis C AB  S/CO Ratio                        Interpretation  < 1.00                                   Non-Reactive  1.00 - 4.99                         Weakly-Reactive  >= 5.00                                Reactive  Non-Reactive: Aperson with a non-reactive HCV antibody  result is considered uninfected.  No further action is  needed unless recent infection is suspected.  In these  cases, consider repeat testing later to detect  seroconversion..  Weakly-Reactive: HCV antibody test is abnormal, HCV RNA  Qualitative test will follow.  Reactive: HCV antibody test is abnormal, HCV RNA  Qualitative test will follow.  Note: HCV antibody testing is performed on the [employer]   system.      [12-28-19 @ 03:30]      RADIOLOGY & ADDITIONAL STUDIES:

## 2020-01-02 NOTE — PROGRESS NOTE ADULT - PROBLEM SELECTOR PLAN 1
pt went into VT during CPAP trial, likely 2/2 hypoxia in the setting of severe ischemic disease  -s/p amio and lido bolus   -c/w lido drip at 2 will titrate to 1 by tonight   -rpt TTE, cxr, abg, cardiac enzyme  -switch zosyn to meropenum for salt reduction  -resume hep gtt   -resume bb pt went into VT during CPAP trial, likely 2/2 hypoxia in the setting of severe ischemic disease  -s/p amio and lido bolus   -c/w lido drip at 2 will titrate to 1 by tonight   -rpt TTE unchanged from prior, Trop elevated CK/CKMB wnl. likely will need non urgent ischemic eval when pt is more HDS   -switched zosyn to meropenum for salt reduction  -resumed hep gtt   -resumed bb

## 2020-01-02 NOTE — PROGRESS NOTE ADULT - PROBLEM SELECTOR PLAN 9
RVP +influenza A, +RSV, +hMPV   -Tamiflu started. blood cultures NGTD  - Will continue empiric zosyn at present given oral lesion that still requires packing

## 2020-01-02 NOTE — PROGRESS NOTE ADULT - SUBJECTIVE AND OBJECTIVE BOX
Cheyanne Soriano PGY1   Page: [phone]    PATIENT: THONY ANAYA, MRN: [id number]    CHIEF COMPLAINT: Patient is a 64y old  Male who presents with a chief complaint of Acute resp failure (2020 20:03)      INTERVAL HISTORY/OVERNIGHT EVENTS: febrile o/n, pan cx'ed, on Meropenum already. parks clogged, exchanged, and drained 2L     REVIEW OF SYSTEMS:  limited, pt intubated and sedated     MEDICATIONS:  MEDICATIONS  (STANDING):  aspirin  chewable 81 milliGRAM(s) Oral daily  atorvastatin 80 milliGRAM(s) Oral at bedtime  chlorhexidine 0.12% Liquid 15 milliLiter(s) Oral Mucosa every 12 hours  chlorhexidine 4% Liquid 1 Application(s) Topical daily  fentaNYL   Infusion. 0.5 MICROgram(s)/kG/Hr (6 mL/Hr) IV Continuous <Continuous>  heparin  Infusion 1000 Unit(s)/Hr (11 mL/Hr) IV Continuous <Continuous>  hydrocortisone sodium succinate Injectable 100 milliGRAM(s) IV Push every 8 hours  lidocaine   Infusion 1 mG/Min (7.5 mL/Hr) IV Continuous <Continuous>  meropenem  IVPB 1000 milliGRAM(s) IV Intermittent every 12 hours  metoclopramide Injectable 5 milliGRAM(s) IV Push every 12 hours  metoprolol tartrate 25 milliGRAM(s) Oral two times a day  midazolam Infusion 0.02 mG/kG/Hr (2.4 mL/Hr) IV Continuous <Continuous>  pantoprazole  Injectable 40 milliGRAM(s) IV Push daily  sertraline Concentrate 50 milliGRAM(s) Oral daily  sodium chloride 0.9%. 1000 milliLiter(s) (10 mL/Hr) IV Continuous <Continuous>  ticagrelor 90 milliGRAM(s) Oral every 12 hours    MEDICATIONS  (PRN):  acetaminophen    Suspension .. 650 milliGRAM(s) Oral every 6 hours PRN Temp greater or equal to 38C (100.4F)      ALLERGIES: Allergies    No Known Allergies    Intolerances        OBJECTIVE:  ICU Vital Signs Last 24 Hrs  T(C): 37.7 (2020 08:00), Max: 38.3 (2020 00:00)  T(F): 99.8 (2020 08:00), Max: 101 (2020 00:00)  HR: 76 (2020 08:15) (61 - 104)  BP: 84/58 (2020 09:00) (84/58 - 84/58)  BP(mean): 64 (2020 09:00) (64 - 64)  ABP: 170/98 (2020 08:15) (93/54 - 170/98)  ABP(mean): 122 (2020 08:15) (66 - 122)  RR: 20 (2020 08:15) (11 - 22)  SpO2: 99% (2020 08:15) (97% - 100%)    Mode: AC/ CMV (Assist Control/ Continuous Mandatory Ventilation)  RR (machine): 20  TV (machine): 500  FiO2: 40  PEEP: 5  ITime: 1  MAP: 13  PIP: 30    Adult Advanced Hemodynamics Last 24 Hrs  CVP(mm Hg): 13 (2020 09:00) (13 - 13)  CVP(cm H2O): --  CO: --  CI: --  PA: --  PA(mean): --  PCWP: --  SVR: --  SVRI: --  PVR: --  PVRI: --  CAPILLARY BLOOD GLUCOSE        CAPILLARY BLOOD GLUCOSE        I&O's Summary    2020 07:01  -  2020 07:00  --------------------------------------------------------  IN: 2310.7 mL / OUT: 3285 mL / NET: -974.3 mL    2020 07:01  -  2020 08:53  --------------------------------------------------------  IN: 53.9 mL / OUT: 100 mL / NET: -46.1 mL      Daily     Daily     PHYSICAL EXAMINATION:  General: Comfortable, no acute distress, cooperative with exam.  HEENT: PERRLA, EOMI, moist mucous membranes.  Respiratory: CTAB, normal respiratory effort, no coughing, wheezes, crackles, or rales.  CV: RRR, S1S2, no murmurs, rubs or gallops. No JVD. Distal pulses intact.  Abdominal: Soft, nontender, nondistended, no rebound or guarding, normal bowel sounds.  Neurology: AOx3, no focal neuro defects, DOW x 4.  Extremities: No pitting edema, + Peripheral pulses.  Incisions:   Tubes:    LABS:  ABG - ( 2020 04:20 )  pH, Arterial: 7.44  pH, Blood: x     /  pCO2: 37    /  pO2: 127   / HCO3: 26    / Base Excess: 1.2   /  SaO2: 98.4                                    9.8    8.27  )-----------( 106      ( 2020 04:20 )             32.8     01-    148<H>  |  112<H>  |  76<H>  ----------------------------<  115<H>  3.9   |  23  |  2.67<H>    Ca    8.3<L>      2020 04:20  Phos  4.7       Mg     2.7         TPro  5.3<L>  /  Alb  2.9<L>  /  TBili  0.5  /  DBili  x   /  AST  32  /  ALT  38  /  AlkPhos  87  -    LIVER FUNCTIONS - ( 2020 04:20 )  Alb: 2.9 g/dL / Pro: 5.3 g/dL / ALK PHOS: 87 u/L / ALT: 38 u/L / AST: 32 u/L / GGT: x           PTT - ( 2020 04:20 )  PTT:50.6 SEC  Creatine Kinase, Serum: 279 u/L ( @ 13:30)  CKMB: 3.28 ng/mL ( @ 13:30)  Creatine Kinase, Serum: 289 u/L ( @ 09:15)  CKMB: 2.48 ng/mL ( @ 09:15)    CARDIAC MARKERS ( 2020 13:30 )  x     / x     / 279 u/L / 3.28 ng/mL / x      CARDIAC MARKERS ( 2020 09:15 )  x     / x     / 289 u/L / 2.48 ng/mL / x          Urinalysis Basic - ( 2020 04:20 )    Color: YELLOW / Appearance: Lt TURBID / S.020 / pH: 6.0  Gluc: NEGATIVE / Ketone: NEGATIVE  / Bili: NEGATIVE / Urobili: NORMAL   Blood: LARGE / Protein: 30 / Nitrite: NEGATIVE   Leuk Esterase: NEGATIVE / RBC: >50 / WBC 3-5   Sq Epi: OCC / Non Sq Epi: x / Bacteria: NEGATIVE        TELEMETRY:     EKG:     IMAGING: Cheyanne Soriano PGY1   Page: [phone]    PATIENT: THONY ANAYA, MRN: [id number]    CHIEF COMPLAINT: Patient is a 64y old  Male who presents with a chief complaint of Acute resp failure (2020 20:03)      INTERVAL HISTORY/OVERNIGHT EVENTS: febrile o/n, pan cx'ed, on Meropenum already. parks clogged, exchanged, and drained 2L     REVIEW OF SYSTEMS:  limited, pt intubated and sedated     MEDICATIONS:  MEDICATIONS  (STANDING):  aspirin  chewable 81 milliGRAM(s) Oral daily  atorvastatin 80 milliGRAM(s) Oral at bedtime  chlorhexidine 0.12% Liquid 15 milliLiter(s) Oral Mucosa every 12 hours  chlorhexidine 4% Liquid 1 Application(s) Topical daily  fentaNYL   Infusion. 0.5 MICROgram(s)/kG/Hr (6 mL/Hr) IV Continuous <Continuous>  heparin  Infusion 1000 Unit(s)/Hr (11 mL/Hr) IV Continuous <Continuous>  hydrocortisone sodium succinate Injectable 100 milliGRAM(s) IV Push every 8 hours  lidocaine   Infusion 1 mG/Min (7.5 mL/Hr) IV Continuous <Continuous>  meropenem  IVPB 1000 milliGRAM(s) IV Intermittent every 12 hours  metoclopramide Injectable 5 milliGRAM(s) IV Push every 12 hours  metoprolol tartrate 25 milliGRAM(s) Oral two times a day  midazolam Infusion 0.02 mG/kG/Hr (2.4 mL/Hr) IV Continuous <Continuous>  pantoprazole  Injectable 40 milliGRAM(s) IV Push daily  sertraline Concentrate 50 milliGRAM(s) Oral daily  sodium chloride 0.9%. 1000 milliLiter(s) (10 mL/Hr) IV Continuous <Continuous>  ticagrelor 90 milliGRAM(s) Oral every 12 hours    MEDICATIONS  (PRN):  acetaminophen    Suspension .. 650 milliGRAM(s) Oral every 6 hours PRN Temp greater or equal to 38C (100.4F)      ALLERGIES: Allergies    No Known Allergies    Intolerances        OBJECTIVE:  ICU Vital Signs Last 24 Hrs  T(C): 37.7 (2020 08:00), Max: 38.3 (2020 00:00)  T(F): 99.8 (2020 08:00), Max: 101 (2020 00:00)  HR: 76 (2020 08:15) (61 - 104)  BP: 84/58 (2020 09:00) (84/58 - 84/58)  BP(mean): 64 (2020 09:00) (64 - 64)  ABP: 170/98 (2020 08:15) (93/54 - 170/98)  ABP(mean): 122 (2020 08:15) (66 - 122)  RR: 20 (2020 08:15) (11 - 22)  SpO2: 99% (2020 08:15) (97% - 100%)    Mode: AC/ CMV (Assist Control/ Continuous Mandatory Ventilation)  RR (machine): 20  TV (machine): 500  FiO2: 40  PEEP: 5  ITime: 1  MAP: 13  PIP: 30    Adult Advanced Hemodynamics Last 24 Hrs  CVP(mm Hg): 13 (2020 09:00) (13 - 13)  CVP(cm H2O): --  CO: --  CI: --  PA: --  PA(mean): --  PCWP: --  SVR: --  SVRI: --  PVR: --  PVRI: --  CAPILLARY BLOOD GLUCOSE        CAPILLARY BLOOD GLUCOSE        I&O's Summary    2020 07:01  -  2020 07:00  --------------------------------------------------------  IN: 2310.7 mL / OUT: 3285 mL / NET: -974.3 mL    2020 07:01  -  2020 08:53  --------------------------------------------------------  IN: 53.9 mL / OUT: 100 mL / NET: -46.1 mL      Daily     Daily     PHYSICAL EXAMINATION:  General: Comfortable, no acute distress, cooperative with exam.  HEENT: PERRLA, EOMI, moist mucous membranes.  Respiratory: intubated normal respiratory effort, no coughing, wheezes, crackles, or rales.  CV: afib rhythm, no murmurs, rubs or gallops. No JVD. Distal pulses intact.  Abdominal: Soft, nontender, nondistended, no rebound or guarding, normal bowel sounds.  Neurology: sedated  Extremities: No pitting edema, + Peripheral pulses.  Incisions:   Tubes:    LABS:  ABG - ( 2020 04:20 )  pH, Arterial: 7.44  pH, Blood: x     /  pCO2: 37    /  pO2: 127   / HCO3: 26    / Base Excess: 1.2   /  SaO2: 98.4                                    9.8    8.27  )-----------( 106      ( 2020 04:20 )             32.8     01-    148<H>  |  112<H>  |  76<H>  ----------------------------<  115<H>  3.9   |  23  |  2.67<H>    Ca    8.3<L>      2020 04:20  Phos  4.7     -  Mg     2.7         TPro  5.3<L>  /  Alb  2.9<L>  /  TBili  0.5  /  DBili  x   /  AST  32  /  ALT  38  /  AlkPhos  87  01-02    LIVER FUNCTIONS - ( 2020 04:20 )  Alb: 2.9 g/dL / Pro: 5.3 g/dL / ALK PHOS: 87 u/L / ALT: 38 u/L / AST: 32 u/L / GGT: x           PTT - ( 2020 04:20 )  PTT:50.6 SEC  Creatine Kinase, Serum: 279 u/L ( @ 13:30)  CKMB: 3.28 ng/mL ( @ 13:30)  Creatine Kinase, Serum: 289 u/L ( @ 09:15)  CKMB: 2.48 ng/mL ( @ 09:15)    CARDIAC MARKERS ( 2020 13:30 )  x     / x     / 279 u/L / 3.28 ng/mL / x      CARDIAC MARKERS ( 2020 09:15 )  x     / x     / 289 u/L / 2.48 ng/mL / x          Urinalysis Basic - ( 2020 04:20 )    Color: YELLOW / Appearance: Lt TURBID / S.020 / pH: 6.0  Gluc: NEGATIVE / Ketone: NEGATIVE  / Bili: NEGATIVE / Urobili: NORMAL   Blood: LARGE / Protein: 30 / Nitrite: NEGATIVE   Leuk Esterase: NEGATIVE / RBC: >50 / WBC 3-5   Sq Epi: OCC / Non Sq Epi: x / Bacteria: NEGATIVE        TELEMETRY:     EKG:     IMAGING:

## 2020-01-02 NOTE — PROGRESS NOTE ADULT - PROBLEM SELECTOR PLAN 7
Baseline reported at 1.8 to 2.0. Renal recs noted and appreciated. Continue to monitor Scr. downtrending   -avoid nephrotoxic agent

## 2020-01-02 NOTE — PROGRESS NOTE ADULT - PROBLEM SELECTOR PLAN 3
ECHO with worsening LV function. S/p lasix 80 mg IV BID  - d/c lasix standing, will give as PRN   - resume bb today  - continue to hold ACEi

## 2020-01-02 NOTE — CONSULT NOTE ADULT - ASSESSMENT
64M with CAD, stents and CABG (last [facility] was 12/2017 with patent LIMA but other grafts occluded, medical management), HTN, HLD, CKD, CHER, HF, and pemphigus vulgaris presents with acute SOB requiring intubation for airway protection with new atrial fibrillation in setting of + influenza, hMPV, and RSV. Patient failed CPAP, complicated by hypoxia and VT, s/p amiodarone and lidocaine ggt. Pulmonology consulted for assistance with extubation.    Recommendations  - patient currently not optimized for extubation, may require more time  - start precedex, can start weaning fentanyl and versed once precedex is maxed for sedation  - increase pressure support during CPAP  - tighter blood pressure control to avoid flash pulmonary edema once extubated  - goal net negative fluid balance  - keep BIPAP at bedside when planning to extubate    **Attending attestation to follow**    Sia Walters, PGY2  Internal Medicine 64M with CAD, stents and CABG (last [facility] was 12/2017 with patent LIMA but other grafts occluded, medical management), HTN, HLD, CKD, CHER, HF, and pemphigus vulgaris presents with acute SOB requiring intubation for airway protection with new atrial fibrillation in setting of + influenza, hMPV, and RSV. Patient failed CPAP, complicated by hypoxia and VT, s/p amiodarone and lidocaine ggt. Pulmonology consulted for assistance with extubation.    Recommendations  - patient currently not optimized for extubation, may require more time  - start precedex, can start weaning fentanyl and versed once precedex is maxed for sedation  - tighter blood pressure control to avoid flash pulmonary edema once extubated  - goal net negative fluid balance  - keep BIPAP at bedside when planning to extubate    **Attending attestation to follow**    Sia Walters, PGY2  Internal Medicine

## 2020-01-02 NOTE — CONSULT NOTE ADULT - ATTENDING COMMENTS
64 year old male with multiple co-morbidities including with coronary artery disease s/p CABG, severe LV dysfunction, CHER with acute respiratory failure in setting of influenza, hMPV, and RSV. Weaning trials complicated by hypoxemia and VT storm - pulmonary team consulted for assistance in weaning.   - Patient may benefit from the addition of Precedex and titrating down Fentanyl/Versed  - Need better BP control as he is at high risk for pulmonary edema once positive pressure support is removed.  - Keep negative fluid balance  - Minimal O2 requirements - patient is on 30% FiO2 and no wheezing on exam. Patient is on steroids for pemphigus vulgaris which may be helping.   - Once patient is ready for weaning in AM, can re-attempt CPAP trials but recommend higher EPAP given his obesity  - Extubation to bilevel can be considered but need to be cautious in setting of oral mucosa bleeding from pemphigus vulgaris
Wesley Colon is a 64 year old man with CAD, prior PCI with stents, CABG (angiogram 12/2017 showed patent LIMA but occlusion of other grafts, treated with medical management), HTN, HLD, CKD, CHER, possible HF and pemphigus vulgaris (s/p rituximab infusion one week ago). He presented with acute chest pain and SOB while driving his son in law to work. In ED examination noted diffuse pulmonary crackles decreased responsiveness. He was intubated for airway protection. After difficult intubation, he was briefly pulseless (PEA), received CPR with quick ROSC. Lasix 80 mg IV given X 1 and propofol was started for sedation. ECG noted atrial fibrillation / flutter with RVR (120s bpm) with no ischemic ST-segment changes. Labs noted elevated WBC, lactate and initial serum potassium 6.2 mmol/L. Viral panel was positive for influenza AH1, respiratory syncytial virus, and hMVP. Initial blood cultures showed no growth at 24 hours. He received oseltamivir (Tamiflu) and was empirically treated with vancomycin and Zosyn. Phenylephrine infusion was started for hypotension and sedation was changed from propofol to fentanyl and midazolam. Telemetry in CCU noted atrial flutter with HR mostly 70-80’s bpm. IV heparin was initiated for anticoagulation. The plan for 12/28/19 included decreasing PEEP and FIO2 on respirator, maintaining furosemide at 80 mg IV twice daily and tapering phenylephrine as tolerable by BP. Standing regimen: EC aspirin enteric coated 81 mg daily, atorvastatin 80 mg daily at bedtime, fentanyl 0.5 mCg/kG/Hr (6 mL/Hr) continuous IV, furosemide 80 mg IV Push every 12 hours, heparin  infusion 10 Unit(s)/Hr (0.1 mL/Hr) continuous IV, hydrocortisone sodium succinate injectable 100 mg IV Push every 8 hours, insulin sliding scale coverage, midazolam infusion 0.02 mG/kG/Hr (2.4 mL/Hr) IV Continuous, oseltamivir Tamiflu) Suspension 30 mg by mouth twice daily, pantoprazole injectable 40 mg IV Push daily, phenylephrine infusion 0.1 mCg/kG/Min (4.5 mL/Hr) IV Continuous, piperacillin / tazobactam IVPB. 3.375 gm IV every 8 hours, sertraline 50 mg daily and ticagrelor (Brilinta) 90 mg by mouth twice daily
64/M with PMH CAD s/p stents and CABG (last [facility] was 12/2017 with patent LIMA but other grafts occluded, medical management - on ASA and Ticagrelor), HTN, HLD, CKD, CHER, ?HF, and pemphigus vulgaris (s/p Rituxan 1 week PTA and chronic steroids).  Admitted 12/27/19 with  acute onset SOB preceded by chest burning. In ER, noted to have acute SOB, diffuse crackles, minimally responsive. Intubated for airway protection (?traumatic intubation) f/b loss of pulse, CPR and ROSC (downtime not known).  Labs showed leucocytosis, elevated lactate, transaminitis. RVP positive for Influenza A, RSV, hMPV. EKG shwoed new Afib with RVR. Txf to CCU.  Hosp course c/b recurrent oral bleeding s/p multiple interventions by ENT, episode of hypotension, desaturation, VT on trial of CPAP 1/1/20.  Pt continued to spike fevers.   ----------  Multifactorial etiology for fevers - viral infections (particulalrly hMPV) v/s aspiration 2/2 recurrent oral bleeding  - additionally, due to recent Rituximab and steroid therapy - he is immunosuppressed. May consider RSV therapy  - would continue Meropenem for now to cover for possible aspiration    Prior antimicrobials:  Vanc 12/27, 12/28  Zosyn 12/27-1/1  Meropenem 1/1-    We are making arrangements for orally administered Ribavarin for the RSV in this critically ill immunocompromised patient and continue Meropenem for now  guarded outlook

## 2020-01-02 NOTE — CONSULT NOTE ADULT - SUBJECTIVE AND OBJECTIVE BOX
Patient is a 64y old  Male who presents with a chief complaint of Acute resp failure (2020 14:03)    HPI:  Obtained via family and previous chart.   64M with CAD, stents and CABG (last [facility] was 2017 with patent LIMA but other grafts occluded, medical management), HTN, HLD, CKD, CHER, ?HF, and pemphygus vulgaris presents with acute SOB. Family reports patient was last seen well  at 5AM today. He was driving his son in law to work when he needed to pull over due to ? chest burning which was then relieved with water however he developed acute SOB and was brought to ER. In ER he was noted to be acutely SOB with diffuse crackles and minimally responsive, intubated for airway protection. Intubation was reported as traumatic and afterward he briefly lost pulses, received CPR with quick ROSC. Lasix 80 mg IV given X 1 and propofol started for sedation. Family states no recent sick contacts, cough, cold, or flulike symptoms, denies chest pain or SOB. EKG in ER notable for atrial fibrillation with VR 120s but no ischemic changes noted. Per his cardiologist Dr. Mckeon this is new a fib for him. Labs significant for elevated WBC and lactate, with rectal temp 100. K 6.2. Admitted to CCU for multifactorial acute resp failure. (27 Dec 2019 11:31)  ----------------  - per records obtained by CCU team, noted to have new Afib with RVR  -  Nephrology consulted for CKD  -  noted to have oral bleeding, with suctioning of 200c blood from oral cavity and drop in Hb from 13.7 to 12.7. ENT consulted, clots removed from oral cavity with suction, area of mucosal tear in left alveolobuccal groove actively bleeding. They attempted to control bleed with silver nitrate, although bleeding slowed down, remained active  packed with wet kerlix.  -  Kerlix removed by ENT with significant saturation. Lt inferior alveolar mucosa anterior to RMT with diffuse bulky tissue/clot, removed with persistent ooze, surgicel packing placed at site - taped to face, kerlix replaced and taped to face superiorly  -  Heparin gtt held.  oral packing taken out by ENT  -  Heparin Gtt restarted  -  Patient did not tolerate CPAP placed back on AC, with episode of hypotension and VT. Received Amiodarone 150 mg IVPX2, then Lidocaine 100mg given then 50 mg given. Placed Back on AC with fio2 of 70 %. Zoll pads placed and kep at bedside  -  evening patient with low urine output during day shift (60ml) and noted to have urine leaking around the parks.  Replaced with new parks using sterile technique by the CCU RN with 2000ml of urine output and several small blood clots.  - continue to spike fevers. ID consulted for recs    prior hospital charts reviewed [  ]  primary team notes reviewed [  ]  other consultant notes reviewed [  ]    PAST MEDICAL & SURGICAL HISTORY:  CHF (congestive heart failure)  Psoriasis  HLD (hyperlipidemia)  CAD (coronary artery disease): 3 stents  Hypertension  History of coronary artery stent placement: 1 stent , 2 stents  in [facility]  S/P quadruple vessel bypass:     Allergies  No Known Allergies    ANTIMICROBIALS (past 90 days)  MEDICATIONS  (STANDING):  meropenem  IVPB   100 mL/Hr IV Intermittent (20 @ 06:24)   100 mL/Hr IV Intermittent (20 @ 17:27)    oseltamivir Suspension   30 milliGRAM(s) Oral (20 @ 07:05)   30 milliGRAM(s) Oral (19 @ 17:51)   30 milliGRAM(s) Oral (19 @ 08:32)   30 milliGRAM(s) Oral (19 @ 20:00)   30 milliGRAM(s) Oral (19 @ 06:18)   30 milliGRAM(s) Oral (19 @ 19:51)   30 milliGRAM(s) Oral (19 @ 06:32)   30 milliGRAM(s) Oral (19 @ 22:33)   30 milliGRAM(s) Oral (19 @ 09:44)   30 milliGRAM(s) Oral (19 @ 19:59)    piperacillin/tazobactam IVPB.   200 mL/Hr IV Intermittent (19 @ 11:09)    piperacillin/tazobactam IVPB..   25 mL/Hr IV Intermittent (20 @ 11:06)   25 mL/Hr IV Intermittent (20 @ 02:27)   25 mL/Hr IV Intermittent (19 @ 17:50)   25 mL/Hr IV Intermittent (19 @ 10:54)   25 mL/Hr IV Intermittent (19 @ 03:53)   25 mL/Hr IV Intermittent (19 @ 18:03)   25 mL/Hr IV Intermittent (19 @ 11:00)   25 mL/Hr IV Intermittent (19 @ 02:24)   25 mL/Hr IV Intermittent (19 @ 18:18)   25 mL/Hr IV Intermittent (19 @ 11:10)   25 mL/Hr IV Intermittent (19 @ 01:09)   25 mL/Hr IV Intermittent (19 @ 17:39)   25 mL/Hr IV Intermittent (19 @ 09:45)   25 mL/Hr IV Intermittent (19 @ 01:28)   25 mL/Hr IV Intermittent (19 @ 18:08)    vancomycin  IVPB   250 mL/Hr IV Intermittent (19 @ 14:50)    vancomycin  IVPB   250 mL/Hr IV Intermittent (19 @ 11:49)      ANTIMICROBIALS:    meropenem  IVPB 1000 every 12 hours    OTHER MEDS: MEDICATIONS  (STANDING):  acetaminophen    Suspension .. 650 every 6 hours PRN  aspirin  chewable 81 daily  atorvastatin 80 at bedtime  fentaNYL   Infusion. 0.5 <Continuous>  heparin  Infusion 1000 <Continuous>  hydrocortisone sodium succinate Injectable 100 every 8 hours  lidocaine   Infusion 0.5 <Continuous>  metoclopramide Injectable 5 every 12 hours  metoprolol tartrate 25 two times a day  midazolam Infusion 0.02 <Continuous>  pantoprazole  Injectable 40 daily  sertraline Concentrate 50 daily  ticagrelor 90 every 12 hours    SOCIAL HISTORY:   hx smoking  non-smoker    FAMILY HISTORY:  Family history of cerebrovascular accident (CVA)  Family history of early CAD: father who  of CVA.    REVIEW OF SYSTEMS  [  ] ROS unobtainable because:    [  ] All other systems negative except as noted below:	    Constitutional:  [ ] fever [ ] chills  [ ] weight loss  [ ] weakness  Skin:  [ ] rash [ ] phlebitis	  Eyes: [ ] icterus [ ] pain  [ ] discharge	  ENMT: [ ] sore throat  [ ] thrush [ ] ulcers [ ] exudates  Respiratory: [ ] dyspnea [ ] hemoptysis [ ] cough [ ] sputum	  Cardiovascular:  [ ] chest pain [ ] palpitations [ ] edema	  Gastrointestinal:  [ ] nausea [ ] vomiting [ ] diarrhea [ ] constipation [ ] pain	  Genitourinary:  [ ] dysuria [ ] frequency [ ] hematuria [ ] discharge [ ] flank pain  [ ] incontinence  Musculoskeletal:  [ ] myalgias [ ] arthralgias [ ] arthritis  [ ] back pain  Neurological:  [ ] headache [ ] seizures  [ ] confusion/altered mental status  Psychiatric:  [ ] anxiety [ ] depression	  Hematology/Lymphatics:  [ ] lymphadenopathy  Endocrine:  [ ] adrenal [ ] thyroid  Allergic/Immunologic:	 [ ] transplant [ ] seasonal    Vital Signs Last 24 Hrs  T(F): 101.3 (20 @ 12:00), Max: 101.3 (20 @ 12:00)  Vital Signs Last 24 Hrs  HR: 68 (20 @ 14:00) (61 - 86)  BP: --  RR: 20 (20 @ 14:00)  SpO2: 99% (20 @ 14:00) (98% - 100%)  Wt(kg): --    EXAM:  Constitutional: Not in acute distress  Eyes: pupils bilaterally reactive to light. No icterus.  Oral cavity: Clear, no lesions  Neck: No neck vein distension noted  RS: Chest clear to auscultation bilaterally. No wheeze/rhonchi/crepitations.  CVS: S1, S2 heard. Regular rate and rhythm. No murmurs/rubs/gallops.  Abdomen: Soft. No guarding/rigidity/tenderness.  : No acute abnormalities  Extremities: Warm. No pedal edema  Skin: No lesions noted  Vascular: No evidence of phlebitis  Neuro: Alert, oriented to time/place/person                          9.8    8.27  )-----------( 106      ( 2020 04:20 )             32.8     01-02    148<H>  |  112<H>  |  76<H>  ----------------------------<  115<H>  3.9   |  23  |  2.67<H>    Ca    8.3<L>      2020 04:20  Phos  4.7     01-02  Mg     2.7         TPro  5.3<L>  /  Alb  2.9<L>  /  TBili  0.5  /  DBili  x   /  AST  32  /  ALT  38  /  AlkPhos  87      Urinalysis Basic - ( 2020 04:20 )    Color: YELLOW / Appearance: Lt TURBID / S.020 / pH: 6.0  Gluc: NEGATIVE / Ketone: NEGATIVE  / Bili: NEGATIVE / Urobili: NORMAL   Blood: LARGE / Protein: 30 / Nitrite: NEGATIVE   Leuk Esterase: NEGATIVE / RBC: >50 / WBC 3-5   Sq Epi: OCC / Non Sq Epi: x / Bacteria: NEGATIVE    MICROBIOLOGY:Vancomycin Level, Random: 13.8 ( @ 04:45)    Culture - Respiratory with Gram Stain (collected 30 Dec 2019 18:05)  Source: SPUTUM  Preliminary Report (2020 10:16):    NO ORGANISMS ISOLATED AT 24 HOURS    NO ORGANISMS ISOLATED AT 48 HRS.    Culture - Blood (collected 30 Dec 2019 02:05)  Source: BLOOD PERIPHERAL  Preliminary Report (2020 02:08):    NO ORGANISMS ISOLATED    NO ORGANISMS ISOLATED AT 72 HRS.    Culture - Blood (collected 30 Dec 2019 02:05)  Source: BLOOD  Preliminary Report (2020 02:08):    NO ORGANISMS ISOLATED    NO ORGANISMS ISOLATED AT 72 HRS.                    RADIOLOGY:  imaging below personally reviewed  < from: Xray Chest 1 View- PORTABLE-Routine (20 @ 07:59) >  IMPRESSION:  Line and tubes as above.    Continued right basilar opacity which could be due to atelectasis or pneumonia.    < end of copied text >    OTHER TESTS:  < from: Transthoracic Echocardiogram (20 @ 08:57) >  CONCLUSIONS:  1. Tethered mitral valve leaflets with normal opening.  Moderate-severe mitral regurgitation.  2. Moderate left ventricular enlargement.  3. Endocardium not well visualized; grossly severe left  ventricular systolic dysfunction.  4. The right ventricle is not well visualized.  5. No obvious pericardial effusion noted.    < end of copied text > Patient is a 64y old  Male who presents with a chief complaint of Acute resp failure (2020 14:03)    HPI:  Obtained via family and previous chart.   64M with CAD, stents and CABG (last [facility] was 2017 with patent LIMA but other grafts occluded, medical management), HTN, HLD, CKD, CHER, ?HF, and pemphygus vulgaris presents with acute SOB. Family reports patient was last seen well  at 5AM today. He was driving his son in law to work when he needed to pull over due to ? chest burning which was then relieved with water however he developed acute SOB and was brought to ER. In ER he was noted to be acutely SOB with diffuse crackles and minimally responsive, intubated for airway protection. Intubation was reported as traumatic and afterward he briefly lost pulses, received CPR with quick ROSC. Lasix 80 mg IV given X 1 and propofol started for sedation. Family states no recent sick contacts, cough, cold, or flulike symptoms, denies chest pain or SOB. EKG in ER notable for atrial fibrillation with VR 120s but no ischemic changes noted. Per his cardiologist Dr. Mckeon this is new a fib for him. Labs significant for elevated WBC and lactate, with rectal temp 100. K 6.2. Admitted to CCU for multifactorial acute resp failure. (27 Dec 2019 11:31)  ----------------  - per records obtained by CCU team, noted to have new Afib with RVR  -  Nephrology consulted for CKD  -  noted to have oral bleeding, with suctioning of 200c blood from oral cavity and drop in Hb from 13.7 to 12.7. ENT consulted, clots removed from oral cavity with suction, area of mucosal tear in left alveolobuccal groove actively bleeding. They attempted to control bleed with silver nitrate, although bleeding slowed down, remained active packed with wet kerlix.  -  Kerlix removed by ENT with significant saturation. Lt inferior alveolar mucosa anterior to RMT with diffuse bulky tissue/clot, removed with persistent ooze, surgicel packing placed at site - taped to face, kerlix replaced and taped to face superiorly  -  Heparin gtt held.  oral packing taken out by ENT  -  Heparin Gtt restarted  -  Patient did not tolerate CPAP placed back on AC, with episode of hypotension and VT. Received Amiodarone 150 mg IVPX2, then Lidocaine 100mg given then 50 mg given. Placed Back on AC with fio2 of 70 %. Zoll pads placed and kep at bedside  -  evening patient with low urine output during day shift (60ml) and noted to have urine leaking around the parks.  Replaced with new parks using sterile technique by the CCU RN with 2000ml of urine output and several small blood clots.  - continue to spike fevers. ID consulted for recs  ----------  - Pt sedated and intubated. ROS could not be obtained  - per RN - minor skin breakdown over base of penis. No tracheal secretions, FiO2 40%  - now noted to have recurrent oral bleeding with clots and rectal bleeding    prior hospital charts reviewed [x]  primary team notes reviewed [x]  other consultant notes reviewed [x]    PAST MEDICAL & SURGICAL HISTORY:  CHF (congestive heart failure)  Psoriasis  HLD (hyperlipidemia)  CAD (coronary artery disease): 3 stents  Hypertension  History of coronary artery stent placement: 1 stent , 2 stents  in [facility]  S/P quadruple vessel bypass:     Allergies  No Known Allergies    ANTIMICROBIALS (past 90 days)  MEDICATIONS  (STANDING):  meropenem  IVPB   100 mL/Hr IV Intermittent (20 @ 06:24)   100 mL/Hr IV Intermittent (20 @ 17:27)    oseltamivir Suspension   30 milliGRAM(s) Oral (20 @ 07:05)   30 milliGRAM(s) Oral (19 @ 17:51)   30 milliGRAM(s) Oral (19 @ 08:32)   30 milliGRAM(s) Oral (19 @ 20:00)   30 milliGRAM(s) Oral (19 @ 06:18)   30 milliGRAM(s) Oral (19 @ 19:51)   30 milliGRAM(s) Oral (19 @ 06:32)   30 milliGRAM(s) Oral (19 @ 22:33)   30 milliGRAM(s) Oral (19 @ 09:44)   30 milliGRAM(s) Oral (19 @ 19:59)    piperacillin/tazobactam IVPB.   200 mL/Hr IV Intermittent (19 @ 11:09)    piperacillin/tazobactam IVPB..   25 mL/Hr IV Intermittent (20 @ 11:06)   25 mL/Hr IV Intermittent (20 @ 02:27)   25 mL/Hr IV Intermittent (19 @ 17:50)   25 mL/Hr IV Intermittent (19 @ 10:54)   25 mL/Hr IV Intermittent (19 @ 03:53)   25 mL/Hr IV Intermittent (19 @ 18:03)   25 mL/Hr IV Intermittent (19 @ 11:00)   25 mL/Hr IV Intermittent (19 @ 02:24)   25 mL/Hr IV Intermittent (19 @ 18:18)   25 mL/Hr IV Intermittent (19 @ 11:10)   25 mL/Hr IV Intermittent (19 @ 01:09)   25 mL/Hr IV Intermittent (19 @ 17:39)   25 mL/Hr IV Intermittent (19 @ 09:45)   25 mL/Hr IV Intermittent (19 @ 01:28)   25 mL/Hr IV Intermittent (19 @ 18:08)    vancomycin  IVPB   250 mL/Hr IV Intermittent (19 @ 14:50)    vancomycin  IVPB   250 mL/Hr IV Intermittent (19 @ 11:49)      ANTIMICROBIALS:    meropenem  IVPB 1000 every 12 hours    OTHER MEDS: MEDICATIONS  (STANDING):  acetaminophen    Suspension .. 650 every 6 hours PRN  aspirin  chewable 81 daily  atorvastatin 80 at bedtime  fentaNYL   Infusion. 0.5 <Continuous>  heparin  Infusion 1000 <Continuous>  hydrocortisone sodium succinate Injectable 100 every 8 hours  lidocaine   Infusion 0.5 <Continuous>  metoclopramide Injectable 5 every 12 hours  metoprolol tartrate 25 two times a day  midazolam Infusion 0.02 <Continuous>  pantoprazole  Injectable 40 daily  sertraline Concentrate 50 daily  ticagrelor 90 every 12 hours    SOCIAL HISTORY:  Could not be obtained. Sedated and intubated    FAMILY HISTORY:  Family history of cerebrovascular accident (CVA)  Family history of early CAD: father who  of CVA.    REVIEW OF SYSTEMS  [x] ROS unobtainable because:  Could not be obtained. Sedated and intubated  [  ] All other systems negative except as noted below:	    Constitutional:  [ ] fever [ ] chills  [ ] weight loss  [ ] weakness  Skin:  [ ] rash [ ] phlebitis	  Eyes: [ ] icterus [ ] pain  [ ] discharge	  ENMT: [ ] sore throat  [ ] thrush [ ] ulcers [ ] exudates  Respiratory: [ ] dyspnea [ ] hemoptysis [ ] cough [ ] sputum	  Cardiovascular:  [ ] chest pain [ ] palpitations [ ] edema	  Gastrointestinal:  [ ] nausea [ ] vomiting [ ] diarrhea [ ] constipation [ ] pain	  Genitourinary:  [ ] dysuria [ ] frequency [ ] hematuria [ ] discharge [ ] flank pain  [ ] incontinence  Musculoskeletal:  [ ] myalgias [ ] arthralgias [ ] arthritis  [ ] back pain  Neurological:  [ ] headache [ ] seizures  [ ] confusion/altered mental status  Psychiatric:  [ ] anxiety [ ] depression	  Hematology/Lymphatics:  [ ] lymphadenopathy  Endocrine:  [ ] adrenal [ ] thyroid  Allergic/Immunologic:	 [ ] transplant [ ] seasonal    Vital Signs Last 24 Hrs  T(F): 101.3 (20 @ 12:00), Max: 101.3 (20 @ 12:00)  Vital Signs Last 24 Hrs  HR: 68 (20 @ 14:00) (61 - 86)  BP: --  RR: 20 (20 @ 14:00)  SpO2: 99% (20 @ 14:00) (98% - 100%)  Wt(kg): --    EXAM:  Constitutional: Not in acute distress  Eyes: pupils bilaterally reactive to light. No icterus.  Oral cavity: Clear, no lesions  Neck: No neck vein distension noted. Rt IJV TLC  RS: Chest clear to auscultation bilaterally. No wheeze/rhonchi/crepitations.  CVS: S1, S2 heard. Regular rate and rhythm. No murmurs/rubs/gallops.  Abdomen: Soft. No guarding/rigidity/tenderness.  : Parks present  Extremities: Warm. No pedal edema. Rt radial A-line noted  Skin: Minor lesion noted on plantar aspect of scrotum  Vascular: No evidence of phlebitis  Neuro: Sedated and intubated                          9.8    8.27  )-----------( 106      ( 2020 04:20 )             32.8     01-    148<H>  |  112<H>  |  76<H>  ----------------------------<  115<H>  3.9   |  23  |  2.67<H>    Ca    8.3<L>      2020 04:20  Phos  4.7     01  Mg     2.7     02    TPro  5.3<L>  /  Alb  2.9<L>  /  TBili  0.5  /  DBili  x   /  AST  32  /  ALT  38  /  AlkPhos  87      Urinalysis Basic - ( 2020 04:20 )    Color: YELLOW / Appearance: Lt TURBID / S.020 / pH: 6.0  Gluc: NEGATIVE / Ketone: NEGATIVE  / Bili: NEGATIVE / Urobili: NORMAL   Blood: LARGE / Protein: 30 / Nitrite: NEGATIVE   Leuk Esterase: NEGATIVE / RBC: >50 / WBC 3-5   Sq Epi: OCC / Non Sq Epi: x / Bacteria: NEGATIVE    MICROBIOLOGY:Vancomycin Level, Random: 13.8 ( @ 04:45)    Culture - Respiratory with Gram Stain (collected 30 Dec 2019 18:05)  Source: SPUTUM  Preliminary Report (2020 10:16):    NO ORGANISMS ISOLATED AT 24 HOURS    NO ORGANISMS ISOLATED AT 48 HRS.    Culture - Blood (collected 30 Dec 2019 02:05)  Source: BLOOD PERIPHERAL  Preliminary Report (2020 02:08):    NO ORGANISMS ISOLATED    NO ORGANISMS ISOLATED AT 72 HRS.    Culture - Blood (collected 30 Dec 2019 02:05)  Source: BLOOD  Preliminary Report (2020 02:08):    NO ORGANISMS ISOLATED    NO ORGANISMS ISOLATED AT 72 HRS.      RADIOLOGY:  imaging below personally reviewed  < from: Xray Chest 1 View- PORTABLE-Routine (20 @ 07:59) >  IMPRESSION:  Line and tubes as above.    Continued right basilar opacity which could be due to atelectasis or pneumonia.    < end of copied text >    OTHER TESTS:  < from: Transthoracic Echocardiogram (20 @ 08:57) >  CONCLUSIONS:  1. Tethered mitral valve leaflets with normal opening.  Moderate-severe mitral regurgitation.  2. Moderate left ventricular enlargement.  3. Endocardium not well visualized; grossly severe left  ventricular systolic dysfunction.  4. The right ventricle is not well visualized.  5. No obvious pericardial effusion noted.    < end of copied text >

## 2020-01-02 NOTE — PROGRESS NOTE ADULT - ASSESSMENT
64M with CAD, stents and CABG (last [facility] was 12/2017 with patent LIMA but other grafts occluded, medical management), HTN, HLD, CKD, CHER, ?HF, and pemphygus vulgaris presents with acute SOB. admitted to ccu for acute resp failure s/p intubation    NARCISO on CKD (chronic kidney disease) stage 3-4    baseline likely ~ 2-2.2  had NARCISO peaked at 3.27 improved   again worsen today  NARCISO likely ATN, has recurrent fever  He is non-oliguric. parks in place  Advise to continue diuresis as needed at present. Off lasix   BP borderline. Avoid hypotension   No role/ need for RRT at present       Essential hypertension.    BP is controlled at present.  care per CCU    Acute Resp Failure.  Improving volume status s/p diuretics   Remains intubated due to oral bleeding   I agree with diuretics as needed at present  Continue to monitor his weight and urine output   f/u cardio    CKD-MBD  check pth, &  Vitamin D   Phos acceptable   Calcium low  monitor phos and wolf daily    hypernatremia  free water increased today per team  monitor 64M with CAD, stents and CABG (last [facility] was 12/2017 with patent LIMA but other grafts occluded, medical management), HTN, HLD, CKD, CHER, ?HF, and pemphygus vulgaris presents with acute SOB. admitted to ccu for acute resp failure s/p intubation    NARCISO on CKD (chronic kidney disease) stage 3-4  baseline likely ~ 2-2.2  had NARCISO peaked at 3.27 and had improved   again worsen today  NARCISO likely ATN, has recurrent fever  He is non-oliguric. parks in place  Advise to continue diuresis as needed at present. Off lasix   BP borderline. Avoid hypotension   No role/ need for RRT at present       Essential hypertension.    BP is controlled at present.  care per CCU    Acute Resp Failure.  Improving volume status s/p diuretics   Remains intubated due to oral bleeding   I agree with diuretics as needed at present  Continue to monitor his weight and urine output   f/u cardio    CKD-MBD  check pth, &  Vitamin D   Phos acceptable   Calcium low  monitor phos and wolf daily    Hypernatremia  free water increased today per team  monitor

## 2020-01-02 NOTE — CONSULT NOTE ADULT - SUBJECTIVE AND OBJECTIVE BOX
HPI:    64M with CAD, stents and CABG (last [facility] was 2017 with patent LIMA but other grafts occluded, medical management), HTN, HLD, CKD, CHER, ?HF, and pemphigus vulgaris presented with acute SOB.  Patient was intubated for airway protection. Intubation was reported as traumatic and afterward he briefly lost pulses, received CPR with quick ROSC. S/p Lasix 80 mg IV given X 1 and propofol started for sedation. Patient found to have RVP positive for Influenza, RSV, and hMPV as well as CXR with R basilar opacity. Patient currently on vanc and meropenem, complicate course of tamiflu. Patient also found to have new Afib with RVR, started on heparin drip. Patient failed CPAP on , found to be hypoxic, complicated by hypotension and VT. Patient received amiodarone, lidocaine. Patient back on sedation with fentanyl and versed. Pulmonology consulted for assistance and optimization for extubation.       PAST MEDICAL & SURGICAL HISTORY:  CHF (congestive heart failure)  Psoriasis  HLD (hyperlipidemia)  CAD (coronary artery disease): 3 stents  Hypertension  History of coronary artery stent placement: 1 stent , 2 stents  in [facility]  S/P quadruple vessel bypass:       FAMILY HISTORY:  Family history of cerebrovascular accident (CVA)  Family history of early CAD: father who  of CVA.      SOCIAL HISTORY:  Smoking: __ packs x ___ years  EtOH Use:  Marital Status:  Occupation:  Exposures:  Recent Travel:    Allergies    No Known Allergies    Intolerances    HOME MEDICATIONS:    REVIEW OF SYSTEMS: unable to assess due to sedation and intubation    OBJECTIVE:  ICU Vital Signs Last 24 Hrs  T(C): 38.2 (2020 14:00), Max: 38.5 (2020 12:00)  T(F): 100.7 (2020 14:00), Max: 101.3 (2020 12:00)  HR: 72 (2020 15:17) (61 - 86)  BP: --  BP(mean): --  ABP: 157/87 (2020 15:00) (134/79 - 170/98)  ABP(mean): 110 (2020 15:00) (98 - 122)  RR: 20 (2020 15:00) (13 - 21)  SpO2: 99% (2020 15:17) (98% - 100%)    Mode: AC/ CMV (Assist Control/ Continuous Mandatory Ventilation), RR (machine): 20, TV (machine): 500, FiO2: 40, PEEP: 5, MAP: 12, PIP: 26     @ 07:01   @ 07:00  --------------------------------------------------------  IN: 2310.7 mL / OUT: 3285 mL / NET: -974.3 mL     @ 07: @ 16:21  --------------------------------------------------------  IN: 437.5 mL / OUT: 650 mL / NET: -212.5 mL      CAPILLARY BLOOD GLUCOSE    PHYSICAL EXAM:    General: No acute distress, intubated and sedated.   HEENT: NCAT. No scleral icterus or injection. bloody secretions from mouth  Neck: No JVD.    Heart: RRR.  Normal S1 and S2.  No murmurs, rubs, or gallops.   Lungs: CTAB. No wheezes, crackles, or rhonchi.    Abdomen: BS+, soft, ND.   Skin: Warm and dry.  No rashes.  Extremities: No edema, clubbing, or cyanosis.    Musculoskeletal: No deformities.   Neuro: A&Ox0. Arousable but not following commands. Moving all extremities.       HOSPITAL MEDICATIONS:  MEDICATIONS  (STANDING):  aspirin  chewable 81 milliGRAM(s) Oral daily  atorvastatin 80 milliGRAM(s) Oral at bedtime  chlorhexidine 0.12% Liquid 15 milliLiter(s) Oral Mucosa every 12 hours  chlorhexidine 4% Liquid 1 Application(s) Topical daily  dexMEDEtomidine Infusion 1 MICROgram(s)/kG/Hr (26.525 mL/Hr) IV Continuous <Continuous>  fentaNYL   Infusion. 0.5 MICROgram(s)/kG/Hr (6 mL/Hr) IV Continuous <Continuous>  heparin  Infusion 1000 Unit(s)/Hr (11 mL/Hr) IV Continuous <Continuous>  hydrocortisone sodium succinate Injectable 100 milliGRAM(s) IV Push every 8 hours  lidocaine   Infusion 0.5 mG/Min (3.75 mL/Hr) IV Continuous <Continuous>  meropenem  IVPB 1000 milliGRAM(s) IV Intermittent every 12 hours  metoclopramide Injectable 5 milliGRAM(s) IV Push every 12 hours  metoprolol tartrate 50 milliGRAM(s) Oral two times a day  midazolam Infusion 0.02 mG/kG/Hr (2.4 mL/Hr) IV Continuous <Continuous>  pantoprazole  Injectable 40 milliGRAM(s) IV Push daily  sertraline Concentrate 50 milliGRAM(s) Oral daily  sodium chloride 0.9%. 1000 milliLiter(s) (10 mL/Hr) IV Continuous <Continuous>  ticagrelor 90 milliGRAM(s) Oral every 12 hours    MEDICATIONS  (PRN):  acetaminophen    Suspension .. 650 milliGRAM(s) Oral every 6 hours PRN Temp greater or equal to 38C (100.4F)      LABS:                        9.9    7.55  )-----------( 98       ( 2020 14:20 )             32.6     01-02    148<H>  |  112<H>  |  76<H>  ----------------------------<  115<H>  3.9   |  23  |  2.67<H>    Ca    8.3<L>      2020 04:20  Phos  4.7     -  Mg     2.7         TPro  5.3<L>  /  Alb  2.9<L>  /  TBili  0.5  /  DBili  x   /  AST  32  /  ALT  38  /  AlkPhos  87  01-02    PTT - ( 2020 10:14 )  PTT:67.6 SEC  Urinalysis Basic - ( 2020 04:20 )    Color: YELLOW / Appearance: Lt TURBID / S.020 / pH: 6.0  Gluc: NEGATIVE / Ketone: NEGATIVE  / Bili: NEGATIVE / Urobili: NORMAL   Blood: LARGE / Protein: 30 / Nitrite: NEGATIVE   Leuk Esterase: NEGATIVE / RBC: >50 / WBC 3-5   Sq Epi: OCC / Non Sq Epi: x / Bacteria: NEGATIVE      Arterial Blood Gas:   @ 04:20  7.44/37/127/26/98.4/1.2  ABG lactate: 1.7  Arterial Blood Gas:   @ 13:30  7.42/43/165/27/98.8/2.8  ABG lactate: --  Arterial Blood Gas:   @ 09:40  7.36/47/180/25/98.5/1.1  ABG lactate: 1.9  Arterial Blood Gas:   @ 04:55  7.48/37/78/28/95.5/3.9  ABG lactate: 1.9    Venous Blood Gas:   @ 05:00  7.43/44/37/28/64.3  VBG Lactate: --

## 2020-01-02 NOTE — CONSULT NOTE ADULT - ASSESSMENT
ASSESSMENT:    Prior antimicrobials:  Vanc 12/27, 12/28  Zosyn 12/27-1/1  Meropenem 1/1-  Tamiflu 12/27-12/31    RECOMMENDATIONS:    BRIGITTE Hayward MD  Fellow, Infectious Diseases  Pager: [phone]  After 5pm and on Weekends: Call [phone] ASSESSMENT:  64/M with PMH CAD s/p stents and CABG (last [facility] was 12/2017 with patent LIMA but other grafts occluded, medical management - on ASA and Ticagrelor), HTN, HLD, CKD, CHER, ?HF, and pemphigus vulgaris (s/p Rituxan 1 week PTA and chronic steroids).  P/w acute onset SOB preceded by chest burning. In ER, noted to have acute SOB, diffuse crackles, minimally responsive. Intubated for airway protection (?traumatic intubation) f/b loss of pulse, CPR and ROSC (downtime not known).  Labs showed leucocytosis, elevated lactate, transaminitis. EKG shwoed new Afib with RVR. Txf to CCU.  Hosp course c/b recurrent oral bleeding s/p multiple interventions by ENT, episode of hypotension, desaturation, VT on trial of CPAP 1/1/20.  Pt continued to spike fevers. ID consulted for recs  ----------  - Pt sedated and intubated. ROS could not be obtained  - per RN - minor skin breakdown over base of penis. No tracheal secretions, FiO2 40%  - now noted to have recurrent oral bleeding with clots and rectal bleeding    Prior antimicrobials:  Vanc 12/27, 12/28  Zosyn 12/27-1/1  Meropenem 1/1-  Tamiflu 12/27-12/31    RECOMMENDATIONS:    BRIGITTE Hayward MD  Fellow, Infectious Diseases  Pager: [phone]  After 5pm and on Weekends: Call [phone] ASSESSMENT:  64/M with PMH CAD s/p stents and CABG (last [facility] was 12/2017 with patent LIMA but other grafts occluded, medical management - on ASA and Ticagrelor), HTN, HLD, CKD, CHER, ?HF, and pemphigus vulgaris (s/p Rituxan 1 week PTA and chronic steroids).  P/w acute onset SOB preceded by chest burning. In ER, noted to have acute SOB, diffuse crackles, minimally responsive. Intubated for airway protection (?traumatic intubation) f/b loss of pulse, CPR and ROSC (downtime not known).  Labs showed leucocytosis, elevated lactate, transaminitis. RVP positive for Influenza A, RSV, hMPV. EKG shwoed new Afib with RVR. Txf to CCU.  Hosp course c/b recurrent oral bleeding s/p multiple interventions by ENT, episode of hypotension, desaturation, VT on trial of CPAP 1/1/20.  Pt continued to spike fevers. ID consulted for recs.  ----------  Multifactorial etiology for fevers - viral infections (particulalrly hMPV) v/s aspiration 2/2 recurrent oral bleeding  - additionally, due to recent Rituximab and steroid therapy - he is immunosuppressed. May consider RSV therapy  - would continue Meropenem for now to cover for possible aspiration    Prior antimicrobials:  Vanc 12/27, 12/28  Zosyn 12/27-1/1  Meropenem 1/1-  Tamiflu 12/27-12/31    RECOMMENDATIONS:  - continue Meropenem 1g IV q12h  - f/u cultures  - Pharmacy working to obtain Ribavirin therapy. Will start once available  Recs conveyed to primary team    BRIGITTE Hayward MD  Fellow, Infectious Diseases  Pager: [phone]  After 5pm and on Weekends: Call [phone] ASSESSMENT:  64/M with PMH CAD s/p stents and CABG (last [facility] was 12/2017 with patent LIMA but other grafts occluded, medical management - on ASA and Ticagrelor), HTN, HLD, CKD, CHER, ?HF, and pemphigus vulgaris (s/p Rituxan 1 week PTA and chronic steroids).  P/w acute onset SOB preceded by chest burning. In ER, noted to have acute SOB, diffuse crackles, minimally responsive. Intubated for airway protection (?traumatic intubation) f/b loss of pulse, CPR and ROSC (downtime not known).  Labs showed leucocytosis, elevated lactate, transaminitis. RVP positive for Influenza A, RSV, hMPV. EKG shwoed new Afib with RVR. Txf to CCU.  Hosp course c/b recurrent oral bleeding s/p multiple interventions by ENT, episode of hypotension, desaturation, VT on trial of CPAP 1/1/20.  Pt continued to spike fevers. ID consulted for recs.  ----------  Multifactorial etiology for fevers - viral infections (particulalrly hMPV) v/s aspiration 2/2 recurrent oral bleeding  - additionally, due to recent Rituximab and steroid therapy - he is immunosuppressed. May consider RSV therapy  - would continue Meropenem for now to cover for possible aspiration    Prior antimicrobials:  Vanc 12/27, 12/28  Zosyn 12/27-1/1  Meropenem 1/1-  Tamiflu 12/27-12/31    RECOMMENDATIONS:  - continue Meropenem 1g IV q12h  - f/u cultures  - Pharmacy working to obtain enteral Ribavirin therapy. Will start once available  Recs conveyed to primary team    BRIGITTE Hayward MD  Fellow, Infectious Diseases  Pager: [phone]  After 5pm and on Weekends: Call [phone]

## 2020-01-02 NOTE — PROGRESS NOTE ADULT - ASSESSMENT
64M with CAD, stents and CABG (last [facility] was 12/2017 with patent LIMA but other grafts occluded, medical management), HTN, HLD, CKD, CHER, ?HF, and pemphigus vulgaris presents with acute SOB requiring intubation for airway protection with new atrial fibrillation in setting of + influenza.  Pt CPAP'ed this morning was initially pulling in good tidal volume, but quickly desats and went into VT, s/p Amio 150 x1 and lidocaine bolus, started on Lidocaine drip at 2, resumed hep gtt, switched zosyn to meropenum for salt reduction 64M with CAD, stents and CABG (last [facility] was 12/2017 with patent LIMA but other grafts occluded, medical management), HTN, HLD, CKD, CHER, ?HF, and pemphigus vulgaris presents with acute SOB requiring intubation for airway protection with new atrial fibrillation in setting of + influenza.  Pt CPAP'ed this morning was initially pulling in good tidal volume, but quickly desats and went into VT, s/p Amio 150 x1 and lidocaine bolus, started on Lidocaine drip will taper off today, resumed hep gtt, switched zosyn to meropenum for salt reduction

## 2020-01-03 LAB
ALBUMIN SERPL ELPH-MCNC: 3 G/DL — LOW (ref 3.3–5)
ALP SERPL-CCNC: 90 U/L — SIGNIFICANT CHANGE UP (ref 40–120)
ALT FLD-CCNC: 38 U/L — SIGNIFICANT CHANGE UP (ref 4–41)
ANION GAP SERPL CALC-SCNC: 13 MMO/L — SIGNIFICANT CHANGE UP (ref 7–14)
ANION GAP SERPL CALC-SCNC: 14 MMO/L — SIGNIFICANT CHANGE UP (ref 7–14)
ANION GAP SERPL CALC-SCNC: 14 MMO/L — SIGNIFICANT CHANGE UP (ref 7–14)
APTT BLD: 25 SEC — LOW (ref 27.5–36.3)
AST SERPL-CCNC: 28 U/L — SIGNIFICANT CHANGE UP (ref 4–40)
B PERT DNA SPEC QL NAA+PROBE: NOT DETECTED — SIGNIFICANT CHANGE UP
BACTERIA SPT RESP CULT: SIGNIFICANT CHANGE UP
BASE EXCESS BLDA CALC-SCNC: 2.2 MMOL/L — SIGNIFICANT CHANGE UP
BASE EXCESS BLDV CALC-SCNC: 6.7 MMOL/L — SIGNIFICANT CHANGE UP
BILIRUB SERPL-MCNC: 0.7 MG/DL — SIGNIFICANT CHANGE UP (ref 0.2–1.2)
BLOOD GAS ARTERIAL - FIO2: 40 — SIGNIFICANT CHANGE UP
BUN SERPL-MCNC: 64 MG/DL — HIGH (ref 7–23)
BUN SERPL-MCNC: 68 MG/DL — HIGH (ref 7–23)
BUN SERPL-MCNC: 68 MG/DL — HIGH (ref 7–23)
C PNEUM DNA SPEC QL NAA+PROBE: NOT DETECTED — SIGNIFICANT CHANGE UP
CA-I BLDA-SCNC: 1.15 MMOL/L — SIGNIFICANT CHANGE UP (ref 1.15–1.29)
CALCIUM SERPL-MCNC: 8 MG/DL — LOW (ref 8.4–10.5)
CALCIUM SERPL-MCNC: 8.4 MG/DL — SIGNIFICANT CHANGE UP (ref 8.4–10.5)
CALCIUM SERPL-MCNC: 8.5 MG/DL — SIGNIFICANT CHANGE UP (ref 8.4–10.5)
CHLORIDE SERPL-SCNC: 112 MMOL/L — HIGH (ref 98–107)
CHLORIDE SERPL-SCNC: 112 MMOL/L — HIGH (ref 98–107)
CHLORIDE SERPL-SCNC: 113 MMOL/L — HIGH (ref 98–107)
CO2 SERPL-SCNC: 23 MMOL/L — SIGNIFICANT CHANGE UP (ref 22–31)
CO2 SERPL-SCNC: 24 MMOL/L — SIGNIFICANT CHANGE UP (ref 22–31)
CO2 SERPL-SCNC: 25 MMOL/L — SIGNIFICANT CHANGE UP (ref 22–31)
CREAT SERPL-MCNC: 2.3 MG/DL — HIGH (ref 0.5–1.3)
CREAT SERPL-MCNC: 2.46 MG/DL — HIGH (ref 0.5–1.3)
CREAT SERPL-MCNC: 2.5 MG/DL — HIGH (ref 0.5–1.3)
FLUAV H1 2009 PAND RNA SPEC QL NAA+PROBE: NOT DETECTED — SIGNIFICANT CHANGE UP
FLUAV H1 RNA SPEC QL NAA+PROBE: NOT DETECTED — SIGNIFICANT CHANGE UP
FLUAV H3 RNA SPEC QL NAA+PROBE: NOT DETECTED — SIGNIFICANT CHANGE UP
FLUAV SUBTYP SPEC NAA+PROBE: NOT DETECTED — SIGNIFICANT CHANGE UP
FLUBV RNA SPEC QL NAA+PROBE: NOT DETECTED — SIGNIFICANT CHANGE UP
GLUCOSE BLDA-MCNC: 112 MG/DL — HIGH (ref 70–99)
GLUCOSE SERPL-MCNC: 119 MG/DL — HIGH (ref 70–99)
GLUCOSE SERPL-MCNC: 120 MG/DL — HIGH (ref 70–99)
GLUCOSE SERPL-MCNC: 96 MG/DL — SIGNIFICANT CHANGE UP (ref 70–99)
HADV DNA SPEC QL NAA+PROBE: NOT DETECTED — SIGNIFICANT CHANGE UP
HCO3 BLDA-SCNC: 27 MMOL/L — HIGH (ref 22–26)
HCO3 BLDV-SCNC: 30 MMOL/L — HIGH (ref 20–27)
HCOV PNL SPEC NAA+PROBE: SIGNIFICANT CHANGE UP
HCT VFR BLD CALC: 34.5 % — LOW (ref 39–50)
HCT VFR BLDA CALC: 33.6 % — LOW (ref 39–51)
HGB BLD-MCNC: 10.5 G/DL — LOW (ref 13–17)
HGB BLDA-MCNC: 10.9 G/DL — LOW (ref 13–17)
HMPV RNA SPEC QL NAA+PROBE: NOT DETECTED — SIGNIFICANT CHANGE UP
HPIV1 RNA SPEC QL NAA+PROBE: NOT DETECTED — SIGNIFICANT CHANGE UP
HPIV2 RNA SPEC QL NAA+PROBE: NOT DETECTED — SIGNIFICANT CHANGE UP
HPIV3 RNA SPEC QL NAA+PROBE: NOT DETECTED — SIGNIFICANT CHANGE UP
HPIV4 RNA SPEC QL NAA+PROBE: NOT DETECTED — SIGNIFICANT CHANGE UP
LACTATE BLDA-SCNC: 1.6 MMOL/L — SIGNIFICANT CHANGE UP (ref 0.5–2)
MAGNESIUM SERPL-MCNC: 2.4 MG/DL — SIGNIFICANT CHANGE UP (ref 1.6–2.6)
MAGNESIUM SERPL-MCNC: 2.5 MG/DL — SIGNIFICANT CHANGE UP (ref 1.6–2.6)
MCHC RBC-ENTMCNC: 26.7 PG — LOW (ref 27–34)
MCHC RBC-ENTMCNC: 30.4 % — LOW (ref 32–36)
MCV RBC AUTO: 87.8 FL — SIGNIFICANT CHANGE UP (ref 80–100)
NRBC # FLD: 0 K/UL — SIGNIFICANT CHANGE UP (ref 0–0)
PCO2 BLDA: 37 MMHG — SIGNIFICANT CHANGE UP (ref 35–48)
PCO2 BLDV: 45 MMHG — SIGNIFICANT CHANGE UP (ref 41–51)
PH BLDA: 7.46 PH — HIGH (ref 7.35–7.45)
PH BLDV: 7.45 PH — HIGH (ref 7.32–7.43)
PHOSPHATE SERPL-MCNC: 3.8 MG/DL — SIGNIFICANT CHANGE UP (ref 2.5–4.5)
PLATELET # BLD AUTO: 118 K/UL — LOW (ref 150–400)
PMV BLD: 13 FL — SIGNIFICANT CHANGE UP (ref 7–13)
PO2 BLDA: 104 MMHG — SIGNIFICANT CHANGE UP (ref 83–108)
PO2 BLDV: 41 MMHG — HIGH (ref 35–40)
POTASSIUM BLDA-SCNC: 3.2 MMOL/L — LOW (ref 3.4–4.5)
POTASSIUM SERPL-MCNC: 3.2 MMOL/L — LOW (ref 3.5–5.3)
POTASSIUM SERPL-MCNC: 3.4 MMOL/L — LOW (ref 3.5–5.3)
POTASSIUM SERPL-MCNC: 4 MMOL/L — SIGNIFICANT CHANGE UP (ref 3.5–5.3)
POTASSIUM SERPL-SCNC: 3.2 MMOL/L — LOW (ref 3.5–5.3)
POTASSIUM SERPL-SCNC: 3.4 MMOL/L — LOW (ref 3.5–5.3)
POTASSIUM SERPL-SCNC: 4 MMOL/L — SIGNIFICANT CHANGE UP (ref 3.5–5.3)
PROT SERPL-MCNC: 5.8 G/DL — LOW (ref 6–8.3)
RBC # BLD: 3.93 M/UL — LOW (ref 4.2–5.8)
RBC # FLD: 17.1 % — HIGH (ref 10.3–14.5)
RSV RNA SPEC QL NAA+PROBE: DETECTED — HIGH
RV+EV RNA SPEC QL NAA+PROBE: NOT DETECTED — SIGNIFICANT CHANGE UP
SAO2 % BLDA: 98.1 % — SIGNIFICANT CHANGE UP (ref 95–99)
SAO2 % BLDV: 70.2 % — SIGNIFICANT CHANGE UP (ref 60–85)
SODIUM BLDA-SCNC: 148 MMOL/L — HIGH (ref 136–146)
SODIUM SERPL-SCNC: 149 MMOL/L — HIGH (ref 135–145)
SODIUM SERPL-SCNC: 150 MMOL/L — HIGH (ref 135–145)
SODIUM SERPL-SCNC: 151 MMOL/L — HIGH (ref 135–145)
SPECIMEN SOURCE: SIGNIFICANT CHANGE UP
VANCOMYCIN FLD-MCNC: < 1.3 UG/ML — SIGNIFICANT CHANGE UP
WBC # BLD: 6.7 K/UL — SIGNIFICANT CHANGE UP (ref 3.8–10.5)
WBC # FLD AUTO: 6.7 K/UL — SIGNIFICANT CHANGE UP (ref 3.8–10.5)

## 2020-01-03 PROCEDURE — 99233 SBSQ HOSP IP/OBS HIGH 50: CPT | Mod: GC

## 2020-01-03 PROCEDURE — 99233 SBSQ HOSP IP/OBS HIGH 50: CPT

## 2020-01-03 PROCEDURE — 99232 SBSQ HOSP IP/OBS MODERATE 35: CPT

## 2020-01-03 PROCEDURE — 71045 X-RAY EXAM CHEST 1 VIEW: CPT | Mod: 26

## 2020-01-03 PROCEDURE — 71045 X-RAY EXAM CHEST 1 VIEW: CPT | Mod: 26,77

## 2020-01-03 RX ORDER — POTASSIUM CHLORIDE 20 MEQ
20 PACKET (EA) ORAL ONCE
Refills: 0 | Status: COMPLETED | OUTPATIENT
Start: 2020-01-03 | End: 2020-01-03

## 2020-01-03 RX ORDER — POTASSIUM CHLORIDE 20 MEQ
10 PACKET (EA) ORAL
Refills: 0 | Status: DISCONTINUED | OUTPATIENT
Start: 2020-01-03 | End: 2020-01-03

## 2020-01-03 RX ORDER — POTASSIUM CHLORIDE 20 MEQ
40 PACKET (EA) ORAL ONCE
Refills: 0 | Status: COMPLETED | OUTPATIENT
Start: 2020-01-03 | End: 2020-01-03

## 2020-01-03 RX ORDER — PROPOFOL 10 MG/ML
10 INJECTION, EMULSION INTRAVENOUS
Qty: 1000 | Refills: 0 | Status: DISCONTINUED | OUTPATIENT
Start: 2020-01-03 | End: 2020-01-04

## 2020-01-03 RX ORDER — FUROSEMIDE 40 MG
40 TABLET ORAL ONCE
Refills: 0 | Status: COMPLETED | OUTPATIENT
Start: 2020-01-03 | End: 2020-01-03

## 2020-01-03 RX ORDER — HEPARIN SODIUM 5000 [USP'U]/ML
5000 INJECTION INTRAVENOUS; SUBCUTANEOUS EVERY 8 HOURS
Refills: 0 | Status: DISCONTINUED | OUTPATIENT
Start: 2020-01-03 | End: 2020-01-04

## 2020-01-03 RX ORDER — SODIUM CHLORIDE 9 MG/ML
1000 INJECTION, SOLUTION INTRAVENOUS
Refills: 0 | Status: DISCONTINUED | OUTPATIENT
Start: 2020-01-03 | End: 2020-01-04

## 2020-01-03 RX ORDER — HYDRALAZINE HCL 50 MG
10 TABLET ORAL DAILY
Refills: 0 | Status: DISCONTINUED | OUTPATIENT
Start: 2020-01-03 | End: 2020-01-03

## 2020-01-03 RX ORDER — POTASSIUM CHLORIDE 20 MEQ
20 PACKET (EA) ORAL
Refills: 0 | Status: COMPLETED | OUTPATIENT
Start: 2020-01-03 | End: 2020-01-03

## 2020-01-03 RX ORDER — AMLODIPINE BESYLATE 2.5 MG/1
5 TABLET ORAL DAILY
Refills: 0 | Status: DISCONTINUED | OUTPATIENT
Start: 2020-01-03 | End: 2020-01-04

## 2020-01-03 RX ORDER — HYDRALAZINE HCL 50 MG
10 TABLET ORAL ONCE
Refills: 0 | Status: COMPLETED | OUTPATIENT
Start: 2020-01-03 | End: 2020-01-03

## 2020-01-03 RX ORDER — HYDRALAZINE HCL 50 MG
5 TABLET ORAL ONCE
Refills: 0 | Status: COMPLETED | OUTPATIENT
Start: 2020-01-03 | End: 2020-01-03

## 2020-01-03 RX ORDER — SODIUM CHLORIDE 9 MG/ML
500 INJECTION, SOLUTION INTRAVENOUS
Refills: 0 | Status: DISCONTINUED | OUTPATIENT
Start: 2020-01-03 | End: 2020-01-03

## 2020-01-03 RX ADMIN — Medication 40 MILLIGRAM(S): at 08:03

## 2020-01-03 RX ADMIN — Medication 100 MILLIEQUIVALENT(S): at 06:39

## 2020-01-03 RX ADMIN — CHLORHEXIDINE GLUCONATE 15 MILLILITER(S): 213 SOLUTION TOPICAL at 05:30

## 2020-01-03 RX ADMIN — Medication 5 MILLIGRAM(S): at 05:28

## 2020-01-03 RX ADMIN — Medication 5 MILLIGRAM(S): at 17:56

## 2020-01-03 RX ADMIN — Medication 100 MILLIGRAM(S): at 05:29

## 2020-01-03 RX ADMIN — Medication 40 MILLIEQUIVALENT(S): at 06:40

## 2020-01-03 RX ADMIN — DEXMEDETOMIDINE HYDROCHLORIDE IN 0.9% SODIUM CHLORIDE 26.52 MICROGRAM(S)/KG/HR: 4 INJECTION INTRAVENOUS at 00:01

## 2020-01-03 RX ADMIN — Medication 40 MILLIGRAM(S): at 02:54

## 2020-01-03 RX ADMIN — TICAGRELOR 90 MILLIGRAM(S): 90 TABLET ORAL at 17:56

## 2020-01-03 RX ADMIN — PROPOFOL 6.37 MICROGRAM(S)/KG/MIN: 10 INJECTION, EMULSION INTRAVENOUS at 16:04

## 2020-01-03 RX ADMIN — Medication 10 MILLIGRAM(S): at 02:15

## 2020-01-03 RX ADMIN — CHLORHEXIDINE GLUCONATE 1 APPLICATION(S): 213 SOLUTION TOPICAL at 12:16

## 2020-01-03 RX ADMIN — PROPOFOL 6.37 MICROGRAM(S)/KG/MIN: 10 INJECTION, EMULSION INTRAVENOUS at 19:32

## 2020-01-03 RX ADMIN — HEPARIN SODIUM 5000 UNIT(S): 5000 INJECTION INTRAVENOUS; SUBCUTANEOUS at 21:16

## 2020-01-03 RX ADMIN — DEXMEDETOMIDINE HYDROCHLORIDE IN 0.9% SODIUM CHLORIDE 26.52 MICROGRAM(S)/KG/HR: 4 INJECTION INTRAVENOUS at 01:36

## 2020-01-03 RX ADMIN — Medication 100 MILLIEQUIVALENT(S): at 16:02

## 2020-01-03 RX ADMIN — Medication 5 MILLIGRAM(S): at 07:55

## 2020-01-03 RX ADMIN — MIDAZOLAM HYDROCHLORIDE 2.4 MG/KG/HR: 1 INJECTION, SOLUTION INTRAMUSCULAR; INTRAVENOUS at 01:29

## 2020-01-03 RX ADMIN — Medication 81 MILLIGRAM(S): at 12:15

## 2020-01-03 RX ADMIN — Medication 100 MILLIEQUIVALENT(S): at 16:54

## 2020-01-03 RX ADMIN — MEROPENEM 100 MILLIGRAM(S): 1 INJECTION INTRAVENOUS at 17:55

## 2020-01-03 RX ADMIN — PROPOFOL 6.37 MICROGRAM(S)/KG/MIN: 10 INJECTION, EMULSION INTRAVENOUS at 09:30

## 2020-01-03 RX ADMIN — Medication 100 MILLIEQUIVALENT(S): at 17:56

## 2020-01-03 RX ADMIN — SODIUM CHLORIDE 10 MILLILITER(S): 9 INJECTION INTRAMUSCULAR; INTRAVENOUS; SUBCUTANEOUS at 08:01

## 2020-01-03 RX ADMIN — SODIUM CHLORIDE 100 MILLILITER(S): 9 INJECTION, SOLUTION INTRAVENOUS at 16:54

## 2020-01-03 RX ADMIN — AMLODIPINE BESYLATE 5 MILLIGRAM(S): 2.5 TABLET ORAL at 05:30

## 2020-01-03 RX ADMIN — TICAGRELOR 90 MILLIGRAM(S): 90 TABLET ORAL at 05:30

## 2020-01-03 RX ADMIN — SERTRALINE 50 MILLIGRAM(S): 25 TABLET, FILM COATED ORAL at 12:16

## 2020-01-03 RX ADMIN — DEXMEDETOMIDINE HYDROCHLORIDE IN 0.9% SODIUM CHLORIDE 26.52 MICROGRAM(S)/KG/HR: 4 INJECTION INTRAVENOUS at 05:29

## 2020-01-03 RX ADMIN — DEXMEDETOMIDINE HYDROCHLORIDE IN 0.9% SODIUM CHLORIDE 26.52 MICROGRAM(S)/KG/HR: 4 INJECTION INTRAVENOUS at 08:00

## 2020-01-03 RX ADMIN — SODIUM CHLORIDE 100 MILLILITER(S): 9 INJECTION, SOLUTION INTRAVENOUS at 19:33

## 2020-01-03 RX ADMIN — Medication 50 MILLIGRAM(S): at 05:30

## 2020-01-03 RX ADMIN — Medication 50 MILLIGRAM(S): at 17:56

## 2020-01-03 RX ADMIN — ATORVASTATIN CALCIUM 80 MILLIGRAM(S): 80 TABLET, FILM COATED ORAL at 21:16

## 2020-01-03 RX ADMIN — PANTOPRAZOLE SODIUM 40 MILLIGRAM(S): 20 TABLET, DELAYED RELEASE ORAL at 12:15

## 2020-01-03 RX ADMIN — FENTANYL CITRATE 6 MICROGRAM(S)/KG/HR: 50 INJECTION INTRAVENOUS at 02:00

## 2020-01-03 RX ADMIN — CHLORHEXIDINE GLUCONATE 15 MILLILITER(S): 213 SOLUTION TOPICAL at 17:56

## 2020-01-03 RX ADMIN — PROPOFOL 6.37 MICROGRAM(S)/KG/MIN: 10 INJECTION, EMULSION INTRAVENOUS at 12:16

## 2020-01-03 RX ADMIN — Medication 40 MILLIGRAM(S): at 08:08

## 2020-01-03 RX ADMIN — MEROPENEM 100 MILLIGRAM(S): 1 INJECTION INTRAVENOUS at 05:29

## 2020-01-03 NOTE — PROGRESS NOTE ADULT - ASSESSMENT
64/M with PMH CAD s/p stents and CABG (last [facility] was 12/2017 with patent LIMA but other grafts occluded, medical management - on ASA and Ticagrelor), HTN, HLD, CKD, CHER, ?HF, and pemphigus vulgaris (s/p Rituxan 1 week PTA and chronic steroids).  Admitted 12/27/19 with  acute onset SOB preceded by chest burning. In ER, noted to have acute SOB, diffuse crackles, minimally responsive. Intubated for airway protection (?traumatic intubation) f/b loss of pulse, CPR and ROSC (downtime not known).  Labs showed leucocytosis, elevated lactate, transaminitis. RVP positive for Influenza A, RSV, hMPV. EKG shwoed new Afib with RVR. Txf to CCU.  Hosp course c/b recurrent oral bleeding s/p multiple interventions by ENT, episode of hypotension, desaturation, VT on trial of CPAP 1/1/20.  Pt continued to spike fevers.   ----------  Multifactorial etiology for fevers - viral infections (particulalrly hMPV) v/s aspiration 2/2 recurrent oral bleeding  - additionally, due to recent Rituximab and steroid therapy - he is immunosuppressed.   - would continue Meropenem for now to cover for possible aspiration    Prior antimicrobials:  Vanc 12/27, 12/28  Zosyn 12/27-1/1  Meropenem 1/1-->    Repeat RVP today 1/3 is positive just for RSV  Pharmacy efforts appreciated. Ribavarin is not compatible with vent. Oral capsule can not be crushed. Unfortunately Ribavarin can not be provided for the RSV in this critically ill immunocompromised patient     Suggest  Continue Meropenem: if stabilizes may be able to discontinue on 1/6    I will be out until 1/13 -  ID will see this weekend and cover next week    continue Meropenem for now

## 2020-01-03 NOTE — PROGRESS NOTE ADULT - SUBJECTIVE AND OBJECTIVE BOX
Cheyanne Soriano PGY1   Page: [phone]    PATIENT: THONY ANAYA, MRN: [id number]    CHIEF COMPLAINT: Patient is a 64y old  Male who presents with a chief complaint of Acute resp failure (2020 16:20)      INTERVAL HISTORY/OVERNIGHT EVENTS: No overnight events. At bedside, patient has been sleeping and eating well. Denies N/V/D/C. Last BM x1 regular yesterday. Denies abdominal pain. Denies chest pain or SOB, cough. Has been ambulating with assistance. Oriented to person, place, and time. Breathing comfortably on room air.    REVIEW OF SYSTEMS:    Constitutional:     [ ] negative [ ] fevers [ ] chills [ ] weight loss [ ] weight gain  HEENT:                  [ ] negative [ ] dry eyes [ ] eye irritation [ ] postnasal drip [ ] nasal congestion  CV:                         [ ] negative  [ ] chest pain [ ] orthopnea [ ] palpitations [ ] murmur  Resp:                     [ ] negative [ ] cough [ ] shortness of breath [ ] dyspnea [ ] wheezing [ ] sputum [ ] hemoptysis  GI:                          [ ] negative [ ] nausea [ ] vomiting [ ] diarrhea [ ] constipation [ ] abd pain [ ] dysphagia   :                        [ ] negative [ ] dysuria [ ] nocturia [ ] hematuria [ ] increased urinary frequency  Musculoskeletal: [ ] negative [ ] back pain [ ] myalgias [ ] arthralgias [ ] fracture  Skin:                       [ ] negative [ ] rash [ ] itch  Neurological:        [ ] negative [ ] headache [ ] dizziness [ ] syncope [ ] weakness [ ] numbness  Psychiatric:           [ ] negative [ ] anxiety [ ] depression  Endocrine:            [ ] negative [ ] diabetes [ ] thyroid problem  Heme/Lymph:      [ ] negative [ ] anemia [ ] bleeding problem  Allergic/Immune: [ ] negative [ ] itchy eyes [ ] nasal discharge [ ] hives [ ] angioedema    [ ] All other systems negative  [ ] Unable to assess ROS because ________.    MEDICATIONS:  MEDICATIONS  (STANDING):  amLODIPine   Tablet 5 milliGRAM(s) Oral daily  aspirin  chewable 81 milliGRAM(s) Oral daily  atorvastatin 80 milliGRAM(s) Oral at bedtime  chlorhexidine 0.12% Liquid 15 milliLiter(s) Oral Mucosa every 12 hours  chlorhexidine 4% Liquid 1 Application(s) Topical daily  dexMEDEtomidine Infusion 1 MICROgram(s)/kG/Hr (26.525 mL/Hr) IV Continuous <Continuous>  hydrocortisone sodium succinate Injectable 100 milliGRAM(s) IV Push every 8 hours  meropenem  IVPB 1000 milliGRAM(s) IV Intermittent every 12 hours  metoclopramide Injectable 5 milliGRAM(s) IV Push every 12 hours  metoprolol tartrate 50 milliGRAM(s) Oral two times a day  pantoprazole  Injectable 40 milliGRAM(s) IV Push daily  sertraline Concentrate 50 milliGRAM(s) Oral daily  sodium chloride 0.9%. 1000 milliLiter(s) (10 mL/Hr) IV Continuous <Continuous>  ticagrelor 90 milliGRAM(s) Oral every 12 hours    MEDICATIONS  (PRN):  acetaminophen    Suspension .. 650 milliGRAM(s) Oral every 6 hours PRN Temp greater or equal to 38C (100.4F)      ALLERGIES: Allergies    No Known Allergies    Intolerances        OBJECTIVE:  ICU Vital Signs Last 24 Hrs  T(C): 36.4 (2020 04:00), Max: 38.5 (2020 12:00)  T(F): 97.6 (2020 04:00), Max: 101.3 (2020 12:00)  HR: 103 (2020 08:00) (63 - 103)  BP: 184/109 (2020 02:00) (184/109 - 184/109)  BP(mean): 128 (2020 02:00) (128 - 128)  ABP: 165/96 (2020 08:00) (157/87 - 194/97)  ABP(mean): 120 (2020 08:00) (104 - 130)  RR: 24 (2020 08:00) (13 - 24)  SpO2: 98% (2020 08:00) (94% - 100%)    Mode: AC/ CMV (Assist Control/ Continuous Mandatory Ventilation)  RR (machine): 20  TV (machine): 500  FiO2: 30  PEEP: 5  MAP: 12  PIP: 27    Adult Advanced Hemodynamics Last 24 Hrs  CVP(mm Hg): 13 (2020 08:00) (6 - 13)  CVP(cm H2O): --  CO: --  CI: --  PA: --  PA(mean): --  PCWP: --  SVR: --  SVRI: --  PVR: --  PVRI: --  CAPILLARY BLOOD GLUCOSE        CAPILLARY BLOOD GLUCOSE        I&O's Summary    2020 07:01  -  2020 07:00  --------------------------------------------------------  IN: 2584.7 mL / OUT: 3850 mL / NET: -1265.3 mL    2020 07:01  -  2020 08:36  --------------------------------------------------------  IN: 28.6 mL / OUT: 925 mL / NET: -896.4 mL      Daily     Daily Weight in k.5 (2020 06:00)    PHYSICAL EXAMINATION:  General: Comfortable, no acute distress, cooperative with exam.  HEENT: PERRLA, EOMI, moist mucous membranes.  Respiratory: CTAB, normal respiratory effort, no coughing, wheezes, crackles, or rales.  CV: RRR, S1S2, no murmurs, rubs or gallops. No JVD. Distal pulses intact.  Abdominal: Soft, nontender, nondistended, no rebound or guarding, normal bowel sounds.  Neurology: AOx3, no focal neuro defects, DOW x 4.  Extremities: No pitting edema, + Peripheral pulses.  Incisions:   Tubes:    LABS:  ABG - ( 2020 04:40 )  pH, Arterial: 7.46  pH, Blood: x     /  pCO2: 37    /  pO2: 104   / HCO3: 27    / Base Excess: 2.2   /  SaO2: 98.1                                    10.5   6.70  )-----------( 118      ( 2020 04:40 )             34.5     01-03    149<H>  |  112<H>  |  68<H>  ----------------------------<  119<H>  3.4<L>   |  23  |  2.30<H>    Ca    8.0<L>      2020 04:40  Phos  3.8     01-03  Mg     2.5         TPro  5.8<L>  /  Alb  3.0<L>  /  TBili  0.7  /  DBili  x   /  AST  28  /  ALT  38  /  AlkPhos  90      LIVER FUNCTIONS - ( 2020 04:40 )  Alb: 3.0 g/dL / Pro: 5.8 g/dL / ALK PHOS: 90 u/L / ALT: 38 u/L / AST: 28 u/L / GGT: x           PTT - ( 2020 04:40 )  PTT:25.0 SEC  Creatine Kinase, Serum: 123 u/L ( @ 10:14)  CKMB: 1.36 ng/mL ( @ 10:14)  CKMB Relative Index: Test not performed ( @ 10:14)    CARDIAC MARKERS ( 2020 10:14 )  x     / x     / 123 u/L / 1.36 ng/mL / x      CARDIAC MARKERS ( 2020 13:30 )  x     / x     / 279 u/L / 3.28 ng/mL / x      CARDIAC MARKERS ( 2020 09:15 )  x     / x     / 289 u/L / 2.48 ng/mL / x          Urinalysis Basic - ( 2020 04:20 )    Color: YELLOW / Appearance: Lt TURBID / S.020 / pH: 6.0  Gluc: NEGATIVE / Ketone: NEGATIVE  / Bili: NEGATIVE / Urobili: NORMAL   Blood: LARGE / Protein: 30 / Nitrite: NEGATIVE   Leuk Esterase: NEGATIVE / RBC: >50 / WBC 3-5   Sq Epi: OCC / Non Sq Epi: x / Bacteria: NEGATIVE        TELEMETRY:     EKG:     IMAGING: Cheyanne Soriano PGY1   Page: [phone]    PATIENT: THONY ANAYA, MRN: [id number]    CHIEF COMPLAINT: Patient is a 64y old  Male who presents with a chief complaint of Acute resp failure (2020 16:20)      INTERVAL HISTORY/OVERNIGHT EVENTS: No overnight events. pt spiked fever to 38.5.     REVIEW OF SYSTEMS:  limited, pt intubated and sedated     MEDICATIONS  (PRN):  acetaminophen    Suspension .. 650 milliGRAM(s) Oral every 6 hours PRN Temp greater or equal to 38C (100.4F)      ALLERGIES: Allergies    No Known Allergies    Intolerances        OBJECTIVE:  ICU Vital Signs Last 24 Hrs  T(C): 36.4 (2020 04:00), Max: 38.5 (2020 12:00)  T(F): 97.6 (2020 04:00), Max: 101.3 (2020 12:00)  HR: 103 (2020 08:00) (63 - 103)  BP: 184/109 (2020 02:00) (184/109 - 184/109)  BP(mean): 128 (2020 02:00) (128 - 128)  ABP: 165/96 (2020 08:00) (157/87 - 194/97)  ABP(mean): 120 (2020 08:00) (104 - 130)  RR: 24 (2020 08:00) (13 - 24)  SpO2: 98% (2020 08:00) (94% - 100%)    Mode: AC/ CMV (Assist Control/ Continuous Mandatory Ventilation)  RR (machine): 20  TV (machine): 500  FiO2: 30  PEEP: 5  MAP: 12  PIP: 27          CAPILLARY BLOOD GLUCOSE        I&O's Summary    2020 07:01  -  2020 07:00  --------------------------------------------------------  IN: 2584.7 mL / OUT: 3850 mL / NET: -1265.3 mL    2020 07:01  -  2020 08:36  --------------------------------------------------------  IN: 28.6 mL / OUT: 925 mL / NET: -896.4 mL      Daily     Daily Weight in k.5 (2020 06:00)    PHYSICAL EXAMINATION:  General: Comfortable, no acute distress, cooperative with exam.  HEENT: PERRLA, EOMI, moist mucous membranes.  Respiratory: intubated normal respiratory effort, no coughing, wheezes, crackles, or rales.  CV: afib rhythm, no murmurs, rubs or gallops. No JVD. Distal pulses intact.  Abdominal: Soft, nontender, nondistended, no rebound or guarding, normal bowel sounds.  Neurology: sedated  Extremities: No pitting edema, + Peripheral pulses.  Incisions:   Tubes:    LABS:  ABG - ( 2020 04:40 )  pH, Arterial: 7.46  pH, Blood: x     /  pCO2: 37    /  pO2: 104   / HCO3: 27    / Base Excess: 2.2   /  SaO2: 98.1                                    10.5   6.70  )-----------( 118      ( 2020 04:40 )             34.5     01-03    149<H>  |  112<H>  |  68<H>  ----------------------------<  119<H>  3.4<L>   |  23  |  2.30<H>    Ca    8.0<L>      2020 04:40  Phos  3.8     -  Mg     2.5         TPro  5.8<L>  /  Alb  3.0<L>  /  TBili  0.7  /  DBili  x   /  AST  28  /  ALT  38  /  AlkPhos  90  -03    LIVER FUNCTIONS - ( 2020 04:40 )  Alb: 3.0 g/dL / Pro: 5.8 g/dL / ALK PHOS: 90 u/L / ALT: 38 u/L / AST: 28 u/L / GGT: x           PTT - ( 2020 04:40 )  PTT:25.0 SEC  Creatine Kinase, Serum: 123 u/L ( @ 10:14)  CKMB: 1.36 ng/mL ( @ 10:14)  CKMB Relative Index: Test not performed ( @ 10:14)    CARDIAC MARKERS ( 2020 10:14 )  x     / x     / 123 u/L / 1.36 ng/mL / x      CARDIAC MARKERS ( 2020 13:30 )  x     / x     / 279 u/L / 3.28 ng/mL / x      CARDIAC MARKERS ( 2020 09:15 )  x     / x     / 289 u/L / 2.48 ng/mL / x          Urinalysis Basic - ( 2020 04:20 )    Color: YELLOW / Appearance: Lt TURBID / S.020 / pH: 6.0  Gluc: NEGATIVE / Ketone: NEGATIVE  / Bili: NEGATIVE / Urobili: NORMAL   Blood: LARGE / Protein: 30 / Nitrite: NEGATIVE   Leuk Esterase: NEGATIVE / RBC: >50 / WBC 3-5   Sq Epi: OCC / Non Sq Epi: x / Bacteria: NEGATIVE        TELEMETRY:     EKG:     IMAGING:

## 2020-01-03 NOTE — PROGRESS NOTE ADULT - SUBJECTIVE AND OBJECTIVE BOX
[facility]   MD YENNY PIERCE DO ANAM SIDDIQUI ANGELA WONG, PA    TEL:  OFFICE: [phone]  DR LEE CELL: [phone]  DR. STEVENSON CELL: [phone]  DR. GOODWIN CELL: [phone]  YOANNA CUNNINGHAM CELL: [phone]        Patient is a 64y old  Male who presents with a chief complaint of Acute resp failure (03 Jan 2020 09:14)      Patient seen and examined at bedside.     VITALS:  T(F): 99.8 (01-03-20 @ 12:00), Max: 100.7 (01-02-20 @ 14:00)  HR: 82 (01-03-20 @ 12:10)  BP: 184/109 (01-03-20 @ 02:00)  RR: 16 (01-03-20 @ 12:10)  SpO2: 95% (01-03-20 @ 12:10)  Wt(kg): --    01-02 @ 07:01  -  01-03 @ 07:00  --------------------------------------------------------  IN: 2584.7 mL / OUT: 3850 mL / NET: -1265.3 mL    01-03 @ 07:01  -  01-03 @ 12:47  --------------------------------------------------------  IN: 85.9 mL / OUT: 2490 mL / NET: -2404.1 mL          PHYSICAL EXAM:  Constitutional: intubated  Neck: No JVD  Respiratory: + rhonchi R>L  Cardiovascular: S1, S2, RRR  Gastrointestinal: BS+, soft, NT/ND  Extremities: No peripheral edema    Hospital Medications:   MEDICATIONS  (STANDING):  amLODIPine   Tablet 5 milliGRAM(s) Oral daily  aspirin  chewable 81 milliGRAM(s) Oral daily  atorvastatin 80 milliGRAM(s) Oral at bedtime  chlorhexidine 0.12% Liquid 15 milliLiter(s) Oral Mucosa every 12 hours  chlorhexidine 4% Liquid 1 Application(s) Topical daily  meropenem  IVPB 1000 milliGRAM(s) IV Intermittent every 12 hours  metoclopramide Injectable 5 milliGRAM(s) IV Push every 12 hours  metoprolol tartrate 50 milliGRAM(s) Oral two times a day  pantoprazole  Injectable 40 milliGRAM(s) IV Push daily  predniSONE Concentrate 60 milliGRAM(s) Oral daily  propofol Infusion 10 MICROgram(s)/kG/Min (6.366 mL/Hr) IV Continuous <Continuous>  sertraline Concentrate 50 milliGRAM(s) Oral daily  sodium chloride 0.9%. 1000 milliLiter(s) (10 mL/Hr) IV Continuous <Continuous>  ticagrelor 90 milliGRAM(s) Oral every 12 hours      LABS:  01-03    149<H>  |  112<H>  |  68<H>  ----------------------------<  119<H>  3.4<L>   |  23  |  2.30<H>    Ca    8.0<L>      03 Jan 2020 04:40  Phos  3.8     01-03  Mg     2.5     01-03    TPro  5.8<L>  /  Alb  3.0<L>  /  TBili  0.7  /  DBili      /  AST  28  /  ALT  38  /  AlkPhos  90  01-03    Creatinine Trend: 2.30 <--, 2.67 <--, 2.45 <--, 2.19 <--, 2.99 <--, 3.27 <--, 3.05 <--, 2.84 <--, 2.67 <--, 2.59 <--, 2.52 <--    Phosphorus Level, Serum: 3.8 mg/dL (01-03 @ 04:40)  Albumin, Serum: 3.0 g/dL (01-03 @ 04:40)                              10.5   6.70  )-----------( 118      ( 03 Jan 2020 04:40 )             34.5     Urine Studies:  Urinalysis - [01-02-20 @ 04:20]      Color YELLOW / Appearance Lt TURBID / SG 1.020 / pH 6.0      Gluc NEGATIVE / Ketone NEGATIVE  / Bili NEGATIVE / Urobili NORMAL       Blood LARGE / Protein 30 / Leuk Est NEGATIVE / Nitrite NEGATIVE      RBC >50 / WBC 3-5 / Hyaline NEGATIVE / Gran  / Sq Epi OCC / Non Sq Epi  / Bacteria NEGATIVE      HbA1c 6.1      [12-28-19 @ 03:30]    HCV 0.14, Nonreactive Hepatitis C AB  S/CO Ratio                        Interpretation  < 1.00                                   Non-Reactive  1.00 - 4.99                         Weakly-Reactive  >= 5.00                                Reactive  Non-Reactive: Aperson with a non-reactive HCV antibody  result is considered uninfected.  No further action is  needed unless recent infection is suspected.  In these  cases, consider repeat testing later to detect  seroconversion..  Weakly-Reactive: HCV antibody test is abnormal, HCV RNA  Qualitative test will follow.  Reactive: HCV antibody test is abnormal, HCV RNA  Qualitative test will follow.  Note: HCV antibody testing is performed on the [employer]   system.      [12-28-19 @ 03:30]      RADIOLOGY & ADDITIONAL STUDIES:

## 2020-01-03 NOTE — PROGRESS NOTE ADULT - SUBJECTIVE AND OBJECTIVE BOX
CHIEF COMPLAINT: shortness of breath 2/2 acute respiratory failure    Interval Events: Patient's sedation changed to precedex. Patient talking over ET tube today. Patient had CXR to confirm ET tube placement. Patient still hypertensive, given amlodipine, lasix 40 IV X2.     REVIEW OF SYSTEMS:  Constitutional: [ ] negative [ ] fevers [ ] chills [ ] weight loss [ ] weight gain  HEENT: [ ] negative [ ] dry eyes [ ] eye irritation [ ] postnasal drip [ ] nasal congestion  CV: [ ] negative  [ ] chest pain [ ] orthopnea [ ] palpitations [ ] murmur  Resp: [ ] negative [ ] cough [ ] shortness of breath [ ] dyspnea [ ] wheezing [ ] sputum [ ] hemoptysis  GI: [ ] negative [ ] nausea [ ] vomiting [ ] diarrhea [ ] constipation [ ] abd pain [ ] dysphagia   : [ ] negative [ ] dysuria [ ] nocturia [ ] hematuria [ ] increased urinary frequency  Musculoskeletal: [ ] negative [ ] back pain [ ] myalgias [ ] arthralgias [ ] fracture  Skin: [ ] negative [ ] rash [ ] itch  Neurological: [ ] negative [ ] headache [ ] dizziness [ ] syncope [ ] weakness [ ] numbness  Psychiatric: [ ] negative [ ] anxiety [ ] depression  Endocrine: [ ] negative [ ] diabetes [ ] thyroid problem  Hematologic/Lymphatic: [ ] negative [ ] anemia [ ] bleeding problem  Allergic/Immunologic: [ ] negative [ ] itchy eyes [ ] nasal discharge [ ] hives [ ] angioedema  [ ] All other systems negative  [X] Unable to assess ROS because of mental status and sedation    OBJECTIVE:  ICU Vital Signs Last 24 Hrs  T(C): 36.4 (2020 04:00), Max: 38.5 (2020 12:00)  T(F): 97.6 (2020 04:00), Max: 101.3 (2020 12:00)  HR: 103 (2020 08:00) (65 - 103)  BP: 184/109 (2020 02:00) (184/109 - 184/109)  BP(mean): 128 (2020 02:00) (128 - 128)  ABP: 165/96 (2020 08:00) (157/87 - 194/97)  ABP(mean): 120 (2020 08:00) (104 - 130)  RR: 24 (2020 08:00) (13 - 24)  SpO2: 98% (2020 08:00) (94% - 100%)    Mode: AC/ CMV (Assist Control/ Continuous Mandatory Ventilation), RR (machine): 20, TV (machine): 500, FiO2: 30, PEEP: 5, MAP: 12, PIP: 27     @ 07: @ 07:00  --------------------------------------------------------  IN: 2584.7 mL / OUT: 3850 mL / NET: -1265.3 mL     @ 07: @ 09:14  --------------------------------------------------------  IN: 28.6 mL / OUT: 1225 mL / NET: -1196.4 mL      CAPILLARY BLOOD GLUCOSE    PHYSICAL EXAM:    General: No acute distress, intubated and sedated   HEENT: NCAT. No scleral icterus or injection.  Neck: Supple.  Full ROM.  No JVD.    Heart: RRR.  Normal S1 and S2.  No murmurs, rubs, or gallops.   Lungs: CTAB. No wheezes, crackles, or rhonchi.    Abdomen: BS+, soft, NT/ND.    Skin: Warm and dry.  No rashes.  Extremities: No edema, clubbing, or cyanosis.   Musculoskeletal: No deformities.  No spinal or paraspinal tenderness.  Neuro: A&Ox3.  CN II-XII intact.  5/5 strength in UE and LE b/l.  Tactile sensation intact in UE and LE b/l.  Cerebellar function intact     HOSPITAL MEDICATIONS:  MEDICATIONS  (STANDING):  amLODIPine   Tablet 5 milliGRAM(s) Oral daily  aspirin  chewable 81 milliGRAM(s) Oral daily  atorvastatin 80 milliGRAM(s) Oral at bedtime  chlorhexidine 0.12% Liquid 15 milliLiter(s) Oral Mucosa every 12 hours  chlorhexidine 4% Liquid 1 Application(s) Topical daily  dexMEDEtomidine Infusion 1 MICROgram(s)/kG/Hr (26.525 mL/Hr) IV Continuous <Continuous>  hydrocortisone sodium succinate Injectable 100 milliGRAM(s) IV Push every 8 hours  meropenem  IVPB 1000 milliGRAM(s) IV Intermittent every 12 hours  metoclopramide Injectable 5 milliGRAM(s) IV Push every 12 hours  metoprolol tartrate 50 milliGRAM(s) Oral two times a day  pantoprazole  Injectable 40 milliGRAM(s) IV Push daily  sertraline Concentrate 50 milliGRAM(s) Oral daily  sodium chloride 0.9%. 1000 milliLiter(s) (10 mL/Hr) IV Continuous <Continuous>  ticagrelor 90 milliGRAM(s) Oral every 12 hours    MEDICATIONS  (PRN):  acetaminophen    Suspension .. 650 milliGRAM(s) Oral every 6 hours PRN Temp greater or equal to 38C (100.4F)      LABS:                        10.5   6.70  )-----------( 118      ( 2020 04:40 )             34.5     Hgb Trend: 10.5<--, 9.9<--, 9.8<--, 10.5<--, 10.2<--  01-03    149<H>  |  112<H>  |  68<H>  ----------------------------<  119<H>  3.4<L>   |  23  |  2.30<H>    Ca    8.0<L>      2020 04:40  Phos  3.8     01-03  Mg     2.5     -03    TPro  5.8<L>  /  Alb  3.0<L>  /  TBili  0.7  /  DBili  x   /  AST  28  /  ALT  38  /  AlkPhos  90  01-03    Creatinine Trend: 2.30<--, 2.67<--, 2.45<--, 2.19<--, 2.99<--, 3.27<--  PTT - ( 2020 04:40 )  PTT:25.0 SEC  Urinalysis Basic - ( 2020 04:20 )    Color: YELLOW / Appearance: Lt TURBID / S.020 / pH: 6.0  Gluc: NEGATIVE / Ketone: NEGATIVE  / Bili: NEGATIVE / Urobili: NORMAL   Blood: LARGE / Protein: 30 / Nitrite: NEGATIVE   Leuk Esterase: NEGATIVE / RBC: >50 / WBC 3-5   Sq Epi: OCC / Non Sq Epi: x / Bacteria: NEGATIVE      Arterial Blood Gas:   @ 04:40  7.46/37/104/27/98.1/2.2  ABG lactate: 1.6  Arterial Blood Gas:   @ 04:20  7.44/37/127/26/98.4/1.2  ABG lactate: 1.7  Arterial Blood Gas:   @ 13:30  7.42/43/165/27/98.8/2.8  ABG lactate: --  Arterial Blood Gas:   @ 09:40  7.36/47/180/25/98.5/1.1  ABG lactate: 1.9        MICROBIOLOGY:     Culture - Blood (collected 2020 02:39)  Source: BLOOD VENOUS  Preliminary Report (2020 02:39):    NO ORGANISMS ISOLATED    NO ORGANISMS ISOLATED AT 24 HOURS        RADIOLOGY:  [ ] Reviewed and interpreted by me

## 2020-01-03 NOTE — PROGRESS NOTE ADULT - PROBLEM SELECTOR PLAN 1
pt went into VT during CPAP trial, likely 2/2 hypoxia in the setting of severe ischemic disease  -s/p amio and lido bolus   -c/w lido drip at 2 will titrate to 1 by tonight   -rpt TTE unchanged from prior, Trop elevated CK/CKMB wnl. likely will need non urgent ischemic eval when pt is more HDS   -switched zosyn to meropenum for salt reduction  -resumed hep gtt   -resumed bb Resolved   pt went into VT during CPAP trial, likely 2/2 hypoxia in the setting of severe ischemic disease  -s/p amio and lido bolus and lido drip   -rpt TTE unchanged from prior, Trop elevated, CK/CKMB wnl. likely will need non urgent ischemic eval when pt is more HDS   -switched zosyn to meropenum for salt reduction  -resumed hep gtt and d'mery because of active bleeding

## 2020-01-03 NOTE — PROGRESS NOTE ADULT - PROBLEM SELECTOR PLAN 2
Remains intubated and sedated, on fentanyl and versed. pt couldn't tolerate cpap today.   -currently on 40% FiO2 and PEEP 5  -f/u abg  -appreciate pulm for assistance with difficult extubation  -pt febrile o/n, s/p pan cx'ed, will f/u cx result, c/w meropenum for now, appreciate ID pt couldn't tolerate cpap today. Remains intubated and sedated, on propofol   -currently on 40% FiO2 and PEEP 7  -f/u abg  -appreciate pulm for assistance with difficult extubation  -pt febrile o/n, s/p pan cx'ed, will f/u cx result, c/w meropenum for now, appreciate ID

## 2020-01-03 NOTE — PROGRESS NOTE ADULT - ASSESSMENT
64M with CAD, stents and CABG (last [facility] was 12/2017 with patent LIMA but other grafts occluded, medical management), HTN, HLD, CKD, CHER, ?HF, and pemphigus vulgaris presents with acute SOB requiring intubation for airway protection with new atrial fibrillation in setting of + influenza.  Pt CPAP'ed this morning was initially pulling in good tidal volume, but quickly desats and went into VT, s/p Amio 150 x1 and lidocaine bolus, started on Lidocaine drip will taper off today, resumed hep gtt, switched zosyn to meropenum for salt reduction 64M with CAD, stents and CABG (last [facility] was 12/2017 with patent LIMA but other grafts occluded, medical management), HTN, HLD, CKD, CHER, ?HF, and pemphigus vulgaris presents with acute SOB requiring intubation for airway protection with new atrial fibrillation in setting of + influenza.  Pt CPAP'ed this morning was initially pulling in good tidal volume, but quickly desats and went into VT, s/p Amio 150 x1 and lidocaine, resumed hep gtt, switched zosyn to meropenum for salt reduction. Pt couldn't tolerate heparin gtt, profuse bleeding from oropharynx and rectum. hep is d/mery. Didnt tolerate CPAP well today and htn, will sedate with propofol today and cpap again tomorrow

## 2020-01-03 NOTE — PROGRESS NOTE ADULT - SUBJECTIVE AND OBJECTIVE BOX
Follow Up:  viral pneumonia    Interval History/ROS: sedated on vent    Allergies  No Known Allergies    ANTIMICROBIALS:  meropenem  IVPB 1000 every 12 hours      OTHER MEDS:  MEDICATIONS  (STANDING):  acetaminophen    Suspension .. 650 every 6 hours PRN  amLODIPine   Tablet 5 daily  aspirin  chewable 81 daily  atorvastatin 80 at bedtime  heparin  Injectable 5000 every 8 hours  metoclopramide Injectable 5 every 12 hours  metoprolol tartrate 50 two times a day  pantoprazole  Injectable 40 daily  predniSONE Concentrate 60 daily  propofol Infusion 10 <Continuous>  sertraline Concentrate 50 daily  ticagrelor 90 every 12 hours      Vital Signs Last 24 Hrs  T(C): 37.1 (2020 16:00), Max: 37.7 (2020 12:00)  T(F): 98.8 (2020 16:00), Max: 99.8 (2020 12:00)  HR: 81 (2020 17:00) (65 - 105)  BP: 184/109 (2020 02:00) (184/109 - 184/109)  BP(mean): 128 (2020 02:00) (128 - 128)  RR: 17 (2020 17:00) (13 - 27)  SpO2: 97% (2020 17:00) (90% - 98%)    PHYSICAL EXAM:  General:  NAD, ill apperaring  Neurology: sedated on vent; FiO2 = 0.4  Respiratory: Clear to auscultation bilaterally  CV: RRR, S1S2, no murmurs, rubs or gallops  Abdominal: Soft, Non-tender, non-distended  Extremities: No edema  Line Sites: Clear  Skin: No rash                        10.5   6.70  )-----------( 118      ( 2020 04:40 )             34.5   WBC Count: 6.70 ( @ 04:40)  WBC Count: 7.55 ( @ 14:20)  WBC Count: 8.27 ( @ 04:20)  WBC Count: 8.38 ( @ 04:55)  WBC Count: 8.08 ( @ 05:20)  WBC Count: 7.47 ( @ 06:10)        151<H>  |  112<H>  |  68<H>  ----------------------------<  96  3.2<L>   |  25  |  2.50<H>  Creatinine: 2.50 ( @ 14:15)    Creatinine: 2.30 ( @ 04:40)    Creatinine: 2.67 ( @ 04:20)    Creatinine: 2.45 ( @ 09:15)    Creatinine: 2.19 ( @ 04:55)    Creatinine: 2.99 ( @ 05:20)    Creatinine: 3.27 ( @ 06:10)     Ca    8.5      2020 14:15  Phos  3.8       Mg     2.4         TPro  5.8<L>  /  Alb  3.0<L>  /  TBili  0.7  /  DBili  x   /  AST  28  /  ALT  38  /  AlkPhos  90        Urinalysis Basic - ( 2020 04:20 )    Color: YELLOW / Appearance: Lt TURBID / S.020 / pH: 6.0  Gluc: NEGATIVE / Ketone: NEGATIVE  / Bili: NEGATIVE / Urobili: NORMAL   Blood: LARGE / Protein: 30 / Nitrite: NEGATIVE   Leuk Esterase: NEGATIVE / RBC: >50 / WBC 3-5   Sq Epi: OCC / Non Sq Epi: x / Bacteria: NEGATIVE      MICROBIOLOGY:  BLOOD VENOUS  20 --  --  --    SPUTUM  19 --  --  --    BLOOD  19 --  --  --    BLOOD PERIPHERAL  19 --  --  --    Peripheral Site 1  19 --  --  --    Peripheral Site 2  19 --  --  --          RADIOLOGY:    Albert Armendariz MD; Division of Infectious Disease; Pager: [phone]; nights and weekends: [phone]

## 2020-01-03 NOTE — PROGRESS NOTE ADULT - PROBLEM SELECTOR PLAN 4
Remains in aflutter and afib but with good rate control.   - Heparin gtt held for anticoagulation because of bleeding, bleeding had stopped  -resumed hep gtt, for added benefit for the ischemic heart dx

## 2020-01-03 NOTE — PROGRESS NOTE ADULT - ASSESSMENT
64M with CAD, stents and CABG (last [facility] was 12/2017 with patent LIMA but other grafts occluded, medical management), HTN, HLD, CKD, CHER, HF, and pemphigus vulgaris presents with acute SOB requiring intubation for airway protection with new atrial fibrillation in setting of + influenza, hMPV, and RSV. Patient failed CPAP, complicated by hypoxia and VT, s/p amiodarone and lidocaine ggt. Pulmonology consulted for assistance with extubation.    Recommendations  - patient not optimized for extubation yet, may require more time.   - continue precedex for sedation (versed and fentanyl discontinued)  - CXR to confirm ET tube placement however patient satting well with current venting settings  - CPAP trial with higher EPAP when ready for trial  - tighter blood pressure control to avoid flash pulmonary edema once extubated  - goal net negative fluid balance  - continue abx to cover for possible aspiration  - keep BIPAP at bedside when planning to extubate however caution due to mucosal bleeding     **Attending attestation to follow**    Sia Walters, PGY2  Internal Medicine

## 2020-01-03 NOTE — PROGRESS NOTE ADULT - ASSESSMENT
64M with CAD, stents and CABG (last [facility] was 12/2017 with patent LIMA but other grafts occluded, medical management), HTN, HLD, CKD, CHER, ?HF, and pemphygus vulgaris presents with acute SOB. admitted to ccu for acute resp failure s/p intubation    NARCISO on CKD (chronic kidney disease) stage 3-4  baseline likely ~ 2-2.2  had NARCISO peaked at 3.27 and had improved   recurrent NARCISO likely ATN 1/1 improving today  He is non-oliguric. parks in place  Advise to continue diuresis as needed at present. Off lasix   BP borderline. Avoid hypotension   No role/ need for RRT at present       Essential hypertension.    BP is uncontrolled   care per CCU    Acute Resp Failure.  Improving volume status s/p diuretics   Remains intubated  I agree with diuretics as needed at present  Continue to monitor his weight and urine output   f/u cardio    CKD-MBD  check pth, &Vitamin D 25  Phos acceptable   Calcium low  monitor phos and wolf daily    Hypernatremia  increase free water via NGT   monitor

## 2020-01-04 LAB
ALBUMIN SERPL ELPH-MCNC: 2.9 G/DL — LOW (ref 3.3–5)
ALP SERPL-CCNC: 101 U/L — SIGNIFICANT CHANGE UP (ref 40–120)
ALT FLD-CCNC: 50 U/L — HIGH (ref 4–41)
ANION GAP SERPL CALC-SCNC: 10 MMO/L — SIGNIFICANT CHANGE UP (ref 7–14)
ANION GAP SERPL CALC-SCNC: 12 MMO/L — SIGNIFICANT CHANGE UP (ref 7–14)
APTT BLD: 61.6 SEC — HIGH (ref 27.5–36.3)
AST SERPL-CCNC: 44 U/L — HIGH (ref 4–40)
BACTERIA BLD CULT: SIGNIFICANT CHANGE UP
BACTERIA BLD CULT: SIGNIFICANT CHANGE UP
BASE EXCESS BLDA CALC-SCNC: 1.1 MMOL/L — SIGNIFICANT CHANGE UP
BASE EXCESS BLDA CALC-SCNC: 1.8 MMOL/L — SIGNIFICANT CHANGE UP
BILIRUB SERPL-MCNC: 0.7 MG/DL — SIGNIFICANT CHANGE UP (ref 0.2–1.2)
BLOOD GAS ARTERIAL - FIO2: 40 — SIGNIFICANT CHANGE UP
BLOOD GAS ARTERIAL - FIO2: 40 — SIGNIFICANT CHANGE UP
BUN SERPL-MCNC: 51 MG/DL — HIGH (ref 7–23)
BUN SERPL-MCNC: 63 MG/DL — HIGH (ref 7–23)
CALCIUM SERPL-MCNC: 8.1 MG/DL — LOW (ref 8.4–10.5)
CALCIUM SERPL-MCNC: 8.5 MG/DL — SIGNIFICANT CHANGE UP (ref 8.4–10.5)
CHLORIDE SERPL-SCNC: 113 MMOL/L — HIGH (ref 98–107)
CHLORIDE SERPL-SCNC: 116 MMOL/L — HIGH (ref 98–107)
CO2 SERPL-SCNC: 22 MMOL/L — SIGNIFICANT CHANGE UP (ref 22–31)
CO2 SERPL-SCNC: 25 MMOL/L — SIGNIFICANT CHANGE UP (ref 22–31)
CREAT SERPL-MCNC: 1.75 MG/DL — HIGH (ref 0.5–1.3)
CREAT SERPL-MCNC: 2.17 MG/DL — HIGH (ref 0.5–1.3)
GLUCOSE BLDA-MCNC: 104 MG/DL — HIGH (ref 70–99)
GLUCOSE SERPL-MCNC: 101 MG/DL — HIGH (ref 70–99)
GLUCOSE SERPL-MCNC: 120 MG/DL — HIGH (ref 70–99)
HCO3 BLDA-SCNC: 26 MMOL/L — SIGNIFICANT CHANGE UP (ref 22–26)
HCO3 BLDA-SCNC: 26 MMOL/L — SIGNIFICANT CHANGE UP (ref 22–26)
HCT VFR BLD CALC: 34.9 % — LOW (ref 39–50)
HCT VFR BLDA CALC: 35 % — LOW (ref 39–51)
HGB BLD-MCNC: 10.5 G/DL — LOW (ref 13–17)
HGB BLDA-MCNC: 11.4 G/DL — LOW (ref 13–17)
MAGNESIUM SERPL-MCNC: 2.3 MG/DL — SIGNIFICANT CHANGE UP (ref 1.6–2.6)
MAGNESIUM SERPL-MCNC: 2.4 MG/DL — SIGNIFICANT CHANGE UP (ref 1.6–2.6)
MCHC RBC-ENTMCNC: 26.6 PG — LOW (ref 27–34)
MCHC RBC-ENTMCNC: 30.1 % — LOW (ref 32–36)
MCV RBC AUTO: 88.6 FL — SIGNIFICANT CHANGE UP (ref 80–100)
NRBC # FLD: 0 K/UL — SIGNIFICANT CHANGE UP (ref 0–0)
PCO2 BLDA: 32 MMHG — LOW (ref 35–48)
PCO2 BLDA: 36 MMHG — SIGNIFICANT CHANGE UP (ref 35–48)
PH BLDA: 7.46 PH — HIGH (ref 7.35–7.45)
PH BLDA: 7.49 PH — HIGH (ref 7.35–7.45)
PHOSPHATE SERPL-MCNC: 2.6 MG/DL — SIGNIFICANT CHANGE UP (ref 2.5–4.5)
PHOSPHATE SERPL-MCNC: 3.2 MG/DL — SIGNIFICANT CHANGE UP (ref 2.5–4.5)
PLATELET # BLD AUTO: 143 K/UL — LOW (ref 150–400)
PMV BLD: 12.8 FL — SIGNIFICANT CHANGE UP (ref 7–13)
PO2 BLDA: 115 MMHG — HIGH (ref 83–108)
PO2 BLDA: 133 MMHG — HIGH (ref 83–108)
POTASSIUM BLDA-SCNC: 4.2 MMOL/L — SIGNIFICANT CHANGE UP (ref 3.4–4.5)
POTASSIUM SERPL-MCNC: 3.3 MMOL/L — LOW (ref 3.5–5.3)
POTASSIUM SERPL-MCNC: 4.1 MMOL/L — SIGNIFICANT CHANGE UP (ref 3.5–5.3)
POTASSIUM SERPL-SCNC: 3.3 MMOL/L — LOW (ref 3.5–5.3)
POTASSIUM SERPL-SCNC: 4.1 MMOL/L — SIGNIFICANT CHANGE UP (ref 3.5–5.3)
PROT SERPL-MCNC: 5.5 G/DL — LOW (ref 6–8.3)
RBC # BLD: 3.94 M/UL — LOW (ref 4.2–5.8)
RBC # FLD: 17.7 % — HIGH (ref 10.3–14.5)
SAO2 % BLDA: 97.7 % — SIGNIFICANT CHANGE UP (ref 95–99)
SAO2 % BLDA: 98.4 % — SIGNIFICANT CHANGE UP (ref 95–99)
SODIUM BLDA-SCNC: 147 MMOL/L — HIGH (ref 136–146)
SODIUM SERPL-SCNC: 148 MMOL/L — HIGH (ref 135–145)
SODIUM SERPL-SCNC: 150 MMOL/L — HIGH (ref 135–145)
WBC # BLD: 10.79 K/UL — HIGH (ref 3.8–10.5)
WBC # FLD AUTO: 10.79 K/UL — HIGH (ref 3.8–10.5)

## 2020-01-04 PROCEDURE — 99232 SBSQ HOSP IP/OBS MODERATE 35: CPT

## 2020-01-04 PROCEDURE — 99233 SBSQ HOSP IP/OBS HIGH 50: CPT

## 2020-01-04 PROCEDURE — 71045 X-RAY EXAM CHEST 1 VIEW: CPT | Mod: 26

## 2020-01-04 RX ORDER — HYDRALAZINE HCL 50 MG
10 TABLET ORAL THREE TIMES A DAY
Refills: 0 | Status: DISCONTINUED | OUTPATIENT
Start: 2020-01-04 | End: 2020-01-05

## 2020-01-04 RX ORDER — DEXMEDETOMIDINE HYDROCHLORIDE IN 0.9% SODIUM CHLORIDE 4 UG/ML
0.2 INJECTION INTRAVENOUS
Qty: 200 | Refills: 0 | Status: DISCONTINUED | OUTPATIENT
Start: 2020-01-04 | End: 2020-01-04

## 2020-01-04 RX ORDER — HYDRALAZINE HCL 50 MG
10 TABLET ORAL ONCE
Refills: 0 | Status: COMPLETED | OUTPATIENT
Start: 2020-01-04 | End: 2020-01-04

## 2020-01-04 RX ORDER — POTASSIUM CHLORIDE 20 MEQ
20 PACKET (EA) ORAL ONCE
Refills: 0 | Status: COMPLETED | OUTPATIENT
Start: 2020-01-04 | End: 2020-01-04

## 2020-01-04 RX ORDER — HEPARIN SODIUM 5000 [USP'U]/ML
1000 INJECTION INTRAVENOUS; SUBCUTANEOUS
Qty: 25000 | Refills: 0 | Status: DISCONTINUED | OUTPATIENT
Start: 2020-01-04 | End: 2020-01-07

## 2020-01-04 RX ORDER — SODIUM CHLORIDE 9 MG/ML
1000 INJECTION, SOLUTION INTRAVENOUS
Refills: 0 | Status: DISCONTINUED | OUTPATIENT
Start: 2020-01-04 | End: 2020-01-05

## 2020-01-04 RX ORDER — POTASSIUM CHLORIDE 20 MEQ
40 PACKET (EA) ORAL ONCE
Refills: 0 | Status: COMPLETED | OUTPATIENT
Start: 2020-01-04 | End: 2020-01-04

## 2020-01-04 RX ORDER — METOPROLOL TARTRATE 50 MG
75 TABLET ORAL
Refills: 0 | Status: DISCONTINUED | OUTPATIENT
Start: 2020-01-04 | End: 2020-01-06

## 2020-01-04 RX ADMIN — CHLORHEXIDINE GLUCONATE 15 MILLILITER(S): 213 SOLUTION TOPICAL at 05:11

## 2020-01-04 RX ADMIN — Medication 60 MILLIGRAM(S): at 06:50

## 2020-01-04 RX ADMIN — AMLODIPINE BESYLATE 5 MILLIGRAM(S): 2.5 TABLET ORAL at 05:11

## 2020-01-04 RX ADMIN — TICAGRELOR 90 MILLIGRAM(S): 90 TABLET ORAL at 17:15

## 2020-01-04 RX ADMIN — Medication 5 MILLIGRAM(S): at 17:13

## 2020-01-04 RX ADMIN — Medication 100 MILLIEQUIVALENT(S): at 05:53

## 2020-01-04 RX ADMIN — Medication 5 MILLIGRAM(S): at 05:13

## 2020-01-04 RX ADMIN — PANTOPRAZOLE SODIUM 40 MILLIGRAM(S): 20 TABLET, DELAYED RELEASE ORAL at 12:23

## 2020-01-04 RX ADMIN — Medication 75 MILLIGRAM(S): at 17:14

## 2020-01-04 RX ADMIN — TICAGRELOR 90 MILLIGRAM(S): 90 TABLET ORAL at 05:12

## 2020-01-04 RX ADMIN — HEPARIN SODIUM 5000 UNIT(S): 5000 INJECTION INTRAVENOUS; SUBCUTANEOUS at 05:13

## 2020-01-04 RX ADMIN — Medication 81 MILLIGRAM(S): at 22:33

## 2020-01-04 RX ADMIN — PROPOFOL 6.37 MICROGRAM(S)/KG/MIN: 10 INJECTION, EMULSION INTRAVENOUS at 05:12

## 2020-01-04 RX ADMIN — Medication 10 MILLIGRAM(S): at 22:33

## 2020-01-04 RX ADMIN — CHLORHEXIDINE GLUCONATE 1 APPLICATION(S): 213 SOLUTION TOPICAL at 12:09

## 2020-01-04 RX ADMIN — DEXMEDETOMIDINE HYDROCHLORIDE IN 0.9% SODIUM CHLORIDE 5.3 MICROGRAM(S)/KG/HR: 4 INJECTION INTRAVENOUS at 06:50

## 2020-01-04 RX ADMIN — Medication 40 MILLIEQUIVALENT(S): at 05:53

## 2020-01-04 RX ADMIN — Medication 10 MILLIGRAM(S): at 12:08

## 2020-01-04 RX ADMIN — SERTRALINE 50 MILLIGRAM(S): 25 TABLET, FILM COATED ORAL at 22:33

## 2020-01-04 RX ADMIN — MEROPENEM 100 MILLIGRAM(S): 1 INJECTION INTRAVENOUS at 05:13

## 2020-01-04 RX ADMIN — Medication 50 MILLIGRAM(S): at 05:11

## 2020-01-04 RX ADMIN — ATORVASTATIN CALCIUM 80 MILLIGRAM(S): 80 TABLET, FILM COATED ORAL at 22:33

## 2020-01-04 NOTE — PROGRESS NOTE ADULT - ASSESSMENT
64M with CAD, stents and CABG (last [facility] was 12/2017 with patent LIMA but other grafts occluded, medical management), HTN, HLD, CKD, CHER, ?HF, and pemphigus vulgaris presents with acute SOB requiring intubation for airway protection with new atrial fibrillation in setting of + influenza.  Pt CPAP'ed this morning was initially pulling in good tidal volume, but quickly desats and went into VT, s/p Amio 150 x1 and lidocaine, resumed hep gtt, switched zosyn to meropenum for salt reduction. Pt couldn't tolerate heparin gtt, profuse bleeding from oropharynx and rectum. hep is d/mery. Didnt tolerate CPAP well today and htn, will sedate with propofol today and cpap again tomorrow 64M with CAD, stents and CABG (last [facility] was 12/2017 with patent LIMA but other grafts occluded, medical management), HTN, HLD, CKD, CHER, ?HF, and pemphigus vulgaris presents with acute SOB requiring intubation for airway protection with new atrial fibrillation in setting of + influenza.  Pt CPAP'ed this morning was initially pulling in good tidal volume, but quickly desats and went into VT, s/p Amio 150 x1 and lidocaine, resumed hep gtt, switched zosyn to meropenum for salt reduction. Pt couldn't tolerate heparin gtt, profuse bleeding from oropharynx and rectum. hep is d/mery. Pt extubated 1/4, resume hep gtt for afib/aflutter

## 2020-01-04 NOTE — PROGRESS NOTE ADULT - NSHPATTENDINGPLANDISCUSS_GEN_ALL_CORE
Dr. Carlos Lange, Dr. Davi Trivedi, Macy Portillo, NP
CCU team
Dr Cheyanne Soriano, Dr. Geraldo Moralez, Charly Cheema, NP

## 2020-01-04 NOTE — PROGRESS NOTE ADULT - PROBLEM SELECTOR PLAN 1
Resolved   pt went into VT during CPAP trial, likely 2/2 hypoxia in the setting of severe ischemic disease  -s/p amio and lido bolus and lido drip   -rpt TTE unchanged from prior, Trop elevated, CK/CKMB wnl. likely will need non urgent ischemic eval when pt is more HDS   -switched zosyn to meropenum for salt reduction  -resumed hep gtt and d'mery because of active bleeding

## 2020-01-04 NOTE — PROGRESS NOTE ADULT - SUBJECTIVE AND OBJECTIVE BOX
Cheyanne Soriano PGY1   Page: [phone]    PATIENT: THONY ANAYA, MRN: [id number]    CHIEF COMPLAINT: Patient is a 64y old  Male who presents with a chief complaint of Acute resp failure (2020 18:07)      INTERVAL HISTORY/OVERNIGHT EVENTS: No overnight events. At bedside, patient has been sleeping and eating well. Denies N/V/D/C. Last BM x1 regular yesterday. Denies abdominal pain. Denies chest pain or SOB, cough. Has been ambulating with assistance. Oriented to person, place, and time. Breathing comfortably on room air.    REVIEW OF SYSTEMS:    Constitutional:     [ ] negative [ ] fevers [ ] chills [ ] weight loss [ ] weight gain  HEENT:                  [ ] negative [ ] dry eyes [ ] eye irritation [ ] postnasal drip [ ] nasal congestion  CV:                         [ ] negative  [ ] chest pain [ ] orthopnea [ ] palpitations [ ] murmur  Resp:                     [ ] negative [ ] cough [ ] shortness of breath [ ] dyspnea [ ] wheezing [ ] sputum [ ] hemoptysis  GI:                          [ ] negative [ ] nausea [ ] vomiting [ ] diarrhea [ ] constipation [ ] abd pain [ ] dysphagia   :                        [ ] negative [ ] dysuria [ ] nocturia [ ] hematuria [ ] increased urinary frequency  Musculoskeletal: [ ] negative [ ] back pain [ ] myalgias [ ] arthralgias [ ] fracture  Skin:                       [ ] negative [ ] rash [ ] itch  Neurological:        [ ] negative [ ] headache [ ] dizziness [ ] syncope [ ] weakness [ ] numbness  Psychiatric:           [ ] negative [ ] anxiety [ ] depression  Endocrine:            [ ] negative [ ] diabetes [ ] thyroid problem  Heme/Lymph:      [ ] negative [ ] anemia [ ] bleeding problem  Allergic/Immune: [ ] negative [ ] itchy eyes [ ] nasal discharge [ ] hives [ ] angioedema    [ ] All other systems negative  [ ] Unable to assess ROS because ________.    MEDICATIONS:  MEDICATIONS  (STANDING):  amLODIPine   Tablet 5 milliGRAM(s) Oral daily  aspirin  chewable 81 milliGRAM(s) Oral daily  atorvastatin 80 milliGRAM(s) Oral at bedtime  chlorhexidine 0.12% Liquid 15 milliLiter(s) Oral Mucosa every 12 hours  chlorhexidine 4% Liquid 1 Application(s) Topical daily  dexMEDEtomidine Infusion 0.2 MICROgram(s)/kG/Hr (5.305 mL/Hr) IV Continuous <Continuous>  dextrose 5% 1000 milliLiter(s) (100 mL/Hr) IV Continuous <Continuous>  heparin  Injectable 5000 Unit(s) SubCutaneous every 8 hours  meropenem  IVPB 1000 milliGRAM(s) IV Intermittent every 12 hours  metoclopramide Injectable 5 milliGRAM(s) IV Push every 12 hours  metoprolol tartrate 50 milliGRAM(s) Oral two times a day  pantoprazole  Injectable 40 milliGRAM(s) IV Push daily  predniSONE Concentrate 60 milliGRAM(s) Oral daily  propofol Infusion 10 MICROgram(s)/kG/Min (6.366 mL/Hr) IV Continuous <Continuous>  sertraline Concentrate 50 milliGRAM(s) Oral daily  ticagrelor 90 milliGRAM(s) Oral every 12 hours    MEDICATIONS  (PRN):  acetaminophen    Suspension .. 650 milliGRAM(s) Oral every 6 hours PRN Temp greater or equal to 38C (100.4F)      ALLERGIES: Allergies    No Known Allergies    Intolerances        OBJECTIVE:  ICU Vital Signs Last 24 Hrs  T(C): 36.4 (2020 04:00), Max: 37.7 (2020 12:00)  T(F): 97.6 (2020 04:00), Max: 99.8 (2020 12:00)  HR: 65 (2020 07:11) (65 - 105)  BP: 105/68 (2020 00:00) (105/68 - 105/68)  BP(mean): 76 (2020 00:00) (76 - 76)  ABP: 96/58 (2020 07:00) (96/58 - 168/88)  ABP(mean): 70 (2020 07:00) (68 - 114)  RR: 24 (2020 07:00) (16 - 27)  SpO2: 98% (2020 07:11) (90% - 100%)    Mode: AC/ CMV (Assist Control/ Continuous Mandatory Ventilation)  RR (machine): 16  TV (machine): 500  FiO2: 40  PEEP: 5  MAP: 11  PIP: 23    Adult Advanced Hemodynamics Last 24 Hrs  CVP(mm Hg): -1 (2020 07:00) (-1 - 285)  CVP(cm H2O): --  CO: --  CI: --  PA: --  PA(mean): --  PCWP: --  SVR: --  SVRI: --  PVR: --  PVRI: --  CAPILLARY BLOOD GLUCOSE        CAPILLARY BLOOD GLUCOSE        I&O's Summary    2020 07:01  -  2020 07:00  --------------------------------------------------------  IN: 3551.6 mL / OUT: 5415 mL / NET: -1863.4 mL      Daily     Daily Weight in k.6 (2020 01:00)    PHYSICAL EXAMINATION:  General: Comfortable, no acute distress, cooperative with exam.  HEENT: PERRLA, EOMI, moist mucous membranes.  Respiratory: CTAB, normal respiratory effort, no coughing, wheezes, crackles, or rales.  CV: RRR, S1S2, no murmurs, rubs or gallops. No JVD. Distal pulses intact.  Abdominal: Soft, nontender, nondistended, no rebound or guarding, normal bowel sounds.  Neurology: AOx3, no focal neuro defects, DOW x 4.  Extremities: No pitting edema, + Peripheral pulses.  Incisions:   Tubes:    LABS:  ABG - ( 2020 04:40 )  pH, Arterial: 7.46  pH, Blood: x     /  pCO2: 37    /  pO2: 104   / HCO3: 27    / Base Excess: 2.2   /  SaO2: 98.1                                    10.5   10.79 )-----------( 143      ( 2020 03:40 )             34.9     01-04    150<H>  |  113<H>  |  63<H>  ----------------------------<  120<H>  3.3<L>   |  25  |  2.17<H>    Ca    8.1<L>      2020 03:40  Phos  2.6     01-04  Mg     2.4     01-04    TPro  5.5<L>  /  Alb  2.9<L>  /  TBili  0.7  /  DBili  x   /  AST  44<H>  /  ALT  50<H>  /  AlkPhos  101  01-04    LIVER FUNCTIONS - ( 2020 03:40 )  Alb: 2.9 g/dL / Pro: 5.5 g/dL / ALK PHOS: 101 u/L / ALT: 50 u/L / AST: 44 u/L / GGT: x           PTT - ( 2020 04:40 )  PTT:25.0 SEC    CARDIAC MARKERS ( 2020 10:14 )  x     / x     / 123 u/L / 1.36 ng/mL / x              TELEMETRY:     EKG:     IMAGING: Cheyanne Soriano PGY1   Page: [phone]    PATIENT: THONY ANAYA, MRN: [id number]    CHIEF COMPLAINT: Patient is a 64y old  Male who presents with a chief complaint of Acute resp failure (2020 18:07)      INTERVAL HISTORY/OVERNIGHT EVENTS: No overnight events. pt extubated this morning, tolerating CPAP well     REVIEW OF SYSTEMS:  no f/c/n/v/d/constipation/cp/sob, pt wants to know if he can go home     MEDICATIONS:  MEDICATIONS  (STANDING):  amLODIPine   Tablet 5 milliGRAM(s) Oral daily  aspirin  chewable 81 milliGRAM(s) Oral daily  atorvastatin 80 milliGRAM(s) Oral at bedtime  chlorhexidine 0.12% Liquid 15 milliLiter(s) Oral Mucosa every 12 hours  chlorhexidine 4% Liquid 1 Application(s) Topical daily  dexMEDEtomidine Infusion 0.2 MICROgram(s)/kG/Hr (5.305 mL/Hr) IV Continuous <Continuous>  dextrose 5% 1000 milliLiter(s) (100 mL/Hr) IV Continuous <Continuous>  heparin  Injectable 5000 Unit(s) SubCutaneous every 8 hours  meropenem  IVPB 1000 milliGRAM(s) IV Intermittent every 12 hours  metoclopramide Injectable 5 milliGRAM(s) IV Push every 12 hours  metoprolol tartrate 50 milliGRAM(s) Oral two times a day  pantoprazole  Injectable 40 milliGRAM(s) IV Push daily  predniSONE Concentrate 60 milliGRAM(s) Oral daily  propofol Infusion 10 MICROgram(s)/kG/Min (6.366 mL/Hr) IV Continuous <Continuous>  sertraline Concentrate 50 milliGRAM(s) Oral daily  ticagrelor 90 milliGRAM(s) Oral every 12 hours    MEDICATIONS  (PRN):  acetaminophen    Suspension .. 650 milliGRAM(s) Oral every 6 hours PRN Temp greater or equal to 38C (100.4F)      ALLERGIES: Allergies    No Known Allergies    Intolerances        OBJECTIVE:  ICU Vital Signs Last 24 Hrs  T(C): 36.4 (2020 04:00), Max: 37.7 (2020 12:00)  T(F): 97.6 (2020 04:00), Max: 99.8 (2020 12:00)  HR: 65 (2020 07:11) (65 - 105)  BP: 105/68 (2020 00:00) (105/68 - 105/68)  BP(mean): 76 (2020 00:00) (76 - 76)  ABP: 96/58 (2020 07:00) (96/58 - 168/88)  ABP(mean): 70 (2020 07:00) (68 - 114)  RR: 24 (2020 07:00) (16 - 27)  SpO2: 98% (2020 07:11) (90% - 100%)    Mode: AC/ CMV (Assist Control/ Continuous Mandatory Ventilation)  RR (machine): 16  TV (machine): 500  FiO2: 40  PEEP: 5  MAP: 11  PIP: 23    Adult Advanced Hemodynamics Last 24 Hrs  CVP(mm Hg): -1 (2020 07:00) (-1 - 285)  CVP(cm H2O): --  CO: --  CI: --  PA: --  PA(mean): --  PCWP: --  SVR: --  SVRI: --  PVR: --  PVRI: --  CAPILLARY BLOOD GLUCOSE        CAPILLARY BLOOD GLUCOSE        I&O's Summary    2020 07:01  -  2020 07:00  --------------------------------------------------------  IN: 3551.6 mL / OUT: 5415 mL / NET: -1863.4 mL      Daily     Daily Weight in k.6 (2020 01:00)    PHYSICAL EXAMINATION:  General: Comfortable, no acute distress, cooperative with exam.  HEENT: PERRLA, EOMI, dry mucous membranes.  Respiratory: on CPAP, tachypneic to 20-24bpm, no coughing, wheezes, crackles, or rales.  CV: afib, S1S2, no murmurs, no rubs or gallops. No JVD. Distal pulses intact.  Abdominal: Soft, nontender, nondistended, no rebound or guarding, normal bowel sounds.  Neurology: AOx3, no focal neuro defects, DOW x 4.  Extremities: No pitting edema, + Peripheral pulses.  Incisions:   Tubes: A line, and central line     LABS:  ABG - ( 2020 04:40 )  pH, Arterial: 7.46  pH, Blood: x     /  pCO2: 37    /  pO2: 104   / HCO3: 27    / Base Excess: 2.2   /  SaO2: 98.1                                    10.5   10.79 )-----------( 143      ( 2020 03:40 )             34.9     01-04    150<H>  |  113<H>  |  63<H>  ----------------------------<  120<H>  3.3<L>   |  25  |  2.17<H>    Ca    8.1<L>      2020 03:40  Phos  2.6     01-04  Mg     2.4     01-04    TPro  5.5<L>  /  Alb  2.9<L>  /  TBili  0.7  /  DBili  x   /  AST  44<H>  /  ALT  50<H>  /  AlkPhos  101  01-04    LIVER FUNCTIONS - ( 2020 03:40 )  Alb: 2.9 g/dL / Pro: 5.5 g/dL / ALK PHOS: 101 u/L / ALT: 50 u/L / AST: 44 u/L / GGT: x           PTT - ( 2020 04:40 )  PTT:25.0 SEC    CARDIAC MARKERS ( 2020 10:14 )  x     / x     / 123 u/L / 1.36 ng/mL / x              TELEMETRY:     EKG:     IMAGING:

## 2020-01-04 NOTE — PROGRESS NOTE ADULT - PROBLEM SELECTOR PLAN 3
ECHO with worsening LV function. S/p lasix 80 mg IV BID  - d/c lasix standing, will give as PRN   - resume bb today  - continue to hold ACEi ECHO with worsening LV function. S/p lasix 80 mg IV BID  - d/c lasix standing, will give as PRN   - c/w metoprolol   - continue to hold ACEi

## 2020-01-04 NOTE — PROGRESS NOTE ADULT - SUBJECTIVE AND OBJECTIVE BOX
Follow Up:      Inverval History/ROS:Patient is a 64y old  Male who presents with a chief complaint of Acute resp failure (04 Jan 2020 08:37)    Last fever on 1/2  Extubated  On Cipap      Allergies    No Known Allergies    Intolerances        ANTIMICROBIALS:  meropenem  IVPB 1000 every 12 hours      OTHER MEDS:  acetaminophen    Suspension .. 650 milliGRAM(s) Oral every 6 hours PRN  aspirin  chewable 81 milliGRAM(s) Oral daily  atorvastatin 80 milliGRAM(s) Oral at bedtime  chlorhexidine 4% Liquid 1 Application(s) Topical daily  dexMEDEtomidine Infusion 0.2 MICROgram(s)/kG/Hr IV Continuous <Continuous>  dextrose 5% 1000 milliLiter(s) IV Continuous <Continuous>  heparin  Injectable 5000 Unit(s) SubCutaneous every 8 hours  metoclopramide Injectable 5 milliGRAM(s) IV Push every 12 hours  metoprolol tartrate 75 milliGRAM(s) Oral two times a day  pantoprazole  Injectable 40 milliGRAM(s) IV Push daily  predniSONE Concentrate 60 milliGRAM(s) Oral daily  sertraline Concentrate 50 milliGRAM(s) Oral daily  ticagrelor 90 milliGRAM(s) Oral every 12 hours      Vital Signs Last 24 Hrs  T(C): 37.6 (04 Jan 2020 09:00), Max: 37.6 (04 Jan 2020 09:00)  T(F): 99.6 (04 Jan 2020 09:00), Max: 99.6 (04 Jan 2020 09:00)  HR: 89 (04 Jan 2020 12:00) (65 - 89)  BP: 105/68 (04 Jan 2020 00:00) (105/68 - 105/68)  BP(mean): 76 (04 Jan 2020 00:00) (76 - 76)  RR: 17 (04 Jan 2020 12:00) (16 - 27)  SpO2: 100% (04 Jan 2020 12:00) (95% - 100%)    PHYSICAL EXAM:  General: [x ] cipap  HEAD/EYES: [ ] PERRL [x ] white sclera [ ] icterus  ENT:  [ ] normal x[ ] supple [ ] thrush [ ] pharyngeal exudate  Cardiovascular:   [ ] murmur [x ] normal [ ] PPM/AICD  Respiratory:  [x ] course BS  GI:  x[ ] soft, non-tender, normal bowel sounds  :  [ ] parks [ ] no CVA tenderness   Musculoskeletal:  [ ] no synovitis  Neurologic:  [ ] non-focal exam   Skin:  [x ] no rash  Lymph: [x ] no lymphadenopathy  Psychiatric:  [ ] appropriate affect [ ] alert & oriented  Lines:  [ x] no phlebitis [ ] central line                                10.5   10.79 )-----------( 143      ( 04 Jan 2020 03:40 )             34.9       01-04    150<H>  |  113<H>  |  63<H>  ----------------------------<  120<H>  3.3<L>   |  25  |  2.17<H>    Ca    8.1<L>      04 Jan 2020 03:40  Phos  2.6     01-04  Mg     2.4     01-04    TPro  5.5<L>  /  Alb  2.9<L>  /  TBili  0.7  /  DBili  x   /  AST  44<H>  /  ALT  50<H>  /  AlkPhos  101  01-04          MICROBIOLOGY:Culture - Respiratory:   NO ORGANISMS ISOLATED AT 24 HOURS  NO ORGANISMS ISOLATED AT 48 HRS.  NO ORGANISMS ISOLATED AT 72 HRS. (12-30-19 @ 18:05)      RADIOLOGY:

## 2020-01-04 NOTE — PROGRESS NOTE ADULT - SUBJECTIVE AND OBJECTIVE BOX
THONY ANAYA  64y  Patient is a 64y old  Male who presents with a chief complaint of Acute resp failure (2020 12:47)    HPI:  This is a patient with NARCISO on CKD after acute Respiratory failure. He feels better toay.    HEALTH ISSUES - PROBLEM Dx:  Ventricular tachyarrhythmia: Ventricular tachyarrhythmia  Hypernatremia: Hypernatremia  Prophylactic measure: Prophylactic measure  Hypotension: Hypotension  Influenza: Influenza  Atrial fibrillation and flutter: Atrial fibrillation and flutter  Acute congestive heart failure, unspecified heart failure type: Acute congestive heart failure, unspecified heart failure type  Preventive measure: Preventive measure  Anxiety: Anxiety  Depression: Depression  Pemphigus vulgaris: Pemphigus vulgaris  Hypertension: Hypertension  HLD (hyperlipidemia): HLD (hyperlipidemia)  CAD (coronary artery disease): CAD (coronary artery disease)  CKD (chronic kidney disease): CKD (chronic kidney disease)  CHF (congestive heart failure): CHF (congestive heart failure)  Acute hypercapnic respiratory failure: Acute hypercapnic respiratory failure        MEDICATIONS  (STANDING):  aspirin  chewable 81 milliGRAM(s) Oral daily  atorvastatin 80 milliGRAM(s) Oral at bedtime  chlorhexidine 4% Liquid 1 Application(s) Topical daily  dextrose 5%. 1000 milliLiter(s) (100 mL/Hr) IV Continuous <Continuous>  heparin  Infusion 1000 Unit(s)/Hr (10 mL/Hr) IV Continuous <Continuous>  hydrALAZINE 10 milliGRAM(s) Oral three times a day  metoclopramide Injectable 5 milliGRAM(s) IV Push every 12 hours  metoprolol tartrate 75 milliGRAM(s) Oral two times a day  pantoprazole  Injectable 40 milliGRAM(s) IV Push daily  predniSONE Concentrate 60 milliGRAM(s) Oral daily  sertraline Concentrate 50 milliGRAM(s) Oral daily  ticagrelor 90 milliGRAM(s) Oral every 12 hours    MEDICATIONS  (PRN):  acetaminophen    Suspension .. 650 milliGRAM(s) Oral every 6 hours PRN Temp greater or equal to 38C (100.4F)    Vital Signs Last 24 Hrs  T(C): 37.6 (2020 09:00), Max: 37.6 (2020 09:00)  T(F): 99.6 (2020 09:00), Max: 99.6 (2020 09:00)  HR: 93 (2020 17:00) (65 - 105)  BP: 105/68 (2020 00:00) (105/68 - 105/68)  BP(mean): 76 (2020 00:00) (76 - 76)  RR: 24 (2020 17:00) (15 - 27)  SpO2: 98% (2020 17:00) (96% - 100%)  Daily     Daily Weight in k.6 (2020 01:00)    PHYSICAL EXAM:  Constitutional:  He appears comfortable and not distressed. Not diaphoretic.    Neck:  The thyroid is normal. Trachea is midline. Feeding tube.    Respiratory: Bibasal creps.    Cardiovascular: S1 and S2 are normal. No mummurs, rubs or gallops are present.    Gastrointestinal: The abdomen is soft. No tenderness is present. No masses are present. Bowel sounds are normal.    Genitourinary: The bladder is not distended. No CVA tenderness is present.    Extremities: Trace edema is noted. No deformities are present.    Neurological: Cognition is normal. Tone, power and sensation are normal.     Skin: No lesions are seen  or palpated.    Lymph Nodes: No lymphadenopathy is present.                            10.5   10.79 )-----------( 143      ( 2020 03:40 )             34.9     01-04    150<H>  |  113<H>  |  63<H>  ----------------------------<  120<H>  3.3<L>   |  25  |  2.17<H>    Ca    8.1<L>      2020 03:40  Phos  2.6     01-04  Mg     2.4     01-04    TPro  5.5<L>  /  Alb  2.9<L>  /  TBili  0.7  /  DBili  x   /  AST  44<H>  /  ALT  50<H>  /  AlkPhos  101  01-04

## 2020-01-04 NOTE — PROGRESS NOTE ADULT - PROBLEM SELECTOR PLAN 9
RVP +influenza A, +RSV, +hMPV   -Tamiflu started. blood cultures NGTD  - Will continue empiric zosyn at present given oral lesion that still requires packing RVP +influenza A, +RSV, +hMPV   -s/p 5days of Tamiflu, blood cultures NGTD  - d/c abx given afebrile and cx NGTD x48hrs   -rpt RVP +RSV

## 2020-01-04 NOTE — PROGRESS NOTE ADULT - ASSESSMENT
64M with CAD, stents and CABG (last [facility] was 12/2017 with patent LIMA but other grafts occluded, medical management), HTN, HLD, CKD, CHER, ?HF, and pemphygus vulgaris presents with acute SOB. admitted to ccu for acute resp failure s/p intubation    NARCISO on CKD (chronic kidney disease) stage 3-4  baseline likely ~ 2-2.2  He is non-oliguric and has a parks in place  Advise to continue diuresis as needed at present. Off lasix   BP borderline. Avoid hypotension   No role/ need for RRT at present       Essential hypertension.    BP is uncontrolled   care per CCU    Acute Resp Failure.  Improving volume status s/p diuretics   Remains intubated  I agree with diuretics as needed at present  Continue to monitor his weight and urine output   f/u cardio    CKD-MBD  check pth, &Vitamin D 25  Phos acceptable   Calcium low  monitor phos and wolf daily    Hypernatremia  Increase free water via NGT

## 2020-01-04 NOTE — PROGRESS NOTE ADULT - ASSESSMENT
64/M with PMH  ·	CAD s/p stents and CABG  ·	HTN, HLD,  ·	CKD  ·	,CHER,  ·	 ?HF  ·	pemphigus vulgaris (s/p Rituxan 1 week PTA and chronic steroids).    Immunosuppressed with heart disease    Influenza/ RSV viral syndrome- s/p tamilfu    Secondary bacterial pneumonia  -finish 7 day course of meropenem

## 2020-01-04 NOTE — PROGRESS NOTE ADULT - PROBLEM SELECTOR PLAN 2
pt couldn't tolerate cpap today. Remains intubated and sedated, on propofol   -currently on 40% FiO2 and PEEP 7  -f/u abg  -appreciate pulm for assistance with difficult extubation  -pt febrile o/n, s/p pan cx'ed, will f/u cx result, c/w meropenum for now, appreciate ID extubated 1/4, cpap   -f/u abg  -pt febrile o/n, s/p pan cx'ed, will f/u cx result, c/w meropenum for now, appreciate ID

## 2020-01-04 NOTE — PROGRESS NOTE ADULT - PROBLEM SELECTOR PLAN 10
DVT: hep gtt held because of active bleeding   Diet: TF DVT: resume hep gtt   Diet: regular low salt diet

## 2020-01-05 LAB
ALBUMIN SERPL ELPH-MCNC: 2.9 G/DL — LOW (ref 3.3–5)
ALP SERPL-CCNC: 109 U/L — SIGNIFICANT CHANGE UP (ref 40–120)
ALT FLD-CCNC: 42 U/L — HIGH (ref 4–41)
ANION GAP SERPL CALC-SCNC: 14 MMO/L — SIGNIFICANT CHANGE UP (ref 7–14)
APTT BLD: 63.9 SEC — HIGH (ref 27.5–36.3)
AST SERPL-CCNC: 30 U/L — SIGNIFICANT CHANGE UP (ref 4–40)
BILIRUB SERPL-MCNC: 0.9 MG/DL — SIGNIFICANT CHANGE UP (ref 0.2–1.2)
BUN SERPL-MCNC: 44 MG/DL — HIGH (ref 7–23)
CALCIUM SERPL-MCNC: 8.5 MG/DL — SIGNIFICANT CHANGE UP (ref 8.4–10.5)
CHLORIDE SERPL-SCNC: 115 MMOL/L — HIGH (ref 98–107)
CO2 SERPL-SCNC: 21 MMOL/L — LOW (ref 22–31)
CREAT SERPL-MCNC: 1.57 MG/DL — HIGH (ref 0.5–1.3)
GLUCOSE SERPL-MCNC: 90 MG/DL — SIGNIFICANT CHANGE UP (ref 70–99)
HCT VFR BLD CALC: 38.1 % — LOW (ref 39–50)
HGB BLD-MCNC: 11.6 G/DL — LOW (ref 13–17)
MAGNESIUM SERPL-MCNC: 2.3 MG/DL — SIGNIFICANT CHANGE UP (ref 1.6–2.6)
MCHC RBC-ENTMCNC: 26.7 PG — LOW (ref 27–34)
MCHC RBC-ENTMCNC: 30.4 % — LOW (ref 32–36)
MCV RBC AUTO: 87.6 FL — SIGNIFICANT CHANGE UP (ref 80–100)
NRBC # FLD: 0 K/UL — SIGNIFICANT CHANGE UP (ref 0–0)
PHOSPHATE SERPL-MCNC: 3.3 MG/DL — SIGNIFICANT CHANGE UP (ref 2.5–4.5)
PLATELET # BLD AUTO: 203 K/UL — SIGNIFICANT CHANGE UP (ref 150–400)
PMV BLD: 12.5 FL — SIGNIFICANT CHANGE UP (ref 7–13)
POTASSIUM SERPL-MCNC: 3.9 MMOL/L — SIGNIFICANT CHANGE UP (ref 3.5–5.3)
POTASSIUM SERPL-SCNC: 3.9 MMOL/L — SIGNIFICANT CHANGE UP (ref 3.5–5.3)
PROT SERPL-MCNC: 6 G/DL — SIGNIFICANT CHANGE UP (ref 6–8.3)
RBC # BLD: 4.35 M/UL — SIGNIFICANT CHANGE UP (ref 4.2–5.8)
RBC # FLD: 17.3 % — HIGH (ref 10.3–14.5)
SODIUM SERPL-SCNC: 150 MMOL/L — HIGH (ref 135–145)
WBC # BLD: 14.26 K/UL — HIGH (ref 3.8–10.5)
WBC # FLD AUTO: 14.26 K/UL — HIGH (ref 3.8–10.5)

## 2020-01-05 PROCEDURE — 71045 X-RAY EXAM CHEST 1 VIEW: CPT | Mod: 26

## 2020-01-05 PROCEDURE — 71045 X-RAY EXAM CHEST 1 VIEW: CPT | Mod: 26,77

## 2020-01-05 PROCEDURE — 99233 SBSQ HOSP IP/OBS HIGH 50: CPT

## 2020-01-05 RX ORDER — ISOSORBIDE DINITRATE 5 MG/1
10 TABLET ORAL THREE TIMES A DAY
Refills: 0 | Status: DISCONTINUED | OUTPATIENT
Start: 2020-01-05 | End: 2020-01-05

## 2020-01-05 RX ORDER — HYDRALAZINE HCL 50 MG
50 TABLET ORAL THREE TIMES A DAY
Refills: 0 | Status: DISCONTINUED | OUTPATIENT
Start: 2020-01-05 | End: 2020-01-10

## 2020-01-05 RX ORDER — HYDRALAZINE HCL 50 MG
10 TABLET ORAL ONCE
Refills: 0 | Status: COMPLETED | OUTPATIENT
Start: 2020-01-05 | End: 2020-01-05

## 2020-01-05 RX ORDER — ISOSORBIDE DINITRATE 5 MG/1
10 TABLET ORAL THREE TIMES A DAY
Refills: 0 | Status: DISCONTINUED | OUTPATIENT
Start: 2020-01-05 | End: 2020-01-06

## 2020-01-05 RX ORDER — HYDRALAZINE HCL 50 MG
25 TABLET ORAL THREE TIMES A DAY
Refills: 0 | Status: DISCONTINUED | OUTPATIENT
Start: 2020-01-05 | End: 2020-01-05

## 2020-01-05 RX ORDER — IPRATROPIUM/ALBUTEROL SULFATE 18-103MCG
3 AEROSOL WITH ADAPTER (GRAM) INHALATION EVERY 6 HOURS
Refills: 0 | Status: DISCONTINUED | OUTPATIENT
Start: 2020-01-05 | End: 2020-01-10

## 2020-01-05 RX ADMIN — TICAGRELOR 90 MILLIGRAM(S): 90 TABLET ORAL at 06:18

## 2020-01-05 RX ADMIN — ISOSORBIDE DINITRATE 10 MILLIGRAM(S): 5 TABLET ORAL at 21:56

## 2020-01-05 RX ADMIN — TICAGRELOR 90 MILLIGRAM(S): 90 TABLET ORAL at 17:28

## 2020-01-05 RX ADMIN — Medication 50 MILLIGRAM(S): at 15:26

## 2020-01-05 RX ADMIN — Medication 75 MILLIGRAM(S): at 06:18

## 2020-01-05 RX ADMIN — Medication 10 MILLIGRAM(S): at 15:24

## 2020-01-05 RX ADMIN — Medication 3 MILLILITER(S): at 22:44

## 2020-01-05 RX ADMIN — ISOSORBIDE DINITRATE 10 MILLIGRAM(S): 5 TABLET ORAL at 15:26

## 2020-01-05 RX ADMIN — PANTOPRAZOLE SODIUM 40 MILLIGRAM(S): 20 TABLET, DELAYED RELEASE ORAL at 11:49

## 2020-01-05 RX ADMIN — Medication 5 MILLIGRAM(S): at 06:18

## 2020-01-05 RX ADMIN — Medication 5 MILLIGRAM(S): at 17:27

## 2020-01-05 RX ADMIN — CHLORHEXIDINE GLUCONATE 1 APPLICATION(S): 213 SOLUTION TOPICAL at 11:49

## 2020-01-05 RX ADMIN — Medication 81 MILLIGRAM(S): at 11:48

## 2020-01-05 RX ADMIN — SERTRALINE 50 MILLIGRAM(S): 25 TABLET, FILM COATED ORAL at 11:50

## 2020-01-05 RX ADMIN — Medication 25 MILLIGRAM(S): at 06:17

## 2020-01-05 RX ADMIN — Medication 75 MILLIGRAM(S): at 17:28

## 2020-01-05 RX ADMIN — Medication 60 MILLIGRAM(S): at 11:47

## 2020-01-05 RX ADMIN — ATORVASTATIN CALCIUM 80 MILLIGRAM(S): 80 TABLET, FILM COATED ORAL at 21:55

## 2020-01-05 RX ADMIN — HEPARIN SODIUM 10 UNIT(S)/HR: 5000 INJECTION INTRAVENOUS; SUBCUTANEOUS at 21:55

## 2020-01-05 RX ADMIN — Medication 50 MILLIGRAM(S): at 21:56

## 2020-01-05 NOTE — SWALLOW BEDSIDE ASSESSMENT ADULT - ASR SWALLOW RECOMMEND DIAG
Cinesophagram to objectively assess swallow mechanism and to determine if intermittent cough/ throat clear is dysphagia related/VFSS/MBS

## 2020-01-05 NOTE — SWALLOW BEDSIDE ASSESSMENT ADULT - SWALLOW EVAL: RECOMMENDED DIET
1. Oral means of nutrition/ hydration/ medication contraindicated at this time 2. Continue with short term non oral means of nutrition/ hydration/ medication via NGT

## 2020-01-05 NOTE — PROGRESS NOTE ADULT - PROBLEM SELECTOR PLAN 9
RVP +influenza A, +RSV, +hMPV   -s/p 5days of Tamiflu, blood cultures NGTD  - d/c abx given afebrile and cx NGTD x48hrs   -rpt RVP +RSV

## 2020-01-05 NOTE — PROGRESS NOTE ADULT - SUBJECTIVE AND OBJECTIVE BOX
FOLLOW UP:    SUBJECTIVE/OBSERVATIONS:  OVERNIGHT EVENTS:  TELE EVENTS:     Vital Signs Last 24 Hrs  T(C): 36.6 (05 Jan 2020 04:00), Max: 37.1 (05 Jan 2020 00:00)  T(F): 97.8 (05 Jan 2020 04:00), Max: 98.7 (05 Jan 2020 00:00)  HR: 77 (05 Jan 2020 07:31) (77 - 105)  BP: 147/96 (05 Jan 2020 05:00) (129/69 - 147/96)  BP(mean): 108 (05 Jan 2020 05:00) (73 - 108)  RR: 28 (05 Jan 2020 07:00) (15 - 28)  SpO2: 98% (05 Jan 2020 07:31) (94% - 100%)  I&O's Summary    04 Jan 2020 07:01  -  05 Jan 2020 07:00  --------------------------------------------------------  IN: 990 mL / OUT: 3030 mL / NET: -2040 mL        MEDICATIONS:  aspirin  chewable 81 milliGRAM(s) Oral daily  heparin  Infusion 1000 Unit(s)/Hr IV Continuous <Continuous>  hydrALAZINE 25 milliGRAM(s) Oral three times a day  metoprolol tartrate 75 milliGRAM(s) Oral two times a day  ticagrelor 90 milliGRAM(s) Oral every 12 hours        acetaminophen    Suspension .. 650 milliGRAM(s) Oral every 6 hours PRN  metoclopramide Injectable 5 milliGRAM(s) IV Push every 12 hours  sertraline Concentrate 50 milliGRAM(s) Oral daily    pantoprazole  Injectable 40 milliGRAM(s) IV Push daily    atorvastatin 80 milliGRAM(s) Oral at bedtime  predniSONE Concentrate 60 milliGRAM(s) Oral daily    chlorhexidine 4% Liquid 1 Application(s) Topical daily  dextrose 5%. 1000 milliLiter(s) IV Continuous <Continuous>      REVIEW OF SYSTEMS:  Complete 10point ROS negative.    PHYSICAL EXAM:  General: NAD  Cardiovascular: Normal S1 S2, No JVD, No murmurs, No edema  Respiratory: Lungs clear to auscultation	  Gastrointestinal:  Soft, Non-tender, + BS	  Skin: warm and dry, No rashes, No ecchymoses, No cyanosis	  Extremities: Normal range of motion, No clubbing, cyanosis or edema  Vascular: Peripheral pulses palpable 2+ bilaterally    LABS:	 	    CBC Full  -  ( 05 Jan 2020 04:30 )  WBC Count : 14.26 K/uL  Hemoglobin : 11.6 g/dL  Hematocrit : 38.1 %  Platelet Count - Automated : 203 K/uL  Mean Cell Volume : 87.6 fL  Mean Cell Hemoglobin : 26.7 pg  Mean Cell Hemoglobin Concentration : 30.4 %  Auto Neutrophil # : x  Auto Lymphocyte # : x  Auto Monocyte # : x  Auto Eosinophil # : x  Auto Basophil # : x  Auto Neutrophil % : x  Auto Lymphocyte % : x  Auto Monocyte % : x  Auto Eosinophil % : x  Auto Basophil % : x    01-05    150<H>  |  115<H>  |  44<H>  ----------------------------<  90  3.9   |  21<L>  |  1.57<H>  01-04    148<H>  |  116<H>  |  51<H>  ----------------------------<  101<H>  4.1   |  22  |  1.75<H>    Ca    8.5      05 Jan 2020 04:30  Ca    8.5      04 Jan 2020 21:10  Phos  3.3     01-05  Phos  3.2     01-04  Mg     2.3     01-05  Mg     2.3     01-04    TPro  6.0  /  Alb  2.9<L>  /  TBili  0.9  /  DBili  x   /  AST  30  /  ALT  42<H>  /  AlkPhos  109  01-05  TPro  5.5<L>  /  Alb  2.9<L>  /  TBili  0.7  /  DBili  x   /  AST  44<H>  /  ALT  50<H>  /  AlkPhos  101  01-04      proBNP:   Lipid Profile:   HgA1c:   TSH:       CARDIAC MARKERS: FOLLOW UP:  acute respiratory failure in setting of FLUAH1/HMPV/RSV  SUBJECTIVE/OBSERVATIONS:  Patient seen and examined. He is doing well on   OVERNIGHT EVENTS:  TELE EVENTS:     Vital Signs Last 24 Hrs  T(C): 36.6 (05 Jan 2020 04:00), Max: 37.1 (05 Jan 2020 00:00)  T(F): 97.8 (05 Jan 2020 04:00), Max: 98.7 (05 Jan 2020 00:00)  HR: 77 (05 Jan 2020 07:31) (77 - 105)  BP: 147/96 (05 Jan 2020 05:00) (129/69 - 147/96)  BP(mean): 108 (05 Jan 2020 05:00) (73 - 108)  RR: 28 (05 Jan 2020 07:00) (15 - 28)  SpO2: 98% (05 Jan 2020 07:31) (94% - 100%)  I&O's Summary    04 Jan 2020 07:01  -  05 Jan 2020 07:00  --------------------------------------------------------  IN: 990 mL / OUT: 3030 mL / NET: -2040 mL        MEDICATIONS:  aspirin  chewable 81 milliGRAM(s) Oral daily  heparin  Infusion 1000 Unit(s)/Hr IV Continuous <Continuous>  hydrALAZINE 25 milliGRAM(s) Oral three times a day  metoprolol tartrate 75 milliGRAM(s) Oral two times a day  ticagrelor 90 milliGRAM(s) Oral every 12 hours        acetaminophen    Suspension .. 650 milliGRAM(s) Oral every 6 hours PRN  metoclopramide Injectable 5 milliGRAM(s) IV Push every 12 hours  sertraline Concentrate 50 milliGRAM(s) Oral daily    pantoprazole  Injectable 40 milliGRAM(s) IV Push daily    atorvastatin 80 milliGRAM(s) Oral at bedtime  predniSONE Concentrate 60 milliGRAM(s) Oral daily    chlorhexidine 4% Liquid 1 Application(s) Topical daily  dextrose 5%. 1000 milliLiter(s) IV Continuous <Continuous>      REVIEW OF SYSTEMS:  Complete 10point ROS negative.    PHYSICAL EXAM:  General: NAD  Cardiovascular: Normal S1 S2, No JVD, No murmurs, No edema  Respiratory: Lungs clear to auscultation	  Gastrointestinal:  Soft, Non-tender, + BS	  Skin: warm and dry, No rashes, No ecchymoses, No cyanosis	  Extremities: Normal range of motion, No clubbing, cyanosis or edema  Vascular: Peripheral pulses palpable 2+ bilaterally    LABS:	 	    CBC Full  -  ( 05 Jan 2020 04:30 )  WBC Count : 14.26 K/uL  Hemoglobin : 11.6 g/dL  Hematocrit : 38.1 %  Platelet Count - Automated : 203 K/uL  Mean Cell Volume : 87.6 fL  Mean Cell Hemoglobin : 26.7 pg  Mean Cell Hemoglobin Concentration : 30.4 %  Auto Neutrophil # : x  Auto Lymphocyte # : x  Auto Monocyte # : x  Auto Eosinophil # : x  Auto Basophil # : x  Auto Neutrophil % : x  Auto Lymphocyte % : x  Auto Monocyte % : x  Auto Eosinophil % : x  Auto Basophil % : x    01-05    150<H>  |  115<H>  |  44<H>  ----------------------------<  90  3.9   |  21<L>  |  1.57<H>  01-04    148<H>  |  116<H>  |  51<H>  ----------------------------<  101<H>  4.1   |  22  |  1.75<H>    Ca    8.5      05 Jan 2020 04:30  Ca    8.5      04 Jan 2020 21:10  Phos  3.3     01-05  Phos  3.2     01-04  Mg     2.3     01-05  Mg     2.3     01-04    TPro  6.0  /  Alb  2.9<L>  /  TBili  0.9  /  DBili  x   /  AST  30  /  ALT  42<H>  /  AlkPhos  109  01-05  TPro  5.5<L>  /  Alb  2.9<L>  /  TBili  0.7  /  DBili  x   /  AST  44<H>  /  ALT  50<H>  /  AlkPhos  101  01-04      proBNP:   Lipid Profile:   HgA1c:   TSH:       CARDIAC MARKERS: FOLLOW UP:  acute respiratory failure in setting of FLUAH1/HMPV/RSV  SUBJECTIVE/OBSERVATIONS:  Patient seen and examined. He is doing well on 2 liters nasal canula  OVERNIGHT EVENTS:  Remains hypernatremic, swallowing difficulty, weak from prolonged intubation  TELE EVENTS:   remains atrial fibrillation   Vital Signs Last 24 Hrs  T(C): 36.6 (05 Jan 2020 04:00), Max: 37.1 (05 Jan 2020 00:00)  T(F): 97.8 (05 Jan 2020 04:00), Max: 98.7 (05 Jan 2020 00:00)  HR: 77 (05 Jan 2020 07:31) (77 - 105)  BP: 147/96 (05 Jan 2020 05:00) (129/69 - 147/96)  BP(mean): 108 (05 Jan 2020 05:00) (73 - 108)  RR: 28 (05 Jan 2020 07:00) (15 - 28)  SpO2: 98% (05 Jan 2020 07:31) (94% - 100%)  I&O's Summary    04 Jan 2020 07:01  -  05 Jan 2020 07:00  --------------------------------------------------------  IN: 990 mL / OUT: 3030 mL / NET: -2040 mL        MEDICATIONS:  aspirin  chewable 81 milliGRAM(s) Oral daily  heparin  Infusion 1000 Unit(s)/Hr IV Continuous <Continuous>  hydrALAZINE 25 milliGRAM(s) Oral three times a day  metoprolol tartrate 75 milliGRAM(s) Oral two times a day  ticagrelor 90 milliGRAM(s) Oral every 12 hours  acetaminophen    Suspension .. 650 milliGRAM(s) Oral every 6 hours PRN  metoclopramide Injectable 5 milliGRAM(s) IV Push every 12 hours  sertraline Concentrate 50 milliGRAM(s) Oral daily    pantoprazole  Injectable 40 milliGRAM(s) IV Push daily    atorvastatin 80 milliGRAM(s) Oral at bedtime  predniSONE Concentrate 60 milliGRAM(s) Oral daily    chlorhexidine 4% Liquid 1 Application(s) Topical daily  dextrose 5%. 1000 milliLiter(s) IV Continuous <Continuous>    REVIEW OF SYSTEMS:    CONSTITUTIONAL: endorses weakness  EYES/ENT: No visual changes;  No vertigo or throat pain   NECK: No pain or stiffness  RESPIRATORY: endorses cough and SOB on exertion  CARDIOVASCULAR: No chest pain or palpitations  GASTROINTESTINAL: No abdominal or epigastric pain. No nausea, vomiting, or hematemesis; No diarrhea or constipation. No melena or hematochezia.  GENITOURINARY: No dysuria, frequency or hematuria  NEUROLOGICAL: No numbness or weakness  SKIN: No itching, rashes    PHYSICAL EXAM:    GENERAL: NAD  HEAD:  Atraumatic, Normocephalic  EYES: EOMI, PERRLA, conjunctiva and sclera clear  NECK: Supple, No JVD  CHEST/LUNG: decreased breath sound with crackles at bases, poor inspiratory effort  CARDIAC: irregular rate and rhythm; +S1, S2; No murmurs, rubs, or gallops  ABDOMEN: Soft, Nontender, Nondistended; Normoactive bowel sounds  EXTREMITIES:  2+ Peripheral Pulses, No clubbing, cyanosis, or edema  PSYCH: A&Ox3  NEUROLOGY: non-focal  SKIN: No rashes or lesions  LABS:	 	    CBC Full  -  ( 05 Jan 2020 04:30 )  WBC Count : 14.26 K/uL  Hemoglobin : 11.6 g/dL  Hematocrit : 38.1 %  Platelet Count - Automated : 203 K/uL  Mean Cell Volume : 87.6 fL  Mean Cell Hemoglobin : 26.7 pg  Mean Cell Hemoglobin Concentration : 30.4 %  Auto Neutrophil # : x  Auto Lymphocyte # : x  Auto Monocyte # : x  Auto Eosinophil # : x  Auto Basophil # : x  Auto Neutrophil % : x  Auto Lymphocyte % : x  Auto Monocyte % : x  Auto Eosinophil % : x  Auto Basophil % : x    01-05    150<H>  |  115<H>  |  44<H>  ----------------------------<  90  3.9   |  21<L>  |  1.57<H>  01-04    148<H>  |  116<H>  |  51<H>  ----------------------------<  101<H>  4.1   |  22  |  1.75<H>    Ca    8.5      05 Jan 2020 04:30  Ca    8.5      04 Jan 2020 21:10  Phos  3.3     01-05  Phos  3.2     01-04  Mg     2.3     01-05  Mg     2.3     01-04    TPro  6.0  /  Alb  2.9<L>  /  TBili  0.9  /  DBili  x   /  AST  30  /  ALT  42<H>  /  AlkPhos  109  01-05  TPro  5.5<L>  /  Alb  2.9<L>  /  TBili  0.7  /  DBili  x   /  AST  44<H>  /  ALT  50<H>  /  AlkPhos  101  01-04    < from: Transthoracic Echocardiogram (01.01.20 @ 08:57) >    Patient name: THONY ANAYA  YOB: 1955   Age: 64 (M)   MR#: [id number]  Study Date: 1/1/2020  [facility] STATSonographer: Brian Chavez RDCS  Study quality: Technically good  Referring Physician: Verna Beckman MD  Blood Pressure: 104/71 mmHg  Height: 178 cm  Weight: 101 kg  BSA: 2.2 m2  ------------------------------------------------------------------------  PROCEDURE: Transthoracic echocardiogram with 2-D, M-Mode  and complete spectral and color flow Doppler.  INDICATION: Abnormal electrocardiogram (ECG) (EKG) (R94.31)  ------------------------------------------------------------------------  DIMENSIONS:  Dimensions:     Normal Values:  LA:     5.2 cm    2.0 - 4.0 cm  Ao:     3.1 cm    2.0 - 3.8 cm  SEPTUM: 0.7 cm    0.6 - 1.2 cm  PWT:    0.7 cm    0.6 - 1.1 cm  LVIDd:  5.2 cm    3.0 - 5.6 cm  LVIDs:  5.0 cm    1.8 - 4.0 cm  Derived Variables:  LVMI: 56 g/m2  RWT: 0.26  Fractional short: 4 %  ------------------------------------------------------------------------  OBSERVATIONS:  Mitral Valve: Tethered mitral valve leaflets with normal  opening. Moderate-severe mitral regurgitation.  Aortic Root: Normal aortic root.  Aortic Valve: Normal trileaflet aortic valve.  Left Atrium: LA volume index = 15 cc/m2.  Left Ventricle: Endocardium not well visualized; grossly  severe left ventricular systolic dysfunction. Moderate left  ventricular enlargement.  Right Heart: Right atrium not well visualized. The right  ventricle is not well visualized. Tricuspid valve not well  visualized. Pulmonic valve not well visualized. Minimal  pulmonic regurgitation.  Pericardium/PleuraNo obvious pericardial effusion noted.  ------------------------------------------------------------------------  CONCLUSIONS:  1. Tethered mitral valve leaflets with normal opening.  Moderate-severe mitral regurgitation.  2. Moderate left ventricular enlargement.  3. Endocardium not well visualized; grossly severe left  ventricular systolic dysfunction.  4. The right ventricle is not well visualized.  5. No obvious pericardial effusion noted.  *** Compared with echocardiogram of 12/27/2019, no  significant changes noted.  ------------------------------------------------------------------------  Confirmed on  1/1/2020 - 11:39:23 by Nico Dugan MD, [facility],  ESTER, PRASAD  ------------------------------------------------------------------------    < end of copied text >

## 2020-01-05 NOTE — PROGRESS NOTE ADULT - SUBJECTIVE AND OBJECTIVE BOX
THONY ANAYA  64y  Patient is a 64y old  Male who presents with a chief complaint of Acute resp failure (2020 09:15)    HPI:  This is a p[atient with NARCISO after Acute Respiratory Failure.  Stable and feels better. Had Hypernatremia after diuresis, improved.    HEALTH ISSUES - PROBLEM Dx:  Ventricular tachyarrhythmia: Ventricular tachyarrhythmia  Hypernatremia: Hypernatremia  Prophylactic measure: Prophylactic measure  Hypotension: Hypotension  Influenza: Influenza  Atrial fibrillation and flutter: Atrial fibrillation and flutter  Acute congestive heart failure, unspecified heart failure type: Acute congestive heart failure, unspecified heart failure type  Preventive measure: Preventive measure  Anxiety: Anxiety  Depression: Depression  Pemphigus vulgaris: Pemphigus vulgaris  Hypertension: Hypertension  HLD (hyperlipidemia): HLD (hyperlipidemia)  CAD (coronary artery disease): CAD (coronary artery disease)  CKD (chronic kidney disease): CKD (chronic kidney disease)  CHF (congestive heart failure): CHF (congestive heart failure)  Acute hypercapnic respiratory failure: Acute hypercapnic respiratory failure        MEDICATIONS  (STANDING):  aspirin  chewable 81 milliGRAM(s) Oral daily  atorvastatin 80 milliGRAM(s) Oral at bedtime  chlorhexidine 4% Liquid 1 Application(s) Topical daily  heparin  Infusion 1000 Unit(s)/Hr (10 mL/Hr) IV Continuous <Continuous>  hydrALAZINE 50 milliGRAM(s) Oral three times a day  isosorbide   dinitrate Tablet (ISORDIL) 10 milliGRAM(s) Oral three times a day  metoclopramide Injectable 5 milliGRAM(s) IV Push every 12 hours  metoprolol tartrate 75 milliGRAM(s) Oral two times a day  pantoprazole  Injectable 40 milliGRAM(s) IV Push daily  predniSONE   Tablet 60 milliGRAM(s) Oral every 24 hours  sertraline Concentrate 50 milliGRAM(s) Oral daily  ticagrelor 90 milliGRAM(s) Oral every 12 hours    MEDICATIONS  (PRN):  acetaminophen    Suspension .. 650 milliGRAM(s) Oral every 6 hours PRN Temp greater or equal to 38C (100.4F)    Vital Signs Last 24 Hrs  T(C): 37.6 (2020 15:13), Max: 37.6 (2020 15:13)  T(F): 99.6 (2020 15:13), Max: 99.6 (2020 15:13)  HR: 108 (2020 17:00) (77 - 120)  BP: 147/96 (2020 05:00) (129/69 - 147/96)  BP(mean): 108 (2020 05:00) (73 - 108)  RR: 29 (2020 17:00) (18 - 30)  SpO2: 96% (2020 17:00) (94% - 100%)  Daily     Daily Weight in k.6 (2020 06:00)    PHYSICAL EXAM:  Constitutional:  He appears comfortable and not distressed. Not diaphoretic.    Neck:  The thyroid is normal. Trachea is midline.     Breasts: Normal examination.    Respiratory: The lungs are clear to auscultation. No dullness and expansion is normal.    Cardiovascular: S1 and S2 are normal. No mummurs, rubs or gallops are present.    Gastrointestinal: The abdomen is soft. No tenderness is present. No masses are present. Bowel sounds are normal.    Genitourinary: The bladder is not distended. No CVA tenderness is present.    Extremities: No edema is noted. No deformities are present.    Neurological: Cognition is normal. Tone, power and sensation are normal.     Skin: No lesions are seen  or palpated.    Lymph Nodes: No lymphadenopathy is present.    Psychiatric: Mood is appropriate. No hallucinations or flight of ideas are noted.                              11.6   14.26 )-----------( 203      ( 2020 04:30 )             38.1     01-05    150<H>  |  115<H>  |  44<H>  ----------------------------<  90  3.9   |  21<L>  |  1.57<H>    Ca    8.5      2020 04:30  Phos  3.3     01-05  Mg     2.3     01-05    TPro  6.0  /  Alb  2.9<L>  /  TBili  0.9  /  DBili  x   /  AST  30  /  ALT  42<H>  /  AlkPhos  109  01-05

## 2020-01-05 NOTE — PROGRESS NOTE ADULT - ASSESSMENT
64M with CAD, stents and CABG (last [facility] was 12/2017 with patent LIMA but other grafts occluded, medical management), HTN, HLD, CKD, CHER, ?HF, and pemphigus vulgaris presents with acute SOB requiring intubation for airway protection with new atrial fibrillation in setting of + influenza.  Pt CPAP'ed this morning was initially pulling in good tidal volume, but quickly desats and went into VT, s/p Amio 150 x1 and lidocaine, resumed hep gtt, switched zosyn to meropenum for salt reduction. Pt couldn't tolerate heparin gtt, profuse bleeding from oropharynx and rectum. hep is d/mery. Pt extubated 1/4, resume hep gtt for afib/aflutter 64M with CAD, stents and CABG (last [facility] was 12/2017 with patent LIMA but other grafts occluded, medical management), HTN, HLD, CKD, CHER, ?HF, and pemphigus vulgaris presents with acute SOB requiring intubation for airway protection with new atrial fibrillation in setting of + influenza.  Pt CPAP'ed this morning was initially pulling in good tidal volume, but quickly desats and went into VT, s/p Amio 150 x1 and lidocaine, resumed hep gtt, switched zosyn to meropenum for salt reduction. Pt couldn't tolerate heparin gtt, profuse bleeding from oropharynx and rectum. hep is d/mery. Pt extubated 1/4, resume hep gtt for afib/aflutter.

## 2020-01-05 NOTE — PROGRESS NOTE ADULT - PROBLEM SELECTOR PLAN 2
extubated 1/4, cpap   -f/u abg  -pt febrile o/n, s/p pan cx'ed, will f/u cx result, c/w meropenum for now, appreciate ID -patient extubated on 1/4  -tolerating 2 liters nasal canula, bipap at night  -lungs clear on CXR

## 2020-01-05 NOTE — PROGRESS NOTE ADULT - PROBLEM SELECTOR PLAN 10
DVT: resume hep gtt   Diet: regular low salt diet DVT: resume hep gtt   Diet: patient failed speech and swallow evaluation, Keep NPO with feeds and free water and keep nepro at 40cc/hr

## 2020-01-05 NOTE — SWALLOW BEDSIDE ASSESSMENT ADULT - COMMENTS
Patient is a "64M with CAD, stents and CABG (last [facility] was 12/2017 with patent LIMA but other grafts occluded, medical management), HTN, HLD, CKD, CHER, ?HF, and pemphigus vulgaris presents with acute SOB requiring intubation for airway protection with ? new atrial fibrillation with ?hypoxic and hypercapnic respiratory failure". Intubated on 12/27/19 and extubated on 1/4/20. CXR completed on 1/3 reported "lungs are clear of focal consolidation".     Patient seen upright at bedside with nasal canula and NGT in place. Patient was alert/awake and verbally responsive to simple questions. Patient able to follow simple directions. Patient noted with coughing prior to PO consumption. BASELINE STATS: SpO2 99%

## 2020-01-05 NOTE — PROGRESS NOTE ADULT - PROBLEM SELECTOR PLAN 3
ECHO with worsening LV function. S/p lasix 80 mg IV BID  - d/c lasix standing, will give as PRN   - c/w metoprolol   - continue to hold ACEi -patient appears dry on physical exam, NA level 150  -ECHO reveals tethered mitral valve, mod-severe MR, grossly severe LV dysfunction  -patient BP is elevated and he remains hypertensive  -continue GDMT for HF, increased hydralazine to 50 tid and added isordil 10 tid for afterload reduction, continue metoprolol 75 bid  -HOLD DIURETICs for now  -patient with Na level of 150, hypernatremic, continue free H20 500cc every 6 hours via the keofeed -patient appears dry on physical exam, NA level 150  -ECHO reveals tethered mitral valve, mod-severe MR, grossly severe LV dysfunction  -patient BP is elevated and he remains hypertensive  -continue GDMT for HF, increased hydralazine to 50 tid and added isordil 10 tid for afterload reduction, continue metoprolol 75 bid  -HOLD DIURETICs for now  -for hypernatremia continue free H20 500cc every 6 hours via the keofeed

## 2020-01-05 NOTE — PROGRESS NOTE ADULT - PROBLEM SELECTOR PLAN 7
Baseline reported at 1.8 to 2.0. Renal recs noted and appreciated. Continue to monitor Scr. downtrending   -avoid nephrotoxic agent creatinine down to 1.57 today from 1.75  -avoid nephrotoxic agent  -patient is -2040 overnight

## 2020-01-05 NOTE — SWALLOW BEDSIDE ASSESSMENT ADULT - SWALLOW EVAL: DIAGNOSIS
1. Functional oral phase for puree consistency, honey thick liquids and nectar thick liquids marked by adequate bolus manipulation and functional oral transit time 2. Mild oral phase dysphagia for thin liquids marked by reduced bolus manipulation, reduced anterior to posterior transport and suspected posterior loss of bolus 3. Moderate pharyngeal phase dysphagia for puree consistency, honey thick liquids and nectar thick liquids marked by reduced laryngeal elevation upon digital palpation with delayed/ intermittent evidence of cough response and throat clear subsequent deglutition 4. Moderate to Severe pharyngeal phase dysphagia for thin liquids marked by reduced laryngeal elevation upon digital palpation with immediate evidence of throat clear subsequent deglutition indicative of laryngeal penetration/aspiration

## 2020-01-05 NOTE — PROGRESS NOTE ADULT - ASSESSMENT
64M with CAD, stents and CABG (last [facility] was 12/2017 with patent LIMA but other grafts occluded, medical management), HTN, HLD, CKD, CHER, ?HF, and pemphygus vulgaris presents with acute SOB. Admitted to ccu for acute resp failure s/p intubation.    NARCISO on CKD (chronic kidney disease) stage 3-4  Baseline creatinine is 2.  He is non-oliguric and has a parks in place.  Advise to continue diuresis as needed at present. Off lasix   BP borderline. Avoid hypotension   No role/ need for RRT at present       Essential hypertension.    BP is uncontrolled   care per CCU    Acute Resp Failure.  Improving volume status s/p diuretics   Remains intubated  I agree with diuretics as needed at present  Continue to monitor his weight and urine output   f/u cardio    CKD-MBD  check pth, &Vitamin D 25  Phos acceptable   Calcium low  monitor phos and wolf daily    Hypernatremia  Increase free water via NGT

## 2020-01-05 NOTE — SWALLOW BEDSIDE ASSESSMENT ADULT - PHARYNGEAL PHASE
Decreased laryngeal elevation/intermittent/ delayed throat clear/cough Decreased laryngeal elevation/Throat clear post oral intake

## 2020-01-05 NOTE — PROGRESS NOTE ADULT - PROBLEM SELECTOR PLAN 1
Resolved   pt went into VT during CPAP trial, likely 2/2 hypoxia in the setting of severe ischemic disease  -s/p amio and lido bolus and lido drip   -rpt TTE unchanged from prior, Trop elevated, CK/CKMB wnl. likely will need non urgent ischemic eval when pt is more HDS   -switched zosyn to meropenum for salt reduction  -resumed hep gtt and d'mery because of active bleeding -patient with no tele events or VT overnight  -continue to monitor on tele  -possible ischemic evaluation once medically optimized

## 2020-01-06 LAB
ALBUMIN SERPL ELPH-MCNC: 2.8 G/DL — LOW (ref 3.3–5)
ALP SERPL-CCNC: 97 U/L — SIGNIFICANT CHANGE UP (ref 40–120)
ALT FLD-CCNC: 30 U/L — SIGNIFICANT CHANGE UP (ref 4–41)
ANION GAP SERPL CALC-SCNC: 14 MMO/L — SIGNIFICANT CHANGE UP (ref 7–14)
APTT BLD: 58.9 SEC — HIGH (ref 27.5–36.3)
AST SERPL-CCNC: 27 U/L — SIGNIFICANT CHANGE UP (ref 4–40)
BILIRUB SERPL-MCNC: 0.7 MG/DL — SIGNIFICANT CHANGE UP (ref 0.2–1.2)
BUN SERPL-MCNC: 44 MG/DL — HIGH (ref 7–23)
CALCIUM SERPL-MCNC: 8.4 MG/DL — SIGNIFICANT CHANGE UP (ref 8.4–10.5)
CHLORIDE SERPL-SCNC: 113 MMOL/L — HIGH (ref 98–107)
CO2 SERPL-SCNC: 19 MMOL/L — LOW (ref 22–31)
CREAT SERPL-MCNC: 1.55 MG/DL — HIGH (ref 0.5–1.3)
GLUCOSE SERPL-MCNC: 105 MG/DL — HIGH (ref 70–99)
HCT VFR BLD CALC: 35.1 % — LOW (ref 39–50)
HGB BLD-MCNC: 10.5 G/DL — LOW (ref 13–17)
MAGNESIUM SERPL-MCNC: 2.3 MG/DL — SIGNIFICANT CHANGE UP (ref 1.6–2.6)
MCHC RBC-ENTMCNC: 26.3 PG — LOW (ref 27–34)
MCHC RBC-ENTMCNC: 29.9 % — LOW (ref 32–36)
MCV RBC AUTO: 88 FL — SIGNIFICANT CHANGE UP (ref 80–100)
NRBC # FLD: 0 K/UL — SIGNIFICANT CHANGE UP (ref 0–0)
PHOSPHATE SERPL-MCNC: 2.6 MG/DL — SIGNIFICANT CHANGE UP (ref 2.5–4.5)
PLATELET # BLD AUTO: 245 K/UL — SIGNIFICANT CHANGE UP (ref 150–400)
PMV BLD: 12.7 FL — SIGNIFICANT CHANGE UP (ref 7–13)
POTASSIUM SERPL-MCNC: 3.6 MMOL/L — SIGNIFICANT CHANGE UP (ref 3.5–5.3)
POTASSIUM SERPL-SCNC: 3.6 MMOL/L — SIGNIFICANT CHANGE UP (ref 3.5–5.3)
PROT SERPL-MCNC: 5.8 G/DL — LOW (ref 6–8.3)
RBC # BLD: 3.99 M/UL — LOW (ref 4.2–5.8)
RBC # FLD: 17.4 % — HIGH (ref 10.3–14.5)
SODIUM SERPL-SCNC: 146 MMOL/L — HIGH (ref 135–145)
WBC # BLD: 12.8 K/UL — HIGH (ref 3.8–10.5)
WBC # FLD AUTO: 12.8 K/UL — HIGH (ref 3.8–10.5)

## 2020-01-06 PROCEDURE — 74230 X-RAY XM SWLNG FUNCJ C+: CPT | Mod: 26

## 2020-01-06 PROCEDURE — 99233 SBSQ HOSP IP/OBS HIGH 50: CPT

## 2020-01-06 PROCEDURE — 71045 X-RAY EXAM CHEST 1 VIEW: CPT | Mod: 26

## 2020-01-06 PROCEDURE — 99232 SBSQ HOSP IP/OBS MODERATE 35: CPT

## 2020-01-06 RX ORDER — METOPROLOL TARTRATE 50 MG
150 TABLET ORAL DAILY
Refills: 0 | Status: DISCONTINUED | OUTPATIENT
Start: 2020-01-06 | End: 2020-01-10

## 2020-01-06 RX ORDER — ISOSORBIDE DINITRATE 5 MG/1
20 TABLET ORAL THREE TIMES A DAY
Refills: 0 | Status: DISCONTINUED | OUTPATIENT
Start: 2020-01-06 | End: 2020-01-10

## 2020-01-06 RX ORDER — LANOLIN ALCOHOL/MO/W.PET/CERES
3 CREAM (GRAM) TOPICAL ONCE
Refills: 0 | Status: COMPLETED | OUTPATIENT
Start: 2020-01-06 | End: 2020-01-06

## 2020-01-06 RX ORDER — METOPROLOL TARTRATE 50 MG
75 TABLET ORAL ONCE
Refills: 0 | Status: COMPLETED | OUTPATIENT
Start: 2020-01-06 | End: 2020-01-06

## 2020-01-06 RX ORDER — MEROPENEM 1 G/30ML
1000 INJECTION INTRAVENOUS EVERY 12 HOURS
Refills: 0 | Status: COMPLETED | OUTPATIENT
Start: 2020-01-06 | End: 2020-01-09

## 2020-01-06 RX ADMIN — ATORVASTATIN CALCIUM 80 MILLIGRAM(S): 80 TABLET, FILM COATED ORAL at 22:13

## 2020-01-06 RX ADMIN — HEPARIN SODIUM 10 UNIT(S)/HR: 5000 INJECTION INTRAVENOUS; SUBCUTANEOUS at 19:33

## 2020-01-06 RX ADMIN — ISOSORBIDE DINITRATE 20 MILLIGRAM(S): 5 TABLET ORAL at 12:00

## 2020-01-06 RX ADMIN — PANTOPRAZOLE SODIUM 40 MILLIGRAM(S): 20 TABLET, DELAYED RELEASE ORAL at 11:08

## 2020-01-06 RX ADMIN — TICAGRELOR 90 MILLIGRAM(S): 90 TABLET ORAL at 18:50

## 2020-01-06 RX ADMIN — Medication 50 MILLIGRAM(S): at 22:13

## 2020-01-06 RX ADMIN — Medication 60 MILLIGRAM(S): at 11:08

## 2020-01-06 RX ADMIN — Medication 75 MILLIGRAM(S): at 18:57

## 2020-01-06 RX ADMIN — TICAGRELOR 90 MILLIGRAM(S): 90 TABLET ORAL at 05:26

## 2020-01-06 RX ADMIN — CHLORHEXIDINE GLUCONATE 1 APPLICATION(S): 213 SOLUTION TOPICAL at 11:22

## 2020-01-06 RX ADMIN — MEROPENEM 100 MILLIGRAM(S): 1 INJECTION INTRAVENOUS at 18:50

## 2020-01-06 RX ADMIN — Medication 3 MILLILITER(S): at 10:33

## 2020-01-06 RX ADMIN — HEPARIN SODIUM 10 UNIT(S)/HR: 5000 INJECTION INTRAVENOUS; SUBCUTANEOUS at 11:06

## 2020-01-06 RX ADMIN — Medication 50 MILLIGRAM(S): at 05:25

## 2020-01-06 RX ADMIN — ISOSORBIDE DINITRATE 10 MILLIGRAM(S): 5 TABLET ORAL at 05:25

## 2020-01-06 RX ADMIN — ISOSORBIDE DINITRATE 20 MILLIGRAM(S): 5 TABLET ORAL at 22:13

## 2020-01-06 RX ADMIN — Medication 3 MILLILITER(S): at 23:21

## 2020-01-06 RX ADMIN — Medication 50 MILLIGRAM(S): at 11:22

## 2020-01-06 RX ADMIN — Medication 75 MILLIGRAM(S): at 05:26

## 2020-01-06 RX ADMIN — SERTRALINE 50 MILLIGRAM(S): 25 TABLET, FILM COATED ORAL at 11:06

## 2020-01-06 RX ADMIN — Medication 3 MILLIGRAM(S): at 00:58

## 2020-01-06 RX ADMIN — Medication 3 MILLILITER(S): at 03:22

## 2020-01-06 RX ADMIN — Medication 3 MILLILITER(S): at 16:11

## 2020-01-06 RX ADMIN — Medication 81 MILLIGRAM(S): at 11:06

## 2020-01-06 NOTE — PHYSICAL THERAPY INITIAL EVALUATION ADULT - GENERAL OBSERVATIONS, REHAB EVAL
Pt received semi-glover in bed, +cardiac monitor, supplemental O2, left UE IV, pulse oximeter, NAD. Pt agreeable to PT consultation

## 2020-01-06 NOTE — PHYSICAL THERAPY INITIAL EVALUATION ADULT - ADDITIONAL COMMENTS
Pt lives at home with daughter and son in law, 12 steps to enter with unilateral handrail. Pt was independent during ADLs and ambulation prior to admission. No prior use of assistive device.

## 2020-01-06 NOTE — SWALLOW VFSS/MBS ASSESSMENT ADULT - COMMENTS
Patient is a "64M with CAD, stents and CABG (last [facility] was 12/2017 with patent LIMA but other grafts occluded, medical management), HTN, HLD, CKD, CHER, ?HF, and pemphigus vulgaris presents with acute SOB requiring intubation for airway protection with ? new atrial fibrillation with ?hypoxic and hypercapnic respiratory failure". Intubated on 12/27/19 and extubated on 1/4/20. CXR completed on 1/3 reported "lungs are clear of focal consolidation".    Clinical Bedside Swallow Evaluation completed on 1/5/19 (see report for details).     Patient seen upright in specialized chair with nasal canula and NGT in place with Radiologist present. Patient was alert/awake and easily engaged in conversation. Patient able to follow simple directions.

## 2020-01-06 NOTE — PROGRESS NOTE ADULT - SUBJECTIVE AND OBJECTIVE BOX
[facility]   MD YENNY PIERCE DO ANAM SIDDIQUI ANGELA WONG, PA    TEL:  OFFICE: [phone]  DR LEE CELL: [phone]  DR. STEVENSON CELL: [phone]  DR. GOODWIN CELL: [phone]  YOANNA CUNNINGHAM CELL: [phone]        Patient is a 64y old  Male who presents with a chief complaint of Acute resp failure (06 Jan 2020 09:02)      Patient seen and examined at bedside. + cough    VITALS:  T(F): 98.6 (01-06-20 @ 08:00), Max: 99.6 (01-05-20 @ 15:13)  HR: 102 (01-06-20 @ 11:00)  BP: --  RR: 29 (01-06-20 @ 11:00)  SpO2: 96% (01-06-20 @ 11:00)  Wt(kg): --    01-05 @ 07:01  -  01-06 @ 07:00  --------------------------------------------------------  IN: 2590 mL / OUT: 1755 mL / NET: 835 mL    01-06 @ 07:01  -  01-06 @ 12:15  --------------------------------------------------------  IN: 660 mL / OUT: 300 mL / NET: 360 mL          PHYSICAL EXAM:  Constitutional: NAD  Neck: No JVD  Respiratory: b/l rhonchi  Cardiovascular: S1, S2, RRR  Gastrointestinal: BS+, soft, NT/ND, + NGT  Extremities: No peripheral edema    Hospital Medications:   MEDICATIONS  (STANDING):  albuterol/ipratropium for Nebulization. 3 milliLiter(s) Nebulizer every 6 hours  aspirin  chewable 81 milliGRAM(s) Oral daily  atorvastatin 80 milliGRAM(s) Oral at bedtime  chlorhexidine 4% Liquid 1 Application(s) Topical daily  heparin  Infusion 1000 Unit(s)/Hr (10 mL/Hr) IV Continuous <Continuous>  hydrALAZINE 50 milliGRAM(s) Oral three times a day  isosorbide   dinitrate Tablet (ISORDIL) 20 milliGRAM(s) Oral three times a day  metoprolol tartrate 75 milliGRAM(s) Oral two times a day  pantoprazole  Injectable 40 milliGRAM(s) IV Push daily  predniSONE   Tablet 60 milliGRAM(s) Oral every 24 hours  sertraline Concentrate 50 milliGRAM(s) Oral daily  ticagrelor 90 milliGRAM(s) Oral every 12 hours      LABS:  01-06    146<H>  |  113<H>  |  44<H>  ----------------------------<  105<H>  3.6   |  19<L>  |  1.55<H>    Ca    8.4      06 Jan 2020 05:45  Phos  2.6     01-06  Mg     2.3     01-06    TPro  5.8<L>  /  Alb  2.8<L>  /  TBili  0.7  /  DBili      /  AST  27  /  ALT  30  /  AlkPhos  97  01-06    Creatinine Trend: 1.55 <--, 1.57 <--, 1.75 <--, 2.17 <--, 2.46 <--, 2.50 <--, 2.30 <--, 2.67 <--, 2.45 <--, 2.19 <--, 2.99 <--    Phosphorus Level, Serum: 2.6 mg/dL (01-06 @ 05:45)  Albumin, Serum: 2.8 g/dL (01-06 @ 05:45)                              10.5   12.80 )-----------( 245      ( 06 Jan 2020 05:45 )             35.1     Urine Studies:  Urinalysis - [01-02-20 @ 04:20]      Color YELLOW / Appearance Lt TURBID / SG 1.020 / pH 6.0      Gluc NEGATIVE / Ketone NEGATIVE  / Bili NEGATIVE / Urobili NORMAL       Blood LARGE / Protein 30 / Leuk Est NEGATIVE / Nitrite NEGATIVE      RBC >50 / WBC 3-5 / Hyaline NEGATIVE / Gran  / Sq Epi OCC / Non Sq Epi  / Bacteria NEGATIVE      HbA1c 6.1      [12-28-19 @ 03:30]    HCV 0.14, Nonreactive Hepatitis C AB  S/CO Ratio                        Interpretation  < 1.00                                   Non-Reactive  1.00 - 4.99                         Weakly-Reactive  >= 5.00                                Reactive  Non-Reactive: Aperson with a non-reactive HCV antibody  result is considered uninfected.  No further action is  needed unless recent infection is suspected.  In these  cases, consider repeat testing later to detect  seroconversion..  Weakly-Reactive: HCV antibody test is abnormal, HCV RNA  Qualitative test will follow.  Reactive: HCV antibody test is abnormal, HCV RNA  Qualitative test will follow.  Note: HCV antibody testing is performed on the [employer]   system.      [12-28-19 @ 03:30]      RADIOLOGY & ADDITIONAL STUDIES:

## 2020-01-06 NOTE — SWALLOW VFSS/MBS ASSESSMENT ADULT - PHARYNGEAL PHASE COMMENTS
Adequate initiation of pharyngeal swallow, adequate laryngeal elevation, adequate base of tongue retraction, adequate epiglottic deflection, adequate laryngeal vestibular closure with NO laryngeal penetration/aspiration noted and adequate pharyngeal contractility. Delay in initiation of pharyngeal swallow (head of bolus varies from level to valleculae to level of pyriforms), reduced laryngeal elevation, reduced base of tongue retraction resulting in trace residue along base of tongue surface and in valleculae post primary swallow, reduced epiglottic deflection, reduced laryngeal vestibular closure and reduced pharyngeal contractility. There was trace laryngeal penetration during the swallow above the level of the vocal cords with retrieval noted and airway protection was maintained. Patient benefits from prompted secondary re-swallow to adequately clear residue. Delay in initiation of pharyngeal swallow (head of bolus varies from level to valleculae to level of pyriforms), adequate laryngeal elevation, reduced base of tongue retraction resulting in trace residue along base of tongue surface and in valleculae post primary swallow, adequate epiglottic deflection, reduced laryngeal vestibular closure and reduced pharyngeal contractility. There was trace laryngeal penetration during the swallow above the level of the vocal cords with retrieval noted and airway protection was maintained. Patient benefits from prompted secondary re-swallow to adequately clear residue.

## 2020-01-06 NOTE — PROGRESS NOTE ADULT - SUBJECTIVE AND OBJECTIVE BOX
Cheyanne Soriano PGY1   Page: [phone]    PATIENT: THONY ANAYA, MRN: [id number]    CHIEF COMPLAINT: Patient is a 64y old  Male who presents with a chief complaint of Acute resp failure (2020 18:41)      INTERVAL HISTORY/OVERNIGHT EVENTS: No overnight events. At bedside, patient has been sleeping and eating well. Denies N/V/D/C. Last BM x1 regular yesterday. Denies abdominal pain. Denies chest pain or SOB, cough. Has been ambulating with assistance. Oriented to person, place, and time. Breathing comfortably on room air.    REVIEW OF SYSTEMS:    Constitutional:     [ ] negative [ ] fevers [ ] chills [ ] weight loss [ ] weight gain  HEENT:                  [ ] negative [ ] dry eyes [ ] eye irritation [ ] postnasal drip [ ] nasal congestion  CV:                         [ ] negative  [ ] chest pain [ ] orthopnea [ ] palpitations [ ] murmur  Resp:                     [ ] negative [ ] cough [ ] shortness of breath [ ] dyspnea [ ] wheezing [ ] sputum [ ] hemoptysis  GI:                          [ ] negative [ ] nausea [ ] vomiting [ ] diarrhea [ ] constipation [ ] abd pain [ ] dysphagia   :                        [ ] negative [ ] dysuria [ ] nocturia [ ] hematuria [ ] increased urinary frequency  Musculoskeletal: [ ] negative [ ] back pain [ ] myalgias [ ] arthralgias [ ] fracture  Skin:                       [ ] negative [ ] rash [ ] itch  Neurological:        [ ] negative [ ] headache [ ] dizziness [ ] syncope [ ] weakness [ ] numbness  Psychiatric:           [ ] negative [ ] anxiety [ ] depression  Endocrine:            [ ] negative [ ] diabetes [ ] thyroid problem  Heme/Lymph:      [ ] negative [ ] anemia [ ] bleeding problem  Allergic/Immune: [ ] negative [ ] itchy eyes [ ] nasal discharge [ ] hives [ ] angioedema    [ ] All other systems negative  [ ] Unable to assess ROS because ________.    MEDICATIONS:  MEDICATIONS  (STANDING):  albuterol/ipratropium for Nebulization. 3 milliLiter(s) Nebulizer every 6 hours  aspirin  chewable 81 milliGRAM(s) Oral daily  atorvastatin 80 milliGRAM(s) Oral at bedtime  chlorhexidine 4% Liquid 1 Application(s) Topical daily  heparin  Infusion 1000 Unit(s)/Hr (10 mL/Hr) IV Continuous <Continuous>  hydrALAZINE 50 milliGRAM(s) Oral three times a day  isosorbide   dinitrate Tablet (ISORDIL) 10 milliGRAM(s) Oral three times a day  metoprolol tartrate 75 milliGRAM(s) Oral two times a day  pantoprazole  Injectable 40 milliGRAM(s) IV Push daily  predniSONE   Tablet 60 milliGRAM(s) Oral every 24 hours  sertraline Concentrate 50 milliGRAM(s) Oral daily  ticagrelor 90 milliGRAM(s) Oral every 12 hours    MEDICATIONS  (PRN):  acetaminophen    Suspension .. 650 milliGRAM(s) Oral every 6 hours PRN Temp greater or equal to 38C (100.4F)      ALLERGIES: Allergies    No Known Allergies    Intolerances        OBJECTIVE:  ICU Vital Signs Last 24 Hrs  T(C): 37 (2020 08:00), Max: 37.6 (2020 15:13)  T(F): 98.6 (2020 08:00), Max: 99.6 (2020 15:13)  HR: 79 (2020 08:00) (76 - 120)  BP: --  BP(mean): --  ABP: 145/79 (2020 08:00) (133/69 - 184/108)  ABP(mean): 98 (2020 08:00) (88 - 135)  RR: 19 (2020 08:00) (17 - 30)  SpO2: 100% (2020 08:00) (96% - 100%)      Adult Advanced Hemodynamics Last 24 Hrs  CVP(mm Hg): --  CVP(cm H2O): --  CO: --  CI: --  PA: --  PA(mean): --  PCWP: --  SVR: --  SVRI: --  PVR: --  PVRI: --  CAPILLARY BLOOD GLUCOSE        CAPILLARY BLOOD GLUCOSE        I&O's Summary    2020 07:01  -  2020 07:00  --------------------------------------------------------  IN: 2590 mL / OUT: 1755 mL / NET: 835 mL    2020 07:01  -  2020 09:02  --------------------------------------------------------  IN: 50 mL / OUT: 100 mL / NET: -50 mL      Daily     Daily Weight in k (2020 05:00)    PHYSICAL EXAMINATION:  General: Comfortable, no acute distress, cooperative with exam.  HEENT: PERRLA, EOMI, moist mucous membranes.  Respiratory: CTAB, normal respiratory effort, no coughing, wheezes, crackles, or rales.  CV: RRR, S1S2, no murmurs, rubs or gallops. No JVD. Distal pulses intact.  Abdominal: Soft, nontender, nondistended, no rebound or guarding, normal bowel sounds.  Neurology: AOx3, no focal neuro defects, DOW x 4.  Extremities: No pitting edema, + Peripheral pulses.  Incisions:   Tubes:    LABS:  ABG - ( 2020 13:30 )  pH, Arterial: 7.49  pH, Blood: x     /  pCO2: 32    /  pO2: 133   / HCO3: 26    / Base Excess: 1.1   /  SaO2: 98.4                                    10.5   12.80 )-----------( 245      ( 2020 05:45 )             35.1     01-06    146<H>  |  113<H>  |  44<H>  ----------------------------<  105<H>  3.6   |  19<L>  |  1.55<H>    Ca    8.4      2020 05:45  Phos  2.6     01-06  Mg     2.3     01-06    TPro  5.8<L>  /  Alb  2.8<L>  /  TBili  0.7  /  DBili  x   /  AST  27  /  ALT  30  /  AlkPhos  97  01-06    LIVER FUNCTIONS - ( 2020 05:45 )  Alb: 2.8 g/dL / Pro: 5.8 g/dL / ALK PHOS: 97 u/L / ALT: 30 u/L / AST: 27 u/L / GGT: x           PTT - ( 2020 05:45 )  PTT:58.9 SEC            TELEMETRY:     EKG:     IMAGING: Cheyanne Soriano PGY1   Page: [phone]    PATIENT: THONY ANAYA, MRN: [id number]    CHIEF COMPLAINT: Patient is a 64y old  Male who presents with a chief complaint of Acute resp failure (2020 18:41)      INTERVAL HISTORY/OVERNIGHT EVENTS: No overnight events. pt said he is sleepy this morning because he was awake talking with the nurse all night last night. complaint of throat discomfort from the NG tube     REVIEW OF SYSTEMS:  no f/c/n/v/d/constipation/rash/cp     MEDICATIONS:  MEDICATIONS  (STANDING):  albuterol/ipratropium for Nebulization. 3 milliLiter(s) Nebulizer every 6 hours  aspirin  chewable 81 milliGRAM(s) Oral daily  atorvastatin 80 milliGRAM(s) Oral at bedtime  chlorhexidine 4% Liquid 1 Application(s) Topical daily  heparin  Infusion 1000 Unit(s)/Hr (10 mL/Hr) IV Continuous <Continuous>  hydrALAZINE 50 milliGRAM(s) Oral three times a day  isosorbide   dinitrate Tablet (ISORDIL) 10 milliGRAM(s) Oral three times a day  metoprolol tartrate 75 milliGRAM(s) Oral two times a day  pantoprazole  Injectable 40 milliGRAM(s) IV Push daily  predniSONE   Tablet 60 milliGRAM(s) Oral every 24 hours  sertraline Concentrate 50 milliGRAM(s) Oral daily  ticagrelor 90 milliGRAM(s) Oral every 12 hours    MEDICATIONS  (PRN):  acetaminophen    Suspension .. 650 milliGRAM(s) Oral every 6 hours PRN Temp greater or equal to 38C (100.4F)      ALLERGIES: Allergies    No Known Allergies    Intolerances        OBJECTIVE:  ICU Vital Signs Last 24 Hrs  T(C): 37 (2020 08:00), Max: 37.6 (2020 15:13)  T(F): 98.6 (2020 08:00), Max: 99.6 (2020 15:13)  HR: 79 (2020 08:00) (76 - 120)  BP: --  BP(mean): --  ABP: 145/79 (2020 08:00) (133/69 - 184/108)  ABP(mean): 98 (2020 08:00) (88 - 135)  RR: 19 (2020 08:00) (17 - 30)  SpO2: 100% (2020 08:00) (96% - 100%)          I&O's Summary    2020 07:01  -  2020 07:00  --------------------------------------------------------  IN: 2590 mL / OUT: 1755 mL / NET: 835 mL    2020 07:01  -  2020 09:02  --------------------------------------------------------  IN: 50 mL / OUT: 100 mL / NET: -50 mL      Daily     Daily Weight in k (2020 05:00)    PHYSICAL EXAMINATION:  General: Comfortable, no acute distress, cooperative with exam.  HEENT: PERRLA, EOMI, moist mucous membranes.  Respiratory: rhonchus, normal respiratory effort, no coughing, wheezes, crackles, or rales.  CV: afib, S1S2, no murmurs, rubs or gallops. No JVD. Distal pulses intact.  Abdominal: Soft, nontender, nondistended, no rebound or guarding, normal bowel sounds.  Neurology: AOx3, no focal neuro defects, DOW x 4.  Extremities: No pitting edema, + Peripheral pulses.  Incisions:   Tubes:    LABS:  ABG - ( 2020 13:30 )  pH, Arterial: 7.49  pH, Blood: x     /  pCO2: 32    /  pO2: 133   / HCO3: 26    / Base Excess: 1.1   /  SaO2: 98.4                                    10.5   12.80 )-----------( 245      ( 2020 05:45 )             35.1     -    146<H>  |  113<H>  |  44<H>  ----------------------------<  105<H>  3.6   |  19<L>  |  1.55<H>    Ca    8.4      2020 05:45  Phos  2.6     -06  Mg     2.3     -06    TPro  5.8<L>  /  Alb  2.8<L>  /  TBili  0.7  /  DBili  x   /  AST  27  /  ALT  30  /  AlkPhos  97  -    LIVER FUNCTIONS - ( 2020 05:45 )  Alb: 2.8 g/dL / Pro: 5.8 g/dL / ALK PHOS: 97 u/L / ALT: 30 u/L / AST: 27 u/L / GGT: x           PTT - ( 2020 05:45 )  PTT:58.9 SEC            TELEMETRY:     EKG:     IMAGING:

## 2020-01-06 NOTE — PROGRESS NOTE ADULT - ASSESSMENT
64M with CAD, stents and CABG (last [facility] was 12/2017 with patent LIMA but other grafts occluded, medical management), HTN, HLD, CKD, CHER, ?HF, and pemphigus vulgaris presents with acute SOB requiring intubation for airway protection with new atrial fibrillation in setting of + influenza.  Pt CPAP'ed this morning was initially pulling in good tidal volume, but quickly desats and went into VT, s/p Amio 150 x1 and lidocaine, resumed hep gtt, switched zosyn to meropenum for salt reduction. Pt couldn't tolerate heparin gtt, profuse bleeding from oropharynx and rectum. hep is d/mery. Pt extubated 1/4, resume hep gtt for afib/aflutter. 64M with CAD, stents and CABG (last [facility] was 12/2017 with patent LIMA but other grafts occluded, medical management), HTN, HLD, CKD, CHER, ?HF, and pemphigus vulgaris presents with acute SOB requiring intubation for airway protection with new atrial fibrillation in setting of + influenza.  Pt extubated 1/4, resume hep gtt for afib/aflutter, no bleeding

## 2020-01-06 NOTE — PROGRESS NOTE ADULT - PROBLEM SELECTOR PLAN 6
-increase free water to 500cc q6hr through feeding tube -na downtrending  -decrease free water to 300cc q6hr through feeding tube

## 2020-01-06 NOTE — PROGRESS NOTE ADULT - PROBLEM SELECTOR PLAN 4
Remains in aflutter and afib but with good rate control.   - Heparin gtt held for anticoagulation because of bleeding, bleeding had stopped  -resumed hep gtt, for added benefit for the ischemic heart dx Remains in aflutter and afib but with good rate control.   -resumed hep gtt

## 2020-01-06 NOTE — PROGRESS NOTE ADULT - PROBLEM SELECTOR PLAN 3
-patient appears dry on physical exam, NA level 150  -ECHO reveals tethered mitral valve, mod-severe MR, grossly severe LV dysfunction  -patient BP is elevated and he remains hypertensive  -continue GDMT for HF, increased hydralazine to 50 tid and added isordil 10 tid for afterload reduction, continue metoprolol 75 bid  -HOLD DIURETICs for now  -for hypernatremia continue free H20 500cc every 6 hours via the keofeed -patient appears dry on physical exam  -ECHO reveals tethered mitral valve, mod-severe MR, grossly severe LV dysfunction  -patient BP is elevated and he remains hypertensive  -continue GDMT for HF, increased hydralazine to 50 tid and added isordil 10 tid for afterload reduction, continue metoprolol 75 bid  -pt is making good amount of urine, HOLD DIURETICs for now  -for hypernatremia continue free H20 300cc every 6 hours via the [employer]

## 2020-01-06 NOTE — PROGRESS NOTE ADULT - ASSESSMENT
64M with CAD, stents and CABG (last [facility] was 12/2017 with patent LIMA but other grafts occluded, medical management), HTN, HLD, CKD, CHER, ?HF, and pemphygus vulgaris presents with acute SOB. admitted to ccu for acute resp failure s/p intubation now extubated     NARCISO on CKD (chronic kidney disease) stage 3-4  baseline likely ~ 2-2.2  had NARCISO peaked at 3.27 and had improved better than baseline  recurrent NARCISO likely ATN  He is non-oliguric. parks in place  Advise to continue diuresis as needed at present. Off lasix   BP borderline. Avoid hypotension   No role/ need for RRT at present       Essential hypertension.    BP better  care per CCU    Acute Resp Failure.  Improving volume status s/p diuretics   now extubated  I agree with diuretics as needed at present  Continue to monitor his weight and urine output   f/u cardio    CKD-MBD  check pth, &Vitamin D 25  Phos acceptable   Calcium low  monitor phos and wolf daily    Hypernatremia  increase free water via NGT   monitor

## 2020-01-06 NOTE — PROGRESS NOTE ADULT - SUBJECTIVE AND OBJECTIVE BOX
Follow Up:      Inverval History/ROS:Patient is a 64y old  Male who presents with a chief complaint of Acute resp failure (06 Jan 2020 12:15)    No fever  Breathing comfortably  No dairrhea    Allergies    No Known Allergies    Intolerances        ANTIMICROBIALS:      OTHER MEDS:  acetaminophen    Suspension .. 650 milliGRAM(s) Oral every 6 hours PRN  albuterol/ipratropium for Nebulization. 3 milliLiter(s) Nebulizer every 6 hours  aspirin  chewable 81 milliGRAM(s) Oral daily  atorvastatin 80 milliGRAM(s) Oral at bedtime  chlorhexidine 4% Liquid 1 Application(s) Topical daily  heparin  Infusion 1000 Unit(s)/Hr IV Continuous <Continuous>  hydrALAZINE 50 milliGRAM(s) Oral three times a day  isosorbide   dinitrate Tablet (ISORDIL) 20 milliGRAM(s) Oral three times a day  metoprolol succinate  milliGRAM(s) Oral daily  predniSONE   Tablet 60 milliGRAM(s) Oral every 24 hours  sertraline Concentrate 50 milliGRAM(s) Oral daily  ticagrelor 90 milliGRAM(s) Oral every 12 hours      Vital Signs Last 24 Hrs  T(C): 37 (06 Jan 2020 08:00), Max: 37.2 (06 Jan 2020 04:00)  T(F): 98.6 (06 Jan 2020 08:00), Max: 99 (06 Jan 2020 04:00)  HR: 90 (06 Jan 2020 16:11) (76 - 108)  BP: 130/70 (06 Jan 2020 14:00) (121/72 - 138/69)  BP(mean): 86 (06 Jan 2020 14:00) (81 - 86)  RR: 20 (06 Jan 2020 14:00) (17 - 29)  SpO2: 99% (06 Jan 2020 16:11) (95% - 100%)    PHYSICAL EXAM:  General: [x ] non-toxic  HEAD/EYES: [ ] PERRL [ ] white sclera [ ] icterus  ENT:  [ ] normal [ x] supple [ ] thrush [ ] pharyngeal exudate  Cardiovascular:   [ ] murmur [ x] normal [ ] PPM/AICD  Respiratory:  [x ] course BS  GI:  [x ] soft, non-tender, normal bowel sounds  :  [ ] parks [ ] no CVA tenderness   Musculoskeletal:  [ ] no synovitis  Neurologic:  [ ] non-focal exam   Skin:  [ x] no rash  Lymph: [ ] no lymphadenopathy  Psychiatric:  [ ] appropriate affect [x ] alert & oriented  Lines:  [ ] no phlebitis [ ] central line                                10.5   12.80 )-----------( 245      ( 06 Jan 2020 05:45 )             35.1       01-06    146<H>  |  113<H>  |  44<H>  ----------------------------<  105<H>  3.6   |  19<L>  |  1.55<H>    Ca    8.4      06 Jan 2020 05:45  Phos  2.6     01-06  Mg     2.3     01-06    TPro  5.8<L>  /  Alb  2.8<L>  /  TBili  0.7  /  DBili  x   /  AST  27  /  ALT  30  /  AlkPhos  97  01-06          MICROBIOLOGY:Culture - Respiratory:   NO ORGANISMS ISOLATED AT 24 HOURS  NO ORGANISMS ISOLATED AT 48 HRS.  NO ORGANISMS ISOLATED AT 72 HRS. (12-30-19 @ 18:05)      RADIOLOGY:

## 2020-01-06 NOTE — PHYSICAL THERAPY INITIAL EVALUATION ADULT - PERTINENT HX OF CURRENT PROBLEM, REHAB EVAL
As per CCU NP note: "acute SOB requiring intubation for airway protection with new atrial fibrillation in setting of + influenza.  Pt CPAP'ed this morning was initially pulling in good tidal volume, but quickly desats and went into VT, s/p Amio 150 x1 and lidocaine, resumed hep gtt, switched zosyn to meropenum for salt reduction. Pt couldn't tolerate heparin gtt, profuse bleeding from oropharynx and rectum. hep is d/mery. Pt extubated 1/4, resume hep gtt for afib/aflutter."

## 2020-01-06 NOTE — PROGRESS NOTE ADULT - PROBLEM SELECTOR PLAN 10
DVT: resume hep gtt   Diet: patient failed speech and swallow evaluation, Keep NPO with feeds and free water and keep nepro at 40cc/hr

## 2020-01-06 NOTE — PROGRESS NOTE ADULT - PROBLEM SELECTOR PLAN 1
-patient with no tele events or VT overnight  -continue to monitor on tele  -possible ischemic evaluation once medically optimized -resolved   -s/p amio and lido bolus and lido drip  -continue to monitor on tele  -possible ischemic evaluation once medically optimized

## 2020-01-06 NOTE — PROGRESS NOTE ADULT - PROBLEM SELECTOR PLAN 7
creatinine down to 1.57 today from 1.75  -avoid nephrotoxic agent  -patient is -2040 overnight creatinine downtrending to 1.57   -baseline 1.8 in 2018  -avoid nephrotoxic agent

## 2020-01-06 NOTE — SWALLOW VFSS/MBS ASSESSMENT ADULT - DIAGNOSTIC IMPRESSIONS
1. Functional oral phase for puree consistency, mechanical soft solids, regular solids, honey thick liquids, nectar thick liquids and thin liquids marked by adequate labial seal, mildly extended mastication for regular solids though able to breakdown bolus, adequate bolus manipulation, adequate anterior to posterior transport and functional oral transit time 2. Functional pharyngeal phase for puree consistency, mechanical soft solids, and regular solids marked by adequate initiation of pharyngeal swallow, adequate laryngeal elevation, adequate base of tongue retraction, adequate epiglottic deflection, adequate laryngeal vestibular closure with NO laryngeal penetration/aspiration and adequate pharyngeal contractility. Adequate pharyngeal clearance noted 3. Mild pharyngeal phase dysphagia for honey thick liquids, nectar thick liquids and thin liquids marked by delay in initiation of pharyngeal swallow (head of bolus varies from level to valleculae to level of pyriforms), adequate laryngeal elevation, reduced base of tongue retraction resulting in trace residue along base of tongue surface and in valleculae post primary swallow, adequate epiglottic deflection, reduced laryngeal vestibular closure and reduced pharyngeal contractility. There was trace laryngeal penetration (deeper for thin liquids vs honey thick liquids and nectar thick liquids) during the swallow above the level of the vocal cords with retrieval noted and airway protection was maintained. Patient benefits from prompted secondary re-swallow to adequately clear residue.

## 2020-01-06 NOTE — PROGRESS NOTE ADULT - ASSESSMENT
64/M with PMH  ·	CAD s/p stents and CABG  ·	HTN, HLD,  ·	CKD  ·	,CHER,  ·	 ?HF  ·	pemphigus vulgaris (s/p Rituxan 1 week PTA and chronic steroids).    Immunosuppressed with heart disease    Influenza/ RSV viral syndrome- s/p tamilfu    Secondary bacterial pneumonia  -finish course of meropenem on 1/9    Will sign off

## 2020-01-07 LAB
ANION GAP SERPL CALC-SCNC: 12 MMO/L — SIGNIFICANT CHANGE UP (ref 7–14)
BACTERIA BLD CULT: SIGNIFICANT CHANGE UP
BLD GP AB SCN SERPL QL: POSITIVE — SIGNIFICANT CHANGE UP
BUN SERPL-MCNC: 33 MG/DL — HIGH (ref 7–23)
CALCIUM SERPL-MCNC: 8.7 MG/DL — SIGNIFICANT CHANGE UP (ref 8.4–10.5)
CHLORIDE SERPL-SCNC: 110 MMOL/L — HIGH (ref 98–107)
CO2 SERPL-SCNC: 20 MMOL/L — LOW (ref 22–31)
CREAT SERPL-MCNC: 1.44 MG/DL — HIGH (ref 0.5–1.3)
DAT POLY-SP REAG RBC QL: NEGATIVE — SIGNIFICANT CHANGE UP
GLUCOSE SERPL-MCNC: 87 MG/DL — SIGNIFICANT CHANGE UP (ref 70–99)
HCT VFR BLD CALC: 35.8 % — LOW (ref 39–50)
HGB BLD-MCNC: 10.9 G/DL — LOW (ref 13–17)
INR BLD: 0.97 — SIGNIFICANT CHANGE UP (ref 0.88–1.17)
MCHC RBC-ENTMCNC: 27.2 PG — SIGNIFICANT CHANGE UP (ref 27–34)
MCHC RBC-ENTMCNC: 30.4 % — LOW (ref 32–36)
MCV RBC AUTO: 89.3 FL — SIGNIFICANT CHANGE UP (ref 80–100)
PLATELET # BLD AUTO: 263 K/UL — SIGNIFICANT CHANGE UP (ref 150–400)
PMV BLD: 12.4 FL — SIGNIFICANT CHANGE UP (ref 7–13)
POTASSIUM SERPL-MCNC: 3.4 MMOL/L — LOW (ref 3.5–5.3)
POTASSIUM SERPL-SCNC: 3.4 MMOL/L — LOW (ref 3.5–5.3)
PROTHROM AB SERPL-ACNC: 11.1 SEC — SIGNIFICANT CHANGE UP (ref 9.8–13.1)
RBC # BLD: 4.01 M/UL — LOW (ref 4.2–5.8)
RBC # FLD: 17.2 % — HIGH (ref 10.3–14.5)
RH IG SCN BLD-IMP: POSITIVE — SIGNIFICANT CHANGE UP
SODIUM SERPL-SCNC: 142 MMOL/L — SIGNIFICANT CHANGE UP (ref 135–145)
WBC # BLD: 11.99 K/UL — HIGH (ref 3.8–10.5)
WBC # FLD AUTO: 11.99 K/UL — HIGH (ref 3.8–10.5)

## 2020-01-07 PROCEDURE — 99233 SBSQ HOSP IP/OBS HIGH 50: CPT

## 2020-01-07 RX ORDER — APIXABAN 2.5 MG/1
5 TABLET, FILM COATED ORAL EVERY 12 HOURS
Refills: 0 | Status: COMPLETED | OUTPATIENT
Start: 2020-01-07 | End: 2020-01-07

## 2020-01-07 RX ORDER — POTASSIUM CHLORIDE 20 MEQ
40 PACKET (EA) ORAL ONCE
Refills: 0 | Status: COMPLETED | OUTPATIENT
Start: 2020-01-07 | End: 2020-01-07

## 2020-01-07 RX ORDER — SERTRALINE 25 MG/1
50 TABLET, FILM COATED ORAL DAILY
Refills: 0 | Status: DISCONTINUED | OUTPATIENT
Start: 2020-01-07 | End: 2020-01-10

## 2020-01-07 RX ORDER — QUETIAPINE FUMARATE 200 MG/1
25 TABLET, FILM COATED ORAL DAILY
Refills: 0 | Status: DISCONTINUED | OUTPATIENT
Start: 2020-01-07 | End: 2020-01-07

## 2020-01-07 RX ORDER — QUETIAPINE FUMARATE 200 MG/1
25 TABLET, FILM COATED ORAL AT BEDTIME
Refills: 0 | Status: DISCONTINUED | OUTPATIENT
Start: 2020-01-07 | End: 2020-01-10

## 2020-01-07 RX ADMIN — TICAGRELOR 90 MILLIGRAM(S): 90 TABLET ORAL at 17:30

## 2020-01-07 RX ADMIN — Medication 60 MILLIGRAM(S): at 12:04

## 2020-01-07 RX ADMIN — CHLORHEXIDINE GLUCONATE 1 APPLICATION(S): 213 SOLUTION TOPICAL at 12:05

## 2020-01-07 RX ADMIN — Medication 50 MILLIGRAM(S): at 06:14

## 2020-01-07 RX ADMIN — Medication 1 MILLIGRAM(S): at 02:46

## 2020-01-07 RX ADMIN — SERTRALINE 50 MILLIGRAM(S): 25 TABLET, FILM COATED ORAL at 12:05

## 2020-01-07 RX ADMIN — QUETIAPINE FUMARATE 25 MILLIGRAM(S): 200 TABLET, FILM COATED ORAL at 21:44

## 2020-01-07 RX ADMIN — Medication 50 MILLIGRAM(S): at 12:05

## 2020-01-07 RX ADMIN — TICAGRELOR 90 MILLIGRAM(S): 90 TABLET ORAL at 06:14

## 2020-01-07 RX ADMIN — Medication 40 MILLIEQUIVALENT(S): at 17:30

## 2020-01-07 RX ADMIN — ISOSORBIDE DINITRATE 20 MILLIGRAM(S): 5 TABLET ORAL at 12:05

## 2020-01-07 RX ADMIN — Medication 81 MILLIGRAM(S): at 12:04

## 2020-01-07 RX ADMIN — MEROPENEM 100 MILLIGRAM(S): 1 INJECTION INTRAVENOUS at 06:14

## 2020-01-07 RX ADMIN — Medication 50 MILLIGRAM(S): at 21:44

## 2020-01-07 RX ADMIN — ATORVASTATIN CALCIUM 80 MILLIGRAM(S): 80 TABLET, FILM COATED ORAL at 21:44

## 2020-01-07 RX ADMIN — Medication 3 MILLILITER(S): at 10:22

## 2020-01-07 RX ADMIN — Medication 3 MILLILITER(S): at 22:48

## 2020-01-07 RX ADMIN — ISOSORBIDE DINITRATE 20 MILLIGRAM(S): 5 TABLET ORAL at 21:44

## 2020-01-07 RX ADMIN — APIXABAN 5 MILLIGRAM(S): 2.5 TABLET, FILM COATED ORAL at 17:32

## 2020-01-07 RX ADMIN — ISOSORBIDE DINITRATE 20 MILLIGRAM(S): 5 TABLET ORAL at 06:14

## 2020-01-07 RX ADMIN — Medication 3 MILLILITER(S): at 16:27

## 2020-01-07 RX ADMIN — MEROPENEM 100 MILLIGRAM(S): 1 INJECTION INTRAVENOUS at 17:29

## 2020-01-07 RX ADMIN — Medication 150 MILLIGRAM(S): at 06:14

## 2020-01-07 NOTE — PROGRESS NOTE ADULT - SUBJECTIVE AND OBJECTIVE BOX
[facility] [place] [facility]   MD YENNY PIERCE DO ANAM SIDDIQUI ANGELA WONG, PA    TEL:  OFFICE: [phone]  DR LEE CELL: [phone]  DR. STEVENSON CELL: [phone]  DR. GOODWIN CELL: [phone]  YOANNA CUNNINGHAM CELL: [phone]        Patient is a 64y old  Male who presents with a chief complaint of Acute resp failure (07 Jan 2020 08:31)      Patient seen and examined at bedside. No chest pain/sob    VITALS:  T(F): 98.3 (01-07-20 @ 12:00), Max: 99.1 (01-06-20 @ 20:07)  HR: 90 (01-07-20 @ 12:00)  BP: 129/87 (01-07-20 @ 12:00)  RR: 24 (01-07-20 @ 12:00)  SpO2: 99% (01-07-20 @ 12:00)  Wt(kg): --    01-06 @ 07:01  -  01-07 @ 07:00  --------------------------------------------------------  IN: 960 mL / OUT: 1000 mL / NET: -40 mL    01-07 @ 07:01  -  01-07 @ 14:39  --------------------------------------------------------  IN: 0 mL / OUT: 300 mL / NET: -300 mL          PHYSICAL EXAM:  Constitutional: NAD  Neck: No JVD  Respiratory: CTAB, no wheezes, rales or rhonchi  Cardiovascular: S1, S2, RRR  Gastrointestinal: BS+, soft, NT/ND  Extremities: No peripheral edema    Hospital Medications:   MEDICATIONS  (STANDING):  albuterol/ipratropium for Nebulization. 3 milliLiter(s) Nebulizer every 6 hours  apixaban 5 milliGRAM(s) Oral every 12 hours  aspirin  chewable 81 milliGRAM(s) Oral daily  atorvastatin 80 milliGRAM(s) Oral at bedtime  chlorhexidine 4% Liquid 1 Application(s) Topical daily  hydrALAZINE 50 milliGRAM(s) Oral three times a day  isosorbide   dinitrate Tablet (ISORDIL) 20 milliGRAM(s) Oral three times a day  meropenem  IVPB 1000 milliGRAM(s) IV Intermittent every 12 hours  metoprolol succinate  milliGRAM(s) Oral daily  potassium chloride    Tablet ER 40 milliEquivalent(s) Oral once  predniSONE   Tablet 60 milliGRAM(s) Oral every 24 hours  QUEtiapine 25 milliGRAM(s) Oral at bedtime  sertraline 50 milliGRAM(s) Oral daily  ticagrelor 90 milliGRAM(s) Oral every 12 hours      LABS:  01-07    142  |  110<H>  |  33<H>  ----------------------------<  87  3.4<L>   |  20<L>  |  1.44<H>    Ca    8.7      07 Jan 2020 11:00  Phos  2.6     01-06  Mg     2.3     01-06    TPro  5.8<L>  /  Alb  2.8<L>  /  TBili  0.7  /  DBili      /  AST  27  /  ALT  30  /  AlkPhos  97  01-06    Creatinine Trend: 1.44 <--, 1.55 <--, 1.57 <--, 1.75 <--, 2.17 <--, 2.46 <--, 2.50 <--, 2.30 <--, 2.67 <--, 2.45 <--, 2.19 <--                                10.9   11.99 )-----------( 263      ( 07 Jan 2020 11:00 )             35.8     Urine Studies:  Urinalysis - [01-02-20 @ 04:20]      Color YELLOW / Appearance Lt TURBID / SG 1.020 / pH 6.0      Gluc NEGATIVE / Ketone NEGATIVE  / Bili NEGATIVE / Urobili NORMAL       Blood LARGE / Protein 30 / Leuk Est NEGATIVE / Nitrite NEGATIVE      RBC >50 / WBC 3-5 / Hyaline NEGATIVE / Gran  / Sq Epi OCC / Non Sq Epi  / Bacteria NEGATIVE      HbA1c 6.1      [12-28-19 @ 03:30]    HCV 0.14, Nonreactive Hepatitis C AB  S/CO Ratio                        Interpretation  < 1.00                                   Non-Reactive  1.00 - 4.99                         Weakly-Reactive  >= 5.00                                Reactive  Non-Reactive: Aperson with a non-reactive HCV antibody  result is considered uninfected.  No further action is  needed unless recent infection is suspected.  In these  cases, consider repeat testing later to detect  seroconversion..  Weakly-Reactive: HCV antibody test is abnormal, HCV RNA  Qualitative test will follow.  Reactive: HCV antibody test is abnormal, HCV RNA  Qualitative test will follow.  Note: HCV antibody testing is performed on the Abbott   system.      [12-28-19 @ 03:30]      RADIOLOGY & ADDITIONAL STUDIES:

## 2020-01-07 NOTE — PROGRESS NOTE ADULT - ASSESSMENT
64M with CAD, stents and CABG (last [facility] was 12/2017 with patent LIMA but other grafts occluded, medical management), HTN, HLD, CKD, CHER, ?HF, and pemphygus vulgaris presents with acute SOB. admitted to ccu for acute resp failure s/p intubation now extubated     NARCISO on CKD (chronic kidney disease) stage 3-4  baseline likely ~ 2-2.2  had NARCISO peaked at 3.27 and had improved better than baseline  recurrent NARCISO likely ATN  He is non-oliguric. parks in place  Advise to continue diuresis as needed at present. Off lasix   BP borderline. Avoid hypotension   No role/ need for RRT at present       Essential hypertension.    BP better  care per CCU    Acute Resp Failure.  Improving volume status s/p diuretics   now extubated  I agree with diuretics as needed at present  Continue to monitor his weight and urine output   f/u cardio    CKD-MBD  check pth, &Vitamin D 25  Phos acceptable   Calcium low  monitor phos and wolf daily    Hypernatremia  better   monitor

## 2020-01-07 NOTE — PROGRESS NOTE ADULT - PROBLEM SELECTOR PLAN 3
-patient appears dry on physical exam  -ECHO reveals tethered mitral valve, mod-severe MR, grossly severe LV dysfunction  -patient BP is elevated and he remains hypertensive  -continue GDMT for HF, increased hydralazine to 50 tid and added isordil 10 tid for afterload reduction, continue metoprolol 75 bid  -pt is making good amount of urine, HOLD DIURETICs for now  -for hypernatremia continue free H20 300cc every 6 hours via the [employer] -patient appears euvolemic on physical exam  -ECHO reveals tethered mitral valve, mod-severe MR, grossly severe LV dysfunction  -patient BP is elevated and he remains hypertensive  -continue GDMT for HF, increased hydralazine to 50 tid and added isordil 10 tid for afterload reduction, continue metoprolol 75 bid  -pt is making good amount of urine, HOLD DIURETICs for now  - hypernatremia resolved

## 2020-01-07 NOTE — PROGRESS NOTE ADULT - SUBJECTIVE AND OBJECTIVE BOX
Cheyanne Soriano PGY1   Page: [phone]    PATIENT: THONY ANAYA, MRN: [id number]    CHIEF COMPLAINT: Patient is a 64y old  Male who presents with a chief complaint of Acute resp failure (06 Jan 2020 16:13)      INTERVAL HISTORY/OVERNIGHT EVENTS: No overnight events. At bedside, patient has been sleeping and eating well. Denies N/V/D/C. Last BM x1 regular yesterday. Denies abdominal pain. Denies chest pain or SOB, cough. Has been ambulating with assistance. Oriented to person, place, and time. Breathing comfortably on room air.    REVIEW OF SYSTEMS:    Constitutional:     [ ] negative [ ] fevers [ ] chills [ ] weight loss [ ] weight gain  HEENT:                  [ ] negative [ ] dry eyes [ ] eye irritation [ ] postnasal drip [ ] nasal congestion  CV:                         [ ] negative  [ ] chest pain [ ] orthopnea [ ] palpitations [ ] murmur  Resp:                     [ ] negative [ ] cough [ ] shortness of breath [ ] dyspnea [ ] wheezing [ ] sputum [ ] hemoptysis  GI:                          [ ] negative [ ] nausea [ ] vomiting [ ] diarrhea [ ] constipation [ ] abd pain [ ] dysphagia   :                        [ ] negative [ ] dysuria [ ] nocturia [ ] hematuria [ ] increased urinary frequency  Musculoskeletal: [ ] negative [ ] back pain [ ] myalgias [ ] arthralgias [ ] fracture  Skin:                       [ ] negative [ ] rash [ ] itch  Neurological:        [ ] negative [ ] headache [ ] dizziness [ ] syncope [ ] weakness [ ] numbness  Psychiatric:           [ ] negative [ ] anxiety [ ] depression  Endocrine:            [ ] negative [ ] diabetes [ ] thyroid problem  Heme/Lymph:      [ ] negative [ ] anemia [ ] bleeding problem  Allergic/Immune: [ ] negative [ ] itchy eyes [ ] nasal discharge [ ] hives [ ] angioedema    [ ] All other systems negative  [ ] Unable to assess ROS because ________.    MEDICATIONS:  MEDICATIONS  (STANDING):  albuterol/ipratropium for Nebulization. 3 milliLiter(s) Nebulizer every 6 hours  aspirin  chewable 81 milliGRAM(s) Oral daily  atorvastatin 80 milliGRAM(s) Oral at bedtime  chlorhexidine 4% Liquid 1 Application(s) Topical daily  heparin  Infusion 1000 Unit(s)/Hr (10 mL/Hr) IV Continuous <Continuous>  hydrALAZINE 50 milliGRAM(s) Oral three times a day  isosorbide   dinitrate Tablet (ISORDIL) 20 milliGRAM(s) Oral three times a day  LORazepam   Injectable 2 milliGRAM(s) IV Push once  meropenem  IVPB 1000 milliGRAM(s) IV Intermittent every 12 hours  metoprolol succinate  milliGRAM(s) Oral daily  predniSONE   Tablet 60 milliGRAM(s) Oral every 24 hours  sertraline Concentrate 50 milliGRAM(s) Oral daily  ticagrelor 90 milliGRAM(s) Oral every 12 hours    MEDICATIONS  (PRN):  acetaminophen    Suspension .. 650 milliGRAM(s) Oral every 6 hours PRN Temp greater or equal to 38C (100.4F)      ALLERGIES: Allergies    No Known Allergies    Intolerances        OBJECTIVE:  ICU Vital Signs Last 24 Hrs  T(C): 37.1 (07 Jan 2020 08:00), Max: 37.3 (06 Jan 2020 20:07)  T(F): 98.7 (07 Jan 2020 08:00), Max: 99.1 (06 Jan 2020 20:07)  HR: 96 (07 Jan 2020 08:00) (73 - 102)  BP: 115/53 (07 Jan 2020 08:00) (115/53 - 163/77)  BP(mean): 67 (07 Jan 2020 08:00) (67 - 111)  ABP: 146/75 (06 Jan 2020 11:00) (139/77 - 156/82)  ABP(mean): 97 (06 Jan 2020 11:00) (97 - 106)  RR: 23 (07 Jan 2020 08:00) (16 - 29)  SpO2: 96% (07 Jan 2020 08:00) (80% - 100%)      Adult Advanced Hemodynamics Last 24 Hrs  CVP(mm Hg): --  CVP(cm H2O): --  CO: --  CI: --  PA: --  PA(mean): --  PCWP: --  SVR: --  SVRI: --  PVR: --  PVRI: --  CAPILLARY BLOOD GLUCOSE        CAPILLARY BLOOD GLUCOSE        I&O's Summary    06 Jan 2020 07:01  -  07 Jan 2020 07:00  --------------------------------------------------------  IN: 960 mL / OUT: 1000 mL / NET: -40 mL    07 Jan 2020 07:01  -  07 Jan 2020 08:31  --------------------------------------------------------  IN: 0 mL / OUT: 300 mL / NET: -300 mL      Daily     Daily     PHYSICAL EXAMINATION:  General: Comfortable, no acute distress, cooperative with exam.  HEENT: PERRLA, EOMI, moist mucous membranes.  Respiratory: CTAB, normal respiratory effort, no coughing, wheezes, crackles, or rales.  CV: RRR, S1S2, no murmurs, rubs or gallops. No JVD. Distal pulses intact.  Abdominal: Soft, nontender, nondistended, no rebound or guarding, normal bowel sounds.  Neurology: AOx3, no focal neuro defects, DOW x 4.  Extremities: No pitting edema, + Peripheral pulses.  Incisions:   Tubes:    LABS:                          10.5   12.80 )-----------( 245      ( 06 Jan 2020 05:45 )             35.1     01-06    146<H>  |  113<H>  |  44<H>  ----------------------------<  105<H>  3.6   |  19<L>  |  1.55<H>    Ca    8.4      06 Jan 2020 05:45  Phos  2.6     01-06  Mg     2.3     01-06    TPro  5.8<L>  /  Alb  2.8<L>  /  TBili  0.7  /  DBili  x   /  AST  27  /  ALT  30  /  AlkPhos  97  01-06    LIVER FUNCTIONS - ( 06 Jan 2020 05:45 )  Alb: 2.8 g/dL / Pro: 5.8 g/dL / ALK PHOS: 97 u/L / ALT: 30 u/L / AST: 27 u/L / GGT: x           PTT - ( 06 Jan 2020 05:45 )  PTT:58.9 SEC            TELEMETRY:     EKG:     IMAGING: Cheyanne Soriano PGY1   Page: [phone]    PATIENT: THONY ANAYA, MRN: [id number]    CHIEF COMPLAINT: Patient is a 64y old  Male who presents with a chief complaint of Acute resp failure (06 Jan 2020 16:13)      INTERVAL HISTORY/OVERNIGHT EVENTS: pt was sun downing last night, pulled out iv and wanted to leave ama     REVIEW OF SYSTEMS:  no f/c/n/v/d/constipation/rash/cp     MEDICATIONS:  MEDICATIONS  (STANDING):  albuterol/ipratropium for Nebulization. 3 milliLiter(s) Nebulizer every 6 hours  aspirin  chewable 81 milliGRAM(s) Oral daily  atorvastatin 80 milliGRAM(s) Oral at bedtime  chlorhexidine 4% Liquid 1 Application(s) Topical daily  heparin  Infusion 1000 Unit(s)/Hr (10 mL/Hr) IV Continuous <Continuous>  hydrALAZINE 50 milliGRAM(s) Oral three times a day  isosorbide   dinitrate Tablet (ISORDIL) 20 milliGRAM(s) Oral three times a day  LORazepam   Injectable 2 milliGRAM(s) IV Push once  meropenem  IVPB 1000 milliGRAM(s) IV Intermittent every 12 hours  metoprolol succinate  milliGRAM(s) Oral daily  predniSONE   Tablet 60 milliGRAM(s) Oral every 24 hours  sertraline Concentrate 50 milliGRAM(s) Oral daily  ticagrelor 90 milliGRAM(s) Oral every 12 hours    MEDICATIONS  (PRN):  acetaminophen    Suspension .. 650 milliGRAM(s) Oral every 6 hours PRN Temp greater or equal to 38C (100.4F)      ALLERGIES: Allergies    No Known Allergies    Intolerances        OBJECTIVE:  ICU Vital Signs Last 24 Hrs  T(C): 37.1 (07 Jan 2020 08:00), Max: 37.3 (06 Jan 2020 20:07)  T(F): 98.7 (07 Jan 2020 08:00), Max: 99.1 (06 Jan 2020 20:07)  HR: 96 (07 Jan 2020 08:00) (73 - 102)  BP: 115/53 (07 Jan 2020 08:00) (115/53 - 163/77)  BP(mean): 67 (07 Jan 2020 08:00) (67 - 111)  ABP: 146/75 (06 Jan 2020 11:00) (139/77 - 156/82)  ABP(mean): 97 (06 Jan 2020 11:00) (97 - 106)  RR: 23 (07 Jan 2020 08:00) (16 - 29)  SpO2: 96% (07 Jan 2020 08:00) (80% - 100%)        CAPILLARY BLOOD GLUCOSE        I&O's Summary    06 Jan 2020 07:01  -  07 Jan 2020 07:00  --------------------------------------------------------  IN: 960 mL / OUT: 1000 mL / NET: -40 mL    07 Jan 2020 07:01  -  07 Jan 2020 08:31  --------------------------------------------------------  IN: 0 mL / OUT: 300 mL / NET: -300 mL      Daily     Daily     PHYSICAL EXAMINATION:  General: Comfortable, no acute distress, cooperative with exam.  HEENT: PERRLA, EOMI, moist mucous membranes.  Respiratory: rhonchus, normal respiratory effort, no coughing, wheezes, crackles, or rales.  CV: afib, S1S2, no murmurs, rubs or gallops. No JVD. Distal pulses intact.  Abdominal: Soft, nontender, nondistended, no rebound or guarding, normal bowel sounds.  Neurology: AOx3, no focal neuro defects, DOW x 4.  Extremities: No pitting edema, + Peripheral pulses.  Incisions:   Tubes:      LABS:                          10.5   12.80 )-----------( 245      ( 06 Jan 2020 05:45 )             35.1     01-06    146<H>  |  113<H>  |  44<H>  ----------------------------<  105<H>  3.6   |  19<L>  |  1.55<H>    Ca    8.4      06 Jan 2020 05:45  Phos  2.6     01-06  Mg     2.3     01-06    TPro  5.8<L>  /  Alb  2.8<L>  /  TBili  0.7  /  DBili  x   /  AST  27  /  ALT  30  /  AlkPhos  97  01-06    LIVER FUNCTIONS - ( 06 Jan 2020 05:45 )  Alb: 2.8 g/dL / Pro: 5.8 g/dL / ALK PHOS: 97 u/L / ALT: 30 u/L / AST: 27 u/L / GGT: x           PTT - ( 06 Jan 2020 05:45 )  PTT:58.9 SEC        TELEMETRY:     EKG:     IMAGING:

## 2020-01-07 NOTE — PROGRESS NOTE ADULT - PROBLEM SELECTOR PLAN 2
-patient extubated on 1/4  -tolerating 2 liters nasal canula, bipap at night  -lungs clear on CXR -patient extubated on 1/4  -on RA, bipap at night  -lungs clear on CXR

## 2020-01-07 NOTE — PROGRESS NOTE ADULT - PROBLEM SELECTOR PLAN 4
Remains in aflutter and afib but with good rate control.   -resumed hep gtt Remains in aflutter and afib but with good rate control.   -d/c hep gtt  -start Eliquis, hold tonight for possible LHC tomorrow

## 2020-01-07 NOTE — PROGRESS NOTE ADULT - ASSESSMENT
64M with CAD, stents and CABG (last [facility] was 12/2017 with patent LIMA but other grafts occluded, medical management), HTN, HLD, CKD, CHER, ?HF, and pemphigus vulgaris presents with acute SOB requiring intubation for airway protection with new atrial fibrillation in setting of + influenza.  Pt extubated 1/4, resume hep gtt for afib/aflutter, no bleeding 64M with CAD, stents and CABG (last [facility] was 12/2017 with patent LIMA but other grafts occluded, medical management), HTN, HLD, CKD, CHER, ?HF, and pemphigus vulgaris presents with acute SOB requiring intubation for airway protection with new atrial fibrillation in setting of + influenza.  Pt extubated 1/4, resume hep gtt for afib/aflutter, no bleeding, pending [facility].

## 2020-01-07 NOTE — CHART NOTE - NSCHARTNOTEFT_GEN_A_CORE
Source: Patient , nursing  & EMR     Diet : Soft Dysphagia 3 Soft -Thin Fluids      Patient disoriented , per nursing tolerating PO diet, able to take < 50% of the meals , but PO intake improving . Family supplementing food from out side .    Current Weight: - 99 kg on 1/6/2020    Pertinent Medications: MEDICATIONS  (STANDING):  albuterol/ipratropium for Nebulization. 3 milliLiter(s) Nebulizer every 6 hours  apixaban 5 milliGRAM(s) Oral every 12 hours  aspirin  chewable 81 milliGRAM(s) Oral daily  atorvastatin 80 milliGRAM(s) Oral at bedtime  chlorhexidine 4% Liquid 1 Application(s) Topical daily  hydrALAZINE 50 milliGRAM(s) Oral three times a day  isosorbide   dinitrate Tablet (ISORDIL) 20 milliGRAM(s) Oral three times a day  meropenem  IVPB 1000 milliGRAM(s) IV Intermittent every 12 hours  metoprolol succinate  milliGRAM(s) Oral daily  potassium chloride    Tablet ER 40 milliEquivalent(s) Oral once  predniSONE   Tablet 60 milliGRAM(s) Oral every 24 hours  QUEtiapine 25 milliGRAM(s) Oral at bedtime  sertraline 50 milliGRAM(s) Oral daily  ticagrelor 90 milliGRAM(s) Oral every 12 hours    MEDICATIONS  (PRN):  acetaminophen    Suspension .. 650 milliGRAM(s) Oral every 6 hours PRN Temp greater or equal to 38C (100.4F)    Pertinent Labs:  01-07 Na142 mmol/L Glu 87 mg/dL K+ 3.4 mmol/L<L> Cr  1.44 mg/dL<H> BUN 33 mg/dL<H> 01-06 Phos 2.6 mg/dL 01-06 Alb 2.8 g/dL<L> 12-28 [id number] 6.1 %<H>      Recommend to continue current diet     Monitoring and Evaluation:  PO intake , Tolerance to diet prescription , weights & follow up per protocol

## 2020-01-07 NOTE — PROGRESS NOTE ADULT - PROBLEM SELECTOR PLAN 10
DVT: resume hep gtt   Diet: patient failed speech and swallow evaluation, Keep NPO with feeds and free water and keep nepro at 40cc/hr DVT: eliquis   Diet: patient failed speech and swallow evaluation, Keep NPO with feeds and free water and keep nepro at 40cc/hr

## 2020-01-07 NOTE — PROGRESS NOTE ADULT - PROBLEM SELECTOR PLAN 1
-resolved   -s/p amio and lido bolus and lido drip  -continue to monitor on tele  -possible ischemic evaluation once medically optimized

## 2020-01-08 LAB
ALBUMIN SERPL ELPH-MCNC: 3.3 G/DL — SIGNIFICANT CHANGE UP (ref 3.3–5)
ALP SERPL-CCNC: 103 U/L — SIGNIFICANT CHANGE UP (ref 40–120)
ALT FLD-CCNC: 44 U/L — HIGH (ref 4–41)
ANION GAP SERPL CALC-SCNC: 13 MMO/L — SIGNIFICANT CHANGE UP (ref 7–14)
APTT BLD: 27.6 SEC — SIGNIFICANT CHANGE UP (ref 27.5–36.3)
AST SERPL-CCNC: 37 U/L — SIGNIFICANT CHANGE UP (ref 4–40)
BILIRUB SERPL-MCNC: 0.7 MG/DL — SIGNIFICANT CHANGE UP (ref 0.2–1.2)
BUN SERPL-MCNC: 34 MG/DL — HIGH (ref 7–23)
CALCIUM SERPL-MCNC: 8.9 MG/DL — SIGNIFICANT CHANGE UP (ref 8.4–10.5)
CHLORIDE SERPL-SCNC: 111 MMOL/L — HIGH (ref 98–107)
CO2 SERPL-SCNC: 19 MMOL/L — LOW (ref 22–31)
CREAT SERPL-MCNC: 1.51 MG/DL — HIGH (ref 0.5–1.3)
GLUCOSE SERPL-MCNC: 77 MG/DL — SIGNIFICANT CHANGE UP (ref 70–99)
HCT VFR BLD CALC: 35.6 % — LOW (ref 39–50)
HGB BLD-MCNC: 10.7 G/DL — LOW (ref 13–17)
INR BLD: 1.11 — SIGNIFICANT CHANGE UP (ref 0.88–1.17)
MAGNESIUM SERPL-MCNC: 2.3 MG/DL — SIGNIFICANT CHANGE UP (ref 1.6–2.6)
MCHC RBC-ENTMCNC: 26.5 PG — LOW (ref 27–34)
MCHC RBC-ENTMCNC: 30.1 % — LOW (ref 32–36)
MCV RBC AUTO: 88.1 FL — SIGNIFICANT CHANGE UP (ref 80–100)
NRBC # FLD: 0 K/UL — SIGNIFICANT CHANGE UP (ref 0–0)
PHOSPHATE SERPL-MCNC: 3.2 MG/DL — SIGNIFICANT CHANGE UP (ref 2.5–4.5)
PLATELET # BLD AUTO: 286 K/UL — SIGNIFICANT CHANGE UP (ref 150–400)
PMV BLD: 11.8 FL — SIGNIFICANT CHANGE UP (ref 7–13)
POTASSIUM SERPL-MCNC: 4.6 MMOL/L — SIGNIFICANT CHANGE UP (ref 3.5–5.3)
POTASSIUM SERPL-SCNC: 4.6 MMOL/L — SIGNIFICANT CHANGE UP (ref 3.5–5.3)
PROT SERPL-MCNC: 6.2 G/DL — SIGNIFICANT CHANGE UP (ref 6–8.3)
PROTHROM AB SERPL-ACNC: 12.7 SEC — SIGNIFICANT CHANGE UP (ref 9.8–13.1)
RBC # BLD: 4.04 M/UL — LOW (ref 4.2–5.8)
RBC # FLD: 17.6 % — HIGH (ref 10.3–14.5)
SODIUM SERPL-SCNC: 143 MMOL/L — SIGNIFICANT CHANGE UP (ref 135–145)
WBC # BLD: 10.55 K/UL — HIGH (ref 3.8–10.5)
WBC # FLD AUTO: 10.55 K/UL — HIGH (ref 3.8–10.5)

## 2020-01-08 PROCEDURE — 93459 L HRT ART/GRFT ANGIO: CPT | Mod: 26

## 2020-01-08 PROCEDURE — 99233 SBSQ HOSP IP/OBS HIGH 50: CPT

## 2020-01-08 RX ADMIN — Medication 3 MILLILITER(S): at 10:26

## 2020-01-08 RX ADMIN — MEROPENEM 100 MILLIGRAM(S): 1 INJECTION INTRAVENOUS at 06:17

## 2020-01-08 RX ADMIN — QUETIAPINE FUMARATE 25 MILLIGRAM(S): 200 TABLET, FILM COATED ORAL at 21:13

## 2020-01-08 RX ADMIN — Medication 150 MILLIGRAM(S): at 06:17

## 2020-01-08 RX ADMIN — TICAGRELOR 90 MILLIGRAM(S): 90 TABLET ORAL at 17:37

## 2020-01-08 RX ADMIN — Medication 3 MILLILITER(S): at 21:27

## 2020-01-08 RX ADMIN — Medication 50 MILLIGRAM(S): at 21:13

## 2020-01-08 RX ADMIN — ISOSORBIDE DINITRATE 20 MILLIGRAM(S): 5 TABLET ORAL at 06:17

## 2020-01-08 RX ADMIN — Medication 50 MILLIGRAM(S): at 06:17

## 2020-01-08 RX ADMIN — ATORVASTATIN CALCIUM 80 MILLIGRAM(S): 80 TABLET, FILM COATED ORAL at 21:13

## 2020-01-08 RX ADMIN — Medication 3 MILLILITER(S): at 03:31

## 2020-01-08 RX ADMIN — ISOSORBIDE DINITRATE 20 MILLIGRAM(S): 5 TABLET ORAL at 21:13

## 2020-01-08 RX ADMIN — Medication 81 MILLIGRAM(S): at 12:37

## 2020-01-08 RX ADMIN — CHLORHEXIDINE GLUCONATE 1 APPLICATION(S): 213 SOLUTION TOPICAL at 12:37

## 2020-01-08 RX ADMIN — MEROPENEM 100 MILLIGRAM(S): 1 INJECTION INTRAVENOUS at 17:37

## 2020-01-08 RX ADMIN — Medication 60 MILLIGRAM(S): at 12:37

## 2020-01-08 RX ADMIN — TICAGRELOR 90 MILLIGRAM(S): 90 TABLET ORAL at 06:17

## 2020-01-08 RX ADMIN — SERTRALINE 50 MILLIGRAM(S): 25 TABLET, FILM COATED ORAL at 12:37

## 2020-01-08 NOTE — PROGRESS NOTE ADULT - PROBLEM SELECTOR PLAN 3
-patient appears euvolemic on physical exam  -ECHO reveals tethered mitral valve, mod-severe MR, grossly severe LV dysfunction  -patient BP is elevated and he remains hypertensive  -continue GDMT for HF, increased hydralazine to 50 tid and added isordil 10 tid for afterload reduction, continue metoprolol 75 bid  -pt is making good amount of urine, HOLD DIURETICs for now  - hypernatremia resolved -patient appears euvolemic on physical exam  -ECHO reveals tethered mitral valve, mod-severe MR, grossly severe LV dysfunction  -patient BP is elevated and he remains hypertensive  -continue GDMT for HF, c/w hydralazine to 50 tid and added isordil 20 tid for afterload reduction, continue metoprolol 150 daily  -pt is making good amount of urine, HOLD DIURETICs for now  - hypernatremia resolved -patient appears euvolemic on physical exam  -ECHO reveals tethered mitral valve, mod-severe MR, grossly severe LV dysfunction, pending [facility]  -patient BP well controlled, c/w hydralazine to 50 tid and added isordil 20 tid for afterload reduction, continue metoprolol 150 daily  -pt is making good amount of urine, HOLD DIURETICs for now  - hypernatremia resolved

## 2020-01-08 NOTE — CHART NOTE - NSCHARTNOTEFT_GEN_A_CORE
Left radial band removed at 1900 with no complications.  Gauze dressing applied with transport tape. Dressing clean, dry and intact. No hematoma.

## 2020-01-08 NOTE — PROGRESS NOTE ADULT - SUBJECTIVE AND OBJECTIVE BOX
[facility] [place] [facility]   MD YENNY PIERCE DO ANAM SIDDIQUI ANGELA WONG, PA    TEL:  OFFICE: [phone]  DR LEE CELL: [phone]  DR. STEVENSON CELL: [phone]  DR. GOODWIN CELL: [phone]  YOANNA CUNNINGHAM CELL: [phone]        Patient is a 64y old  Male who presents with a chief complaint of Acute resp failure (08 Jan 2020 08:58)      Patient seen and examined at bedside. No chest pain/sob    VITALS:  T(F): 98.8 (01-08-20 @ 12:08), Max: 98.8 (01-08-20 @ 12:08)  HR: 87 (01-08-20 @ 12:00)  BP: 128/82 (01-08-20 @ 12:00)  RR: 23 (01-08-20 @ 12:00)  SpO2: 99% (01-08-20 @ 12:00)  Wt(kg): --    01-07 @ 07:01  -  01-08 @ 07:00  --------------------------------------------------------  IN: 690 mL / OUT: 1000 mL / NET: -310 mL          PHYSICAL EXAM:  Constitutional: NAD  Neck: No JVD  Respiratory: CTAB, no wheezes, rales or rhonchi  Cardiovascular: S1, S2, RRR  Gastrointestinal: BS+, soft, NT/ND  Extremities: No peripheral edema    Hospital Medications:   MEDICATIONS  (STANDING):  albuterol/ipratropium for Nebulization. 3 milliLiter(s) Nebulizer every 6 hours  aspirin  chewable 81 milliGRAM(s) Oral daily  atorvastatin 80 milliGRAM(s) Oral at bedtime  chlorhexidine 4% Liquid 1 Application(s) Topical daily  hydrALAZINE 50 milliGRAM(s) Oral three times a day  isosorbide   dinitrate Tablet (ISORDIL) 20 milliGRAM(s) Oral three times a day  meropenem  IVPB 1000 milliGRAM(s) IV Intermittent every 12 hours  metoprolol succinate  milliGRAM(s) Oral daily  predniSONE   Tablet 60 milliGRAM(s) Oral every 24 hours  QUEtiapine 25 milliGRAM(s) Oral at bedtime  sertraline 50 milliGRAM(s) Oral daily  ticagrelor 90 milliGRAM(s) Oral every 12 hours      LABS:  01-08    143  |  111<H>  |  34<H>  ----------------------------<  77  4.6   |  19<L>  |  1.51<H>    Ca    8.9      08 Jan 2020 06:31  Phos  3.2     01-08  Mg     2.3     01-08    TPro  6.2  /  Alb  3.3  /  TBili  0.7  /  DBili      /  AST  37  /  ALT  44<H>  /  AlkPhos  103  01-08    Creatinine Trend: 1.51 <--, 1.44 <--, 1.55 <--, 1.57 <--, 1.75 <--, 2.17 <--, 2.46 <--, 2.50 <--, 2.30 <--, 2.67 <--    Phosphorus Level, Serum: 3.2 mg/dL (01-08 @ 06:31)  Albumin, Serum: 3.3 g/dL (01-08 @ 06:31)                              10.7   10.55 )-----------( 286      ( 08 Jan 2020 06:31 )             35.6     Urine Studies:  Urinalysis - [01-02-20 @ 04:20]      Color YELLOW / Appearance Lt TURBID / SG 1.020 / pH 6.0      Gluc NEGATIVE / Ketone NEGATIVE  / Bili NEGATIVE / Urobili NORMAL       Blood LARGE / Protein 30 / Leuk Est NEGATIVE / Nitrite NEGATIVE      RBC >50 / WBC 3-5 / Hyaline NEGATIVE / Gran  / Sq Epi OCC / Non Sq Epi  / Bacteria NEGATIVE      HbA1c 6.1      [12-28-19 @ 03:30]    HCV 0.14, Nonreactive Hepatitis C AB  S/CO Ratio                        Interpretation  < 1.00                                   Non-Reactive  1.00 - 4.99                         Weakly-Reactive  >= 5.00                                Reactive  Non-Reactive: Aperson with a non-reactive HCV antibody  result is considered uninfected.  No further action is  needed unless recent infection is suspected.  In these  cases, consider repeat testing later to detect  seroconversion..  Weakly-Reactive: HCV antibody test is abnormal, HCV RNA  Qualitative test will follow.  Reactive: HCV antibody test is abnormal, HCV RNA  Qualitative test will follow.  Note: HCV antibody testing is performed on the [employer]   system.      [12-28-19 @ 03:30]      RADIOLOGY & ADDITIONAL STUDIES:

## 2020-01-08 NOTE — PROGRESS NOTE ADULT - PROBLEM SELECTOR PLAN 7
creatinine downtrending to 1.57   -baseline 1.8 in 2018  -avoid nephrotoxic agent creatinine stable at 1.5  -baseline 1.8 in 2018  -avoid nephrotoxic agent

## 2020-01-08 NOTE — PROGRESS NOTE ADULT - PROBLEM SELECTOR PLAN 4
Remains in aflutter and afib but with good rate control.   -d/c hep gtt  -start Eliquis, hold tonight for possible LHC tomorrow

## 2020-01-08 NOTE — PROGRESS NOTE ADULT - SUBJECTIVE AND OBJECTIVE BOX
SUBJECTIVE / OVERNIGHT EVENTS: pt denies chest pain, shortness of breath       MEDICATIONS  (STANDING):  albuterol/ipratropium for Nebulization. 3 milliLiter(s) Nebulizer every 6 hours  aspirin  chewable 81 milliGRAM(s) Oral daily  atorvastatin 80 milliGRAM(s) Oral at bedtime  chlorhexidine 4% Liquid 1 Application(s) Topical daily  hydrALAZINE 50 milliGRAM(s) Oral three times a day  isosorbide   dinitrate Tablet (ISORDIL) 20 milliGRAM(s) Oral three times a day  meropenem  IVPB 1000 milliGRAM(s) IV Intermittent every 12 hours  metoprolol succinate  milliGRAM(s) Oral daily  predniSONE   Tablet 60 milliGRAM(s) Oral every 24 hours  QUEtiapine 25 milliGRAM(s) Oral at bedtime  sertraline 50 milliGRAM(s) Oral daily  ticagrelor 90 milliGRAM(s) Oral every 12 hours    MEDICATIONS  (PRN):  acetaminophen    Suspension .. 650 milliGRAM(s) Oral every 6 hours PRN Temp greater or equal to 38C (100.4F)    Vital Signs Last 24 Hrs  T(C): 36.5 (08 Jan 2020 20:38), Max: 37.1 (08 Jan 2020 12:08)  T(F): 97.7 (08 Jan 2020 20:38), Max: 98.8 (08 Jan 2020 12:08)  HR: 85 (08 Jan 2020 22:30) (67 - 100)  BP: 138/108 (08 Jan 2020 20:38) (112/70 - 155/84)  BP(mean): 104 (08 Jan 2020 19:50) (80 - 104)  RR: 18 (08 Jan 2020 20:38) (13 - 29)  SpO2: 98% (08 Jan 2020 22:30) (96% - 100%)    CAPILLARY BLOOD GLUCOSE        I&O's Summary    07 Jan 2020 07:01  -  08 Jan 2020 07:00  --------------------------------------------------------  IN: 690 mL / OUT: 1000 mL / NET: -310 mL        Constitutional: No fever, fatigue  Skin: No rash.  Eyes: No recent vision problems or eye pain.  ENT: No congestion, ear pain, or sore throat.  Cardiovascular: No chest pain or palpation.  Respiratory: No cough, shortness of breath, congestion, or wheezing.  Gastrointestinal: No abdominal pain, nausea, vomiting, or diarrhea.  Genitourinary: No dysuria.  Musculoskeletal: No joint swelling.  Neurologic: No headache.    PHYSICAL EXAM:  GENERAL: NAD  EYES: EOMI, PERRLA  NECK: Supple, No JVD  CHEST/LUNG: dec breath sounds at bases   HEART:  S1 , S2 +  ABDOMEN: soft , bs+  EXTREMITIES:  edema+  NEUROLOGY: alert awake      LABS:                        10.7   10.55 )-----------( 286      ( 08 Jan 2020 06:31 )             35.6     01-08    143  |  111<H>  |  34<H>  ----------------------------<  77  4.6   |  19<L>  |  1.51<H>    Ca    8.9      08 Jan 2020 06:31  Phos  3.2     01-08  Mg     2.3     01-08    TPro  6.2  /  Alb  3.3  /  TBili  0.7  /  DBili  x   /  AST  37  /  ALT  44<H>  /  AlkPhos  103  01-08    PT/INR - ( 08 Jan 2020 06:31 )   PT: 12.7 SEC;   INR: 1.11          PTT - ( 08 Jan 2020 06:31 )  PTT:27.6 SEC          RADIOLOGY & ADDITIONAL TESTS:    Imaging Personally Reviewed:    Consultant(s) Notes Reviewed:      Care Discussed with Consultants/Other Providers:

## 2020-01-08 NOTE — PROGRESS NOTE ADULT - PROBLEM SELECTOR PLAN 10
DVT: eliquis   Diet: patient failed speech and swallow evaluation, Keep NPO with feeds and free water and keep nepro at 40cc/hr DVT: eliquis   Diet: soft food thin liquid

## 2020-01-08 NOTE — PROGRESS NOTE ADULT - SUBJECTIVE AND OBJECTIVE BOX
Cheyanne Soriano PGY1   Page: [phone]    PATIENT: THONY ANAYA, MRN: [id number]    CHIEF COMPLAINT: Patient is a 64y old  Male who presents with a chief complaint of Acute resp failure (2020 14:39)      INTERVAL HISTORY/OVERNIGHT EVENTS: No overnight events. At bedside, patient has been sleeping and eating well. Denies N/V/D/C. Last BM x1 regular yesterday. Denies abdominal pain. Denies chest pain or SOB, cough. Has been ambulating with assistance. Oriented to person, place, and time. Breathing comfortably on room air.    REVIEW OF SYSTEMS:    Constitutional:     [ ] negative [ ] fevers [ ] chills [ ] weight loss [ ] weight gain  HEENT:                  [ ] negative [ ] dry eyes [ ] eye irritation [ ] postnasal drip [ ] nasal congestion  CV:                         [ ] negative  [ ] chest pain [ ] orthopnea [ ] palpitations [ ] murmur  Resp:                     [ ] negative [ ] cough [ ] shortness of breath [ ] dyspnea [ ] wheezing [ ] sputum [ ] hemoptysis  GI:                          [ ] negative [ ] nausea [ ] vomiting [ ] diarrhea [ ] constipation [ ] abd pain [ ] dysphagia   :                        [ ] negative [ ] dysuria [ ] nocturia [ ] hematuria [ ] increased urinary frequency  Musculoskeletal: [ ] negative [ ] back pain [ ] myalgias [ ] arthralgias [ ] fracture  Skin:                       [ ] negative [ ] rash [ ] itch  Neurological:        [ ] negative [ ] headache [ ] dizziness [ ] syncope [ ] weakness [ ] numbness  Psychiatric:           [ ] negative [ ] anxiety [ ] depression  Endocrine:            [ ] negative [ ] diabetes [ ] thyroid problem  Heme/Lymph:      [ ] negative [ ] anemia [ ] bleeding problem  Allergic/Immune: [ ] negative [ ] itchy eyes [ ] nasal discharge [ ] hives [ ] angioedema    [ ] All other systems negative  [ ] Unable to assess ROS because ________.    MEDICATIONS:  MEDICATIONS  (STANDING):  albuterol/ipratropium for Nebulization. 3 milliLiter(s) Nebulizer every 6 hours  aspirin  chewable 81 milliGRAM(s) Oral daily  atorvastatin 80 milliGRAM(s) Oral at bedtime  chlorhexidine 4% Liquid 1 Application(s) Topical daily  hydrALAZINE 50 milliGRAM(s) Oral three times a day  isosorbide   dinitrate Tablet (ISORDIL) 20 milliGRAM(s) Oral three times a day  meropenem  IVPB 1000 milliGRAM(s) IV Intermittent every 12 hours  metoprolol succinate  milliGRAM(s) Oral daily  predniSONE   Tablet 60 milliGRAM(s) Oral every 24 hours  QUEtiapine 25 milliGRAM(s) Oral at bedtime  sertraline 50 milliGRAM(s) Oral daily  ticagrelor 90 milliGRAM(s) Oral every 12 hours    MEDICATIONS  (PRN):  acetaminophen    Suspension .. 650 milliGRAM(s) Oral every 6 hours PRN Temp greater or equal to 38C (100.4F)      ALLERGIES: Allergies    No Known Allergies    Intolerances        OBJECTIVE:  ICU Vital Signs Last 24 Hrs  T(C): 36.8 (2020 08:00), Max: 36.8 (2020 12:00)  T(F): 98.2 (2020 08:00), Max: 98.3 (2020 12:00)  HR: 93 (2020 07:42) (67 - 93)  BP: 155/84 (2020 06:00) (106/72 - 155/84)  BP(mean): 98 (2020 06:00) (77 - 105)  ABP: --  ABP(mean): --  RR: 13 (2020 06:00) (13 - 25)  SpO2: 96% (2020 07:42) (93% - 100%)      Adult Advanced Hemodynamics Last 24 Hrs  CVP(mm Hg): --  CVP(cm H2O): --  CO: --  CI: --  PA: --  PA(mean): --  PCWP: --  SVR: --  SVRI: --  PVR: --  PVRI: --  CAPILLARY BLOOD GLUCOSE        CAPILLARY BLOOD GLUCOSE        I&O's Summary    2020 07:01  -  2020 07:00  --------------------------------------------------------  IN: 690 mL / OUT: 1000 mL / NET: -310 mL      Daily     Daily Weight in k.6 (2020 06:00)    PHYSICAL EXAMINATION:  General: Comfortable, no acute distress, cooperative with exam.  HEENT: PERRLA, EOMI, moist mucous membranes.  Respiratory: CTAB, normal respiratory effort, no coughing, wheezes, crackles, or rales.  CV: RRR, S1S2, no murmurs, rubs or gallops. No JVD. Distal pulses intact.  Abdominal: Soft, nontender, nondistended, no rebound or guarding, normal bowel sounds.  Neurology: AOx3, no focal neuro defects, DOW x 4.  Extremities: No pitting edema, + Peripheral pulses.  Incisions:   Tubes:    LABS:                          10.7   10.55 )-----------( 286      ( 2020 06:31 )             35.6     01-08    143  |  111<H>  |  34<H>  ----------------------------<  77  4.6   |  19<L>  |  1.51<H>    Ca    8.9      2020 06:31  Phos  3.2     -08  Mg     2.3     -08    TPro  6.2  /  Alb  3.3  /  TBili  0.7  /  DBili  x   /  AST  37  /  ALT  44<H>  /  AlkPhos  103  01-08    LIVER FUNCTIONS - ( 2020 06:31 )  Alb: 3.3 g/dL / Pro: 6.2 g/dL / ALK PHOS: 103 u/L / ALT: 44 u/L / AST: 37 u/L / GGT: x           PT/INR - ( 2020 06:31 )   PT: 12.7 SEC;   INR: 1.11          PTT - ( 2020 06:31 )  PTT:27.6 SEC            TELEMETRY:     EKG:     IMAGING: Cheyanne Soriano PGY1   Page: [phone]    PATIENT: THONY ANAYA, MRN: [id number]    CHIEF COMPLAINT: Patient is a 64y old  Male who presents with a chief complaint of Acute resp failure (2020 14:39)      INTERVAL HISTORY/OVERNIGHT EVENTS: No overnight events.     REVIEW OF SYSTEMS:no f/c/n/v/d/constipation/rash/cp       MEDICATIONS:  MEDICATIONS  (STANDING):  albuterol/ipratropium for Nebulization. 3 milliLiter(s) Nebulizer every 6 hours  aspirin  chewable 81 milliGRAM(s) Oral daily  atorvastatin 80 milliGRAM(s) Oral at bedtime  chlorhexidine 4% Liquid 1 Application(s) Topical daily  hydrALAZINE 50 milliGRAM(s) Oral three times a day  isosorbide   dinitrate Tablet (ISORDIL) 20 milliGRAM(s) Oral three times a day  meropenem  IVPB 1000 milliGRAM(s) IV Intermittent every 12 hours  metoprolol succinate  milliGRAM(s) Oral daily  predniSONE   Tablet 60 milliGRAM(s) Oral every 24 hours  QUEtiapine 25 milliGRAM(s) Oral at bedtime  sertraline 50 milliGRAM(s) Oral daily  ticagrelor 90 milliGRAM(s) Oral every 12 hours    MEDICATIONS  (PRN):  acetaminophen    Suspension .. 650 milliGRAM(s) Oral every 6 hours PRN Temp greater or equal to 38C (100.4F)      ALLERGIES: Allergies    No Known Allergies    Intolerances        OBJECTIVE:  ICU Vital Signs Last 24 Hrs  T(C): 36.8 (2020 08:00), Max: 36.8 (2020 12:00)  T(F): 98.2 (2020 08:00), Max: 98.3 (2020 12:00)  HR: 93 (2020 07:42) (67 - 93)  BP: 155/84 (2020 06:00) (106/72 - 155/84)  BP(mean): 98 (2020 06:00) (77 - 105)  ABP: --  ABP(mean): --  RR: 13 (2020 06:00) (13 - 25)  SpO2: 96% (2020 07:42) (93% - 100%)          I&O's Summary    2020 07:01  -  2020 07:00  --------------------------------------------------------  IN: 690 mL / OUT: 1000 mL / NET: -310 mL      Daily     Daily Weight in k.6 (2020 06:00)    PHYSICAL EXAMINATION:  General: Comfortable, no acute distress, cooperative with exam.  HEENT: PERRLA, EOMI, moist mucous membranes.  Respiratory: rhonchus, normal respiratory effort, no coughing, wheezes, crackles, or rales.  CV: afib, S1S2, no murmurs, rubs or gallops. No JVD. Distal pulses intact.  Abdominal: Soft, nontender, nondistended, no rebound or guarding, normal bowel sounds.  Neurology: AOx3, no focal neuro defects, DOW x 4.  Extremities: No pitting edema, + Peripheral pulses.  Incisions:   Tubes:      LABS:                          10.7   10.55 )-----------( 286      ( 2020 06:31 )             35.6     01-08    143  |  111<H>  |  34<H>  ----------------------------<  77  4.6   |  19<L>  |  1.51<H>    Ca    8.9      2020 06:31  Phos  3.2     01-08  Mg     2.3     01-08    TPro  6.2  /  Alb  3.3  /  TBili  0.7  /  DBili  x   /  AST  37  /  ALT  44<H>  /  AlkPhos  103  01-08    LIVER FUNCTIONS - ( 2020 06:31 )  Alb: 3.3 g/dL / Pro: 6.2 g/dL / ALK PHOS: 103 u/L / ALT: 44 u/L / AST: 37 u/L / GGT: x           PT/INR - ( 2020 06:31 )   PT: 12.7 SEC;   INR: 1.11          PTT - ( 2020 06:31 )  PTT:27.6 SEC            TELEMETRY:     EKG:     IMAGING: Cheyanne Soriano PGY1   Page: [phone]    PATIENT: THONY ANAYA, MRN: [id number]    CHIEF COMPLAINT: Patient is a 64y old  Male who presents with a chief complaint of Acute resp failure (2020 14:39)      INTERVAL HISTORY/OVERNIGHT EVENTS: No overnight events.     REVIEW OF SYSTEMS:no f/c/n/v/d/constipation/rash/cp       MEDICATIONS:  MEDICATIONS  (STANDING):  albuterol/ipratropium for Nebulization. 3 milliLiter(s) Nebulizer every 6 hours  aspirin  chewable 81 milliGRAM(s) Oral daily  atorvastatin 80 milliGRAM(s) Oral at bedtime  chlorhexidine 4% Liquid 1 Application(s) Topical daily  hydrALAZINE 50 milliGRAM(s) Oral three times a day  isosorbide   dinitrate Tablet (ISORDIL) 20 milliGRAM(s) Oral three times a day  meropenem  IVPB 1000 milliGRAM(s) IV Intermittent every 12 hours  metoprolol succinate  milliGRAM(s) Oral daily  predniSONE   Tablet 60 milliGRAM(s) Oral every 24 hours  QUEtiapine 25 milliGRAM(s) Oral at bedtime  sertraline 50 milliGRAM(s) Oral daily  ticagrelor 90 milliGRAM(s) Oral every 12 hours    MEDICATIONS  (PRN):  acetaminophen    Suspension .. 650 milliGRAM(s) Oral every 6 hours PRN Temp greater or equal to 38C (100.4F)      ALLERGIES: Allergies    No Known Allergies    Intolerances        OBJECTIVE:  ICU Vital Signs Last 24 Hrs  T(C): 36.8 (2020 08:00), Max: 36.8 (2020 12:00)  T(F): 98.2 (2020 08:00), Max: 98.3 (2020 12:00)  HR: 93 (2020 07:42) (67 - 93)  BP: 155/84 (2020 06:00) (106/72 - 155/84)  BP(mean): 98 (2020 06:00) (77 - 105)  ABP: --  ABP(mean): --  RR: 13 (2020 06:00) (13 - 25)  SpO2: 96% (2020 07:42) (93% - 100%)          I&O's Summary    2020 07:01  -  2020 07:00  --------------------------------------------------------  IN: 690 mL / OUT: 1000 mL / NET: -310 mL      Daily     Daily Weight in k.6 (2020 06:00)    PHYSICAL EXAMINATION:  General: Comfortable, no acute distress, cooperative with exam.  HEENT: PERRLA, EOMI, moist mucous membranes.  Respiratory: rhonchus, normal respiratory effort, no coughing, wheezes, crackles, or rales.  CV: afib, S1S2, no murmurs, rubs or gallops. No JVD. Distal pulses intact.  Abdominal: Soft, nontender, nondistended, no rebound or guarding, normal bowel sounds.  Neurology: AOx3, no focal neuro defects, DOW x 4.  Extremities: No pitting edema, + Peripheral pulses.        LABS:                          10.7   10.55 )-----------( 286      ( 2020 06:31 )             35.6     01-08    143  |  111<H>  |  34<H>  ----------------------------<  77  4.6   |  19<L>  |  1.51<H>    Ca    8.9      2020 06:31  Phos  3.2     01-08  Mg     2.3     01-08    TPro  6.2  /  Alb  3.3  /  TBili  0.7  /  DBili  x   /  AST  37  /  ALT  44<H>  /  AlkPhos  103  01-08    LIVER FUNCTIONS - ( 2020 06:31 )  Alb: 3.3 g/dL / Pro: 6.2 g/dL / ALK PHOS: 103 u/L / ALT: 44 u/L / AST: 37 u/L / GGT: x           PT/INR - ( 2020 06:31 )   PT: 12.7 SEC;   INR: 1.11          PTT - ( 2020 06:31 )  PTT:27.6 SEC            TELEMETRY:     EKG:     IMAGING:

## 2020-01-08 NOTE — PROGRESS NOTE ADULT - ASSESSMENT
64M with CAD, stents and CABG (last C was 12/2017 with patent LIMA but other grafts occluded, medical management), HTN, HLD, CKD, CHER, ?HF, and pemphygus vulgaris presents with acute SOB. admitted to ccu for acute resp failure s/p intubation now extubated     NARCISO on CKD (chronic kidney disease) stage 3-4  baseline likely ~ 2-2.2  had NARCISO peaked at 3.27 and had improved better than baseline stable ~ 1.5 possible sec to lost of muscle mass  recurrent NARCISO likely ATN  He is non-oliguric. parks in place  Advise to continue diuresis as needed at present. Off lasix   planning for [facility], requiring contrast risk and benefit explanined  monitor bmp      Essential hypertension.    BP better  care per CCU    Acute Resp Failure.  Improving volume status s/p diuretics   now extubated  agree with diuretics as needed at present  Continue to monitor his weight and urine output   f/u cardio    CKD-MBD  check pth, &Vitamin D 25  Phos acceptable   Calcium low  monitor phos and wolf daily    Hypernatremia  better   monitor

## 2020-01-08 NOTE — CHART NOTE - NSCHARTNOTEFT_GEN_A_CORE
CCU Transfer Note    Transfer from: CCU    Transfer to: (  ) Medicine    (x ) Telemetry     (   ) RCU        (    ) Palliative         (   ) Stroke Unit          (   ) __________________    Accepting physician:       MICU COURSE:   64M with CAD, stents and CABG (last C was 12/2017 with patent LIMA but other grafts occluded, medical management), HTN, HLD, CKD, CHER, ?HF, and pemphigus vulgaris presents with acute SOB requiring intubation for airway protection with new atrial fibrillation in setting of + influenza requiring intubation, s/p extubation 1/4. For afib/aflutter, heparin gtt for ac transitioned to Eliquis. course c/b 1 episode of VT in the setting of hypoxia s/p amio/lido bolus and lido gtt, resolved. S/p LHC showed same result as previously- patent LIMA, all other grafts are occluded. Radial band to be removed at 6pm and resume eliquis tomorrow morning.         ASSESSMENT & PLAN:   Problem/Plan - 1:  ·  Problem: Ventricular tachyarrhythmia.  Plan: -resolved   -s/p amio and lido bolus and lido drip  -continue to monitor on tele  -possible ischemic evaluation once medically optimized.     Problem/Plan - 2:  ·  Problem: Acute hypercapnic respiratory failure.  Plan: -patient extubated on 1/4  -on RA, bipap at night  -lungs clear on CXR.     Problem/Plan - 3:  ·  Problem: Acute congestive heart failure, unspecified heart failure type.  Plan: -patient appears euvolemic on physical exam  -ECHO reveals tethered mitral valve, mod-severe MR, grossly severe LV dysfunction, pending [facility]  -patient BP well controlled, c/w hydralazine 50 tid, isordil 20 tid for afterload reduction, continue metoprolol 150 daily  -pt is making good amount of urine, HOLD DIURETICs for now      Problem/Plan - 4:  ·  Problem: Atrial fibrillation and flutter.  Plan: Remains in aflutter and afib but with good rate control.   -resume eliquis tomorrow morning     Problem/Plan - 5:  ·  Problem: CAD (coronary artery disease).  Plan: Medical management at present. Continue DAPT with ASA and Brilinta, lipitor.     Problem/Plan - 6:  Problem: Hypernatremia. Plan: resolved.    Problem/Plan - 7:  ·  Problem: CKD (chronic kidney disease).  Plan: creatinine stable at 1.5  -baseline 1.8 in 2018  -avoid nephrotoxic agent.     Problem/Plan - 8:  ·  Problem: Pemphigus vulgaris.  Plan: d/c solucortef  c/w prednisone 60 daily home dose.     Problem/Plan - 9:  ·  Problem: Influenza.  Plan: RVP +influenza A, +RSV, +hMPV   -s/p 5days of Tamiflu, blood cultures NGTD  - d/c abx given afebrile and cx NGTD x48hrs   -rpt RVP +RSV.     Problem/Plan - 10:  Problem: Prophylactic measure. Plan; DVT: eliquis   Diet: soft food thin liquid.      For Followup:  [] remove radial band at 6pm  [] resume eliquis tomorrow  morning         Vital Signs Last 24 Hrs  T(C): 37.1 (08 Jan 2020 12:08), Max: 37.1 (08 Jan 2020 12:08)  T(F): 98.8 (08 Jan 2020 12:08), Max: 98.8 (08 Jan 2020 12:08)  HR: 87 (08 Jan 2020 12:00) (67 - 93)  BP: 128/82 (08 Jan 2020 12:00) (112/70 - 155/84)  BP(mean): 93 (08 Jan 2020 12:00) (77 - 98)  RR: 23 (08 Jan 2020 12:00) (13 - 24)  SpO2: 99% (08 Jan 2020 12:00) (96% - 100%)  I&O's Summary    07 Jan 2020 07:01  -  08 Jan 2020 07:00  --------------------------------------------------------  IN: 690 mL / OUT: 1000 mL / NET: -310 mL        MEDICATIONS  (STANDING):  albuterol/ipratropium for Nebulization. 3 milliLiter(s) Nebulizer every 6 hours  aspirin  chewable 81 milliGRAM(s) Oral daily  atorvastatin 80 milliGRAM(s) Oral at bedtime  chlorhexidine 4% Liquid 1 Application(s) Topical daily  hydrALAZINE 50 milliGRAM(s) Oral three times a day  isosorbide   dinitrate Tablet (ISORDIL) 20 milliGRAM(s) Oral three times a day  meropenem  IVPB 1000 milliGRAM(s) IV Intermittent every 12 hours  metoprolol succinate  milliGRAM(s) Oral daily  predniSONE   Tablet 60 milliGRAM(s) Oral every 24 hours  QUEtiapine 25 milliGRAM(s) Oral at bedtime  sertraline 50 milliGRAM(s) Oral daily  ticagrelor 90 milliGRAM(s) Oral every 12 hours    MEDICATIONS  (PRN):  acetaminophen    Suspension .. 650 milliGRAM(s) Oral every 6 hours PRN Temp greater or equal to 38C (100.4F)        LABS                                            10.7                  Neurophils% (auto):   x      (01-08 @ 06:31):    10.55)-----------(286          Lymphocytes% (auto):  x                                             35.6                   Eosinphils% (auto):   x        Manual%: Neutrophils x    ; Lymphocytes x    ; Eosinophils x    ; Bands%: x    ; Blasts x                                    143    |  111    |  34                  Calcium: 8.9   / iCa: x      (01-08 @ 06:31)    ----------------------------<  77        Magnesium: 2.3                              4.6     |  19     |  1.51             Phosphorous: 3.2      TPro  6.2    /  Alb  3.3    /  TBili  0.7    /  DBili  x      /  AST  37     /  ALT  44     /  AlkPhos  103    08 Jan 2020 06:31    ( 01-08 @ 06:31 )   PT: 12.7 SEC;   INR: 1.11   aPTT: 27.6 SEC CCU Transfer Note    Transfer from: CCU    Transfer to: (  ) Medicine    (x ) Telemetry     (   ) RCU        (    ) Palliative         (   ) Stroke Unit          (   ) __________________    Accepting physician:       MICU COURSE:   64M with CAD, stents and CABG (last [facility] was 12/2017 with patent LIMA but other grafts occluded, medical management), HTN, HLD, CKD, CHER, ?HF, and pemphigus vulgaris presents with acute SOB requiring intubation for airway protection with new atrial fibrillation in setting of + influenza/hMPV/RSV, s/p extubation 1/4. AC with heparin gtt initially, then transitioned to Eliquis. course c/b 1 episode of VT in the setting of hypoxia s/p amio/lido bolus and lido gtt, resolved. S/p LHC showed same result as previously- patent LIMA, all other grafts are occluded. Radial band to be removed at 6pm and resume eliquis tomorrow morning.         ASSESSMENT & PLAN:   Problem/Plan - 1:  ·  Problem: Ventricular tachyarrhythmia.  Plan: -resolved   -s/p amio and lido bolus and lido drip  -continue to monitor on tele  -possible ischemic evaluation once medically optimized.     Problem/Plan - 2:  ·  Problem: Acute hypercapnic respiratory failure.  Plan: -patient extubated on 1/4  -on RA, bipap at night  -lungs clear on CXR.     Problem/Plan - 3:  ·  Problem: Acute congestive heart failure, unspecified heart failure type.  Plan: -patient appears euvolemic on physical exam  -ECHO reveals tethered mitral valve, mod-severe MR, grossly severe LV dysfunction, pending [facility]  -patient BP well controlled, c/w hydralazine 50 tid, isordil 20 tid for afterload reduction, continue metoprolol 150 daily  -pt is making good amount of urine, HOLD DIURETICs for now      Problem/Plan - 4:  ·  Problem: Atrial fibrillation and flutter.  Plan: Remains in aflutter and afib but with good rate control.   -resume eliquis tomorrow morning     Problem/Plan - 5:  ·  Problem: CAD (coronary artery disease).  Plan: Medical management at present. Continue DAPT with ASA and Brilinta, lipitor.     Problem/Plan - 6:  Problem: Hypernatremia. Plan: resolved.    Problem/Plan - 7:  ·  Problem: CKD (chronic kidney disease).  Plan: creatinine stable at 1.5  -baseline 1.8 in 2018  -avoid nephrotoxic agent.     Problem/Plan - 8:  ·  Problem: Pemphigus vulgaris.  Plan: d/c solucortef  c/w prednisone 60 daily home dose.     Problem/Plan - 9:  ·  Problem: Influenza.  Plan: RVP +influenza A, +RSV, +hMPV   -s/p 5days of Tamiflu, blood cultures NGTD  - d/c abx given afebrile and cx NGTD x48hrs   -rpt RVP +RSV.     Problem/Plan - 10:  Problem: Prophylactic measure. Plan; DVT: eliquis   Diet: soft food thin liquid.      For Followup:  [] remove radial band at 6pm  [] resume eliquis tomorrow  morning         Vital Signs Last 24 Hrs  T(C): 37.1 (08 Jan 2020 12:08), Max: 37.1 (08 Jan 2020 12:08)  T(F): 98.8 (08 Jan 2020 12:08), Max: 98.8 (08 Jan 2020 12:08)  HR: 87 (08 Jan 2020 12:00) (67 - 93)  BP: 128/82 (08 Jan 2020 12:00) (112/70 - 155/84)  BP(mean): 93 (08 Jan 2020 12:00) (77 - 98)  RR: 23 (08 Jan 2020 12:00) (13 - 24)  SpO2: 99% (08 Jan 2020 12:00) (96% - 100%)  I&O's Summary    07 Jan 2020 07:01  -  08 Jan 2020 07:00  --------------------------------------------------------  IN: 690 mL / OUT: 1000 mL / NET: -310 mL        MEDICATIONS  (STANDING):  albuterol/ipratropium for Nebulization. 3 milliLiter(s) Nebulizer every 6 hours  aspirin  chewable 81 milliGRAM(s) Oral daily  atorvastatin 80 milliGRAM(s) Oral at bedtime  chlorhexidine 4% Liquid 1 Application(s) Topical daily  hydrALAZINE 50 milliGRAM(s) Oral three times a day  isosorbide   dinitrate Tablet (ISORDIL) 20 milliGRAM(s) Oral three times a day  meropenem  IVPB 1000 milliGRAM(s) IV Intermittent every 12 hours  metoprolol succinate  milliGRAM(s) Oral daily  predniSONE   Tablet 60 milliGRAM(s) Oral every 24 hours  QUEtiapine 25 milliGRAM(s) Oral at bedtime  sertraline 50 milliGRAM(s) Oral daily  ticagrelor 90 milliGRAM(s) Oral every 12 hours    MEDICATIONS  (PRN):  acetaminophen    Suspension .. 650 milliGRAM(s) Oral every 6 hours PRN Temp greater or equal to 38C (100.4F)        LABS                                            10.7                  Neurophils% (auto):   x      (01-08 @ 06:31):    10.55)-----------(286          Lymphocytes% (auto):  x                                             35.6                   Eosinphils% (auto):   x        Manual%: Neutrophils x    ; Lymphocytes x    ; Eosinophils x    ; Bands%: x    ; Blasts x                                    143    |  111    |  34                  Calcium: 8.9   / iCa: x      (01-08 @ 06:31)    ----------------------------<  77        Magnesium: 2.3                              4.6     |  19     |  1.51             Phosphorous: 3.2      TPro  6.2    /  Alb  3.3    /  TBili  0.7    /  DBili  x      /  AST  37     /  ALT  44     /  AlkPhos  103    08 Jan 2020 06:31    ( 01-08 @ 06:31 )   PT: 12.7 SEC;   INR: 1.11   aPTT: 27.6 SEC

## 2020-01-08 NOTE — PROGRESS NOTE ADULT - ASSESSMENT
64M with CAD, stents and CABG (last LHC was 12/2017 with patent LIMA but other grafts occluded, medical management), HTN, HLD, CKD, CHER, ?HF, and pemphigus vulgaris presents with acute SOB requiring intubation for airway protection with new atrial fibrillation in setting of + influenza.  Pt extubated 1/4, Eliquis for afib/aflutter, pending [facility].

## 2020-01-08 NOTE — PROGRESS NOTE ADULT - ASSESSMENT
64M with CAD, stents and CABG (last LHC was 12/2017 with patent LIMA but other grafts occluded, medical management), HTN, HLD, CKD, CHER, ?HF, and pemphigus vulgaris presents with acute SOB requiring intubation for airway protection with new atrial fibrillation in setting of + influenza.  Pt extubated 1/4, resume hep gtt for afib/aflutter, no bleeding, pending [facility]. 64M with CAD, stents and CABG (last LHC was 12/2017 with patent LIMA but other grafts occluded, medical management), HTN, HLD, CKD, CHER, ?HF, and pemphigus vulgaris presents with acute SOB requiring intubation for airway protection with new atrial fibrillation in setting of + influenza.  Pt extubated 1/4, Eliquis for afib/aflutter, pending [facility].

## 2020-01-08 NOTE — PROGRESS NOTE ADULT - PROBLEM SELECTOR PLAN 3
-patient appears euvolemic on physical exam  -ECHO reveals tethered mitral valve, mod-severe MR, grossly severe LV dysfunction, pending [facility]  -patient BP well controlled, c/w hydralazine to 50 tid and added isordil 20 tid for afterload reduction, continue metoprolol 150 daily  -pt is making good amount of urine, HOLD DIURETICs for now  - hypernatremia resolved

## 2020-01-09 DIAGNOSIS — Z02.9 ENCOUNTER FOR ADMINISTRATIVE EXAMINATIONS, UNSPECIFIED: ICD-10-CM

## 2020-01-09 PROCEDURE — 99233 SBSQ HOSP IP/OBS HIGH 50: CPT

## 2020-01-09 RX ORDER — APIXABAN 2.5 MG/1
5 TABLET, FILM COATED ORAL EVERY 12 HOURS
Refills: 0 | Status: DISCONTINUED | OUTPATIENT
Start: 2020-01-09 | End: 2020-01-10

## 2020-01-09 RX ADMIN — CHLORHEXIDINE GLUCONATE 1 APPLICATION(S): 213 SOLUTION TOPICAL at 13:01

## 2020-01-09 RX ADMIN — Medication 50 MILLIGRAM(S): at 13:00

## 2020-01-09 RX ADMIN — Medication 50 MILLIGRAM(S): at 07:17

## 2020-01-09 RX ADMIN — ATORVASTATIN CALCIUM 80 MILLIGRAM(S): 80 TABLET, FILM COATED ORAL at 21:09

## 2020-01-09 RX ADMIN — Medication 3 MILLILITER(S): at 16:04

## 2020-01-09 RX ADMIN — Medication 3 MILLILITER(S): at 10:07

## 2020-01-09 RX ADMIN — Medication 3 MILLILITER(S): at 23:36

## 2020-01-09 RX ADMIN — ISOSORBIDE DINITRATE 20 MILLIGRAM(S): 5 TABLET ORAL at 21:09

## 2020-01-09 RX ADMIN — Medication 50 MILLIGRAM(S): at 21:09

## 2020-01-09 RX ADMIN — SERTRALINE 50 MILLIGRAM(S): 25 TABLET, FILM COATED ORAL at 13:00

## 2020-01-09 RX ADMIN — Medication 150 MILLIGRAM(S): at 07:18

## 2020-01-09 RX ADMIN — TICAGRELOR 90 MILLIGRAM(S): 90 TABLET ORAL at 07:18

## 2020-01-09 RX ADMIN — QUETIAPINE FUMARATE 25 MILLIGRAM(S): 200 TABLET, FILM COATED ORAL at 21:10

## 2020-01-09 RX ADMIN — TICAGRELOR 90 MILLIGRAM(S): 90 TABLET ORAL at 18:23

## 2020-01-09 RX ADMIN — MEROPENEM 100 MILLIGRAM(S): 1 INJECTION INTRAVENOUS at 07:17

## 2020-01-09 RX ADMIN — ISOSORBIDE DINITRATE 20 MILLIGRAM(S): 5 TABLET ORAL at 07:17

## 2020-01-09 RX ADMIN — ISOSORBIDE DINITRATE 20 MILLIGRAM(S): 5 TABLET ORAL at 13:00

## 2020-01-09 RX ADMIN — Medication 60 MILLIGRAM(S): at 13:00

## 2020-01-09 RX ADMIN — APIXABAN 5 MILLIGRAM(S): 2.5 TABLET, FILM COATED ORAL at 18:23

## 2020-01-09 RX ADMIN — Medication 81 MILLIGRAM(S): at 13:00

## 2020-01-09 NOTE — PROGRESS NOTE ADULT - SUBJECTIVE AND OBJECTIVE BOX
[facility]   MD YENNY PIERCE DO ANAM SIDDIQUI ANGELA WONG, PA    TEL:  OFFICE: [phone]  DR LEE CELL: [phone]  DR. STEVENSON CELL: [phone]  DR. GOODWIN CELL: [phone]  YOANNA CUNNINGHAM CELL: [phone]        Patient is a 64y old  Male who presents with a chief complaint of Acute resp failure (09 Jan 2020 10:02)      Patient seen and examined at bedside. No chest pain/sob    VITALS:  T(F): 98.4 (01-09-20 @ 12:56), Max: 98.8 (01-08-20 @ 17:30)  HR: 87 (01-09-20 @ 12:56)  BP: 145/92 (01-09-20 @ 12:56)  RR: 18 (01-09-20 @ 12:56)  SpO2: 98% (01-09-20 @ 12:56)  Wt(kg): --        PHYSICAL EXAM:  Constitutional: NAD  Neck: No JVD  Respiratory: diffused rhonchi  Cardiovascular: S1, S2, RRR  Gastrointestinal: BS+, soft, NT/ND  Extremities: No peripheral edema    Hospital Medications:   MEDICATIONS  (STANDING):  albuterol/ipratropium for Nebulization. 3 milliLiter(s) Nebulizer every 6 hours  apixaban 5 milliGRAM(s) Oral every 12 hours  aspirin  chewable 81 milliGRAM(s) Oral daily  atorvastatin 80 milliGRAM(s) Oral at bedtime  chlorhexidine 4% Liquid 1 Application(s) Topical daily  hydrALAZINE 50 milliGRAM(s) Oral three times a day  isosorbide   dinitrate Tablet (ISORDIL) 20 milliGRAM(s) Oral three times a day  metoprolol succinate  milliGRAM(s) Oral daily  predniSONE   Tablet 60 milliGRAM(s) Oral every 24 hours  QUEtiapine 25 milliGRAM(s) Oral at bedtime  sertraline 50 milliGRAM(s) Oral daily  ticagrelor 90 milliGRAM(s) Oral every 12 hours      LABS:  01-08    143  |  111<H>  |  34<H>  ----------------------------<  77  4.6   |  19<L>  |  1.51<H>    Ca    8.9      08 Jan 2020 06:31  Phos  3.2     01-08  Mg     2.3     01-08    TPro  6.2  /  Alb  3.3  /  TBili  0.7  /  DBili      /  AST  37  /  ALT  44<H>  /  AlkPhos  103  01-08    Creatinine Trend: 1.51 <--, 1.44 <--, 1.55 <--, 1.57 <--, 1.75 <--, 2.17 <--, 2.46 <--, 2.50 <--, 2.30 <--                                10.7   10.55 )-----------( 286      ( 08 Jan 2020 06:31 )             35.6     Urine Studies:  Urinalysis - [01-02-20 @ 04:20]      Color YELLOW / Appearance Lt TURBID / SG 1.020 / pH 6.0      Gluc NEGATIVE / Ketone NEGATIVE  / Bili NEGATIVE / Urobili NORMAL       Blood LARGE / Protein 30 / Leuk Est NEGATIVE / Nitrite NEGATIVE      RBC >50 / WBC 3-5 / Hyaline NEGATIVE / Gran  / Sq Epi OCC / Non Sq Epi  / Bacteria NEGATIVE      HbA1c 6.1      [12-28-19 @ 03:30]    HCV 0.14, Nonreactive Hepatitis C AB  S/CO Ratio                        Interpretation  < 1.00                                   Non-Reactive  1.00 - 4.99                         Weakly-Reactive  >= 5.00                                Reactive  Non-Reactive: Aperson with a non-reactive HCV antibody  result is considered uninfected.  No further action is  needed unless recent infection is suspected.  In these  cases, consider repeat testing later to detect  seroconversion..  Weakly-Reactive: HCV antibody test is abnormal, HCV RNA  Qualitative test will follow.  Reactive: HCV antibody test is abnormal, HCV RNA  Qualitative test will follow.  Note: HCV antibody testing is performed on the [email or web address] system.      [12-28-19 @ 03:30]      RADIOLOGY & ADDITIONAL STUDIES:

## 2020-01-09 NOTE — PROGRESS NOTE ADULT - ASSESSMENT
64M with CAD, stents and CABG (last [facility] was 12/2017 with patent LIMA but other grafts occluded, medical management), HTN, HLD, CKD, CHER, ?HF, and pemphygus vulgaris presents with acute SOB. admitted to ccu for acute resp failure s/p intubation now extubated     NARCISO on CKD (chronic kidney disease) stage 3-4  baseline likely ~ 2-2.2  had NARCISO peaked at 3.27 and had improved better than baseline stable ~ 1.5 possible sec to lost of muscle mass  recurrent NARCISO likely ATN  Advise to continue diuresis as needed at present. Off lasix   s/p [facility] 1/8  monitor bmp      Essential hypertension.    BP better  care per CCU    Acute Resp Failure.  Improving volume status s/p diuretics   now extubated  agree with diuretics as needed at present  Continue to monitor his weight and urine output   f/u cardio    CKD-MBD  check pth, &Vitamin D 25  Phos acceptable   Calcium low  monitor phos and wolf daily    Hypernatremia  better   monitor 64M with CAD, stents and CABG (last [facility] was 12/2017 with patent LIMA but other grafts occluded, medical management), HTN, HLD, CKD, CHER, ?HF, and pemphygus vulgaris presents with acute SOB. admitted to ccu for acute resp failure s/p intubation now extubated     NARCISO on CKD (chronic kidney disease) stage 3-4  baseline likely ~ 2-2.2  had NARCISO peaked at 3.27 and had improved better than baseline stable ~ 1.5 possible sec to lost of muscle mass  recurrent NARCISO likely ATN  Advise to continue diuresis as needed at present. Off lasix   s/p [facility] 1/8  monitor bmp      Essential hypertension.    BP at goal  care per CCU    Acute Resp Failure.  Improving volume status s/p diuretics   now extubated  agree with diuretics as needed at present  Continue to monitor his weight and urine output   f/u cardio    CKD-MBD  check pth, &Vitamin D 25  Phos acceptable   Calcium low  monitor phos and wolf daily    Hypernatremia  Improved  monitor

## 2020-01-09 NOTE — PROGRESS NOTE ADULT - ASSESSMENT
64M with CAD, stents and CABG (last [facility] was 12/2017 with patent LIMA but other grafts occluded, medical management), HTN, HLD, CKD, CHER, ?HF, and pemphigus vulgaris presents with acute SOB requiring intubation for airway protection with new atrial fibrillation in setting of + influenza. Now stable post extubation and diuresis.

## 2020-01-09 NOTE — PROGRESS NOTE ADULT - PROBLEM SELECTOR PLAN 1
Continues to appear euvolemic, no SOB. Not currently on diuretics. Continue hydralazine, isordil, and Toprol. Will repeat ECHO to reassess LV function and MR. Strict I & O please.

## 2020-01-09 NOTE — PROGRESS NOTE ADULT - PROBLEM SELECTOR PLAN 3
Continue DAPT, lipitor. LHC unchanged from 2017 with patent LIMA graft but diffuse disease of native vessels not amenable to PCI.

## 2020-01-09 NOTE — PROGRESS NOTE ADULT - SUBJECTIVE AND OBJECTIVE BOX
Subjective/Objective: Patient resting in bed, no complaints.    MEDICATIONS  (STANDING):  albuterol/ipratropium for Nebulization. 3 milliLiter(s) Nebulizer every 6 hours  aspirin  chewable 81 milliGRAM(s) Oral daily  atorvastatin 80 milliGRAM(s) Oral at bedtime  chlorhexidine 4% Liquid 1 Application(s) Topical daily  hydrALAZINE 50 milliGRAM(s) Oral three times a day  isosorbide   dinitrate Tablet (ISORDIL) 20 milliGRAM(s) Oral three times a day  metoprolol succinate  milliGRAM(s) Oral daily  predniSONE   Tablet 60 milliGRAM(s) Oral every 24 hours  QUEtiapine 25 milliGRAM(s) Oral at bedtime  sertraline 50 milliGRAM(s) Oral daily  ticagrelor 90 milliGRAM(s) Oral every 12 hours    MEDICATIONS  (PRN):  acetaminophen    Suspension .. 650 milliGRAM(s) Oral every 6 hours PRN Temp greater or equal to 38C (100.4F)          Vital Signs Last 24 Hrs  T(C): 36.6 (2020 07:08), Max: 37.1 (2020 12:08)  T(F): 97.9 (2020 07:08), Max: 98.8 (2020 12:08)  HR: 88 (2020 07:08) (81 - 100)  BP: 128/91 (2020 07:08) (116/72 - 142/90)  BP(mean): 104 (2020 19:50) (83 - 104)  RR: 18 (2020 07:08) (18 - 29)  SpO2: 96% (2020 07:08) (96% - 100%)  I&O's Detail        PHYSICAL EXAM  GEN: NAD, skin W & D  RESP: + scattered rhonchi  CV: S1S2 irreg irreg  GI: soft, NT/ND, BS +  EXT: no C/C/E. L radial cath site clean and dry  NEURO: A & O X 3    EKG/ TELEM: afib with VR 70s    LABS: 2019 pending                          10.7   10.55 )-----------( 286      ( 2020 06:31 )             35.6     PT/INR - ( 2020 06:31 )   PT: 12.7 SEC;   INR: 1.11          PTT - ( 2020 06:31 )  PTT:27.6 SEC  2020 06:31    143    |  111<H>  |  34<H>  ----------------------------<  77     4.6     |  19<L>  |  1.51<H>  2020 11:00    142    |  110<H>  |  33<H>  ----------------------------<  87     3.4<L>   |  20<L>  |  1.44<H>    Ca    8.9        2020 06:31  Ca    8.7        2020 11:00  Phos  3.2       2020 06:31  Mg     2.3       2020 06:31    TPro  6.2    /  Alb  3.3    /  TBili  0.7    /  DBili  x      /  AST  37     /  ALT  44<H>  /  AlkPhos  103    2020 06:31    Diagnostic testing:        [facility]  [phone] [place], [place] [address] [phone]  Cath Lab Report -- Comprehensive Report  Patient: THONY ANAYA  Study date: 2020  Account number: [id number]  MR number: [id number]  : 1955  Gender: Male  Race: W  Case Physician(s):  Rut Arriaga M.D.  Fellow:  DANTE Delgadillo M.D.  Referring Physician:  Verna Beckman M.D.  INDICATIONS: Dilated cardiomyopathy.  HISTORY: h/o CABG, only LIMA to LAD is patent as per last cath The patient  has hypertension and medication-treated dyslipidemia.  PROCEDURE:  --  Left heart catheterization.  --  Left coronary angiography.  --  Right coronary angiography.  --  LIMA graft angiography.  TECHNIQUE: The risks and alternatives of the procedures and conscious  sedation were explained to the patient and informed consent was obtained.  Cardiac catheterization performed electively.  Local anesthetic given. Left radial artery access. Local anesthetic given.  A 6 Fr. Radial Glidesheath Slender was inserted in the vessel, utilizing  the modified Seldinger technique. Left heart catheterization. A 5 Fr.  DxTerity JR 4.0 catheter was utilized. After recording ascending aortic  pressure, the catheter was advanced across the aortic valve and left  ventricular pressure was recorded. Left coronary artery angiography. The  vessel was injected utilizing a 5 Fr. DxTerity JL 3.5 catheter and  positioned in the vessel ostia under fluoroscopic guidance. Angiography  was performed in multiple projections using hand-injection of contrast.  Right coronary artery angiography. The vessel was injected utilizing a 5  Fr. DxTerity JR 4.0 catheter and positioned in the vessel ostia under  fluoroscopic guidance. Angiography was performed in multiple projections  using hand-injection of contrast. Left internal mammary graft angiography.  The vessel was injected utilizing a 4 Fr IM catheter and positioned in the  vessel ostia under fluoroscopic guidance. Angiography was performed in  multiple projections using hand-injection of contrast. RADIATION EXPOSURE:  4 min.  CONTRAST GIVEN: 55 ml Optiray.  MEDICATIONS GIVEN: Heparin, 4000 units, IV. 2% Lidocaine, 3 ml,  subcutaneously. Cardene, 400 mcg. Cardene, 400 mcg. 0.9% Normal saline, 32  ml, IV.  VENTRICLES: No left ventriculogram was performed.  CORONARY VESSELS: The coronary circulation is right dominant.  LM:   --  LM: Angiography showed mild atherosclerosis with no flow limiting  lesions.  LAD:   --  Proximal LAD: There was a 100 % stenosis. This lesion is a  chronic total occlusion.  CX:   --  Proximal circumflex: The artery was supplied by extensive  bridging collaterals. There was a 100 % stenosis.  RCA:   --  Proximal RCA: There was a 95 % stenosis.  --  Mid RCA: There was a 99 % stenosis.  --  Distal RCA: There was a 95 % stenosis. Distal vessel angiography showed  a small vessel and severe diffuse disease.  GRAFTS:   --  Graft to the LAD: The graft was a medium to large sized LIMA.  Distal vessel angiography showed a small to medium sized vessel and severe  diffuse disease.  COMPLICATIONS: No complications occurred during the cath lab visit.  DIAGNOSTIC IMPRESSIONS: Patent ROJAS to LAD  Severe diffuse disease of the native vessels, not amenable to PCI  Overall , coronary anatomy is unchanged  INTERVENTIONAL IMPRESSIONS: Patent ROJAS to LAD  Severe diffuse disease of the native vessels, not amenable to PCI  Overall , coronary anatomy is unchanged  Prepared and signed by  Rut Arriaga M.D.  Signed 2020 17:32:04  HEMODYNAMIC TABLES  Pressures:  NO PHASE  Pressures:  - HR: 78  Pressures:  - Rhythm:  Pressures:  -- Aortic Pressure (S/D/M): 139/87/110  Pressures:  -- Left Ventricle (s/edp): 135/22/--  Outputs:  NO PHASE  Outputs:  -- CALCULATIONS[age]  Outputs:  -- CALCULATIONS: Body Surface Area: 2.16  Outputs:  -- CALCULATIONS: Height in cm: 170.00  Outputs:  -- CALCULATIONS: Sex: Male  Outputs:  -- CALCULATIONS: Weight in k.10  Outputs:  -- OUTPUTS: O2 consumption: 269.95  Outputs:  -- OUTPUTS: Vo2 Indexed: 125.00  Outputs:  Post Angio  Outputs:  -- OUTPUTS: O2 consumption: 269.95  Outputs:  -- OUTPUTS: Vo2 Indexed: 125.00

## 2020-01-10 ENCOUNTER — TRANSCRIPTION ENCOUNTER (OUTPATIENT)
Age: 65
End: 2020-01-10

## 2020-01-10 VITALS — OXYGEN SATURATION: 96 %

## 2020-01-10 LAB
ANION GAP SERPL CALC-SCNC: 11 MMO/L — SIGNIFICANT CHANGE UP (ref 7–14)
BUN SERPL-MCNC: 28 MG/DL — HIGH (ref 7–23)
CALCIUM SERPL-MCNC: 8.8 MG/DL — SIGNIFICANT CHANGE UP (ref 8.4–10.5)
CHLORIDE SERPL-SCNC: 108 MMOL/L — HIGH (ref 98–107)
CO2 SERPL-SCNC: 19 MMOL/L — LOW (ref 22–31)
CREAT SERPL-MCNC: 1.43 MG/DL — HIGH (ref 0.5–1.3)
GLUCOSE SERPL-MCNC: 75 MG/DL — SIGNIFICANT CHANGE UP (ref 70–99)
HCT VFR BLD CALC: 34.4 % — LOW (ref 39–50)
HGB BLD-MCNC: 10.5 G/DL — LOW (ref 13–17)
MAGNESIUM SERPL-MCNC: 2.1 MG/DL — SIGNIFICANT CHANGE UP (ref 1.6–2.6)
MCHC RBC-ENTMCNC: 26.9 PG — LOW (ref 27–34)
MCHC RBC-ENTMCNC: 30.5 % — LOW (ref 32–36)
MCV RBC AUTO: 88.2 FL — SIGNIFICANT CHANGE UP (ref 80–100)
NRBC # FLD: 0 K/UL — SIGNIFICANT CHANGE UP (ref 0–0)
PHOSPHATE SERPL-MCNC: 3.2 MG/DL — SIGNIFICANT CHANGE UP (ref 2.5–4.5)
PLATELET # BLD AUTO: 336 K/UL — SIGNIFICANT CHANGE UP (ref 150–400)
PMV BLD: 11.9 FL — SIGNIFICANT CHANGE UP (ref 7–13)
POTASSIUM SERPL-MCNC: 4.2 MMOL/L — SIGNIFICANT CHANGE UP (ref 3.5–5.3)
POTASSIUM SERPL-SCNC: 4.2 MMOL/L — SIGNIFICANT CHANGE UP (ref 3.5–5.3)
RBC # BLD: 3.9 M/UL — LOW (ref 4.2–5.8)
RBC # FLD: 18 % — HIGH (ref 10.3–14.5)
SODIUM SERPL-SCNC: 138 MMOL/L — SIGNIFICANT CHANGE UP (ref 135–145)
WBC # BLD: 12.71 K/UL — HIGH (ref 3.8–10.5)
WBC # FLD AUTO: 12.71 K/UL — HIGH (ref 3.8–10.5)

## 2020-01-10 PROCEDURE — 99233 SBSQ HOSP IP/OBS HIGH 50: CPT

## 2020-01-10 RX ORDER — ASPIRIN/CALCIUM CARB/MAGNESIUM 324 MG
1 TABLET ORAL
Qty: 0 | Refills: 0 | DISCHARGE
Start: 2020-01-10

## 2020-01-10 RX ORDER — TICAGRELOR 90 MG/1
1 TABLET ORAL
Qty: 0 | Refills: 0 | DISCHARGE

## 2020-01-10 RX ORDER — APIXABAN 2.5 MG/1
1 TABLET, FILM COATED ORAL
Qty: 0 | Refills: 0 | DISCHARGE
Start: 2020-01-10

## 2020-01-10 RX ORDER — HYDRALAZINE HCL 50 MG
1 TABLET ORAL
Qty: 0 | Refills: 0 | DISCHARGE
Start: 2020-01-10

## 2020-01-10 RX ORDER — CARVEDILOL PHOSPHATE 80 MG/1
1 CAPSULE, EXTENDED RELEASE ORAL
Qty: 0 | Refills: 0 | DISCHARGE

## 2020-01-10 RX ORDER — AMLODIPINE BESYLATE 2.5 MG/1
1 TABLET ORAL
Qty: 0 | Refills: 0 | DISCHARGE

## 2020-01-10 RX ORDER — METOPROLOL TARTRATE 50 MG
3 TABLET ORAL
Qty: 0 | Refills: 0 | DISCHARGE
Start: 2020-01-10

## 2020-01-10 RX ORDER — SERTRALINE 25 MG/1
0 TABLET, FILM COATED ORAL
Qty: 0 | Refills: 0 | DISCHARGE

## 2020-01-10 RX ORDER — EZETIMIBE 10 MG/1
1 TABLET ORAL
Qty: 0 | Refills: 0 | DISCHARGE

## 2020-01-10 RX ORDER — IPRATROPIUM/ALBUTEROL SULFATE 18-103MCG
3 AEROSOL WITH ADAPTER (GRAM) INHALATION
Qty: 0 | Refills: 0 | DISCHARGE
Start: 2020-01-10

## 2020-01-10 RX ORDER — SERTRALINE 25 MG/1
1 TABLET, FILM COATED ORAL
Qty: 0 | Refills: 0 | DISCHARGE
Start: 2020-01-10

## 2020-01-10 RX ORDER — EVOLOCUMAB 140 MG/ML
1 INJECTION, SOLUTION SUBCUTANEOUS
Qty: 0 | Refills: 0 | DISCHARGE

## 2020-01-10 RX ORDER — LISINOPRIL 2.5 MG/1
1 TABLET ORAL
Qty: 0 | Refills: 0 | DISCHARGE

## 2020-01-10 RX ORDER — QUETIAPINE FUMARATE 200 MG/1
1 TABLET, FILM COATED ORAL
Qty: 0 | Refills: 0 | DISCHARGE
Start: 2020-01-10

## 2020-01-10 RX ORDER — ISOSORBIDE DINITRATE 5 MG/1
1 TABLET ORAL
Qty: 0 | Refills: 0 | DISCHARGE
Start: 2020-01-10

## 2020-01-10 RX ORDER — ISOSORBIDE MONONITRATE 60 MG/1
1 TABLET, EXTENDED RELEASE ORAL
Qty: 0 | Refills: 0 | DISCHARGE

## 2020-01-10 RX ADMIN — Medication 3 MILLILITER(S): at 10:11

## 2020-01-10 RX ADMIN — Medication 50 MILLIGRAM(S): at 05:33

## 2020-01-10 RX ADMIN — Medication 81 MILLIGRAM(S): at 12:32

## 2020-01-10 RX ADMIN — Medication 50 MILLIGRAM(S): at 13:38

## 2020-01-10 RX ADMIN — ISOSORBIDE DINITRATE 20 MILLIGRAM(S): 5 TABLET ORAL at 13:38

## 2020-01-10 RX ADMIN — TICAGRELOR 90 MILLIGRAM(S): 90 TABLET ORAL at 05:33

## 2020-01-10 RX ADMIN — Medication 3 MILLILITER(S): at 16:13

## 2020-01-10 RX ADMIN — Medication 3 MILLILITER(S): at 05:11

## 2020-01-10 RX ADMIN — CHLORHEXIDINE GLUCONATE 1 APPLICATION(S): 213 SOLUTION TOPICAL at 15:00

## 2020-01-10 RX ADMIN — ISOSORBIDE DINITRATE 20 MILLIGRAM(S): 5 TABLET ORAL at 05:33

## 2020-01-10 RX ADMIN — Medication 150 MILLIGRAM(S): at 05:33

## 2020-01-10 RX ADMIN — APIXABAN 5 MILLIGRAM(S): 2.5 TABLET, FILM COATED ORAL at 05:38

## 2020-01-10 RX ADMIN — Medication 60 MILLIGRAM(S): at 12:32

## 2020-01-10 RX ADMIN — SERTRALINE 50 MILLIGRAM(S): 25 TABLET, FILM COATED ORAL at 12:31

## 2020-01-10 NOTE — DISCHARGE NOTE NURSING/CASE MANAGEMENT/SOCIAL WORK - NSDCPEEMAIL_GEN_ALL_CORE
[facility] email [email or web address]

## 2020-01-10 NOTE — DISCHARGE NOTE PROVIDER - PROVIDER TOKENS
FREE:[LAST:[PMD],PHONE:[(   )    -],FAX:[(   )    -],ADDRESS:[call for appointment]],FREE:[LAST:[Cardiology],PHONE:[(   )    -],FAX:[(   )    -],ADDRESS:[call for appointment]]

## 2020-01-10 NOTE — DISCHARGE NOTE PROVIDER - NSDCCPCAREPLAN_GEN_ALL_CORE_FT
PRINCIPAL DISCHARGE DIAGNOSIS  Diagnosis: Flash pulmonary edema  Assessment and Plan of Treatment: Low salt diet, fluid restriction to 1500 ml daily, monitor your fluid intake and weight daily, exercise as tolerated 30 minutes daily, and follow up with your physician within 1 to 2 weeks.        SECONDARY DISCHARGE DIAGNOSES  Diagnosis: SOB (shortness of breath)  Assessment and Plan of Treatment:

## 2020-01-10 NOTE — DISCHARGE NOTE NURSING/CASE MANAGEMENT/SOCIAL WORK - PATIENT PORTAL LINK FT
You can access the FollowMyHealth Patient Portal offered by [facility] by registering at the following website: [email or web address]. By joining [facility]’s FollowMyHealth portal, you will also be able to view your health information using other applications (apps) compatible with our system.

## 2020-01-10 NOTE — DISCHARGE NOTE PROVIDER - CARE PROVIDER_API CALL
PMD,   call for appointment  Phone: (   )    -  Fax: (   )    -  Follow Up Time:     Cardiology,   call for appointment  Phone: (   )    -  Fax: (   )    -  Follow Up Time:

## 2020-01-10 NOTE — DISCHARGE NOTE PROVIDER - NSDCMRMEDTOKEN_GEN_ALL_CORE_FT
apixaban 5 mg oral tablet: 1 tab(s) orally every 12 hours  aspirin 81 mg oral tablet, chewable: 1 tab(s) orally once a day  atorvastatin 80 mg oral tablet: 1 tab(s) orally once a day (at bedtime)  cholecalciferol 1000 intl units (25 mcg) oral tablet: 1 tab(s) orally once a day  furosemide 20 mg oral tablet: 1 tab(s) orally once a day  hydrALAZINE 50 mg oral tablet: 1 tab(s) orally 3 times a day  ipratropium-albuterol 0.5 mg-2.5 mg/3 mLinhalation solution: 3 milliliter(s) inhaled every 6 hours, As Needed  isosorbide dinitrate 20 mg oral tablet: 1 tab(s) orally 3 times a day  metoprolol succinate 50 mg oral tablet, extended release: 3 tab(s) orally once a day  pantoprazole 40 mg oral delayed release tablet: 1 tab(s) orally 2 times a day (before meals)  predniSONE 20 mg oral tablet: 3 tab(s) orally every 24 hours  QUEtiapine 25 mg oral tablet: 1 tab(s) orally once a day (at bedtime)  Rituxan 10 mg/mL intravenous solution: 1 application intravenous every 6 months  sertraline 50 mg oral tablet: 1 tab(s) orally once a day  ticagrelor 90 mg oral tablet: 1 tab(s) orally every 12 hours

## 2020-01-10 NOTE — PROGRESS NOTE ADULT - ATTENDING COMMENTS
64 year old male with multiple co-morbidities including with coronary artery disease s/p CABG, severe LV dysfunction, CHER with acute respiratory failure in setting of influenza, hMPV, and RSV. Weaning trials complicated by hypoxemia and VT storm - pulmonary team consulted for assistance in weaning.   - Not yet optimized for weaning.  - Patient now on propofol.  - Keep negative fluid balance - diuresis per CCU team.  - Once patient is ready for weaning trial, can re-attempt CPAP trials but recommend higher EPAP given his obesity and would consider extubation to bilevel but need to be cautious in setting of oral mucosa bleeding
1. Mitral annular calcification, otherwise normal mitral  valve. Moderate mitral regurgitation.  2. Calcified trileaflet aortic valve with normal opening.  Moderate aortic regurgitation.  Vena contracta width about  0.4 cm.  3. Normal size aortic root. (Ao:3.3 cm).  Mild dilatation  of the ascending aorta (about  4.4 cm).  4. Mildly dilated left atrium.  LA volume index = 39 cc/m2.  5. Normal left ventricular internal dimensions and wall  thicknesses.  6. Moderate segmental left ventricular systolic  dysfunction.  Hypokinesis of the inferior, lateral, and  inferolateral walls.  7. The right ventricle is not well visualized; grossly  normal right ventricular systolic function.    Echo results today as above, now slight improvement of LV function and less severe MR  Patient doing well  Continue present meds
SP LHC yesterday with no interval change from previous cath  Recheck TTE now more stable  Continue present meds
Wesley Colon is a 64 year old man with CAD, prior PCI with stents, CABG (angiogram 12/2017 showed patent LIMA but occlusion of other grafts, treated with medical management), HTN, HLD, CKD, CHER, possible HF and pemphigus vulgaris (s/p rituximab infusion one week ago). He presented with acute chest pain and SOB while driving his son in law to work. In ED examination noted diffuse pulmonary crackles decreased responsiveness. He was intubated for airway protection. After difficult intubation, he was briefly pulseless (PEA), received CPR with quick ROSC. Lasix 80 mg IV given X 1 and propofol was started for sedation. ECG noted atrial fibrillation / flutter with RVR (120s bpm) with no ischemic ST-segment changes. Labs noted elevated WBC, lactate and initial serum potassium 6.2 mmol/L. Viral panel was positive for influenza AH1, respiratory syncytial virus, and hMVP. Initial blood cultures showed no growth at 24 hours. He received oseltamivir (Tamiflu) and was empirically treated with vancomycin and Zosyn. Phenylephrine infusion was started for hypotension and sedation was changed from propofol to fentanyl and midazolam. Telemetry in CCU noted atrial flutter with HR mostly 70-80’s bpm. IV heparin was initiated for anticoagulation. The plan for 12/28/19 included decreasing PEEP and FIO2 on respirator, maintaining furosemide at 80 mg IV twice daily and tapering phenylephrine as tolerable by BP. Standing regimen: EC aspirin enteric coated 81 mg daily, atorvastatin 80 mg daily at bedtime, fentanyl 0.5 mCg/kG/Hr (6 mL/Hr) continuous IV, furosemide 80 mg IV Push every 12 hours, heparin  infusion 10 Unit(s)/Hr (0.1 mL/Hr) continuous IV, hydrocortisone sodium succinate injectable 100 mg IV Push every 8 hours, insulin sliding scale coverage, midazolam infusion 0.02 mG/kG/Hr (2.4 mL/Hr) IV Continuous, oseltamivir Tamiflu) Suspension 30 mg by mouth twice daily, pantoprazole injectable 40 mg IV Push daily, phenylephrine infusion 0.1 mCg/kG/Min (4.5 mL/Hr) IV Continuous, piperacillin / tazobactam IVPB. 3.375 gm IV every 8 hours, sertraline 50 mg daily and ticagrelor (Brilinta) 90 mg by mouth twice daily
Patient seen and examined  Agree with above assessment and plan  Patient with known CAD admitted with acute respiratory failure in setting of Afib with RVR and Influenza with new severe LV dysfunction  Patient with CVP of 12--hold diuresis  Continue Tamiflu for Influenza  Heparin gtt on hold due to traumatic intubation and bleeding  Continue present meds
64 year old man with known CAD (last [facility] in 2017), AF/afl admitted with hypercarbic respiratory failure in setting of rapid afib and aflutter in setting of Influenza, RSV and HMNV infection. Intubated for over 1 week and episodes of VT while hypoxic-->now extubated and doing much better  Continue current meds
Patient seen and examined  Agree with above assessment and plan  Patient Flu, RSV, HMNV-->intubated and while on CPAP yesterday developed VT  Currently on Lidocaine gtt  VT likely in setting of hypoxia  Will consult Pulm and ID given multiple viral infections ?overlying PNA  Continue present meds
Patient seen and examined  Agree with above assessment and plan  Patient is 64 year old man admitted last week with acute respiratory failure and AF with RVR in setting of viral infections including Flu RSV and HMNV  Patient brought to CCU with fluid overload in this setting, had VT  Had episode of VT while on CPAP 48 hours ago, started on Lido that has since been weaned to 1 mg/hr  Pulm and ID consults called yesterday- input appreciated  Can transition to Precedex for sedation and attempt to wean again off of vent support.  Cont ABX as outline by ID
Patient seen and examined  Agree with above assessment and plan  Patient with acute on chronic systolic heart failure  AFib and AFL in setting of multiple viral infections (Flu/RSV/HMNV)  He has been extubated since the weekend and slowly improving however more delerious today  Cont current meds  Will need eventual LHC for ischemic evaluation  Continue present meds
Patient seen and examined  Agree with above assessment and plan  Patient awaiting cath  Cont current meds
Patient seen/examined with CCU team.  Assessment and plan were reviewed during rounds this morning.
Wesely Colon is a 64 year old man with CAD, prior PCI with stents, CABG (angiogram 12/2017 showed patent LIMA but occlusion of other grafts, treated with medical management), HTN, HLD, CKD, CHER, possible HF and pemphigus vulgaris (s/p rituximab infusion one week ago). He presented with acute chest pain and SOB while driving his son in law to work. In ED examination noted diffuse pulmonary crackles decreased responsiveness. He was intubated for airway protection. After traumatic intubation, with area of mucosal tear in left alveolobuccal groove actively bleeding, he was briefly pulseless (PEA), received CPR with quick ROSC. Lasix 80 mg IV given X 1 and propofol was started for sedation. ECG noted atrial fibrillation / flutter with RVR (120s bpm) with no ischemic ST-segment changes. Labs noted elevated WBC, lactate and initial serum potassium 6.2 mmol/L. Viral panel was positive for influenza AH1, respiratory syncytial virus, and hMVP. Initial blood cultures showed no growth. He received oseltamivir (Tamiflu) and was empirically treated with vancomycin and Zosyn. Phenylephrine infusion was started for hypotension and sedation was changed from propofol to fentanyl and midazolam. Telemetry in CCU noted atrial fibrillation and flutter with HR mostly 70-80’s bpm. IV heparin was initiated for anticoagulation. Standing regimen: EC aspirin enteric coated 81 mg daily, atorvastatin 80 mg daily at bedtime, fentanyl 0.5 mCg/kG/Hr (6 mL/Hr) continuous IV, furosemide 80 mg IV Push every 12 hours, heparin  infusion 10 Unit(s)/Hr (0.1 mL/Hr) continuous IV, hydrocortisone sodium succinate injectable 100 mg IV Push every 8 hours, insulin sliding scale coverage, midazolam infusion 0.02 mG/kG/Hr (2.4 mL/Hr) IV Continuous, oseltamivir Tamiflu) Suspension 30 mg by mouth twice daily, pantoprazole injectable 40 mg IV Push daily, phenylephrine infusion 0.1 mCg/kG/Min (4.5 mL/Hr) IV Continuous, piperacillin / tazobactam IVPB. 3.375 gm IV every 8 hours, sertraline 50 mg daily and ticagrelor (Brilinta) 90 mg by mouth twice daily. The plan for 12/29/19 included maintaining PEEP at 8 mm Hg and decreasing FIO2 from 40% to 35% for respirator settings. Region of alveolobuccal groove was packed with wet kerlix. Continue broad spectrum antibiotics. Hold furosemide given increase in creatinine (3.05 mg/dL on 12/29/19). Attempt discontinuation of phenylephrine. BP was 123/74 mm Hg as of 11:00 12/29/19
Patient seen and examined  Agree with above assessment and plan  Patient with acute respiratory failure and AF in setting of Flu  Continue current mgmt
Patient seen and examined with the CCU team. Agree with above assessment and plan as discussed during CCU rounds.

## 2020-01-10 NOTE — PROGRESS NOTE ADULT - ASSESSMENT
64M with CAD, stents and CABG (last [facility] was 12/2017 with patent LIMA but other grafts occluded, medical management), HTN, HLD, CKD, CHER, ?HF, and pemphigus vulgaris presents with acute SOB requiring intubation for airway protection with new atrial fibrillation in setting of + influenza. Now stable post extubation and diuresis.

## 2020-01-10 NOTE — PROGRESS NOTE ADULT - PROBLEM SELECTOR PLAN 3
Medical management at this time. [facility] with patent LIMA but diffuse native disease not amenable to PCI. Continue DAPT and lipitor.

## 2020-01-10 NOTE — PROGRESS NOTE ADULT - PROBLEM SELECTOR PLAN 1
Remains euvolemic, no SOB, off diuretics. Continue isordil, hydralazine, and Toprol XL. Will likely need low dose lasix upon discharge. Await repeat ECHO now that patient has improved with diuresis and GDMT. Strict I & O please.

## 2020-01-10 NOTE — DISCHARGE NOTE NURSING/CASE MANAGEMENT/SOCIAL WORK - NSDCPEHOTLINE_GEN_ALL_CORE
[facility] Smokers Quitline [phone] ([phone])

## 2020-01-10 NOTE — DISCHARGE NOTE PROVIDER - HOSPITAL COURSE
64M with CAD, stents and CABG (last [facility] was 12/2017 with patent LIMA but other grafts occluded, medical management), HTN, HLD, CKD, CHER, ?HF, and pemphigus vulgaris presents with acute SOB requiring intubation for airway protection with new atrial fibrillation in setting of + influenza. Now stable post extubation and diuresis.                      Problem/Plan - 1:    ·  Problem: Acute congestive heart failure, unspecified heart failure type.  Plan: Remains euvolemic, no SOB, off diuretics. Continue isordil, hydralazine, and Toprol XL. Will likely need low dose lasix upon discharge. Await repeat ECHO now that patient has improved with diuresis and GDMT. Strict I & O please.          Problem/Plan - 2:    ·  Problem: Atrial fibrillation and flutter.  Plan: Rate controlled at present. Continue Toprol XL and apixaban for A/C.          Problem/Plan - 3:    ·  Problem: CAD (coronary artery disease).  Plan: Medical management at this time. [facility] with patent LIMA but diffuse native disease not amenable to PCI. Continue DAPT and lipitor.          Problem/Plan - 4:    ·  Problem: CKD (chronic kidney disease).  Plan: Scr had been steadily improving however no labs yesterday. Await today's labs.          Problem/Plan - 5:    ·  Problem: Pemphigus vulgaris.  Plan: Continue daily prednisone.        Discussed with MD - pt stable for discharge home, pt with no complaints, discharge medications reviewed with MD. 64M with CAD, stents and CABG (last [facility] was 12/2017 with patent LIMA but other grafts occluded, medical management), HTN, HLD, CKD, CHER, ?HF, and pemphigus vulgaris presents with acute SOB requiring intubation for airway protection with new atrial fibrillation in setting of + influenza. Now stable post extubation and diuresis.                      Problem/Plan - 1:    ·  Problem: Acute congestive heart failure, unspecified heart failure type.  Plan: Remains euvolemic, no SOB, off diuretics. Continue isordil, hydralazine, and Toprol XL. Will likely need low dose lasix upon discharge. Await repeat ECHO now that patient has improved with diuresis and GDMT. Strict I & O please.          Problem/Plan - 2:    ·  Problem: Atrial fibrillation and flutter.  Plan: Rate controlled at present. Continue Toprol XL and apixaban for A/C.          Problem/Plan - 3:    ·  Problem: CAD (coronary artery disease).  Plan: Medical management at this time. [facility] with patent LIMA but diffuse native disease not amenable to PCI. Continue DAPT and lipitor.          Problem/Plan - 4:    ·  Problem: CKD (chronic kidney disease).  Plan: Scr had been steadily improving however no labs yesterday. Await today's labs.          Problem/Plan - 5:    ·  Problem: Pemphigus vulgaris.  Plan: Continue daily prednisone.        Discussed with MD - pt stable for discharge to rehab, pt with no complaints, discharge medications reviewed with MD.

## 2020-01-10 NOTE — DISCHARGE NOTE NURSING/CASE MANAGEMENT/SOCIAL WORK - NSDCPEPT PROEDHF_GEN_ALL_CORE
Activities as tolerated/Call primary care provider for follow up after discharge/Monitor weight daily/Low salt diet/Report signs and symptoms to primary care provider

## 2020-01-10 NOTE — PROGRESS NOTE ADULT - ASSESSMENT
64M with CAD, stents and CABG (last [facility] was 12/2017 with patent LIMA but other grafts occluded, medical management), HTN, HLD, CKD, CHER, ?HF, and pemphygus vulgaris presents with acute SOB. admitted to ccu for acute resp failure s/p intubation now extubated     NARCISO on CKD (chronic kidney disease) stage 3-4  baseline likely ~ 2-2.2  had NARCISO peaked at 3.27 and had improved better than baseline stable ~ 1.5 possible sec to lost of muscle mass  recurrent NARCISO likely ATN  Advise to continue diuresis as needed at present. Off lasix   s/p [facility] 1/8  renal function stable  monitor bmp      Essential hypertension.    BP at goal  care per CCU    Acute Resp Failure.  Improving volume status s/p diuretics   now extubated  agree with diuretics as needed at present  Continue to monitor his weight and urine output   f/u cardio    CKD-MBD  check pth, &Vitamin D 25  Phos acceptable   Calcium low  monitor phos and wolf daily    Hypernatremia  Improved  monitor

## 2020-01-10 NOTE — PROGRESS NOTE ADULT - SUBJECTIVE AND OBJECTIVE BOX
Subjective/Objective: Patient resting in bed, no complaints, states he feels well.    MEDICATIONS  (STANDING):  albuterol/ipratropium for Nebulization. 3 milliLiter(s) Nebulizer every 6 hours  apixaban 5 milliGRAM(s) Oral every 12 hours  aspirin  chewable 81 milliGRAM(s) Oral daily  atorvastatin 80 milliGRAM(s) Oral at bedtime  chlorhexidine 4% Liquid 1 Application(s) Topical daily  hydrALAZINE 50 milliGRAM(s) Oral three times a day  isosorbide   dinitrate Tablet (ISORDIL) 20 milliGRAM(s) Oral three times a day  metoprolol succinate  milliGRAM(s) Oral daily  predniSONE   Tablet 60 milliGRAM(s) Oral every 24 hours  QUEtiapine 25 milliGRAM(s) Oral at bedtime  sertraline 50 milliGRAM(s) Oral daily  ticagrelor 90 milliGRAM(s) Oral every 12 hours    MEDICATIONS  (PRN):  acetaminophen    Suspension .. 650 milliGRAM(s) Oral every 6 hours PRN Temp greater or equal to 38C (100.4F)          Vital Signs Last 24 Hrs  T(C): 36.9 (10 Behzad 2020 05:31), Max: 36.9 (09 Jan 2020 12:56)  T(F): 98.4 (10 Behzad 2020 05:31), Max: 98.4 (09 Jan 2020 12:56)  HR: 68 (10 Behzad 2020 05:31) (63 - 105)  BP: 121/78 (10 Behzad 2020 05:31) (121/78 - 145/92)  BP(mean): --  RR: 16 (10 Behzad 2020 05:31) (16 - 19)  SpO2: 98% (10 Behzad 2020 05:31) (96% - 100%)  I&O's Detail        PHYSICAL EXAM  GEN: NAD, skin W & D  RESP: + rhonchi  CV: S1S2 irreg irreg  GI: soft, NT/ND  EXT: no C/C/E  NEURO: A & O X 3  PSYCH: calm, cooperative        EKG/ TELEM: afib with VR 70 - 90    LABS: pending collection 1/10/2020

## 2020-01-10 NOTE — DISCHARGE NOTE NURSING/CASE MANAGEMENT/SOCIAL WORK - NSDCPEWEB_GEN_ALL_CORE
[place] website --- [email or web address]."[email or web address]/[email or web address] website --- [email or web address]

## 2020-01-10 NOTE — PROGRESS NOTE ADULT - REASON FOR ADMISSION
Acute resp failure

## 2020-01-10 NOTE — PROGRESS NOTE ADULT - SUBJECTIVE AND OBJECTIVE BOX
[facility]   MD YENNY PIERCE DO ANAM SIDDIQUI ANGELA WONG, PA    TEL:  OFFICE: [phone]  DR LEE CELL: [phone]  DR. STEVENSON CELL: [phone]  DR. GOODWIN CELL: [phone]  YOANNA CUNNINGHAM CELL: [phone]        Patient is a 64y old  Male who presents with a chief complaint of Acute resp failure (10 Behzad 2020 13:39)      Patient seen and examined at bedside. No chest pain/sob    VITALS:  T(F): 98 (01-10-20 @ 13:36), Max: 98.4 (01-10-20 @ 05:31)  HR: 90 (01-10-20 @ 13:36)  BP: 122/69 (01-10-20 @ 13:36)  RR: 18 (01-10-20 @ 13:36)  SpO2: 99% (01-10-20 @ 13:36)  Wt(kg): --        PHYSICAL EXAM:  Constitutional: NAD  Neck: No JVD  Respiratory: CTAB, no wheezes, rales or rhonchi  Cardiovascular: S1, S2, RRR  Gastrointestinal: BS+, soft, NT/ND  Extremities: No peripheral edema    Hospital Medications:   MEDICATIONS  (STANDING):  albuterol/ipratropium for Nebulization. 3 milliLiter(s) Nebulizer every 6 hours  apixaban 5 milliGRAM(s) Oral every 12 hours  aspirin  chewable 81 milliGRAM(s) Oral daily  atorvastatin 80 milliGRAM(s) Oral at bedtime  chlorhexidine 4% Liquid 1 Application(s) Topical daily  hydrALAZINE 50 milliGRAM(s) Oral three times a day  isosorbide   dinitrate Tablet (ISORDIL) 20 milliGRAM(s) Oral three times a day  metoprolol succinate  milliGRAM(s) Oral daily  predniSONE   Tablet 60 milliGRAM(s) Oral every 24 hours  QUEtiapine 25 milliGRAM(s) Oral at bedtime  sertraline 50 milliGRAM(s) Oral daily  ticagrelor 90 milliGRAM(s) Oral every 12 hours      LABS:  01-10    138  |  108<H>  |  28<H>  ----------------------------<  75  4.2   |  19<L>  |  1.43<H>    Ca    8.8      10 Behzad 2020 08:20  Phos  3.2     01-10  Mg     2.1     01-10      Creatinine Trend: 1.43 <--, 1.51 <--, 1.44 <--, 1.55 <--, 1.57 <--, 1.75 <--, 2.17 <--, 2.46 <--    Phosphorus Level, Serum: 3.2 mg/dL (01-10 @ 08:20)                              10.5   12.71 )-----------( 336      ( 10 Behzad 2020 08:20 )             34.4     Urine Studies:  Urinalysis - [01-02-20 @ 04:20]      Color YELLOW / Appearance Lt TURBID / SG 1.020 / pH 6.0      Gluc NEGATIVE / Ketone NEGATIVE  / Bili NEGATIVE / Urobili NORMAL       Blood LARGE / Protein 30 / Leuk Est NEGATIVE / Nitrite NEGATIVE      RBC >50 / WBC 3-5 / Hyaline NEGATIVE / Gran  / Sq Epi OCC / Non Sq Epi  / Bacteria NEGATIVE      HbA1c 6.1      [12-28-19 @ 03:30]    HCV 0.14, Nonreactive Hepatitis C AB  S/CO Ratio                        Interpretation  < 1.00                                   Non-Reactive  1.00 - 4.99                         Weakly-Reactive  >= 5.00                                Reactive  Non-Reactive: Aperson with a non-reactive HCV antibody  result is considered uninfected.  No further action is  needed unless recent infection is suspected.  In these  cases, consider repeat testing later to detect  seroconversion..  Weakly-Reactive: HCV antibody test is abnormal, HCV RNA  Qualitative test will follow.  Reactive: HCV antibody test is abnormal, HCV RNA  Qualitative test will follow.  Note: HCV antibody testing is performed on the [employer]   system.      [12-28-19 @ 03:30]      RADIOLOGY & ADDITIONAL STUDIES:

## 2020-01-10 NOTE — PROGRESS NOTE ADULT - PROBLEM/PLAN-6
DISPLAY PLAN FREE TEXT
Dry/Warm
DISPLAY PLAN FREE TEXT

## 2020-01-10 NOTE — PROGRESS NOTE ADULT - PROVIDER SPECIALTY LIST ADULT
CCU
ENT
Infectious Disease
Internal Medicine
Nephrology
Pulmonology
ENT
Nephrology
CCU

## 2020-02-11 NOTE — PROGRESS NOTE ADULT - PROBLEM SELECTOR PLAN 5
CC:  Elenita Short is here today for Follow-up    Medications: medications verified and updated  Added preferred pharmacy  Refills needed today?  NO  denies Latex allergy or sensitivity    Patient would like communication of their results via:  Send letter with results.  If need to speak with pt, call   Home Phone: [phone] (home)  Okay to leave a message containing results? Yes  Health Maintenance Due   Topic Date Due   • Shingles Vaccine (1 of 2) 08/25/2006   • DTaP/Tdap/Td Vaccine (2 - Td) 02/06/2019     Patient is due for the topics as listed above and wishes to proceed with them                            Scr has been stable but today's is pending.

## 2020-08-24 ENCOUNTER — INPATIENT (INPATIENT)
Facility: HOSPITAL | Age: 65
LOS: 22 days | Discharge: HOME | DRG: 871 | End: 2020-09-16
Attending: INTERNAL MEDICINE | Admitting: HOSPITALIST
Payer: MEDICARE

## 2020-08-24 VITALS
TEMPERATURE: 101 F | SYSTOLIC BLOOD PRESSURE: 101 MMHG | RESPIRATION RATE: 22 BRPM | WEIGHT: 250 LBS | OXYGEN SATURATION: 95 % | DIASTOLIC BLOOD PRESSURE: 59 MMHG | HEART RATE: 75 BPM | HEIGHT: 68 IN

## 2020-08-24 DIAGNOSIS — A41.9 SEPSIS, UNSPECIFIED ORGANISM: ICD-10-CM

## 2020-08-24 DIAGNOSIS — M79.89 OTHER SPECIFIED SOFT TISSUE DISORDERS: ICD-10-CM

## 2020-08-24 DIAGNOSIS — S41.102A UNSPECIFIED OPEN WOUND OF LEFT UPPER ARM, INITIAL ENCOUNTER: ICD-10-CM

## 2020-08-24 DIAGNOSIS — I25.10 ATHEROSCLEROTIC HEART DISEASE OF NATIVE CORONARY ARTERY WITHOUT ANGINA PECTORIS: ICD-10-CM

## 2020-08-24 DIAGNOSIS — I10 ESSENTIAL (PRIMARY) HYPERTENSION: ICD-10-CM

## 2020-08-24 DIAGNOSIS — I50.9 HEART FAILURE, UNSPECIFIED: ICD-10-CM

## 2020-08-24 DIAGNOSIS — Z29.9 ENCOUNTER FOR PROPHYLACTIC MEASURES, UNSPECIFIED: ICD-10-CM

## 2020-08-24 DIAGNOSIS — L10.0 PEMPHIGUS VULGARIS: ICD-10-CM

## 2020-08-24 DIAGNOSIS — R09.89 OTHER SPECIFIED SYMPTOMS AND SIGNS INVOLVING THE CIRCULATORY AND RESPIRATORY SYSTEMS: ICD-10-CM

## 2020-08-24 DIAGNOSIS — I48.91 UNSPECIFIED ATRIAL FIBRILLATION: ICD-10-CM

## 2020-08-24 DIAGNOSIS — G47.33 OBSTRUCTIVE SLEEP APNEA (ADULT) (PEDIATRIC): ICD-10-CM

## 2020-08-24 LAB
APPEARANCE UR: CLEAR — SIGNIFICANT CHANGE UP
APTT BLD: 41.6 SEC — HIGH (ref 27.5–35.5)
BASOPHILS # BLD AUTO: 0.04 K/UL — SIGNIFICANT CHANGE UP (ref 0–0.2)
BASOPHILS NFR BLD AUTO: 0.3 % — SIGNIFICANT CHANGE UP (ref 0–2)
BILIRUB UR-MCNC: NEGATIVE — SIGNIFICANT CHANGE UP
COLOR SPEC: YELLOW — SIGNIFICANT CHANGE UP
DIFF PNL FLD: NEGATIVE — SIGNIFICANT CHANGE UP
EOSINOPHIL # BLD AUTO: 0.16 K/UL — SIGNIFICANT CHANGE UP (ref 0–0.5)
EOSINOPHIL NFR BLD AUTO: 1.3 % — SIGNIFICANT CHANGE UP (ref 0–6)
GAS PNL BLDV: SIGNIFICANT CHANGE UP
GLUCOSE UR QL: NEGATIVE — SIGNIFICANT CHANGE UP
HCT VFR BLD CALC: 32.6 % — LOW (ref 39–50)
HGB BLD-MCNC: 9.2 G/DL — LOW (ref 13–17)
IMM GRANULOCYTES NFR BLD AUTO: 0.5 % — SIGNIFICANT CHANGE UP (ref 0–1.5)
INR BLD: 2.16 RATIO — HIGH (ref 0.88–1.16)
KETONES UR-MCNC: NEGATIVE — SIGNIFICANT CHANGE UP
LACTATE BLDV-MCNC: 0.9 MMOL/L — SIGNIFICANT CHANGE UP (ref 0.7–2)
LEUKOCYTE ESTERASE UR-ACNC: NEGATIVE — SIGNIFICANT CHANGE UP
LYMPHOCYTES # BLD AUTO: 0.89 K/UL — LOW (ref 1–3.3)
LYMPHOCYTES # BLD AUTO: 7.3 % — LOW (ref 13–44)
MCHC RBC-ENTMCNC: 23 PG — LOW (ref 27–34)
MCHC RBC-ENTMCNC: 28.2 GM/DL — LOW (ref 32–36)
MCV RBC AUTO: 81.5 FL — SIGNIFICANT CHANGE UP (ref 80–100)
MONOCYTES # BLD AUTO: 0.89 K/UL — SIGNIFICANT CHANGE UP (ref 0–0.9)
MONOCYTES NFR BLD AUTO: 7.3 % — SIGNIFICANT CHANGE UP (ref 2–14)
NEUTROPHILS # BLD AUTO: 10.23 K/UL — HIGH (ref 1.8–7.4)
NEUTROPHILS NFR BLD AUTO: 83.3 % — HIGH (ref 43–77)
NITRITE UR-MCNC: NEGATIVE — SIGNIFICANT CHANGE UP
NRBC # BLD: 0 /100 WBCS — SIGNIFICANT CHANGE UP (ref 0–0)
NT-PROBNP SERPL-SCNC: 2301 PG/ML — HIGH (ref 0–300)
PH UR: 6 — SIGNIFICANT CHANGE UP (ref 5–8)
PLATELET # BLD AUTO: 258 K/UL — SIGNIFICANT CHANGE UP (ref 150–400)
PROT UR-MCNC: ABNORMAL
PROTHROM AB SERPL-ACNC: 24.8 SEC — HIGH (ref 10.6–13.6)
RBC # BLD: 4 M/UL — LOW (ref 4.2–5.8)
RBC # FLD: 18.9 % — HIGH (ref 10.3–14.5)
SARS-COV-2 RNA SPEC QL NAA+PROBE: SIGNIFICANT CHANGE UP
SP GR SPEC: 1.02 — SIGNIFICANT CHANGE UP (ref 1.01–1.02)
TROPONIN T, HIGH SENSITIVITY RESULT: 57 NG/L — HIGH (ref 0–51)
TROPONIN T, HIGH SENSITIVITY RESULT: 65 NG/L — HIGH (ref 0–51)
UROBILINOGEN FLD QL: ABNORMAL
WBC # BLD: 12.27 K/UL — HIGH (ref 3.8–10.5)
WBC # FLD AUTO: 12.27 K/UL — HIGH (ref 3.8–10.5)

## 2020-08-24 PROCEDURE — 73590 X-RAY EXAM OF LOWER LEG: CPT | Mod: 26,RT

## 2020-08-24 PROCEDURE — 93308 TTE F-UP OR LMTD: CPT | Mod: 26

## 2020-08-24 PROCEDURE — 73130 X-RAY EXAM OF HAND: CPT | Mod: 26,LT

## 2020-08-24 PROCEDURE — 93010 ELECTROCARDIOGRAM REPORT: CPT

## 2020-08-24 PROCEDURE — 73562 X-RAY EXAM OF KNEE 3: CPT | Mod: 26,RT

## 2020-08-24 PROCEDURE — 99285 EMERGENCY DEPT VISIT HI MDM: CPT

## 2020-08-24 PROCEDURE — 71045 X-RAY EXAM CHEST 1 VIEW: CPT | Mod: 26

## 2020-08-24 PROCEDURE — 73610 X-RAY EXAM OF ANKLE: CPT | Mod: 26,RT

## 2020-08-24 PROCEDURE — 73552 X-RAY EXAM OF FEMUR 2/>: CPT | Mod: 26,RT

## 2020-08-24 PROCEDURE — 73502 X-RAY EXAM HIP UNI 2-3 VIEWS: CPT | Mod: 26,RT

## 2020-08-24 PROCEDURE — 99223 1ST HOSP IP/OBS HIGH 75: CPT | Mod: GC

## 2020-08-24 PROCEDURE — 93971 EXTREMITY STUDY: CPT | Mod: 26,RT

## 2020-08-24 RX ORDER — APIXABAN 2.5 MG/1
5 TABLET, FILM COATED ORAL
Refills: 0 | Status: DISCONTINUED | OUTPATIENT
Start: 2020-08-24 | End: 2020-09-09

## 2020-08-24 RX ORDER — ACETAMINOPHEN 500 MG
975 TABLET ORAL ONCE
Refills: 0 | Status: COMPLETED | OUTPATIENT
Start: 2020-08-24 | End: 2020-08-24

## 2020-08-24 RX ORDER — SERTRALINE 25 MG/1
1 TABLET, FILM COATED ORAL
Qty: 0 | Refills: 0 | DISCHARGE

## 2020-08-24 RX ORDER — SODIUM CHLORIDE 9 MG/ML
500 INJECTION INTRAMUSCULAR; INTRAVENOUS; SUBCUTANEOUS ONCE
Refills: 0 | Status: COMPLETED | OUTPATIENT
Start: 2020-08-24 | End: 2020-08-24

## 2020-08-24 RX ORDER — SERTRALINE 25 MG/1
50 TABLET, FILM COATED ORAL DAILY
Refills: 0 | Status: DISCONTINUED | OUTPATIENT
Start: 2020-08-24 | End: 2020-09-16

## 2020-08-24 RX ORDER — ALPRAZOLAM 0.25 MG
0.5 TABLET ORAL
Refills: 0 | Status: DISCONTINUED | OUTPATIENT
Start: 2020-08-24 | End: 2020-08-24

## 2020-08-24 RX ORDER — VANCOMYCIN HCL 1 G
1000 VIAL (EA) INTRAVENOUS ONCE
Refills: 0 | Status: COMPLETED | OUTPATIENT
Start: 2020-08-24 | End: 2020-08-24

## 2020-08-24 RX ORDER — VANCOMYCIN HCL 1 G
1000 VIAL (EA) INTRAVENOUS EVERY 24 HOURS
Refills: 0 | Status: DISCONTINUED | OUTPATIENT
Start: 2020-08-25 | End: 2020-08-26

## 2020-08-24 RX ORDER — TETANUS TOXOID, REDUCED DIPHTHERIA TOXOID AND ACELLULAR PERTUSSIS VACCINE, ADSORBED 5; 2.5; 8; 8; 2.5 [IU]/.5ML; [IU]/.5ML; UG/.5ML; UG/.5ML; UG/.5ML
0.5 SUSPENSION INTRAMUSCULAR ONCE
Refills: 0 | Status: COMPLETED | OUTPATIENT
Start: 2020-08-24 | End: 2020-08-24

## 2020-08-24 RX ORDER — SODIUM CHLORIDE 9 MG/ML
1000 INJECTION, SOLUTION INTRAVENOUS ONCE
Refills: 0 | Status: COMPLETED | OUTPATIENT
Start: 2020-08-24 | End: 2020-08-24

## 2020-08-24 RX ORDER — RITUXIMAB 10 MG/ML
1 INJECTION, SOLUTION INTRAVENOUS
Qty: 0 | Refills: 0 | DISCHARGE

## 2020-08-24 RX ORDER — ASPIRIN/CALCIUM CARB/MAGNESIUM 324 MG
81 TABLET ORAL DAILY
Refills: 0 | Status: DISCONTINUED | OUTPATIENT
Start: 2020-08-24 | End: 2020-09-16

## 2020-08-24 RX ORDER — CHOLECALCIFEROL (VITAMIN D3) 125 MCG
1 CAPSULE ORAL
Qty: 0 | Refills: 0 | DISCHARGE

## 2020-08-24 RX ORDER — SODIUM CHLORIDE 9 MG/ML
500 INJECTION, SOLUTION INTRAVENOUS ONCE
Refills: 0 | Status: COMPLETED | OUTPATIENT
Start: 2020-08-24 | End: 2020-08-24

## 2020-08-24 RX ORDER — ATORVASTATIN CALCIUM 80 MG/1
80 TABLET, FILM COATED ORAL AT BEDTIME
Refills: 0 | Status: DISCONTINUED | OUTPATIENT
Start: 2020-08-24 | End: 2020-09-16

## 2020-08-24 RX ORDER — PIPERACILLIN AND TAZOBACTAM 4; .5 G/20ML; G/20ML
3.38 INJECTION, POWDER, LYOPHILIZED, FOR SOLUTION INTRAVENOUS ONCE
Refills: 0 | Status: COMPLETED | OUTPATIENT
Start: 2020-08-24 | End: 2020-08-24

## 2020-08-24 RX ORDER — PANTOPRAZOLE SODIUM 20 MG/1
40 TABLET, DELAYED RELEASE ORAL
Refills: 0 | Status: DISCONTINUED | OUTPATIENT
Start: 2020-08-24 | End: 2020-09-16

## 2020-08-24 RX ADMIN — SODIUM CHLORIDE 500 MILLILITER(S): 9 INJECTION, SOLUTION INTRAVENOUS at 18:22

## 2020-08-24 RX ADMIN — SODIUM CHLORIDE 500 MILLILITER(S): 9 INJECTION, SOLUTION INTRAVENOUS at 16:26

## 2020-08-24 RX ADMIN — TETANUS TOXOID, REDUCED DIPHTHERIA TOXOID AND ACELLULAR PERTUSSIS VACCINE, ADSORBED 0.5 MILLILITER(S): 5; 2.5; 8; 8; 2.5 SUSPENSION INTRAMUSCULAR at 19:15

## 2020-08-24 RX ADMIN — Medication 975 MILLIGRAM(S): at 13:47

## 2020-08-24 RX ADMIN — Medication 1000 MILLIGRAM(S): at 18:22

## 2020-08-24 RX ADMIN — SODIUM CHLORIDE 500 MILLILITER(S): 9 INJECTION, SOLUTION INTRAVENOUS at 13:46

## 2020-08-24 RX ADMIN — APIXABAN 5 MILLIGRAM(S): 2.5 TABLET, FILM COATED ORAL at 22:35

## 2020-08-24 RX ADMIN — SODIUM CHLORIDE 1000 MILLILITER(S): 9 INJECTION, SOLUTION INTRAVENOUS at 18:22

## 2020-08-24 RX ADMIN — SODIUM CHLORIDE 500 MILLILITER(S): 9 INJECTION INTRAMUSCULAR; INTRAVENOUS; SUBCUTANEOUS at 15:24

## 2020-08-24 RX ADMIN — PIPERACILLIN AND TAZOBACTAM 200 GRAM(S): 4; .5 INJECTION, POWDER, LYOPHILIZED, FOR SOLUTION INTRAVENOUS at 14:20

## 2020-08-24 RX ADMIN — SODIUM CHLORIDE 500 MILLILITER(S): 9 INJECTION INTRAMUSCULAR; INTRAVENOUS; SUBCUTANEOUS at 18:22

## 2020-08-24 RX ADMIN — PIPERACILLIN AND TAZOBACTAM 3.38 GRAM(S): 4; .5 INJECTION, POWDER, LYOPHILIZED, FOR SOLUTION INTRAVENOUS at 18:22

## 2020-08-24 RX ADMIN — ATORVASTATIN CALCIUM 80 MILLIGRAM(S): 80 TABLET, FILM COATED ORAL at 22:35

## 2020-08-24 RX ADMIN — Medication 975 MILLIGRAM(S): at 14:23

## 2020-08-24 RX ADMIN — Medication 250 MILLIGRAM(S): at 14:19

## 2020-08-24 NOTE — H&P ADULT - PROBLEM SELECTOR PLAN 5
-c/w Eliquis 5mg BID  -Pending admission EKG -c/w Eliquis 5mg BID -c/w Eliquis 5mg BID  - Holding Coreg for rate control given hypotension on admission

## 2020-08-24 NOTE — ED PROVIDER NOTE - ATTENDING CONTRIBUTION TO CARE
Mckeon DO: I have personally performed a face to face medical and diagnostic evaluation of the patient. I have discussed with and reviewed the ACP's note and agree with the History, ROS, Physical Exam and MDM unless otherwise indicated. A brief summary of my personal evaluation and impression can be found below.    65M hx of CAD/CABG/ CHF, afib on eliquis, p/w difficulty walking 2/2 to RLE swelling ongoing x 1 week, baseline exertional dyspnea and shortness of breath, and generalized weakness. Pt had mechanical fall 1 month ago without reported head trauma and injured L hand and R leg at that time. In ED, pt noted to be febrile. Pt has not noticed fever at home. No n/v/d/ chest pain. No cough. No sick contacts. Pt not sure of last tetanus vaccine.  Pt reports compliance with home BP meds and home lasix. Baseline bp 106 systolic. L hand with dorsal erythema and some purulent drainage, ttp, but active range of motion intact. RLE with 2+ pitting edema and significant TTP at the medial malleolus. R  knee ROM wnl without ttp at patella or joint lines. . Extremity pulses 2+ and symmetrical peripherally. Pelvis stable. Strength grossly intact with some limitations 2/2 to pain. No midline spinal ttp. No abd ttp. Midline surgical scar noted on anterior chest. Heart s1s2.   Plan: Suspect early sepsis 2/2 to fever and likely infectious source of L hand. Will eval RLE swelling for dvt/fracture. Some concern for ADHF as pt reports sob and generalized fatigue. Pt at last admission required significant diuresis and as pt is obese, difficult assessment of IVC on bedside pocus. Will start with gentle fluids, broad spectrum antibiotics, and escalate fluids according to fluid response. Do not want to bolus 30 cc/kg as pt with sob and hx CHF and chief complaint was right lower extremity pain and not fever. Pt is overall well appearing and conversive. Mckeon DO: I have personally performed a face to face medical and diagnostic evaluation of the patient. I have discussed with and reviewed the ACP's note and agree with the History, ROS, Physical Exam and MDM unless otherwise indicated. A brief summary of my personal evaluation and impression can be found below.    65M hx of CAD/CABG/ CHF, afib on eliquis, p/w difficulty walking 2/2 to RLE swelling ongoing x 1 week, baseline exertional dyspnea and shortness of breath, and generalized weakness. Pt had mechanical fall 1 month ago without reported head trauma and injured L hand and R leg at that time. In ED, pt noted to be febrile. Pt has not noticed fever at home. No n/v/d/ chest pain. No cough. No sick contacts. Pt not sure of last tetanus vaccine.  Pt reports compliance with home BP meds and home lasix. Baseline bp 106 systolic. L hand with dorsal erythema and some purulent drainage, ttp, but active range of motion intact and no appreciable crepitus. RLE with 2+ pitting edema and significant TTP at the medial malleolus. R  knee passive ROM wnl without ttp at patella or joint lines. Compartments soft. no crepitus of LE. Extremity pulses 2+ and symmetrical peripherally. Pelvis stable. Strength grossly intact with some limitations 2/2 to pain. No midline spinal ttp. No abd ttp. Midline surgical scar noted on anterior chest. Heart s1s2.   Plan: Suspect early sepsis 2/2 to fever and likely infectious source of L hand. Will eval RLE swelling for dvt/fracture. Mild warmth of RLE so possible early cellulitis . Do not suspect septic joint as no significant erythema/warmth surrounding joint.  Some concern for ADHF as pt reports sob and generalized fatigue. Pt at last admission required significant diuresis and as pt is obese, difficult assessment of IVC on bedside pocus. Will start with gentle fluids, broad spectrum antibiotics, and escalate fluids according to fluid response. Do not want to bolus 30 cc/kg as pt with sob and hx CHF and chief complaint was right lower extremity pain. Pt is overall well appearing and conversive. Will continue to assess fluid status and admin fluids as appropriate. Mckeon DO: I have personally performed a face to face medical and diagnostic evaluation of the patient. I have discussed with and reviewed the ACP's note and agree with the History, ROS, Physical Exam and MDM unless otherwise indicated. A brief summary of my personal evaluation and impression can be found below.    65M hx of CAD/CABG/ CHF, afib on eliquis, p/w difficulty walking 2/2 to RLE swelling ongoing x 1 week, baseline exertional dyspnea and shortness of breath, and generalized weakness. Pt had mechanical fall 1 month ago without reported head trauma and injured L hand and R leg at that time. In ED, pt noted to be febrile. Pt has not noticed fever at home. No n/v/d/ chest pain. No cough. No sick contacts. Pt not sure of last tetanus vaccine.  Pt reports compliance with home BP meds and home lasix. Baseline bp 106 systolic. L hand with dorsal erythema and some purulent drainage, ttp, but active range of motion intact and no appreciable crepitus. RLE with 2+ pitting edema and significant TTP at the medial malleolus. R  knee passive ROM wnl without ttp at patella or joint lines. Compartments soft. no crepitus of LE. Extremity pulses 2+ and symmetrical peripherally. Pelvis stable. Strength grossly intact with some limitations 2/2 to pain. No midline spinal ttp. No abd ttp. Midline surgical scar noted on anterior chest. Heart s1s2.   Plan: Suspect early sepsis 2/2 to fever and likely infectious source of L hand. Will eval RLE swelling for dvt/fracture. Mild warmth of RLE so possible early cellulitis . Do not suspect septic joint as no significant erythema/warmth surrounding joint.  Some concern for ADHF as pt reports sob and generalized fatigue. Pt at last admission required significant diuresis and as pt is obese, difficult assessment of IVC on bedside pocus. Will start with gentle fluids, broad spectrum antibiotics, and escalate fluids according to fluid response. Do not want to bolus 30 cc/kg as pt with sob and hx CHF and chief complaint was right lower extremity pain. Pt is overall well appearing and conversive.

## 2020-08-24 NOTE — ED ADULT NURSE REASSESSMENT NOTE - NS ED NURSE REASSESS COMMENT FT1
Pt presenting lower BP, states feeling sick but better than when he came. another 500 of fluid given, MD aware. Will continue to monitor closely.

## 2020-08-24 NOTE — ED PROVIDER NOTE - CARE PLAN
Principal Discharge DX:	Wound of left upper extremity, initial encounter  Secondary Diagnosis:	Leg swelling  Secondary Diagnosis:	Fever

## 2020-08-24 NOTE — ED PROVIDER NOTE - NS ED ROS FT
Constitutional: +subjective fever and chills  Eyes: No visual changes, eye pain or redness  HEENT: No throat pain, ear pain, nasal pain. No nose bleeding.  CV: No chest pain or +significant RLE edema, pain  Resp: +progressively worsening SOB. no cough  GI: No abd pain. No nausea or vomiting. No diarrhea. No constipation.   : No dysuria, hematuria.   MSK: No musculoskeletal pain  Skin: L hand with swelling, redness and oozing   Neuro: No headache. No numbness or tingling. No weakness.

## 2020-08-24 NOTE — H&P ADULT - NSHPREVIEWOFSYSTEMS_GEN_ALL_CORE
Review of Systems:     Constitutional: No weakness, fever, chills. 35lb weight gain in past few months.     Eyes: No blindness, no eye pain    ENT: No throat pain, no rhinorrhea     Neck: No pain or stiffness     Respiratory: No cough. SOB.     Cardiovascular: No chest pain, no palpitations     Gastrointestinal: No abdominal or epigastric pain. No nausea, vomiting, or diarrhea     Genitourinary: No dysuria, no change in frequency, no hematuria     Neurological: No numbness or weakness      Skin: No itching, burning, or lesions. RLE swelling, erythema, pain. L hand wound with pus drainage.     Pysch: No depression or anxiety Review of Systems:     Constitutional: No weakness, fever, chills. 35lb weight gain in past few months.     Eyes: No blindness, no eye pain    ENT: No throat pain, no rhinorrhea     Neck: No pain or stiffness     Respiratory: No cough. +SOB and MELCHOR.     Cardiovascular: No chest pain, no palpitations     Gastrointestinal: No abdominal or epigastric pain. No nausea, vomiting, or diarrhea     Genitourinary: No dysuria, no change in frequency, no hematuria     Neurological: No numbness or weakness      Skin: No itching, burning, or lesions. RLE swelling, erythema, pain. L hand wound with purulent drainage.     Pysch: No depression or anxiety

## 2020-08-24 NOTE — H&P ADULT - ATTENDING COMMENTS
Agree with above. Pt presented with unilateral right LE swelling, found to be septic likely 2/2 LUE purulent cellulitis. VS reviewed, labwork reviewed. Lactate cleared s/p IVF. Pt with likely NARCISO on CKD III. Suspect pre-renal etiology given lack of PO intake prior to admission and hylaine casts on UA. Will treat with Vancomycin for cellulitis to cover MRSA given purulence and chronic immunosuppression. f/u cultures. Will be cautious with further IVF administration given HFrEF. Currently not in exacerbation. Will hold antihypertensive medications for now given hypotension on admission. Check formal LE duplex to r/o DVT. Low suspicion given pt already chronically anticoagulated. Will check TTE as last prior was in 1/2020. PT consult.

## 2020-08-24 NOTE — H&P ADULT - ASSESSMENT
63yo male with PMH significant for CAD (s/p CABG; most recent LHC showed stable disease with no intervention), CHF (LVEF 39% TTE 1/2020), HTN, HLD, CKD III, CHER (uses CPAP), and pemphigus vulgaris presenting to ED with RLE swelling. 63yo male with PMH significant for CAD (s/p CABG; most recent LHC showed stable disease with no intervention), CHF (LVEF 39% TTE 1/2020), Afib (on Eliquis), HTN, HLD, CKD III, CHER (uses CPAP), and pemphigus vulgaris presenting to ED with RLE swelling. Found to be septic in ED (hypotension, leukocytosis, elevated lactate, febrile). S/p Vanc/zosyn, 2L IVF in ED.

## 2020-08-24 NOTE — ED PROVIDER NOTE - OBJECTIVE STATEMENT
64M with CAD, stents and CABG (last [facility] was 12/2017 with patent LIMA but other grafts occluded, medical management), HTN, HLD, CKD, CHER, CHF, and pemphigus vulgaris last admitted for respiratory failure and afib rvr in Jan 2020. Pt presents today for worsening RLE swelling ongoing x 1 week with difficulty ambulating. Notes he did fall 1 month ago near steps but was ambulatory and did not have to seek care for it at the time. Pt notes sob but feels that it is chronic- states it has been ongoing ' a while ' , at least a month. No chest pain. Takes eliquis. Denies hx of GI bleeding or bleeding anywhere. Denies any fevers/ cough. 64M with CAD, stents and CABG (last [facility] was 12/2017 with patent LIMA but other grafts occluded, medical management), HTN, HLD, CKD, CHER, CHF, and pemphigus vulgaris last admitted for respiratory failure and afib rvr in Jan 2020. Pt presents today for worsening RLE swelling ongoing x 1 week with difficulty ambulating. Sent in by Dr. Campbell who he saw on behalf of his PMD Dr. Charan Mckeon to r/o DVT. Notes he did fall 1 month ago near steps but was ambulatory and did not have to seek care for it at the time. Pt notes sob but feels that it is chronic- states it has been ongoing ' a while ' , at least a month. No chest pain. Takes eliquis. Denies hx of GI bleeding or bleeding anywhere. Denies any fevers/ cough. Reports that when he fell he injured his L hand and has been swollen and red with a wound on the back of his hand since that time that is not healing, his physician did not say anything about it. Reports that he has been feeling warm with chills at home though did not take his temperature. Denies any cough, nausea, vomiting, abdominal pain.

## 2020-08-24 NOTE — ED PROCEDURE NOTE - GENERAL PROCEDURE NAME
Chief complaint:   Chief Complaint   Patient presents with   • Sore Throat     x2 days; mother requests both covid and strep swabs       Vitals:  Visit Vitals  Pulse 84   Temp 98.4 °F (36.9 °C) (Tympanic)   SpO2 100%       HISTORY OF PRESENT ILLNESS     17-year-old female who was brought to the walk-in clinic today by her mother with concern for sore throat.  Patient had the symptoms for the last 2 days.  Mother is concern for both COVID and strep throat and would like to have the patient tested for both.  Temperature clinic is 98.4° F. No nausea or vomiting.      Other significant problems:  There are no active problems to display for this patient.      PAST MEDICAL, FAMILY AND SOCIAL HISTORY     Medications:  Current Outpatient Medications   Medication   • Norgestimate-Ethinyl Estradiol (TRI-LO-SPRINTEC) 0.18/0.215/0.25 MG-25 MCG tablet   • sertraline (ZOLOFT) 50 MG tablet     No current facility-administered medications for this visit.        Allergies:  ALLERGIES:   Allergen Reactions   • Codeine ERYTHEMA     Child was taking Z-Pack also at the same time - so unknown if allergy / sensitive to codeine or Z-Pack.   • Zithromax [Azithromycin] ERYTHEMA     Child was taking codeine also at the same time - so unknown if allergy / sensitive to codeine or Z-Pack.       Past Medical  History/Surgeries:  History reviewed. No pertinent past medical history.    History reviewed. No pertinent surgical history.    Family History:  History reviewed. No pertinent family history.    Social History:  Social History     Tobacco Use   • Smoking status: Never Smoker   • Smokeless tobacco: Never Used   Substance Use Topics   • Alcohol use: Not on file       REVIEW OF SYSTEMS     Review of Systems   HENT: Positive for sore throat.        PHYSICAL EXAM     Physical Exam  Vitals signs and nursing note reviewed.   Constitutional:       General: She is not in acute distress.     Appearance: Normal appearance. She is well-developed. She is  educational POCUS not ill-appearing, toxic-appearing or diaphoretic.   HENT:      Head: Normocephalic and atraumatic.      Right Ear: Tympanic membrane normal.      Left Ear: Tympanic membrane normal.      Nose: Nose normal.      Mouth/Throat:      Mouth: Mucous membranes are moist.      Pharynx: Oropharynx is clear. No oropharyngeal exudate or posterior oropharyngeal erythema.   Eyes:      Conjunctiva/sclera: Conjunctivae normal.      Pupils: Pupils are equal, round, and reactive to light.   Neck:      Musculoskeletal: Normal range of motion and neck supple.   Cardiovascular:      Rate and Rhythm: Normal rate and regular rhythm.      Heart sounds: Normal heart sounds. No murmur. No friction rub. No gallop.    Pulmonary:      Effort: Pulmonary effort is normal. No respiratory distress.      Breath sounds: Normal breath sounds. No wheezing or rales.   Skin:     General: Skin is warm and dry.   Neurological:      Mental Status: She is alert and oriented to person, place, and time.   Psychiatric:         Mood and Affect: Mood normal.         Behavior: Behavior normal.         Thought Content: Thought content normal.         ASSESSMENT/PLAN     Genevieve was seen today for sore throat.    Diagnoses and all orders for this visit:    Sore throat  -     POCT RAPID STREP A  -     STREPTOCOCCUS GROUP A (STREPTOCOCCUS PYOGENES), BACTERIAL CULTURE.  If the patient strep culture returns positive, parents will be notified and patient will be placed on the appropriate antibiotic.    Suspected COVID-19 virus infection  -     2019 NOVEL CORONAVIRUS (SARS-COV-2).  Recommend self quarantine.  Home care instructions provided for review.  Work excuse provided per request.     If symptoms worsen or fail to improve, patient should follow-up with their PCP.

## 2020-08-24 NOTE — H&P ADULT - NSHPLABSRESULTS_GEN_ALL_CORE
< from: Xray Chest 1 View AP/PA (08.24.20 @ 16:06) >    IMPRESSION:    Clear lungs.    < end of copied text >    < from: Xray Ankle Complete 3 Views, Right (08.24.20 @ 16:09) >    IMPRESSION:  No acute displaced fracture or dislocation is seen in the right hip. No acute displaced fracture is seen in the bony pelvis. No diastasis of the pubic symphysis or bilateral sacroiliac joints. Bilateral hip joint spaces appear preserved. Vascular calcifications.    No acute displaced fracture is seen in the right femur. Vascular calcifications in the right thigh. Surgical clip in the soft tissues at the medial aspect of the mid right thigh.    No acute fracture or dislocation is seen in the right knee. No right knee joint effusion. Surgical clips at the medial aspect of the knee. The femorotibial joint spaces appear preserved.    No acute displaced fracture is seen in the right tibia/fibula. There is diffuse soft tissue stranding/swelling in the lower leg.    No acute displaced fracture or dislocation in the right ankle. There is soft tissue swelling about the ankle. Ankle mortise appears congruent on nonstressed views.    < end of copied text >    < from: US Duplex Venous Lower Ext Ltd, Right (ED) (08.24.20 @ 16:36) >    IMPRESSION:  No Evidence of proximal deep vein thrombosis in the right  lower extremity.    < end of copied text >

## 2020-08-24 NOTE — ED PROVIDER NOTE - PHYSICAL EXAMINATION
GEN: Well Appearing, Nontoxic, NAD  HEENT: NC/AT, Symm Facies. PERRL, EOMI, MMM, posterior pharynx clear  CV: No JVD/Bruits or stridor;  +S1S2, irregularly irregular w/o m/g/r  RESP: b/l coarse breath sounds with crackles to bases  ABD: obese, markedly distended, nontender+BS. No guarding/rebound. No RUQ tender, no CVAT  EXT/MSK: significant RLE swelling 2+ pitting edema to knee with tenderness diffusely and erythema distally. WWP, palpable pulses. Decreased ROM of RLE secondary to pain. L hand with poorly healing erythematous wound to dorsum with edema to hand diffusely. 2+ radial pulse with ROM of hand within normal limits with pain.   SKIN: No erythema, lesions or rash  Neuro: Grossly intact, AOX3 with normal speech, CN II-XII intact; Sensation intact.

## 2020-08-24 NOTE — CHART NOTE - NSCHARTNOTEFT_GEN_A_CORE
TO BE COMPLETED WITHIN 6 HOURS OF INITIAL ASSESSMENT:    For use in patients that have 2 sepsis criteria and new organ dysfunction   •	New or increased oxygen requirement  •	Creatinine >2mg/dL  •	Bilirubin>2mg/dL  •	Platelet <100,000/mm3  •	INR >1.5, PTT>60  •	Lactate >2    If patient persistent hypotension (SBP<90) or any lactate >4 then provider evaluation (Physician/PA/NP) within 30 minutes of bolus completion is required.    Vital Signs Last 24 Hrs  T(C): 37.4 (24 Aug 2020 14:24), Max: 38.3 (24 Aug 2020 13:34)  T(F): 99.3 (24 Aug 2020 14:24), Max: 101 (24 Aug 2020 13:34)  HR: 60 (24 Aug 2020 17:38) (60 - 75)  BP: 102/63 (24 Aug 2020 17:38) (82/50 - 106/54)  BP(mean): --  RR: 20 (24 Aug 2020 17:38) (18 - 22)  SpO2: 97% (24 Aug 2020 17:38) (95% - 98%)  		  LUNGS:  [X] Clear bilaterally [  ] Wheeze [  ] Rhonchi [  ] Rales [  ] Crackles; Other:  HEART: [X]RRR [  ] No murmur [  ]  Normal S1S2 [  ] Tachycardia;  Other:  CAPILLARY REFILL:  	Fingers: [X] less than 2 seconds [  ] more than 2 seconds                                           Toes: [X]  less than 2 seconds [  ] more than 2 seconds   PERIPHERAL PULSES:  Radial: [X] Palpable  [  ]  non-palpable                                         Dorsalis Pedis: [  ] Palpable  [X] non-palpable - due to edema                                         Posterior Tibial: [  ] Palpable  [X] non-palpable - due to edema                                          Other:  SKIN:   [  ]  Diaphoretic  [  ]  Mottling  [  ]  Cold extremities  [X]  Warm [  ]  Dry                      Other:    BEDSIDE ULTRASOUND FINDINGS (IF APPLICABLE):    Labs:  24 Aug 2020 13:43    135    |  97     |  15     ----------------------------<  107    3.8     |  21     |  2.34     Ca    9.4        24 Aug 2020 13:43    TPro  7.2    /  Alb  3.6    /  TBili  1.4    /  DBili  x      /  AST  8      /  ALT  6      /  AlkPhos  105    24 Aug 2020 13:43                          9.2    12.27 )-----------( 258      ( 24 Aug 2020 13:45 )             32.6     PT/INR - ( 24 Aug 2020 13:45 )   PT: 24.8 sec;   INR: 2.16 ratio         PTT - ( 24 Aug 2020 13:45 )  PTT:41.6 sec  Lactate:    [ ] I have re-evaluated the patient's fluid status and reviewed vital signs. Clinical evaluation demonstrates an appropriate response to fluid resuscitation, with subsequent plan as follows:    Patient received a modified total of fluid resuscitation for the following reason:  [ ] obesity BMI > 30, patient received 30 cc/kg according to Ideal Body Weight   [ ] acute, decompensated CHF   [ ] pulmonary edema    [ ] ESRD with signs of fluid overload  [ ] presence of LVAD     Plan (orders must be placed in EMR):     [  ]  Check Repeat Lactate   [  ]  No change in current plan  [  ]  Start Vasopressors:  [  ]  Repeat Fluid Bolus:  [  ] other:    Care Discussed with Consultants/Other Providers [ ] YES  [ ] NO TO BE COMPLETED WITHIN 6 HOURS OF INITIAL ASSESSMENT:    For use in patients that have 2 sepsis criteria and new organ dysfunction   •	New or increased oxygen requirement  •	Creatinine >2mg/dL  •	Bilirubin>2mg/dL  •	Platelet <100,000/mm3  •	INR >1.5, PTT>60  •	Lactate >2    If patient persistent hypotension (SBP<90) or any lactate >4 then provider evaluation (Physician/PA/NP) within 30 minutes of bolus completion is required.    Vital Signs Last 24 Hrs  T(C): 37.4 (24 Aug 2020 14:24), Max: 38.3 (24 Aug 2020 13:34)  T(F): 99.3 (24 Aug 2020 14:24), Max: 101 (24 Aug 2020 13:34)  HR: 60 (24 Aug 2020 17:38) (60 - 75)  BP: 102/63 (24 Aug 2020 17:38) (82/50 - 106/54)  BP(mean): --  RR: 20 (24 Aug 2020 17:38) (18 - 22)  SpO2: 97% (24 Aug 2020 17:38) (95% - 98%)  		  LUNGS:  [X] Clear bilaterally [  ] Wheeze [  ] Rhonchi [  ] Rales [  ] Crackles; Other:  HEART: [X]RRR [  ] No murmur [  ]  Normal S1S2 [  ] Tachycardia;  Other:  CAPILLARY REFILL:  	Fingers: [X] less than 2 seconds [  ] more than 2 seconds                                           Toes: [X]  less than 2 seconds [  ] more than 2 seconds   PERIPHERAL PULSES:  Radial: [X] Palpable  [  ]  non-palpable                                         Dorsalis Pedis: [  ] Palpable  [X] non-palpable - due to edema                                         Posterior Tibial: [  ] Palpable  [X] non-palpable - due to edema                                          Other:  SKIN:   [  ]  Diaphoretic  [  ]  Mottling  [  ]  Cold extremities  [X]  Warm [  ]  Dry                      Other:    BEDSIDE ULTRASOUND FINDINGS (IF APPLICABLE):    Labs:  24 Aug 2020 13:43    135    |  97     |  15     ----------------------------<  107    3.8     |  21     |  2.34     Ca    9.4        24 Aug 2020 13:43    TPro  7.2    /  Alb  3.6    /  TBili  1.4    /  DBili  x      /  AST  8      /  ALT  6      /  AlkPhos  105    24 Aug 2020 13:43                          9.2    12.27 )-----------( 258      ( 24 Aug 2020 13:45 )             32.6     PT/INR - ( 24 Aug 2020 13:45 )   PT: 24.8 sec;   INR: 2.16 ratio         PTT - ( 24 Aug 2020 13:45 )  PTT:41.6 sec  Lactate:    [ ] I have re-evaluated the patient's fluid status and reviewed vital signs. Clinical evaluation demonstrates an appropriate response to fluid resuscitation, with subsequent plan as follows:    Patient received a modified total of fluid resuscitation for the following reason:  [ ] obesity BMI > 30, patient received 30 cc/kg according to Ideal Body Weight   [ ] acute, decompensated CHF   [ ] pulmonary edema    [ ] ESRD with signs of fluid overload  [ ] presence of LVAD     Plan (orders must be placed in EMR):     [X]  Check Repeat Lactate   [  ]  No change in current plan  [  ]  Start Vasopressors:  [  ]  Repeat Fluid Bolus:  [  ] other:    Care Discussed with Consultants/Other Providers [ ] YES  [ ] NO Sigifredo Storey, PGY-3  MAR  Dept. Internal Medicine    TO BE COMPLETED WITHIN 6 HOURS OF INITIAL ASSESSMENT:    For use in patients that have 2 sepsis criteria and new organ dysfunction   •	New or increased oxygen requirement  •	Creatinine >2mg/dL  •	Bilirubin>2mg/dL  •	Platelet <100,00/mm3  •	INR >1.5, PTT>60  •	Lactate >2    If patient persistent hypotension (SBP<90) or any lactate >4 then provider evaluation (Physician/PA/NP) within 30 minutes of bolus completion is required.    Vital Signs Last 24 Hrs  T(C): 37.4 (24 Aug 2020 14:24), Max: 38.3 (24 Aug 2020 13:34)  T(F): 99.3 (24 Aug 2020 14:24), Max: 101 (24 Aug 2020 13:34)  HR: 60 (24 Aug 2020 17:38) (60 - 75)  BP: 102/63 (24 Aug 2020 17:38) (82/50 - 106/54)  BP(mean): --  RR: 20 (24 Aug 2020 17:38) (18 - 22)  SpO2: 97% (24 Aug 2020 17:38) (95% - 98%)  		  LUNGS:  [X]Clear bilaterally [  ] Wheeze [  ] Rhonchi [  ] Rales [  ] Crackles; Other:  HEART: [X]RRR [  ] No murmur[  ]  Normal S1S2[  ] Tachycardia;  Other:  CAPILLARY REFiLL:  	Fingers: [X] less than 2 seconds [  ] more than 2 seconds                                           Toes: [X]  less than 2 seconds [  ] more than 2 seconds   PERIPHERAL PULSES:  Radial: [X] Palpable  [  ]  non-palpable                                         Dorsalis Pedis: [  ] Palpable  [X] non-palpable due to edema                                         Posterior Tibial: [  ] Palpable  [X] non-palpable due to edema                                          Other:  SKIN:   [  ]  Diaphoretic  [  ]  mottling  [  ]  Cold extremities  [X]  Warm [  ]  Dry                      Other:    BEDSIDE ULTRASOUND FINDINGS (IF APPLICABLE):    Labs:  24 Aug 2020 13:43    135    |  97     |  15     ----------------------------<  107    3.8     |  21     |  2.34     Ca    9.4        24 Aug 2020 13:43    TPro  7.2    /  Alb  3.6    /  TBili  1.4    /  DBili  x      /  AST  8      /  ALT  6      /  AlkPhos  105    24 Aug 2020 13:43                          9.2    12.27 )-----------( 258      ( 24 Aug 2020 13:45 )             32.6     PT/INR - ( 24 Aug 2020 13:45 )   PT: 24.8 sec;   INR: 2.16 ratio         PTT - ( 24 Aug 2020 13:45 )  PTT:41.6 sec  Lactate:    Plan (orders must be placed in EMR):     [  ]  Check Repeat Lactate   [  ]  No change in current plan  [  ]  Start Vasopressors:  [  ]  Repeat Fluid Bolus:  [X] other: Check lactate, IVF bolus in 500 ccs if lac >2    Care Discussed with Consultants/Other Providers [X] YES  [ ] NO

## 2020-08-24 NOTE — H&P ADULT - NSHPPHYSICALEXAM_GEN_ALL_CORE
Physical Exam:     General: No acute distress, well-appearing    Eyes: EOMI     ENT: MMM, no oropharyngeal lesions or erythema appreciated     Pulm: No increased WOB. No wheezing. Crackles on bilateral lung bases.     CV: RRR. S1&S2+. No M/R/G appreciated.     Abdomen: +BS. Soft, NT. No organomegaly. Distended.     MSK: Limited ROM RLE.     Extremities: RLE 1+ pitting edema with swelling, erythema, and ttp.     Neuro: A&Ox3, no focal deficits     Skin: Warm and dry. L hand wound with central puncture and draining pus. Physical Exam:     General: No acute distress, well-appearing    Eyes: EOMI     ENT: MMM, no oropharyngeal lesions or erythema appreciated     Pulm: No increased WOB. No wheezing. Crackles on bilateral lung bases.     CV: RRR. S1&S2+. No M/R/G appreciated.     Abdomen: +BS. Soft, NT. No organomegaly. Distended.     MSK: Limited ROM RLE.     Extremities: RLE 1+ pitting edema with swelling, erythema, and ttp.     Neuro: A&Ox3, no focal deficits     Skin: Warm and dry. L hand wound with central puncture, erythema, ttp, and draining pus.

## 2020-08-24 NOTE — H&P ADULT - HISTORY OF PRESENT ILLNESS
65yo male with PMH significant for CAD (s/p CABG; most recent LHC showed stable disease with no intervention), CHF (LVEF 39% TTE 1/2020), HTN, HLD, CKD III, CHER (uses CPAP), and pemphigus vulgaris presenting to ED with RLE swelling. Patient was last admitted for respiratory failure and afib rvr in Jan 2020 after a dose of rituxan. Pt had a mechanical fall 1 month ago but did not have trouble ambulating after that. 1 week ago he noticed RLE swelling, which has been progressively getting worse. He notes erythema, progressive swelling, and pain with limited ankle mobility. He has had difficulty ambulating for 1 week.  Notes he did fall 1 month ago near steps but was ambulatory and did not have to seek care for it at the time. Pt notes sob but feels that it is chronic- states it has been ongoing ' a while ' , at least a month. No chest pain. Takes eliquis. Denies hx of GI bleeding or bleeding anywhere. Denies any fevers/ cough. Reports that when he fell he injured his L hand and has been swollen and red with a wound on the back of his hand since that time that is not healing, his physician did not say anything about it. Reports that he has been feeling warm with chills at home though did not take his temperature. Denies any cough, nausea, vomiting, abdominal pain.    ED course: 65 year old male with PMH significant for CAD (s/p CABG; most recent LHC showed stable disease with no intervention), CHF (LVEF 39% TTE 1/2020), Afib (on Eliquis), HTN, HLD, CKD III, CHER (on CPAP), and pemphigus vulgaris presenting to ED with RLE swelling. Pt had a mechanical fall 1 month ago where he landed on his right leg and left hand. He didn't have any difficulty ambulating so he didn't seek medical evaluation at that time. However, since then he said he had a L hand wound, which has not been healing and is now draining pus. Pt not sure of last tetanus vaccine. 1 week ago, he noticed RLE swelling, which has been progressively getting worse. He notes erythema, progressive swelling, and pain with limited ankle mobility. He has had difficulty ambulating. Additionally, he is c/o sob but feels that this is chronic, reporting baseline SOB and MELCHOR. He denies fevers, CP, wheezing, cough, nausea, vomiting, abdominal pain.    ED course: Admission vitals, T 100.6, HR 75, /55, RR 22, Sat 95%. Labs s/f leukocytosis (12.27), INR 2.16, Trop 65, Cr 2.34, BNP 2301. Lactate 2.4. COVID negative. UA negative. CXR wnl. Bedside echo showed indeterminate IVC volume status. XR RLE showed no fx but areas of soft tissue swelling/stranding around right ankle. Limited RLE Duplex scan negative for DVT. Patient received 2L IVF for worsening hypotension (SBP as low as 80's). Repeat lactate 0.7. Improved BPs (SBPs 100's). S/p IV vanc/zosyn for sepsis likely 2/2 to hand wound with purulent drainage vs RLE cellulitis.     Interval history: Patient was admitted to medicine service. Seen and examined at bedside in ED. Patient doing well, c/o RLE pain with movement/palpation. SOB at baseline. Afebrile at time of exam.

## 2020-08-24 NOTE — ED PROVIDER NOTE - PROGRESS NOTE DETAILS
Mckeon DO: pts BP persistently soft, though pt very clinically well appearing, will give another 500 cc bolus, and continue to monitor fluid status due to pts known chf. and continue to admin fluids as pt tolerate. POCUS reveals variable IVC. patient able to tolerate more IV fluids. will administer 1L slowly, continue to monitor fluid status and blood pressure. -Katie Holt PA-C POCUS reveals variable IVC. patient able to tolerate more IV fluids. will administer 1L slowly, continue to monitor fluid status and blood pressure.  -Katie Holt PA-C Given patient's hypotension, not stable enough to leave unit for US to r/o DVT. US performed at bedside by Dr. Lu, negative for DVT. -Katie Holt PA-C Mckeon DO: Pt IVC on pocus demonstrates variability, another 1L of fluids going in with improvement in BP, now MAP 73, pt mentating normally throughout, no sob, no complaints, will continue to assess. Case discussed with Dr. Campbell who agrees with admission to hospitalist. -Katie Holt PA-C Mckeon DO: bp improved, respirations even and unlabored, extremities well perfused, mentation within normal, stable for floor, no acute complaints.

## 2020-08-24 NOTE — H&P ADULT - NSICDXPASTSURGICALHX_GEN_ALL_CORE_FT
PAST SURGICAL HISTORY:  History of coronary artery stent placement 1 stent 2008, 2 stents 2009 in [facility]    S/P quadruple vessel bypass [facility]

## 2020-08-24 NOTE — H&P ADULT - PROBLEM SELECTOR PLAN 4
-TTE (1/2020): LVEF 39%, mod MR/AR  -Will hold isosorbide dinitrate, Entresto, coreg 2/2 hypotension on admission.   -Will hold lasix for now. Consider restarting tomorrow. -TTE (1/2020): LVEF 39%, mod MR/AR  -Not overtly volume overloaded on exam. CXR with no evidence of edema.   -Will hold isosorbide dinitrate, Entresto, coreg 2/2 hypotension on admission.   -Will hold lasix for now. Consider restarting tomorrow. -TTE (1/2020): LVEF 39%, mod MR/AR. Pt with chronic HFrEF  -Not overtly volume overloaded on exam. CXR with no evidence of edema.   -Will hold isosorbide dinitrate, Entresto, coreg 2/2 hypotension on admission.   -Will hold lasix for now. Consider restarting tomorrow.

## 2020-08-24 NOTE — ED PROCEDURE NOTE - PROCEDURE ADDITIONAL DETAILS
Flu shot  
Emergency Department Focused Ultrasound performed at patient's bedside.  The complete report can be found in PACS.
Emergency Department Focused Ultrasound performed at patient's bedside for educational purposes. The study will have a follow up study performed or was performed in the direct supervision of an ultrasound trained attending. Difficult views of IVC and cardiac chambers, no anterior B lines.

## 2020-08-24 NOTE — H&P ADULT - PROBLEM SELECTOR PLAN 1
DVT vs Cellulitis vs CHF exacerbation  -RLE Duplex negative for DVT   -Not overtly volume overloaded on exam   -Patient received Vanc 1g IV and Zosyn 3.75g IV in ED  -C/w Vanc 1g/24hr per pharmacy   -Vanc trough 1 hour prior to 3rd dose of Vanc DVT vs Cellulitis vs CHF exacerbation  -Limited RLE Duplex negative for DVT. Pending formal scan.   -XR RLE negative for fx. Soft tissue swelling/stranding around right ankle.   -S/p IV vanc/zosyn in ED   -C/w Vanc 1g/24hr per pharmacy   -Vanc trough 1 hour prior to 3rd dose of Vanc

## 2020-08-24 NOTE — ED ADULT NURSE NOTE - OBJECTIVE STATEMENT
64 yo male complaining of "I came from my doctor because he told me that I might have DVT," Pt presented as septic, blood pressure 106/54, RR 20, and 101 rectal temperature. Pt saturation 98%, IV place (2 IV/s) R hand and R AC 20 G, fluid given at arrival, blood cultures drawn, sent to lab. pt alerted and oriented x3, abdomen round, soft. right leg red, pain on touch and warm. left hand with an oozing wound, covered by dressing from MD office. Right growing area with an open skin (where it folds) enlarged scrotum. pt not able to ambulate due to pain. Heart sounds normal, lungs wheezing bilaterally at this moment. EKG done, presented, Pt connected to cardiac monitoring. Pt states "I feel so sick"

## 2020-08-24 NOTE — H&P ADULT - PROBLEM SELECTOR PLAN 2
Febrile (100.6) on admission + Leukocytosis (WBC 15.6) + Elevated lactate 2.4 + Hypotensive (SBP 100s). RLE cellulitis vs LUE wound as possible sources of infection.   -s/p 2L IVF + IV Tylenol in ED   -abx (as per above)   -F/u cx Febrile (100.6) on admission + Leukocytosis (WBC 12.27) + Elevated lactate 2.4 + Hypotensive (SBP 100s). RLE cellulitis vs LUE wound as possible sources of infection.   -s/p 2L IVF in ED. Improving BP ('s) and improved lactate (0.7)  -abx (as per above)   -F/u bcx

## 2020-08-24 NOTE — ED ADULT NURSE REASSESSMENT NOTE - NS ED NURSE REASSESS COMMENT FT1
Urine collected  - straight cath - two nurses in room  - sterile procedure, pt tolerated well, 200 mL of urine collected.

## 2020-08-24 NOTE — H&P ADULT - PROBLEM SELECTOR PLAN 3
-abx (per above)   -Will place wound care consult -abx (per above)   -Will place wound care consult tomorrow AM

## 2020-08-24 NOTE — ED PROVIDER NOTE - QRS
Provider Comments  Provider Comments:   pt was a no show today      Signatures   Electronically signed by : Woodrow Anguiano, ; Apr  3 2017 11:33AM EST                       (Author) [id number]

## 2020-08-25 LAB
A1C WITH ESTIMATED AVERAGE GLUCOSE RESULT: 5.5 % — SIGNIFICANT CHANGE UP (ref 4–5.6)
ALBUMIN SERPL ELPH-MCNC: 3.4 G/DL — SIGNIFICANT CHANGE UP (ref 3.3–5)
ALP SERPL-CCNC: 102 U/L — SIGNIFICANT CHANGE UP (ref 40–120)
ALT FLD-CCNC: 5 U/L — LOW (ref 10–45)
ANION GAP SERPL CALC-SCNC: 14 MMOL/L — SIGNIFICANT CHANGE UP (ref 5–17)
APTT BLD: 33.2 SEC — SIGNIFICANT CHANGE UP (ref 27.5–35.5)
AST SERPL-CCNC: 12 U/L — SIGNIFICANT CHANGE UP (ref 10–40)
BILIRUB SERPL-MCNC: 1.2 MG/DL — SIGNIFICANT CHANGE UP (ref 0.2–1.2)
BUN SERPL-MCNC: 16 MG/DL — SIGNIFICANT CHANGE UP (ref 7–23)
CALCIUM SERPL-MCNC: 9 MG/DL — SIGNIFICANT CHANGE UP (ref 8.4–10.5)
CHLORIDE SERPL-SCNC: 101 MMOL/L — SIGNIFICANT CHANGE UP (ref 96–108)
CO2 SERPL-SCNC: 21 MMOL/L — LOW (ref 22–31)
CREAT SERPL-MCNC: 1.96 MG/DL — HIGH (ref 0.5–1.3)
CULTURE RESULTS: NO GROWTH — SIGNIFICANT CHANGE UP
ESTIMATED AVERAGE GLUCOSE: 111 MG/DL — SIGNIFICANT CHANGE UP (ref 68–114)
GLUCOSE SERPL-MCNC: 85 MG/DL — SIGNIFICANT CHANGE UP (ref 70–99)
INR BLD: 2 RATIO — HIGH (ref 0.88–1.16)
MAGNESIUM SERPL-MCNC: 2.1 MG/DL — SIGNIFICANT CHANGE UP (ref 1.6–2.6)
PHOSPHATE SERPL-MCNC: 3.2 MG/DL — SIGNIFICANT CHANGE UP (ref 2.5–4.5)
POTASSIUM SERPL-MCNC: 3.5 MMOL/L — SIGNIFICANT CHANGE UP (ref 3.5–5.3)
POTASSIUM SERPL-SCNC: 3.5 MMOL/L — SIGNIFICANT CHANGE UP (ref 3.5–5.3)
PROCALCITONIN SERPL-MCNC: 0.37 NG/ML — HIGH (ref 0.02–0.1)
PROT SERPL-MCNC: 6.9 G/DL — SIGNIFICANT CHANGE UP (ref 6–8.3)
PROTHROM AB SERPL-ACNC: 22.8 SEC — HIGH (ref 10.6–13.6)
SARS-COV-2 IGG SERPL QL IA: POSITIVE
SARS-COV-2 IGM SERPL IA-ACNC: 4.15 INDEX — HIGH
SODIUM SERPL-SCNC: 136 MMOL/L — SIGNIFICANT CHANGE UP (ref 135–145)
SPECIMEN SOURCE: SIGNIFICANT CHANGE UP

## 2020-08-25 PROCEDURE — 93971 EXTREMITY STUDY: CPT | Mod: 26,RT

## 2020-08-25 PROCEDURE — 99233 SBSQ HOSP IP/OBS HIGH 50: CPT | Mod: GC

## 2020-08-25 PROCEDURE — 99232 SBSQ HOSP IP/OBS MODERATE 35: CPT

## 2020-08-25 RX ADMIN — Medication 20 MILLIGRAM(S): at 05:10

## 2020-08-25 RX ADMIN — APIXABAN 5 MILLIGRAM(S): 2.5 TABLET, FILM COATED ORAL at 05:10

## 2020-08-25 RX ADMIN — PANTOPRAZOLE SODIUM 40 MILLIGRAM(S): 20 TABLET, DELAYED RELEASE ORAL at 05:10

## 2020-08-25 RX ADMIN — Medication 81 MILLIGRAM(S): at 11:32

## 2020-08-25 RX ADMIN — PANTOPRAZOLE SODIUM 40 MILLIGRAM(S): 20 TABLET, DELAYED RELEASE ORAL at 17:47

## 2020-08-25 RX ADMIN — APIXABAN 5 MILLIGRAM(S): 2.5 TABLET, FILM COATED ORAL at 17:47

## 2020-08-25 RX ADMIN — ATORVASTATIN CALCIUM 80 MILLIGRAM(S): 80 TABLET, FILM COATED ORAL at 22:18

## 2020-08-25 RX ADMIN — Medication 250 MILLIGRAM(S): at 13:08

## 2020-08-25 RX ADMIN — SERTRALINE 50 MILLIGRAM(S): 25 TABLET, FILM COATED ORAL at 11:32

## 2020-08-25 NOTE — PROGRESS NOTE ADULT - SUBJECTIVE AND OBJECTIVE BOX
Patient is a 65y old  Male who presents with a chief complaint of RLE swelling (24 Aug 2020 19:35)    SUBJECTIVE / OVERNIGHT EVENTS: Patient seen and examined. No acute overnight events, no subjective complaints.    OBJECTIVE:  Vital Signs Last 24 Hrs  T(C): 37.6 (25 Aug 2020 04:17), Max: 38.3 (24 Aug 2020 13:34)  T(F): 99.7 (25 Aug 2020 04:17), Max: 101 (24 Aug 2020 13:34)  HR: 64 (25 Aug 2020 04:17) (60 - 76)  BP: 115/79 (25 Aug 2020 04:17) (82/50 - 115/79)  BP(mean): --  RR: 18 (25 Aug 2020 04:17) (18 - 22)  SpO2: 94% (25 Aug 2020 04:17) (94% - 99%)    I&O's Summary    24 Aug 2020 07:01  -  25 Aug 2020 06:37  --------------------------------------------------------  IN: 0 mL / OUT: 300 mL / NET: -300 mL      Physical Examination:  GEN: thin man, laying in stretcher in NAD  PSYCH: A&Ox3, mood and affect appear appropriate   SKIN: intact, no e/o rash  NEURO: no focal neurologic deficits appreciated; CN II-XII intact, sensation intact B/L, motor 5/5 in all mm groups  EYES: PERRL, anicteric, EOMI, no vertical/horizontal nystagmus  HEAD: NC, AT  NECK: supple  RESPI: no accessory muscle use, B/L air entry, CTAB   CARDIO: regular rate/rhythm, no LE edema B/L  ABD: soft, NT, ND, +BS  EXT: patient able to move all extremities spontaneously  VASC: peripheral pulses palpated    Labs:  CAPILLARY BLOOD GLUCOSE                              9.2    12.27 )-----------( 258      ( 24 Aug 2020 13:45 )             32.6     08-24    135  |  97  |  15  ----------------------------<  107<H>  3.8   |  21<L>  |  2.34<H>    Ca    9.4      24 Aug 2020 13:43    TPro  7.2  /  Alb  3.6  /  TBili  1.4<H>  /  DBili  x   /  AST  8<L>  /  ALT  6<L>  /  AlkPhos  105  08-24    PT/INR - ( 24 Aug 2020 13:45 )   PT: 24.8 sec;   INR: 2.16 ratio         PTT - ( 24 Aug 2020 13:45 )  PTT:41.6 sec        Urinalysis Basic - ( 24 Aug 2020 17:41 )    Color: Yellow / Appearance: Clear / S.021 / pH: x  Gluc: x / Ketone: Negative  / Bili: Negative / Urobili: 2 mg/dL   Blood: x / Protein: 30 mg/dL / Nitrite: Negative   Leuk Esterase: Negative / RBC: 1 /hpf / WBC 3 /HPF   Sq Epi: x / Non Sq Epi: 1 /hpf / Bacteria: Negative      Imaging Personally Reviewed:    Consultant(s) Notes Reviewed:   Care Discussed with Consultants/Other Providers:    MEDICATIONS  (STANDING):  apixaban 5 milliGRAM(s) Oral two times a day  aspirin  chewable 81 milliGRAM(s) Oral daily  atorvastatin 80 milliGRAM(s) Oral at bedtime  pantoprazole    Tablet 40 milliGRAM(s) Oral two times a day  predniSONE   Tablet 20 milliGRAM(s) Oral daily  sertraline 50 milliGRAM(s) Oral daily  vancomycin  IVPB 1000 milliGRAM(s) IV Intermittent every 24 hours    MEDICATIONS  (PRN): Patient is a 65y old  Male who presents with a chief complaint of RLE swelling (24 Aug 2020 19:35)    SUBJECTIVE / OVERNIGHT EVENTS: Patient seen and examined. RLE still swollen, pain slightly better this am, has better ROM.     OBJECTIVE:  Vital Signs Last 24 Hrs  T(C): 37.6 (25 Aug 2020 04:17), Max: 38.3 (24 Aug 2020 13:34)  T(F): 99.7 (25 Aug 2020 04:17), Max: 101 (24 Aug 2020 13:34)  HR: 64 (25 Aug 2020 04:17) (60 - 76)  BP: 115/79 (25 Aug 2020 04:17) (82/50 - 115/79)  BP(mean): --  RR: 18 (25 Aug 2020 04:17) (18 - 22)  SpO2: 94% (25 Aug 2020 04:17) (94% - 99%)    I&O's Summary    24 Aug 2020 07:01  -  25 Aug 2020 06:37  --------------------------------------------------------  IN: 0 mL / OUT: 300 mL / NET: -300 mL      Physical Examination:  GEN: Obese man, laying in stretcher in NAD  PSYCH: A&Ox3, mood and affect appear appropriate   SKIN: 1x1cm gash in left hand with surrounding erythema and serosanguionus drainage  NEURO: no focal neurologic deficits appreciated; CN II-XII intact, sensation intact B/L, motor 5/5 in all mm groups  EYES: PERRL, anicteric, EOMI, no vertical/horizontal nystagmus  HEAD: NC, AT  NECK: supple  RESPI: no accessory muscle use, B/L air entry, CTAB   CARDIO: regular rate/rhythm,  ABD: obese soft, NT, ND, +BS  EXT: 2+ RLE edema to knee with erythema, ttp    Labs:  CAPILLARY BLOOD GLUCOSE                              9.2    12.27 )-----------( 258      ( 24 Aug 2020 13:45 )             32.6     08-24    135  |  97  |  15  ----------------------------<  107<H>  3.8   |  21<L>  |  2.34<H>    Ca    9.4      24 Aug 2020 13:43    TPro  7.2  /  Alb  3.6  /  TBili  1.4<H>  /  DBili  x   /  AST  8<L>  /  ALT  6<L>  /  AlkPhos  105  08-24    PT/INR - ( 24 Aug 2020 13:45 )   PT: 24.8 sec;   INR: 2.16 ratio         PTT - ( 24 Aug 2020 13:45 )  PTT:41.6 sec        Urinalysis Basic - ( 24 Aug 2020 17:41 )    Color: Yellow / Appearance: Clear / S.021 / pH: x  Gluc: x / Ketone: Negative  / Bili: Negative / Urobili: 2 mg/dL   Blood: x / Protein: 30 mg/dL / Nitrite: Negative   Leuk Esterase: Negative / RBC: 1 /hpf / WBC 3 /HPF   Sq Epi: x / Non Sq Epi: 1 /hpf / Bacteria: Negative      Imaging Personally Reviewed:    Consultant(s) Notes Reviewed:   Care Discussed with Consultants/Other Providers:    MEDICATIONS  (STANDING):  apixaban 5 milliGRAM(s) Oral two times a day  aspirin  chewable 81 milliGRAM(s) Oral daily  atorvastatin 80 milliGRAM(s) Oral at bedtime  pantoprazole    Tablet 40 milliGRAM(s) Oral two times a day  predniSONE   Tablet 20 milliGRAM(s) Oral daily  sertraline 50 milliGRAM(s) Oral daily  vancomycin  IVPB 1000 milliGRAM(s) IV Intermittent every 24 hours    MEDICATIONS  (PRN):

## 2020-08-25 NOTE — PROGRESS NOTE ADULT - PROBLEM SELECTOR PLAN 7
-Hold home antihypertensives as patient was hypotensive on admission -Holding home antihypertensives as above given hypotension

## 2020-08-25 NOTE — PHYSICAL THERAPY INITIAL EVALUATION ADULT - PERTINENT HX OF CURRENT PROBLEM, REHAB EVAL
63 yo male with PMH significant for CAD (s/p CABG; most recent LHC showed stable disease with no intervention), CHF (LVEF 39% TTE 1/2020), Afib (on Eliquis), HTN, HLD, CKD III, CHER (uses CPAP), and pemphigus vulgaris presenting to ED with RLE swelling.

## 2020-08-25 NOTE — PROGRESS NOTE ADULT - PROBLEM SELECTOR PLAN 1
DVT vs Cellulitis vs CHF exacerbation  -Limited RLE Duplex negative for DVT. Pending formal scan.   -XR RLE negative for fx. Soft tissue swelling/stranding around right ankle.   -S/p IV vanc/zosyn in ED   -C/w Vanc 1g/24hr per pharmacy   -Vanc trough 1 hour prior to 3rd dose of Vanc Likely 2/2 Cellulitis given improvement in symptoms with abx  - RLE Duplex negative for DVT.   -XR RLE negative for fx. Soft tissue swelling/stranding around right ankle.   -S/p IV vanc/zosyn in ED   -C/w Vanc 1g/24hr per pharmacy   -Vanc trough 1 hour prior to 3rd dose of Vanc

## 2020-08-25 NOTE — PHYSICAL THERAPY INITIAL EVALUATION ADULT - PLANNED THERAPY INTERVENTIONS, PT EVAL
bed mobility training/gait training/balance training/GOAL: Pt will negotiate up & down 12 stairs independently with unilateral HR & least restrictive AD, in 2 weeks./transfer training

## 2020-08-25 NOTE — CONSULT NOTE ADULT - SUBJECTIVE AND OBJECTIVE BOX
Wound Surgery Consult Note:    HPI:  65 year old male with PMH significant for CAD (s/p CABG; most recent LHC showed stable disease with no intervention), CHF (LVEF 39% TTE 2020), Afib (on Eliquis), HTN, HLD, CKD III, CHER (on CPAP), and pemphigus vulgaris presenting to ED with RLE swelling. Pt had a mechanical fall 1 month ago where he landed on his right leg and left hand. He didn't have any difficulty ambulating so he didn't seek medical evaluation at that time. However, since then he said he had a L hand wound, which has not been healing and is now draining pus. Pt not sure of last tetanus vaccine. 1 week ago, he noticed RLE swelling, which has been progressively getting worse. He notes erythema, progressive swelling, and pain with limited ankle mobility. He has had difficulty ambulating. Additionally, he is c/o sob but feels that this is chronic, reporting baseline SOB and MELCHOR. He denies fevers, CP, wheezing, cough, nausea, vomiting, abdominal pain.    ED course: Admission vitals, T 100.6, HR 75, /55, RR 22, Sat 95%. Labs s/f leukocytosis (12.27), INR 2.16, Trop 65, Cr 2.34, BNP 2301. Lactate 2.4. COVID negative. UA negative. CXR wnl. Bedside echo showed indeterminate IVC volume status. XR RLE showed no fx but areas of soft tissue swelling/stranding around right ankle. Limited RLE Duplex scan negative for DVT. Patient received 2L IVF for worsening hypotension (SBP as low as 80's). Repeat lactate 0.7. Improved BPs (SBPs 100's). S/p IV vanc/zosyn for sepsis likely 2/2 to hand wound with purulent drainage vs RLE cellulitis.     Interval history: Patient was admitted to medicine service. Seen and examined at bedside in ED. Patient doing well, c/o RLE pain with movement/palpation. SOB at baseline. Afebrile at time of exam. (24 Aug 2020 19:35)    Request for wound care consult for Left dorsal hand wound received. MR. Colon stated that the wound is the result of trauma to the hand which occurred when he fell. He stated that there is still pain in the hand but that it is improved. The wound and hand swelling have also improved. He stated that he is left handed. He was seen with Dr. Garnica.    PAST MEDICAL & SURGICAL HISTORY:  CHF (congestive heart failure)  Psoriasis  HLD (hyperlipidemia)  CAD (coronary artery disease): 3 stents  Hypertension  History of coronary artery stent placement: 1 stent , 2 stents  in [facility]  S/P quadruple vessel bypass:     REVIEW OF SYSTEMS  General: no fever or chills	  Respiratory and Thorax: + SOB with movement and speaking, h/o COVID19  Cardiovascular:	h/o CABG x 4, CAD, CHF  Gastrointestinal:	 no n/v  Genitourinary:	decreased urination  Musculoskeletal:	 difficulty ambulating  Neurological:	no LOC  Vascular: increased Right leg swelling  Skin: Left dorsal hand wound, pemphigus vulgaris, psoriasis    MEDICATIONS  (STANDING):  apixaban 5 milliGRAM(s) Oral two times a day  aspirin  chewable 81 milliGRAM(s) Oral daily  atorvastatin 80 milliGRAM(s) Oral at bedtime  pantoprazole    Tablet 40 milliGRAM(s) Oral two times a day  predniSONE   Tablet 20 milliGRAM(s) Oral daily  sertraline 50 milliGRAM(s) Oral daily  vancomycin  IVPB 1000 milliGRAM(s) IV Intermittent every 24 hours    MEDICATIONS  (PRN):    Allergies    No Known Allergies    Intolerances    SOCIAL HISTORY:  Former smoker, occasional ETOH Denies drugs,    FAMILY HISTORY:  Family history of cerebrovascular accident (CVA)  Family history of early CAD: father who  of CVA.    Vital Signs Last 24 Hrs  T(C): 36.5 (25 Aug 2020 11:39), Max: 38.3 (24 Aug 2020 13:34)  T(F): 97.7 (25 Aug 2020 11:39), Max: 101 (24 Aug 2020 13:34)  HR: 80 (25 Aug 2020 11:39) (60 - 80)  BP: 125/67 (25 Aug 2020 11:39) (82/50 - 125/67)  BP(mean): --  RR: 18 (25 Aug 2020 11:39) (18 - 22)  SpO2: 97% (25 Aug 2020 11:39) (94% - 99%)    Physical Exam:  General: MO, NAD, A&O x 3  Respiratory: + SOB with speaking and exertion on room air  Gastrointestinal: soft NT/ND   Neurology: weakened strength & sensation grossly intact  Musculoskeletal: no contractures or deformities  Vascular: BLE edema R>L, BLE equally warm  Skin:  Left dorsal hand wound L 1cm x W 1cm x D unknown, wound surface 100% covered with soft, moist, slough, + edema, small amount of serosanguinous drainage, +TTP and erthema, No odor, increased warmth, induration, fluctuance    LABS:      136  |  101  |  16  ----------------------------<  85  3.5   |  21<L>  |  1.96<H>    Ca    9.0      25 Aug 2020 06:49  Phos  3.2       Mg     2.1         TPro  6.9  /  Alb  3.4  /  TBili  1.2  /  DBili  x   /  AST  12  /  ALT  5<L>  /  AlkPhos  102                            9.2    12.27 )-----------( 258      ( 24 Aug 2020 13:45 )             32.6     PT/INR - ( 25 Aug 2020 09:26 )   PT: 22.8 sec;   INR: 2.00 ratio         PTT - ( 25 Aug 2020 09:26 )  PTT:33.2 sec  Urinalysis Basic - ( 24 Aug 2020 17:41 )    Color: Yellow / Appearance: Clear / S.021 / pH: x  Gluc: x / Ketone: Negative  / Bili: Negative / Urobili: 2 mg/dL   Blood: x / Protein: 30 mg/dL / Nitrite: Negative   Leuk Esterase: Negative / RBC: 1 /hpf / WBC 3 /HPF   Sq Epi: x / Non Sq Epi: 1 /hpf / Bacteria: Negative        RADIOLOGY & ADDITIONAL STUDIES:    AM:  HAND LEFT (MINIMUM 3 VIEWS)                        PROCEDURE DATE:  2020    INTERPRETATION:  EXAMINATION: 3 views of the left hand  CLINICAL INFORMATION: Left hand swelling, history of fall, evaluate for fracture and free air.    COMPARISON: None available.    IMPRESSION:  There is soft tissue swelling at the dorsal aspect of the hand. No definite abnormal soft tissue gas collections are seen. No acute displaced fracture or dislocation. Wound Surgery Consult Note:    HPI:  65 year old male with PMH significant for CAD (s/p CABG; most recent LHC showed stable disease with no intervention), CHF (LVEF 39% TTE 2020), Afib (on Eliquis), HTN, HLD, CKD III, CHER (on CPAP), and pemphigus vulgaris presenting to ED with RLE swelling. Pt had a mechanical fall 1 month ago where he landed on his right leg and left hand. He didn't have any difficulty ambulating so he didn't seek medical evaluation at that time. However, since then he said he had a L hand wound, which has not been healing and is now draining pus. Pt not sure of last tetanus vaccine. 1 week ago, he noticed RLE swelling, which has been progressively getting worse. He notes erythema, progressive swelling, and pain with limited ankle mobility. He has had difficulty ambulating. Additionally, he is c/o sob but feels that this is chronic, reporting baseline SOB and MELCHOR. He denies fevers, CP, wheezing, cough, nausea, vomiting, abdominal pain.    ED course: Admission vitals, T 100.6, HR 75, /55, RR 22, Sat 95%. Labs s/f leukocytosis (12.27), INR 2.16, Trop 65, Cr 2.34, BNP 2301. Lactate 2.4. COVID negative. UA negative. CXR wnl. Bedside echo showed indeterminate IVC volume status. XR RLE showed no fx but areas of soft tissue swelling/stranding around right ankle. Limited RLE Duplex scan negative for DVT. Patient received 2L IVF for worsening hypotension (SBP as low as 80's). Repeat lactate 0.7. Improved BPs (SBPs 100's). S/p IV vanc/zosyn for sepsis likely 2/2 to hand wound with purulent drainage vs RLE cellulitis.     Interval history: Patient was admitted to medicine service. Seen and examined at bedside in ED. Patient doing well, c/o RLE pain with movement/palpation. SOB at baseline. Afebrile at time of exam. (24 Aug 2020 19:35)    Request for wound care consult for Left dorsal hand wound received. MR. Colon stated that the wound is the result of trauma to the hand which occurred when he fell. He stated that there is still pain in the hand but that it is improved. The wound and hand swelling have also improved. He stated that he is right handed. He was seen with Dr. Garnica.    PAST MEDICAL & SURGICAL HISTORY:  CHF (congestive heart failure)  Psoriasis  HLD (hyperlipidemia)  CAD (coronary artery disease): 3 stents  Hypertension  History of coronary artery stent placement: 1 stent , 2 stents  in [facility]  S/P quadruple vessel bypass:     REVIEW OF SYSTEMS  General: no fever or chills	  Respiratory and Thorax: + SOB with movement and speaking, h/o COVID19  Cardiovascular:	h/o CABG x 4, CAD, CHF  Gastrointestinal:	 no n/v  Genitourinary:	decreased urination  Musculoskeletal:	 difficulty ambulating  Neurological:	no LOC  Vascular: increased Right leg swelling  Skin: Left dorsal hand wound, pemphigus vulgaris, psoriasis    MEDICATIONS  (STANDING):  apixaban 5 milliGRAM(s) Oral two times a day  aspirin  chewable 81 milliGRAM(s) Oral daily  atorvastatin 80 milliGRAM(s) Oral at bedtime  pantoprazole    Tablet 40 milliGRAM(s) Oral two times a day  predniSONE   Tablet 20 milliGRAM(s) Oral daily  sertraline 50 milliGRAM(s) Oral daily  vancomycin  IVPB 1000 milliGRAM(s) IV Intermittent every 24 hours    MEDICATIONS  (PRN):    Allergies    No Known Allergies    Intolerances    SOCIAL HISTORY:  Former smoker, occasional ETOH Denies drugs,    FAMILY HISTORY:  Family history of cerebrovascular accident (CVA)  Family history of early CAD: father who  of CVA.    Vital Signs Last 24 Hrs  T(C): 36.5 (25 Aug 2020 11:39), Max: 38.3 (24 Aug 2020 13:34)  T(F): 97.7 (25 Aug 2020 11:39), Max: 101 (24 Aug 2020 13:34)  HR: 80 (25 Aug 2020 11:39) (60 - 80)  BP: 125/67 (25 Aug 2020 11:39) (82/50 - 125/67)  BP(mean): --  RR: 18 (25 Aug 2020 11:39) (18 - 22)  SpO2: 97% (25 Aug 2020 11:39) (94% - 99%)    Physical Exam:  General: MO, NAD, A&O x 3  Respiratory: + SOB with speaking and exertion on room air  Gastrointestinal: soft NT/ND   Neurology: weakened strength & sensation grossly intact  Musculoskeletal: no contractures or deformities  Vascular: BLE edema R>L, BLE equally warm  Skin:  Left dorsal hand wound L 1cm x W 1cm x D unknown, wound surface 100% covered with soft, moist, slough, + edema, small amount of serosanguinous drainage, +TTP and erthema, No odor, increased warmth, induration, fluctuance    LABS:      136  |  101  |  16  ----------------------------<  85  3.5   |  21<L>  |  1.96<H>    Ca    9.0      25 Aug 2020 06:49  Phos  3.2       Mg     2.1         TPro  6.9  /  Alb  3.4  /  TBili  1.2  /  DBili  x   /  AST  12  /  ALT  5<L>  /  AlkPhos  102                            9.2    12.27 )-----------( 258      ( 24 Aug 2020 13:45 )             32.6     PT/INR - ( 25 Aug 2020 09:26 )   PT: 22.8 sec;   INR: 2.00 ratio         PTT - ( 25 Aug 2020 09:26 )  PTT:33.2 sec  Urinalysis Basic - ( 24 Aug 2020 17:41 )    Color: Yellow / Appearance: Clear / S.021 / pH: x  Gluc: x / Ketone: Negative  / Bili: Negative / Urobili: 2 mg/dL   Blood: x / Protein: 30 mg/dL / Nitrite: Negative   Leuk Esterase: Negative / RBC: 1 /hpf / WBC 3 /HPF   Sq Epi: x / Non Sq Epi: 1 /hpf / Bacteria: Negative        RADIOLOGY & ADDITIONAL STUDIES:    AM:  HAND LEFT (MINIMUM 3 VIEWS)                        PROCEDURE DATE:  2020    INTERPRETATION:  EXAMINATION: 3 views of the left hand  CLINICAL INFORMATION: Left hand swelling, history of fall, evaluate for fracture and free air.    COMPARISON: None available.    IMPRESSION:  There is soft tissue swelling at the dorsal aspect of the hand. No definite abnormal soft tissue gas collections are seen. No acute displaced fracture or dislocation.

## 2020-08-25 NOTE — PROGRESS NOTE ADULT - PROBLEM SELECTOR PLAN 2
Febrile (100.6) on admission + Leukocytosis (WBC 12.27) + Elevated lactate 2.4 + Hypotensive (SBP 100s). RLE cellulitis vs LUE wound as possible sources of infection.   -s/p 2L IVF in ED. Improving BP ('s) and improved lactate (0.7)  -abx (as per above)   -F/u bcx Febrile (100.6) on admission + Leukocytosis (WBC 12.27) + Elevated lactate 2.4 + Hypotensive (SBP 100s). RLE cellulitis vs LUE wound as possible sources of infection.  Resolving  -s/p 2L IVF in ED. BP's improved with normalized lactate (0.7)  -abx (as per above)   -F/u bcx

## 2020-08-25 NOTE — PROGRESS NOTE ADULT - ATTENDING COMMENTS
Pt with some improvement in RLE pain this am with abx. Suspect pain and edema likely 2/2 cellulitis given clinical presentation. Hypotension resolving, sepsis resolving. Will continue with vancomycin renally dosed given chronic immunosuppression.   Continuing to monitor BP's as currently holding all of patients antihypertensive medications. Will restart as BP allows  NARCISO on CKD III resolving. Likely pre-renal in etiology given improvement in Cr with IVF. Renally dose medications for GFR 30-40. Hold lasix for now  PT consult  Wound care eval appreciated

## 2020-08-25 NOTE — CONSULT NOTE ADULT - ASSESSMENT
Impression:    Left hand wound 2/2 trauma    Recommend:  1.) topical therapy: Left hand wound - cleanse with NS, pat dry, apply medihoney, cover with allevyn foam dressing every other day.  2.) Left hand elevation  3.) Nutrition optimization  4.) Consider Plastic/Hand Surgery consult    Care as per medicine will follow w/ you  Upon discharge [facility] [address] [place] [address] [phone]  Seen with Dr. Garnica and discussed with clinical nurse and primary team resident  Thank you for this consult  Rosemarie Mulligan, NP-C, CWOCN [id number]

## 2020-08-25 NOTE — PHYSICAL THERAPY INITIAL EVALUATION ADULT - MANUAL MUSCLE TESTING RESULTS, REHAB EVAL
grossly assessed due to/BUE at least 4/5, BLE grossly at least 3/5 throughout , R ankle not formally assessed due to pain

## 2020-08-25 NOTE — PROGRESS NOTE ADULT - PROBLEM SELECTOR PLAN 4
-TTE (1/2020): LVEF 39%, mod MR/AR  -Not overtly volume overloaded on exam. CXR with no evidence of edema.   -Will hold isosorbide dinitrate, Entresto, coreg 2/2 hypotension on admission.   -Will hold lasix for now. Consider restarting tomorrow. Heart failure with reduced ejection fraction. TTE (1/2020): LVEF 39%, mod MR/AR  -Not overtly volume overloaded on exam. CXR with no evidence of edema.   -Will hold isosorbide dinitrate, Entresto, coreg 2/2 hypotension on admission. Will monitor BP and restart as BP can tolerate  -Will hold lasix for now.

## 2020-08-25 NOTE — PHYSICAL THERAPY INITIAL EVALUATION ADULT - ACTIVE RANGE OF MOTION EXAMINATION, REHAB EVAL
except R ankle ROM limited due to pain and swelling/Left LE Active ROM was WFL (within functional limits)/Right LE Active ROM was WFL (within functional limits)/bilateral upper extremity Active ROM was WFL (within functional limits)

## 2020-08-25 NOTE — PROGRESS NOTE ADULT - PROBLEM SELECTOR PLAN 3
-abx (per above)   -Will place wound care consult tomorrow AM -abx (per above)   - wound care consult placed

## 2020-08-25 NOTE — PHYSICAL THERAPY INITIAL EVALUATION ADULT - CRITERIA FOR SKILLED THERAPEUTIC INTERVENTIONS
anticipated discharge recommendation/impairments found/functional limitations in following categories/rehab potential

## 2020-08-25 NOTE — PHYSICAL THERAPY INITIAL EVALUATION ADULT - PRECAUTIONS/LIMITATIONS, REHAB EVAL
fall precautions/Pt had mechanical fall 1 month ago where he landed on his right leg and left hand.  L hand wound has not been healing and is now draining pus. 1 week ago, pt noticed RLE swelling, which has been progressively getting worse, endorses erythema, progressive swelling, and pain with limited ankle mobility, has had difficulty ambulating. Additionally, pt c/o SOB but feels this is chronic, reporting baseline SOB and MELCHOR. Pt denies fevers, CP, wheezing, cough, nausea, vomiting, abdominal pain. Pt found to be septic in ED (hypotension, leukocytosis, elevated lactate, febrile). S/p 2L IVF, s/p for sepsis likely 2/2 to hand wound with purulent drainage vs RLE cellulitis.  XR L HAND: There is soft tissue swelling at the dorsal aspect of the hand. No definite abnormal soft tissue gas collections are seen. No acute displaced fracture or dislocation./cardiac precautions

## 2020-08-25 NOTE — PROGRESS NOTE ADULT - ASSESSMENT
63yo male with PMH significant for CAD (s/p CABG; most recent LHC showed stable disease with no intervention), CHF (LVEF 39% TTE 1/2020), Afib (on Eliquis), HTN, HLD, CKD III, CHER (uses CPAP), and pemphigus vulgaris presenting to ED with RLE swelling. Found to be septic in ED (hypotension, leukocytosis, elevated lactate, febrile). S/p Vanc/zosyn, 2L IVF in ED.

## 2020-08-25 NOTE — PHYSICAL THERAPY INITIAL EVALUATION ADULT - ADDITIONAL COMMENTS
XR RLE: No acute displaced fracture or dislocation is seen in the right hip. No acute displaced fracture is seen in the bony pelvis. No diastasis of the pubic symphysis or bilateral sacroiliac joints. Bilateral hip joint spaces appear preserved. Vascular calcifications.  No acute displaced fracture is seen in the right femur. Vascular calcifications in the right thigh. Surgical clip in the soft tissues at the medial aspect of the mid right thigh.  No acute fracture or dislocation is seen in the right knee. No right knee joint effusion. Surgical clips at the medial aspect of the knee. The femorotibial joint spaces appear preserved.  No acute displaced fracture is seen in the right tibia/fibula. There is diffuse soft tissue stranding/swelling in the lower leg.  No acute displaced fracture or dislocation in the right ankle. There is soft tissue swelling about the ankle. Ankle mortise appears congruent on nonstressed views.

## 2020-08-25 NOTE — PHYSICAL THERAPY INITIAL EVALUATION ADULT - LIVES WITH, PROFILE
Pt lives with spouse in house with 12 steps with unilateral handrail up to bedroom.  Pt independent with all functional mobility ~1 month prior to admission however ambulation recently limited due to RLE pain and swelling.  Pt reports RW & cane at home from previous hospitalization, tub-shower combo.

## 2020-08-26 DIAGNOSIS — N17.9 ACUTE KIDNEY FAILURE, UNSPECIFIED: ICD-10-CM

## 2020-08-26 LAB
ALBUMIN SERPL ELPH-MCNC: 3.1 G/DL — LOW (ref 3.3–5)
ALP SERPL-CCNC: 110 U/L — SIGNIFICANT CHANGE UP (ref 40–120)
ALT FLD-CCNC: 8 U/L — LOW (ref 10–45)
ANION GAP SERPL CALC-SCNC: 17 MMOL/L — SIGNIFICANT CHANGE UP (ref 5–17)
AST SERPL-CCNC: 18 U/L — SIGNIFICANT CHANGE UP (ref 10–40)
BILIRUB SERPL-MCNC: 0.8 MG/DL — SIGNIFICANT CHANGE UP (ref 0.2–1.2)
BUN SERPL-MCNC: 18 MG/DL — SIGNIFICANT CHANGE UP (ref 7–23)
CALCIUM SERPL-MCNC: 9.2 MG/DL — SIGNIFICANT CHANGE UP (ref 8.4–10.5)
CHLORIDE SERPL-SCNC: 101 MMOL/L — SIGNIFICANT CHANGE UP (ref 96–108)
CK MB CFR SERPL CALC: 2.1 NG/ML — SIGNIFICANT CHANGE UP (ref 0–6.7)
CK SERPL-CCNC: 65 U/L — SIGNIFICANT CHANGE UP (ref 30–200)
CO2 SERPL-SCNC: 18 MMOL/L — LOW (ref 22–31)
CREAT SERPL-MCNC: 1.86 MG/DL — HIGH (ref 0.5–1.3)
GLUCOSE SERPL-MCNC: 93 MG/DL — SIGNIFICANT CHANGE UP (ref 70–99)
HCT VFR BLD CALC: 32.4 % — LOW (ref 39–50)
HGB BLD-MCNC: 9.4 G/DL — LOW (ref 13–17)
INR BLD: 1.89 RATIO — HIGH (ref 0.88–1.16)
MAGNESIUM SERPL-MCNC: 2.3 MG/DL — SIGNIFICANT CHANGE UP (ref 1.6–2.6)
MCHC RBC-ENTMCNC: 23.4 PG — LOW (ref 27–34)
MCHC RBC-ENTMCNC: 29 GM/DL — LOW (ref 32–36)
MCV RBC AUTO: 80.8 FL — SIGNIFICANT CHANGE UP (ref 80–100)
NRBC # BLD: 0 /100 WBCS — SIGNIFICANT CHANGE UP (ref 0–0)
PHOSPHATE SERPL-MCNC: 3 MG/DL — SIGNIFICANT CHANGE UP (ref 2.5–4.5)
PLATELET # BLD AUTO: 325 K/UL — SIGNIFICANT CHANGE UP (ref 150–400)
POTASSIUM SERPL-MCNC: 3.6 MMOL/L — SIGNIFICANT CHANGE UP (ref 3.5–5.3)
POTASSIUM SERPL-SCNC: 3.6 MMOL/L — SIGNIFICANT CHANGE UP (ref 3.5–5.3)
PROT SERPL-MCNC: 6.8 G/DL — SIGNIFICANT CHANGE UP (ref 6–8.3)
PROTHROM AB SERPL-ACNC: 21.8 SEC — HIGH (ref 10.6–13.6)
RBC # BLD: 4.01 M/UL — LOW (ref 4.2–5.8)
RBC # FLD: 18.7 % — HIGH (ref 10.3–14.5)
SODIUM SERPL-SCNC: 136 MMOL/L — SIGNIFICANT CHANGE UP (ref 135–145)
VANCOMYCIN TROUGH SERPL-MCNC: 7.4 UG/ML — LOW (ref 10–20)
WBC # BLD: 10.76 K/UL — HIGH (ref 3.8–10.5)
WBC # FLD AUTO: 10.76 K/UL — HIGH (ref 3.8–10.5)

## 2020-08-26 PROCEDURE — 99233 SBSQ HOSP IP/OBS HIGH 50: CPT | Mod: GC

## 2020-08-26 RX ORDER — CARVEDILOL PHOSPHATE 80 MG/1
12.5 CAPSULE, EXTENDED RELEASE ORAL EVERY 12 HOURS
Refills: 0 | Status: DISCONTINUED | OUTPATIENT
Start: 2020-08-26 | End: 2020-09-02

## 2020-08-26 RX ORDER — POTASSIUM CHLORIDE 20 MEQ
40 PACKET (EA) ORAL ONCE
Refills: 0 | Status: COMPLETED | OUTPATIENT
Start: 2020-08-26 | End: 2020-08-26

## 2020-08-26 RX ORDER — VANCOMYCIN HCL 1 G
1000 VIAL (EA) INTRAVENOUS EVERY 12 HOURS
Refills: 0 | Status: DISCONTINUED | OUTPATIENT
Start: 2020-08-26 | End: 2020-08-28

## 2020-08-26 RX ORDER — CARVEDILOL PHOSPHATE 80 MG/1
12.5 CAPSULE, EXTENDED RELEASE ORAL EVERY 12 HOURS
Refills: 0 | Status: DISCONTINUED | OUTPATIENT
Start: 2020-08-26 | End: 2020-08-26

## 2020-08-26 RX ADMIN — Medication 0.5 MILLIGRAM(S): at 21:54

## 2020-08-26 RX ADMIN — SERTRALINE 50 MILLIGRAM(S): 25 TABLET, FILM COATED ORAL at 12:19

## 2020-08-26 RX ADMIN — Medication 250 MILLIGRAM(S): at 23:58

## 2020-08-26 RX ADMIN — PANTOPRAZOLE SODIUM 40 MILLIGRAM(S): 20 TABLET, DELAYED RELEASE ORAL at 06:42

## 2020-08-26 RX ADMIN — CARVEDILOL PHOSPHATE 12.5 MILLIGRAM(S): 80 CAPSULE, EXTENDED RELEASE ORAL at 18:36

## 2020-08-26 RX ADMIN — Medication 81 MILLIGRAM(S): at 12:19

## 2020-08-26 RX ADMIN — Medication 250 MILLIGRAM(S): at 13:10

## 2020-08-26 RX ADMIN — Medication 40 MILLIEQUIVALENT(S): at 08:23

## 2020-08-26 RX ADMIN — APIXABAN 5 MILLIGRAM(S): 2.5 TABLET, FILM COATED ORAL at 18:34

## 2020-08-26 RX ADMIN — Medication 20 MILLIGRAM(S): at 06:41

## 2020-08-26 RX ADMIN — APIXABAN 5 MILLIGRAM(S): 2.5 TABLET, FILM COATED ORAL at 06:41

## 2020-08-26 RX ADMIN — PANTOPRAZOLE SODIUM 40 MILLIGRAM(S): 20 TABLET, DELAYED RELEASE ORAL at 18:34

## 2020-08-26 RX ADMIN — ATORVASTATIN CALCIUM 80 MILLIGRAM(S): 80 TABLET, FILM COATED ORAL at 21:53

## 2020-08-26 NOTE — PROGRESS NOTE ADULT - PROBLEM SELECTOR PLAN 5
-c/w Eliquis 5mg BID -c/w Eliquis 5mg BID  -Will restart home coreg today -c/w Eliquis 5mg BID  -Restarted home coreg 12.5mg BID today Heart failure with reduced ejection fraction. TTE (1/2020): LVEF 39%, mod MR/AR  -Not overtly volume overloaded on exam. CXR with no evidence of edema.   -Will hold isosorbide dinitrate, Entresto 2/2 hypotension on admission. Will monitor BP and restart as BP can tolerate  -Will hold lasix for now.  -Will restart home coreg today

## 2020-08-26 NOTE — PROGRESS NOTE ADULT - ATTENDING COMMENTS
Leg pain markedly improved today. RLE still edematous. Sepsis resolved.   c/w Vanc, renally dosed for purulent cellulitis in immunosuppressed patient. Plastics to be called today for hand wound  NARCISO on CKD III resolving, Cr near baseline. Continue to hold lasix  Restart Coreg today. If BP remains stable and Cr normalizes would start Entresto tomorrow  f/u final PT rec's

## 2020-08-26 NOTE — PROGRESS NOTE ADULT - ASSESSMENT
63yo male with PMH significant for CAD (s/p CABG; most recent LHC showed stable disease with no intervention), CHF (LVEF 39% TTE 1/2020), Afib (on Eliquis), HTN, HLD, CKD III, CHER (uses CPAP), and pemphigus vulgaris presenting to ED with RLE swelling. Found to be septic in ED (hypotension, leukocytosis, elevated lactate, febrile). S/p Vanc/zosyn, 2L IVF in ED. 63yo male with PMH significant for CAD (s/p CABG; most recent LHC showed stable disease with no intervention), CHF (LVEF 39% TTE 1/2020), Afib (on Eliquis), HTN, HLD, CKD III, CHER (uses CPAP), and pemphigus vulgaris presenting to ED with RLE swelling. Found to be septic in ED (hypotension, leukocytosis, elevated lactate, febrile). S/p Vanc/zosyn, 2L IVF in ED. Restarted home ativan 0.5mg at bedtime. C/w home zoloft.

## 2020-08-26 NOTE — PROGRESS NOTE ADULT - PROBLEM SELECTOR PLAN 8
-Will order CPAP overnight -c/w BiPAP overnight -Holding home antihypertensives as above given hypotension. restarting Coreg today

## 2020-08-26 NOTE — PROGRESS NOTE ADULT - PROBLEM SELECTOR PLAN 1
Likely 2/2 Cellulitis given improvement in symptoms with abx  - RLE Duplex negative for DVT.   -XR RLE negative for fx. Soft tissue swelling/stranding around right ankle.   -S/p IV vanc/zosyn in ED   -C/w Vanc 1g/24hr per pharmacy   -Vanc trough 1 hour prior to 3rd dose of Vanc Likely 2/2 Cellulitis given improvement in symptoms with abx  - RLE Duplex negative for DVT.   - XR RLE negative for fx. Soft tissue swelling/stranding around right ankle.   - S/p IV vanc/zosyn in ED   - C/w Vanc 1g/24hr per pharmacy   - Vanc trough 1 hour prior to 3rd dose of Vanc (8/26) Likely 2/2 Cellulitis given improvement in symptoms with abx  - RLE Duplex negative for DVT.   - XR RLE negative for fx. Soft tissue swelling/stranding around right ankle.   - S/p IV vanc/zosyn in ED   - C/w Vanc 1g/24hr per pharmacy.   - Vanc trough 1 hour prior to 3rd dose of Vanc (8/26). Goal 10-15.

## 2020-08-26 NOTE — CONSULT NOTE ADULT - SUBJECTIVE AND OBJECTIVE BOX
HISTORY OF PRESENT ILLNESS: HPI:  Patient is a 65 year old male with PMH of CAD s/p CABG with most recent C 2020 with no intervention, chronic systolic CHF (LVEF 39% TTE 2020), Afib (on Eliquis), HTN, HLD, CKD, CHER (on CPAP), and pemphigus vulgaris who presented with RLE swelling admitted with RLE cellulitis and NARCISO on CKD. Cardiology consulted for management of CAD/CHF. Patient reports RLE swelling improved and feeling much better. He reports chronic MELCHOR over last few months. Denies chest pain, SOB at rest, palpitations, syncope, dizziness, abd pain, n/v, back pain, fever/chills.    PAST MEDICAL & SURGICAL HISTORY:  CHF (congestive heart failure)  Psoriasis  HLD (hyperlipidemia)  CAD (coronary artery disease): 3 stents  Hypertension  History of coronary artery stent placement: 1 stent , 2 stents  in [facility]  S/P quadruple vessel bypass:     MEDICATIONS:  MEDICATIONS  (STANDING):  apixaban 5 milliGRAM(s) Oral two times a day  aspirin  chewable 81 milliGRAM(s) Oral daily  atorvastatin 80 milliGRAM(s) Oral at bedtime  carvedilol 12.5 milliGRAM(s) Oral every 12 hours  LORazepam     Tablet 0.5 milliGRAM(s) Oral at bedtime  pantoprazole    Tablet 40 milliGRAM(s) Oral two times a day  predniSONE   Tablet 20 milliGRAM(s) Oral daily  sertraline 50 milliGRAM(s) Oral daily  vancomycin  IVPB 1000 milliGRAM(s) IV Intermittent every 24 hours      Allergies    No Known Allergies    Intolerances        FAMILY HISTORY:  Family history of cerebrovascular accident (CVA)  Family history of early CAD: father who  of CVA.    Non-contributary for premature coronary disease or sudden cardiac death    SOCIAL HISTORY:    [ ] Non-smoker  [x ] Former Smoker  [ ] Alcohol    FLU VACCINE THIS YEAR STARTS IN AUGUST:  [ ] Yes    [ ] No    IF OVER 65 HAVE YOU EVER HAD A PNA VACCINE:  [ ] Yes    [ ] No       [ ] N/A      REVIEW OF SYSTEMS:  [ ]chest pain  [x  ]shortness of breath  [  ]palpitations  [  ]syncope  [ ]near syncope [ ]upper extremity weakness   [ ] lower extremity weakness  [  ]diplopia  [  ]altered mental status   [  ]fevers  [ ]chills [ ]nausea  [ ]vomitting  [  ]dysphagia    [ ]abdominal pain  [ ]melena  [ ]BRBPR    [  ]epistaxis  [  ]rash    [x ]lower extremity edema        [x ] All others negative	  [ ] Unable to obtain      LABS:	 	    CARDIAC MARKERS:                              9.4    10.76 )-----------( 325      ( 26 Aug 2020 06:26 )             32.4     Hb Trend: 9.4<--        136  |  101  |  18  ----------------------------<  93  3.6   |  18<L>  |  1.86<H>    Ca    9.2      26 Aug 2020 06:25  Phos  3.0       Mg     2.3         TPro  6.8  /  Alb  3.1<L>  /  TBili  0.8  /  DBili  x   /  AST  18  /  ALT  8<L>  /  AlkPhos  110      Creatinine Trend: 1.86<--, 1.96<--, 2.34<--    Coags:  PT/INR - ( 26 Aug 2020 09:25 )   PT: 21.8 sec;   INR: 1.89 ratio             proBNP:   Lipid Profile:   HgA1c:   TSH:         PHYSICAL EXAM:  T(C): 36.8 (20 @ 11:30), Max: 36.9 (20 @ 20:38)  HR: 83 (20 @ 11:30) (68 - 86)  BP: 131/85 (20 @ 11:30) (125/74 - 131/85)  RR: 18 (20 @ 11:30) (18 - 18)  SpO2: 96% (20 @ 11:30) (95% - 98%)  Wt(kg): --   BMI (kg/m2): 37.9 (20 @ 20:05)  I&O's Summary    25 Aug 2020 07:  -  26 Aug 2020 07:00  --------------------------------------------------------  IN: 720 mL / OUT: 400 mL / NET: 320 mL    26 Aug 2020 07:01  -  26 Aug 2020 13:07  --------------------------------------------------------  IN: 240 mL / OUT: 475 mL / NET: -235 mL        Gen: Appears well in NAD  HEENT:  (-)icterus (-)pallor  CV: N S1 S2 / KATHERYN (+)2 Pulses B/l  Resp:  Clear to auscultation B/L, normal effort  GI: (+) BS Soft, NT, ND  Lymph:  (+) RLE Edema, (-)obvious lymphadenopathy  Skin: Warm to touch, Normal turgor  Psych: Appropriate mood and affect    TELEMETRY: None	      ECG: SR with 1st degree AVB 	    RADIOLOGY:         CXR: < from: Xray Chest 1 View AP/PA (20 @ 16:06) >    IMPRESSION:    Clear lungs.    < end of copied text >      ASSESSMENT/PLAN: Patient is a 65 year old male with PMH of CAD s/p CABG with most recent C 2020 with no intervention, chronic systolic CHF (LVEF 39% TTE 2020), Afib (on Eliquis), HTN, HLD, CKD, CHER (on CPAP), and pemphigus vulgaris who presented with RLE swelling admitted with RLE cellulitis and NARCISO on CKD. Cardiology consulted for management of CAD/CHF.    - Abx for cellulitis per med  - Recommend medical management of known CAD with systolic cardiomyopathy - continue ASA/Statin  - BP stable - coreg restarted  - Trend creatinine, resume lasix/entresto when stable  - Continue AC with Eliquis for CVA prevention  - Check TTE to eval LV/RV function  - Further recs pending above  - Patient to f/u with Dr. Campbell's cardiology group after d/c    Cristi Jarvis PA-C  Pager: [phone] HISTORY OF PRESENT ILLNESS: HPI:  Patient is a 65 year old male with PMH of CAD s/p CABG with most recent C 2020 with no intervention, chronic systolic CHF (LVEF 39% TTE 2020), Afib (on Eliquis), HTN, HLD, CKD, CHER (on CPAP), and pemphigus vulgaris who presented with RLE swelling admitted with RLE cellulitis and NARCISO on CKD. Cardiology consulted for management of CAD/CHF. Patient reports RLE swelling improved and feeling much better. He reports chronic MELCHOR over last few months. Denies chest pain, SOB at rest, palpitations, syncope, dizziness, abd pain, n/v, back pain, fever/chills.    PAST MEDICAL & SURGICAL HISTORY:  CHF (congestive heart failure)  Psoriasis  HLD (hyperlipidemia)  CAD (coronary artery disease): 3 stents  Hypertension  History of coronary artery stent placement: 1 stent , 2 stents  in [facility]  S/P quadruple vessel bypass:     MEDICATIONS:  MEDICATIONS  (STANDING):  apixaban 5 milliGRAM(s) Oral two times a day  aspirin  chewable 81 milliGRAM(s) Oral daily  atorvastatin 80 milliGRAM(s) Oral at bedtime  carvedilol 12.5 milliGRAM(s) Oral every 12 hours  LORazepam     Tablet 0.5 milliGRAM(s) Oral at bedtime  pantoprazole    Tablet 40 milliGRAM(s) Oral two times a day  predniSONE   Tablet 20 milliGRAM(s) Oral daily  sertraline 50 milliGRAM(s) Oral daily  vancomycin  IVPB 1000 milliGRAM(s) IV Intermittent every 24 hours      Allergies    No Known Allergies    Intolerances        FAMILY HISTORY:  Family history of cerebrovascular accident (CVA)  Family history of early CAD: father who  of CVA.    Non-contributary for premature coronary disease or sudden cardiac death    SOCIAL HISTORY:    [ ] Non-smoker  [x ] Former Smoker  [ ] Alcohol    FLU VACCINE THIS YEAR STARTS IN AUGUST:  [ ] Yes    [ ] No    IF OVER 65 HAVE YOU EVER HAD A PNA VACCINE:  [ ] Yes    [ ] No       [ ] N/A      REVIEW OF SYSTEMS:  [ ]chest pain  [x  ]shortness of breath  [  ]palpitations  [  ]syncope  [ ]near syncope [ ]upper extremity weakness   [ ] lower extremity weakness  [  ]diplopia  [  ]altered mental status   [  ]fevers  [ ]chills [ ]nausea  [ ]vomitting  [  ]dysphagia    [ ]abdominal pain  [ ]melena  [ ]BRBPR    [  ]epistaxis  [  ]rash    [x ]lower extremity edema        [x ] All others negative	  [ ] Unable to obtain      LABS:	 	    CARDIAC MARKERS:                              9.4    10.76 )-----------( 325      ( 26 Aug 2020 06:26 )             32.4     Hb Trend: 9.4<--        136  |  101  |  18  ----------------------------<  93  3.6   |  18<L>  |  1.86<H>    Ca    9.2      26 Aug 2020 06:25  Phos  3.0       Mg     2.3         TPro  6.8  /  Alb  3.1<L>  /  TBili  0.8  /  DBili  x   /  AST  18  /  ALT  8<L>  /  AlkPhos  110      Creatinine Trend: 1.86<--, 1.96<--, 2.34<--    Coags:  PT/INR - ( 26 Aug 2020 09:25 )   PT: 21.8 sec;   INR: 1.89 ratio             proBNP:   Lipid Profile:   HgA1c:   TSH:         PHYSICAL EXAM:  T(C): 36.8 (20 @ 11:30), Max: 36.9 (20 @ 20:38)  HR: 83 (20 @ 11:30) (68 - 86)  BP: 131/85 (20 @ 11:30) (125/74 - 131/85)  RR: 18 (20 @ 11:30) (18 - 18)  SpO2: 96% (20 @ 11:30) (95% - 98%)  Wt(kg): --   BMI (kg/m2): 37.9 (20 @ 20:05)  I&O's Summary    25 Aug 2020 07:  -  26 Aug 2020 07:00  --------------------------------------------------------  IN: 720 mL / OUT: 400 mL / NET: 320 mL    26 Aug 2020 07:01  -  26 Aug 2020 13:07  --------------------------------------------------------  IN: 240 mL / OUT: 475 mL / NET: -235 mL        Gen: Appears well in NAD  HEENT:  (-)icterus (-)pallor  CV: N S1 S2 / KATHERYN (+)2 Pulses B/l  Resp:  Clear to auscultation B/L, normal effort  GI: (+) BS Soft, NT, ND  Lymph:  (+) RLE Edema, (-)obvious lymphadenopathy  Skin: Warm to touch, Normal turgor  Psych: Appropriate mood and affect    TELEMETRY: None	      ECG: SR with 1st degree AVB 	    RADIOLOGY:         CXR: < from: Xray Chest 1 View AP/PA (20 @ 16:06) >    IMPRESSION:    Clear lungs.    < end of copied text >      ASSESSMENT/PLAN: Patient is a 65 year old male with PMH of CAD s/p CABG with most recent C 2020 with no intervention, chronic systolic CHF (LVEF 39% TTE 2020), Afib (on Eliquis), HTN, HLD, CKD, CHER (on CPAP), and pemphigus vulgaris who presented with RLE swelling admitted with RLE cellulitis and NARCISO on CKD. Cardiology consulted for management of CAD/CHF.    - Abx for cellulitis per med  - Elevated troponin now downtrended likely demand ischemia in setting of infection and renal failure - check CK/CKMB but do not suspect ACS  - Recommend medical management of known CAD with systolic cardiomyopathy - continue ASA/Statin  - BP stable - coreg restarted  - Trend creatinine, resume lasix/entresto when stable  - Continue AC with Eliquis for CVA prevention  - Check TTE to eval LV/RV function  - Further recs pending above  - Patient to f/u with Dr. Campbell's cardiology group after d/c    Cristi Jarvis PA-C  Pager: [phone]

## 2020-08-26 NOTE — PROGRESS NOTE ADULT - SUBJECTIVE AND OBJECTIVE BOX
Patient is a 65y old  Male who presents with a chief complaint of RLE swelling (25 Aug 2020 13:04)    SUBJECTIVE / OVERNIGHT EVENTS: Patient seen and examined. No acute overnight events, no subjective complaints.    OBJECTIVE:  Vital Signs Last 24 Hrs  T(C): 36.6 (26 Aug 2020 04:27), Max: 36.9 (25 Aug 2020 20:38)  T(F): 97.8 (26 Aug 2020 04:27), Max: 98.5 (25 Aug 2020 20:38)  HR: 82 (26 Aug 2020 04:27) (68 - 82)  BP: 125/74 (26 Aug 2020 04:27) (119/63 - 128/88)  BP(mean): --  RR: 18 (26 Aug 2020 04:27) (18 - 18)  SpO2: 97% (26 Aug 2020 04:27) (95% - 98%)    I&O's Summary    25 Aug 2020 07:01  -  26 Aug 2020 07:00  --------------------------------------------------------  IN: 720 mL / OUT: 400 mL / NET: 320 mL     Physical Exam:   	  General: No acute distress, well-appearing  	  Eyes: EOMI   	  ENT: MMM, no oropharyngeal lesions or erythema appreciated   	  Pulm: No increased WOB. No wheezing. Crackles on bilateral lung bases.   	  CV: RRR. S1&S2+. No M/R/G appreciated.   	  Abdomen: +BS. Soft, NT. No organomegaly. Distended.   	  MSK: Limited ROM RLE.   	  Extremities: RLE 1+ pitting edema with swelling, erythema, and ttp.   	  Neuro: A&Ox3, no focal deficits       Skin: Warm and dry. L hand wound with dressing in place.       Labs:  CAPILLARY BLOOD GLUCOSE                              9.2    12.27 )-----------( 258      ( 24 Aug 2020 13:45 )             32.6     08    136  |  101  |  16  ----------------------------<  85  3.5   |  21<L>  |  1.96<H>    Ca    9.0      25 Aug 2020 06:49  Phos  3.2     08-25  Mg     2.1     08-25    TPro  6.9  /  Alb  3.4  /  TBili  1.2  /  DBili  x   /  AST  12  /  ALT  5<L>  /  AlkPhos  102  08-25    PT/INR - ( 25 Aug 2020 09:26 )   PT: 22.8 sec;   INR: 2.00 ratio         PTT - ( 25 Aug 2020 09:26 )  PTT:33.2 sec        Culture - Urine (collected 25 Aug 2020 00:51)  Source: .Urine Clean Catch (Midstream)  Final Report (25 Aug 2020 19:54):    No growth    Culture - Blood (collected 24 Aug 2020 17:07)  Source: .Blood Blood-Peripheral  Preliminary Report (25 Aug 2020 18:01):    No growth to date.    Culture - Blood (collected 24 Aug 2020 17:07)  Source: .Blood Blood-Peripheral  Preliminary Report (25 Aug 2020 18:01):    No growth to date.      Urinalysis Basic - ( 24 Aug 2020 17:41 )    Color: Yellow / Appearance: Clear / S.021 / pH: x  Gluc: x / Ketone: Negative  / Bili: Negative / Urobili: 2 mg/dL   Blood: x / Protein: 30 mg/dL / Nitrite: Negative   Leuk Esterase: Negative / RBC: 1 /hpf / WBC 3 /HPF   Sq Epi: x / Non Sq Epi: 1 /hpf / Bacteria: Negative      Imaging Personally Reviewed:    Consultant(s) Notes Reviewed:   Care Discussed with Consultants/Other Providers:    MEDICATIONS  (STANDING):  apixaban 5 milliGRAM(s) Oral two times a day  aspirin  chewable 81 milliGRAM(s) Oral daily  atorvastatin 80 milliGRAM(s) Oral at bedtime  pantoprazole    Tablet 40 milliGRAM(s) Oral two times a day  predniSONE   Tablet 20 milliGRAM(s) Oral daily  sertraline 50 milliGRAM(s) Oral daily  vancomycin  IVPB 1000 milliGRAM(s) IV Intermittent every 24 hours    MEDICATIONS  (PRN): Patient is a 65y old  Male who presents with a chief complaint of RLE swelling (25 Aug 2020 13:04)    SUBJECTIVE / OVERNIGHT EVENTS: Patient examined at bedside. Doing well, says his leg pain has improved significantly since admission. Denies CP, increased SOB, palpitations.     OBJECTIVE:  Vital Signs Last 24 Hrs  T(C): 36.6 (26 Aug 2020 04:27), Max: 36.9 (25 Aug 2020 20:38)  T(F): 97.8 (26 Aug 2020 04:27), Max: 98.5 (25 Aug 2020 20:38)  HR: 82 (26 Aug 2020 04:27) (68 - 82)  BP: 125/74 (26 Aug 2020 04:27) (119/63 - 128/88)  BP(mean): --  RR: 18 (26 Aug 2020 04:27) (18 - 18)  SpO2: 97% (26 Aug 2020 04:27) (95% - 98%)    I&O's Summary    25 Aug 2020 07:01  -  26 Aug 2020 07:00  --------------------------------------------------------  IN: 720 mL / OUT: 400 mL / NET: 320 mL     Physical Exam:   	  General: No acute distress, well-appearing  	  Eyes: EOMI   	  ENT: MMM, no oropharyngeal lesions or erythema appreciated   	  Pulm: No increased WOB. No wheezing. Crackles on bilateral lung bases.   	  CV: RRR. S1&S2+. No M/R/G appreciated.   	  Abdomen: +BS. Soft, NT. No organomegaly. Distended.   	  MSK: Limited ROM RLE.   	  Extremities: RLE 1+ pitting edema with swelling, erythema, and ttp.   	  Neuro: A&Ox3, no focal deficits       Skin: Warm and dry. L hand wound with dressing in place.       Labs:  CAPILLARY BLOOD GLUCOSE                              9.2    12.27 )-----------( 258      ( 24 Aug 2020 13:45 )             32.6     08-25    136  |  101  |  16  ----------------------------<  85  3.5   |  21<L>  |  1.96<H>    Ca    9.0      25 Aug 2020 06:49  Phos  3.2     08-25  Mg     2.1     08-25    TPro  6.9  /  Alb  3.4  /  TBili  1.2  /  DBili  x   /  AST  12  /  ALT  5<L>  /  AlkPhos  102  08-25    PT/INR - ( 25 Aug 2020 09:26 )   PT: 22.8 sec;   INR: 2.00 ratio         PTT - ( 25 Aug 2020 09:26 )  PTT:33.2 sec        Culture - Urine (collected 25 Aug 2020 00:51)  Source: .Urine Clean Catch (Midstream)  Final Report (25 Aug 2020 19:54):    No growth    Culture - Blood (collected 24 Aug 2020 17:07)  Source: .Blood Blood-Peripheral  Preliminary Report (25 Aug 2020 18:01):    No growth to date.    Culture - Blood (collected 24 Aug 2020 17:07)  Source: .Blood Blood-Peripheral  Preliminary Report (25 Aug 2020 18:01):    No growth to date.      Urinalysis Basic - ( 24 Aug 2020 17:41 )    Color: Yellow / Appearance: Clear / S.021 / pH: x  Gluc: x / Ketone: Negative  / Bili: Negative / Urobili: 2 mg/dL   Blood: x / Protein: 30 mg/dL / Nitrite: Negative   Leuk Esterase: Negative / RBC: 1 /hpf / WBC 3 /HPF   Sq Epi: x / Non Sq Epi: 1 /hpf / Bacteria: Negative      Imaging Personally Reviewed:    Consultant(s) Notes Reviewed:   Care Discussed with Consultants/Other Providers:    MEDICATIONS  (STANDING):  apixaban 5 milliGRAM(s) Oral two times a day  aspirin  chewable 81 milliGRAM(s) Oral daily  atorvastatin 80 milliGRAM(s) Oral at bedtime  pantoprazole    Tablet 40 milliGRAM(s) Oral two times a day  predniSONE   Tablet 20 milliGRAM(s) Oral daily  sertraline 50 milliGRAM(s) Oral daily  vancomycin  IVPB 1000 milliGRAM(s) IV Intermittent every 24 hours    MEDICATIONS  (PRN):

## 2020-08-26 NOTE — PROGRESS NOTE ADULT - PROBLEM SELECTOR PLAN 2
Febrile (100.6) on admission + Leukocytosis (WBC 12.27) + Elevated lactate 2.4 + Hypotensive (SBP 100s). RLE cellulitis vs LUE wound as possible sources of infection.  Resolving  -s/p 2L IVF in ED. BP's improved with normalized lactate (0.7)  -abx (as per above)   -F/u bcx Febrile (100.6) on admission + Leukocytosis (WBC 12.27) + Elevated lactate 2.4 + Hypotensive (SBP 100s). RLE cellulitis vs LUE wound as possible sources of infection.  Resolving  -s/p 2L IVF in ED. BP's improved with normalized lactate (0.7)  -abx (as per above)   -Bcx (8/24) NGTD Febrile (100.6) on admission + Leukocytosis (WBC 12.27) + Elevated lactate 2.4 + Hypotensive (SBP 100s). RLE cellulitis vs LUE wound as possible sources of infection.  Resolved  -s/p 2L IVF in ED. BP's improved with normalized lactate (0.7)  -abx (as per above)   -Bcx (8/24) NGTD

## 2020-08-26 NOTE — PROGRESS NOTE ADULT - PROBLEM SELECTOR PLAN 3
-abx (per above)   - wound care consult placed - abx (per above)   - Wound care saw patient (8/25). Redressed wound. Recommended plastics consult.   - Will consult Plastic today - abx (per above)   - Wound care saw patient (8/25). Redressed wound. Recommended plastics consult.   - Will consult Plastics today

## 2020-08-26 NOTE — PROGRESS NOTE ADULT - SUBJECTIVE AND OBJECTIVE BOX
Left dorsal hand wound   Improving swelling and ROM per patient  No fluctuance  No need for drainage  Continue local wound care  Should demarcate and heal secondarily  FU outpatient  [phone]

## 2020-08-26 NOTE — PROGRESS NOTE ADULT - PROBLEM SELECTOR PLAN 6
s/p CABG   -C/w home aspirin 81mg qday + atorvastatin 80mg qday -c/w Eliquis 5mg BID  -Restarted home coreg 12.5mg BID today

## 2020-08-27 LAB
ALBUMIN SERPL ELPH-MCNC: 3.5 G/DL — SIGNIFICANT CHANGE UP (ref 3.3–5)
ALP SERPL-CCNC: 124 U/L — HIGH (ref 40–120)
ALT FLD-CCNC: 17 U/L — SIGNIFICANT CHANGE UP (ref 10–45)
ANION GAP SERPL CALC-SCNC: 22 MMOL/L — HIGH (ref 5–17)
APTT BLD: 31.6 SEC — SIGNIFICANT CHANGE UP (ref 27.5–35.5)
AST SERPL-CCNC: 30 U/L — SIGNIFICANT CHANGE UP (ref 10–40)
BASOPHILS # BLD AUTO: 0.1 K/UL — SIGNIFICANT CHANGE UP (ref 0–0.2)
BASOPHILS NFR BLD AUTO: 0.6 % — SIGNIFICANT CHANGE UP (ref 0–2)
BILIRUB SERPL-MCNC: 0.6 MG/DL — SIGNIFICANT CHANGE UP (ref 0.2–1.2)
BUN SERPL-MCNC: 19 MG/DL — SIGNIFICANT CHANGE UP (ref 7–23)
CALCIUM SERPL-MCNC: 9.7 MG/DL — SIGNIFICANT CHANGE UP (ref 8.4–10.5)
CHLORIDE SERPL-SCNC: 99 MMOL/L — SIGNIFICANT CHANGE UP (ref 96–108)
CO2 SERPL-SCNC: 13 MMOL/L — LOW (ref 22–31)
CREAT SERPL-MCNC: 2.08 MG/DL — HIGH (ref 0.5–1.3)
EOSINOPHIL # BLD AUTO: 0.22 K/UL — SIGNIFICANT CHANGE UP (ref 0–0.5)
EOSINOPHIL NFR BLD AUTO: 1.2 % — SIGNIFICANT CHANGE UP (ref 0–6)
GAS PNL BLDA: SIGNIFICANT CHANGE UP
GLUCOSE BLDC GLUCOMTR-MCNC: 118 MG/DL — HIGH (ref 70–99)
GLUCOSE SERPL-MCNC: 188 MG/DL — HIGH (ref 70–99)
HCT VFR BLD CALC: SIGNIFICANT CHANGE UP (ref 39–50)
HGB BLD-MCNC: 10 G/DL — LOW (ref 13–17)
IMM GRANULOCYTES NFR BLD AUTO: 0.7 % — SIGNIFICANT CHANGE UP (ref 0–1.5)
INR BLD: 1.73 RATIO — HIGH (ref 0.88–1.16)
LYMPHOCYTES # BLD AUTO: 17.8 % — SIGNIFICANT CHANGE UP (ref 13–44)
LYMPHOCYTES # BLD AUTO: 3.2 K/UL — SIGNIFICANT CHANGE UP (ref 1–3.3)
MAGNESIUM SERPL-MCNC: 2.6 MG/DL — SIGNIFICANT CHANGE UP (ref 1.6–2.6)
MCHC RBC-ENTMCNC: SIGNIFICANT CHANGE UP (ref 27–34)
MCHC RBC-ENTMCNC: SIGNIFICANT CHANGE UP (ref 32–36)
MCV RBC AUTO: SIGNIFICANT CHANGE UP (ref 80–100)
MONOCYTES # BLD AUTO: 1.2 K/UL — HIGH (ref 0–0.9)
MONOCYTES NFR BLD AUTO: 6.7 % — SIGNIFICANT CHANGE UP (ref 2–14)
NEUTROPHILS # BLD AUTO: 13.15 K/UL — HIGH (ref 1.8–7.4)
NEUTROPHILS NFR BLD AUTO: 73 % — SIGNIFICANT CHANGE UP (ref 43–77)
NRBC # BLD: 0 /100 WBCS — SIGNIFICANT CHANGE UP (ref 0–0)
PHOSPHATE SERPL-MCNC: 4.2 MG/DL — SIGNIFICANT CHANGE UP (ref 2.5–4.5)
PLATELET # BLD AUTO: 498 K/UL — HIGH (ref 150–400)
POTASSIUM SERPL-MCNC: 4.6 MMOL/L — SIGNIFICANT CHANGE UP (ref 3.5–5.3)
POTASSIUM SERPL-SCNC: 4.6 MMOL/L — SIGNIFICANT CHANGE UP (ref 3.5–5.3)
PROT SERPL-MCNC: 7.2 G/DL — SIGNIFICANT CHANGE UP (ref 6–8.3)
PROTHROM AB SERPL-ACNC: 20.1 SEC — HIGH (ref 10.6–13.6)
RBC # BLD: 4.27 M/UL — SIGNIFICANT CHANGE UP (ref 4.2–5.8)
RBC # FLD: SIGNIFICANT CHANGE UP (ref 10.3–14.5)
SODIUM SERPL-SCNC: 134 MMOL/L — LOW (ref 135–145)
WBC # BLD: 17.99 K/UL — HIGH (ref 3.8–10.5)
WBC # FLD AUTO: 17.99 K/UL — HIGH (ref 3.8–10.5)

## 2020-08-27 PROCEDURE — 99292 CRITICAL CARE ADDL 30 MIN: CPT

## 2020-08-27 PROCEDURE — 93306 TTE W/DOPPLER COMPLETE: CPT | Mod: 26

## 2020-08-27 PROCEDURE — 93308 TTE F-UP OR LMTD: CPT | Mod: 26

## 2020-08-27 PROCEDURE — 71045 X-RAY EXAM CHEST 1 VIEW: CPT | Mod: 26

## 2020-08-27 PROCEDURE — 93010 ELECTROCARDIOGRAM REPORT: CPT

## 2020-08-27 PROCEDURE — 99291 CRITICAL CARE FIRST HOUR: CPT

## 2020-08-27 RX ORDER — AMIODARONE HYDROCHLORIDE 400 MG/1
150 TABLET ORAL ONCE
Refills: 0 | Status: DISCONTINUED | OUTPATIENT
Start: 2020-08-27 | End: 2020-08-27

## 2020-08-27 RX ORDER — FUROSEMIDE 40 MG
40 TABLET ORAL EVERY 24 HOURS
Refills: 0 | Status: DISCONTINUED | OUTPATIENT
Start: 2020-08-28 | End: 2020-08-29

## 2020-08-27 RX ORDER — ACETAMINOPHEN 500 MG
650 TABLET ORAL ONCE
Refills: 0 | Status: COMPLETED | OUTPATIENT
Start: 2020-08-27 | End: 2020-08-27

## 2020-08-27 RX ORDER — CHLORHEXIDINE GLUCONATE 213 G/1000ML
1 SOLUTION TOPICAL
Refills: 0 | Status: DISCONTINUED | OUTPATIENT
Start: 2020-08-27 | End: 2020-08-28

## 2020-08-27 RX ORDER — ISOSORBIDE DINITRATE 5 MG/1
10 TABLET ORAL THREE TIMES A DAY
Refills: 0 | Status: DISCONTINUED | OUTPATIENT
Start: 2020-08-27 | End: 2020-09-02

## 2020-08-27 RX ORDER — BUMETANIDE 0.25 MG/ML
2 INJECTION INTRAMUSCULAR; INTRAVENOUS ONCE
Refills: 0 | Status: COMPLETED | OUTPATIENT
Start: 2020-08-27 | End: 2020-08-27

## 2020-08-27 RX ORDER — AMIODARONE HYDROCHLORIDE 400 MG/1
1 TABLET ORAL
Qty: 900 | Refills: 0 | Status: DISCONTINUED | OUTPATIENT
Start: 2020-08-27 | End: 2020-08-27

## 2020-08-27 RX ADMIN — BUMETANIDE 2 MILLIGRAM(S): 0.25 INJECTION INTRAMUSCULAR; INTRAVENOUS at 05:32

## 2020-08-27 RX ADMIN — SERTRALINE 50 MILLIGRAM(S): 25 TABLET, FILM COATED ORAL at 14:32

## 2020-08-27 RX ADMIN — Medication 81 MILLIGRAM(S): at 11:55

## 2020-08-27 RX ADMIN — PANTOPRAZOLE SODIUM 40 MILLIGRAM(S): 20 TABLET, DELAYED RELEASE ORAL at 08:39

## 2020-08-27 RX ADMIN — AMIODARONE HYDROCHLORIDE 33.3 MG/MIN: 400 TABLET ORAL at 05:25

## 2020-08-27 RX ADMIN — ISOSORBIDE DINITRATE 10 MILLIGRAM(S): 5 TABLET ORAL at 14:32

## 2020-08-27 RX ADMIN — CARVEDILOL PHOSPHATE 12.5 MILLIGRAM(S): 80 CAPSULE, EXTENDED RELEASE ORAL at 08:39

## 2020-08-27 RX ADMIN — APIXABAN 5 MILLIGRAM(S): 2.5 TABLET, FILM COATED ORAL at 21:07

## 2020-08-27 RX ADMIN — CARVEDILOL PHOSPHATE 12.5 MILLIGRAM(S): 80 CAPSULE, EXTENDED RELEASE ORAL at 21:07

## 2020-08-27 RX ADMIN — ISOSORBIDE DINITRATE 10 MILLIGRAM(S): 5 TABLET ORAL at 21:07

## 2020-08-27 RX ADMIN — Medication 20 MILLIGRAM(S): at 08:41

## 2020-08-27 RX ADMIN — CHLORHEXIDINE GLUCONATE 1 APPLICATION(S): 213 SOLUTION TOPICAL at 07:44

## 2020-08-27 RX ADMIN — ATORVASTATIN CALCIUM 80 MILLIGRAM(S): 80 TABLET, FILM COATED ORAL at 21:07

## 2020-08-27 RX ADMIN — Medication 0.5 MILLIGRAM(S): at 21:07

## 2020-08-27 RX ADMIN — APIXABAN 5 MILLIGRAM(S): 2.5 TABLET, FILM COATED ORAL at 11:55

## 2020-08-27 RX ADMIN — Medication 250 MILLIGRAM(S): at 11:55

## 2020-08-27 RX ADMIN — Medication 250 MILLIGRAM(S): at 23:11

## 2020-08-27 NOTE — PROGRESS NOTE ADULT - ATTENDING COMMENTS
I have personally seen, examined and participated in the care of this patient. I have reviewed all pertinent clinical information, including history, physical exam, plan and the resident's note. I agree with the resident's note with the following additions:    HFrEF, admitted to hospital for cellulitis, transferred to CICU after rapid response for hypoxia and VT   Likely flash edema from systolic 170s in setting of severely reduced EF  Initial lactate 8, bicarb 13; placed on Bipap for respiratory distress, chest x-ray consistent with flash edema; given Lasix, started on Amio  Bedside TTE with EF 30%, VTI 16 with  for output 4.8 LPM  Now off Bipap, no VT on tele  VT likely reactionary to high lactate/acidosis - hold Amio  Blood pressure now 100s/60s - hold Coreg, Imdur  ABG normal on 2L nasal cannula  DASH diet  Baseline CKD, now with non-oliguric NARCISO, Lasix responsive, target negative 1-2L  H/H low but acceptable  Vancomycin (8/24- ) for LE cellulitis, cultures negative  Sugars controlled; on Prednisone for pemphigus    The patient required critical care management and I personally provided 35 minutes of non-continuous care to the patient concurrently with the resident/fellow/nurse practitioner, excluding separate procedures and time spent teaching, in addition to discussing the patient and plan at length with the CICU staff and helping coordinate care. I have personally seen, examined and participated in the care of this patient. I have reviewed all pertinent clinical information, including history, physical exam, plan and the resident's note. I agree with the resident's note with the following additions:    HFrEF, admitted to hospital for cellulitis, transferred to CICU after rapid response for hypoxia and VT   Likely flash edema from systolic 170s in setting of severely reduced EF  Initial lactate 8, bicarb 13; placed on Bipap for respiratory distress, chest x-ray consistent with flash edema; given Lasix, started on Amio  Bedside TTE with EF 30%, VTI 16 with  for output 4.8 LPM  Now off Bipap, no VT on tele  VT likely reactionary to high lactate/acidosis - hold Amio  Blood pressure now 100s/60s - hold Coreg, Imdur  ABG normal on 2L nasal cannula  DASH diet  Baseline CKD, now with non-oliguric NARCISO, Lasix responsive, target negative 1-2L  H/H low but acceptable on Eliquis for paroxysmal afib  Vancomycin (8/24- ) for LE cellulitis, cultures negative  Sugars controlled; on Prednisone for pemphigus    The patient required critical care management and I personally provided 35 minutes of non-continuous care to the patient concurrently with the resident/fellow/nurse practitioner, excluding separate procedures and time spent teaching, in addition to discussing the patient and plan at length with the CICU staff and helping coordinate care. I have personally seen, examined and participated in the care of this patient. I have reviewed all pertinent clinical information, including history, physical exam, plan and the resident's note. I agree with the resident's note with the following additions:    HFrEF, admitted to hospital for cellulitis, transferred to CICU after rapid response for hypoxia and VT   Likely flash edema from systolic 170s in setting of severely reduced EF  Initial lactate 8, bicarb 13; placed on Bipap for respiratory distress, chest x-ray consistent with flash edema; given Lasix, started on Amio  Bedside TTE with EF 30%, VTI 16 with  for output 4.8 LPM  Now off Bipap, no VT on tele  VT likely reactionary to high lactate/acidosis - hold Amio  Blood pressure now 100s/60s - hold Coreg, Imdur  ABG normal on 2L nasal cannula  DASH diet  Baseline CKD, now with non-oliguric NARCISO, Lasix responsive, target negative 1-2L  H/H low but acceptable on Eliquis for paroxysmal afib  Vancomycin (8/24- ) for LE cellulitis, cultures negative  Sugars controlled; on Prednisone for pemphigus    The patient required critical care management and I personally provided 75 minutes of non-continuous care to the patient concurrently with the resident/fellow/nurse practitioner, excluding separate procedures and time spent teaching, in addition to discussing the patient and plan at length with the CICU staff and helping coordinate care.

## 2020-08-27 NOTE — PROVIDER CONTACT NOTE (OTHER) - ASSESSMENT
Pt a&ox4, coughing frequently with some clear sputum production. SpO2: 96% RA, RR: 21, temp: 98.7F. Pt c/o SOB and feeling that the phlegm is "dripping down from nose into throat." Pt on bipap overnight.

## 2020-08-27 NOTE — CHART NOTE - NSCHARTNOTEFT_GEN_A_CORE
Marta Schwab MD  Internal Medicine, PGY2  [phone]    CCU Transfer Note    Transfer from: CCU    Transfer to: (  ) Medicine    (x) Telemetry    (  ) RCU                               (  ) Palliative    (  ) Stroke Unit    (  ) MICU    (  ) __________________    Accepting physician:    Sign out given to:     HPI / CCU COURSE:  65 year old male with PMH of CAD s/p CABG with most recent [facility] 1/2020 with no intervention, HFrEF (LVEF 39% TTE 1/2020), Afib (on Eliquis), HTN, HLD, CKD, CHER (on CPAP), and pemphigus vulgaris on chronic steroid admitted 8/24 with RLE cellulitis/ L hand wound and NARCISO on CKD. Pt had RRT called on floors for SOB/ Hypoxic RF with spO2 dropping into 70s, CXR showing b/l Pulm edema, Lact 8.1. Lasix 40mg IVP given. Pt was also found to have frequent runs of VT, upgraded to BiPAP and given amio 150mg bolus followed by amio gtt, transferred to CICU for further mgmt of ADHF.    CCU Course  Pt given additional bumex 2mg IV for volume overload with net negative I/O. While initially requiring bipap, was successfully weaned to NC with resolution of acidosis, clearing of lactate. Endorsed improved SOB. For NSVT, thought to be 2/2 acidosis, and no longer noted on tele in CCU. Amio was dc'd. For cellulitis, vanco was increased to BID given subtherapeutic level.       ASSESSMENT & PLAN:   65 year old male with PMH of CAD s/p CABG with most recent LH 1/2020 with no intervention, HFrEF (LVEF 39% TTE 1/2020), Afib (on Eliquis), HTN, HLD, CKD, CHER (on CPAP), and pemphigus vulgaris on chronic steroid admitted 8/24 with RLE cellulitis, course c/b RRT for flash pulm edema s/p CCU stay for diuresis, now with improved respiratory status and stable for transfer to floor.         FOR FOLLOW UP:  [] Continue diuresis with lasix PO 40mg daily  [] F/u pre-4th dose vanc trough 8/28  [] F/u cardiology recs Marta Schwab MD  Internal Medicine, PGY2  [phone]    CCU Transfer Note    Transfer from: CCU    Transfer to: (  ) Medicine    (x) Telemetry    (  ) RCU                               (  ) Palliative    (  ) Stroke Unit    (  ) MICU    (  ) __________________    Accepting physician:    Sign out given to:     HPI / CCU COURSE:  65 year old male with PMH of CAD s/p CABG with most recent [facility] 1/2020 with no intervention, HFrEF (LVEF 39% TTE 1/2020), Afib (on Eliquis), HTN, HLD, CKD, CHER (on CPAP), and pemphigus vulgaris on chronic steroid admitted 8/24 with RLE cellulitis/ L hand wound and NARCISO on CKD. Pt had RRT called on floors for SOB/ Hypoxic RF with spO2 dropping into 70s, CXR showing b/l Pulm edema, Lact 8.1. Lasix 40mg IVP given. Pt was also found to have frequent runs of VT, upgraded to BiPAP and given amio 150mg bolus followed by amio gtt, transferred to CICU for further mgmt of ADHF.    CCU Course  Pt given additional bumex 2mg IV for volume overload with net negative I/O. While initially requiring bipap, was successfully weaned to NC with resolution of acidosis, clearing of lactate. Endorsed improved SOB. For NSVT, thought to be 2/2 acidosis, and no longer noted on tele in CCU. Amio was dc'd. For cellulitis, vanco was increased to BID given subtherapeutic level.       ASSESSMENT & PLAN:   65 year old male with PMH of CAD s/p CABG with most recent [facility] 1/2020 with no intervention, HFrEF (LVEF 39% TTE 1/2020), Afib (on Eliquis), HTN, HLD, CKD, CHER (on CPAP), and pemphigus vulgaris on chronic steroid admitted 8/24 with RLE cellulitis, course c/b RRT for flash pulm edema s/p CCU stay for diuresis, now with improved respiratory status and stable for transfer to floor.         FOR FOLLOW UP:  [] Continue diuresis with lasix PO 40mg daily  [] F/u pre-4th dose vanc trough 8/28  [] F/u Bcx 8/27  [] F/u cardiology recs Marta Schwab MD  Internal Medicine, PGY2  [phone]    CCU Transfer Note    Transfer from: CCU    Transfer to: (  ) Medicine    (x) Telemetry    (  ) RCU                               (  ) Palliative    (  ) Stroke Unit    (  ) MICU    (  ) __________________    Accepting physician: Jefe    Sign out given to: Jefe    HPI / CCU COURSE:  65 year old male with PMH of CAD s/p CABG with most recent [facility] 1/2020 with no intervention, HFrEF (LVEF 39% TTE 1/2020), Afib (on Eliquis), HTN, HLD, CKD, CHER (on CPAP), and pemphigus vulgaris on chronic steroid admitted 8/24 with RLE cellulitis/ L hand wound and NARCISO on CKD. Pt had RRT called on floors for SOB/ Hypoxic RF with spO2 dropping into 70s, CXR showing b/l Pulm edema, Lact 8.1. Lasix 40mg IVP given. Pt was also found to have frequent runs of VT, upgraded to BiPAP and given amio 150mg bolus followed by amio gtt, transferred to CICU for further mgmt of ADHF.    CCU Course  Pt given additional bumex 2mg IV for volume overload with net negative I/O. While initially requiring bipap, was successfully weaned to NC with resolution of acidosis, clearing of lactate. Endorsed improved SOB. For NSVT, thought to be 2/2 acidosis, and no longer noted on tele in CCU. Amio was dc'd. For cellulitis, vanco was increased to BID given subtherapeutic level.       ASSESSMENT & PLAN:   65 year old male with PMH of CAD s/p CABG with most recent [facility] 1/2020 with no intervention, HFrEF (LVEF 39% TTE 1/2020), Afib (on Eliquis), HTN, HLD, CKD, CHER (on CPAP), and pemphigus vulgaris on chronic steroid admitted 8/24 with RLE cellulitis, course c/b RRT for flash pulm edema s/p CCU stay for diuresis, now with improved respiratory status and stable for transfer to floor.         FOR FOLLOW UP:  [] Continue diuresis with lasix PO 40mg daily  [] F/u pre-4th dose vanc trough 8/28  [] F/u Bcx 8/27  [] F/u cardiology recs Marta Schwab MD  Internal Medicine, PGY2  [phone]    CCU Transfer Note    Transfer from: CCU    Transfer to: (  ) Medicine    (x) Telemetry    (  ) RCU                               (  ) Palliative    (  ) Stroke Unit    (  ) MICU    (  ) __________________    Accepting physician: Jefe    Sign out given to: Jefe    HPI / CCU COURSE:  65 year old male with PMH of CAD s/p CABG with most recent [facility] 1/2020 with no intervention, HFrEF (LVEF 39% TTE 1/2020), Afib (on Eliquis), HTN, HLD, CKD, CHER (on CPAP), and pemphigus vulgaris on chronic steroid admitted 8/24 with RLE cellulitis/ L hand wound and NARCISO on CKD. Pt had RRT called on floors for SOB/ Hypoxic RF with spO2 dropping into 70s, CXR showing b/l Pulm edema, Lact 8.1. Lasix 40mg IVP given. Pt was also found to have frequent runs of VT, upgraded to BiPAP and given amio 150mg bolus followed by amio gtt, transferred to CICU for further mgmt of ADHF.    CCU Course  Pt given additional bumex 2mg IV for volume overload with net negative I/O. While initially requiring bipap, was successfully weaned to NC with resolution of acidosis, clearing of lactate. Endorsed improved SOB. For NSVT, thought to be 2/2 acidosis, and no longer noted on tele in CCU. Amio was dc'd. For cellulitis, vanco was increased to BID given subtherapeutic level.     T(C): 37.5 (08-28-20 @ 00:00), Max: 38.4 (08-27-20 @ 04:26)  HR: 64 (08-28-20 @ 00:00) (60 - 92)  BP: 137/88 (08-28-20 @ 00:00) (97/67 - 174/75)  RR: 22 (08-28-20 @ 00:00) (11 - 47)  SpO2: 100% (08-28-20 @ 00:00) (94% - 100%)    Physical Exam:  GENERAL APPEARANCE:  NAD. Patient sleeping comfortably on BiPAP.  CARDIAC: Normal S1 and S2. No S3, S4 or murmurs. Rhythm is regular.  LUNGS: coarse breath sounds b/l  EXTREMITIES: B/L lower extremity edema, R>L. R. leg with overlying erythema    Tele: Sinus rhythm 60-70s w/ PACs.       ASSESSMENT & PLAN:   65 year old male with PMH of CAD s/p CABG with most recent [facility] 1/2020 with no intervention, HFrEF (LVEF 39% TTE 1/2020), Afib (on Eliquis), HTN, HLD, CKD, CHER (on CPAP), and pemphigus vulgaris on chronic steroid admitted 8/24 with RLE cellulitis, course c/b RRT for flash pulm edema s/p CCU stay for diuresis, now with improved respiratory status and stable for transfer to floor.         FOR FOLLOW UP:  [] Continue diuresis with lasix PO 40mg daily  [] F/u pre-4th dose vanc trough 8/28  [] F/u Bcx 8/27  [] F/u cardiology recs

## 2020-08-27 NOTE — PROGRESS NOTE ADULT - SUBJECTIVE AND OBJECTIVE BOX
THONY ANAYA  MRN-[id number]  Patient is a 65y old  Male who presents with a chief complaint of RLE swelling (26 Aug 2020 13:24)    HPI:  65 year old male with PMH significant for CAD (s/p CABG; most recent LHC showed stable disease with no intervention), CHF (LVEF 39% TTE 1/2020), Afib (on Eliquis), HTN, HLD, CKD III, CHER (on CPAP), and pemphigus vulgaris presenting to ED with RLE swelling. Pt had a mechanical fall 1 month ago where he landed on his right leg and left hand. He didn't have any difficulty ambulating so he didn't seek medical evaluation at that time. However, since then he said he had a L hand wound, which has not been healing and is now draining pus. Pt not sure of last tetanus vaccine. 1 week ago, he noticed RLE swelling, which has been progressively getting worse. He notes erythema, progressive swelling, and pain with limited ankle mobility. He has had difficulty ambulating. Additionally, he is c/o sob but feels that this is chronic, reporting baseline SOB and MELCHOR. He denies fevers, CP, wheezing, cough, nausea, vomiting, abdominal pain.    ED course: Admission vitals, T 100.6, HR 75, /55, RR 22, Sat 95%. Labs s/f leukocytosis (12.27), INR 2.16, Trop 65, Cr 2.34, BNP 2301. Lactate 2.4. COVID negative. UA negative. CXR wnl. Bedside echo showed indeterminate IVC volume status. XR RLE showed no fx but areas of soft tissue swelling/stranding around right ankle. Limited RLE Duplex scan negative for DVT. Patient received 2L IVF for worsening hypotension (SBP as low as 80's). Repeat lactate 0.7. Improved BPs (SBPs 100's). S/p IV vanc/zosyn for sepsis likely 2/2 to hand wound with purulent drainage vs RLE cellulitis.     Interval history: Patient was admitted to medicine service. Seen and examined at bedside in ED. Patient doing well, c/o RLE pain with movement/palpation. SOB at baseline. Afebrile at time of exam. (24 Aug 2020 19:35)      24 HOUR EVENTS:    REVIEW OF SYSTEMS:    CONSTITUTIONAL: No weakness, fevers or chills  EYES/ENT: No visual changes;  No vertigo or throat pain   NECK: No pain or stiffness  RESPIRATORY: No cough, wheezing, hemoptysis; No shortness of breath  CARDIOVASCULAR: No chest pain or palpitations  GASTROINTESTINAL: No abdominal or epigastric pain. No nausea, vomiting, or hematemesis; No diarrhea or constipation. No melena or hematochezia.  GENITOURINARY: No dysuria, frequency or hematuria  NEUROLOGICAL: No numbness or weakness  SKIN: No itching, rashes      ICU Vital Signs Last 24 Hrs  T(C): 37.7 (27 Aug 2020 10:05), Max: 38.4 (27 Aug 2020 04:26)  T(F): 99.9 (27 Aug 2020 10:05), Max: 101.2 (27 Aug 2020 04:26)  HR: 74 (27 Aug 2020 10:05) (74 - 96)  BP: 127/70 (27 Aug 2020 09:05) (100/69 - 174/75)  BP(mean): 94 (27 Aug 2020 09:05) (80 - 123)  ABP: --  ABP(mean): --  RR: 24 (27 Aug 2020 10:05) (18 - 47)  SpO2: 97% (27 Aug 2020 10:05) (95% - 100%)      CVP(mm Hg): --  CO: --  CI: --  PA: --  PA(mean): --  PA(direct): --  PCWP: --  LA: --  RA: --  SVR: --  SVRI: --  PVR: --  PVRI: --  I&O's Summary    26 Aug 2020 07:01  -  27 Aug 2020 07:00  --------------------------------------------------------  IN: 863.2 mL / OUT: 975 mL / NET: -111.8 mL    27 Aug 2020 07:01  -  27 Aug 2020 10:10  --------------------------------------------------------  IN: 66.6 mL / OUT: 900 mL / NET: -833.4 mL        CAPILLARY BLOOD GLUCOSE    CAPILLARY BLOOD GLUCOSE      POCT Blood Glucose.: 118 mg/dL (27 Aug 2020 03:33)      PHYSICAL EXAM:  GENERAL: No acute distress, well-developed  HEAD:  Atraumatic, Normocephalic  EYES: EOMI, PERRLA, conjunctiva and sclera clear  NECK: Supple, no lymphadenopathy, no JVD  CHEST/LUNG: CTAB; No wheezes, rales, or rhonchi  HEART: Regular rate and rhythm. Normal S1/S2. No murmurs, rubs, or gallops  ABDOMEN: Soft, non-tender, non-distended; normal bowel sounds, no organomegaly  EXTREMITIES:  2+ peripheral pulses b/l, No clubbing, cyanosis, or edema  NEUROLOGY: A&O x 3, no focal deficits  SKIN: No rashes or lesions    ============================I/O===========================   I&O's Detail    26 Aug 2020 07:01  -  27 Aug 2020 07:00  --------------------------------------------------------  IN:    amiodarone Infusion: 133.2 mL    IV PiggyBack: 250 mL    Oral Fluid: 480 mL  Total IN: 863.2 mL    OUT:    Indwelling Catheter - Urethral: 500 mL    Voided: 475 mL  Total OUT: 975 mL    Total NET: -111.8 mL      27 Aug 2020 07:01  -  27 Aug 2020 10:10  --------------------------------------------------------  IN:    amiodarone Infusion: 66.6 mL  Total IN: 66.6 mL    OUT:    Indwelling Catheter - Urethral: 900 mL  Total OUT: 900 mL    Total NET: -833.4 mL        ============================ LABS =========================                        10.0   17.99 )-----------( 498      ( 27 Aug 2020 04:02 )             See note    08-27    134<L>  |  99  |  19  ----------------------------<  188<H>  4.6   |  13<L>  |  2.08<H>    Ca    9.7      27 Aug 2020 04:02  Phos  4.2     08-27  Mg     2.6     08-27    TPro  7.2  /  Alb  3.5  /  TBili  0.6  /  DBili  x   /  AST  30  /  ALT  17  /  AlkPhos  124<H>  08-27    Troponin T, High Sensitivity Result: 57 ng/L (08-24-20 @ 17:38)  Troponin T, High Sensitivity Result: 65 ng/L (08-24-20 @ 13:43)    CKMB Units: 2.1 ng/mL (08-26-20 @ 06:25)    Creatine Kinase, Serum: 65 U/L (08-26-20 @ 06:25)          LIVER FUNCTIONS - ( 27 Aug 2020 04:02 )  Alb: 3.5 g/dL / Pro: 7.2 g/dL / ALK PHOS: 124 U/L / ALT: 17 U/L / AST: 30 U/L / GGT: x           PT/INR - ( 27 Aug 2020 04:02 )   PT: 20.1 sec;   INR: 1.73 ratio         PTT - ( 27 Aug 2020 04:02 )  PTT:31.6 sec  ABG - ( 27 Aug 2020 08:41 )  pH, Arterial: 7.45  pH, Blood: x     /  pCO2: 32    /  pO2: 82    / HCO3: 22    / Base Excess: -.9   /  SaO2: 96                Blood Gas Arterial, Lactate: 1.1 mmol/L (08-27-20 @ 08:41)  Blood Gas Arterial, Lactate: 3.4 mmol/L (08-27-20 @ 04:52)  Blood Gas Arterial, Lactate: 8.1 mmol/L (08-27-20 @ 03:56)  Blood Gas Venous - Lactate: 0.9 mmoL/L (08-24-20 @ 15:08)  Blood Gas Venous - Lactate: 2.4 mmoL/L (08-24-20 @ 13:46)      ======================Micro/Rad/Cardio=================  Telemtry: Reviewed   EKG: Reviewed  CXR: Reviewed  Culture: Reviewed   Echo: Transthoracic Echocardiogram:   Patient name: THONY ANAYA  YOB: 1955   Age: 64 (M)   MR#: [id number]  Study Date: 1/10/2020  Location: [id number]: HANNAH Martins  Study quality: Technically Difficult  Referring Physician: Verna Beckman MD  Blood Pressure: 121/78 mmHg  Height: 173 cm  Weight: 100 kg  BSA: 2.1 m2 THONY ANAYA  MRN-[id number]  Patient is a 65y old  Male who presents with a chief complaint of RLE swelling (26 Aug 2020 13:24)    HPI:  65 year old male with PMH significant for CAD (s/p CABG; most recent LHC showed stable disease with no intervention), CHF (LVEF 39% TTE 1/2020), Afib (on Eliquis), HTN, HLD, CKD III, CHER (on CPAP), and pemphigus vulgaris presenting to ED with RLE swelling. Pt had a mechanical fall 1 month ago where he landed on his right leg and left hand. He didn't have any difficulty ambulating so he didn't seek medical evaluation at that time. However, since then he said he had a L hand wound, which has not been healing and is now draining pus. Pt not sure of last tetanus vaccine. 1 week ago, he noticed RLE swelling, which has been progressively getting worse. He notes erythema, progressive swelling, and pain with limited ankle mobility. He has had difficulty ambulating. Additionally, he is c/o sob but feels that this is chronic, reporting baseline SOB and MELCHOR. He denies fevers, CP, wheezing, cough, nausea, vomiting, abdominal pain.    ED course: Admission vitals, T 100.6, HR 75, /55, RR 22, Sat 95%. Labs s/f leukocytosis (12.27), INR 2.16, Trop 65, Cr 2.34, BNP 2301. Lactate 2.4. COVID negative. UA negative. CXR wnl. Bedside echo showed indeterminate IVC volume status. XR RLE showed no fx but areas of soft tissue swelling/stranding around right ankle. Limited RLE Duplex scan negative for DVT. Patient received 2L IVF for worsening hypotension (SBP as low as 80's). Repeat lactate 0.7. Improved BPs (SBPs 100's). S/p IV vanc/zosyn for sepsis likely 2/2 to hand wound with purulent drainage vs RLE cellulitis.     Interval history: Patient was admitted to medicine service. Seen and examined at bedside in ED. Patient doing well, c/o RLE pain with movement/palpation. SOB at baseline. Afebrile at time of exam. (24 Aug 2020 19:35)      24 HOUR EVENTS:    REVIEW OF SYSTEMS:    CONSTITUTIONAL: No weakness, fevers or chills  EYES/ENT: No visual changes;  No vertigo or throat pain   NECK: No pain or stiffness  RESPIRATORY: No cough, wheezing, hemoptysis; No shortness of breath  CARDIOVASCULAR: No chest pain or palpitations  GASTROINTESTINAL: No abdominal or epigastric pain. No nausea, vomiting, or hematemesis; No diarrhea or constipation. No melena or hematochezia.  GENITOURINARY: No dysuria, frequency or hematuria  NEUROLOGICAL: No numbness or weakness  SKIN: No itching, rashes      ICU Vital Signs Last 24 Hrs  T(C): 37.7 (27 Aug 2020 10:05), Max: 38.4 (27 Aug 2020 04:26)  T(F): 99.9 (27 Aug 2020 10:05), Max: 101.2 (27 Aug 2020 04:26)  HR: 74 (27 Aug 2020 10:05) (74 - 96)  BP: 127/70 (27 Aug 2020 09:05) (100/69 - 174/75)  BP(mean): 94 (27 Aug 2020 09:05) (80 - 123)  ABP: --  ABP(mean): --  RR: 24 (27 Aug 2020 10:05) (18 - 47)  SpO2: 97% (27 Aug 2020 10:05) (95% - 100%)      I&O's Summary    26 Aug 2020 07:01  -  27 Aug 2020 07:00  --------------------------------------------------------  IN: 863.2 mL / OUT: 975 mL / NET: -111.8 mL    27 Aug 2020 07:01  -  27 Aug 2020 10:10  --------------------------------------------------------  IN: 66.6 mL / OUT: 900 mL / NET: -833.4 mL            POCT Blood Glucose.: 118 mg/dL (27 Aug 2020 03:33)      PHYSICAL EXAM:  GENERAL: In mild respiratory distress  HEAD:  Atraumatic, Normocephalic  EYES: EOMI, PERRLA, conjunctiva and sclera clear  NECK: Supple, difficult to appreciate JVD 2/2 body habitus  CHEST/LUNG: Diffuse crackles auscultated in all lung fields  HEART: Regular rate and rhythm. Normal S1/S2. No murmurs, rubs, or gallops  ABDOMEN: Soft, non-tender, non-distended; normal bowel sounds, no organomegaly  EXTREMITIES:  B/L lower extremity edema, R>L. R. leg with overlying erythema, nontender  NEUROLOGY: A&O x 3, no focal deficits    ============================I/O===========================   I&O's Detail      27 Aug 2020 07:01  -  27 Aug 2020 10:10  --------------------------------------------------------  IN:    amiodarone Infusion: 66.6 mL  Total IN: 66.6 mL    OUT:    Indwelling Catheter - Urethral: 900 mL  Total OUT: 900 mL    Total NET: -833.4 mL        ============================ LABS =========================                        10.0   17.99 )-----------( 498      ( 27 Aug 2020 04:02 )             See note    08-27    134<L>  |  99  |  19  ----------------------------<  188<H>  4.6   |  13<L>  |  2.08<H>    Ca    9.7      27 Aug 2020 04:02  Phos  4.2     08-27  Mg     2.6     08-27    TPro  7.2  /  Alb  3.5  /  TBili  0.6  /  DBili  x   /  AST  30  /  ALT  17  /  AlkPhos  124<H>  08-27    Troponin T, High Sensitivity Result: 57 ng/L (08-24-20 @ 17:38)  Troponin T, High Sensitivity Result: 65 ng/L (08-24-20 @ 13:43)    CKMB Units: 2.1 ng/mL (08-26-20 @ 06:25)    Creatine Kinase, Serum: 65 U/L (08-26-20 @ 06:25)          LIVER FUNCTIONS - ( 27 Aug 2020 04:02 )  Alb: 3.5 g/dL / Pro: 7.2 g/dL / ALK PHOS: 124 U/L / ALT: 17 U/L / AST: 30 U/L / GGT: x           PT/INR - ( 27 Aug 2020 04:02 )   PT: 20.1 sec;   INR: 1.73 ratio         PTT - ( 27 Aug 2020 04:02 )  PTT:31.6 sec  ABG - ( 27 Aug 2020 08:41 )  pH, Arterial: 7.45  pH, Blood: x     /  pCO2: 32    /  pO2: 82    / HCO3: 22    / Base Excess: -.9   /  SaO2: 96                Blood Gas Arterial, Lactate: 1.1 mmol/L (08-27-20 @ 08:41)  Blood Gas Arterial, Lactate: 3.4 mmol/L (08-27-20 @ 04:52)  Blood Gas Arterial, Lactate: 8.1 mmol/L (08-27-20 @ 03:56)        ======================Micro/Rad/Cardio=================  Telemtry: Reviewed, sinus rhythm with occasional PVCs  EKG: Reviewed from time of RRT. NSVT and PVCs in couplets  CXR: Reviewed, volume overload with kerley B lines  Culture: Reviewed   Echo: Transthoracic Echocardiogram:   Patient name: DEMETRICE ANAYAPH 1/2020  EF 39%  LV systolic dysfunction with hypoinesis of inferior, lateral and inferolateral walls THONY ANAYA  MRN-[id number]  Patient is a 65y old  Male who presents with a chief complaint of RLE swelling (26 Aug 2020 13:24)    HPI:  65 year old male with PMH significant for CAD (s/p CABG; most recent LHC showed stable disease with no intervention), CHF (LVEF 39% TTE 1/2020), Afib (on Eliquis), HTN, HLD, CKD III, CHER (on CPAP), and pemphigus vulgaris presenting to ED with RLE swelling. Pt had a mechanical fall 1 month ago where he landed on his right leg and left hand. He didn't have any difficulty ambulating so he didn't seek medical evaluation at that time. However, since then he said he had a L hand wound, which has not been healing and is now draining pus. Pt not sure of last tetanus vaccine. 1 week ago, he noticed RLE swelling, which has been progressively getting worse. He notes erythema, progressive swelling, and pain with limited ankle mobility. He has had difficulty ambulating. Additionally, he is c/o sob but feels that this is chronic, reporting baseline SOB and MELCHOR. He denies fevers, CP, wheezing, cough, nausea, vomiting, abdominal pain.    ED course: Admission vitals, T 100.6, HR 75, /55, RR 22, Sat 95%. Labs s/f leukocytosis (12.27), INR 2.16, Trop 65, Cr 2.34, BNP 2301. Lactate 2.4. COVID negative. UA negative. CXR wnl. Bedside echo showed indeterminate IVC volume status. XR RLE showed no fx but areas of soft tissue swelling/stranding around right ankle. Limited RLE Duplex scan negative for DVT. Patient received 2L IVF for worsening hypotension (SBP as low as 80's). Repeat lactate 0.7. Improved BPs (SBPs 100's). S/p IV vanc/zosyn for sepsis likely 2/2 to hand wound with purulent drainage vs RLE cellulitis.     Interval history: Patient was admitted to medicine service. Seen and examined at bedside in ED. Patient doing well, c/o RLE pain with movement/palpation. SOB at baseline. Afebrile at time of exam. (24 Aug 2020 19:35)      24 HOUR EVENTS: RRT called for respiratory distress on the floor. CXR obtained with evidence of significant pulm edema. Patient transferred to CCU for hemodynamic monitoring and management of volume overload. Started on BiPap, weaned to NC, s/p IV lasix 40 during RRT and 2 of IV bumex in the CCU. Fever to 101.2 on CCU admission. This morning patient reports SOB, improved since rapid response. Denies chest pain, and RLE pain.    REVIEW OF SYSTEMS:    CONSTITUTIONAL: No weakness, fevers or chills  EYES/ENT: No visual changes;  No vertigo or throat pain   NECK: No pain or stiffness  RESPIRATORY: + cough, SOB, no wheezes  CARDIOVASCULAR: No chest pain or palpitations  GASTROINTESTINAL: No abdominal or epigastric pain. No nausea, vomiting, or hematemesis; No diarrhea or constipation. No melena or hematochezia.  GENITOURINARY: No dysuria, frequency or hematuria  NEUROLOGICAL: No numbness or weakness  SKIN: No itching, rashes      ICU Vital Signs Last 24 Hrs  T(C): 37.7 (27 Aug 2020 10:05), Max: 38.4 (27 Aug 2020 04:26)  T(F): 99.9 (27 Aug 2020 10:05), Max: 101.2 (27 Aug 2020 04:26)  HR: 74 (27 Aug 2020 10:05) (74 - 96)  BP: 127/70 (27 Aug 2020 09:05) (100/69 - 174/75)  BP(mean): 94 (27 Aug 2020 09:05) (80 - 123)  ABP: --  ABP(mean): --  RR: 24 (27 Aug 2020 10:05) (18 - 47)  SpO2: 97% (27 Aug 2020 10:05) (95% - 100%)      I&O's Summary    26 Aug 2020 07:01  -  27 Aug 2020 07:00  --------------------------------------------------------  IN: 863.2 mL / OUT: 975 mL / NET: -111.8 mL    27 Aug 2020 07:01  -  27 Aug 2020 10:10  --------------------------------------------------------  IN: 66.6 mL / OUT: 900 mL / NET: -833.4 mL            POCT Blood Glucose.: 118 mg/dL (27 Aug 2020 03:33)      PHYSICAL EXAM:  GENERAL: In mild respiratory distress  HEAD:  Atraumatic, Normocephalic  EYES: EOMI, PERRLA, conjunctiva and sclera clear  NECK: Supple, difficult to appreciate JVD 2/2 body habitus  CHEST/LUNG: Diffuse crackles auscultated in all lung fields  HEART: Regular rate and rhythm. Normal S1/S2. No murmurs, rubs, or gallops  ABDOMEN: Soft, non-tender, non-distended; normal bowel sounds, no organomegaly  EXTREMITIES:  B/L lower extremity edema, R>L. R. leg with overlying erythema, nontender  NEUROLOGY: A&O x 3, no focal deficits    ============================I/O===========================   I&O's Detail      27 Aug 2020 07:01  -  27 Aug 2020 10:10  --------------------------------------------------------  IN:    amiodarone Infusion: 66.6 mL  Total IN: 66.6 mL    OUT:    Indwelling Catheter - Urethral: 900 mL  Total OUT: 900 mL    Total NET: -833.4 mL        ============================ LABS =========================                        10.0   17.99 )-----------( 498      ( 27 Aug 2020 04:02 )             See note    08-27    134<L>  |  99  |  19  ----------------------------<  188<H>  4.6   |  13<L>  |  2.08<H>    Ca    9.7      27 Aug 2020 04:02  Phos  4.2     08-27  Mg     2.6     08-27    TPro  7.2  /  Alb  3.5  /  TBili  0.6  /  DBili  x   /  AST  30  /  ALT  17  /  AlkPhos  124<H>  08-27    Troponin T, High Sensitivity Result: 57 ng/L (08-24-20 @ 17:38)  Troponin T, High Sensitivity Result: 65 ng/L (08-24-20 @ 13:43)    CKMB Units: 2.1 ng/mL (08-26-20 @ 06:25)    Creatine Kinase, Serum: 65 U/L (08-26-20 @ 06:25)          LIVER FUNCTIONS - ( 27 Aug 2020 04:02 )  Alb: 3.5 g/dL / Pro: 7.2 g/dL / ALK PHOS: 124 U/L / ALT: 17 U/L / AST: 30 U/L / GGT: x           PT/INR - ( 27 Aug 2020 04:02 )   PT: 20.1 sec;   INR: 1.73 ratio         PTT - ( 27 Aug 2020 04:02 )  PTT:31.6 sec  ABG - ( 27 Aug 2020 08:41 )  pH, Arterial: 7.45  pH, Blood: x     /  pCO2: 32    /  pO2: 82    / HCO3: 22    / Base Excess: -.9   /  SaO2: 96                Blood Gas Arterial, Lactate: 1.1 mmol/L (08-27-20 @ 08:41)  Blood Gas Arterial, Lactate: 3.4 mmol/L (08-27-20 @ 04:52)  Blood Gas Arterial, Lactate: 8.1 mmol/L (08-27-20 @ 03:56)        ======================Micro/Rad/Cardio=================  Telemtry: Reviewed, sinus rhythm with occasional PVCs  EKG: Reviewed from time of RRT. NSVT and PVCs in couplets  CXR: Reviewed, volume overload with kerley B lines  Culture: Reviewed   Echo: Transthoracic Echocardiogram:   Patient name: THONY ANAYA 1/2020  EF 39%  LV systolic dysfunction with hypoinesis of inferior, lateral and inferolateral walls    ======================================================  PAST MEDICAL & SURGICAL HISTORY:  CHF (congestive heart failure)  Psoriasis  HLD (hyperlipidemia)  CAD (coronary artery disease): 3 stents  Hypertension  History of coronary artery stent placement: 1 stent 2008, 2 stents 2009 in [facility]  S/P quadruple vessel bypass: 2006 THONY ANAYA  MRN-[id number]  Patient is a 65y old  Male who presents with a chief complaint of RLE swelling (26 Aug 2020 13:24)    HPI:  65 year old male with PMH significant for CAD (s/p CABG; most recent LHC showed stable disease with no intervention), CHF (LVEF 39% TTE 1/2020), Afib (on Eliquis), HTN, HLD, CKD III, CHER (on CPAP), and pemphigus vulgaris presenting to ED with RLE swelling. Pt had a mechanical fall 1 month ago where he landed on his right leg and left hand. He didn't have any difficulty ambulating so he didn't seek medical evaluation at that time. However, since then he said he had a L hand wound, which has not been healing and is now draining pus. Pt not sure of last tetanus vaccine. 1 week ago, he noticed RLE swelling, which has been progressively getting worse. He notes erythema, progressive swelling, and pain with limited ankle mobility. He has had difficulty ambulating. Additionally, he is c/o sob but feels that this is chronic, reporting baseline SOB and MELCHOR. He denies fevers, CP, wheezing, cough, nausea, vomiting, abdominal pain.    ED course: Admission vitals, T 100.6, HR 75, /55, RR 22, Sat 95%. Labs s/f leukocytosis (12.27), INR 2.16, Trop 65, Cr 2.34, BNP 2301. Lactate 2.4. COVID negative. UA negative. CXR wnl. Bedside echo showed indeterminate IVC volume status. XR RLE showed no fx but areas of soft tissue swelling/stranding around right ankle. Limited RLE Duplex scan negative for DVT. Patient received 2L IVF for worsening hypotension (SBP as low as 80's). Repeat lactate 0.7. Improved BPs (SBPs 100's). S/p IV vanc/zosyn for sepsis likely 2/2 to hand wound with purulent drainage vs RLE cellulitis.     Interval history: Patient was admitted to medicine service. Seen and examined at bedside in ED. Patient doing well, c/o RLE pain with movement/palpation. SOB at baseline. Afebrile at time of exam. (24 Aug 2020 19:35)      24 HOUR EVENTS: RRT called for respiratory distress on the floor. CXR obtained with evidence of significant pulm edema. Patient transferred to CCU for hemodynamic monitoring and management of volume overload. Started on BiPap, weaned to NC, s/p IV lasix 40 during RRT and 2 of IV bumex in the CCU. Fever to 101.2 on CCU admission. This morning patient reports SOB, improved since rapid response. Denies chest pain, and RLE pain. Lactate cleared.    REVIEW OF SYSTEMS:    CONSTITUTIONAL: No weakness, fevers or chills  EYES/ENT: No visual changes;  No vertigo or throat pain   NECK: No pain or stiffness  RESPIRATORY: + cough, SOB, no wheezes  CARDIOVASCULAR: No chest pain or palpitations  GASTROINTESTINAL: No abdominal or epigastric pain. No nausea, vomiting, or hematemesis; No diarrhea or constipation. No melena or hematochezia.  GENITOURINARY: No dysuria, frequency or hematuria  NEUROLOGICAL: No numbness or weakness  SKIN: No itching, rashes      ICU Vital Signs Last 24 Hrs  T(C): 37.7 (27 Aug 2020 10:05), Max: 38.4 (27 Aug 2020 04:26)  T(F): 99.9 (27 Aug 2020 10:05), Max: 101.2 (27 Aug 2020 04:26)  HR: 74 (27 Aug 2020 10:05) (74 - 96)  BP: 127/70 (27 Aug 2020 09:05) (100/69 - 174/75)  BP(mean): 94 (27 Aug 2020 09:05) (80 - 123)  ABP: --  ABP(mean): --  RR: 24 (27 Aug 2020 10:05) (18 - 47)  SpO2: 97% (27 Aug 2020 10:05) (95% - 100%)      I&O's Summary    26 Aug 2020 07:01  -  27 Aug 2020 07:00  --------------------------------------------------------  IN: 863.2 mL / OUT: 975 mL / NET: -111.8 mL    27 Aug 2020 07:01  -  27 Aug 2020 10:10  --------------------------------------------------------  IN: 66.6 mL / OUT: 900 mL / NET: -833.4 mL            POCT Blood Glucose.: 118 mg/dL (27 Aug 2020 03:33)      PHYSICAL EXAM:  GENERAL: In mild respiratory distress  HEAD:  Atraumatic, Normocephalic  EYES: EOMI, PERRLA, conjunctiva and sclera clear  NECK: Supple, difficult to appreciate JVD 2/2 body habitus  CHEST/LUNG: Diffuse crackles auscultated in all lung fields  HEART: Regular rate and rhythm. Normal S1/S2. No murmurs, rubs, or gallops  ABDOMEN: Soft, non-tender, non-distended; normal bowel sounds, no organomegaly  EXTREMITIES:  B/L lower extremity edema, R>L. R. leg with overlying erythema, nontender  NEUROLOGY: A&O x 3, no focal deficits    ============================I/O===========================   I&O's Detail      27 Aug 2020 07:01  -  27 Aug 2020 10:10  --------------------------------------------------------  IN:    amiodarone Infusion: 66.6 mL  Total IN: 66.6 mL    OUT:    Indwelling Catheter - Urethral: 900 mL  Total OUT: 900 mL    Total NET: -833.4 mL        ============================ LABS =========================                        10.0   17.99 )-----------( 498      ( 27 Aug 2020 04:02 )             See note    08-27    134<L>  |  99  |  19  ----------------------------<  188<H>  4.6   |  13<L>  |  2.08<H>    Ca    9.7      27 Aug 2020 04:02  Phos  4.2     08-27  Mg     2.6     08-27    TPro  7.2  /  Alb  3.5  /  TBili  0.6  /  DBili  x   /  AST  30  /  ALT  17  /  AlkPhos  124<H>  08-27    Troponin T, High Sensitivity Result: 57 ng/L (08-24-20 @ 17:38)  Troponin T, High Sensitivity Result: 65 ng/L (08-24-20 @ 13:43)    CKMB Units: 2.1 ng/mL (08-26-20 @ 06:25)    Creatine Kinase, Serum: 65 U/L (08-26-20 @ 06:25)          LIVER FUNCTIONS - ( 27 Aug 2020 04:02 )  Alb: 3.5 g/dL / Pro: 7.2 g/dL / ALK PHOS: 124 U/L / ALT: 17 U/L / AST: 30 U/L / GGT: x           PT/INR - ( 27 Aug 2020 04:02 )   PT: 20.1 sec;   INR: 1.73 ratio         PTT - ( 27 Aug 2020 04:02 )  PTT:31.6 sec  ABG - ( 27 Aug 2020 08:41 )  pH, Arterial: 7.45  pH, Blood: x     /  pCO2: 32    /  pO2: 82    / HCO3: 22    / Base Excess: -.9   /  SaO2: 96                Blood Gas Arterial, Lactate: 1.1 mmol/L (08-27-20 @ 08:41)  Blood Gas Arterial, Lactate: 3.4 mmol/L (08-27-20 @ 04:52)  Blood Gas Arterial, Lactate: 8.1 mmol/L (08-27-20 @ 03:56)        ======================Micro/Rad/Cardio=================  Telemtry: Reviewed, sinus rhythm with occasional PVCs  EKG: Reviewed from time of RRT. NSVT and PVCs in couplets  CXR: Reviewed, volume overload with kerley B lines  Culture: Reviewed   Echo: Transthoracic Echocardiogram:   Patient name: THONY ANAYA 1/2020  EF 39%  LV systolic dysfunction with hypoinesis of inferior, lateral and inferolateral walls    ======================================================  PAST MEDICAL & SURGICAL HISTORY:  CHF (congestive heart failure)  Psoriasis  HLD (hyperlipidemia)  CAD (coronary artery disease): 3 stents  Hypertension  History of coronary artery stent placement: 1 stent 2008, 2 stents 2009 in [facility]  S/P quadruple vessel bypass: 2006

## 2020-08-27 NOTE — PROGRESS NOTE ADULT - ATTENDING COMMENTS
Patient seen and examined, agree with above assessment and plan as transcribed above.    - events noted, s/p flash pulmonary edema  - F/I echo  - ? may need repeat cath as ischemia is in the differential    John Dodge MD, [facility]  BEEPER [phone]

## 2020-08-27 NOTE — RAPID RESPONSE TEAM SUMMARY - NSSITUATIONBACKGROUNDRRT_GEN_ALL_CORE
63yo male with PMH significant for CAD (s/p CABG; most recent LHC showed stable disease with no intervention), CHF (LVEF 39% TTE 1/2020), Afib (on Eliquis), HTN, HLD, CKD III, CHER (uses CPAP), and pemphigus vulgaris presenting to ED with RLE swelling; hospital course c/b NARCISO    RRT was called for hypoxia (77% on NC)  On arrival patient was in moderate respiratory distress on NRB  Vitals: 97.7 axillary /86 HR: 87 RR 30 and O2 sat: 90%  Physical exam was significant for course crackles on b/l lower lung fields.   Per chart review patient, has history of heart failure and diuretic was help due to NARCISO.  EKG: showed afib w/ RVR w/ PVCs. CXR significant for diffuse pulmonary edema.   Tele: Patient rhythm noted to go into episodic runs of VT    Pt was treated w/ lasix 40mg IVP x1, amiodarone 150mg IVPBx1.  CBC,CMP, Coags, Cardiac Enzymes, ABG, pro-BNP ordered.  ABG revealed pH: 7.1: Pt treated w/ 50meqs of bicarb.     BP remained stable 127/77. O2 sat was 100% on Bipap FiO2: 100. RVR resolved after amio load.   Cardiology consulted and at bedside. Pt stable for transport to CCU.

## 2020-08-27 NOTE — PROVIDER CONTACT NOTE (OTHER) - ASSESSMENT
Pt a&ox4, very anxious, pale, c/o SOB with frequent coughing. BP: 126/72, HR: 60, RR: 30, Spo2: 75%.

## 2020-08-27 NOTE — CHART NOTE - NSCHARTNOTEFT_GEN_A_CORE
THONY MCCRAYROSIO  MRN#: [id number]    : Arayn Santana MD    INDICATION: Ventricular Tachycardia    PROCEDURE:  LIMITED TRANSTHORACIC ECHO    FINDINGS:  LVOT VTI 15.8,  consistent with a cardiac output of 4.8 LPM    INTERPRETATION:  Cardiac output of 4.8 LPM with a BSA of 2.3 yields a cardiac index of 2.1

## 2020-08-27 NOTE — PROGRESS NOTE ADULT - SUBJECTIVE AND OBJECTIVE BOX
S: RRT events noted requiring transfer to CCU. He currently reports improved SOB now on nasal cannula. Denies chest pain.  Review of systems otherwise (-)      MEDICATIONS  (STANDING):  apixaban 5 milliGRAM(s) Oral two times a day  aspirin  chewable 81 milliGRAM(s) Oral daily  atorvastatin 80 milliGRAM(s) Oral at bedtime  carvedilol 12.5 milliGRAM(s) Oral every 12 hours  chlorhexidine 2% Cloths 1 Application(s) Topical <User Schedule>  isosorbide   dinitrate Tablet (ISORDIL) 10 milliGRAM(s) Oral three times a day  LORazepam     Tablet 0.5 milliGRAM(s) Oral at bedtime  pantoprazole    Tablet 40 milliGRAM(s) Oral two times a day  predniSONE   Tablet 20 milliGRAM(s) Oral daily  sertraline 50 milliGRAM(s) Oral daily  vancomycin  IVPB 1000 milliGRAM(s) IV Intermittent every 12 hours    MEDICATIONS  (PRN):      LABS:                            10.0   17.99 )-----------( 498      ( 27 Aug 2020 04:02 )             See note    Hemoglobin: 10.0 g/dL (08-27 @ 04:02)  Hemoglobin: 9.4 g/dL (08-26 @ 06:26)  Hemoglobin: 9.2 g/dL (08-24 @ 13:45)    08-27    134<L>  |  99  |  19  ----------------------------<  188<H>  4.6   |  13<L>  |  2.08<H>    Ca    9.7      27 Aug 2020 04:02  Phos  4.2     08-27  Mg     2.6     08-27    TPro  7.2  /  Alb  3.5  /  TBili  0.6  /  DBili  x   /  AST  30  /  ALT  17  /  AlkPhos  124<H>  08-27    Creatinine Trend: 2.08<--, 1.86<--, 1.96<--, 2.34<--   PT/INR - ( 27 Aug 2020 04:02 )   PT: 20.1 sec;   INR: 1.73 ratio         PTT - ( 27 Aug 2020 04:02 )  PTT:31.6 sec  CARDIAC MARKERS ( 26 Aug 2020 06:25 )  x     / x     / 65 U/L / x     / 2.1 ng/mL          08-26-20 @ 07:01  -  08-27-20 @ 07:00  --------------------------------------------------------  IN: 863.2 mL / OUT: 975 mL / NET: -111.8 mL    08-27-20 @ 07:01  -  08-27-20 @ 14:54  --------------------------------------------------------  IN: 426.6 mL / OUT: 1320 mL / NET: -893.4 mL        PHYSICAL EXAM  Vital Signs Last 24 Hrs  T(C): 37.4 (27 Aug 2020 14:05), Max: 38.4 (27 Aug 2020 04:26)  T(F): 99.3 (27 Aug 2020 14:05), Max: 101.2 (27 Aug 2020 04:26)  HR: 68 (27 Aug 2020 14:05) (68 - 96)  BP: 109/65 (27 Aug 2020 14:05) (100/69 - 174/75)  BP(mean): 86 (27 Aug 2020 14:05) (79 - 123)  RR: 26 (27 Aug 2020 14:05) (17 - 47)  SpO2: 97% (27 Aug 2020 14:05) (94% - 100%)      Gen: NAD  HEENT:  (-)icterus (-)pallor  CV: N S1 S2 1/6 KATHERYN (+)2 Pulses B/l  Resp: Bibasilar crackles, normal effort  GI: (+) BS Soft, NT, ND  Lymph:  (+) R>LE Edema, (-)obvious lymphadenopathy  Skin: Warm to touch, Normal turgor  Psych: Appropriate mood and affect      TELEMETRY: SR 60-70s	      Echo: Pending    ASSESSMENT/PLAN: Patient is a 65 year old male with PMH of CAD s/p CABG with most recent LHC 1/2020 with no intervention, chronic systolic CHF (LVEF 39% TTE 1/2020), Afib (on Eliquis), HTN, HLD, CKD, CHER (on CPAP), and pemphigus vulgaris who presented with RLE swelling admitted with RLE cellulitis and NARCISO on CKD. Cardiology consulted for management of CAD/CHF.    - RRT events noted for hypoxia due to flash pulm edema and reported VT (no strips noted in chart to review) - s/p amio  - Tele appears stable in NSR, no VT noted in CCU  - s/p IV bumex - monitor I/Os, trend creatinine  - Continue Abx for cellulitis - f/u repeat BCx  - Recommend medical management of known CAD with systolic cardiomyopathy - continue ASA/Statin  - Continue AC with Eliquis for CVA prevention  - Check TTE to eval LV/RV function and r/o new wall motion abnormalities  - Will consider repeat ischemic eval given VT/flash pulm edema - last cath noted in Jan 2020 with only patent LIMA to LAD and otherwise occluded native vessels  - Further recs pending above  - Supportive care per CCU appreciated  - Patient to f/u with Dr. Campbell's cardiology group after d/c    Cristi Jarvis PA-C  Pager: [phone]

## 2020-08-27 NOTE — CHART NOTE - NSCHARTNOTEFT_GEN_A_CORE
CCU Accept Note    Transfer from: (  ) Medicine    ( x ) Telemetry     (   ) RCU        (    ) Palliative         (   ) Stroke Unit          (   ) __________________    Accepting physican: Arayn Santana MD    HPI: 65 year old male with PMH of CAD s/p CABG with most recent LHC 1/2020 with no intervention, HFrEF (LVEF 39% TTE 1/2020), Afib (on Eliquis), HTN, HLD, CKD, CHER (on CPAP), and pemphigus vulgaris who presented with RLE swelling admitted 8/24 with RLE cellulitis/ L hand wound and NARCISO on CKD. Pt had RRT called on floors for SOB/ Hypoxic RF with spO2 dropping into 70s, CXR showing b/l Pulm edema, Lact 8.1. Pt was also found to have frequent runs of VT, upgraded to BiPAP and given amio 150mg bolus followed by amio gtt, transferred to CICU for further mgmt of ADHF.     SUBJECTIVE DATA: Pt endorsing difficult time breathing/ fatigue     OBJECTIVE DATA:    Vital Signs Last 24 Hrs  T(C): 38.4 (27 Aug 2020 04:26), Max: 38.4 (27 Aug 2020 04:26)  T(F): 101.2 (27 Aug 2020 04:26), Max: 101.2 (27 Aug 2020 04:26)  HR: 92 (27 Aug 2020 04:54) (74 - 96)  BP: 164/86 (27 Aug 2020 04:26) (100/69 - 164/86)  BP(mean): 123 (27 Aug 2020 04:26) (123 - 123)  RR: 38 (27 Aug 2020 04:26) (18 - 38)  SpO2: 99% (27 Aug 2020 04:54) (95% - 100%)  I&O's Summary    25 Aug 2020 07:01  -  26 Aug 2020 07:00  --------------------------------------------------------  IN: 720 mL / OUT: 400 mL / NET: 320 mL    26 Aug 2020 07:01  -  27 Aug 2020 04:55  --------------------------------------------------------  IN: 763.3 mL / OUT: 475 mL / NET: 288.3 mL        Allergies:      MEDICATIONS  (STANDING):  aMIOdarone Infusion 1 mG/Min (33.3 mL/Hr) IV Continuous <Continuous>  aMIOdarone IVPB 150 milliGRAM(s) IV Intermittent once  apixaban 5 milliGRAM(s) Oral two times a day  aspirin  chewable 81 milliGRAM(s) Oral daily  atorvastatin 80 milliGRAM(s) Oral at bedtime  buMETAnide Injectable 2 milliGRAM(s) IV Push once  carvedilol 12.5 milliGRAM(s) Oral every 12 hours  chlorhexidine 2% Cloths 1 Application(s) Topical <User Schedule>  LORazepam     Tablet 0.5 milliGRAM(s) Oral at bedtime  pantoprazole    Tablet 40 milliGRAM(s) Oral two times a day  predniSONE   Tablet 20 milliGRAM(s) Oral daily  sertraline 50 milliGRAM(s) Oral daily  vancomycin  IVPB 1000 milliGRAM(s) IV Intermittent every 12 hours    MEDICATIONS  (PRN):      LABS                                            10.0                  Neurophils% (auto):   73.0   (08-27 @ 04:02):    17.99)-----------(498          Lymphocytes% (auto):  17.8                                          See note                  Eosinphils% (auto):   1.2      Manual%: Neutrophils x    ; Lymphocytes x    ; Eosinophils x    ; Bands%: x    ; Blasts x                                    134    |  99     |  19                  Calcium: 9.7   / iCa: x      (08-27 @ 04:02)    ----------------------------<  188       Magnesium: 2.6                              4.6     |  13     |  2.08             Phosphorous: 4.2      TPro  7.2    /  Alb  3.5    /  TBili  0.6    /  DBili  x      /  AST  30     /  ALT  17     /  AlkPhos  124    27 Aug 2020 04:02    ( 08-27 @ 04:02 )   PT: 20.1 sec;   INR: 1.73 ratio  aPTT: 31.6 sec      EKG: NSR w/ PVCs  Echo: none from this adm   Cardiac Cath: 01/08/2020:   CORONARY VESSELS: The coronary circulation is right dominant.  LM:   --  LM: Angiography showed mild atherosclerosis with no flow limiting  lesions.  LAD:   --  Proximal LAD: There was a 100 % stenosis. This lesion is a  chronic total occlusion.  CX:   --  Proximal circumflex: The artery was supplied by extensive  bridging collaterals. There was a 100 % stenosis.  RCA:   --  Proximal RCA: There was a 95 % stenosis.  --  Mid RCA: There was a 99 % stenosis.  --  Distal RCA: There was a 95 % stenosis. Distal vessel angiography showed  a small vessel and severe diffuse disease.  GRAFTS:   --  Graft to the LAD: The graft was a medium to large sized LIMA.  Distal vessel angiography showed a small to medium sized vessel and severe  diffuse disease.    Imaging: CXR b/l Pulm edema 8/27     ASSESSMENT & PLAN:   65 year old male with PMH of CAD s/p CABG with most recent LHC 1/2020 with no intervention, HFrEF (LVEF 39% TTE 1/2020), Afib (on Eliquis), HTN, HLD, CKD, CHER (on CPAP), and pemphigus vulgaris adm to floors for RLE Cellulitis, transferred to CICU for ADHF.      #Neuro:  Depression/Anxiety?  - c/w Zoloft 50 daily and Ativan 0.5mg qhs     - A&Ox3 continue to monitor Neuro status     #Respiratory:  Acute Hypoxic Respiratory Failure w/ b/l Pulmonary Edema likely 2/2 ADHF/CHER   - BiPAP 16/8 60%, wean as tolerated, f/u ABG   - Given 40 Lasix during RRT, giving additional Bumex 2 IV as pt appears well perfused, but still overloaded on exam   - f.u post diuresis CXR     CHER  - Usually on CPAP at night, was not able to tolerate 2/2 to coughing. When placed on RA, Hypoxic RF ensued  - c/w BiPAP as above     #Cardiovascular:   ADHF on HFrEF (39% 1/2020)   - c/w BiPAP as above along with 2mg IV Bumex for Fluid overload  - given pt's HTN (/75) can continue with Coreg 12.5 BID for AL reduction   - Curtis inserted for stirct Is Os    new NSVT s/p Amio bolus/ gtt  - c/w Amio gtt at 1, downtitrate to 0.5 @ 10pm   - Monitor Tele    Hx of AFib on Eliquis  - c/w Eliquis     CAD w/ CABG (LIMA to LAD)  - c/w ASA, Statin, Coreg    #Renal:  NARCISO on CKD III  - Cr. 2.0 tonight, unclear baseline, lowest recorded in last year 1.4- 1.5  - Strict Is Os w/ Curtis, monitor serum Cr, replete lytes PRN     #GI:   - Reassess Respiratory status around breakfast, will leave DASH diet on for now   - PPI for Stress Ulcer ppx    #Endo:  Anion Gap Metabolic Acidosis 2/2 Lactic Acidosis   - Compensating on BiPAP w/ high RR, repeat another ABG in later AM to verify     Pemphigous Vulgaris  - c/w Prednisone 20mg daily     - non-DM, highest Gluc reading 188 in setting of RRT, monitor during the day for ISS needs    #Heme:  - VTE ppx w/ Eliquis  - H&H stable     #ID:  RLE Cellulitis, neg BCx 8/24  - Despite inpatient hx, pt now febrile as well as incr leukocytosis 10 -> 17, this is also in setting of Acute hypoxic RF  - c/w Vancomycin 1g q12h for now   - f/u BCx sent from RRT 8/27         LISSA Montelongo

## 2020-08-27 NOTE — PROGRESS NOTE ADULT - ASSESSMENT
======================================================  PAST MEDICAL & SURGICAL HISTORY:  CHF (congestive heart failure)  Psoriasis  HLD (hyperlipidemia)  CAD (coronary artery disease): 3 stents  Hypertension  History of coronary artery stent placement: 1 stent 2008, 2 stents 2009 in [facility]  S/P quadruple vessel bypass: 2006    ====================ASSESSMENT AND PLAN==============      ====================CARDIOVASCULAR==================    Mechanical Circulatory Support:  [ ] IABP   [ ] Impella 2.5   [ ] Impella CP  Settings:     ==Hemodynamics==     (Date) CVP:      PCWP:        PA S/D:            Cardiac Output:             Cardiac Index:            SVR:    Preload (fluids, diuretics):  Afterload (anti-hypertensives, pressors):  Inotropes:    ==Pump==    Echo Date:  LVEF:                              Regional Wall Motion Abnormaility?:  [ ]Yes   [ ] No, If Yes, Details  Diastolic function:  RV function:   Any change from prior?: [ ] Yes   [ ] No, If Yes, Details:   Volume status:    ==Coronaries==    Last ischemic workup:   Antiplatelet regimen:   Anticoagulant:   Statin:   Beta blocker:    ==Rhythm==    Current rhythm:  AM EKG Interpretation:   Anti-arrhythmic therapies:   TVP with settings:     aMIOdarone Infusion 1 mG/Min (33.3 mL/Hr) IV Continuous <Continuous>  aMIOdarone IVPB 150 milliGRAM(s) IV Intermittent once  carvedilol 12.5 milliGRAM(s) Oral every 12 hours      ====================== NEUROLOGY=====================  Sedation [ ]Yes   [ ] No  Delirium [ ]Yes   [ ] No    LORazepam     Tablet 0.5 milliGRAM(s) Oral at bedtime  sertraline 50 milliGRAM(s) Oral daily    ==================== RESPIRATORY======================  Mechanical Ventilation [ ]    BIPAP [ ]   HFNC [ ]   NC [ ]       ABG - ( 27 Aug 2020 08:41 )  pH, Arterial: 7.45  pH, Blood: x     /  pCO2: 32    /  pO2: 82    / HCO3: 22    / Base Excess: -.9   /  SaO2: 96                  Blood Gas Arterial - Lytes,H+H,iCa,Lact: Performed In Lab (08-27-20 @ 08:41)  Blood Gas Arterial - Lytes,H+H,iCa,Lact: Performed In Lab (08-27-20 @ 04:52)  Blood Gas Arterial - Lytes,H+H,iCa,Lact: Performed In Lab (08-27-20 @ 03:56)        ===================== RENAL =========================    08-26-20 @ 07:01 - 08-27-20 @ 07:00  --------------------------------------------------------  IN: 863.2 mL / OUT: 975 mL / NET: -111.8 mL    08-27-20 @ 07:01  - 08-27-20 @ 10:10  --------------------------------------------------------  IN: 66.6 mL / OUT: 900 mL / NET: -833.4 mL      Renal Replacement Therapy:  [ ] CRRT      [ ] IHD, Last Session:    Fluid removal:     [ ] Diuretic therapy, Regimen:       ==================== GASTROINTESTINAL===================    Diet:  Last BM:   Indication for Stress Ulcer Prophylaxis, [ ] Yes    [ ] No   If Yes, Medication:     pantoprazole    Tablet 40 milliGRAM(s) Oral two times a day    ========================INFECTIOUS DISEASE================  T(C): 37.7 (08-27-20 @ 10:05), Max: 38.4 (08-27-20 @ 04:26)  WBC Count: 17.99 K/uL (08-27-20 @ 04:02)  WBC Count: 10.76 K/uL (08-26-20 @ 06:26)  WBC Count: 12.27 K/uL (08-24-20 @ 13:45)      Culture - Urine (collected 08-25-20 @ 00:51)  Source: .Urine Clean Catch (Midstream)  Final Report (08-25-20 @ 19:54):    No growth    Culture - Blood (collected 08-24-20 @ 17:07)  Source: .Blood Blood-Peripheral  Preliminary Report (08-25-20 @ 18:01):    No growth to date.    Culture - Blood (collected 08-24-20 @ 17:07)  Source: .Blood Blood-Peripheral  Preliminary Report (08-25-20 @ 18:01):    No growth to date.        Current Antibiotics/Antifungals with start date:     vancomycin  IVPB 1000 milliGRAM(s) IV Intermittent every 12 hours    ===================HEMATOLOGIC/ONC ===================  Hemoglobin: 10.0 g/dL (08-27-20 @ 04:02)  Hemoglobin: 9.4 g/dL (08-26-20 @ 06:26)  Hemoglobin: 9.2 g/dL (08-24-20 @ 13:45)    Platelet Count - Automated: 498 K/uL (08-27-20 @ 04:02)  Platelet Count - Automated: 325 K/uL (08-26-20 @ 06:26)  Platelet Count - Automated: 258 K/uL (08-24-20 @ 13:45)    Chemical VTE Prophylaxis:  [ ] Lovenox    [ ] SQH   [ ]NA  Systemic Anticogaulation:  [ ] Yes    [ ] No,  If Yes, Medication and Indication:     apixaban 5 milliGRAM(s) Oral two times a day  aspirin  chewable 81 milliGRAM(s) Oral daily    =======================    ENDOCRINE  =====================  Insulin drip  [ ] Yes    [ ] No  Basal Insulin [ ] Yes    [ ] No  Bolus insulin [ ] Yes    [ ] No  Sliding Scale  [ ] Yes    [ ] No  Hgb A1c    POCT Blood Glucose.: 118 mg/dL (08-27-20 @ 03:33)        atorvastatin 80 milliGRAM(s) Oral at bedtime  predniSONE   Tablet 20 milliGRAM(s) Oral daily    ======================= LINES/TUBES  =====================    Curtis: (8/26)    ======================= Disposition  =====================  [ ] ICU   [ ] Floor    ICU Criteria: ======================================================  PAST MEDICAL & SURGICAL HISTORY:  CHF (congestive heart failure)  Psoriasis  HLD (hyperlipidemia)  CAD (coronary artery disease): 3 stents  Hypertension  History of coronary artery stent placement: 1 stent 2008, 2 stents 2009 in [facility]  S/P quadruple vessel bypass: 2006    ====================ASSESSMENT AND PLAN==============  66 yo m with hx of CAD s/p CABG x1 (LIMA to LAD) HFrEF, afib on Elliquis, HTN, HLD, CKD, CHER, and pemphigus vulgaris adm to floors for RLE cellulitis, transferred to CICU for ADHF.    ====================CARDIOVASCULAR==================    Mechanical Circulatory Support:  [ ] IABP   [ ] Impella 2.5   [ ] Impella CP  Settings:     ==Hemodynamics==     (Date) CVP:      PCWP:        PA S/D:            Cardiac Output:             Cardiac Index:            SVR:    Preload (fluids, diuretics):  Afterload (anti-hypertensives, pressors):  Inotropes:    ==Pump==    Echo Date:  LVEF:                              Regional Wall Motion Abnormaility?:  [ ]Yes   [ ] No, If Yes, Details  Diastolic function:  RV function:   Any change from prior?: [ ] Yes   [ ] No, If Yes, Details:   Volume status:    ==Coronaries==    Last ischemic workup:   Antiplatelet regimen:   Anticoagulant:   Statin:   Beta blocker:    ==Rhythm==    Current rhythm:  AM EKG Interpretation:   Anti-arrhythmic therapies:   TVP with settings:     aMIOdarone Infusion 1 mG/Min (33.3 mL/Hr) IV Continuous <Continuous>  aMIOdarone IVPB 150 milliGRAM(s) IV Intermittent once  carvedilol 12.5 milliGRAM(s) Oral every 12 hours      ====================== NEUROLOGY=====================  Sedation [ ]Yes   [ ] No  Delirium [ ]Yes   [ ] No    LORazepam     Tablet 0.5 milliGRAM(s) Oral at bedtime  sertraline 50 milliGRAM(s) Oral daily    ==================== RESPIRATORY======================  Mechanical Ventilation [ ]    BIPAP [ ]   HFNC [ ]   NC [ ]       ABG - ( 27 Aug 2020 08:41 )  pH, Arterial: 7.45  pH, Blood: x     /  pCO2: 32    /  pO2: 82    / HCO3: 22    / Base Excess: -.9   /  SaO2: 96                  Blood Gas Arterial - Lytes,H+H,iCa,Lact: Performed In Lab (08-27-20 @ 08:41)  Blood Gas Arterial - Lytes,H+H,iCa,Lact: Performed In Lab (08-27-20 @ 04:52)  Blood Gas Arterial - Lytes,H+H,iCa,Lact: Performed In Lab (08-27-20 @ 03:56)        ===================== RENAL =========================    08-26-20 @ 07:01 - 08-27-20 @ 07:00  --------------------------------------------------------  IN: 863.2 mL / OUT: 975 mL / NET: -111.8 mL    08-27-20 @ 07:01  - 08-27-20 @ 10:10  --------------------------------------------------------  IN: 66.6 mL / OUT: 900 mL / NET: -833.4 mL      Renal Replacement Therapy:  [ ] CRRT      [ ] IHD, Last Session:    Fluid removal:     [ ] Diuretic therapy, Regimen:       ==================== GASTROINTESTINAL===================    Diet:  Last BM:   Indication for Stress Ulcer Prophylaxis, [ ] Yes    [ ] No   If Yes, Medication:     pantoprazole    Tablet 40 milliGRAM(s) Oral two times a day    ========================INFECTIOUS DISEASE================  T(C): 37.7 (08-27-20 @ 10:05), Max: 38.4 (08-27-20 @ 04:26)  WBC Count: 17.99 K/uL (08-27-20 @ 04:02)  WBC Count: 10.76 K/uL (08-26-20 @ 06:26)  WBC Count: 12.27 K/uL (08-24-20 @ 13:45)      Culture - Urine (collected 08-25-20 @ 00:51)  Source: .Urine Clean Catch (Midstream)  Final Report (08-25-20 @ 19:54):    No growth    Culture - Blood (collected 08-24-20 @ 17:07)  Source: .Blood Blood-Peripheral  Preliminary Report (08-25-20 @ 18:01):    No growth to date.    Culture - Blood (collected 08-24-20 @ 17:07)  Source: .Blood Blood-Peripheral  Preliminary Report (08-25-20 @ 18:01):    No growth to date.        Current Antibiotics/Antifungals with start date:     vancomycin  IVPB 1000 milliGRAM(s) IV Intermittent every 12 hours    ===================HEMATOLOGIC/ONC ===================  Hemoglobin: 10.0 g/dL (08-27-20 @ 04:02)  Hemoglobin: 9.4 g/dL (08-26-20 @ 06:26)  Hemoglobin: 9.2 g/dL (08-24-20 @ 13:45)    Platelet Count - Automated: 498 K/uL (08-27-20 @ 04:02)  Platelet Count - Automated: 325 K/uL (08-26-20 @ 06:26)  Platelet Count - Automated: 258 K/uL (08-24-20 @ 13:45)    Chemical VTE Prophylaxis:  [ ] Lovenox    [ ] SQH   [ ]NA  Systemic Anticogaulation:  [ ] Yes    [ ] No,  If Yes, Medication and Indication:     apixaban 5 milliGRAM(s) Oral two times a day  aspirin  chewable 81 milliGRAM(s) Oral daily    =======================    ENDOCRINE  =====================  Insulin drip  [ ] Yes    [ ] No  Basal Insulin [ ] Yes    [ ] No  Bolus insulin [ ] Yes    [ ] No  Sliding Scale  [ ] Yes    [ ] No  Hgb A1c    POCT Blood Glucose.: 118 mg/dL (08-27-20 @ 03:33)        atorvastatin 80 milliGRAM(s) Oral at bedtime  predniSONE   Tablet 20 milliGRAM(s) Oral daily    ======================= LINES/TUBES  =====================    Curtis: (8/26)    ======================= Disposition  =====================  [ ] ICU   [ ] Floor    ICU Criteria: ====================ASSESSMENT AND PLAN==============  64 yo m with hx of CAD s/p CABG x1 (LIMA to LAD) HFrEF, afib on Elliquis, HTN, HLD, CKD, CHER, and pemphigus vulgaris adm to floors for RLE cellulitis, transferred to CICU for ADHF.    ====================CARDIOVASCULAR==================    Mechanical Circulatory Support:  [ ] IABP   [ ] Impella 2.5   [ ] Impella CP  Settings:     ==Hemodynamics==    Preload (fluids, diuretics): S/p 40 IV Lasix and 2 IV bumex  Afterload (anti-hypertensives, pressors): coreg 12.5mg BID, Isordil  Inotropes:    ==Pump==    Echo Date:  LVEF:                              Regional Wall Motion Abnormaility?:  [ ]Yes   [ ] No, If Yes, Details  Diastolic function:  RV function:   Any change from prior?: [ ] Yes   [ ] No, If Yes, Details:   Volume status:    ==Coronaries==    Last ischemic workup:   Antiplatelet regimen:   Anticoagulant:   Statin:   Beta blocker:    ==Rhythm==    Current rhythm:  AM EKG Interpretation:   Anti-arrhythmic therapies:   TVP with settings:     aMIOdarone Infusion 1 mG/Min (33.3 mL/Hr) IV Continuous <Continuous>  aMIOdarone IVPB 150 milliGRAM(s) IV Intermittent once  carvedilol 12.5 milliGRAM(s) Oral every 12 hours      ====================== NEUROLOGY=====================  Sedation [ ]Yes   [ ] No  Delirium [ ]Yes   [ ] No    LORazepam     Tablet 0.5 milliGRAM(s) Oral at bedtime  sertraline 50 milliGRAM(s) Oral daily    ==================== RESPIRATORY======================  Mechanical Ventilation [ ]    BIPAP [ ]   HFNC [ ]   NC [ ]       ABG - ( 27 Aug 2020 08:41 )  pH, Arterial: 7.45  pH, Blood: x     /  pCO2: 32    /  pO2: 82    / HCO3: 22    / Base Excess: -.9   /  SaO2: 96                  Blood Gas Arterial - Lytes,H+H,iCa,Lact: Performed In Lab (08-27-20 @ 08:41)  Blood Gas Arterial - Lytes,H+H,iCa,Lact: Performed In Lab (08-27-20 @ 04:52)  Blood Gas Arterial - Lytes,H+H,iCa,Lact: Performed In Lab (08-27-20 @ 03:56)        ===================== RENAL =========================    08-26-20 @ 07:01  -  08-27-20 @ 07:00  --------------------------------------------------------  IN: 863.2 mL / OUT: 975 mL / NET: -111.8 mL    08-27-20 @ 07:01  - 08-27-20 @ 10:10  --------------------------------------------------------  IN: 66.6 mL / OUT: 900 mL / NET: -833.4 mL      Renal Replacement Therapy:  [ ] CRRT      [ ] IHD, Last Session:    Fluid removal:     [ ] Diuretic therapy, Regimen:       ==================== GASTROINTESTINAL===================    Diet:  Last BM:   Indication for Stress Ulcer Prophylaxis, [ ] Yes    [ ] No   If Yes, Medication:     pantoprazole    Tablet 40 milliGRAM(s) Oral two times a day    ========================INFECTIOUS DISEASE================  T(C): 37.7 (08-27-20 @ 10:05), Max: 38.4 (08-27-20 @ 04:26)  WBC Count: 17.99 K/uL (08-27-20 @ 04:02)  WBC Count: 10.76 K/uL (08-26-20 @ 06:26)  WBC Count: 12.27 K/uL (08-24-20 @ 13:45)      Culture - Urine (collected 08-25-20 @ 00:51)  Source: .Urine Clean Catch (Midstream)  Final Report (08-25-20 @ 19:54):    No growth    Culture - Blood (collected 08-24-20 @ 17:07)  Source: .Blood Blood-Peripheral  Preliminary Report (08-25-20 @ 18:01):    No growth to date.    Culture - Blood (collected 08-24-20 @ 17:07)  Source: .Blood Blood-Peripheral  Preliminary Report (08-25-20 @ 18:01):    No growth to date.        Current Antibiotics/Antifungals with start date:     vancomycin  IVPB 1000 milliGRAM(s) IV Intermittent every 12 hours    ===================HEMATOLOGIC/ONC ===================  Hemoglobin: 10.0 g/dL (08-27-20 @ 04:02)  Hemoglobin: 9.4 g/dL (08-26-20 @ 06:26)  Hemoglobin: 9.2 g/dL (08-24-20 @ 13:45)    Platelet Count - Automated: 498 K/uL (08-27-20 @ 04:02)  Platelet Count - Automated: 325 K/uL (08-26-20 @ 06:26)  Platelet Count - Automated: 258 K/uL (08-24-20 @ 13:45)    Chemical VTE Prophylaxis:  [ ] Lovenox    [ ] SQH   [ ]NA  Systemic Anticogaulation:  [ ] Yes    [ ] No,  If Yes, Medication and Indication:     apixaban 5 milliGRAM(s) Oral two times a day  aspirin  chewable 81 milliGRAM(s) Oral daily    =======================    ENDOCRINE  =====================  Insulin drip  [ ] Yes    [ ] No  Basal Insulin [ ] Yes    [ ] No  Bolus insulin [ ] Yes    [ ] No  Sliding Scale  [ ] Yes    [ ] No  Hgb A1c    POCT Blood Glucose.: 118 mg/dL (08-27-20 @ 03:33)        atorvastatin 80 milliGRAM(s) Oral at bedtime  predniSONE   Tablet 20 milliGRAM(s) Oral daily    ======================= LINES/TUBES  =====================    Curtis: (8/26)    ======================= Disposition  =====================  [ ] ICU   [ ] Floor    ICU Criteria: ====================ASSESSMENT AND PLAN==============  66 yo m with hx of CAD s/p CABG x1 (LIMA to LAD) HFrEF, afib on Elliquis, HTN, HLD, CKD, CHER, and pemphigus vulgaris adm to floors for RLE cellulitis, transferred to CICU for ADHF.    ====================CARDIOVASCULAR==================  RRT called for respiratory distress and acute hypoxic respiratory failure. Likely flash pulmonary edema in setting of elevated BPs at time of rapid response. Patient with     ==Hemodynamics==    Preload (fluids, diuretics): S/p 40 IV Lasix and 2 IV bumex  Afterload (anti-hypertensives, pressors): coreg 12.5mg BID, Isordil  Inotropes: none    ==Pump==    Echo Date: 1/2020  LVEF: 39%                              Regional Wall Motion Abnormaility?:  [X ]Yes   [ ] No, If Yes, Details Hypokinesis of inferior, lateral and inferiolateral walls  Diastolic function: Preserved  RV function: Preserved      ==Coronaries==    Last ischemic workup:   Antiplatelet regimen:   Anticoagulant:   Statin:   Beta blocker:    ==Rhythm==    Current rhythm:  AM EKG Interpretation:   Anti-arrhythmic therapies:   TVP with settings:     aMIOdarone Infusion 1 mG/Min (33.3 mL/Hr) IV Continuous <Continuous>  aMIOdarone IVPB 150 milliGRAM(s) IV Intermittent once  carvedilol 12.5 milliGRAM(s) Oral every 12 hours      ====================== NEUROLOGY=====================  Sedation [ ]Yes   [ ] No  Delirium [ ]Yes   [ ] No    LORazepam     Tablet 0.5 milliGRAM(s) Oral at bedtime  sertraline 50 milliGRAM(s) Oral daily    ==================== RESPIRATORY======================  Mechanical Ventilation [ ]    BIPAP [ ]   HFNC [ ]   NC [ ]       ABG - ( 27 Aug 2020 08:41 )  pH, Arterial: 7.45  pH, Blood: x     /  pCO2: 32    /  pO2: 82    / HCO3: 22    / Base Excess: -.9   /  SaO2: 96                  Blood Gas Arterial - Lytes,H+H,iCa,Lact: Performed In Lab (08-27-20 @ 08:41)  Blood Gas Arterial - Lytes,H+H,iCa,Lact: Performed In Lab (08-27-20 @ 04:52)  Blood Gas Arterial - Lytes,H+H,iCa,Lact: Performed In Lab (08-27-20 @ 03:56)        ===================== RENAL =========================    08-26-20 @ 07:01  - 08-27-20 @ 07:00  --------------------------------------------------------  IN: 863.2 mL / OUT: 975 mL / NET: -111.8 mL    08-27-20 @ 07:01  - 08-27-20 @ 10:10  --------------------------------------------------------  IN: 66.6 mL / OUT: 900 mL / NET: -833.4 mL      Renal Replacement Therapy:  [ ] CRRT      [ ] IHD, Last Session:    Fluid removal:     [ ] Diuretic therapy, Regimen:       ==================== GASTROINTESTINAL===================    Diet:  Last BM:   Indication for Stress Ulcer Prophylaxis, [ ] Yes    [ ] No   If Yes, Medication:     pantoprazole    Tablet 40 milliGRAM(s) Oral two times a day    ========================INFECTIOUS DISEASE================  T(C): 37.7 (08-27-20 @ 10:05), Max: 38.4 (08-27-20 @ 04:26)  WBC Count: 17.99 K/uL (08-27-20 @ 04:02)  WBC Count: 10.76 K/uL (08-26-20 @ 06:26)  WBC Count: 12.27 K/uL (08-24-20 @ 13:45)      Culture - Urine (collected 08-25-20 @ 00:51)  Source: .Urine Clean Catch (Midstream)  Final Report (08-25-20 @ 19:54):    No growth    Culture - Blood (collected 08-24-20 @ 17:07)  Source: .Blood Blood-Peripheral  Preliminary Report (08-25-20 @ 18:01):    No growth to date.    Culture - Blood (collected 08-24-20 @ 17:07)  Source: .Blood Blood-Peripheral  Preliminary Report (08-25-20 @ 18:01):    No growth to date.        Current Antibiotics/Antifungals with start date:     vancomycin  IVPB 1000 milliGRAM(s) IV Intermittent every 12 hours    ===================HEMATOLOGIC/ONC ===================  Hemoglobin: 10.0 g/dL (08-27-20 @ 04:02)  Hemoglobin: 9.4 g/dL (08-26-20 @ 06:26)  Hemoglobin: 9.2 g/dL (08-24-20 @ 13:45)    Platelet Count - Automated: 498 K/uL (08-27-20 @ 04:02)  Platelet Count - Automated: 325 K/uL (08-26-20 @ 06:26)  Platelet Count - Automated: 258 K/uL (08-24-20 @ 13:45)    Chemical VTE Prophylaxis:  [ ] Lovenox    [ ] SQH   [ ]NA  Systemic Anticogaulation:  [ ] Yes    [ ] No,  If Yes, Medication and Indication:     apixaban 5 milliGRAM(s) Oral two times a day  aspirin  chewable 81 milliGRAM(s) Oral daily    =======================    ENDOCRINE  =====================  Insulin drip  [ ] Yes    [ ] No  Basal Insulin [ ] Yes    [ ] No  Bolus insulin [ ] Yes    [ ] No  Sliding Scale  [ ] Yes    [ ] No  Hgb A1c    POCT Blood Glucose.: 118 mg/dL (08-27-20 @ 03:33)        atorvastatin 80 milliGRAM(s) Oral at bedtime  predniSONE   Tablet 20 milliGRAM(s) Oral daily    ======================= LINES/TUBES  =====================    Curtis: (8/26)    ======================= Disposition  =====================  [ ] ICU   [ ] Floor    ICU Criteria: ====================ASSESSMENT AND PLAN==============  66 yo m with hx of CAD s/p CABG x1 (LIMA to LAD) HFrEF, afib on Elliquis, HTN, HLD, CKD, CHER, and pemphigus vulgaris adm to floors for RLE cellulitis, transferred to CICU for ADHF.    ====================CARDIOVASCULAR==================  RRT called for respiratory distress and acute hypoxic respiratory failure. Likely flash pulmonary edema in setting of elevated BPs at time of rapid response. Patient also with NSVT at time of RRT, believe to be reactionary to acute hypoxic respiratory failure at time of RRT. Amio started but will discontinue 8/27.    ==Hemodynamics==    Preload (fluids, diuretics): S/p 40 IV Lasix and 2 IV bumex  Afterload (anti-hypertensives, pressors): coreg 12.5mg BID, Isordil  Inotropes: none    ==Pump==    Echo Date: 1/2020  LVEF: 39%                              Regional Wall Motion Abnormaility?:  [X ]Yes   [ ] No, If Yes, Details Hypokinesis of inferior, lateral and inferiolateral walls  Diastolic function: Preserved  RV function: Preserved      ==Coronaries==    Last ischemic workup: 1/2020. 3v disease. Proximal % stenosis (chronic. Cx 100% stenosis, supplied by bridging collaterals. RCA Prox 95% stenosis. Mid 99% stenosis, Distal 95% stenosis.  Antiplatelet regimen: ASA 81mg  Anticoagulant: Eliquis 5mg BID  Statin: Atorva 80mg  Beta blocker: Coreg 12.5mg BID    ==Rhythm==  Patient is currently in sinus rhythm with occasional ectopy w PVCs. Amio discontinued 8/27.    Current rhythm: Sinus  AM EKG Interpretation: Sinus with some ectopy      carvedilol 12.5 milliGRAM(s) Oral every 12 hours  atorvastatin 80 milliGRAM(s) oral daily  ASA 81 milliGRAM(S) oral daily  Eliquis 5 milliGRAM(s) oral two times daily      ====================== NEUROLOGY=====================  Hx of depression/anxiety. A&Ox3, no acute concerns.    LORazepam     Tablet 0.5 milliGRAM(s) Oral at bedtime  sertraline 50 milliGRAM(s) Oral daily    ==================== RESPIRATORY======================  BiPap weaned 8/27 to NC 2 L.     ABG - ( 27 Aug 2020 08:41 )  pH, Arterial: 7.45  pH, Blood: x     /  pCO2: 32    /  pO2: 82    / HCO3: 22    / Base Excess: -.9   /  SaO2: 96                  Blood Gas Arterial - Lytes,H+H,iCa,Lact: Performed In Lab (08-27-20 @ 08:41)  Blood Gas Arterial - Lytes,H+H,iCa,Lact: Performed In Lab (08-27-20 @ 04:52)  Blood Gas Arterial - Lytes,H+H,iCa,Lact: Performed In Lab (08-27-20 @ 03:56)        ===================== RENAL =========================  NARCISO on CKDIII. Patient with multiple risk factors for CKD. NARCISO likely related to acute disease process. Clinically volume overloaded, received loops this morning, will monitor for UOp.    -Trend I/Os  -Net negative 1-2L        08-27-20 @ 07:01  -  08-27-20 @ 10:10  --------------------------------------------------------  IN: 66.6 mL / OUT: 900 mL / NET: -833.4 mL    ==================== GASTROINTESTINAL===================    Diet: DASH/TLC  Last BM: unknown  Indication for Stress Ulcer Prophylaxis, [ ] Yes    [X ] No    pantoprazole    Tablet 40 milliGRAM(s) Oral two times a day    ========================INFECTIOUS DISEASE================  Patient admitted with cellulitis. On vanco, last trough subtherapeutic and vanc increased to BID dosing. Will obtain trough before 4th dose. Fever on presentation to CICU. Was recultured 8/27 during rapid response.   -Continue Vancomycin dosing  -Vanc trough before 4th dose  -F/U cultures    T(C): 37.7 (08-27-20 @ 10:05), Max: 38.4 (08-27-20 @ 04:26)  WBC Count: 17.99 K/uL (08-27-20 @ 04:02)  WBC Count: 10.76 K/uL (08-26-20 @ 06:26)  WBC Count: 12.27 K/uL (08-24-20 @ 13:45)      Culture - Urine (collected 08-25-20 @ 00:51)  Source: .Urine Clean Catch (Midstream)  Final Report (08-25-20 @ 19:54):    No growth    Culture - Blood (collected 08-24-20 @ 17:07)  Source: .Blood Blood-Peripheral  Preliminary Report (08-25-20 @ 18:01):    No growth to date.    Culture - Blood (collected 08-24-20 @ 17:07)  Source: .Blood Blood-Peripheral  Preliminary Report (08-25-20 @ 18:01):    No growth to date.        Current Antibiotics/Antifungals with start date:     vancomycin  IVPB 1000 milliGRAM(s) IV Intermittent every 12 hours    ===================HEMATOLOGIC/ONC ===================  Patient with history of pAF, on AC. Patient with normocytic anemia stable from admission.  -Continue AC with Eliquis 5mg BID  -Daily CBC    Hemoglobin: 10.0 g/dL (08-27-20 @ 04:02)  Hemoglobin: 9.4 g/dL (08-26-20 @ 06:26)  Hemoglobin: 9.2 g/dL (08-24-20 @ 13:45)    Platelet Count - Automated: 498 K/uL (08-27-20 @ 04:02)  CBC Full  -  ( 27 Aug 2020 04:02 )  WBC Count : 17.99 K/uL  RBC Count : 4.27 M/uL  Hemoglobin : 10.0 g/dL  Hematocrit : See note  Platelet Count - Automated : 498 K/uL  Mean Cell Volume : See note  Mean Cell Hemoglobin : See note  Mean Cell Hemoglobin Concentration : See note  Auto Neutrophil # : 13.15 K/uL  Auto Lymphocyte # : 3.20 K/uL  Auto Monocyte # : 1.20 K/uL  Auto Eosinophil # : 0.22 K/uL  Auto Basophil # : 0.10 K/uL  Auto Neutrophil % : 73.0 %  Auto Lymphocyte % : 17.8 %  Auto Monocyte % : 6.7 %  Auto Eosinophil % : 1.2 %  Auto Basophil % : 0.6 %  Platelet Count - Automated: 325 K/uL (08-26-20 @ 06:26)  Platelet Count - Automated: 258 K/uL (08-24-20 @ 13:45)    Chemical VTE Prophylaxis:  [ ] Lovenox    [ ] SQH   [ ]NA  Systemic Anticogaulation:  [X ] Yes    [ ] No,  If Yes, Medication and Indication: Elliquis for pAF    apixaban 5 milliGRAM(s) Oral two times a day  aspirin  chewable 81 milliGRAM(s) Oral daily    =======================    ENDOCRINE  =====================  No acute concerns. Does not require insulin at this time.  -FS goal <180  -Defer ISS at this time    Hgb A1c 5.5    POCT Blood Glucose.: 118 mg/dL (08-27-20 @ 03:33)        atorvastatin 80 milliGRAM(s) Oral at bedtime  predniSONE   Tablet 20 milliGRAM(s) Oral daily    ======================= LINES/TUBES  =====================  Curtis: (8/26)    ======================= Disposition  =====================  [X ] ICU   [ ] Floor    ICU Criteria: ====================ASSESSMENT AND PLAN==============  64 yo m with hx of CAD s/p CABG x1 (LIMA to LAD) HFrEF, afib on Elliquis, HTN, HLD, CKD, CHER, and pemphigus vulgaris adm to floors for RLE cellulitis, transferred to CICU for ADHF.    ====================CARDIOVASCULAR==================  RRT called for respiratory distress and acute hypoxic respiratory failure. Likely flash pulmonary edema in setting of elevated BPs at time of rapid response. Patient also with NSVT at time of RRT, believe to be reactionary to acute hypoxic respiratory failure at time of RRT. Amio started but will discontinue 8/27.    ==Hemodynamics==    Preload (fluids, diuretics): S/p 40 IV Lasix and 2 IV bumex  Afterload (anti-hypertensives, pressors): coreg 12.5mg BID, Isordil 10mg TID  Inotropes: none    ==Pump==    Echo Date: 1/2020  LVEF: 39%                              Regional Wall Motion Abnormaility?:  [X ]Yes   [ ] No, If Yes, Details Hypokinesis of inferior, lateral and inferiolateral walls  Diastolic function: Preserved  RV function: Preserved      ==Coronaries==    Last ischemic workup: 1/2020. 3v disease. Proximal % stenosis (chronic. Cx 100% stenosis, supplied by bridging collaterals. RCA Prox 95% stenosis. Mid 99% stenosis, Distal 95% stenosis.  Antiplatelet regimen: ASA 81mg  Anticoagulant: Eliquis 5mg BID  Statin: Atorva 80mg  Beta blocker: Coreg 12.5mg BID    ==Rhythm==  Patient is currently in sinus rhythm with occasional ectopy w PVCs. Amio discontinued 8/27.    Current rhythm: Sinus  AM EKG Interpretation: Sinus with some ectopy      carvedilol 12.5 milliGRAM(s) Oral every 12 hours  atorvastatin 80 milliGRAM(s) oral daily  ASA 81 milliGRAM(S) oral daily  Eliquis 5 milliGRAM(s) oral two times daily      ====================== NEUROLOGY=====================  Hx of depression/anxiety. A&Ox3, no acute concerns.    LORazepam     Tablet 0.5 milliGRAM(s) Oral at bedtime  sertraline 50 milliGRAM(s) Oral daily    ==================== RESPIRATORY======================  BiPap weaned 8/27 to NC 2 L.     ABG - ( 27 Aug 2020 08:41 )  pH, Arterial: 7.45  pH, Blood: x     /  pCO2: 32    /  pO2: 82    / HCO3: 22    / Base Excess: -.9   /  SaO2: 96                  Blood Gas Arterial - Lytes,H+H,iCa,Lact: Performed In Lab (08-27-20 @ 08:41)  Blood Gas Arterial - Lytes,H+H,iCa,Lact: Performed In Lab (08-27-20 @ 04:52)  Blood Gas Arterial - Lytes,H+H,iCa,Lact: Performed In Lab (08-27-20 @ 03:56)        ===================== RENAL =========================  NARCISO on CKDIII. Patient with multiple risk factors for CKD. NARCISO likely related to acute disease process. Clinically volume overloaded, received loops this morning, will monitor for UOp.    -Trend I/Os  -Net negative 1-2L        08-27-20 @ 07:01  -  08-27-20 @ 10:10  --------------------------------------------------------  IN: 66.6 mL / OUT: 900 mL / NET: -833.4 mL    ==================== GASTROINTESTINAL===================    Diet: DASH/TLC  Last BM: unknown  Indication for Stress Ulcer Prophylaxis, [ ] Yes    [X ] No    pantoprazole    Tablet 40 milliGRAM(s) Oral two times a day    ========================INFECTIOUS DISEASE================  Patient admitted with cellulitis. On vanco, last trough subtherapeutic and vanc increased to BID dosing. Will obtain trough before 4th dose. Fever on presentation to CICU. Was recultured 8/27 during rapid response.   -Continue Vancomycin dosing  -Vanc trough before 4th dose  -F/U cultures    T(C): 37.7 (08-27-20 @ 10:05), Max: 38.4 (08-27-20 @ 04:26)  WBC Count: 17.99 K/uL (08-27-20 @ 04:02)  WBC Count: 10.76 K/uL (08-26-20 @ 06:26)  WBC Count: 12.27 K/uL (08-24-20 @ 13:45)      Culture - Urine (collected 08-25-20 @ 00:51)  Source: .Urine Clean Catch (Midstream)  Final Report (08-25-20 @ 19:54):    No growth    Culture - Blood (collected 08-24-20 @ 17:07)  Source: .Blood Blood-Peripheral  Preliminary Report (08-25-20 @ 18:01):    No growth to date.    Culture - Blood (collected 08-24-20 @ 17:07)  Source: .Blood Blood-Peripheral  Preliminary Report (08-25-20 @ 18:01):    No growth to date.        Current Antibiotics/Antifungals with start date:     vancomycin  IVPB 1000 milliGRAM(s) IV Intermittent every 12 hours    ===================HEMATOLOGIC/ONC ===================  Patient with history of pAF, on AC. Patient with normocytic anemia stable from admission.  -Continue AC with Eliquis 5mg BID  -Daily CBC    Hemoglobin: 10.0 g/dL (08-27-20 @ 04:02)  Hemoglobin: 9.4 g/dL (08-26-20 @ 06:26)  Hemoglobin: 9.2 g/dL (08-24-20 @ 13:45)    Platelet Count - Automated: 498 K/uL (08-27-20 @ 04:02)  CBC Full  -  ( 27 Aug 2020 04:02 )  WBC Count : 17.99 K/uL  RBC Count : 4.27 M/uL  Hemoglobin : 10.0 g/dL  Hematocrit : See note  Platelet Count - Automated : 498 K/uL  Mean Cell Volume : See note  Mean Cell Hemoglobin : See note  Mean Cell Hemoglobin Concentration : See note  Auto Neutrophil # : 13.15 K/uL  Auto Lymphocyte # : 3.20 K/uL  Auto Monocyte # : 1.20 K/uL  Auto Eosinophil # : 0.22 K/uL  Auto Basophil # : 0.10 K/uL  Auto Neutrophil % : 73.0 %  Auto Lymphocyte % : 17.8 %  Auto Monocyte % : 6.7 %  Auto Eosinophil % : 1.2 %  Auto Basophil % : 0.6 %  Platelet Count - Automated: 325 K/uL (08-26-20 @ 06:26)  Platelet Count - Automated: 258 K/uL (08-24-20 @ 13:45)    Chemical VTE Prophylaxis:  [ ] Lovenox    [ ] SQH   [ ]NA  Systemic Anticogaulation:  [X ] Yes    [ ] No,  If Yes, Medication and Indication: Elliquis for pAF    apixaban 5 milliGRAM(s) Oral two times a day  aspirin  chewable 81 milliGRAM(s) Oral daily    =======================    ENDOCRINE  =====================  No acute concerns. Does not require insulin at this time.  -FS goal <180  -Defer ISS at this time    Hgb A1c 5.5    POCT Blood Glucose.: 118 mg/dL (08-27-20 @ 03:33)        atorvastatin 80 milliGRAM(s) Oral at bedtime  predniSONE   Tablet 20 milliGRAM(s) Oral daily    ======================= LINES/TUBES  =====================  Curtis: (8/26)    ======================= Disposition  =====================  [X ] ICU   [ ] Floor    ICU Criteria: ====================ASSESSMENT AND PLAN==============  66 yo m with hx of CAD s/p CABG x1 (LIMA to LAD) HFrEF, afib on Elliquis, HTN, HLD, CKD, CHER, and pemphigus vulgaris adm to floors for RLE cellulitis, transferred to CICU for ADHF.    ====================CARDIOVASCULAR==================  RRT called for respiratory distress and acute hypoxic respiratory failure. Likely flash pulmonary edema in setting of elevated BPs at time of rapid response. Patient also with NSVT at time of RRT, believe to be reactionary to acute hypoxic respiratory failure at time of RRT. Amio started but will discontinue 8/27. Curtis inserted for string Is/Os Hx of CAD s/p 1v CABG (LIMA to LAD) in 2005. Cath from 1/2020 with triple vessel disease.     ==Hemodynamics==    Preload (fluids, diuretics): S/p 40 IV Lasix and 2 IV bumex  Afterload (anti-hypertensives, pressors): coreg 12.5mg BID, Isordil 10mg TID  Inotropes: none    ==Pump==    Echo Date: 1/2020  LVEF: 39%                              Regional Wall Motion Abnormaility?:  [X ]Yes   [ ] No, If Yes, Details Hypokinesis of inferior, lateral and inferiolateral walls  Diastolic function: Preserved  RV function: Preserved      ==Coronaries==    Last ischemic workup: 1/2020. 3v disease. Proximal % stenosis (chronic. Cx 100% stenosis, supplied by bridging collaterals. RCA Prox 95% stenosis. Mid 99% stenosis, Distal 95% stenosis.  Antiplatelet regimen: ASA 81mg  Anticoagulant: Eliquis 5mg BID  Statin: Atorva 80mg  Beta blocker: Coreg 12.5mg BID    ==Rhythm==  Patient is currently in sinus rhythm with occasional ectopy w PVCs. Amio discontinued 8/27.    Current rhythm: Sinus  AM EKG Interpretation: Sinus with some ectopy      carvedilol 12.5 milliGRAM(s) Oral every 12 hours  atorvastatin 80 milliGRAM(s) oral daily  ASA 81 milliGRAM(S) oral daily  Eliquis 5 milliGRAM(s) oral two times daily      ====================== NEUROLOGY=====================  Hx of depression/anxiety. A&Ox3, no acute concerns.    LORazepam     Tablet 0.5 milliGRAM(s) Oral at bedtime  sertraline 50 milliGRAM(s) Oral daily    ==================== RESPIRATORY======================  Admitted for acute hypoxic respiratory failure likely 2/2 flash pulmonary edema. BiPap weaned 8/27 to NC 2 L.   -Diurese to keep net negative 1-2L  -F/U CXR  -Wean O2 as tolerated to keep SpO2 >94%    ABG - ( 27 Aug 2020 08:41 )  pH, Arterial: 7.45  pH, Blood: x     /  pCO2: 32    /  pO2: 82    / HCO3: 22    / Base Excess: -.9   /  SaO2: 96            Blood Gas Arterial - Lytes,H+H,iCa,Lact: Performed In Lab (08-27-20 @ 08:41)  Blood Gas Arterial - Lytes,H+H,iCa,Lact: Performed In Lab (08-27-20 @ 04:52)  Blood Gas Arterial - Lytes,H+H,iCa,Lact: Performed In Lab (08-27-20 @ 03:56)        ===================== RENAL =========================  NARCISO on CKDIII. Patient with multiple risk factors for CKD. NARCISO likely related to acute disease process. Clinically volume overloaded, received loops this morning, will monitor for UOp.    -Trend I/Os  -Net negative 1-2L        08-27-20 @ 07:01  -  08-27-20 @ 10:10  --------------------------------------------------------  IN: 66.6 mL / OUT: 900 mL / NET: -833.4 mL    ==================== GASTROINTESTINAL===================    Diet: DASH/TLC  Last BM: unknown  Indication for Stress Ulcer Prophylaxis, [ ] Yes    [X ] No    pantoprazole    Tablet 40 milliGRAM(s) Oral two times a day    ========================INFECTIOUS DISEASE================  Patient admitted with RLE cellulitis. On vanco, last trough subtherapeutic and vanc increased to BID dosing. Will obtain trough before 4th dose. Fever on presentation to CICU. Was recultured 8/27 during rapid response.   -Continue Vancomycin dosing  -Vanc trough before 4th dose  -F/U cultures    T(C): 37.7 (08-27-20 @ 10:05), Max: 38.4 (08-27-20 @ 04:26)  WBC Count: 17.99 K/uL (08-27-20 @ 04:02)  WBC Count: 10.76 K/uL (08-26-20 @ 06:26)  WBC Count: 12.27 K/uL (08-24-20 @ 13:45)      Culture - Urine (collected 08-25-20 @ 00:51)  Source: .Urine Clean Catch (Midstream)  Final Report (08-25-20 @ 19:54):    No growth    Culture - Blood (collected 08-24-20 @ 17:07)  Source: .Blood Blood-Peripheral  Preliminary Report (08-25-20 @ 18:01):    No growth to date.    Culture - Blood (collected 08-24-20 @ 17:07)  Source: .Blood Blood-Peripheral  Preliminary Report (08-25-20 @ 18:01):    No growth to date.        Current Antibiotics/Antifungals with start date:     vancomycin  IVPB 1000 milliGRAM(s) IV Intermittent every 12 hours    ===================HEMATOLOGIC/ONC ===================  Patient with history of pAF, on AC. Patient with normocytic anemia stable from admission.  -Continue AC with Eliquis 5mg BID  -Daily CBC    Hemoglobin: 10.0 g/dL (08-27-20 @ 04:02)  Hemoglobin: 9.4 g/dL (08-26-20 @ 06:26)  Hemoglobin: 9.2 g/dL (08-24-20 @ 13:45)    Platelet Count - Automated: 498 K/uL (08-27-20 @ 04:02)  CBC Full  -  ( 27 Aug 2020 04:02 )  WBC Count : 17.99 K/uL  RBC Count : 4.27 M/uL  Hemoglobin : 10.0 g/dL  Hematocrit : See note  Platelet Count - Automated : 498 K/uL  Mean Cell Volume : See note  Mean Cell Hemoglobin : See note  Mean Cell Hemoglobin Concentration : See note  Auto Neutrophil # : 13.15 K/uL  Auto Lymphocyte # : 3.20 K/uL  Auto Monocyte # : 1.20 K/uL  Auto Eosinophil # : 0.22 K/uL  Auto Basophil # : 0.10 K/uL  Auto Neutrophil % : 73.0 %  Auto Lymphocyte % : 17.8 %  Auto Monocyte % : 6.7 %  Auto Eosinophil % : 1.2 %  Auto Basophil % : 0.6 %  Platelet Count - Automated: 325 K/uL (08-26-20 @ 06:26)  Platelet Count - Automated: 258 K/uL (08-24-20 @ 13:45)    Chemical VTE Prophylaxis:  [ ] Lovenox    [ ] SQH   [ ]NA  Systemic Anticogaulation:  [X ] Yes    [ ] No,  If Yes, Medication and Indication: Elliquis for pAF    apixaban 5 milliGRAM(s) Oral two times a day  aspirin  chewable 81 milliGRAM(s) Oral daily    =======================    ENDOCRINE  =====================  No acute concerns from DM standpoint. Does not require insulin at this time.  -FS goal <180  -Defer ISS at this time    Hgb A1c 5.5    POCT Blood Glucose.: 118 mg/dL (08-27-20 @ 03:33)    Pemphigus Vulgaris:  -c/w daily pred 20mg qd    atorvastatin 80 milliGRAM(s) Oral at bedtime  predniSONE   Tablet 20 milliGRAM(s) Oral daily    ======================= LINES/TUBES  =====================  Curtis: (8/26)    ======================= Disposition  =====================  [X ] ICU   [ ] Floor    ICU Criteria:

## 2020-08-28 LAB
ALBUMIN SERPL ELPH-MCNC: 3.3 G/DL — SIGNIFICANT CHANGE UP (ref 3.3–5)
ALP SERPL-CCNC: 115 U/L — SIGNIFICANT CHANGE UP (ref 40–120)
ALT FLD-CCNC: 17 U/L — SIGNIFICANT CHANGE UP (ref 10–45)
ANION GAP SERPL CALC-SCNC: 14 MMOL/L — SIGNIFICANT CHANGE UP (ref 5–17)
AST SERPL-CCNC: 26 U/L — SIGNIFICANT CHANGE UP (ref 10–40)
BASE EXCESS BLDV CALC-SCNC: 0.4 MMOL/L — SIGNIFICANT CHANGE UP (ref -2–2)
BILIRUB SERPL-MCNC: 0.6 MG/DL — SIGNIFICANT CHANGE UP (ref 0.2–1.2)
BUN SERPL-MCNC: 26 MG/DL — HIGH (ref 7–23)
CA-I SERPL-SCNC: 1.14 MMOL/L — SIGNIFICANT CHANGE UP (ref 1.12–1.3)
CALCIUM SERPL-MCNC: 9.1 MG/DL — SIGNIFICANT CHANGE UP (ref 8.4–10.5)
CHLORIDE BLDV-SCNC: 103 MMOL/L — SIGNIFICANT CHANGE UP (ref 96–108)
CHLORIDE SERPL-SCNC: 99 MMOL/L — SIGNIFICANT CHANGE UP (ref 96–108)
CO2 BLDV-SCNC: 26 MMOL/L — SIGNIFICANT CHANGE UP (ref 22–30)
CO2 SERPL-SCNC: 23 MMOL/L — SIGNIFICANT CHANGE UP (ref 22–31)
CREAT SERPL-MCNC: 2.15 MG/DL — HIGH (ref 0.5–1.3)
GAS PNL BLDV: 135 MMOL/L — SIGNIFICANT CHANGE UP (ref 135–145)
GAS PNL BLDV: SIGNIFICANT CHANGE UP
GLUCOSE BLDC GLUCOMTR-MCNC: 98 MG/DL — SIGNIFICANT CHANGE UP (ref 70–99)
GLUCOSE BLDV-MCNC: 147 MG/DL — HIGH (ref 70–99)
GLUCOSE SERPL-MCNC: 148 MG/DL — HIGH (ref 70–99)
HCO3 BLDV-SCNC: 25 MMOL/L — SIGNIFICANT CHANGE UP (ref 21–29)
HCT VFR BLD CALC: 29.6 % — LOW (ref 39–50)
HCT VFR BLDA CALC: 27 % — LOW (ref 39–50)
HGB BLD CALC-MCNC: 8.8 G/DL — LOW (ref 13–17)
HGB BLD-MCNC: 8.4 G/DL — LOW (ref 13–17)
LACTATE BLDV-MCNC: 2.6 MMOL/L — HIGH (ref 0.7–2)
MAGNESIUM SERPL-MCNC: 2.2 MG/DL — SIGNIFICANT CHANGE UP (ref 1.6–2.6)
MCHC RBC-ENTMCNC: 23 PG — LOW (ref 27–34)
MCHC RBC-ENTMCNC: 28.4 GM/DL — LOW (ref 32–36)
MCV RBC AUTO: 81.1 FL — SIGNIFICANT CHANGE UP (ref 80–100)
NRBC # BLD: 0 /100 WBCS — SIGNIFICANT CHANGE UP (ref 0–0)
OTHER CELLS CSF MANUAL: 4 ML/DL — LOW (ref 18–23)
PCO2 BLDV: 43 MMHG — SIGNIFICANT CHANGE UP (ref 35–50)
PH BLDV: 7.38 — SIGNIFICANT CHANGE UP (ref 7.35–7.45)
PHOSPHATE SERPL-MCNC: 3 MG/DL — SIGNIFICANT CHANGE UP (ref 2.5–4.5)
PLATELET # BLD AUTO: 344 K/UL — SIGNIFICANT CHANGE UP (ref 150–400)
PO2 BLDV: 26 MMHG — SIGNIFICANT CHANGE UP (ref 25–45)
POTASSIUM BLDV-SCNC: 3.7 MMOL/L — SIGNIFICANT CHANGE UP (ref 3.5–5.3)
POTASSIUM SERPL-MCNC: 3.9 MMOL/L — SIGNIFICANT CHANGE UP (ref 3.5–5.3)
POTASSIUM SERPL-SCNC: 3.9 MMOL/L — SIGNIFICANT CHANGE UP (ref 3.5–5.3)
PROT SERPL-MCNC: 6.7 G/DL — SIGNIFICANT CHANGE UP (ref 6–8.3)
RBC # BLD: 3.65 M/UL — LOW (ref 4.2–5.8)
RBC # FLD: 18.6 % — HIGH (ref 10.3–14.5)
SAO2 % BLDV: 33 % — LOW (ref 67–88)
SODIUM SERPL-SCNC: 136 MMOL/L — SIGNIFICANT CHANGE UP (ref 135–145)
VANCOMYCIN TROUGH SERPL-MCNC: 22.5 UG/ML — HIGH (ref 10–20)
VANCOMYCIN TROUGH SERPL-MCNC: 24.6 UG/ML — HIGH (ref 10–20)
WBC # BLD: 9.5 K/UL — SIGNIFICANT CHANGE UP (ref 3.8–10.5)
WBC # FLD AUTO: 9.5 K/UL — SIGNIFICANT CHANGE UP (ref 3.8–10.5)

## 2020-08-28 PROCEDURE — 71045 X-RAY EXAM CHEST 1 VIEW: CPT | Mod: 26

## 2020-08-28 PROCEDURE — 99233 SBSQ HOSP IP/OBS HIGH 50: CPT | Mod: GC

## 2020-08-28 RX ORDER — VANCOMYCIN HCL 1 G
750 VIAL (EA) INTRAVENOUS EVERY 12 HOURS
Refills: 0 | Status: DISCONTINUED | OUTPATIENT
Start: 2020-08-28 | End: 2020-08-29

## 2020-08-28 RX ADMIN — ATORVASTATIN CALCIUM 80 MILLIGRAM(S): 80 TABLET, FILM COATED ORAL at 20:40

## 2020-08-28 RX ADMIN — SERTRALINE 50 MILLIGRAM(S): 25 TABLET, FILM COATED ORAL at 11:17

## 2020-08-28 RX ADMIN — CARVEDILOL PHOSPHATE 12.5 MILLIGRAM(S): 80 CAPSULE, EXTENDED RELEASE ORAL at 07:59

## 2020-08-28 RX ADMIN — PANTOPRAZOLE SODIUM 40 MILLIGRAM(S): 20 TABLET, DELAYED RELEASE ORAL at 05:50

## 2020-08-28 RX ADMIN — Medication 81 MILLIGRAM(S): at 11:17

## 2020-08-28 RX ADMIN — CARVEDILOL PHOSPHATE 12.5 MILLIGRAM(S): 80 CAPSULE, EXTENDED RELEASE ORAL at 17:19

## 2020-08-28 RX ADMIN — Medication 0.5 MILLIGRAM(S): at 20:40

## 2020-08-28 RX ADMIN — ISOSORBIDE DINITRATE 10 MILLIGRAM(S): 5 TABLET ORAL at 20:40

## 2020-08-28 RX ADMIN — APIXABAN 5 MILLIGRAM(S): 2.5 TABLET, FILM COATED ORAL at 09:02

## 2020-08-28 RX ADMIN — ISOSORBIDE DINITRATE 10 MILLIGRAM(S): 5 TABLET ORAL at 13:07

## 2020-08-28 RX ADMIN — ISOSORBIDE DINITRATE 10 MILLIGRAM(S): 5 TABLET ORAL at 05:49

## 2020-08-28 RX ADMIN — PANTOPRAZOLE SODIUM 40 MILLIGRAM(S): 20 TABLET, DELAYED RELEASE ORAL at 17:19

## 2020-08-28 RX ADMIN — APIXABAN 5 MILLIGRAM(S): 2.5 TABLET, FILM COATED ORAL at 17:19

## 2020-08-28 RX ADMIN — Medication 20 MILLIGRAM(S): at 05:50

## 2020-08-28 RX ADMIN — Medication 40 MILLIGRAM(S): at 05:49

## 2020-08-28 NOTE — PROGRESS NOTE ADULT - PROBLEM SELECTOR PLAN 1
Likely 2/2 Cellulitis given improvement in symptoms with abx  - RLE Duplex negative for DVT.   - XR RLE negative for fx. Soft tissue swelling/stranding around right ankle.   - S/p IV vanc/zosyn in ED   - C/w Vanc 1g/24hr per pharmacy.   - Vanc trough 1 hour prior to 3rd dose of Vanc (8/26). Goal 10-15. Likely multifactorial 2/2 cellulitis and CHF - Improving   -RLE Duplex negative for DVT (8/24)  -XR RLE (8/24) negative for fx. Soft tissue swelling/stranding around right ankle.   -S/p IV vanc/zosyn in ED. Vanc 1g/24 started (8/24). Increased to BID on 8/27 due to low vanc trough.  -Recheck vanc trough prior to AM dose (8/28) and adjust accordingly. Likely multifactorial 2/2 cellulitis and CHF - Improving   -RLE Duplex negative for DVT (8/24)  -XR RLE (8/24) negative for fx. Soft tissue swelling/stranding around right ankle.   -S/p IV vanc/zosyn in ED. Vanc 1g/24 started (8/24). Increased to BID on 8/27 due to low vanc trough.  -Vanc trough was 24.6 this AM. Held vanc and will reassess vanc trough before PM dose.

## 2020-08-28 NOTE — PROGRESS NOTE ADULT - PROBLEM SELECTOR PLAN 2
Febrile (100.6) on admission + Leukocytosis (WBC 12.27) + Elevated lactate 2.4 + Hypotensive (SBP 100s). RLE cellulitis vs LUE wound as possible sources of infection.  Resolved  -s/p 2L IVF in ED. BP's improved with normalized lactate (0.7)  -abx (as per above)   -Bcx (8/24) NGTD Febrile (100.6) on admission + Leukocytosis (WBC 12.27) + Elevated lactate 2.4 + Hypotensive (SBP 100s). RLE cellulitis vs LUE wound as possible sources of infection - Resolved     -s/p 2L IVF in ED. BP's improved with normalized lactate (0.7)  -abx (as per above)   -Bcx (8/24) negative. Repeat (8/27) NGTD. Heart failure with reduced ejection fraction. TTE (1/2020): LVEF 39%, mod MR/AR  -isosorbide dinitrate, Entresto, lasix, and coreg held on admission 2/2 hypotension and NARCISO  -Home coreg restarted 8/26.   -8/27: Patient had an RRT after desatting to 77% overnight. Found to be significant pulmonary edema on CXR. EKG showed Afib with RVR and had runs of VT on tele. Lactate 8. Patient received IV lasix 40mg, amio, and transferred to CCU and placed on BiPAP.    -CCU course (8/27): transitioned from BiPAP to NC. Bumex 2. Parks for strict I/Os. Restarted home isordil and po lasix 40mg.   -TTE (8/27): Worsening diastolic dysfunction. EF 35-45%. mild/mod MR. mild AS. mild/mod AR. mod-severe global LV dysfunction.   -Patient transitioned off NC. D/mery parks today  -Pending repeat CXR this AM   -Trending lactate. 1.1 yesterday in CCU. 2.6 today.   -F/u cards recs

## 2020-08-28 NOTE — DIETITIAN INITIAL EVALUATION ADULT. - PROBLEM SELECTOR PLAN 2
Febrile (100.6) on admission + Leukocytosis (WBC 12.27) + Elevated lactate 2.4 + Hypotensive (SBP 100s). RLE cellulitis vs LUE wound as possible sources of infection.   -s/p 2L IVF in ED. Improving BP ('s) and improved lactate (0.7)  -abx (as per above)   -F/u bcx

## 2020-08-28 NOTE — DIETITIAN INITIAL EVALUATION ADULT. - PERTINENT MEDS FT
MEDICATIONS  (STANDING):  apixaban 5 milliGRAM(s) Oral two times a day  aspirin  chewable 81 milliGRAM(s) Oral daily  atorvastatin 80 milliGRAM(s) Oral at bedtime  carvedilol 12.5 milliGRAM(s) Oral every 12 hours  chlorhexidine 2% Cloths 1 Application(s) Topical <User Schedule>  furosemide    Tablet 40 milliGRAM(s) Oral every 24 hours  isosorbide   dinitrate Tablet (ISORDIL) 10 milliGRAM(s) Oral three times a day  LORazepam     Tablet 0.5 milliGRAM(s) Oral at bedtime  pantoprazole    Tablet 40 milliGRAM(s) Oral two times a day  predniSONE   Tablet 20 milliGRAM(s) Oral daily  sertraline 50 milliGRAM(s) Oral daily  vancomycin  IVPB 1000 milliGRAM(s) IV Intermittent every 12 hours    MEDICATIONS  (PRN):

## 2020-08-28 NOTE — DIETITIAN INITIAL EVALUATION ADULT. - ADD RECOMMEND
1. Continue DASH/TLC therapeutic diet as indicated 2. Provide/review nutrition education as indicated 3. Will continue to monitor nutrient intake, wt, labs, f/u per protocol

## 2020-08-28 NOTE — DIETITIAN INITIAL EVALUATION ADULT. - OTHER INFO
Pt is "64 yo m with hx of CAD s/p CABG x1 (LIMA to LAD) HFrEF, afib on Elliquis, HTN, HLD, CKD, CHER, and pemphigus vulgaris adm to floors for RLE cellulitis, s/p transfer to CICU for ADHF, now back on medicine floor."    Pt reports good appetite and po intake at baseline, pt son-in-law cooks for him and they also consume take out food regularly.  Therapeutic Diet PTA: none, pt reports not eating a lot at baseline. States he eats 2 meals a day only: breakfast is toast with cinnamon and dinner is a protein/starch/vegetable combination. No snacks reported. Pt reports he has been told to follow a fluid restriction in the past, however he is unable to recall quantity for fluid restriction. Pt endorses drinking ~5 cups of fluid daily at home.  Nutrition Supplements PTA: none  NKFA reported.    Pt UBW: ~220 lbs x 8 months ago. Pt states following hospitalization in 1/2020 he was down to 214 lbs and he gained 26 lbs within 2 weeks following discharge then. Pt states he informed his cardiologist at the time. Pt endorses taking furosemide at home. States he owns a scale but was not weighing himself at home.  Weight history per chart: 243.6 lbs (12/19/17) -> 218.2 lbs (1/6/20), 212.9 lbs (1/8/20), 232.5 lbs (1/9/20) -> 249 lbs (8/24). Pt current dosing wt 266.7 lbs. Fluid shifts noted.  Per chart weights, at least 34 lb wt gain since 1/2020 indicated.    Pt reports good appetite and po intake, consuming % of meals here.  Pt denies chewing/swallowing difficulties.  Pt has no c/o nausea, vomiting, diarrhea, or constipation.     Nutrition education provided: Heart failure education provided to the patient in detail. Discussed heart failure nutrition therapy, sodium and fluid intake, importance of diet adherence, daily weights monitoring with the patient. Reinforced importance of weight gain parameters and importance of contacting MD’s about weight changes. Patient declines written materials. Patient verbalized understanding demonstrated by teach back method. RD contact information left with patient for any future questioning.

## 2020-08-28 NOTE — PROGRESS NOTE ADULT - ATTENDING COMMENTS
Patient seen and examined, agree with above assessment and plan as transcribed above.    - will review last cath with interventional cardiology.  Appears his entire heart is perfused by a LIMA.  May need to be reassessed given clinical presentation    .  John Dodge MD, [facility]  BEEPER [phone]

## 2020-08-28 NOTE — PROGRESS NOTE ADULT - SUBJECTIVE AND OBJECTIVE BOX
Patient is a 65y old  Male who presents with a chief complaint of RLE swelling (27 Aug 2020 14:52)    SUBJECTIVE / OVERNIGHT EVENTS: Patient seen and examined. No acute overnight events, no subjective complaints.    OBJECTIVE:  Vital Signs Last 24 Hrs  T(C): 36.8 (28 Aug 2020 11:25), Max: 37.7 (27 Aug 2020 12:05)  T(F): 98.3 (28 Aug 2020 11:25), Max: 99.9 (27 Aug 2020 12:05)  HR: 77 (28 Aug 2020 11:25) (60 - 77)  BP: 114/76 (28 Aug 2020 11:25) (97/67 - 137/88)  BP(mean): 86 (27 Aug 2020 23:05) (78 - 94)  RR: 20 (28 Aug 2020 11:25) (11 - 26)  SpO2: 95% (28 Aug 2020 11:25) (94% - 100%)    I&O's Summary    27 Aug 2020 07:01  -  28 Aug 2020 07:00  --------------------------------------------------------  IN: 996.6 mL / OUT: 2160 mL / NET: -1163.4 mL    28 Aug 2020 07:01  -  28 Aug 2020 11:38  --------------------------------------------------------  IN: 240 mL / OUT: 0 mL / NET: 240 mL      Physical Exam:   	  General: No acute distress, well-appearing  	  Eyes: EOMI   	  ENT: MMM, no oropharyngeal lesions or erythema appreciated   	  Pulm: No increased WOB. No wheezing. Crackles on bilateral lung bases.   	  CV: RRR. S1&S2+. No M/R/G appreciated.   	  Abdomen: +BS. Soft, NT. No organomegaly. Distended.   	  MSK: Limited ROM RLE.   	  Extremities: RLE 1+ pitting edema with swelling, erythema, and ttp.   	  Neuro: A&Ox3, no focal deficits       Skin: Warm and dry. L hand wound with dressing in place.     Labs:  CAPILLARY BLOOD GLUCOSE      POCT Blood Glucose.: 98 mg/dL (28 Aug 2020 07:29)                          8.4    9.50  )-----------( 344      ( 28 Aug 2020 11:11 )             29.6     08-27    134<L>  |  99  |  19  ----------------------------<  188<H>  4.6   |  13<L>  |  2.08<H>    Ca    9.7      27 Aug 2020 04:02  Phos  4.2     08-27  Mg     2.6     08-27    TPro  7.2  /  Alb  3.5  /  TBili  0.6  /  DBili  x   /  AST  30  /  ALT  17  /  AlkPhos  124<H>  08-27    PT/INR - ( 27 Aug 2020 04:02 )   PT: 20.1 sec;   INR: 1.73 ratio         PTT - ( 27 Aug 2020 04:02 )  PTT:31.6 sec        Culture - Blood (collected 27 Aug 2020 07:01)  Source: .Blood Blood-Peripheral  Preliminary Report (28 Aug 2020 08:01):    No growth to date.    Culture - Blood (collected 27 Aug 2020 07:01)  Source: .Blood Blood-Peripheral  Preliminary Report (28 Aug 2020 08:01):    No growth to date.        Imaging Personally Reviewed:    Consultant(s) Notes Reviewed:   Care Discussed with Consultants/Other Providers:    MEDICATIONS  (STANDING):  apixaban 5 milliGRAM(s) Oral two times a day  aspirin  chewable 81 milliGRAM(s) Oral daily  atorvastatin 80 milliGRAM(s) Oral at bedtime  carvedilol 12.5 milliGRAM(s) Oral every 12 hours  chlorhexidine 2% Cloths 1 Application(s) Topical <User Schedule>  furosemide    Tablet 40 milliGRAM(s) Oral every 24 hours  isosorbide   dinitrate Tablet (ISORDIL) 10 milliGRAM(s) Oral three times a day  LORazepam     Tablet 0.5 milliGRAM(s) Oral at bedtime  pantoprazole    Tablet 40 milliGRAM(s) Oral two times a day  predniSONE   Tablet 20 milliGRAM(s) Oral daily  sertraline 50 milliGRAM(s) Oral daily  vancomycin  IVPB 1000 milliGRAM(s) IV Intermittent every 12 hours    MEDICATIONS  (PRN): Patient is a 65y old  Male who presents with a chief complaint of RLE swelling (27 Aug 2020 14:52)    SUBJECTIVE / OVERNIGHT EVENTS: Patient transferred back to medicine service from CCU. Patient examined at bedside and states he's feeling better but continuing to have SOB. Additionally, his RLE pain/skin changes have improved but the swelling has not changed much. Otherwise, denies CP, palpitations, weakness.     OBJECTIVE:  Vital Signs Last 24 Hrs  T(C): 36.8 (28 Aug 2020 11:25), Max: 37.7 (27 Aug 2020 12:05)  T(F): 98.3 (28 Aug 2020 11:25), Max: 99.9 (27 Aug 2020 12:05)  HR: 77 (28 Aug 2020 11:25) (60 - 77)  BP: 114/76 (28 Aug 2020 11:25) (97/67 - 137/88)  BP(mean): 86 (27 Aug 2020 23:05) (78 - 94)  RR: 20 (28 Aug 2020 11:25) (11 - 26)  SpO2: 95% (28 Aug 2020 11:25) (94% - 100%)    I&O's Summary    27 Aug 2020 07:01  -  28 Aug 2020 07:00  --------------------------------------------------------  IN: 996.6 mL / OUT: 2160 mL / NET: -1163.4 mL    28 Aug 2020 07:01  -  28 Aug 2020 11:38  --------------------------------------------------------  IN: 240 mL / OUT: 0 mL / NET: 240 mL      Physical Exam:   	  General: No acute distress, well-appearing  	  Eyes: EOMI   	  ENT: MMM, no oropharyngeal lesions or erythema appreciated   	  Pulm: No increased WOB. No wheezing.   	  CV: RRR. S1&S2+. No M/R/G appreciated.   	  Abdomen: +BS. Soft, NT. No organomegaly. Distended.   	  MSK: Limited ROM RLE.   	  Extremities: RLE 1+ pitting edema.   	  Neuro: A&Ox3, no focal deficits       Skin: Warm and dry. L hand wound with dressing in place.     Labs:  CAPILLARY BLOOD GLUCOSE      POCT Blood Glucose.: 98 mg/dL (28 Aug 2020 07:29)                          8.4    9.50  )-----------( 344      ( 28 Aug 2020 11:11 )             29.6     08-27    134<L>  |  99  |  19  ----------------------------<  188<H>  4.6   |  13<L>  |  2.08<H>    Ca    9.7      27 Aug 2020 04:02  Phos  4.2     08-27  Mg     2.6     08-27    TPro  7.2  /  Alb  3.5  /  TBili  0.6  /  DBili  x   /  AST  30  /  ALT  17  /  AlkPhos  124<H>  08-27    PT/INR - ( 27 Aug 2020 04:02 )   PT: 20.1 sec;   INR: 1.73 ratio         PTT - ( 27 Aug 2020 04:02 )  PTT:31.6 sec        Culture - Blood (collected 27 Aug 2020 07:01)  Source: .Blood Blood-Peripheral  Preliminary Report (28 Aug 2020 08:01):    No growth to date.    Culture - Blood (collected 27 Aug 2020 07:01)  Source: .Blood Blood-Peripheral  Preliminary Report (28 Aug 2020 08:01):    No growth to date.        Imaging Personally Reviewed:    Consultant(s) Notes Reviewed:   Care Discussed with Consultants/Other Providers:    MEDICATIONS  (STANDING):  apixaban 5 milliGRAM(s) Oral two times a day  aspirin  chewable 81 milliGRAM(s) Oral daily  atorvastatin 80 milliGRAM(s) Oral at bedtime  carvedilol 12.5 milliGRAM(s) Oral every 12 hours  chlorhexidine 2% Cloths 1 Application(s) Topical <User Schedule>  furosemide    Tablet 40 milliGRAM(s) Oral every 24 hours  isosorbide   dinitrate Tablet (ISORDIL) 10 milliGRAM(s) Oral three times a day  LORazepam     Tablet 0.5 milliGRAM(s) Oral at bedtime  pantoprazole    Tablet 40 milliGRAM(s) Oral two times a day  predniSONE   Tablet 20 milliGRAM(s) Oral daily  sertraline 50 milliGRAM(s) Oral daily  vancomycin  IVPB 1000 milliGRAM(s) IV Intermittent every 12 hours    MEDICATIONS  (PRN): Patient is a 65y old  Male who presents with a chief complaint of RLE swelling (27 Aug 2020 14:52)    SUBJECTIVE / OVERNIGHT EVENTS: Patient transferred back to medicine service from CCU. Patient examined at bedside and states he's feeling better than before but continuing to have SOB. Additionally, his RLE pain/skin changes have improved but the swelling has not changed much. Otherwise, denies CP, palpitations, weakness.     OBJECTIVE:  Vital Signs Last 24 Hrs  T(C): 36.8 (28 Aug 2020 11:25), Max: 37.7 (27 Aug 2020 12:05)  T(F): 98.3 (28 Aug 2020 11:25), Max: 99.9 (27 Aug 2020 12:05)  HR: 77 (28 Aug 2020 11:25) (60 - 77)  BP: 114/76 (28 Aug 2020 11:25) (97/67 - 137/88)  BP(mean): 86 (27 Aug 2020 23:05) (78 - 94)  RR: 20 (28 Aug 2020 11:25) (11 - 26)  SpO2: 95% (28 Aug 2020 11:25) (94% - 100%)    I&O's Summary    27 Aug 2020 07:01  -  28 Aug 2020 07:00  --------------------------------------------------------  IN: 996.6 mL / OUT: 2160 mL / NET: -1163.4 mL    28 Aug 2020 07:01  -  28 Aug 2020 11:38  --------------------------------------------------------  IN: 240 mL / OUT: 0 mL / NET: 240 mL      Physical Exam:   	  General: No acute distress, well-appearing  	  Eyes: EOMI   	  ENT: MMM, no oropharyngeal lesions or erythema appreciated   	  Pulm: No increased WOB. No wheezing.   	  CV: RRR. S1&S2+. No M/R/G appreciated.   	  Abdomen: +BS. Soft, NT. No organomegaly. Distended.   	  MSK: Limited ROM RLE.   	  Extremities: RLE 1+ pitting edema.   	  Neuro: A&Ox3, no focal deficits       Skin: Warm and dry. L hand wound with dressing in place.     Labs:  CAPILLARY BLOOD GLUCOSE      POCT Blood Glucose.: 98 mg/dL (28 Aug 2020 07:29)                          8.4    9.50  )-----------( 344      ( 28 Aug 2020 11:11 )             29.6     08-27    134<L>  |  99  |  19  ----------------------------<  188<H>  4.6   |  13<L>  |  2.08<H>    Ca    9.7      27 Aug 2020 04:02  Phos  4.2     08-27  Mg     2.6     08-27    TPro  7.2  /  Alb  3.5  /  TBili  0.6  /  DBili  x   /  AST  30  /  ALT  17  /  AlkPhos  124<H>  08-27    PT/INR - ( 27 Aug 2020 04:02 )   PT: 20.1 sec;   INR: 1.73 ratio         PTT - ( 27 Aug 2020 04:02 )  PTT:31.6 sec        Culture - Blood (collected 27 Aug 2020 07:01)  Source: .Blood Blood-Peripheral  Preliminary Report (28 Aug 2020 08:01):    No growth to date.    Culture - Blood (collected 27 Aug 2020 07:01)  Source: .Blood Blood-Peripheral  Preliminary Report (28 Aug 2020 08:01):    No growth to date.        Imaging Personally Reviewed:    Consultant(s) Notes Reviewed:   Care Discussed with Consultants/Other Providers:    MEDICATIONS  (STANDING):  apixaban 5 milliGRAM(s) Oral two times a day  aspirin  chewable 81 milliGRAM(s) Oral daily  atorvastatin 80 milliGRAM(s) Oral at bedtime  carvedilol 12.5 milliGRAM(s) Oral every 12 hours  chlorhexidine 2% Cloths 1 Application(s) Topical <User Schedule>  furosemide    Tablet 40 milliGRAM(s) Oral every 24 hours  isosorbide   dinitrate Tablet (ISORDIL) 10 milliGRAM(s) Oral three times a day  LORazepam     Tablet 0.5 milliGRAM(s) Oral at bedtime  pantoprazole    Tablet 40 milliGRAM(s) Oral two times a day  predniSONE   Tablet 20 milliGRAM(s) Oral daily  sertraline 50 milliGRAM(s) Oral daily  vancomycin  IVPB 1000 milliGRAM(s) IV Intermittent every 12 hours    MEDICATIONS  (PRN):

## 2020-08-28 NOTE — PROGRESS NOTE ADULT - PROBLEM SELECTOR PLAN 3
- abx (per above)   - Wound care saw patient (8/25). Redressed wound. Recommended plastics consult.   - Will consult Plastics today -abx (per above)   -Wound care saw patient (8/25). Redressed wound. Recommended plastics consult.   -Plastics consulted 8/26. Recommended local wound care and outpatient follow up (Dr. Marquez [phone]) NARCISO on CKD III. Likely pre-renal in etiology. Cr elevated to 2.4 on admission. Baseline Cr 1.4-1.7. Improved with IVF and lasix was help. However, after CHF exacerbation requiring aggressive diuresis (8/27), patient's Cr is elevated to 2.15 today.    -Continue to monitor BMP  -Renally dose medications. NARCISO on CKD III. Likely pre-renal in etiology. Cr elevated to 2.4 on admission. Baseline Cr 1.4-1.7. Improved with IVF and lasix was held. However, after CHF exacerbation requiring aggressive diuresis (8/27), patient's Cr is elevated to 2.15 today.    -Continue to monitor BMP  -Renally dose medications.

## 2020-08-28 NOTE — PROGRESS NOTE ADULT - PROBLEM SELECTOR PLAN 6
-c/w Eliquis 5mg BID  -Restarted home coreg 12.5mg BID today -Holding home antihypertensives as above given hypotension.   -Restarted home coreg (8/26) and isordil (8/27) -Restarted home coreg (8/26) and isordil (8/27). Entresto continues to be held

## 2020-08-28 NOTE — PROGRESS NOTE ADULT - PROBLEM SELECTOR PLAN 7
s/p CABG   -C/w home aspirin 81mg qday + atorvastatin 80mg qday -abx (per above)   -Wound care saw patient (8/25). Redressed wound. Recommended plastics consult.   -Plastics consulted 8/26. Recommended local wound care and outpatient follow up (Dr. Marquez [phone])

## 2020-08-28 NOTE — PROGRESS NOTE ADULT - PROBLEM SELECTOR PLAN 5
Heart failure with reduced ejection fraction. TTE (1/2020): LVEF 39%, mod MR/AR  -Not overtly volume overloaded on exam. CXR with no evidence of edema.   -Will hold isosorbide dinitrate, Entresto 2/2 hypotension on admission. Will monitor BP and restart as BP can tolerate  -Will hold lasix for now.  -Will restart home coreg today s/p CABG   -C/w home aspirin 81mg qday + atorvastatin 80mg qday + coreg 12.5mg BID

## 2020-08-28 NOTE — PROGRESS NOTE ADULT - ATTENDING COMMENTS
Transferred to [facility] overnight. off supplemental O2 however states that he becomes SOB with minimal activity which is not his baseline. Lungs clear on exam, erythema improved in RLE although still edematous.   Will c/w Vancomycin for cellulitis. Jairo treat for 7 day course. Vanc trough elevated will decrease dose to 1gm daily.  Cr increased again slightly. Will c/w lasix 40mg PO daily for now. Continue to hold Entresto. Monitor renal function closely  Cardiology following, will f/u further rec's

## 2020-08-28 NOTE — DIETITIAN INITIAL EVALUATION ADULT. - PROBLEM SELECTOR PLAN 1
DVT vs Cellulitis vs CHF exacerbation  -Limited RLE Duplex negative for DVT. Pending formal scan.   -XR RLE negative for fx. Soft tissue swelling/stranding around right ankle.   -S/p IV vanc/zosyn in ED   -C/w Vanc 1g/24hr per pharmacy   -Vanc trough 1 hour prior to 3rd dose of Vanc

## 2020-08-28 NOTE — DIETITIAN INITIAL EVALUATION ADULT. - PHYSICAL APPEARANCE
well nourished/other (specify)/obese Height: 68 inches, Weight: 266.7 pounds (dosing wt)  BMI: 40.6 kg/m2 IBW: 154 pounds (+/-10%), %IBW: 173%  Pertinent Info: 1+ edema to L hand, 3+ edema to R leg, R foot, R ankle noted, no pressure injuries noted at this time in nursing flow sheet.

## 2020-08-28 NOTE — DIETITIAN INITIAL EVALUATION ADULT. - PROBLEM SELECTOR PLAN 4
-TTE (1/2020): LVEF 39%, mod MR/AR. Pt with chronic HFrEF  -Not overtly volume overloaded on exam. CXR with no evidence of edema.   -Will hold isosorbide dinitrate, Entresto, coreg 2/2 hypotension on admission.   -Will hold lasix for now. Consider restarting tomorrow.

## 2020-08-28 NOTE — PROGRESS NOTE ADULT - PROBLEM SELECTOR PLAN 4
NARCISO on CKD III. Resolving. Likely pre-renal in etiology. Baseline Cr 1.4-1.7. Cr near baseline.   - Continue to monitor BMP  - Renally dose medications.   - Continue to hold lasix for now. NARCISO on CKD III. Likely pre-renal in etiology. Baseline Cr 1.4-1.7.   -Continue to monitor BMP  -Renally dose medications.   -Initially held lasix on admission. However, because of patient's CHF exacerbation. Patient received IV lasix and bumex in the -c/w Eliquis 5mg BID  -Restarted home coreg 12.5mg BID (8/26)  -EKG with Afib with RVR during RRT on 8/27. Now NSR.

## 2020-08-28 NOTE — PROGRESS NOTE ADULT - SUBJECTIVE AND OBJECTIVE BOX
S: Denies chest pain or SOB. Review of systems otherwise (-)      MEDICATIONS  (STANDING):  apixaban 5 milliGRAM(s) Oral two times a day  aspirin  chewable 81 milliGRAM(s) Oral daily  atorvastatin 80 milliGRAM(s) Oral at bedtime  carvedilol 12.5 milliGRAM(s) Oral every 12 hours  furosemide    Tablet 40 milliGRAM(s) Oral every 24 hours  isosorbide   dinitrate Tablet (ISORDIL) 10 milliGRAM(s) Oral three times a day  LORazepam     Tablet 0.5 milliGRAM(s) Oral at bedtime  pantoprazole    Tablet 40 milliGRAM(s) Oral two times a day  predniSONE   Tablet 20 milliGRAM(s) Oral daily  sertraline 50 milliGRAM(s) Oral daily  vancomycin  IVPB 1000 milliGRAM(s) IV Intermittent every 12 hours    MEDICATIONS  (PRN):      LABS:                            8.4    9.50  )-----------( 344      ( 28 Aug 2020 11:11 )             29.6     Hemoglobin: 8.4 g/dL (08-28 @ 11:11)  Hemoglobin: 10.0 g/dL (08-27 @ 04:02)  Hemoglobin: 9.4 g/dL (08-26 @ 06:26)  Hemoglobin: 9.2 g/dL (08-24 @ 13:45)    08-28    136  |  99  |  26<H>  ----------------------------<  148<H>  3.9   |  23  |  2.15<H>    Ca    9.1      28 Aug 2020 11:11  Phos  3.0     08-28  Mg     2.2     08-28    TPro  6.7  /  Alb  3.3  /  TBili  0.6  /  DBili  x   /  AST  26  /  ALT  17  /  AlkPhos  115  08-28    Creatinine Trend: 2.15<--, 2.08<--, 1.86<--, 1.96<--, 2.34<--     CARDIAC MARKERS ( 26 Aug 2020 06:25 )  x     / x     / 65 U/L / x     / 2.1 ng/mL          08-27-20 @ 07:01  -  08-28-20 @ 07:00  --------------------------------------------------------  IN: 996.6 mL / OUT: 2160 mL / NET: -1163.4 mL    08-28-20 @ 07:01  -  08-28-20 @ 12:56  --------------------------------------------------------  IN: 480 mL / OUT: 1200 mL / NET: -720 mL        PHYSICAL EXAM  Vital Signs Last 24 Hrs  T(C): 36.8 (28 Aug 2020 11:25), Max: 37.6 (27 Aug 2020 13:05)  T(F): 98.3 (28 Aug 2020 11:25), Max: 99.7 (27 Aug 2020 13:05)  HR: 77 (28 Aug 2020 11:25) (60 - 77)  BP: 114/76 (28 Aug 2020 11:25) (97/67 - 137/88)  BP(mean): 86 (27 Aug 2020 23:05) (78 - 94)  RR: 20 (28 Aug 2020 11:25) (11 - 26)  SpO2: 95% (28 Aug 2020 11:25) (94% - 100%)        Gen: NAD  HEENT:  (-)icterus (-)pallor  CV: N S1 S2 1/6 KATHERYN (+)2 Pulses B/l  Resp: Slight crackles at bases, normal effort  GI: (+) BS Soft, NT, ND  Lymph:  (+) R>LE Edema, (-)obvious lymphadenopathy  Skin: Warm to touch, Normal turgor  Psych: Appropriate mood and affect      TELEMETRY: SR 60-80    Echo: < from: TTE with Doppler (w/Cont) (08.27.20 @ 07:54) >  Conclusions:  1. Mitral valve not well visualized. Mitral annular  calcification. Tethered mitral valve leaflets with normal  opening. Mild moderate mitral regurgitation.  2. Calcified trileaflet aortic valve with decreased  opening. Peak transaortic valve gradient equals 12 mm Hg,  mean transaortic valve gradient equals 8 mm Hg, at least  mild AS. Limited gradient/Doppler evaluation. Mild-moderate  aortic regurgitation.  3. Endocardial visualization enhanced with intravenous  injection of Ultrasonic Enhancing Agent (Definity).  Moderate to severe  global left ventricular systolic  dysfunction. There dyssynchronous left ventricular  contraction.  4. Moderate diastolic dysfunction (Stage II). Increased  E/e'  is consistent with elevated left ventricular filling  pressure.  *** Compared with echocardiogram report of 12/13/2017,  Limited comparison.  Disatolic dysfunction is worse.    < end of copied text >      ASSESSMENT/PLAN: Patient is a 65 year old male with PMH of CAD s/p CABG with most recent LHC 1/2020 with no intervention, chronic systolic CHF (LVEF 39% TTE 1/2020), Afib (on Eliquis), HTN, HLD, CKD, CHER (on CPAP), and pemphigus vulgaris who presented with RLE swelling admitted with RLE cellulitis and NARCISO on CKD. Cardiology consulted for management of CAD/CHF.    - Transferred back to tele floor  - Tele appears stable in NSR, no further VT since RRT episode on 8/27 (no strips noted in chart to review)  - Continue PO lasix with strict I/Os, trend creatinine  - Continue Abx for cellulitis - BCx negative so far  - Recommend medical management of known CAD with systolic cardiomyopathy - continue ASA/Statin  - Continue AC with Eliquis for CVA prevention  - TTE noted above  - Will consider repeat ischemic eval given VT/flash pulm edema - however last cath noted in Jan 2020 with only patent LIMA to LAD and otherwise occluded native vessels  - Further recs pending review with attending  - Patient to f/u with Dr. Campbell's cardiology group after d/c    Cristi Jarvis PA-C  Pager: [phone]

## 2020-08-28 NOTE — CHART NOTE - NSCHARTNOTEFT_GEN_A_CORE
Upon Nutritional Assessment by the Registered Dietitian your patient was determined to meet criteria / has evidence of the following diagnosis/diagnoses:          [ ]  Mild Protein Calorie Malnutrition        [ ]  Moderate Protein Calorie Malnutrition        [ ] Severe Protein Calorie Malnutrition        [ ] Unspecified Protein Calorie Malnutrition        [ ] Underweight / BMI <19        [x] Morbid Obesity / BMI > 40      Findings as based on:  [x] Comprehensive nutrition assessment   [ ] Nutrition Focused Physical Exam  [x] Other: BMI 40.6      Nutrition Plan/Recommendations:    1. Continue DASH/TLC therapeutic diet as indicated   2. Provide/review nutrition education as indicated   3. Will continue to monitor nutrient intake, wt, labs, f/u per protocol    RD remains available. Elsa Leon RD, CDN Pager: [phone]       PROVIDER Section:     By signing this assessment you are acknowledging and agree with the diagnosis/diagnoses assigned by the Registered Dietitian    Comments:

## 2020-08-28 NOTE — PROGRESS NOTE ADULT - ASSESSMENT
65yo male with PMH significant for CAD (s/p CABG; most recent LHC showed stable disease with no intervention), CHF (LVEF 39% TTE 1/2020), Afib (on Eliquis), HTN, HLD, CKD III, CHER (uses CPAP), and pemphigus vulgaris presenting to ED with RLE swelling. Found to be septic in ED (hypotension, leukocytosis, elevated lactate, febrile). S/p Vanc/zosyn, 2L IVF in ED. Restarted home ativan 0.5mg at bedtime. C/w home zoloft. 65yo male with PMH significant for CAD (s/p CABG; most recent LHC showed stable disease with no intervention), CHF (LVEF 39% TTE 1/2020), Afib (on Eliquis), HTN, HLD, CKD III, CHER (uses CPAP), and pemphigus vulgaris presenting to ED with RLE swelling. Found to be septic in ED (hypotension, leukocytosis, elevated lactate, febrile). S/p Vanc/zosyn, 2L IVF in ED. Restarted home ativan 0.5mg at bedtime. C/w home zoloft. C/w CPAP at night for CHER.

## 2020-08-28 NOTE — PROGRESS NOTE ADULT - PROBLEM SELECTOR PLAN 8
-Holding home antihypertensives as above given hypotension. restarting Coreg today Febrile (100.6) on admission + Leukocytosis (WBC 12.27) + Elevated lactate 2.4 + Hypotensive (SBP 100s) on admission. RLE cellulitis vs LUE wound as possible sources of infection  -s/p 2L IVF in ED. BP's improved with normalized lactate (0.7)  -abx (as per above)   -Bcx (8/24) negative. Repeat (8/27) NGTD.  -Resolved

## 2020-08-29 LAB
-  COAGULASE NEGATIVE STAPHYLOCOCCUS: SIGNIFICANT CHANGE UP
ANION GAP SERPL CALC-SCNC: 14 MMOL/L — SIGNIFICANT CHANGE UP (ref 5–17)
ANION GAP SERPL CALC-SCNC: 15 MMOL/L — SIGNIFICANT CHANGE UP (ref 5–17)
BUN SERPL-MCNC: 29 MG/DL — HIGH (ref 7–23)
BUN SERPL-MCNC: 31 MG/DL — HIGH (ref 7–23)
CALCIUM SERPL-MCNC: 8.9 MG/DL — SIGNIFICANT CHANGE UP (ref 8.4–10.5)
CALCIUM SERPL-MCNC: 9.2 MG/DL — SIGNIFICANT CHANGE UP (ref 8.4–10.5)
CHLORIDE SERPL-SCNC: 100 MMOL/L — SIGNIFICANT CHANGE UP (ref 96–108)
CHLORIDE SERPL-SCNC: 103 MMOL/L — SIGNIFICANT CHANGE UP (ref 96–108)
CO2 SERPL-SCNC: 23 MMOL/L — SIGNIFICANT CHANGE UP (ref 22–31)
CO2 SERPL-SCNC: 25 MMOL/L — SIGNIFICANT CHANGE UP (ref 22–31)
CREAT SERPL-MCNC: 2.12 MG/DL — HIGH (ref 0.5–1.3)
CREAT SERPL-MCNC: 2.31 MG/DL — HIGH (ref 0.5–1.3)
CULTURE RESULTS: SIGNIFICANT CHANGE UP
CULTURE RESULTS: SIGNIFICANT CHANGE UP
GLUCOSE SERPL-MCNC: 79 MG/DL — SIGNIFICANT CHANGE UP (ref 70–99)
GLUCOSE SERPL-MCNC: 93 MG/DL — SIGNIFICANT CHANGE UP (ref 70–99)
GRAM STN FLD: SIGNIFICANT CHANGE UP
GRAM STN FLD: SIGNIFICANT CHANGE UP
HCT VFR BLD CALC: 28.2 % — LOW (ref 39–50)
HGB BLD-MCNC: 8 G/DL — LOW (ref 13–17)
MAGNESIUM SERPL-MCNC: 2.2 MG/DL — SIGNIFICANT CHANGE UP (ref 1.6–2.6)
MCHC RBC-ENTMCNC: 23.2 PG — LOW (ref 27–34)
MCHC RBC-ENTMCNC: 28.4 GM/DL — LOW (ref 32–36)
MCV RBC AUTO: 81.7 FL — SIGNIFICANT CHANGE UP (ref 80–100)
METHOD TYPE: SIGNIFICANT CHANGE UP
NRBC # BLD: 0 /100 WBCS — SIGNIFICANT CHANGE UP (ref 0–0)
PHOSPHATE SERPL-MCNC: 4.2 MG/DL — SIGNIFICANT CHANGE UP (ref 2.5–4.5)
PLATELET # BLD AUTO: 395 K/UL — SIGNIFICANT CHANGE UP (ref 150–400)
POTASSIUM SERPL-MCNC: 3.4 MMOL/L — LOW (ref 3.5–5.3)
POTASSIUM SERPL-MCNC: 3.7 MMOL/L — SIGNIFICANT CHANGE UP (ref 3.5–5.3)
POTASSIUM SERPL-SCNC: 3.4 MMOL/L — LOW (ref 3.5–5.3)
POTASSIUM SERPL-SCNC: 3.7 MMOL/L — SIGNIFICANT CHANGE UP (ref 3.5–5.3)
RBC # BLD: 3.45 M/UL — LOW (ref 4.2–5.8)
RBC # FLD: 18.9 % — HIGH (ref 10.3–14.5)
SODIUM SERPL-SCNC: 137 MMOL/L — SIGNIFICANT CHANGE UP (ref 135–145)
SODIUM SERPL-SCNC: 143 MMOL/L — SIGNIFICANT CHANGE UP (ref 135–145)
SPECIMEN SOURCE: SIGNIFICANT CHANGE UP
SPECIMEN SOURCE: SIGNIFICANT CHANGE UP
VANCOMYCIN TROUGH SERPL-MCNC: 19 UG/ML — SIGNIFICANT CHANGE UP (ref 10–20)
WBC # BLD: 9.97 K/UL — SIGNIFICANT CHANGE UP (ref 3.8–10.5)
WBC # FLD AUTO: 9.97 K/UL — SIGNIFICANT CHANGE UP (ref 3.8–10.5)

## 2020-08-29 PROCEDURE — 99233 SBSQ HOSP IP/OBS HIGH 50: CPT | Mod: GC

## 2020-08-29 RX ORDER — FUROSEMIDE 40 MG
40 TABLET ORAL DAILY
Refills: 0 | Status: DISCONTINUED | OUTPATIENT
Start: 2020-08-30 | End: 2020-08-30

## 2020-08-29 RX ORDER — POTASSIUM CHLORIDE 20 MEQ
20 PACKET (EA) ORAL ONCE
Refills: 0 | Status: COMPLETED | OUTPATIENT
Start: 2020-08-29 | End: 2020-08-29

## 2020-08-29 RX ORDER — FUROSEMIDE 40 MG
40 TABLET ORAL ONCE
Refills: 0 | Status: COMPLETED | OUTPATIENT
Start: 2020-08-29 | End: 2020-08-29

## 2020-08-29 RX ADMIN — Medication 20 MILLIGRAM(S): at 04:59

## 2020-08-29 RX ADMIN — ISOSORBIDE DINITRATE 10 MILLIGRAM(S): 5 TABLET ORAL at 20:44

## 2020-08-29 RX ADMIN — APIXABAN 5 MILLIGRAM(S): 2.5 TABLET, FILM COATED ORAL at 04:59

## 2020-08-29 RX ADMIN — Medication 40 MILLIGRAM(S): at 04:59

## 2020-08-29 RX ADMIN — ISOSORBIDE DINITRATE 10 MILLIGRAM(S): 5 TABLET ORAL at 04:59

## 2020-08-29 RX ADMIN — CARVEDILOL PHOSPHATE 12.5 MILLIGRAM(S): 80 CAPSULE, EXTENDED RELEASE ORAL at 17:34

## 2020-08-29 RX ADMIN — PANTOPRAZOLE SODIUM 40 MILLIGRAM(S): 20 TABLET, DELAYED RELEASE ORAL at 17:34

## 2020-08-29 RX ADMIN — Medication 250 MILLIGRAM(S): at 00:30

## 2020-08-29 RX ADMIN — SERTRALINE 50 MILLIGRAM(S): 25 TABLET, FILM COATED ORAL at 11:08

## 2020-08-29 RX ADMIN — APIXABAN 5 MILLIGRAM(S): 2.5 TABLET, FILM COATED ORAL at 17:34

## 2020-08-29 RX ADMIN — Medication 20 MILLIEQUIVALENT(S): at 10:15

## 2020-08-29 RX ADMIN — CARVEDILOL PHOSPHATE 12.5 MILLIGRAM(S): 80 CAPSULE, EXTENDED RELEASE ORAL at 04:58

## 2020-08-29 RX ADMIN — Medication 40 MILLIGRAM(S): at 13:45

## 2020-08-29 RX ADMIN — ISOSORBIDE DINITRATE 10 MILLIGRAM(S): 5 TABLET ORAL at 13:10

## 2020-08-29 RX ADMIN — Medication 0.5 MILLIGRAM(S): at 20:43

## 2020-08-29 RX ADMIN — PANTOPRAZOLE SODIUM 40 MILLIGRAM(S): 20 TABLET, DELAYED RELEASE ORAL at 04:58

## 2020-08-29 RX ADMIN — ATORVASTATIN CALCIUM 80 MILLIGRAM(S): 80 TABLET, FILM COATED ORAL at 20:43

## 2020-08-29 RX ADMIN — Medication 81 MILLIGRAM(S): at 11:08

## 2020-08-29 NOTE — PROGRESS NOTE ADULT - PROBLEM SELECTOR PLAN 1
Likely multifactorial 2/2 cellulitis and CHF - Improving   -RLE Duplex negative for DVT (8/24)  -XR RLE (8/24) negative for fx. Soft tissue swelling/stranding around right ankle.   -S/p IV vanc/zosyn in ED. Vanc 1g/24 started (8/24). Increased to BID on 8/27 due to low vanc trough.  -Vanc held yesterday AM due to high trough. Adjusted to 750mg BID PM for elevated trough. Will get trough before dose today. Likely multifactorial 2/2 cellulitis and CHF - Improving   -RLE Duplex negative for DVT (8/24)  -XR RLE (8/24) negative for fx. Soft tissue swelling/stranding around right ankle.   -S/p IV vanc/zosyn in ED. Vanc 1g/24 started (8/24). Increased to BID on 8/27 due to low vanc trough.  -Patient on day 6/7 of vanc. Vanc held this AM 2/2 high vanc trough. Will repeat tonight and dose accordingly.

## 2020-08-29 NOTE — PROVIDER CONTACT NOTE (CRITICAL VALUE NOTIFICATION) - TEST AND RESULT REPORTED:
Blood culture drawn on 8/27/2020 Positive on priliminary result- growth in aerobic bottle , gram positive cocci in clusters.

## 2020-08-29 NOTE — PROGRESS NOTE ADULT - ATTENDING COMMENTS
CARDIOLOGY ATTENDING ADDENDUM    - Appears more fluid overloaded today.  Agree with IV lasix  - Reviewed prior cath.  Patent LIMA however has severe disease distal to the anastomosis will d/w with interventional cardiology   - If no revascularization planned will need EP eval for primary prevention ICD    John Dodge MD, [facility]  BEEPER [phone]

## 2020-08-29 NOTE — PROGRESS NOTE ADULT - PROBLEM SELECTOR PLAN 4
-c/w Eliquis 5mg BID  -Restarted home coreg 12.5mg BID (8/26)  -EKG with Afib with RVR during RRT on 8/27. Now NSR. Stable on tele.

## 2020-08-29 NOTE — PROGRESS NOTE ADULT - PROBLEM SELECTOR PLAN 2
Heart failure with reduced ejection fraction. TTE (1/2020): LVEF 39%, mod MR/AR  -isosorbide dinitrate, Entresto, lasix, and coreg held on admission 2/2 hypotension and NARCISO  -Home coreg restarted 8/26.   -8/27: Patient had an RRT after desatting to 77% overnight. Found to be significant pulmonary edema on CXR. EKG showed Afib with RVR and had runs of VT on tele. Lactate 8. Patient received IV lasix 40mg, amio, and transferred to CCU and placed on BiPAP.    -CCU course (8/27): transitioned from BiPAP to NC. Bumex 2. Parks for strict I/Os. Restarted home isordil and po lasix 40mg.   -TTE (8/27): Worsening diastolic dysfunction. EF 35-45%. mild/mod MR. mild AS. mild/mod AR. mod-severe global LV dysfunction.   -Patient transitioned off NC. D/mery parks 8/28  -Repeat CXR with improvement in pulmonary congestion  -Trending lactate. 1.1 yesterday in CCU. 2.6 8/28.   -F/u cards recs Heart failure with reduced ejection fraction. TTE (1/2020): LVEF 39%, mod MR/AR  -isosorbide dinitrate, Entresto, lasix, and coreg held on admission 2/2 hypotension and NARCISO  -Home coreg restarted 8/26.   -8/27: Patient had an RRT after desatting to 77% overnight. Found to be significant pulmonary edema on CXR. EKG showed Afib with RVR and had runs of VT on tele. Lactate 8. Patient received IV lasix 40mg, amio, and transferred to CCU and placed on BiPAP.    -CCU course (8/27): transitioned from BiPAP to NC. Bumex 2. Parks for strict I/Os. Restarted home isordil and po lasix 40mg. Increased to IV lasix 40mg qday today for inc MELCHOR.   -TTE (8/27): Worsening diastolic dysfunction. EF 35-45%. mild/mod MR. mild AS. mild/mod AR. mod-severe global LV dysfunction.   -Patient transitioned off NC. D/mery parks 8/28  -F/u cards recs: considering possible cath.

## 2020-08-29 NOTE — PROGRESS NOTE ADULT - SUBJECTIVE AND OBJECTIVE BOX
Patient is a 65y old  Male who presents with a chief complaint of RLE swelling (28 Aug 2020 12:55)    SUBJECTIVE / OVERNIGHT EVENTS: NAEON. Patient examined at bedside this AM. Patient states that he's continuing to have SOB, however, it's improved from previously. His RLE edema is the same but the pain/erythema have resolved. Otherwise, denies CP, cough, palpitations.     OBJECTIVE:  Vital Signs Last 24 Hrs  T(C): 36.8 (29 Aug 2020 04:18), Max: 36.8 (28 Aug 2020 11:25)  T(F): 98.2 (29 Aug 2020 04:18), Max: 98.3 (28 Aug 2020 11:25)  HR: 69 (29 Aug 2020 04:18) (66 - 87)  BP: 126/87 (29 Aug 2020 04:18) (110/71 - 145/87)  BP(mean): --  RR: 18 (29 Aug 2020 04:18) (18 - 20)  SpO2: 95% (29 Aug 2020 04:18) (95% - 97%)    I&O's Summary    28 Aug 2020 07:01  -  29 Aug 2020 07:00  --------------------------------------------------------  IN: 930 mL / OUT: 2560 mL / NET: -1630 mL      Physical Exam:   	  General: No acute distress, well-appearing  	  Eyes: EOMI   	  ENT: MMM, no oropharyngeal lesions or erythema appreciated   	  Pulm: No increased WOB. No wheezing. CTAB  	  CV: RRR. S1&S2+. No M/R/G appreciated.   	  Abdomen: +BS. Soft, NT. No organomegaly. Distended.   	  MSK: Limited ROM RLE.   	  Extremities: RLE 1+ pitting edema.   	  Neuro: A&Ox3, no focal deficits       Skin: Warm and dry. L hand wound with dressing in place.     Labs:  CAPILLARY BLOOD GLUCOSE                              8.0    9.97  )-----------( 395      ( 29 Aug 2020 06:59 )             28.2     08-29    137  |  100  |  29<H>  ----------------------------<  79  3.4<L>   |  23  |  2.12<H>    Ca    8.9      29 Aug 2020 06:46  Phos  4.2     08-29  Mg     2.2     08-29    TPro  6.7  /  Alb  3.3  /  TBili  0.6  /  DBili  x   /  AST  26  /  ALT  17  /  AlkPhos  115  08-28            Culture - Blood (collected 27 Aug 2020 07:01)  Source: .Blood Blood-Peripheral  Preliminary Report (28 Aug 2020 08:01):    No growth to date.    Culture - Blood (collected 27 Aug 2020 07:01)  Source: .Blood Blood-Peripheral  Gram Stain (29 Aug 2020 05:46):    Growth in anaerobic bottle: Gram Positive Cocci in Clusters  Preliminary Report (29 Aug 2020 05:46):    Growth in anaerobic bottle: Gram Positive Cocci in Clusters    "Due to technical problems, Proteus sp. will Not be reported as part of    the BCID panel until further notice"    ***Blood Panel PCR results on this specimen are available    approximately 3 hours after the Gram stain result.***    Gram stain, PCR, and/or culture results may not always    correspond due to difference in methodologies.    ************************************************************    This PCR assay was performed using [employer].    The following targets are tested for: Enterococcus,    vancomycin resistant enterococci, Listeria monocytogenes,    coagulase negative staphylococci, S. aureus,    methicillin resistant S. aureus, Streptococcus agalactiae    (Group B), S. pneumoniae, S. pyogenes (Group A),    Acinetobacter baumannii, Enterobacter cloacae, E. coli,    Klebsiella oxytoca, K. pneumoniae, Proteus sp.,    Serratia marcescens, Haemophilus influenzae,    Neisseria meningitidis, Pseudomonas aeruginosa, Candida    albicans, C. glabrata, C krusei, C parapsilosis,    C. tropicalis and the KPC resistance gene.  Organism: Blood Culture PCR (29 Aug 2020 07:18)  Organism: Blood Culture PCR (29 Aug 2020 07:18)        Imaging Personally Reviewed:    Consultant(s) Notes Reviewed:   Care Discussed with Consultants/Other Providers:    MEDICATIONS  (STANDING):  apixaban 5 milliGRAM(s) Oral two times a day  aspirin  chewable 81 milliGRAM(s) Oral daily  atorvastatin 80 milliGRAM(s) Oral at bedtime  carvedilol 12.5 milliGRAM(s) Oral every 12 hours  furosemide    Tablet 40 milliGRAM(s) Oral every 24 hours  isosorbide   dinitrate Tablet (ISORDIL) 10 milliGRAM(s) Oral three times a day  LORazepam     Tablet 0.5 milliGRAM(s) Oral at bedtime  pantoprazole    Tablet 40 milliGRAM(s) Oral two times a day  predniSONE   Tablet 20 milliGRAM(s) Oral daily  sertraline 50 milliGRAM(s) Oral daily  vancomycin  IVPB 750 milliGRAM(s) IV Intermittent every 12 hours    MEDICATIONS  (PRN):

## 2020-08-29 NOTE — PROGRESS NOTE ADULT - ASSESSMENT
63yo male with PMH significant for CAD (s/p CABG; most recent LHC showed stable disease with no intervention), CHF (LVEF 39% TTE 1/2020), Afib (on Eliquis), HTN, HLD, CKD III, CHER (uses CPAP), and pemphigus vulgaris presenting to ED with RLE swelling. Found to be septic in ED (hypotension, leukocytosis, elevated lactate, febrile). S/p Vanc/zosyn, 2L IVF in ED. Restarted home ativan 0.5mg at bedtime. C/w home zoloft. C/w CPAP at night for CHER.

## 2020-08-29 NOTE — PROGRESS NOTE ADULT - PROBLEM SELECTOR PLAN 8
Febrile (100.6) on admission + Leukocytosis (WBC 12.27) + Elevated lactate 2.4 + Hypotensive (SBP 100s) on admission. RLE cellulitis vs LUE wound as possible sources of infection  -s/p 2L IVF in ED. BP's improved with normalized lactate (0.7)  -abx (as per above)   -Bcx (8/24) negative. Repeat (8/27) NGTD.  -Resolved Febrile (100.6) on admission + Leukocytosis (WBC 12.27) + Elevated lactate 2.4 + Hypotensive (SBP 100s) on admission. RLE cellulitis vs LUE wound as possible sources of infection  -s/p 2L IVF in ED. BP's improved with normalized lactate (0.7)  -abx (as per above)   -Bcx (8/24) negative. Repeat (8/27) GPC in clusters.   -Resolved

## 2020-08-29 NOTE — PROGRESS NOTE ADULT - ATTENDING COMMENTS
agree with above with following addenudm:    #Acute hypoxic respiratory failure in setting of acute decompensated HF  -patient currently on RA, but needed BIPAP this AM for SOB. Patient cannot lie flat, has 3+ pitting edema, on unofficial POCUS diffuse anterior B-lines.   -patient is volume overloaded and needs more aggressive diuresis. Will chagne for now to lasix 40mg IV daily given good UOP yesterday for goal net negative 1-1.5L  -strict I/O, daily standing weights.   -continue with coreg, imdur.  -? additoin of hydral so patient on bidil as second line therapy for HFrEF  -appreciate cards: will review with Interventional cardiology prior cath to see if ischemic w/u will add anything.  -likely will need device for primary prevention givnen EF (?secondary if had actual VT, unable to find strips)    #Cellulitis  -supratherapeutic vanc trop, holding dose, recheck tomorrow, though last day tomorrow.    #NARCISO on CKD  -multifactorial, cardiorenal, +/- hemodynamic instability that led to CCU.  -continue diuretics. hold entresto for now.   -avoid other nephrotoxic agents

## 2020-08-29 NOTE — PROGRESS NOTE ADULT - PROBLEM SELECTOR PLAN 7
-abx (per above)   -Wound care saw patient (8/25). Redressed wound. Recommended plastics consult.   -Plastics consulted 8/26. Recommended local wound care and outpatient follow up (Dr. Marquez [phone])

## 2020-08-29 NOTE — PROGRESS NOTE ADULT - SUBJECTIVE AND OBJECTIVE BOX
pt seen and examined, no complaints, ROS - .        apixaban 5 milliGRAM(s) Oral two times a day  aspirin  chewable 81 milliGRAM(s) Oral daily  atorvastatin 80 milliGRAM(s) Oral at bedtime  carvedilol 12.5 milliGRAM(s) Oral every 12 hours  furosemide    Tablet 40 milliGRAM(s) Oral every 24 hours  isosorbide   dinitrate Tablet (ISORDIL) 10 milliGRAM(s) Oral three times a day  LORazepam     Tablet 0.5 milliGRAM(s) Oral at bedtime  pantoprazole    Tablet 40 milliGRAM(s) Oral two times a day  potassium chloride    Tablet ER 20 milliEquivalent(s) Oral once  predniSONE   Tablet 20 milliGRAM(s) Oral daily  sertraline 50 milliGRAM(s) Oral daily                            8.0    9.97  )-----------( 395      ( 29 Aug 2020 06:59 )             28.2       Hemoglobin: 8.0 g/dL (08-29 @ 06:59)  Hemoglobin: 8.4 g/dL (08-28 @ 11:11)  Hemoglobin: 10.0 g/dL (08-27 @ 04:02)  Hemoglobin: 9.4 g/dL (08-26 @ 06:26)  Hemoglobin: 9.2 g/dL (08-24 @ 13:45)      08-29    137  |  100  |  29<H>  ----------------------------<  79  3.4<L>   |  23  |  2.12<H>    Ca    8.9      29 Aug 2020 06:46  Phos  4.2     08-29  Mg     2.2     08-29    TPro  6.7  /  Alb  3.3  /  TBili  0.6  /  DBili  x   /  AST  26  /  ALT  17  /  AlkPhos  115  08-28    Creatinine Trend: 2.12<--, 2.15<--, 2.08<--, 1.86<--, 1.96<--, 2.34<--    COAGS:           T(C): 36.8 (08-29-20 @ 04:18), Max: 36.8 (08-28-20 @ 11:25)  HR: 69 (08-29-20 @ 04:18) (66 - 87)  BP: 126/87 (08-29-20 @ 04:18) (110/71 - 145/87)  RR: 18 (08-29-20 @ 04:18) (18 - 20)  SpO2: 95% (08-29-20 @ 04:18) (95% - 97%)  Wt(kg): --    I&O's Summary    28 Aug 2020 07:01  -  29 Aug 2020 07:00  --------------------------------------------------------  IN: 930 mL / OUT: 2560 mL / NET: -1630 mL    29 Aug 2020 07:01  -  29 Aug 2020 09:50  --------------------------------------------------------  IN: 240 mL / OUT: 600 mL / NET: -360 mL           Gen: NAD  HEENT:  (-)icterus (-)pallor  CV: N S1 S2 1/6 KATHERYN (+)2 Pulses B/l  Resp: Slight crackles at bases, normal effort  GI: (+) BS Soft, NT, ND  Lymph:  (+) R>LE Edema, (-)obvious lymphadenopathy  Skin: Warm to touch, Normal turgor  Psych: Appropriate mood and affect      TELEMETRY: SR 60-80    Echo: < from: TTE with Doppler (w/Cont) (08.27.20 @ 07:54) >  Conclusions:  1. Mitral valve not well visualized. Mitral annular  calcification. Tethered mitral valve leaflets with normal  opening. Mild moderate mitral regurgitation.  2. Calcified trileaflet aortic valve with decreased  opening. Peak transaortic valve gradient equals 12 mm Hg,  mean transaortic valve gradient equals 8 mm Hg, at least  mild AS. Limited gradient/Doppler evaluation. Mild-moderate  aortic regurgitation.  3. Endocardial visualization enhanced with intravenous  injection of Ultrasonic Enhancing Agent (Definity).  Moderate to severe  global left ventricular systolic  dysfunction. There dyssynchronous left ventricular  contraction.  4. Moderate diastolic dysfunction (Stage II). Increased  E/e'  is consistent with elevated left ventricular filling  pressure.  *** Compared with echocardiogram report of 12/13/2017,  Limited comparison.  Disatolic dysfunction is worse.    < end of copied text >      ASSESSMENT/PLAN: Patient is a 65 year old male with PMH of CAD s/p CABG with most recent LHC 1/2020 with no intervention, chronic systolic CHF (LVEF 39% TTE 1/2020), Afib (on Eliquis), HTN, HLD, CKD, CHER (on CPAP), and pemphigus vulgaris who presented with RLE swelling admitted with RLE cellulitis and NARCISO on CKD. Cardiology consulted for management of CAD/CHF.    - Continue PO lasix with strict I/Os, trend creatinine  - Continue Abx for cellulitis - BCx negative so far  - Recommend medical management of known CAD with systolic cardiomyopathy - continue ASA/Statin  - Continue AC with Eliquis for CVA prevention  - TTE noted above  - Will consider repeat ischemic eval given VT/flash pulm edema - however last cath noted in Jan 2020 with only patent LIMA to LAD and otherwise occluded native vessels  - Further recs pending review with attending  - Patient to f/u with Dr. Campbell's cardiology group after d/c

## 2020-08-30 LAB
ANION GAP SERPL CALC-SCNC: 17 MMOL/L — SIGNIFICANT CHANGE UP (ref 5–17)
BUN SERPL-MCNC: 32 MG/DL — HIGH (ref 7–23)
CALCIUM SERPL-MCNC: 9.4 MG/DL — SIGNIFICANT CHANGE UP (ref 8.4–10.5)
CHLORIDE SERPL-SCNC: 100 MMOL/L — SIGNIFICANT CHANGE UP (ref 96–108)
CO2 SERPL-SCNC: 22 MMOL/L — SIGNIFICANT CHANGE UP (ref 22–31)
CREAT SERPL-MCNC: 2.35 MG/DL — HIGH (ref 0.5–1.3)
CULTURE RESULTS: SIGNIFICANT CHANGE UP
CULTURE RESULTS: SIGNIFICANT CHANGE UP
FERRITIN SERPL-MCNC: 151 NG/ML — SIGNIFICANT CHANGE UP (ref 30–400)
FOLATE SERPL-MCNC: 8.4 NG/ML — SIGNIFICANT CHANGE UP
GLUCOSE SERPL-MCNC: 77 MG/DL — SIGNIFICANT CHANGE UP (ref 70–99)
HCT VFR BLD CALC: 29 % — LOW (ref 39–50)
HGB BLD-MCNC: 8.3 G/DL — LOW (ref 13–17)
IRON SATN MFR SERPL: 26 UG/DL — LOW (ref 45–165)
IRON SATN MFR SERPL: 9 % — LOW (ref 16–55)
MAGNESIUM SERPL-MCNC: 2.2 MG/DL — SIGNIFICANT CHANGE UP (ref 1.6–2.6)
MCHC RBC-ENTMCNC: 23.2 PG — LOW (ref 27–34)
MCHC RBC-ENTMCNC: 28.6 GM/DL — LOW (ref 32–36)
MCV RBC AUTO: 81 FL — SIGNIFICANT CHANGE UP (ref 80–100)
NRBC # BLD: 0 /100 WBCS — SIGNIFICANT CHANGE UP (ref 0–0)
ORGANISM # SPEC MICROSCOPIC CNT: SIGNIFICANT CHANGE UP
ORGANISM # SPEC MICROSCOPIC CNT: SIGNIFICANT CHANGE UP
PHOSPHATE SERPL-MCNC: 4.7 MG/DL — HIGH (ref 2.5–4.5)
PLATELET # BLD AUTO: 425 K/UL — HIGH (ref 150–400)
POTASSIUM SERPL-MCNC: 3.5 MMOL/L — SIGNIFICANT CHANGE UP (ref 3.5–5.3)
POTASSIUM SERPL-SCNC: 3.5 MMOL/L — SIGNIFICANT CHANGE UP (ref 3.5–5.3)
RBC # BLD: 3.58 M/UL — LOW (ref 4.2–5.8)
RBC # BLD: 3.58 M/UL — LOW (ref 4.2–5.8)
RBC # FLD: 18.8 % — HIGH (ref 10.3–14.5)
RETICS #: 83.6 K/UL — SIGNIFICANT CHANGE UP (ref 25–125)
RETICS/RBC NFR: 2.3 % — SIGNIFICANT CHANGE UP (ref 0.5–2.5)
SODIUM SERPL-SCNC: 139 MMOL/L — SIGNIFICANT CHANGE UP (ref 135–145)
SPECIMEN SOURCE: SIGNIFICANT CHANGE UP
SPECIMEN SOURCE: SIGNIFICANT CHANGE UP
TIBC SERPL-MCNC: 293 UG/DL — SIGNIFICANT CHANGE UP (ref 220–430)
TSH SERPL-MCNC: 3.87 UIU/ML — SIGNIFICANT CHANGE UP (ref 0.27–4.2)
UIBC SERPL-MCNC: 267 UG/DL — SIGNIFICANT CHANGE UP (ref 110–370)
VIT B12 SERPL-MCNC: 843 PG/ML — SIGNIFICANT CHANGE UP (ref 232–1245)
WBC # BLD: 11.83 K/UL — HIGH (ref 3.8–10.5)
WBC # FLD AUTO: 11.83 K/UL — HIGH (ref 3.8–10.5)

## 2020-08-30 PROCEDURE — 99233 SBSQ HOSP IP/OBS HIGH 50: CPT | Mod: GC

## 2020-08-30 RX ORDER — VANCOMYCIN HCL 1 G
1000 VIAL (EA) INTRAVENOUS ONCE
Refills: 0 | Status: DISCONTINUED | OUTPATIENT
Start: 2020-08-30 | End: 2020-08-30

## 2020-08-30 RX ORDER — POTASSIUM CHLORIDE 20 MEQ
40 PACKET (EA) ORAL ONCE
Refills: 0 | Status: COMPLETED | OUTPATIENT
Start: 2020-08-30 | End: 2020-08-30

## 2020-08-30 RX ORDER — VANCOMYCIN HCL 1 G
1000 VIAL (EA) INTRAVENOUS ONCE
Refills: 0 | Status: COMPLETED | OUTPATIENT
Start: 2020-08-30 | End: 2020-08-30

## 2020-08-30 RX ORDER — VANCOMYCIN HCL 1 G
VIAL (EA) INTRAVENOUS
Refills: 0 | Status: DISCONTINUED | OUTPATIENT
Start: 2020-08-30 | End: 2020-08-30

## 2020-08-30 RX ORDER — POTASSIUM CHLORIDE 20 MEQ
20 PACKET (EA) ORAL ONCE
Refills: 0 | Status: COMPLETED | OUTPATIENT
Start: 2020-08-30 | End: 2020-08-30

## 2020-08-30 RX ORDER — VANCOMYCIN HCL 1 G
1000 VIAL (EA) INTRAVENOUS EVERY 12 HOURS
Refills: 0 | Status: DISCONTINUED | OUTPATIENT
Start: 2020-08-30 | End: 2020-08-30

## 2020-08-30 RX ORDER — FUROSEMIDE 40 MG
40 TABLET ORAL DAILY
Refills: 0 | Status: DISCONTINUED | OUTPATIENT
Start: 2020-08-31 | End: 2020-09-02

## 2020-08-30 RX ADMIN — APIXABAN 5 MILLIGRAM(S): 2.5 TABLET, FILM COATED ORAL at 05:54

## 2020-08-30 RX ADMIN — PANTOPRAZOLE SODIUM 40 MILLIGRAM(S): 20 TABLET, DELAYED RELEASE ORAL at 17:02

## 2020-08-30 RX ADMIN — APIXABAN 5 MILLIGRAM(S): 2.5 TABLET, FILM COATED ORAL at 17:02

## 2020-08-30 RX ADMIN — ISOSORBIDE DINITRATE 10 MILLIGRAM(S): 5 TABLET ORAL at 20:32

## 2020-08-30 RX ADMIN — CARVEDILOL PHOSPHATE 12.5 MILLIGRAM(S): 80 CAPSULE, EXTENDED RELEASE ORAL at 05:54

## 2020-08-30 RX ADMIN — Medication 20 MILLIEQUIVALENT(S): at 00:42

## 2020-08-30 RX ADMIN — Medication 40 MILLIEQUIVALENT(S): at 11:42

## 2020-08-30 RX ADMIN — Medication 40 MILLIGRAM(S): at 05:54

## 2020-08-30 RX ADMIN — CARVEDILOL PHOSPHATE 12.5 MILLIGRAM(S): 80 CAPSULE, EXTENDED RELEASE ORAL at 17:02

## 2020-08-30 RX ADMIN — Medication 81 MILLIGRAM(S): at 11:42

## 2020-08-30 RX ADMIN — Medication 0.5 MILLIGRAM(S): at 20:32

## 2020-08-30 RX ADMIN — ATORVASTATIN CALCIUM 80 MILLIGRAM(S): 80 TABLET, FILM COATED ORAL at 20:32

## 2020-08-30 RX ADMIN — PANTOPRAZOLE SODIUM 40 MILLIGRAM(S): 20 TABLET, DELAYED RELEASE ORAL at 05:54

## 2020-08-30 RX ADMIN — SERTRALINE 50 MILLIGRAM(S): 25 TABLET, FILM COATED ORAL at 11:42

## 2020-08-30 RX ADMIN — ISOSORBIDE DINITRATE 10 MILLIGRAM(S): 5 TABLET ORAL at 15:30

## 2020-08-30 RX ADMIN — ISOSORBIDE DINITRATE 10 MILLIGRAM(S): 5 TABLET ORAL at 05:54

## 2020-08-30 RX ADMIN — Medication 20 MILLIGRAM(S): at 05:54

## 2020-08-30 RX ADMIN — Medication 250 MILLIGRAM(S): at 01:20

## 2020-08-30 NOTE — PROGRESS NOTE ADULT - PROBLEM SELECTOR PLAN 2
Heart failure with reduced ejection fraction. TTE (1/2020): LVEF 39%, mod MR/AR  -isosorbide dinitrate, Entresto, lasix, and coreg held on admission 2/2 hypotension and NARCISO  -Home coreg restarted 8/26.   -8/27: Patient had an RRT after desatting to 77% overnight. Found to be significant pulmonary edema on CXR. EKG showed Afib with RVR and had runs of VT on tele. Lactate 8. Patient received IV lasix 40mg, amio, and transferred to CCU and placed on BiPAP.    -CCU course (8/27): transitioned from BiPAP to NC. Bumex 2. Parks for strict I/Os. Restarted home isordil and po lasix 40mg. Increased to IV lasix 40mg qday today for inc MELCHOR.   -TTE (8/27): Worsening diastolic dysfunction. EF 35-45%. mild/mod MR. mild AS. mild/mod AR. mod-severe global LV dysfunction.   -Patient transitioned off NC. D/mery parks 8/28  -F/u cards recs: considering possible cath. Heart failure with reduced ejection fraction. TTE (1/2020): LVEF 39%, mod MR/AR  -isosorbide dinitrate, Entresto, lasix, and coreg held on admission 2/2 hypotension and NARCISO  -Home coreg restarted 8/26.   -8/27: Patient had an RRT after desatting to 77% overnight. Found to be significant pulmonary edema on CXR. EKG showed Afib with RVR and had runs of VT on tele. Lactate 8. Patient received IV lasix 40mg, amio, and transferred to CCU and placed on BiPAP.    -CCU course (8/27): transitioned from BiPAP to NC. Bumex 2. Parks for strict I/Os. Restarted home isordil and po lasix 40mg. Increased to IV lasix 40mg qday 8/29; now significantly improved resp status clinically and on bedside POCUS - will return to PO qd   -TTE (8/27): Worsening diastolic dysfunction. EF 35-45%. mild/mod MR. mild AS. mild/mod AR. mod-severe global LV dysfunction.   -Patient transitioned off NC. D/mery parks 8/28  -F/u cards recs: considering possible cath. Heart failure with reduced ejection fraction. TTE (1/2020): LVEF 39%, mod MR/AR  -isosorbide dinitrate, Entresto, lasix, and coreg held on admission 2/2 hypotension and NARCISO  -Home coreg restarted 8/26.   -8/27: Patient had an RRT after desatting to 77% overnight. Found to be significant pulmonary edema on CXR. EKG showed Afib with RVR and had runs of VT on tele. Lactate 8. Patient received IV lasix 40mg, amio, and transferred to CCU and placed on BiPAP.    -CCU course (8/27): transitioned from BiPAP to NC. Bumex 2. Parks for strict I/Os. Restarted home isordil and po lasix 40mg. Increased to IV lasix 40mg qday 8/29; now significantly improved resp status clinically and on bedside POCUS - will return to PO qd   -TTE (8/27): Worsening diastolic dysfunction. EF 35-45%. mild/mod MR. mild AS. mild/mod AR. mod-severe global LV dysfunction.   -Patient transitioned off NC. D/mery parks 8/28  -F/u cards recs: per d/w Dr Dodge, unlikely [facility], will consider ICD placement, TMA

## 2020-08-30 NOTE — PROGRESS NOTE ADULT - SUBJECTIVE AND OBJECTIVE BOX
***************************************************************  Guillermina Olvera PGY2  Internal Medicine   Pager: [phone]  [facility] in house pager: [phone]    Mon-Fri: pager covered by day team 7am-7pm;   *Academic conferences 12pm-1pm  Sa/Sun: see chart, primary physician assigned available 7am-12pm  Sat/Street Cross Coverage 12pm-7pm: [facility]- page [phone] for Team1-4, [facility] - pager forwarded to covering Resident  For Night coverage 7pm-7am: [facility]- page 1443 Team1-3, page [phone] Team4 & Care Model; [facility]- page [phone] for Team1-3, [id number] for Team4, Care Model  ***************************************************************    THONY ANAYA  65y  MRN: [id number]    Patient is a 65y old  Male who presents with a chief complaint of RLE swelling (29 Aug 2020 09:50)      Subjective: no events ON. Tele: Sinus rhythm 50-90; AIVR, couplet PVCs. Given K supplementation o/n. Pt tolerated CPAP overnight. He was given vanc 1g based on trough level of 19. This AM he reports some mild "heaviness" while breathing but otherwise has no other complaints. Feels that the RLE rash is improving significantly.  Denies fever, CP, abn pain, N/V, dysuria. Tolerating diet.      MEDICATIONS  (STANDING):  apixaban 5 milliGRAM(s) Oral two times a day  aspirin  chewable 81 milliGRAM(s) Oral daily  atorvastatin 80 milliGRAM(s) Oral at bedtime  carvedilol 12.5 milliGRAM(s) Oral every 12 hours  furosemide   Injectable 40 milliGRAM(s) IV Push daily  isosorbide   dinitrate Tablet (ISORDIL) 10 milliGRAM(s) Oral three times a day  LORazepam     Tablet 0.5 milliGRAM(s) Oral at bedtime  pantoprazole    Tablet 40 milliGRAM(s) Oral two times a day  predniSONE   Tablet 20 milliGRAM(s) Oral daily  sertraline 50 milliGRAM(s) Oral daily    MEDICATIONS  (PRN):      Objective:    Vitals: Vital Signs Last 24 Hrs  T(C): 36.8 (08-30-20 @ 04:32), Max: 37 (08-29-20 @ 11:26)  T(F): 98.2 (08-30-20 @ 04:32), Max: 98.6 (08-29-20 @ 11:26)  HR: 72 (08-30-20 @ 05:50) (59 - 74)  BP: 116/77 (08-30-20 @ 05:50) (116/77 - 147/84)  BP(mean): --  RR: 18 (08-30-20 @ 04:32) (18 - 19)  SpO2: 100% (08-30-20 @ 05:27) (93% - 100%)              I&O's Summary    29 Aug 2020 07:01  -  30 Aug 2020 07:00  --------------------------------------------------------  IN: 720 mL / OUT: 3300 mL / NET: -2580 mL        PHYSICAL EXAM:   General: No acute distress, well-appearing  	  Eyes: EOMI   	  ENT: MMM, no oropharyngeal lesions or erythema appreciated   	  Pulm: No increased WOB. No wheezing. CTAB  	  CV: RRR. S1&S2+. No M/R/G appreciated.   	  Abdomen: +BS. Soft, NT. No organomegaly. Distended.   	  MSK: Limited ROM RLE.   	  Extremities: RLE 1+ pitting edema.   	  Neuro: A&Ox3, no focal deficits       Skin: Warm and dry. L hand wound with dressing in place.   LABS:                        8.3    11.83 )-----------( 425      ( 30 Aug 2020 06:35 )             29.0                         8.0    9.97  )-----------( 395      ( 29 Aug 2020 06:59 )             28.2                         8.4    9.50  )-----------( 344      ( 28 Aug 2020 11:11 )             29.6     08-30    139  |  100  |  32<H>  ----------------------------<  77  3.5   |  22  |  2.35<H>  08-29    143  |  103  |  31<H>  ----------------------------<  93  3.7   |  25  |  2.31<H>  08-29    137  |  100  |  29<H>  ----------------------------<  79  3.4<L>   |  23  |  2.12<H>    Ca    9.4      30 Aug 2020 06:35  Ca    9.2      29 Aug 2020 23:26  Ca    8.9      29 Aug 2020 06:46  Phos  4.7     08-30  Mg     2.2     08-30    TPro  6.7  /  Alb  3.3  /  TBili  0.6  /  DBili  x   /  AST  26  /  ALT  17  /  AlkPhos  115  08-28                      CAPILLARY BLOOD GLUCOSE        RADIOLOGY & ADDITIONAL TESTS:  Imaging Personally Reviewed:  [x ] YES  [ ] NO  Consultants involved in case:   Consultant(s) Notes Reviewed:  [ x] YES  [ ] NO:   Care Discussed with Consultants/Other Providers [x ] YES  [ ] NO ***************************************************************  Guillermina Olvera PGY2  Internal Medicine   Pager: [phone]  [facility] in house pager: [phone]    Mon-Fri: pager covered by day team 7am-7pm;   *Academic conferences 12pm-1pm  Sa/Sun: see chart, primary physician assigned available 7am-12pm  Sat/Street Cross Coverage 12pm-7pm: [facility]- page 1443 for Team1-4, [facility] - pager forwarded to covering Resident  For Night coverage 7pm-7am: [facility]- page 1443 Team1-3, page [phone] Team4 & Care Model; [facility]- page [phone] for Team1-3, [phone] for Team4, Care Model  ***************************************************************    THONY ANAYA  65y  MRN: [id number]    Patient is a 65y old  Male who presents with a chief complaint of RLE swelling (29 Aug 2020 09:50)      Subjective: no events ON. Tele: Sinus rhythm 50-90; AIVR, couplet PVCs. Given K supplementation o/n. Pt tolerated CPAP overnight. He was given vanc 1g based on trough level of 19 at 1am. This AM he reports some mild "heaviness" while breathing but otherwise has no other complaints. Feels that the RLE rash is improving significantly.  Denies fever, CP, abn pain, N/V, dysuria. Tolerating diet.      MEDICATIONS  (STANDING):  apixaban 5 milliGRAM(s) Oral two times a day  aspirin  chewable 81 milliGRAM(s) Oral daily  atorvastatin 80 milliGRAM(s) Oral at bedtime  carvedilol 12.5 milliGRAM(s) Oral every 12 hours  furosemide   Injectable 40 milliGRAM(s) IV Push daily  isosorbide   dinitrate Tablet (ISORDIL) 10 milliGRAM(s) Oral three times a day  LORazepam     Tablet 0.5 milliGRAM(s) Oral at bedtime  pantoprazole    Tablet 40 milliGRAM(s) Oral two times a day  predniSONE   Tablet 20 milliGRAM(s) Oral daily  sertraline 50 milliGRAM(s) Oral daily    MEDICATIONS  (PRN):      Objective:    Vitals: Vital Signs Last 24 Hrs  T(C): 36.8 (08-30-20 @ 04:32), Max: 37 (08-29-20 @ 11:26)  T(F): 98.2 (08-30-20 @ 04:32), Max: 98.6 (08-29-20 @ 11:26)  HR: 72 (08-30-20 @ 05:50) (59 - 74)  BP: 116/77 (08-30-20 @ 05:50) (116/77 - 147/84)  BP(mean): --  RR: 18 (08-30-20 @ 04:32) (18 - 19)  SpO2: 100% (08-30-20 @ 05:27) (93% - 100%)              I&O's Summary    29 Aug 2020 07:01  -  30 Aug 2020 07:00  --------------------------------------------------------  IN: 720 mL / OUT: 3300 mL / NET: -2580 mL        PHYSICAL EXAM:   General: No acute distress, well-appearing  	  Eyes: EOMI   	  ENT: MMM, no oropharyngeal lesions or erythema appreciated   	  Pulm: No increased WOB. No wheezing. CTAB  	  CV: RRR. S1&S2+. No M/R/G appreciated.   	  Abdomen: +BS. Soft, NT. No organomegaly. Distended.   	  MSK: Limited ROM RLE.   	  Extremities: RLE 1+ pitting edema.   	  Neuro: A&Ox3, no focal deficits       Skin: Warm and dry. L hand wound with dressing in place.   LABS:                        8.3    11.83 )-----------( 425      ( 30 Aug 2020 06:35 )             29.0                         8.0    9.97  )-----------( 395      ( 29 Aug 2020 06:59 )             28.2                         8.4    9.50  )-----------( 344      ( 28 Aug 2020 11:11 )             29.6     08-30    139  |  100  |  32<H>  ----------------------------<  77  3.5   |  22  |  2.35<H>  08-29    143  |  103  |  31<H>  ----------------------------<  93  3.7   |  25  |  2.31<H>  08-29    137  |  100  |  29<H>  ----------------------------<  79  3.4<L>   |  23  |  2.12<H>    Ca    9.4      30 Aug 2020 06:35  Ca    9.2      29 Aug 2020 23:26  Ca    8.9      29 Aug 2020 06:46  Phos  4.7     08-30  Mg     2.2     08-30    TPro  6.7  /  Alb  3.3  /  TBili  0.6  /  DBili  x   /  AST  26  /  ALT  17  /  AlkPhos  115  08-28                      CAPILLARY BLOOD GLUCOSE        RADIOLOGY & ADDITIONAL TESTS:  Imaging Personally Reviewed:  [x ] YES  [ ] NO  Consultants involved in case:   Consultant(s) Notes Reviewed:  [ x] YES  [ ] NO:   Care Discussed with Consultants/Other Providers [x ] YES  [ ] NO ***************************************************************  Guillermina Olvera PGY2  Internal Medicine   Pager: [phone]  [facility] in house pager: [phone]    Mon-Fri: pager covered by day team 7am-7pm;   *Academic conferences 12pm-1pm  Sa/Sun: see chart, primary physician assigned available 7am-12pm  Sat/Street Cross Coverage 12pm-7pm: [facility]- page 1443 for Team1-4, [facility] - pager forwarded to covering Resident  For Night coverage 7pm-7am: [facility]- page 1443 Team1-3, page 1446 Team4 & Care Model; [facility]- page [phone] for Team1-3, [phone] for Team4, Care Model  ***************************************************************    THONY ANAYA  65y  MRN: [id number]    Patient is a 65y old  Male who presents with a chief complaint of RLE swelling (29 Aug 2020 09:50)      Subjective: no events ON. Tele: Sinus rhythm 50-90; AIVR, couplet PVCs. Given K supplementation o/n. Pt tolerated CPAP overnight. He was given vanc 1g based on trough level of 19 at 1am. This AM he reports some mild "heaviness" while breathing but otherwise has no other complaints. Feels that the RLE rash is improving significantly.  Denies fever, CP, abn pain, N/V, dysuria. Tolerating diet.      Bedside POCUS shows A-line predominant on R and some B-lines on L, improved from yesterday. Pt observed laying at 15 degrees on bed comfortably.    MEDICATIONS  (STANDING):  apixaban 5 milliGRAM(s) Oral two times a day  aspirin  chewable 81 milliGRAM(s) Oral daily  atorvastatin 80 milliGRAM(s) Oral at bedtime  carvedilol 12.5 milliGRAM(s) Oral every 12 hours  furosemide   Injectable 40 milliGRAM(s) IV Push daily  isosorbide   dinitrate Tablet (ISORDIL) 10 milliGRAM(s) Oral three times a day  LORazepam     Tablet 0.5 milliGRAM(s) Oral at bedtime  pantoprazole    Tablet 40 milliGRAM(s) Oral two times a day  predniSONE   Tablet 20 milliGRAM(s) Oral daily  sertraline 50 milliGRAM(s) Oral daily    MEDICATIONS  (PRN):      Objective:    Vitals: Vital Signs Last 24 Hrs  T(C): 36.8 (08-30-20 @ 04:32), Max: 37 (08-29-20 @ 11:26)  T(F): 98.2 (08-30-20 @ 04:32), Max: 98.6 (08-29-20 @ 11:26)  HR: 72 (08-30-20 @ 05:50) (59 - 74)  BP: 116/77 (08-30-20 @ 05:50) (116/77 - 147/84)  BP(mean): --  RR: 18 (08-30-20 @ 04:32) (18 - 19)  SpO2: 100% (08-30-20 @ 05:27) (93% - 100%)              I&O's Summary    29 Aug 2020 07:01  -  30 Aug 2020 07:00  --------------------------------------------------------  IN: 720 mL / OUT: 3300 mL / NET: -2580 mL        PHYSICAL EXAM:   General: No acute distress, well-appearing  	  Eyes: EOMI   	  ENT: MMM, no oropharyngeal lesions or erythema appreciated   	  Pulm: No increased WOB. No wheezing. CTAB  	  CV: RRR. S1&S2+. No M/R/G appreciated.   	  Abdomen: +BS. Soft, NT. No organomegaly. Distended.   	  MSK: Limited ROM RLE.   	  Extremities: RLE 1+ pitting edema.   	  Neuro: A&Ox3, no focal deficits       Skin: Warm and dry. L hand wound with dressing in place.   LABS:                        8.3    11.83 )-----------( 425      ( 30 Aug 2020 06:35 )             29.0                         8.0    9.97  )-----------( 395      ( 29 Aug 2020 06:59 )             28.2                         8.4    9.50  )-----------( 344      ( 28 Aug 2020 11:11 )             29.6     08-30    139  |  100  |  32<H>  ----------------------------<  77  3.5   |  22  |  2.35<H>  08-29    143  |  103  |  31<H>  ----------------------------<  93  3.7   |  25  |  2.31<H>  08-29    137  |  100  |  29<H>  ----------------------------<  79  3.4<L>   |  23  |  2.12<H>    Ca    9.4      30 Aug 2020 06:35  Ca    9.2      29 Aug 2020 23:26  Ca    8.9      29 Aug 2020 06:46  Phos  4.7     08-30  Mg     2.2     08-30    TPro  6.7  /  Alb  3.3  /  TBili  0.6  /  DBili  x   /  AST  26  /  ALT  17  /  AlkPhos  115  08-28                      CAPILLARY BLOOD GLUCOSE        RADIOLOGY & ADDITIONAL TESTS:  Imaging Personally Reviewed:  [x ] YES  [ ] NO  Consultants involved in case:   Consultant(s) Notes Reviewed:  [ x] YES  [ ] NO:   Care Discussed with Consultants/Other Providers [x ] YES  [ ] NO

## 2020-08-30 NOTE — PROGRESS NOTE ADULT - ATTENDING COMMENTS
Patient seen and examined, agree with above assessment and plan as transcribed above.    - Agree with PO lasix  - Will review last cath with IC  - Will need EP eval for primary prevention ICD    John Dodge MD, [facility]  BEEPER [phone]

## 2020-08-30 NOTE — PROGRESS NOTE ADULT - PROBLEM SELECTOR PLAN 3
NARCISO on CKD III. Likely pre-renal in etiology. Cr elevated to 2.4 on admission. Baseline Cr 1.4-1.7. Improved with IVF and lasix was held. However, after CHF exacerbation requiring aggressive diuresis (8/27), patient's Cr is elevated to 2.15 on 8/28  -Continue to monitor daily BMPs   -Renally dose medications.

## 2020-08-30 NOTE — PROGRESS NOTE ADULT - ATTENDING COMMENTS
agree with above    #acute decompensated HFrEF  -able ot lie flat today.  Unofficial POCUS a-line at apex, b-line posteiror, but more scant than yestserday  -got lasix 40mg IV today, transition to 40mg po tomorrow  -strict I/O, dialy standing weights  -eventual addition of hydral to isordil  -possible ischemic eval if there are targets to go after, Dr. Dodge apprecaited, will confer with interventional rads tomorrow  -AICD likely for primary prevention given low EF    #hypercreatinemia  -acceptable in attempting to diurese, but may make cath more difficult  -trend creatinine, lower lasix dose as more approaching euvolemia today.    rest as above

## 2020-08-30 NOTE — PROGRESS NOTE ADULT - ASSESSMENT
63yo male with PMH significant for CAD (s/p CABG; most recent LHC showed stable disease with no intervention), CHF (LVEF 39% TTE 1/2020), Afib (on Eliquis), HTN, HLD, CKD III, CHER (uses CPAP), and pemphigus vulgaris presenting to ED with RLE swelling. Found to be septic in ED (hypotension, leukocytosis, elevated lactate, febrile). S/p Vanc/zosyn, 2L IVF in ED. Restarted home ativan 0.5mg at bedtime. C/w home zoloft. C/w CPAP at night for HCER.

## 2020-08-30 NOTE — PROGRESS NOTE ADULT - PROBLEM SELECTOR PLAN 7
-abx (per above)   -Wound care saw patient (8/25). Redressed wound. Recommended plastics consult.   -Plastics consulted 8/26. Recommended local wound care and outpatient follow up (Dr. Marquez [phone])

## 2020-08-30 NOTE — PROGRESS NOTE ADULT - PROBLEM SELECTOR PLAN 1
Likely multifactorial 2/2 cellulitis and CHF - Improving   -RLE Duplex negative for DVT (8/24)  -XR RLE (8/24) negative for fx. Soft tissue swelling/stranding around right ankle.   -S/p IV vanc/zosyn in ED. Vanc 1g/24 started (8/24). Increased to BID on 8/27 due to low vanc trough. goal for cellulitis 10-15  -Patient on day 6/7 of vanc. Vanc held yesterday AM 2/2 high vanc trough.   Repeat last night was 19 - pt was given 1g (q12). will obtain vanc random level this AM Likely multifactorial 2/2 cellulitis and CHF - Improving   -RLE Duplex negative for DVT (8/24)  -XR RLE (8/24) negative for fx. Soft tissue swelling/stranding around right ankle.   -S/p IV vanc/zosyn in ED. Vanc 1g/24 started (8/24). Increased to BID on 8/27 due to low vanc trough. goal for cellulitis 10-15  -Patient on day 6/7 of vanc. Vanc held yesterday AM 2/2 high vanc trough.   Repeat last night was 19 - pt was given 1g. will obtain vanc random level this AM Likely multifactorial 2/2 cellulitis and CHF - Improving   -RLE Duplex negative for DVT (8/24)  -XR RLE (8/24) negative for fx. Soft tissue swelling/stranding around right ankle.   -S/p IV vanc/zosyn in ED. Vanc 1g/24 started (8/24). Increased to BID on 8/27 due to low vanc trough. goal for cellulitis 10-15  -Patient on day 6/7 of vanc. Vanc held yesterday AM 2/2 high vanc trough.   Repeat last night was 19 - pt was given 1g. will obtain vanc random level in 24hrs given CKD Likely multifactorial 2/2 cellulitis and CHF - Improving   -RLE Duplex negative for DVT (8/24)  -XR RLE (8/24) negative for fx. Soft tissue swelling/stranding around right ankle.   -S/p IV vanc/zosyn in ED. Vanc 1g/24 started (8/24). Increased to BID on 8/27 due to low vanc trough. goal for cellulitis 10-15  -Patient on day 7/7 of vanc. Vanc held yesterday AM 2/2 high vanc trough.   Repeat last night was 19 - pt was given 1g. will hold further vanc given completion

## 2020-08-30 NOTE — PROGRESS NOTE ADULT - SUBJECTIVE AND OBJECTIVE BOX
pt seen and examined, no complaints, ROS - .        apixaban 5 milliGRAM(s) Oral two times a day  aspirin  chewable 81 milliGRAM(s) Oral daily  atorvastatin 80 milliGRAM(s) Oral at bedtime  carvedilol 12.5 milliGRAM(s) Oral every 12 hours  isosorbide   dinitrate Tablet (ISORDIL) 10 milliGRAM(s) Oral three times a day  LORazepam     Tablet 0.5 milliGRAM(s) Oral at bedtime  pantoprazole    Tablet 40 milliGRAM(s) Oral two times a day  predniSONE   Tablet 20 milliGRAM(s) Oral daily  sertraline 50 milliGRAM(s) Oral daily                            8.3    11.83 )-----------( 425      ( 30 Aug 2020 06:35 )             29.0       Hemoglobin: 8.3 g/dL (08-30 @ 06:35)  Hemoglobin: 8.0 g/dL (08-29 @ 06:59)  Hemoglobin: 8.4 g/dL (08-28 @ 11:11)  Hemoglobin: 10.0 g/dL (08-27 @ 04:02)  Hemoglobin: 9.4 g/dL (08-26 @ 06:26)      08-30    139  |  100  |  32<H>  ----------------------------<  77  3.5   |  22  |  2.35<H>    Ca    9.4      30 Aug 2020 06:35  Phos  4.7     08-30  Mg     2.2     08-30    TPro  6.7  /  Alb  3.3  /  TBili  0.6  /  DBili  x   /  AST  26  /  ALT  17  /  AlkPhos  115  08-28    Creatinine Trend: 2.35<--, 2.31<--, 2.12<--, 2.15<--, 2.08<--, 1.86<--    COAGS:           T(C): 36.8 (08-30-20 @ 04:32), Max: 37 (08-29-20 @ 11:26)  HR: 72 (08-30-20 @ 05:50) (59 - 74)  BP: 116/77 (08-30-20 @ 05:50) (116/77 - 147/84)  RR: 18 (08-30-20 @ 04:32) (18 - 19)  SpO2: 100% (08-30-20 @ 05:27) (93% - 100%)  Wt(kg): --    I&O's Summary    29 Aug 2020 07:01  -  30 Aug 2020 07:00  --------------------------------------------------------  IN: 720 mL / OUT: 3300 mL / NET: -2580 mL    30 Aug 2020 07:01  -  30 Aug 2020 09:47  --------------------------------------------------------  IN: 240 mL / OUT: 200 mL / NET: 40 mL      Gen: NAD  HEENT:  (-)icterus (-)pallor  CV: N S1 S2 1/6 KATHERYN (+)2 Pulses B/l  Resp: Slight crackles at bases, normal effort  GI: (+) BS Soft, NT, ND  Lymph:  (+) R>LE Edema, (-)obvious lymphadenopathy  Skin: Warm to touch, Normal turgor  Psych: Appropriate mood and affect      TELEMETRY: SR 60-80    Echo: < from: TTE with Doppler (w/Cont) (08.27.20 @ 07:54) >  Conclusions:  1. Mitral valve not well visualized. Mitral annular  calcification. Tethered mitral valve leaflets with normal  opening. Mild moderate mitral regurgitation.  2. Calcified trileaflet aortic valve with decreased  opening. Peak transaortic valve gradient equals 12 mm Hg,  mean transaortic valve gradient equals 8 mm Hg, at least  mild AS. Limited gradient/Doppler evaluation. Mild-moderate  aortic regurgitation.  3. Endocardial visualization enhanced with intravenous  injection of Ultrasonic Enhancing Agent (Definity).  Moderate to severe  global left ventricular systolic  dysfunction. There dyssynchronous left ventricular  contraction.  4. Moderate diastolic dysfunction (Stage II). Increased  E/e'  is consistent with elevated left ventricular filling  pressure.  *** Compared with echocardiogram report of 12/13/2017,  Limited comparison.  Disatolic dysfunction is worse.    < end of copied text >      ASSESSMENT/PLAN: Patient is a 65 year old male with PMH of CAD s/p CABG with most recent LHC 1/2020 with no intervention, chronic systolic CHF (LVEF 39% TTE 1/2020), Afib (on Eliquis), HTN, HLD, CKD, CHER (on CPAP), and pemphigus vulgaris who presented with RLE swelling admitted with RLE cellulitis and NARCISO on CKD. Cardiology consulted for management of CAD/CHF.    - keep net neg with IV lasix   - Continue Abx for cellulitis - BCx negative so far  - Recommend medical management of known CAD with systolic cardiomyopathy - continue ASA/Statin  - Continue AC with Eliquis for CVA prevention  - TTE noted above  - Will consider repeat ischemic eval given VT/flash pulm edema - however last cath noted in Jan 2020 with only patent LIMA to LAD and otherwise occluded native vessels  - Further recs pending review with attending  - Patient to f/u with Dr. Campbell's cardiology group after d/c

## 2020-08-30 NOTE — PROGRESS NOTE ADULT - PROBLEM SELECTOR PLAN 8
Febrile (100.6) on admission + Leukocytosis (WBC 12.27) + Elevated lactate 2.4 + Hypotensive (SBP 100s) on admission. RLE cellulitis vs LUE wound as possible sources of infection  -s/p 2L IVF in ED. BP's improved with normalized lactate (0.7)  -abx (as per above)   -Bcx (8/24) negative. Repeat (8/27) GPC in clusters.   -Resolved now resolved . initially Febrile (100.6) on admission + Leukocytosis (WBC 12.27) + Elevated lactate 2.4 + Hypotensive (SBP 100s) on admission. RLE cellulitis vs LUE wound as possible sources of infection  -s/p 2L IVF in ED. BP's improved with normalized lactate (0.7)  -abx (as per above)   -Bcx (8/24) negative. Repeat (8/27) GPC in clusters.

## 2020-08-31 LAB
ALBUMIN SERPL ELPH-MCNC: 3.6 G/DL — SIGNIFICANT CHANGE UP (ref 3.3–5)
ALP SERPL-CCNC: 116 U/L — SIGNIFICANT CHANGE UP (ref 40–120)
ALT FLD-CCNC: 26 U/L — SIGNIFICANT CHANGE UP (ref 10–45)
ANION GAP SERPL CALC-SCNC: 16 MMOL/L — SIGNIFICANT CHANGE UP (ref 5–17)
AST SERPL-CCNC: 21 U/L — SIGNIFICANT CHANGE UP (ref 10–40)
BASOPHILS # BLD AUTO: 0.12 K/UL — SIGNIFICANT CHANGE UP (ref 0–0.2)
BASOPHILS NFR BLD AUTO: 1.1 % — SIGNIFICANT CHANGE UP (ref 0–2)
BILIRUB SERPL-MCNC: 0.5 MG/DL — SIGNIFICANT CHANGE UP (ref 0.2–1.2)
BUN SERPL-MCNC: 32 MG/DL — HIGH (ref 7–23)
CALCIUM SERPL-MCNC: 9.6 MG/DL — SIGNIFICANT CHANGE UP (ref 8.4–10.5)
CHLORIDE SERPL-SCNC: 101 MMOL/L — SIGNIFICANT CHANGE UP (ref 96–108)
CO2 SERPL-SCNC: 23 MMOL/L — SIGNIFICANT CHANGE UP (ref 22–31)
CREAT SERPL-MCNC: 2.37 MG/DL — HIGH (ref 0.5–1.3)
EOSINOPHIL # BLD AUTO: 0.22 K/UL — SIGNIFICANT CHANGE UP (ref 0–0.5)
EOSINOPHIL NFR BLD AUTO: 2 % — SIGNIFICANT CHANGE UP (ref 0–6)
GLUCOSE SERPL-MCNC: 72 MG/DL — SIGNIFICANT CHANGE UP (ref 70–99)
HCT VFR BLD CALC: SIGNIFICANT CHANGE UP (ref 39–50)
HGB BLD-MCNC: 8.9 G/DL — LOW (ref 13–17)
IMM GRANULOCYTES NFR BLD AUTO: 0.9 % — SIGNIFICANT CHANGE UP (ref 0–1.5)
LYMPHOCYTES # BLD AUTO: 1.97 K/UL — SIGNIFICANT CHANGE UP (ref 1–3.3)
LYMPHOCYTES # BLD AUTO: 17.8 % — SIGNIFICANT CHANGE UP (ref 13–44)
MAGNESIUM SERPL-MCNC: 2.3 MG/DL — SIGNIFICANT CHANGE UP (ref 1.6–2.6)
MCHC RBC-ENTMCNC: SIGNIFICANT CHANGE UP (ref 27–34)
MCHC RBC-ENTMCNC: SIGNIFICANT CHANGE UP (ref 32–36)
MCV RBC AUTO: SIGNIFICANT CHANGE UP (ref 80–100)
MONOCYTES # BLD AUTO: 0.73 K/UL — SIGNIFICANT CHANGE UP (ref 0–0.9)
MONOCYTES NFR BLD AUTO: 6.6 % — SIGNIFICANT CHANGE UP (ref 2–14)
NEUTROPHILS # BLD AUTO: 7.9 K/UL — HIGH (ref 1.8–7.4)
NEUTROPHILS NFR BLD AUTO: 71.6 % — SIGNIFICANT CHANGE UP (ref 43–77)
NRBC # BLD: 0 /100 WBCS — SIGNIFICANT CHANGE UP (ref 0–0)
PHOSPHATE SERPL-MCNC: 4.2 MG/DL — SIGNIFICANT CHANGE UP (ref 2.5–4.5)
PLATELET # BLD AUTO: 457 K/UL — HIGH (ref 150–400)
POTASSIUM SERPL-MCNC: 3.8 MMOL/L — SIGNIFICANT CHANGE UP (ref 3.5–5.3)
POTASSIUM SERPL-SCNC: 3.8 MMOL/L — SIGNIFICANT CHANGE UP (ref 3.5–5.3)
PROT SERPL-MCNC: 7 G/DL — SIGNIFICANT CHANGE UP (ref 6–8.3)
RBC # BLD: 3.87 M/UL — LOW (ref 4.2–5.8)
RBC # FLD: SIGNIFICANT CHANGE UP (ref 10.3–14.5)
SODIUM SERPL-SCNC: 140 MMOL/L — SIGNIFICANT CHANGE UP (ref 135–145)
WBC # BLD: 11.04 K/UL — HIGH (ref 3.8–10.5)
WBC # FLD AUTO: 11.04 K/UL — HIGH (ref 3.8–10.5)

## 2020-08-31 PROCEDURE — 76700 US EXAM ABDOM COMPLETE: CPT | Mod: 26

## 2020-08-31 PROCEDURE — 99232 SBSQ HOSP IP/OBS MODERATE 35: CPT | Mod: GC

## 2020-08-31 RX ADMIN — CARVEDILOL PHOSPHATE 12.5 MILLIGRAM(S): 80 CAPSULE, EXTENDED RELEASE ORAL at 17:16

## 2020-08-31 RX ADMIN — ISOSORBIDE DINITRATE 10 MILLIGRAM(S): 5 TABLET ORAL at 05:17

## 2020-08-31 RX ADMIN — Medication 20 MILLIGRAM(S): at 05:17

## 2020-08-31 RX ADMIN — CARVEDILOL PHOSPHATE 12.5 MILLIGRAM(S): 80 CAPSULE, EXTENDED RELEASE ORAL at 05:17

## 2020-08-31 RX ADMIN — ATORVASTATIN CALCIUM 80 MILLIGRAM(S): 80 TABLET, FILM COATED ORAL at 21:56

## 2020-08-31 RX ADMIN — ISOSORBIDE DINITRATE 10 MILLIGRAM(S): 5 TABLET ORAL at 21:57

## 2020-08-31 RX ADMIN — APIXABAN 5 MILLIGRAM(S): 2.5 TABLET, FILM COATED ORAL at 17:16

## 2020-08-31 RX ADMIN — APIXABAN 5 MILLIGRAM(S): 2.5 TABLET, FILM COATED ORAL at 05:17

## 2020-08-31 RX ADMIN — PANTOPRAZOLE SODIUM 40 MILLIGRAM(S): 20 TABLET, DELAYED RELEASE ORAL at 17:16

## 2020-08-31 RX ADMIN — Medication 0.5 MILLIGRAM(S): at 21:56

## 2020-08-31 RX ADMIN — ISOSORBIDE DINITRATE 10 MILLIGRAM(S): 5 TABLET ORAL at 12:39

## 2020-08-31 RX ADMIN — Medication 81 MILLIGRAM(S): at 12:39

## 2020-08-31 RX ADMIN — SERTRALINE 50 MILLIGRAM(S): 25 TABLET, FILM COATED ORAL at 12:39

## 2020-08-31 RX ADMIN — Medication 40 MILLIGRAM(S): at 05:17

## 2020-08-31 RX ADMIN — PANTOPRAZOLE SODIUM 40 MILLIGRAM(S): 20 TABLET, DELAYED RELEASE ORAL at 05:17

## 2020-08-31 NOTE — CONSULT NOTE ADULT - SUBJECTIVE AND OBJECTIVE BOX
EP Attending    HISTORY OF PRESENT ILLNESS:   Mr Colon is a 65yoM who presents for acute onset shortness of breath.  His EF is 35-40% chronically (2020 and 2020), due to ischemic cardiomyopathy.  He is s/p CABG and multiple subsequent PCI. He only has 1 graft open- his LIMA to LAD, and his native vessels are otherwise occluded.  Also has AFib, on apixaban, HTN, CKD, and CHER on CPAP.   He has had multiple episodes of flash pulmonary edema.  At this time he has no angina, orthopnea, PND or palpitations.  He is short of breath on minimal exertion, walking across his hospital room with a walker (NYHA IIIB).  No fainting or near-fainting episodes.  ROS x 10 systems otherwise negative.    PAST MEDICAL & SURGICAL HISTORY:  CHF (congestive heart failure)  Psoriasis  HLD (hyperlipidemia)  CAD (coronary artery disease): 3 stents  Hypertension  History of coronary artery stent placement: 1 stent , 2 stents  in [facility]  S/P quadruple vessel bypass:     MEDICATIONS  (STANDING):  apixaban 5 milliGRAM(s) Oral two times a day  aspirin  chewable 81 milliGRAM(s) Oral daily  atorvastatin 80 milliGRAM(s) Oral at bedtime  carvedilol 12.5 milliGRAM(s) Oral every 12 hours  furosemide    Tablet 40 milliGRAM(s) Oral daily  isosorbide   dinitrate Tablet (ISORDIL) 10 milliGRAM(s) Oral three times a day  LORazepam     Tablet 0.5 milliGRAM(s) Oral at bedtime  pantoprazole    Tablet 40 milliGRAM(s) Oral two times a day  predniSONE   Tablet 20 milliGRAM(s) Oral daily  sertraline 50 milliGRAM(s) Oral daily    Allergies    No Known Allergies    Intolerances    FAMILY HISTORY:  Family history of cerebrovascular accident (CVA)  Family history of early CAD: father who  of CVA.    Non-contributary for premature coronary disease or sudden cardiac death    SOCIAL HISTORY:    [x ] Non-smoker  [ ] Smoker  [ ] Alcohol      PHYSICAL EXAM:  T(C): 36.4 (20 @ 11:56), Max: 37.2 (20 @ 04:32)  HR: 66 (20 @ 11:56) (62 - 71)  BP: 131/79 (20 @ 11:56) (103/67 - 131/79)  RR: 18 (20 @ 11:56) (18 - 18)  SpO2: 95% (20 @ 11:56) (95% - 100%)  Wt(kg): --    Appearance: Robust white male in no acute distress, but short-of-breath.  HEENT:   Normal oral mucosa, PERRL, EOMI	  Lymphatic: No lymphadenopathy , no edema  Cardiovascular: Normal S1 S2, mild JVD, No murmurs , Peripheral pulses palpable 2+ bilaterally  Respiratory: Lungs clear to auscultation, normal effort 	  Gastrointestinal:  Soft, Non-tender, + BS	  Skin: No rashes, No ecchymoses, No cyanosis, warm to touch  Musculoskeletal: Normal range of motion, normal strength  Psychiatry:  Mood & affect appropriate    TELEMETRY: NSR, no significant VT, occasional PVC's  ECG:  	NSR, interpolated PVC's.  Echo: EF 35-40%, LV dilated to upper limit of normal, severe diastolic dysfunction, moderate Mitral regurgitation.  Cath: Prior with only LIMA to LAD patent. Native vessels occluded.	  	  LABS:	 	                          8.9    11.04 )-----------( 457      ( 31 Aug 2020 06:09 )             See note        140  |  101  |  32<H>  ----------------------------<  72  3.8   |  23  |  2.37<H>    Ca    9.6      31 Aug 2020 06:09  Phos  4.2       Mg     2.3         TPro  7.0  /  Alb  3.6  /  TBili  0.5  /  DBili  x   /  AST  21  /  ALT  26  /  AlkPhos  116      ASSESSMENT/PLAN: 	65y Male with an ischemic cardiomyopathy, EF 35-40%, and increasing frequency of episodes of flash pulmonary edema.    There is no nonsustained VT on telemetry.  Guidelines were established based on the MUSTT trial: Ischemic CM patients benefitted from an ICD if they had both nonsustained VT on telemetry, and inducible sustained VT on a catheter EP study.      It is strange that his echocardiogram is read as dyssynchronous LV contraction - which is in line with his angiogram with LIMA perfusion only - but that he has a narrow QRS of 96ms.  At this time there is no indication for Biventricular / Resynchronization Pacing, either.    If he has episodes of polymorphic VT /VF during flash pulmonary edema, an ICD would not be a lifesaving intervention, as this reflects impending demise due to pump failure.  Consider requesting a CHF/Transplant team evaluation.  Will follow telemetry.    Wei Feliciano M.D.  Cardiac Electrophysiology    office [phone]  pager [phone]

## 2020-08-31 NOTE — PROGRESS NOTE ADULT - PROBLEM SELECTOR PLAN 8
now resolved . initially Febrile (100.6) on admission + Leukocytosis (WBC 12.27) + Elevated lactate 2.4 + Hypotensive (SBP 100s) on admission. RLE cellulitis vs LUE wound as possible sources of infection  -s/p 2L IVF in ED. BP's improved with normalized lactate (0.7)  -abx (as per above)   -Bcx (8/24) negative. Repeat (8/27) GPC in clusters. Now resolved . initially Febrile (100.6) on admission + Leukocytosis (WBC 12.27) + Elevated lactate 2.4 + Hypotensive (SBP 100s) on admission. RLE cellulitis vs LUE wound as possible sources of infection  -s/p 2L IVF in ED. BP's improved with normalized lactate (0.7)  -abx (as per above)   -Bcx (8/24) negative. Repeat (8/27) GPC in clusters.

## 2020-08-31 NOTE — PROGRESS NOTE ADULT - PROBLEM SELECTOR PLAN 7
-abx (per above)   -Wound care saw patient (8/25). Redressed wound. Recommended plastics consult.   -Plastics consulted 8/26. Recommended local wound care and outpatient follow up (Dr. Marquez [phone]) -abx (per above)   -Wound care saw patient (8/25). Redressed wound. Recommended plastics consult.   -Plastics consulted 8/26. Recommended local wound care and outpatient follow up (Dr. Marquez [phone])  -Will reevaluate wound today

## 2020-08-31 NOTE — PROGRESS NOTE ADULT - ATTENDING COMMENTS
Volume status improved, able to ambulate around unit without difficulty now. Transitioned to PO lasix.   Completed course of abx for cellulitis  EP eval for possible ICD placement.   Cardiology to discuss with Interventionalist regarding utility of ischemic eval  Cr stable, elevation likely 2/2 diuretic usage. Monitor Cr.

## 2020-08-31 NOTE — PROGRESS NOTE ADULT - PROBLEM SELECTOR PLAN 1
Likely multifactorial 2/2 cellulitis and CHF - Improving   -RLE Duplex negative for DVT (8/24)  -XR RLE (8/24) negative for fx. Soft tissue swelling/stranding around right ankle.   -S/p IV vanc/zosyn in ED. Vanc 1g/24 started (8/24). Increased to BID on 8/27 due to low vanc trough. goal for cellulitis 10-15  -Patient on day 7/7 of vanc. Vanc held yesterday AM 2/2 high vanc trough.   Repeat last night was 19 - pt was given 1g. will hold further vanc given completion Likely multifactorial 2/2 cellulitis and CHF - Improving   -RLE Duplex negative for DVT (8/24)  -XR RLE (8/24) negative for fx. Soft tissue swelling/stranding around right ankle.   -S/p IV vanc/zosyn in ED. Vanc (8/24-30) completed.

## 2020-08-31 NOTE — PROGRESS NOTE ADULT - PROBLEM SELECTOR PLAN 4
-c/w Eliquis 5mg BID  -Restarted home coreg 12.5mg BID (8/26)  -EKG with Afib with RVR during RRT on 8/27. Now NSR. Stable on tele. s/p CABG   -C/w home aspirin 81mg qday + atorvastatin 80mg qday + coreg 12.5mg BID  -F/u cards recs

## 2020-08-31 NOTE — PROGRESS NOTE ADULT - ASSESSMENT
65yo male with PMH significant for CAD (s/p CABG; most recent LHC showed stable disease with no intervention), CHF (LVEF 39% TTE 1/2020), Afib (on Eliquis), HTN, HLD, CKD III, CHER (uses CPAP), and pemphigus vulgaris presenting to ED with RLE swelling. Found to be septic in ED (hypotension, leukocytosis, elevated lactate, febrile). S/p Vanc/zosyn, 2L IVF in ED. Restarted home ativan 0.5mg at bedtime. C/w home zoloft. C/w CPAP at night for CHER. 63yo male with PMH significant for CAD (s/p CABG; most recent LHC showed stable disease with no intervention), CHF (LVEF 39% TTE 1/2020), Afib (on Eliquis), HTN, HLD, CKD III, CHER (uses CPAP), and pemphigus vulgaris presenting to ED with RLE swelling. Found to be septic in ED (hypotension, leukocytosis, elevated lactate, febrile). S/p Vanc/zosyn, 2L IVF in ED. Restarted home ativan 0.5mg at bedtime. C/w home zoloft. C/w CPAP at night for CHER.     RUQ ultrasound (8/31): HSM + mild nodularity c/f cirrhosis; No ascites; No focal lesions. Patient with h/o social drinking but no ETOH abuse. Likely cardiac vs FRANCOIS vs viral hep. Will do hepatitis w/u.

## 2020-08-31 NOTE — PROGRESS NOTE ADULT - SUBJECTIVE AND OBJECTIVE BOX
Patient is a 65y old  Male who presents with a chief complaint of RLE swelling (30 Aug 2020 09:46)    SUBJECTIVE / OVERNIGHT EVENTS: Patient seen and examined. No acute overnight events, no subjective complaints.    OBJECTIVE:  Vital Signs Last 24 Hrs  T(C): 37.2 (31 Aug 2020 04:32), Max: 37.2 (31 Aug 2020 04:32)  T(F): 98.9 (31 Aug 2020 04:32), Max: 98.9 (31 Aug 2020 04:32)  HR: 68 (31 Aug 2020 05:59) (62 - 71)  BP: 126/85 (31 Aug 2020 04:32) (111/72 - 129/77)  BP(mean): --  RR: 18 (31 Aug 2020 04:32) (18 - 18)  SpO2: 98% (31 Aug 2020 05:59) (95% - 100%)    I&O's Summary    29 Aug 2020 07:01  -  30 Aug 2020 07:00  --------------------------------------------------------  IN: 720 mL / OUT: 3300 mL / NET: -2580 mL    30 Aug 2020 07:01  -  31 Aug 2020 06:47  --------------------------------------------------------  IN: 480 mL / OUT: 1700 mL / NET: -1220 mL      PHYSICAL EXAM:   General: No acute distress, well-appearing  	  Eyes: EOMI   	  ENT: MMM, no oropharyngeal lesions or erythema appreciated   	  Pulm: No increased WOB. No wheezing. CTAB  	  CV: RRR. S1&S2+. No M/R/G appreciated.   	  Abdomen: +BS. Soft, NT. No organomegaly. Distended.   	  MSK: Limited ROM RLE.   	  Extremities: RLE 1+ pitting edema.   	  Neuro: A&Ox3, no focal deficits       Skin: Warm and dry. L hand wound with dressing in place.    Labs:  CAPILLARY BLOOD GLUCOSE                              8.9    11.04 )-----------( 457      ( 31 Aug 2020 06:09 )             See note    08-31    140  |  101  |  32<H>  ----------------------------<  72  3.8   |  23  |  2.37<H>    Ca    9.6      31 Aug 2020 06:09  Phos  4.2     08-31  Mg     2.3     08-31    TPro  7.0  /  Alb  3.6  /  TBili  0.5  /  DBili  x   /  AST  21  /  ALT  26  /  AlkPhos  116  08-31              Imaging Personally Reviewed:    Consultant(s) Notes Reviewed:   Care Discussed with Consultants/Other Providers:    MEDICATIONS  (STANDING):  apixaban 5 milliGRAM(s) Oral two times a day  aspirin  chewable 81 milliGRAM(s) Oral daily  atorvastatin 80 milliGRAM(s) Oral at bedtime  carvedilol 12.5 milliGRAM(s) Oral every 12 hours  furosemide    Tablet 40 milliGRAM(s) Oral daily  isosorbide   dinitrate Tablet (ISORDIL) 10 milliGRAM(s) Oral three times a day  LORazepam     Tablet 0.5 milliGRAM(s) Oral at bedtime  pantoprazole    Tablet 40 milliGRAM(s) Oral two times a day  predniSONE   Tablet 20 milliGRAM(s) Oral daily  sertraline 50 milliGRAM(s) Oral daily    MEDICATIONS  (PRN): Patient is a 65y old  Male who presents with a chief complaint of RLE swelling (30 Aug 2020 09:46)    SUBJECTIVE / OVERNIGHT EVENTS: NAEON. Patient unable to be seen this AM as he's gone for ultrasound. Will see patient later this afternoon.     OBJECTIVE:  Vital Signs Last 24 Hrs  T(C): 37.2 (31 Aug 2020 04:32), Max: 37.2 (31 Aug 2020 04:32)  T(F): 98.9 (31 Aug 2020 04:32), Max: 98.9 (31 Aug 2020 04:32)  HR: 68 (31 Aug 2020 05:59) (62 - 71)  BP: 126/85 (31 Aug 2020 04:32) (111/72 - 129/77)  BP(mean): --  RR: 18 (31 Aug 2020 04:32) (18 - 18)  SpO2: 98% (31 Aug 2020 05:59) (95% - 100%)    I&O's Summary    29 Aug 2020 07:01  -  30 Aug 2020 07:00  --------------------------------------------------------  IN: 720 mL / OUT: 3300 mL / NET: -2580 mL    30 Aug 2020 07:01  -  31 Aug 2020 06:47  --------------------------------------------------------  IN: 480 mL / OUT: 1700 mL / NET: -1220 mL      PHYSICAL EXAM:   General: No acute distress, well-appearing  	  Eyes: EOMI   	  ENT: MMM, no oropharyngeal lesions or erythema appreciated   	  Pulm: No increased WOB. No wheezing. CTAB  	  CV: RRR. S1&S2+. No M/R/G appreciated.   	  Abdomen: +BS. Soft, NT. No organomegaly. Distended.   	  MSK: Limited ROM RLE.   	  Extremities: RLE 1+ pitting edema.   	  Neuro: A&Ox3, no focal deficits       Skin: Warm and dry. L hand wound with dressing in place.    Labs:  CAPILLARY BLOOD GLUCOSE                              8.9    11.04 )-----------( 457      ( 31 Aug 2020 06:09 )             See note    08-31    140  |  101  |  32<H>  ----------------------------<  72  3.8   |  23  |  2.37<H>    Ca    9.6      31 Aug 2020 06:09  Phos  4.2     08-31  Mg     2.3     08-31    TPro  7.0  /  Alb  3.6  /  TBili  0.5  /  DBili  x   /  AST  21  /  ALT  26  /  AlkPhos  116  08-31              Imaging Personally Reviewed:    Consultant(s) Notes Reviewed:   Care Discussed with Consultants/Other Providers:    MEDICATIONS  (STANDING):  apixaban 5 milliGRAM(s) Oral two times a day  aspirin  chewable 81 milliGRAM(s) Oral daily  atorvastatin 80 milliGRAM(s) Oral at bedtime  carvedilol 12.5 milliGRAM(s) Oral every 12 hours  furosemide    Tablet 40 milliGRAM(s) Oral daily  isosorbide   dinitrate Tablet (ISORDIL) 10 milliGRAM(s) Oral three times a day  LORazepam     Tablet 0.5 milliGRAM(s) Oral at bedtime  pantoprazole    Tablet 40 milliGRAM(s) Oral two times a day  predniSONE   Tablet 20 milliGRAM(s) Oral daily  sertraline 50 milliGRAM(s) Oral daily    MEDICATIONS  (PRN): Patient is a 65y old  Male who presents with a chief complaint of RLE swelling (30 Aug 2020 09:46)    SUBJECTIVE / OVERNIGHT EVENTS: NAEON. Patient examined at bedside this AM. Saying he notes an improvement in his breathing and resolution of his peripheral edema. He's having some pain in his L hand wound, which is new for him. He says whenever the wound is redressed, he hasn't noticed a change in the wound's appearance or increased purulent discharge. Additionally, He had a RUQ ultrasound done this morning for evaluation of cirrhosis/ascites. Otherwise, denies CP, palpitations, inc SOB, abdominal pain.       Vital Signs Last 24 Hrs  T(C): 37.2 (31 Aug 2020 04:32), Max: 37.2 (31 Aug 2020 04:32)  T(F): 98.9 (31 Aug 2020 04:32), Max: 98.9 (31 Aug 2020 04:32)  HR: 68 (31 Aug 2020 05:59) (62 - 71)  BP: 126/85 (31 Aug 2020 04:32) (111/72 - 129/77)  BP(mean): --  RR: 18 (31 Aug 2020 04:32) (18 - 18)  SpO2: 98% (31 Aug 2020 05:59) (95% - 100%)    I&O's Summary    29 Aug 2020 07:01  -  30 Aug 2020 07:00  --------------------------------------------------------  IN: 720 mL / OUT: 3300 mL / NET: -2580 mL    30 Aug 2020 07:01  -  31 Aug 2020 06:47  --------------------------------------------------------  IN: 480 mL / OUT: 1700 mL / NET: -1220 mL      PHYSICAL EXAM:   General: No acute distress, well-appearing  	  Eyes: EOMI   	  ENT: MMM, no oropharyngeal lesions or erythema appreciated   	  Pulm: No increased WOB. No wheezing. CTAB  	  CV: RRR. S1&S2+. No M/R/G appreciated.   	  Abdomen: +BS. Soft, NT. No organomegaly. Distended.   	  MSK: Limited ROM RLE.   	  Extremities: RLE 1+ pitting edema, nontender. LLE no edema.  	  Neuro: A&Ox3, no focal deficits       Skin: Warm and dry. L hand wound with dressing in place.    Labs:  CAPILLARY BLOOD GLUCOSE                              8.9    11.04 )-----------( 457      ( 31 Aug 2020 06:09 )             See note    08-31    140  |  101  |  32<H>  ----------------------------<  72  3.8   |  23  |  2.37<H>    Ca    9.6      31 Aug 2020 06:09  Phos  4.2     08-31  Mg     2.3     08-31    TPro  7.0  /  Alb  3.6  /  TBili  0.5  /  DBili  x   /  AST  21  /  ALT  26  /  AlkPhos  116  08-31              Imaging Personally Reviewed:    Consultant(s) Notes Reviewed:   Care Discussed with Consultants/Other Providers:    MEDICATIONS  (STANDING):  apixaban 5 milliGRAM(s) Oral two times a day  aspirin  chewable 81 milliGRAM(s) Oral daily  atorvastatin 80 milliGRAM(s) Oral at bedtime  carvedilol 12.5 milliGRAM(s) Oral every 12 hours  furosemide    Tablet 40 milliGRAM(s) Oral daily  isosorbide   dinitrate Tablet (ISORDIL) 10 milliGRAM(s) Oral three times a day  LORazepam     Tablet 0.5 milliGRAM(s) Oral at bedtime  pantoprazole    Tablet 40 milliGRAM(s) Oral two times a day  predniSONE   Tablet 20 milliGRAM(s) Oral daily  sertraline 50 milliGRAM(s) Oral daily    MEDICATIONS  (PRN):

## 2020-08-31 NOTE — PROGRESS NOTE ADULT - PROBLEM SELECTOR PLAN 3
NARCISO on CKD III. Likely pre-renal in etiology. Cr elevated to 2.4 on admission. Baseline Cr 1.4-1.7. Improved with IVF and lasix was held. However, after CHF exacerbation requiring aggressive diuresis (8/27), patient's Cr is elevated to 2.15 on 8/28  -Continue to monitor daily BMPs   -Renally dose medications. NARCISO on CKD III. Likely pre-renal in etiology. Cr elevated to 2.4 on admission. Baseline Cr 1.4-1.7. Improved with IVF and lasix was held. However, after CHF exacerbation requiring aggressive diuresis (8/27), patient's Cr is elevated to 2.15 on 8/28  -8/31: Cr 2.37  -Continue to monitor daily BMPs   -Renally dose medications.

## 2020-08-31 NOTE — PROGRESS NOTE ADULT - PROBLEM SELECTOR PLAN 5
s/p CABG   -C/w home aspirin 81mg qday + atorvastatin 80mg qday + coreg 12.5mg BID -c/w Eliquis 5mg BID  -Restarted home coreg 12.5mg BID (8/26)  -EKG with Afib with RVR during RRT on 8/27. Now NSR. Stable on tele.

## 2020-08-31 NOTE — PROGRESS NOTE ADULT - SUBJECTIVE AND OBJECTIVE BOX
S: SOB improved - less SOB with ambulation to bathroom this morning. No chest pain. Review of systems otherwise (-)      MEDICATIONS  (STANDING):  apixaban 5 milliGRAM(s) Oral two times a day  aspirin  chewable 81 milliGRAM(s) Oral daily  atorvastatin 80 milliGRAM(s) Oral at bedtime  carvedilol 12.5 milliGRAM(s) Oral every 12 hours  furosemide    Tablet 40 milliGRAM(s) Oral daily  isosorbide   dinitrate Tablet (ISORDIL) 10 milliGRAM(s) Oral three times a day  LORazepam     Tablet 0.5 milliGRAM(s) Oral at bedtime  pantoprazole    Tablet 40 milliGRAM(s) Oral two times a day  predniSONE   Tablet 20 milliGRAM(s) Oral daily  sertraline 50 milliGRAM(s) Oral daily    MEDICATIONS  (PRN):      LABS:                            8.9    11.04 )-----------( 457      ( 31 Aug 2020 06:09 )             See note    Hemoglobin: 8.9 g/dL (08-31 @ 06:09)  Hemoglobin: 8.3 g/dL (08-30 @ 06:35)  Hemoglobin: 8.0 g/dL (08-29 @ 06:59)  Hemoglobin: 8.4 g/dL (08-28 @ 11:11)  Hemoglobin: 10.0 g/dL (08-27 @ 04:02)    08-31    140  |  101  |  32<H>  ----------------------------<  72  3.8   |  23  |  2.37<H>    Ca    9.6      31 Aug 2020 06:09  Phos  4.2     08-31  Mg     2.3     08-31    TPro  7.0  /  Alb  3.6  /  TBili  0.5  /  DBili  x   /  AST  21  /  ALT  26  /  AlkPhos  116  08-31    Creatinine Trend: 2.37<--, 2.35<--, 2.31<--, 2.12<--, 2.15<--, 2.08<--             08-30-20 @ 07:01  -  08-31-20 @ 07:00  --------------------------------------------------------  IN: 480 mL / OUT: 1700 mL / NET: -1220 mL    08-31-20 @ 07:01  -  08-31-20 @ 12:53  --------------------------------------------------------  IN: 360 mL / OUT: 0 mL / NET: 360 mL        PHYSICAL EXAM  Vital Signs Last 24 Hrs  T(C): 36.4 (31 Aug 2020 11:56), Max: 37.2 (31 Aug 2020 04:32)  T(F): 97.6 (31 Aug 2020 11:56), Max: 98.9 (31 Aug 2020 04:32)  HR: 66 (31 Aug 2020 11:56) (62 - 71)  BP: 131/79 (31 Aug 2020 11:56) (103/67 - 131/79)  BP(mean): --  RR: 18 (31 Aug 2020 11:56) (18 - 18)  SpO2: 95% (31 Aug 2020 11:56) (95% - 100%)      Gen: NAD  HEENT:  (-)icterus (-)pallor  CV: N S1 S2 1/6 KATHERYN (+)2 Pulses B/l  Resp: CTA B/L, normal effort  GI: (+) BS Soft, NT, ND  Lymph:  (+) R>LE Edema, (-)obvious lymphadenopathy  Skin: Warm to touch, Normal turgor  Psych: Appropriate mood and affect      TELEMETRY: SB/SR 50-70s, PVC    Echo: < from: TTE with Doppler (w/Cont) (08.27.20 @ 07:54) >  Conclusions:  1. Mitral valve not well visualized. Mitral annular  calcification. Tethered mitral valve leaflets with normal  opening. Mild moderate mitral regurgitation.  2. Calcified trileaflet aortic valve with decreased  opening. Peak transaortic valve gradient equals 12 mm Hg,  mean transaortic valve gradient equals 8 mm Hg, at least  mild AS. Limited gradient/Doppler evaluation. Mild-moderate  aortic regurgitation.  3. Endocardial visualization enhanced with intravenous  injection of Ultrasonic Enhancing Agent (Definity).  Moderate to severe  global left ventricular systolic  dysfunction. There dyssynchronous left ventricular  contraction.  4. Moderate diastolic dysfunction (Stage II). Increased  E/e'  is consistent with elevated left ventricular filling  pressure.  *** Compared with echocardiogram report of 12/13/2017,  Limited comparison.  Disatolic dysfunction is worse.    < end of copied text >      ASSESSMENT/PLAN: Patient is a 65 year old male with PMH of CAD s/p CABG with most recent LHC 1/2020 with no intervention, chronic systolic CHF (LVEF 39% TTE 1/2020), Afib (on Eliquis), HTN, HLD, CKD, CHER (on CPAP), and pemphigus vulgaris who presented with RLE swelling admitted with RLE cellulitis and NARCISO on CKD. Cardiology consulted for management of CAD/CHF.    - Volume status improving - transitioned to PO lasix  - Completed abx for cellulitis  - Recommend medical management of known CAD with systolic cardiomyopathy - continue ASA/Statin - Coreg/Isordil resumed, Entresto on hold per primary team  - Continue AC with Eliquis for CVA prevention  - Reviewed previous cath with interventional cardiology - no plans for repeat cath at this time  - EP eval with Dr. Feliciano regarding primary prevention ICD  - Recommend checking CHANDRA tomorrow to eval mitral valve as patient with mod-severe MR on previous echo  - Will consider heart transplant eval pending CHANDRA results  - Patient to f/u with Dr. Campbell's cardiology group after d/c     Cristi Jarvis PA-C  Pager: [phone]

## 2020-08-31 NOTE — PROGRESS NOTE ADULT - PROBLEM SELECTOR PLAN 2
Heart failure with reduced ejection fraction. TTE (1/2020): LVEF 39%, mod MR/AR  -isosorbide dinitrate, Entresto, lasix, and coreg held on admission 2/2 hypotension and NARCISO  -Home coreg restarted 8/26.   -8/27: Patient had an RRT after desatting to 77% overnight. Found to be significant pulmonary edema on CXR. EKG showed Afib with RVR and had runs of VT on tele. Lactate 8. Patient received IV lasix 40mg, amio, and transferred to CCU and placed on BiPAP.    -CCU course (8/27): transitioned from BiPAP to NC. Bumex 2. Parks for strict I/Os. Restarted home isordil and po lasix 40mg. Increased to IV lasix 40mg qday 8/29; now significantly improved resp status clinically and on bedside POCUS - will return to PO qd   -TTE (8/27): Worsening diastolic dysfunction. EF 35-45%. mild/mod MR. mild AS. mild/mod AR. mod-severe global LV dysfunction.   -Patient transitioned off NC. D/mery parks 8/28  -F/u cards recs: per d/w Dr Dodge, unlikely [facility], will consider ICD placement, TMA Heart failure with reduced ejection fraction. TTE (1/2020): LVEF 39%, mod MR/AR  -isosorbide dinitrate, Entresto, lasix, and coreg held on admission 2/2 hypotension and NARCISO  -Home coreg restarted 8/26.   -8/27: Patient had an RRT after desatting to 77% overnight. Found to be significant pulmonary edema on CXR. EKG showed Afib with RVR and had runs of VT on tele. Lactate 8. Patient received IV lasix 40mg, amio, and transferred to CCU and placed on BiPAP.    -CCU course (8/27): transitioned from BiPAP to NC. Bumex 2. Restarted home isordil and po lasix 40mg. Increased to IV lasix 40mg qday 8/29, then transitioned to po lasix 40mg qday.   -TTE (8/27): Worsening diastolic dysfunction. EF 35-45%. mild/mod MR. mild AS. mild/mod AR. mod-severe global LV dysfunction.   -Patient transitioned off NC. D/mery parks 8/28  -F/u cards recs: spoke with cards this AM. Pt evaluated by EP for possible AICD. Cards pending recs from interventionalist.

## 2020-09-01 LAB
ALBUMIN SERPL ELPH-MCNC: 3.7 G/DL — SIGNIFICANT CHANGE UP (ref 3.3–5)
ALP SERPL-CCNC: 109 U/L — SIGNIFICANT CHANGE UP (ref 40–120)
ALT FLD-CCNC: 23 U/L — SIGNIFICANT CHANGE UP (ref 10–45)
ANION GAP SERPL CALC-SCNC: 13 MMOL/L — SIGNIFICANT CHANGE UP (ref 5–17)
AST SERPL-CCNC: 18 U/L — SIGNIFICANT CHANGE UP (ref 10–40)
BILIRUB SERPL-MCNC: 0.5 MG/DL — SIGNIFICANT CHANGE UP (ref 0.2–1.2)
BUN SERPL-MCNC: 30 MG/DL — HIGH (ref 7–23)
CALCIUM SERPL-MCNC: 9.4 MG/DL — SIGNIFICANT CHANGE UP (ref 8.4–10.5)
CHLORIDE SERPL-SCNC: 102 MMOL/L — SIGNIFICANT CHANGE UP (ref 96–108)
CO2 SERPL-SCNC: 25 MMOL/L — SIGNIFICANT CHANGE UP (ref 22–31)
CREAT SERPL-MCNC: 2.29 MG/DL — HIGH (ref 0.5–1.3)
GLUCOSE SERPL-MCNC: 80 MG/DL — SIGNIFICANT CHANGE UP (ref 70–99)
HCT VFR BLD CALC: SIGNIFICANT CHANGE UP (ref 39–50)
HGB BLD-MCNC: 8.7 G/DL — LOW (ref 13–17)
MAGNESIUM SERPL-MCNC: 2.3 MG/DL — SIGNIFICANT CHANGE UP (ref 1.6–2.6)
MCHC RBC-ENTMCNC: SIGNIFICANT CHANGE UP (ref 27–34)
MCHC RBC-ENTMCNC: SIGNIFICANT CHANGE UP (ref 32–36)
MCV RBC AUTO: SIGNIFICANT CHANGE UP (ref 80–100)
NRBC # BLD: 0 /100 WBCS — SIGNIFICANT CHANGE UP (ref 0–0)
PHOSPHATE SERPL-MCNC: 4.8 MG/DL — HIGH (ref 2.5–4.5)
PLATELET # BLD AUTO: 441 K/UL — HIGH (ref 150–400)
POTASSIUM SERPL-MCNC: 3.8 MMOL/L — SIGNIFICANT CHANGE UP (ref 3.5–5.3)
POTASSIUM SERPL-SCNC: 3.8 MMOL/L — SIGNIFICANT CHANGE UP (ref 3.5–5.3)
PROT SERPL-MCNC: 6.9 G/DL — SIGNIFICANT CHANGE UP (ref 6–8.3)
RBC # BLD: 3.8 M/UL — LOW (ref 4.2–5.8)
RBC # FLD: SIGNIFICANT CHANGE UP (ref 10.3–14.5)
SODIUM SERPL-SCNC: 140 MMOL/L — SIGNIFICANT CHANGE UP (ref 135–145)
WBC # BLD: 10.36 K/UL — SIGNIFICANT CHANGE UP (ref 3.8–10.5)
WBC # FLD AUTO: 10.36 K/UL — SIGNIFICANT CHANGE UP (ref 3.8–10.5)

## 2020-09-01 PROCEDURE — 99233 SBSQ HOSP IP/OBS HIGH 50: CPT | Mod: GC

## 2020-09-01 PROCEDURE — 93312 ECHO TRANSESOPHAGEAL: CPT | Mod: 26

## 2020-09-01 PROCEDURE — 93325 DOPPLER ECHO COLOR FLOW MAPG: CPT | Mod: 26

## 2020-09-01 PROCEDURE — 93320 DOPPLER ECHO COMPLETE: CPT | Mod: 26

## 2020-09-01 RX ORDER — BENZOCAINE AND MENTHOL 5; 1 G/100ML; G/100ML
1 LIQUID ORAL THREE TIMES A DAY
Refills: 0 | Status: DISCONTINUED | OUTPATIENT
Start: 2020-09-01 | End: 2020-09-08

## 2020-09-01 RX ADMIN — Medication 81 MILLIGRAM(S): at 11:06

## 2020-09-01 RX ADMIN — BENZOCAINE AND MENTHOL 1 LOZENGE: 5; 1 LIQUID ORAL at 23:55

## 2020-09-01 RX ADMIN — CARVEDILOL PHOSPHATE 12.5 MILLIGRAM(S): 80 CAPSULE, EXTENDED RELEASE ORAL at 05:43

## 2020-09-01 RX ADMIN — CARVEDILOL PHOSPHATE 12.5 MILLIGRAM(S): 80 CAPSULE, EXTENDED RELEASE ORAL at 17:13

## 2020-09-01 RX ADMIN — ISOSORBIDE DINITRATE 10 MILLIGRAM(S): 5 TABLET ORAL at 05:43

## 2020-09-01 RX ADMIN — PANTOPRAZOLE SODIUM 40 MILLIGRAM(S): 20 TABLET, DELAYED RELEASE ORAL at 05:43

## 2020-09-01 RX ADMIN — ISOSORBIDE DINITRATE 10 MILLIGRAM(S): 5 TABLET ORAL at 21:05

## 2020-09-01 RX ADMIN — Medication 40 MILLIGRAM(S): at 05:43

## 2020-09-01 RX ADMIN — ATORVASTATIN CALCIUM 80 MILLIGRAM(S): 80 TABLET, FILM COATED ORAL at 21:05

## 2020-09-01 RX ADMIN — Medication 20 MILLIGRAM(S): at 05:43

## 2020-09-01 RX ADMIN — Medication 0.5 MILLIGRAM(S): at 21:05

## 2020-09-01 RX ADMIN — PANTOPRAZOLE SODIUM 40 MILLIGRAM(S): 20 TABLET, DELAYED RELEASE ORAL at 17:13

## 2020-09-01 RX ADMIN — APIXABAN 5 MILLIGRAM(S): 2.5 TABLET, FILM COATED ORAL at 17:13

## 2020-09-01 RX ADMIN — APIXABAN 5 MILLIGRAM(S): 2.5 TABLET, FILM COATED ORAL at 05:43

## 2020-09-01 RX ADMIN — SERTRALINE 50 MILLIGRAM(S): 25 TABLET, FILM COATED ORAL at 11:06

## 2020-09-01 NOTE — PROGRESS NOTE ADULT - PROBLEM SELECTOR PLAN 1
Likely multifactorial 2/2 cellulitis and CHF - Improving   -RLE Duplex negative for DVT (8/24)  -XR RLE (8/24) negative for fx. Soft tissue swelling/stranding around right ankle.   -S/p IV vanc/zosyn in ED. Vanc (8/24-30) completed. Likely multifactorial 2/2 cellulitis and CHF - Improved  -RLE Duplex negative for DVT (8/24)  -XR RLE (8/24) negative for fx. Soft tissue swelling/stranding around right ankle.   -S/p IV vanc/zosyn in ED. Vanc (8/24-30) completed.

## 2020-09-01 NOTE — PROGRESS NOTE ADULT - PROBLEM SELECTOR PLAN 3
NARCISO on CKD III. Likely pre-renal in etiology. Cr elevated to 2.4 on admission. Baseline Cr 1.4-1.7. Improved with IVF and lasix was held. However, after CHF exacerbation requiring aggressive diuresis (8/27), patient's Cr is elevated to 2.15 on 8/28  -8/31: Cr 2.37. Pending AM Cr.   -Continue to monitor daily BMPs   -Renally dose medications. NARCISO on CKD III. Likely pre-renal in etiology. Cr elevated to 2.4 on admission. Baseline Cr 1.4-1.7. Improved with IVF and lasix was held. However, after CHF exacerbation requiring aggressive diuresis (8/27), patient's Cr is elevated to 2.15 on 8/28  -Cr 2.29 today - Improving   -Continue to monitor daily BMPs   -Renally dose medications.

## 2020-09-01 NOTE — PROGRESS NOTE ADULT - PROBLEM SELECTOR PLAN 7
-abx (per above)   -Wound care saw patient (8/25). Redressed wound. Recommended plastics consult.   -Plastics consulted 8/26. Recommended local wound care and outpatient follow up (Dr. Marquez [phone])  -Will reevaluate wound today

## 2020-09-01 NOTE — PRE-ANESTHESIA EVALUATION ADULT - NSANTHPMHFT_GEN_ALL_CORE
Patient is a 65 year old male with PMH of CAD s/p CABG with most recent LHC 1/2020 with no intervention, chronic systolic CHF (LVEF 39% TTE 1/2020), Afib (on Eliquis), HTN, HLD, CKD, CHER (on CPAP), and pemphigus vulgaris who presented with RLE swelling admitted with RLE cellulitis and NARCISO on CKD.  CABG 2005, coronary stent X2 2010   Pt was SOB on admission, now SOB resolbed, pt walks in the osorio with PT no CP/SOB

## 2020-09-01 NOTE — PROGRESS NOTE ADULT - ATTENDING COMMENTS
Ep recommendations appreciated. Currently not candidate for ICD placement.  Plan for CHANDRA today to assess mitral valve  f/u further cards recommendations. May require advanced heart failure consultation given cardiology consideration for heart transplant  Cr slowly improving. c/w current dose diuretics. Entresto remains held

## 2020-09-01 NOTE — PROGRESS NOTE ADULT - ATTENDING COMMENTS
Agree with above assessment and plan as outlined above.    - CHANDRA with severe MR  - Plan for structural heart eval in AM    John Dodge MD, [facility]  BEEPER [phone]

## 2020-09-01 NOTE — PROGRESS NOTE ADULT - SUBJECTIVE AND OBJECTIVE BOX
EP Attending    HISTORY OF PRESENT ILLNESS:   Mr Colon is a 65yoM who presents for acute onset shortness of breath.  His EF is 35-40% chronically (1/2020 and 8/2020), due to ischemic cardiomyopathy.  He is s/p CABG and multiple subsequent PCI. He only has 1 graft open- his LIMA to LAD, and his native vessels are otherwise occluded.  Also has AFib, on apixaban, HTN, CKD, and CHER on CPAP.   He has had multiple episodes of flash pulmonary edema.    9/1- No angina, orthopnea, PND or palpitations.  He is short of breath on minimal exertion, walking across his hospital room with a walker (NYHA IIIB).  No fainting or near-fainting episodes.  telemetry after writing my note yesterday had 9 beats of NSVT.  ROS x 10 systems otherwise negative.    apixaban 5 milliGRAM(s) Oral two times a day  aspirin  chewable 81 milliGRAM(s) Oral daily  atorvastatin 80 milliGRAM(s) Oral at bedtime  carvedilol 12.5 milliGRAM(s) Oral every 12 hours  furosemide    Tablet 40 milliGRAM(s) Oral daily  isosorbide   dinitrate Tablet (ISORDIL) 10 milliGRAM(s) Oral three times a day  LORazepam     Tablet 0.5 milliGRAM(s) Oral at bedtime  pantoprazole    Tablet 40 milliGRAM(s) Oral two times a day  predniSONE   Tablet 20 milliGRAM(s) Oral daily  sertraline 50 milliGRAM(s) Oral daily                            8.7    10.36 )-----------( 441      ( 01 Sep 2020 05:45 )             See note      09-01    140  |  102  |  30<H>  ----------------------------<  80  3.8   |  25  |  2.29<H>    Ca    9.4      01 Sep 2020 05:45  Phos  4.8     09-01  Mg     2.3     09-01    TPro  6.9  /  Alb  3.7  /  TBili  0.5  /  DBili  x   /  AST  18  /  ALT  23  /  AlkPhos  109  09-01    T(C): 36.7 (09-01-20 @ 13:35), Max: 36.7 (09-01-20 @ 04:25)  HR: 66 (09-01-20 @ 15:16) (57 - 73)  BP: 145/84 (09-01-20 @ 13:35) (116/57 - 145/84)  RR: 18 (09-01-20 @ 13:35) (18 - 18)  SpO2: 97% (09-01-20 @ 15:16) (96% - 99%)  Wt(kg): --    I&O's Summary    31 Aug 2020 07:01  -  01 Sep 2020 07:00  --------------------------------------------------------  IN: 720 mL / OUT: 1000 mL / NET: -280 mL    01 Sep 2020 07:01  -  01 Sep 2020 15:50  --------------------------------------------------------  IN: 0 mL / OUT: 850 mL / NET: -850 mL      Appearance: Robust white male in no acute distress, but short-of-breath.  HEENT:   Normal oral mucosa, PERRL, EOMI	  Lymphatic: No lymphadenopathy , no edema  Cardiovascular: Normal S1 S2, mild JVD, No murmurs , Peripheral pulses palpable 2+ bilaterally  Respiratory: Lungs clear to auscultation, normal effort 	  Gastrointestinal:  Soft, Non-tender, + BS	  Skin: No rashes, No ecchymoses, No cyanosis, warm to touch  Musculoskeletal: Normal range of motion, normal strength  Psychiatry:  Mood & affect appropriate    TELEMETRY: NSR, 9 beat run of NSVT on 8/31.  ECG:  	NSR, interpolated PVC's.  Echo: EF 35-40%, LV dilated to upper limit of normal, severe diastolic dysfunction, moderate Mitral regurgitation.  Cath: Prior with only LIMA to LAD patent. Native vessels occluded.	    ASSESSMENT/PLAN: 	65y Male with an ischemic cardiomyopathy, EF 35-40%, and increasing frequency of episodes of flash pulmonary edema.    Soon after writing the initial consult note, the patient indeed had a run of nonsustained ventricular tachycardia (9 beats).  Telemetry technician printed and saved the strip.    He is not a candidate for biventricular pacing.  If there is anything to be done to improve his symptom status and longevity, he qualifies for an EP Study for VT induction, with ICD insertion if positive.    Needs evaluation for valve abnormalities and eventual CHF team consult.    Will schedule EP study +/- ICD for closer to discharge once workup is completed.    Wei Feliciano M.D.  Cardiac Electrophysiology    office [phone]  pager [phone]

## 2020-09-01 NOTE — PROGRESS NOTE ADULT - PROBLEM SELECTOR PLAN 2
Heart failure with reduced ejection fraction. TTE (1/2020): LVEF 39%, mod MR/AR  -isosorbide dinitrate, Entresto, lasix, and coreg held on admission 2/2 hypotension and NARCISO  -Home coreg restarted 8/26.   -8/27: Patient had an RRT after desatting to 77% overnight. Found to be significant pulmonary edema on CXR. EKG showed Afib with RVR and had runs of VT on tele. Lactate 8. Patient received IV lasix 40mg, amio, and transferred to CCU and placed on BiPAP.    -CCU course (8/27): transitioned from BiPAP to NC. Bumex 2. Restarted home isordil and po lasix 40mg. Increased to IV lasix 40mg qday 8/29, then transitioned to po lasix 40mg qday.   -TTE (8/27): Worsening diastolic dysfunction. EF 35-45%. mild/mod MR. mild AS. mild/mod AR. mod-severe global LV dysfunction.   -Patient transitioned off NC. D/mery parks 8/28  -F/u cards recs: Pt will get CHANDRA today and pending results will possibly be evaluated for heart transplant. EP evaluated pt for AICD, no. Pt will not get cath. Heart failure with reduced ejection fraction. TTE (1/2020): LVEF 39%, mod MR/AR  -isosorbide dinitrate, Entresto, lasix, and coreg held on admission 2/2 hypotension and NARCISO  -Home coreg restarted 8/26.   -8/27: Patient had an RRT after desatting to 77% overnight. Found to be significant pulmonary edema on CXR. EKG showed Afib with RVR and had runs of VT on tele. Lactate 8. Patient received IV lasix 40mg, amio, and transferred to CCU and placed on BiPAP.    -CCU course (8/27): transitioned from BiPAP to NC. Bumex 2. Restarted home isordil and po lasix 40mg. Increased to IV lasix 40mg qday 8/29, then transitioned to po lasix 40mg qday.   -TTE (8/27): Worsening diastolic dysfunction. EF 35-45%. mild/mod MR. mild AS. mild/mod AR. mod-severe global LV dysfunction.   -Patient transitioned off NC. D/mery parks 8/28  -F/u cards recs: Pt will get CHANDRA today and pending results will possibly be evaluated for heart transplant. EP evaluated pt for AICD, pt has no indication for AICD/BiV. Pt will not get cath. Combined Systolic and diastolic Heart failure. TTE (1/2020): LVEF 39%, mod MR/AR  -isosorbide dinitrate, Entresto, lasix, and coreg held on admission 2/2 hypotension and NARCISO  -Home coreg restarted 8/26.   -8/27: Patient had an RRT after desatting to 77% overnight. Found to be significant pulmonary edema on CXR. EKG showed Afib with RVR and had runs of VT on tele. Lactate 8. Patient received IV lasix 40mg, amio, and transferred to CCU and placed on BiPAP.    -CCU course (8/27): transitioned from BiPAP to NC. Bumex 2. Restarted home isordil and po lasix 40mg. Increased to IV lasix 40mg qday 8/29, then transitioned to po lasix 40mg qday.   -TTE (8/27): Worsening diastolic dysfunction. EF 35-45%. mild/mod MR. mild AS. mild/mod AR. mod-severe global LV dysfunction.   -Patient transitioned off NC. D/mery parks 8/28  -F/u cards recs: Pt will get CHANDRA today and pending results will possibly be evaluated for heart transplant. EP evaluated pt for AICD, pt has no indication for AICD/BiV. Pt will not get cath.

## 2020-09-01 NOTE — PROGRESS NOTE ADULT - SUBJECTIVE AND OBJECTIVE BOX
S: SOB improved, able to lay flat and reports walking in hallway with PT comfortably. No chest pain. Review of systems otherwise (-)      MEDICATIONS  (STANDING):  apixaban 5 milliGRAM(s) Oral two times a day  aspirin  chewable 81 milliGRAM(s) Oral daily  atorvastatin 80 milliGRAM(s) Oral at bedtime  carvedilol 12.5 milliGRAM(s) Oral every 12 hours  furosemide    Tablet 40 milliGRAM(s) Oral daily  isosorbide   dinitrate Tablet (ISORDIL) 10 milliGRAM(s) Oral three times a day  LORazepam     Tablet 0.5 milliGRAM(s) Oral at bedtime  pantoprazole    Tablet 40 milliGRAM(s) Oral two times a day  predniSONE   Tablet 20 milliGRAM(s) Oral daily  sertraline 50 milliGRAM(s) Oral daily    MEDICATIONS  (PRN):      LABS:                            8.7    10.36 )-----------( 441      ( 01 Sep 2020 05:45 )             See note    Hemoglobin: 8.7 g/dL (09-01 @ 05:45)  Hemoglobin: 8.9 g/dL (08-31 @ 06:09)  Hemoglobin: 8.3 g/dL (08-30 @ 06:35)  Hemoglobin: 8.0 g/dL (08-29 @ 06:59)  Hemoglobin: 8.4 g/dL (08-28 @ 11:11)    09-01    140  |  102  |  30<H>  ----------------------------<  80  3.8   |  25  |  2.29<H>    Ca    9.4      01 Sep 2020 05:45  Phos  4.8     09-01  Mg     2.3     09-01    TPro  6.9  /  Alb  3.7  /  TBili  0.5  /  DBili  x   /  AST  18  /  ALT  23  /  AlkPhos  109  09-01    Creatinine Trend: 2.29<--, 2.37<--, 2.35<--, 2.31<--, 2.12<--, 2.15<--             08-31-20 @ 07:01  -  09-01-20 @ 07:00  --------------------------------------------------------  IN: 720 mL / OUT: 1000 mL / NET: -280 mL    09-01-20 @ 07:01  -  09-01-20 @ 12:38  --------------------------------------------------------  IN: 0 mL / OUT: 450 mL / NET: -450 mL        PHYSICAL EXAM  Vital Signs Last 24 Hrs  T(C): 36.7 (01 Sep 2020 12:17), Max: 36.7 (01 Sep 2020 04:25)  T(F): 98 (01 Sep 2020 12:17), Max: 98.1 (01 Sep 2020 04:25)  HR: 65 (01 Sep 2020 12:17) (57 - 73)  BP: 145/84 (01 Sep 2020 12:17) (116/57 - 145/84)  BP(mean): --  RR: 18 (01 Sep 2020 12:17) (18 - 18)  SpO2: 97% (01 Sep 2020 12:17) (96% - 99%)        Gen: NAD  HEENT:  (-)icterus (-)pallor  CV: N S1 S2 1/6 KATHERYN (+)2 Pulses B/l  Resp: CTA B/L, normal effort  GI: (+) BS Soft, NT, ND  Lymph:  (-) edema, (-)obvious lymphadenopathy  Skin: Warm to touch, Normal turgor  Psych: Appropriate mood and affect      TELEMETRY: SB/SR 50-70s    Echo: < from: TTE with Doppler (w/Cont) (08.27.20 @ 07:54) >  Conclusions:  1. Mitral valve not well visualized. Mitral annular  calcification. Tethered mitral valve leaflets with normal  opening. Mild moderate mitral regurgitation.  2. Calcified trileaflet aortic valve with decreased  opening. Peak transaortic valve gradient equals 12 mm Hg,  mean transaortic valve gradient equals 8 mm Hg, at least  mild AS. Limited gradient/Doppler evaluation. Mild-moderate  aortic regurgitation.  3. Endocardial visualization enhanced with intravenous  injection of Ultrasonic Enhancing Agent (Definity).  Moderate to severe  global left ventricular systolic  dysfunction. There dyssynchronous left ventricular  contraction.  4. Moderate diastolic dysfunction (Stage II). Increased  E/e'  is consistent with elevated left ventricular filling  pressure.  *** Compared with echocardiogram report of 12/13/2017,  Limited comparison.  Disatolic dysfunction is worse.    < end of copied text >      ASSESSMENT/PLAN: Patient is a 65 year old male with PMH of CAD s/p CABG with most recent LHC 1/2020 with no intervention, chronic systolic CHF (LVEF 39% TTE 1/2020), Afib (on Eliquis), HTN, HLD, CKD, CHER (on CPAP), and pemphigus vulgaris who presented with RLE swelling admitted with RLE cellulitis and NARCISO on CKD. Cardiology consulted for management of CAD/CHF.    - Volume status improved - continue PO lasix 40mg daily for now  - Completed abx for cellulitis  - Recommend medical management of known CAD with systolic cardiomyopathy - continue ASA/Statin and Coreg/Isordil, Entresto on hold per primary team  - Continue AC with Eliquis for CVA prevention  - Reviewed previous cath with interventional cardiology - no plans for repeat cath at this time  - EP consult regarding ICD appreciated  - F/u CHANDRA today to eval mitral valve as patient with mod-severe MR on previous echo  - Will consider heart transplant eval pending CHANDRA results  - Patient to f/u with Dr. Campbell's cardiology group after d/c     Cristi Jarvis PA-C  Pager: [phone]

## 2020-09-01 NOTE — PROGRESS NOTE ADULT - PROBLEM SELECTOR PLAN 8
Now resolved . initially Febrile (100.6) on admission + Leukocytosis (WBC 12.27) + Elevated lactate 2.4 + Hypotensive (SBP 100s) on admission. RLE cellulitis vs LUE wound as possible sources of infection  -s/p 2L IVF in ED. BP's improved with normalized lactate (0.7)  -abx (as per above)   -Bcx (8/24) negative. Repeat (8/27) GPC in clusters.

## 2020-09-01 NOTE — PROGRESS NOTE ADULT - ASSESSMENT
63yo male with PMH significant for CAD (s/p CABG; most recent LHC showed stable disease with no intervention), CHF (LVEF 39% TTE 1/2020), Afib (on Eliquis), HTN, HLD, CKD III, CHER (uses CPAP), and pemphigus vulgaris presenting to ED with RLE swelling. Found to be septic in ED (hypotension, leukocytosis, elevated lactate, febrile). S/p Vanc/zosyn, 2L IVF in ED. Restarted home ativan 0.5mg at bedtime. C/w home zoloft. C/w CPAP at night for CHER.     RUQ ultrasound (8/31): HSM + mild nodularity c/f cirrhosis; No ascites; No focal lesions. Patient with h/o social drinking but no ETOH abuse. Likely cardiac vs FRANCOSI vs viral hep. Will do hepatitis w/u.

## 2020-09-01 NOTE — PROGRESS NOTE ADULT - SUBJECTIVE AND OBJECTIVE BOX
Patient is a 65y old  Male who presents with a chief complaint of RLE swelling (31 Aug 2020 14:09)    SUBJECTIVE / OVERNIGHT EVENTS: NAEON. Patient examined at bedside this AM, with no subjective complaints. Denies fever/chills, inc SOB, CP, palpitations, LE edema.     OBJECTIVE:  Vital Signs Last 24 Hrs  T(C): 36.7 (01 Sep 2020 04:25), Max: 36.7 (01 Sep 2020 04:25)  T(F): 98.1 (01 Sep 2020 04:25), Max: 98.1 (01 Sep 2020 04:25)  HR: 65 (01 Sep 2020 06:42) (57 - 73)  BP: 138/86 (01 Sep 2020 05:42) (103/67 - 138/86)  BP(mean): --  RR: 18 (01 Sep 2020 04:25) (18 - 18)  SpO2: 97% (01 Sep 2020 06:42) (95% - 99%)    I&O's Summary    30 Aug 2020 07:01  -  31 Aug 2020 07:00  --------------------------------------------------------  IN: 480 mL / OUT: 1700 mL / NET: -1220 mL    31 Aug 2020 07:01  -  01 Sep 2020 06:50  --------------------------------------------------------  IN: 720 mL / OUT: 1000 mL / NET: -280 mL      PHYSICAL EXAM:      General: No acute distress, well-appearing  	  Eyes: EOMI   	  ENT: MMM, no oropharyngeal lesions or erythema appreciated   	  Pulm: No increased WOB. No wheezing. CTAB  	  CV: RRR. S1&S2+. No M/R/G appreciated.   	  Abdomen: +BS. Soft, NT. No organomegaly. Distended.   	  MSK: Limited ROM RLE.   	  Extremities: RLE 1+ pitting edema, nontender. LLE no edema.  	  Neuro: A&Ox3, no focal deficits       Skin: Warm and dry. L hand wound with dressing in place.    Labs:  CAPILLARY BLOOD GLUCOSE                              8.7    10.36 )-----------( 441      ( 01 Sep 2020 05:45 )             See note    09-01    140  |  102  |  30<H>  ----------------------------<  80  3.8   |  25  |  2.29<H>    Ca    9.4      01 Sep 2020 05:45  Phos  4.8     09-01  Mg     2.3     09-01    TPro  6.9  /  Alb  3.7  /  TBili  0.5  /  DBili  x   /  AST  18  /  ALT  23  /  AlkPhos  109  09-01              Imaging Personally Reviewed:    Consultant(s) Notes Reviewed:   Care Discussed with Consultants/Other Providers:    MEDICATIONS  (STANDING):  apixaban 5 milliGRAM(s) Oral two times a day  aspirin  chewable 81 milliGRAM(s) Oral daily  atorvastatin 80 milliGRAM(s) Oral at bedtime  carvedilol 12.5 milliGRAM(s) Oral every 12 hours  furosemide    Tablet 40 milliGRAM(s) Oral daily  isosorbide   dinitrate Tablet (ISORDIL) 10 milliGRAM(s) Oral three times a day  LORazepam     Tablet 0.5 milliGRAM(s) Oral at bedtime  pantoprazole    Tablet 40 milliGRAM(s) Oral two times a day  predniSONE   Tablet 20 milliGRAM(s) Oral daily  sertraline 50 milliGRAM(s) Oral daily    MEDICATIONS  (PRN): Patient is a 65y old  Male who presents with a chief complaint of RLE swelling (31 Aug 2020 14:09)    SUBJECTIVE / OVERNIGHT EVENTS: NAEON. Patient NPO for CHANDRA this AM. Examined at bedside, with no subjective complaints. Denies fever/chills, inc SOB, CP, palpitations, LE edema.     OBJECTIVE:  Vital Signs Last 24 Hrs  T(C): 36.7 (01 Sep 2020 04:25), Max: 36.7 (01 Sep 2020 04:25)  T(F): 98.1 (01 Sep 2020 04:25), Max: 98.1 (01 Sep 2020 04:25)  HR: 65 (01 Sep 2020 06:42) (57 - 73)  BP: 138/86 (01 Sep 2020 05:42) (103/67 - 138/86)  BP(mean): --  RR: 18 (01 Sep 2020 04:25) (18 - 18)  SpO2: 97% (01 Sep 2020 06:42) (95% - 99%)    I&O's Summary    30 Aug 2020 07:01  -  31 Aug 2020 07:00  --------------------------------------------------------  IN: 480 mL / OUT: 1700 mL / NET: -1220 mL    31 Aug 2020 07:01  -  01 Sep 2020 06:50  --------------------------------------------------------  IN: 720 mL / OUT: 1000 mL / NET: -280 mL      PHYSICAL EXAM:      General: No acute distress, well-appearing  	  Eyes: EOMI   	  ENT: MMM, no oropharyngeal lesions or erythema appreciated   	  Pulm: No increased WOB. No wheezing. CTAB  	  CV: RRR. S1&S2+. No M/R/G appreciated.   	  Abdomen: +BS. Soft, NT. No organomegaly. Distended.   	  MSK: Limited ROM RLE.   	  Extremities: RLE 1+ edema, nonpitting, nontender. LLE no edema.  	  Neuro: A&Ox3, no focal deficits       Skin: Warm and dry. L hand wound with dressing in place.    Labs:  CAPILLARY BLOOD GLUCOSE                              8.7    10.36 )-----------( 441      ( 01 Sep 2020 05:45 )             See note    09-01    140  |  102  |  30<H>  ----------------------------<  80  3.8   |  25  |  2.29<H>    Ca    9.4      01 Sep 2020 05:45  Phos  4.8     09-01  Mg     2.3     09-01    TPro  6.9  /  Alb  3.7  /  TBili  0.5  /  DBili  x   /  AST  18  /  ALT  23  /  AlkPhos  109  09-01              Imaging Personally Reviewed:    Consultant(s) Notes Reviewed:   Care Discussed with Consultants/Other Providers:    MEDICATIONS  (STANDING):  apixaban 5 milliGRAM(s) Oral two times a day  aspirin  chewable 81 milliGRAM(s) Oral daily  atorvastatin 80 milliGRAM(s) Oral at bedtime  carvedilol 12.5 milliGRAM(s) Oral every 12 hours  furosemide    Tablet 40 milliGRAM(s) Oral daily  isosorbide   dinitrate Tablet (ISORDIL) 10 milliGRAM(s) Oral three times a day  LORazepam     Tablet 0.5 milliGRAM(s) Oral at bedtime  pantoprazole    Tablet 40 milliGRAM(s) Oral two times a day  predniSONE   Tablet 20 milliGRAM(s) Oral daily  sertraline 50 milliGRAM(s) Oral daily    MEDICATIONS  (PRN):

## 2020-09-02 DIAGNOSIS — I34.0 NONRHEUMATIC MITRAL (VALVE) INSUFFICIENCY: ICD-10-CM

## 2020-09-02 DIAGNOSIS — N28.9 DISORDER OF KIDNEY AND URETER, UNSPECIFIED: ICD-10-CM

## 2020-09-02 DIAGNOSIS — I50.23 ACUTE ON CHRONIC SYSTOLIC (CONGESTIVE) HEART FAILURE: ICD-10-CM

## 2020-09-02 LAB
ALBUMIN SERPL ELPH-MCNC: 3.7 G/DL — SIGNIFICANT CHANGE UP (ref 3.3–5)
ALP SERPL-CCNC: 112 U/L — SIGNIFICANT CHANGE UP (ref 40–120)
ALT FLD-CCNC: 24 U/L — SIGNIFICANT CHANGE UP (ref 10–45)
ANION GAP SERPL CALC-SCNC: 14 MMOL/L — SIGNIFICANT CHANGE UP (ref 5–17)
ANION GAP SERPL CALC-SCNC: 16 MMOL/L — SIGNIFICANT CHANGE UP (ref 5–17)
AST SERPL-CCNC: 18 U/L — SIGNIFICANT CHANGE UP (ref 10–40)
BILIRUB SERPL-MCNC: 0.6 MG/DL — SIGNIFICANT CHANGE UP (ref 0.2–1.2)
BUN SERPL-MCNC: 32 MG/DL — HIGH (ref 7–23)
BUN SERPL-MCNC: 35 MG/DL — HIGH (ref 7–23)
CALCIUM SERPL-MCNC: 9.4 MG/DL — SIGNIFICANT CHANGE UP (ref 8.4–10.5)
CALCIUM SERPL-MCNC: 9.5 MG/DL — SIGNIFICANT CHANGE UP (ref 8.4–10.5)
CHLORIDE SERPL-SCNC: 100 MMOL/L — SIGNIFICANT CHANGE UP (ref 96–108)
CHLORIDE SERPL-SCNC: 102 MMOL/L — SIGNIFICANT CHANGE UP (ref 96–108)
CO2 SERPL-SCNC: 23 MMOL/L — SIGNIFICANT CHANGE UP (ref 22–31)
CO2 SERPL-SCNC: 25 MMOL/L — SIGNIFICANT CHANGE UP (ref 22–31)
CREAT SERPL-MCNC: 2.37 MG/DL — HIGH (ref 0.5–1.3)
CREAT SERPL-MCNC: 2.53 MG/DL — HIGH (ref 0.5–1.3)
GLUCOSE SERPL-MCNC: 140 MG/DL — HIGH (ref 70–99)
GLUCOSE SERPL-MCNC: 97 MG/DL — SIGNIFICANT CHANGE UP (ref 70–99)
HAV IGM SER-ACNC: SIGNIFICANT CHANGE UP
HBV CORE IGM SER-ACNC: SIGNIFICANT CHANGE UP
HBV SURFACE AG SER-ACNC: SIGNIFICANT CHANGE UP
HCT VFR BLD CALC: 33 % — LOW (ref 39–50)
HGB BLD-MCNC: 9.2 G/DL — LOW (ref 13–17)
MAGNESIUM SERPL-MCNC: 2.3 MG/DL — SIGNIFICANT CHANGE UP (ref 1.6–2.6)
MAGNESIUM SERPL-MCNC: 2.4 MG/DL — SIGNIFICANT CHANGE UP (ref 1.6–2.6)
MCHC RBC-ENTMCNC: 23.1 PG — LOW (ref 27–34)
MCHC RBC-ENTMCNC: 27.9 GM/DL — LOW (ref 32–36)
MCV RBC AUTO: 82.7 FL — SIGNIFICANT CHANGE UP (ref 80–100)
NRBC # BLD: 0 /100 WBCS — SIGNIFICANT CHANGE UP (ref 0–0)
PHOSPHATE SERPL-MCNC: 4.8 MG/DL — HIGH (ref 2.5–4.5)
PLATELET # BLD AUTO: 449 K/UL — HIGH (ref 150–400)
POTASSIUM SERPL-MCNC: 3.3 MMOL/L — LOW (ref 3.5–5.3)
POTASSIUM SERPL-MCNC: 4.2 MMOL/L — SIGNIFICANT CHANGE UP (ref 3.5–5.3)
POTASSIUM SERPL-SCNC: 3.3 MMOL/L — LOW (ref 3.5–5.3)
POTASSIUM SERPL-SCNC: 4.2 MMOL/L — SIGNIFICANT CHANGE UP (ref 3.5–5.3)
PROT SERPL-MCNC: 7 G/DL — SIGNIFICANT CHANGE UP (ref 6–8.3)
RBC # BLD: 3.99 M/UL — LOW (ref 4.2–5.8)
RBC # FLD: 18.8 % — HIGH (ref 10.3–14.5)
SODIUM SERPL-SCNC: 139 MMOL/L — SIGNIFICANT CHANGE UP (ref 135–145)
SODIUM SERPL-SCNC: 141 MMOL/L — SIGNIFICANT CHANGE UP (ref 135–145)
WBC # BLD: 10.15 K/UL — SIGNIFICANT CHANGE UP (ref 3.8–10.5)
WBC # FLD AUTO: 10.15 K/UL — SIGNIFICANT CHANGE UP (ref 3.8–10.5)

## 2020-09-02 PROCEDURE — 99222 1ST HOSP IP/OBS MODERATE 55: CPT

## 2020-09-02 PROCEDURE — 99233 SBSQ HOSP IP/OBS HIGH 50: CPT | Mod: GC

## 2020-09-02 PROCEDURE — 99223 1ST HOSP IP/OBS HIGH 75: CPT

## 2020-09-02 RX ORDER — POTASSIUM CHLORIDE 20 MEQ
20 PACKET (EA) ORAL ONCE
Refills: 0 | Status: COMPLETED | OUTPATIENT
Start: 2020-09-02 | End: 2020-09-02

## 2020-09-02 RX ORDER — ISOSORBIDE DINITRATE 5 MG/1
10 TABLET ORAL THREE TIMES A DAY
Refills: 0 | Status: DISCONTINUED | OUTPATIENT
Start: 2020-09-02 | End: 2020-09-10

## 2020-09-02 RX ORDER — HYDRALAZINE HCL 50 MG
10 TABLET ORAL THREE TIMES A DAY
Refills: 0 | Status: DISCONTINUED | OUTPATIENT
Start: 2020-09-02 | End: 2020-09-03

## 2020-09-02 RX ORDER — CARVEDILOL PHOSPHATE 80 MG/1
12.5 CAPSULE, EXTENDED RELEASE ORAL EVERY 12 HOURS
Refills: 0 | Status: DISCONTINUED | OUTPATIENT
Start: 2020-09-02 | End: 2020-09-13

## 2020-09-02 RX ORDER — HYDRALAZINE HCL 50 MG
10 TABLET ORAL EVERY 8 HOURS
Refills: 0 | Status: DISCONTINUED | OUTPATIENT
Start: 2020-09-02 | End: 2020-09-02

## 2020-09-02 RX ADMIN — ISOSORBIDE DINITRATE 10 MILLIGRAM(S): 5 TABLET ORAL at 21:43

## 2020-09-02 RX ADMIN — APIXABAN 5 MILLIGRAM(S): 2.5 TABLET, FILM COATED ORAL at 05:11

## 2020-09-02 RX ADMIN — Medication 40 MILLIGRAM(S): at 05:11

## 2020-09-02 RX ADMIN — ISOSORBIDE DINITRATE 10 MILLIGRAM(S): 5 TABLET ORAL at 13:01

## 2020-09-02 RX ADMIN — Medication 81 MILLIGRAM(S): at 11:26

## 2020-09-02 RX ADMIN — PANTOPRAZOLE SODIUM 40 MILLIGRAM(S): 20 TABLET, DELAYED RELEASE ORAL at 17:10

## 2020-09-02 RX ADMIN — SERTRALINE 50 MILLIGRAM(S): 25 TABLET, FILM COATED ORAL at 11:26

## 2020-09-02 RX ADMIN — ATORVASTATIN CALCIUM 80 MILLIGRAM(S): 80 TABLET, FILM COATED ORAL at 21:43

## 2020-09-02 RX ADMIN — APIXABAN 5 MILLIGRAM(S): 2.5 TABLET, FILM COATED ORAL at 17:09

## 2020-09-02 RX ADMIN — Medication 10 MILLIGRAM(S): at 21:48

## 2020-09-02 RX ADMIN — Medication 20 MILLIGRAM(S): at 05:11

## 2020-09-02 RX ADMIN — PANTOPRAZOLE SODIUM 40 MILLIGRAM(S): 20 TABLET, DELAYED RELEASE ORAL at 05:13

## 2020-09-02 RX ADMIN — Medication 20 MILLIEQUIVALENT(S): at 08:43

## 2020-09-02 RX ADMIN — Medication 0.5 MILLIGRAM(S): at 21:48

## 2020-09-02 RX ADMIN — CARVEDILOL PHOSPHATE 12.5 MILLIGRAM(S): 80 CAPSULE, EXTENDED RELEASE ORAL at 17:09

## 2020-09-02 RX ADMIN — ISOSORBIDE DINITRATE 10 MILLIGRAM(S): 5 TABLET ORAL at 05:11

## 2020-09-02 NOTE — CONSULT NOTE ADULT - PROBLEM SELECTOR RECOMMENDATION 9
- Please give lasix 40mg IV x 1 now. Hold further diuretics in the AM. Will reassess.  - If not improving will consider RHC  - Start hydralazine 10mg TID and continue ISDN 10 TID, hold for SBP < 90  - continue coreg 12.5mg BID, hold for HR < 50  - Deferring ARNI/MRA at this time due to renal dysfunction  - Repeat proBNP with AM labs  - 1.5L fluid restriction  - Daily standing weight, strict I/Os  - Daily BMP, Mg. Maintain K 4-4.5 and Mg 2-2.5
- Mitraclip evaluation in progress  - CHANDRA done and showed severe MR which may be overestimated due to volume overloaded status at the time  - appreciate HF recs  - cath was done in January 2020 with patent LIMA-LAD  - still weight up, needs further diuresis  - recommend optimize per HF team (+/- RHC) and then repeat CHANDRA

## 2020-09-02 NOTE — PROGRESS NOTE ADULT - ATTENDING COMMENTS
Patient seen and examined.  Agree with above.   Follow up structural heart  Continue with medical management of CAD for now     Klaus Garcia MD, FACC

## 2020-09-02 NOTE — CONSULT NOTE ADULT - PROBLEM SELECTOR RECOMMENDATION 2
independent - Appreciate structural input  - Medical optimization as above  - Repeat echo to reassess once optimized

## 2020-09-02 NOTE — PROGRESS NOTE ADULT - ASSESSMENT
63yo male with PMH significant for CAD (s/p CABG; most recent LHC showed stable disease with no intervention), CHF (LVEF 39% TTE 1/2020), Afib (on Eliquis), HTN, HLD, CKD III, CHER (uses CPAP), and pemphigus vulgaris presenting to ED with RLE swelling. Found to be septic in ED (hypotension, leukocytosis, elevated lactate, febrile). S/p Vanc/zosyn, 2L IVF in ED. Restarted home ativan 0.5mg at bedtime. C/w home zoloft. C/w CPAP at night for CHER.     RUQ ultrasound (8/31): HSM + mild nodularity c/f cirrhosis; No ascites; No focal lesions. Patient with h/o social drinking but no ETOH abuse. Likely cardiac vs FRANCOIS vs viral hep. Will do hepatitis w/u. 63yo male with PMH significant for CAD (s/p CABG; most recent LHC showed stable disease with no intervention), CHF (LVEF 39% TTE 1/2020), Afib (on Eliquis), HTN, HLD, CKD III, CHER (uses CPAP), and pemphigus vulgaris presenting to ED with RLE swelling. Found to be septic in ED (hypotension, leukocytosis, elevated lactate, febrile). S/p Vanc/zosyn, 2L IVF in ED. Restarted home ativan 0.5mg at bedtime. C/w home zoloft. C/w CPAP at night for CHER.     RUQ ultrasound (8/31): HSM + mild nodularity c/f cirrhosis; No ascites; No focal lesions. Patient with h/o social drinking but no ETOH abuse. Likely cardiac vs FRANCOIS vs viral hep. Hepatitis w/u negative.

## 2020-09-02 NOTE — PROVIDER CONTACT NOTE (OTHER) - ASSESSMENT
aox4. asymptomatic. denies cp , sob or palpitations. VS T 98.2, HR 80, /79 RR 18 o2 97%. k+ 3.3. - 20meq PO supplemented

## 2020-09-02 NOTE — PROGRESS NOTE ADULT - SUBJECTIVE AND OBJECTIVE BOX
Patient is a 65y old  Male who presents with a chief complaint of RLE swelling (01 Sep 2020 15:48)    SUBJECTIVE / OVERNIGHT EVENTS:    OBJECTIVE:  Vital Signs Last 24 Hrs  T(C): 36.8 (02 Sep 2020 04:22), Max: 36.8 (02 Sep 2020 04:22)  T(F): 98.3 (02 Sep 2020 04:22), Max: 98.3 (02 Sep 2020 04:22)  HR: 63 (02 Sep 2020 05:55) (50 - 72)  BP: 121/78 (02 Sep 2020 05:00) (114/56 - 145/84)  BP(mean): --  RR: 18 (02 Sep 2020 04:22) (16 - 19)  SpO2: 95% (02 Sep 2020 05:55) (95% - 100%)    I&O's Summary    31 Aug 2020 07:01  -  01 Sep 2020 07:00  --------------------------------------------------------  IN: 720 mL / OUT: 1000 mL / NET: -280 mL    01 Sep 2020 07:01  -  02 Sep 2020 06:52  --------------------------------------------------------  IN: 0 mL / OUT: 1150 mL / NET: -1150 mL      PHYSICAL EXAM:      General: No acute distress, well-appearing  	  Eyes: EOMI   	  ENT: MMM, no oropharyngeal lesions or erythema appreciated   	  Pulm: No increased WOB. No wheezing. CTAB  	  CV: RRR. S1&S2+. No M/R/G appreciated.   	  Abdomen: +BS. Soft, NT. No organomegaly. Distended.   	  MSK: Limited ROM RLE.   	  Extremities: RLE 1+ edema, nonpitting, nontender. LLE no edema.  	  Neuro: A&Ox3, no focal deficits       Skin: Warm and dry. L hand wound with dressing in place.    Labs:  CAPILLARY BLOOD GLUCOSE                              8.7    10.36 )-----------( 441      ( 01 Sep 2020 05:45 )             See note    09-01    140  |  102  |  30<H>  ----------------------------<  80  3.8   |  25  |  2.29<H>    Ca    9.4      01 Sep 2020 05:45  Phos  4.8     09-01  Mg     2.3     09-01    TPro  6.9  /  Alb  3.7  /  TBili  0.5  /  DBili  x   /  AST  18  /  ALT  23  /  AlkPhos  109  09-01            Culture - Blood (collected 31 Aug 2020 09:39)  Source: .Blood Blood-Venous  Preliminary Report (01 Sep 2020 10:00):    No growth to date.    Culture - Blood (collected 31 Aug 2020 09:39)  Source: .Blood Blood-Peripheral  Preliminary Report (01 Sep 2020 10:00):    No growth to date.        Imaging Personally Reviewed:    < from: Transesophageal Echocardiogram w/o TTE (09.01.20 @ 14:15) >  Conclusions:  1. Tethered mitral valve leaflets with normal opening.  Severe mitral regurgitation. There is systolic flow  reversal in the right upper pulmonary vein. By PISA,  calculated ERO=43 mmsq and regurgitant volume=73 mL (Pisa  rad 1cm, Va 34.5 cm/s, Vm 5.0 m/s,  cm)  2. Calcified trileaflet aortic valve with normal opening.  Moderate aortic regurgitation. Vena contracta=0.4 cm.  3. Normal aortic root size. (Ao: 3.6 cm at the sinuses of  Valsalva). Midlly dilated ascending aorta for BSA  (ascending aorta diameter 4.5 cm approximately 5 cm distal  to the aortic valve.  Normal size aortic arch, and  descending thoracic aorta.  Minimal atheroma.  4. Left atrial enlargement. No left atrial or left atrial  appendage thrombus. Normal left atrial appendage function  (maximum velocity>40 cm/s).  5. Moderate left ventricular systolic dysfunction.  Paradoxical septal motion consistent with prior cardiac  surgery.  6. Normal right ventricular size and function.    < end of copied text >        Consultant(s) Notes Reviewed:   Care Discussed with Consultants/Other Providers:    MEDICATIONS  (STANDING):  apixaban 5 milliGRAM(s) Oral two times a day  aspirin  chewable 81 milliGRAM(s) Oral daily  atorvastatin 80 milliGRAM(s) Oral at bedtime  carvedilol 12.5 milliGRAM(s) Oral every 12 hours  furosemide    Tablet 40 milliGRAM(s) Oral daily  isosorbide   dinitrate Tablet (ISORDIL) 10 milliGRAM(s) Oral three times a day  LORazepam     Tablet 0.5 milliGRAM(s) Oral at bedtime  pantoprazole    Tablet 40 milliGRAM(s) Oral two times a day  predniSONE   Tablet 20 milliGRAM(s) Oral daily  sertraline 50 milliGRAM(s) Oral daily    MEDICATIONS  (PRN):  benzocaine 15 mG/menthol 3.6 mG (Sugar-Free) Lozenge 1 Lozenge Oral three times a day PRN Sore Throat Patient is a 65y old  Male who presents with a chief complaint of RLE swelling (01 Sep 2020 15:48)    SUBJECTIVE / OVERNIGHT EVENTS: NAEON. Patient examined at bedside this AM, spoke to him about CHANDRA results. Pt with no subjective complaints. Denies CP, palpitations, SOB, peripheral edema.     OBJECTIVE:  Vital Signs Last 24 Hrs  T(C): 36.8 (02 Sep 2020 04:22), Max: 36.8 (02 Sep 2020 04:22)  T(F): 98.3 (02 Sep 2020 04:22), Max: 98.3 (02 Sep 2020 04:22)  HR: 63 (02 Sep 2020 05:55) (50 - 72)  BP: 121/78 (02 Sep 2020 05:00) (114/56 - 145/84)  BP(mean): --  RR: 18 (02 Sep 2020 04:22) (16 - 19)  SpO2: 95% (02 Sep 2020 05:55) (95% - 100%)    I&O's Summary    31 Aug 2020 07:01  -  01 Sep 2020 07:00  --------------------------------------------------------  IN: 720 mL / OUT: 1000 mL / NET: -280 mL    01 Sep 2020 07:01  -  02 Sep 2020 06:52  --------------------------------------------------------  IN: 0 mL / OUT: 1150 mL / NET: -1150 mL      PHYSICAL EXAM:      General: No acute distress, well-appearing  	  Eyes: EOMI   	  ENT: MMM, no oropharyngeal lesions or erythema appreciated   	  Pulm: No increased WOB. No wheezing. CTAB  	  CV: RRR. S1&S2+. No M/R/G appreciated.   	  Abdomen: +BS. Soft, NT. No organomegaly. Distended.   	  MSK: Limited ROM RLE.   	  Extremities: No peripheral edema.   	  Neuro: A&Ox3, no focal deficits       Skin: Warm and dry. L hand wound with dressing in place.    Labs:  CAPILLARY BLOOD GLUCOSE                              8.7    10.36 )-----------( 441      ( 01 Sep 2020 05:45 )             See note    09-01    140  |  102  |  30<H>  ----------------------------<  80  3.8   |  25  |  2.29<H>    Ca    9.4      01 Sep 2020 05:45  Phos  4.8     09-01  Mg     2.3     09-01    TPro  6.9  /  Alb  3.7  /  TBili  0.5  /  DBili  x   /  AST  18  /  ALT  23  /  AlkPhos  109  09-01            Culture - Blood (collected 31 Aug 2020 09:39)  Source: .Blood Blood-Venous  Preliminary Report (01 Sep 2020 10:00):    No growth to date.    Culture - Blood (collected 31 Aug 2020 09:39)  Source: .Blood Blood-Peripheral  Preliminary Report (01 Sep 2020 10:00):    No growth to date.        Imaging Personally Reviewed:    < from: Transesophageal Echocardiogram w/o TTE (09.01.20 @ 14:15) >  Conclusions:  1. Tethered mitral valve leaflets with normal opening.  Severe mitral regurgitation. There is systolic flow  reversal in the right upper pulmonary vein. By PISA,  calculated ERO=43 mmsq and regurgitant volume=73 mL (Pisa  rad 1cm, Va 34.5 cm/s, Vm 5.0 m/s,  cm)  2. Calcified trileaflet aortic valve with normal opening.  Moderate aortic regurgitation. Vena contracta=0.4 cm.  3. Normal aortic root size. (Ao: 3.6 cm at the sinuses of  Valsalva). Midlly dilated ascending aorta for BSA  (ascending aorta diameter 4.5 cm approximately 5 cm distal  to the aortic valve.  Normal size aortic arch, and  descending thoracic aorta.  Minimal atheroma.  4. Left atrial enlargement. No left atrial or left atrial  appendage thrombus. Normal left atrial appendage function  (maximum velocity>40 cm/s).  5. Moderate left ventricular systolic dysfunction.  Paradoxical septal motion consistent with prior cardiac  surgery.  6. Normal right ventricular size and function.    < end of copied text >        Consultant(s) Notes Reviewed:   Care Discussed with Consultants/Other Providers:    MEDICATIONS  (STANDING):  apixaban 5 milliGRAM(s) Oral two times a day  aspirin  chewable 81 milliGRAM(s) Oral daily  atorvastatin 80 milliGRAM(s) Oral at bedtime  carvedilol 12.5 milliGRAM(s) Oral every 12 hours  furosemide    Tablet 40 milliGRAM(s) Oral daily  isosorbide   dinitrate Tablet (ISORDIL) 10 milliGRAM(s) Oral three times a day  LORazepam     Tablet 0.5 milliGRAM(s) Oral at bedtime  pantoprazole    Tablet 40 milliGRAM(s) Oral two times a day  predniSONE   Tablet 20 milliGRAM(s) Oral daily  sertraline 50 milliGRAM(s) Oral daily    MEDICATIONS  (PRN):  benzocaine 15 mG/menthol 3.6 mG (Sugar-Free) Lozenge 1 Lozenge Oral three times a day PRN Sore Throat

## 2020-09-02 NOTE — CONSULT NOTE ADULT - SUBJECTIVE AND OBJECTIVE BOX
HPI:  65 year old male with PMH significant for CAD (s/p CABG; most recent LHC showed stable disease with no intervention), CHF (LVEF 39% TTE 1/2020), Afib (on Eliquis), HTN, HLD, CKD III, CHER (on CPAP), and pemphigus vulgaris presenting to ED with RLE swelling. Pt had a mechanical fall 1 month ago where he landed on his right leg and left hand. He didn't have any difficulty ambulating so he didn't seek medical evaluation at that time. However, since then he said he had a L hand wound, which has not been healing and is now draining pus. Pt not sure of last tetanus vaccine. 1 week ago, he noticed RLE swelling, which has been progressively getting worse. He notes erythema, progressive swelling, and pain with limited ankle mobility. He has had difficulty ambulating. Additionally, he is c/o sob but feels that this is chronic, reporting baseline SOB and MELCHOR. He denies fevers, CP, wheezing, cough, nausea, vomiting, abdominal pain.    ED course: Admission vitals, T 100.6, HR 75, /55, RR 22, Sat 95%. Labs s/f leukocytosis (12.27), INR 2.16, Trop 65, Cr 2.34, BNP 2301. Lactate 2.4. COVID negative. UA negative. CXR wnl. Bedside echo showed indeterminate IVC volume status. XR RLE showed no fx but areas of soft tissue swelling/stranding around right ankle. Limited RLE Duplex scan negative for DVT. Patient received 2L IVF for worsening hypotension (SBP as low as 80's). Repeat lactate 0.7. Improved BPs (SBPs 100's). S/p IV vanc/zosyn for sepsis likely 2/2 to hand wound with purulent drainage vs RLE cellulitis.     Mr Colon is a 66y/o male with an extensive CAD history including CABG (2005 @ [facility] with Dr. Pena) as well as multiple stents afterwards.  He also has CHF, Afib (paroxysmal?), CKD III, and HCER (on CPAP).  About a month ago he sustained a mechanical fall resulting in injuries to his L hand and R leg.  They worsened over time to the point where he could no longer walk due to the leg swelling and pain.  He presented to the ER with a purulent wound to his left hand and a swollen, cellulitic R leg.  He was started on IV abx and admitted.  He also reported sob which he described as chronic.  Subsequently, he had a TTE which demonstrated mild/moderate MR and an EF of 35-40%.  CHANDRA done 5 days later showed his MR to be severe.  Structural Heart Team was called to evaluate him for potential Mitraclip.  On examination he was sitting in the edge of the bed without complaints.  He denied active chest pain/pressure and sob at rest.        Allergies    No Known Allergies    Intolerances      PAST MEDICAL & SURGICAL HISTORY:  CHF (congestive heart failure)  Psoriasis  HLD (hyperlipidemia)  CAD (coronary artery disease): 3 stents  Hypertension  History of coronary artery stent placement: 1 stent 2008, 2 stents 2009 in [facility]  S/P quadruple vessel bypass: 2006    MEDICATIONS  (STANDING):  apixaban 5 milliGRAM(s) Oral two times a day  aspirin  chewable 81 milliGRAM(s) Oral daily  atorvastatin 80 milliGRAM(s) Oral at bedtime  carvedilol 12.5 milliGRAM(s) Oral every 12 hours  isosorbide   dinitrate Tablet (ISORDIL) 10 milliGRAM(s) Oral three times a day  LORazepam     Tablet 0.5 milliGRAM(s) Oral at bedtime  pantoprazole    Tablet 40 milliGRAM(s) Oral two times a day  predniSONE   Tablet 20 milliGRAM(s) Oral daily  sertraline 50 milliGRAM(s) Oral daily      REVIEW OF SYSTEMS:    CONSTITUTIONAL: No weakness, fevers or chills  EYES/ENT: No visual changes;  No vertigo or throat pain   NECK: No pain or stiffness  RESPIRATORY: No cough, wheezing, hemoptysis; No shortness of breath  CARDIOVASCULAR: No chest pain or palpitations  GASTROINTESTINAL: No abdominal or epigastric pain. No nausea, vomiting, or hematemesis; No diarrhea or constipation. No melena or hematochezia.  GENITOURINARY: No dysuria, frequency or hematuria  NEUROLOGICAL: No numbness or weakness  SKIN: No itching, rashes      Vital Signs Last 24 Hrs  T(C): 36.8 (02 Sep 2020 15:04), Max: 36.8 (02 Sep 2020 04:22)  T(F): 98.2 (02 Sep 2020 15:04), Max: 98.3 (02 Sep 2020 04:22)  HR: 80 (02 Sep 2020 15:04) (50 - 80)  BP: 131/79 (02 Sep 2020 15:04) (114/56 - 131/79)  BP(mean): --  RR: 18 (02 Sep 2020 15:04) (16 - 19)  SpO2: 97% (02 Sep 2020 15:04) (95% - 100%)                          9.2    10.15 )-----------( 449      ( 02 Sep 2020 06:44 )             33.0   09-02    139  |  100  |  35<H>  ----------------------------<  97  3.3<L>   |  23  |  2.53<H>    Ca    9.5      02 Sep 2020 06:44  Phos  4.8     09-02  Mg     2.3     09-02    TPro  7.0  /  Alb  3.7  /  TBili  0.6  /  DBili  x   /  AST  18  /  ALT  24  /  AlkPhos  112  09-02        I&O's Summary    01 Sep 2020 07:01  -  02 Sep 2020 07:00  --------------------------------------------------------  IN: 0 mL / OUT: 1150 mL / NET: -1150 mL    02 Sep 2020 07:01  -  02 Sep 2020 15:49  --------------------------------------------------------  IN: 600 mL / OUT: 1400 mL / NET: -800 mL          Physical Exam  General: NAD  Cardiac: s1s2, RRR, II/VI systolic murmur  Pulmonary: CTA b/l, no w/r/r  Gastrointestinal: soft abdomen, nontender, nondistended, + bowel sounds throughout  Extremities: trace LE edema R>L  Neuro: A&Ox3, nonfocal 	  EKG: HPI:  65 year old male with PMH significant for CAD (s/p CABG; most recent LHC showed stable disease with no intervention), CHF (LVEF 39% TTE 1/2020), Afib (on Eliquis), HTN, HLD, CKD III, CHER (on CPAP), and pemphigus vulgaris presenting to ED with RLE swelling. Pt had a mechanical fall 1 month ago where he landed on his right leg and left hand. He didn't have any difficulty ambulating so he didn't seek medical evaluation at that time. However, since then he said he had a L hand wound, which has not been healing and is now draining pus. Pt not sure of last tetanus vaccine. 1 week ago, he noticed RLE swelling, which has been progressively getting worse. He notes erythema, progressive swelling, and pain with limited ankle mobility. He has had difficulty ambulating. Additionally, he is c/o sob but feels that this is chronic, reporting baseline SOB and MELCHOR. He denies fevers, CP, wheezing, cough, nausea, vomiting, abdominal pain.    ED course: Admission vitals, T 100.6, HR 75, /55, RR 22, Sat 95%. Labs s/f leukocytosis (12.27), INR 2.16, Trop 65, Cr 2.34, BNP 2301. Lactate 2.4. COVID negative. UA negative. CXR wnl. Bedside echo showed indeterminate IVC volume status. XR RLE showed no fx but areas of soft tissue swelling/stranding around right ankle. Limited RLE Duplex scan negative for DVT. Patient received 2L IVF for worsening hypotension (SBP as low as 80's). Repeat lactate 0.7. Improved BPs (SBPs 100's). S/p IV vanc/zosyn for sepsis likely 2/2 to hand wound with purulent drainage vs RLE cellulitis.       Mr Colon is a 66y/o male with an extensive CAD history including CABG (2005 @ [facility] with Dr. Pena) as well as multiple stents afterwards.  He also has CHF, Afib (paroxysmal?), CKD III, and CHER (on CPAP).  About a month ago he sustained a mechanical fall resulting in injuries to his L hand and R leg.  They worsened over time to the point where he could no longer walk due to the leg swelling and pain.  He presented to the ER with a purulent wound to his left hand and a swollen, cellulitic R leg.  He was started on IV abx and admitted.  He also reported sob which he described as chronic.  Subsequently, he had a TTE which demonstrated mild/moderate MR and an EF of 35-40%.  CHANDRA done 5 days later showed his MR to be severe.  Structural Heart Team was called to evaluate him for potential Mitraclip.  On examination he was sitting in the edge of the bed without complaints.  He denied active chest pain/pressure and sob at rest.        Allergies    No Known Allergies    Intolerances      PAST MEDICAL & SURGICAL HISTORY:  CHF (congestive heart failure)  Psoriasis  HLD (hyperlipidemia)  CAD (coronary artery disease): 3 stents  Hypertension  History of coronary artery stent placement: 1 stent 2008, 2 stents 2009 in [facility]  S/P quadruple vessel bypass: 2006    MEDICATIONS  (STANDING):  apixaban 5 milliGRAM(s) Oral two times a day  aspirin  chewable 81 milliGRAM(s) Oral daily  atorvastatin 80 milliGRAM(s) Oral at bedtime  carvedilol 12.5 milliGRAM(s) Oral every 12 hours  isosorbide   dinitrate Tablet (ISORDIL) 10 milliGRAM(s) Oral three times a day  LORazepam     Tablet 0.5 milliGRAM(s) Oral at bedtime  pantoprazole    Tablet 40 milliGRAM(s) Oral two times a day  predniSONE   Tablet 20 milliGRAM(s) Oral daily  sertraline 50 milliGRAM(s) Oral daily      REVIEW OF SYSTEMS:    CONSTITUTIONAL: No weakness, fevers or chills  EYES/ENT: No visual changes;  No vertigo or throat pain   NECK: No pain or stiffness  RESPIRATORY: No cough, wheezing, hemoptysis; No shortness of breath  CARDIOVASCULAR: No chest pain or palpitations  GASTROINTESTINAL: No abdominal or epigastric pain. No nausea, vomiting, or hematemesis; No diarrhea or constipation. No melena or hematochezia.  GENITOURINARY: No dysuria, frequency or hematuria  NEUROLOGICAL: No numbness or weakness  SKIN: No itching, rashes      Vital Signs Last 24 Hrs  T(C): 36.8 (02 Sep 2020 15:04), Max: 36.8 (02 Sep 2020 04:22)  T(F): 98.2 (02 Sep 2020 15:04), Max: 98.3 (02 Sep 2020 04:22)  HR: 80 (02 Sep 2020 15:04) (50 - 80)  BP: 131/79 (02 Sep 2020 15:04) (114/56 - 131/79)  BP(mean): --  RR: 18 (02 Sep 2020 15:04) (16 - 19)  SpO2: 97% (02 Sep 2020 15:04) (95% - 100%)                          9.2    10.15 )-----------( 449      ( 02 Sep 2020 06:44 )             33.0   09-02    139  |  100  |  35<H>  ----------------------------<  97  3.3<L>   |  23  |  2.53<H>    Ca    9.5      02 Sep 2020 06:44  Phos  4.8     09-02  Mg     2.3     09-02    TPro  7.0  /  Alb  3.7  /  TBili  0.6  /  DBili  x   /  AST  18  /  ALT  24  /  AlkPhos  112  09-02        I&O's Summary    01 Sep 2020 07:01  -  02 Sep 2020 07:00  --------------------------------------------------------  IN: 0 mL / OUT: 1150 mL / NET: -1150 mL    02 Sep 2020 07:01  -  02 Sep 2020 15:49  --------------------------------------------------------  IN: 600 mL / OUT: 1400 mL / NET: -800 mL          Physical Exam  General: NAD  Cardiac: s1s2, RRR, II/VI systolic murmur  Pulmonary: CTA b/l, no w/r/r  Gastrointestinal: soft abdomen, nontender, nondistended, + bowel sounds throughout  Extremities: trace LE edema R>L  Neuro: A&Ox3, nonfocal 	  EKG: NSR w/ PVC's, L posterior fascicular block

## 2020-09-02 NOTE — PROGRESS NOTE ADULT - PROBLEM SELECTOR PLAN 1
Likely multifactorial 2/2 cellulitis and CHF - Improved  -RLE Duplex negative for DVT (8/24)  -XR RLE (8/24) negative for fx. Soft tissue swelling/stranding around right ankle.   -S/p IV vanc/zosyn in ED. Vanc (8/24-30) completed.

## 2020-09-02 NOTE — CONSULT NOTE ADULT - ASSESSMENT
Mr Colon is a 66y/o male with an extensive CAD history including CABG (2005 @ [facility] with Dr. Pena) as well as multiple stents afterwards.  He also has CHF, Afib (paroxysmal?), CKD III, and CHER (on CPAP).   Admitted through the ER with sepsis resulting from injuries sustained during a mechanical fall and treated with IV abx.  Found on echo to have MR and referred for Mitraclip  evaluation.      NYHA II  STS risk for MVR: 10.8%, risk for MVR/CABG 15.5%

## 2020-09-02 NOTE — PROGRESS NOTE ADULT - SUBJECTIVE AND OBJECTIVE BOX
S: SOB improved and No chest pain. Review of systems otherwise (-)      MEDICATIONS  (STANDING):  apixaban 5 milliGRAM(s) Oral two times a day  aspirin  chewable 81 milliGRAM(s) Oral daily  atorvastatin 80 milliGRAM(s) Oral at bedtime  carvedilol 12.5 milliGRAM(s) Oral every 12 hours  furosemide    Tablet 40 milliGRAM(s) Oral daily  isosorbide   dinitrate Tablet (ISORDIL) 10 milliGRAM(s) Oral three times a day  LORazepam     Tablet 0.5 milliGRAM(s) Oral at bedtime  pantoprazole    Tablet 40 milliGRAM(s) Oral two times a day  predniSONE   Tablet 20 milliGRAM(s) Oral daily  sertraline 50 milliGRAM(s) Oral daily    MEDICATIONS  (PRN):  benzocaine 15 mG/menthol 3.6 mG (Sugar-Free) Lozenge 1 Lozenge Oral three times a day PRN Sore Throat      LABS:                            9.2    10.15 )-----------( 449      ( 02 Sep 2020 06:44 )             33.0     Hemoglobin: 9.2 g/dL (09-02 @ 06:44)  Hemoglobin: 8.7 g/dL (09-01 @ 05:45)  Hemoglobin: 8.9 g/dL (08-31 @ 06:09)  Hemoglobin: 8.3 g/dL (08-30 @ 06:35)  Hemoglobin: 8.0 g/dL (08-29 @ 06:59)    09-02    139  |  100  |  35<H>  ----------------------------<  97  3.3<L>   |  23  |  2.53<H>    Ca    9.5      02 Sep 2020 06:44  Phos  4.8     09-02  Mg     2.3     09-02    TPro  7.0  /  Alb  3.7  /  TBili  0.6  /  DBili  x   /  AST  18  /  ALT  24  /  AlkPhos  112  09-02    Creatinine Trend: 2.53<--, 2.29<--, 2.37<--, 2.35<--, 2.31<--, 2.12<--             09-01-20 @ 07:01  -  09-02-20 @ 07:00  --------------------------------------------------------  IN: 0 mL / OUT: 1150 mL / NET: -1150 mL        PHYSICAL EXAM  Vital Signs Last 24 Hrs  T(C): 36.6 (02 Sep 2020 11:28), Max: 36.8 (02 Sep 2020 04:22)  T(F): 97.9 (02 Sep 2020 11:28), Max: 98.3 (02 Sep 2020 04:22)  HR: 52 (02 Sep 2020 11:28) (50 - 72)  BP: 128/83 (02 Sep 2020 11:28) (114/56 - 145/84)  BP(mean): --  RR: 18 (02 Sep 2020 11:28) (16 - 19)  SpO2: 95% (02 Sep 2020 11:28) (95% - 100%)          Gen: NAD  HEENT:  (-)icterus (-)pallor  CV: N S1 S2 1/6 KATHERYN (+)2 Pulses B/l  Resp: CTA B/L, normal effort  GI: (+) BS Soft, NT, ND  Lymph:  (-) edema, (-)obvious lymphadenopathy  Skin: Warm to touch, Normal turgor  Psych: Appropriate mood and affect      TELEMETRY: SB/SR 50-70s, PVCs    Echo: < from: TTE with Doppler (w/Cont) (08.27.20 @ 07:54) >  Conclusions:  1. Mitral valve not well visualized. Mitral annular  calcification. Tethered mitral valve leaflets with normal  opening. Mild moderate mitral regurgitation.  2. Calcified trileaflet aortic valve with decreased  opening. Peak transaortic valve gradient equals 12 mm Hg,  mean transaortic valve gradient equals 8 mm Hg, at least  mild AS. Limited gradient/Doppler evaluation. Mild-moderate  aortic regurgitation.  3. Endocardial visualization enhanced with intravenous  injection of Ultrasonic Enhancing Agent (Definity).  Moderate to severe  global left ventricular systolic  dysfunction. There dyssynchronous left ventricular  contraction.  4. Moderate diastolic dysfunction (Stage II). Increased  E/e'  is consistent with elevated left ventricular filling  pressure.  *** Compared with echocardiogram report of 12/13/2017,  Limited comparison.  Disatolic dysfunction is worse.    < end of copied text >    < from: Transesophageal Echocardiogram w/o TTE (09.01.20 @ 14:15) >  Conclusions:  1. Tethered mitral valve leaflets with normal opening.  Severe mitral regurgitation. There is systolic flow  reversal in the right upper pulmonary vein. By PISA,  calculated ERO=43 mmsq and regurgitant volume=73 mL (Pisa  rad 1cm, Va 34.5 cm/s, Vm 5.0 m/s,  cm)  2. Calcified trileaflet aortic valve with normal opening.  Moderate aortic regurgitation. Vena contracta=0.4 cm.  3. Normal aortic root size. (Ao: 3.6 cm at the sinuses of  Valsalva). Midlly dilated ascending aorta for BSA  (ascending aorta diameter 4.5 cm approximately 5 cm distal  to the aortic valve.  Normal size aortic arch, and  descending thoracic aorta.  Minimal atheroma.  4. Left atrial enlargement. No left atrial or left atrial  appendage thrombus. Normal left atrial appendage function  (maximum velocity>40 cm/s).  5. Moderate left ventricular systolic dysfunction.  Paradoxical septal motion consistent with prior cardiac  surgery.  6. Normal right ventricular size and function.    < end of copied text >        ASSESSMENT/PLAN: Patient is a 65 year old male with PMH of CAD s/p CABG with most recent LHC 1/2020 with no intervention, chronic systolic CHF (LVEF 39% TTE 1/2020), Afib (on Eliquis), HTN, HLD, CKD, CHER (on CPAP), and pemphigus vulgaris who presented with RLE swelling admitted with RLE cellulitis and NARCISO on CKD. Cardiology consulted for management of CAD/CHF.    - Volume status improved - continue PO lasix 40mg daily for now  - Completed abx for cellulitis  - Recommend medical management of known CAD with systolic cardiomyopathy - continue ASA/Statin and Coreg/Isordil, Entresto on hold per primary team  - Continue AC with Eliquis for CVA prevention  - Reviewed previous cath with interventional cardiology - no plans for repeat cath at this time  - EP follow up appreciated  - CHANDRA with severe MR  - Structural heart and heart failure consults called  - Patient to f/u with Dr. Campbell's cardiology group after d/c     Cristi Jarvis PA-C  Pager: [phone]

## 2020-09-02 NOTE — CONSULT NOTE ADULT - SUBJECTIVE AND OBJECTIVE BOX
HPI:  Mr. Colon is a 65 year old male with PMH significant for CAD (s/p CABG  LIMA-LAD, SVG- OM2, SVG- OM1, LIMA to LAD only patient graft per cath 2020), ICM HFrEF (LVEF 30-35%), Afib (on Eliquis), HTN, HLD, CKD III, CHER (on CPAP), and pemphigus vulgaris presented to ED  with RLE swelling. Pt had a mechanical fall 1 month ago where he landed on his right leg and left hand. He didn't have any difficulty ambulating so he didn't seek medical evaluation at that time. However, since then he said he had a L hand wound, which has not been healing and is now draining pus. Pt not sure of last tetanus vaccine. One week PTA, he noticed RLE swelling, which has been progressively getting worse. He notes erythema, progressive swelling, and pain with limited ankle mobility. He has had difficulty ambulating. He reports having a week of subjective fevers at home.     ED course: Admission vitals, T 100.6, HR 75, /55, RR 22, Sat 95%. Labs s/f leukocytosis (12.27), INR 2.16, Trop 65, Cr 2.34, BNP 2301. Lactate 2.4. COVID negative. UA negative. CXR wnl. Bedside echo showed indeterminate IVC volume status. XR RLE showed no fx but areas of soft tissue swelling/stranding around right ankle. Limited RLE Duplex scan negative for DVT. Patient received 2L IVF for worsening hypotension (SBP as low as 80's). Repeat lactate 0.7. Improved BPs (SBPs 100's). S/p IV vanc/zosyn for sepsis likely 2/2 to hand wound with purulent drainage vs RLE cellulitis. Home doses of isosorbide dinitrate, Entresto, lasix, and coreg held on admission 2/2 hypotension and NARCISO. On : Patient had an RRT after desatting to 77% overnight. Found to be significant pulmonary edema on CXR. EKG showed Afib with RVR and had runs of VT on tele. Lactate 8. Patient received IV lasix 40mg, amio, and transferred to CCU and placed on BiPAP which was eventually weaned to NC. He was diuresed and resumed on oral vasodilators. TTE (): Worsening diastolic dysfunction. EF 35-45%. mild/mod MR. mild AS. mild/mod AR. mod-severe global LV dysfunction. CHANDRA done  shows LVEF 30-35%, nl RV size and function, severe MR, mod AI, mild TR.    At baseline he reports being able to walk about 1/2 a block and is able to climb a flight of stairs before stopping due to fatigue. Notes 4 pillow orthopnea. Last hospitalized at [facility] in Dec-2020 for syncope with discharge weight of 214lbs. States he had a 30+ lb weight gain in the subsequent two weeks with poor appetite.       PAST MEDICAL & SURGICAL HISTORY:  CHF (congestive heart failure)  Psoriasis  HLD (hyperlipidemia)  CAD (coronary artery disease): 3 stents  Hypertension  History of coronary artery stent placement: 1 stent , 2 stents  in [facility]  S/P quadruple vessel bypass:     Review of Systems:  14 point ROS done and found to be negative or noncontributory other than noted in HPI.    MEDICATIONS  (STANDING):  apixaban 5 milliGRAM(s) Oral two times a day  aspirin  chewable 81 milliGRAM(s) Oral daily  atorvastatin 80 milliGRAM(s) Oral at bedtime  carvedilol 12.5 milliGRAM(s) Oral every 12 hours  isosorbide   dinitrate Tablet (ISORDIL) 10 milliGRAM(s) Oral three times a day  LORazepam     Tablet 0.5 milliGRAM(s) Oral at bedtime  pantoprazole    Tablet 40 milliGRAM(s) Oral two times a day  predniSONE   Tablet 20 milliGRAM(s) Oral daily  sertraline 50 milliGRAM(s) Oral daily    MEDICATIONS  (PRN):  benzocaine 15 mG/menthol 3.6 mG (Sugar-Free) Lozenge 1 Lozenge Oral three times a day PRN Sore Throat    HOME MEDICATIONS:  ·	coreg 12.5mg BID  · 	aspirin 81 mg oral tablet, chewable: Last Dose Taken:  , 1 tab(s) orally once a day  · 	pantoprazole 40 mg oral delayed release tablet: Last Dose Taken:  , 1 tab(s) orally 2 times a day (before meals)  · 	atorvastatin 80 mg oral tablet: Last Dose Taken:  , 1 tab(s) orally once a day (at bedtime)  · 	furosemide 20 mg oral tablet: Last Dose Taken:  , 1 tab(s) orally once a day  · 	Repatha 140 mg/mL subcutaneous solution: Last Dose Taken:  , 1 milliliter(s) subcutaneous every 2 weeks  · 	Entresto 24 mg-26 mg oral tablet: Last Dose Taken:  , 1 tab(s) orally once a day  · 	ALPRAZolam 0.5 mg oral tablet: Last Dose Taken:  , 1 tab(s) orally 2 times a day  · 	isosorbide mononitrate 30 mg oral tablet, extended release: Last Dose Taken:  , 1 tab(s) orally 2 times a day  · 	sertraline 50 mg oral tablet: Last Dose Taken:  , 1 tab(s) orally once a day    Allergies    No Known Allergies    SOCIAL HISTORY:  Former smoker 20 pack year history, quit 3 years ago  Retired, worked in [employer]  Lives with daughter and her family    FAMILY HISTORY:  Family history of early CAD on his paternal side. Hx of early MI runs in that side of the family. His sister  of MI at 48. Father who  of CVA.    Vital Signs Last 24 Hrs  T(C): 36.6 (02 Sep 2020 11:28), Max: 36.8 (02 Sep 2020 04:22)  T(F): 97.9 (02 Sep 2020 11:28), Max: 98.3 (02 Sep 2020 04:22)  HR: 52 (02 Sep 2020 11:28) (50 - 72)  BP: 128/83 (02 Sep 2020 11:28) (114/56 - 130/82)  RR: 18 (02 Sep 2020 11:28) (16 - 19)  SpO2: 95% (02 Sep 2020 11:28) (95% - 100%)    Tele: SR HR 50-70s, IVR overnight    General: No distress. Comfortable.  HEENT: EOM intact.  Neck: Neck supple. JVP difficult to assess due to body habitus but appears mildly elevated. No masses  Chest: Clear to auscultation bilaterally. Occasional dry cough  CV: Regular. Normal S1 and S2. II/VI SM at axilla. Radial pulses normal. Trace BLE edema R>L  Abdomen: Obese, soft, non-distended, non-tender  Skin: No rashes or skin breakdown  Neurology: Alert and oriented times three. Sensation intact  Psych: Affect normal    LABS:                        9.2    10.15 )-----------( 449      ( 02 Sep 2020 06:44 )             33.0     09-    139  |  100  |  35<H>  ----------------------------<  97  3.3<L>   |  23  |  2.53<H>    Ca    9.5      02 Sep 2020 06:44  Phos  4.8       Mg     2.3         TPro  7.0  /  Alb  3.7  /  TBili  0.6  /  DBili  x   /  AST  18  /  ALT  24  /  AlkPhos  112        RADIOLOGY & ADDITIONAL STUDIES: HPI:  Mr. Colon is a 65 year old male with PMH significant for CAD (s/p CABG  LIMA-LAD, SVG- OM2, SVG- OM1, LIMA to LAD only patient graft per cath 2020), ICM HFrEF (LVEF 30-35%), Afib (on Eliquis), HTN, HLD, CKD III, CHER (on CPAP), and pemphigus vulgaris presented to ED  with RLE swelling. Pt had a mechanical fall 1 month ago where he landed on his right leg and left hand. He didn't have any difficulty ambulating so he didn't seek medical evaluation at that time. However, since then he said he had a L hand wound, which has not been healing and is now draining pus. Pt not sure of last tetanus vaccine. One week PTA, he noticed RLE swelling, which has been progressively getting worse. He notes erythema, progressive swelling, and pain with limited ankle mobility. He has had difficulty ambulating. He reports having a week of subjective fevers at home.     ED course: Admission vitals, T 100.6, HR 75, /55, RR 22, Sat 95%. Labs s/f leukocytosis (12.27), INR 2.16, Trop 65, Cr 2.34, BNP 2301. Lactate 2.4. COVID negative. UA negative. CXR wnl. Bedside echo showed indeterminate IVC volume status. XR RLE showed no fx but areas of soft tissue swelling/stranding around right ankle. Limited RLE Duplex scan negative for DVT. Patient received 2L IVF for worsening hypotension (SBP as low as 80's). Repeat lactate 0.7. Improved BPs (SBPs 100's). S/p IV vanc/zosyn for sepsis likely 2/2 to hand wound with purulent drainage vs RLE cellulitis. Home doses of isosorbide dinitrate, Entresto, lasix, and coreg held on admission 2/2 hypotension and NARCISO. On : Patient had an RRT after desatting to 77% overnight. Found to be significant pulmonary edema on CXR. EKG showed Afib with RVR and had runs of VT on tele. Lactate 8. Patient received IV lasix 40mg, amio, and transferred to CCU and placed on BiPAP which was eventually weaned to NC. He was diuresed and resumed on oral vasodilators. TTE (): Worsening diastolic dysfunction. EF 35-45%. mild/mod MR. mild AS. mild/mod AR. mod-severe global LV dysfunction. CHANDRA done  shows LVEF 30-35%, nl RV size and function, severe MR, mod AI, mild TR.    At baseline he reports being able to walk about 1/2 a block and is able to climb a flight of stairs before stopping due to fatigue. Notes 4 pillow orthopnea. Last hospitalized at [facility] in Dec-2020 for respiratory failure requiring intubation c/b cardiac arrest in setting of influenza. He reports a discharge weight of 214lbs. States he had a 30+ lb weight gain in the subsequent two weeks with poor appetite.     He currently feels improved although with occasional cough and orthopnea.      PAST MEDICAL & SURGICAL HISTORY:  CHF (congestive heart failure)  Psoriasis  HLD (hyperlipidemia)  CAD (coronary artery disease): 3 stents  Hypertension  History of coronary artery stent placement: 1 stent , 2 stents  in [facility]  S/P quadruple vessel bypass:     Review of Systems:  14 point ROS done and found to be negative or noncontributory other than noted in HPI.    MEDICATIONS  (STANDING):  apixaban 5 milliGRAM(s) Oral two times a day  aspirin  chewable 81 milliGRAM(s) Oral daily  atorvastatin 80 milliGRAM(s) Oral at bedtime  carvedilol 12.5 milliGRAM(s) Oral every 12 hours  isosorbide   dinitrate Tablet (ISORDIL) 10 milliGRAM(s) Oral three times a day  LORazepam     Tablet 0.5 milliGRAM(s) Oral at bedtime  pantoprazole    Tablet 40 milliGRAM(s) Oral two times a day  predniSONE   Tablet 20 milliGRAM(s) Oral daily  sertraline 50 milliGRAM(s) Oral daily    MEDICATIONS  (PRN):  benzocaine 15 mG/menthol 3.6 mG (Sugar-Free) Lozenge 1 Lozenge Oral three times a day PRN Sore Throat    HOME MEDICATIONS:  ·	coreg 12.5mg BID  · 	aspirin 81 mg oral tablet, chewable: Last Dose Taken:  , 1 tab(s) orally once a day  · 	pantoprazole 40 mg oral delayed release tablet: Last Dose Taken:  , 1 tab(s) orally 2 times a day (before meals)  · 	atorvastatin 80 mg oral tablet: Last Dose Taken:  , 1 tab(s) orally once a day (at bedtime)  · 	furosemide 20 mg oral tablet: Last Dose Taken:  , 1 tab(s) orally once a day  · 	Repatha 140 mg/mL subcutaneous solution: Last Dose Taken:  , 1 milliliter(s) subcutaneous every 2 weeks  · 	Entresto 24 mg-26 mg oral tablet: Last Dose Taken:  , 1 tab(s) orally once a day  · 	ALPRAZolam 0.5 mg oral tablet: Last Dose Taken:  , 1 tab(s) orally 2 times a day  · 	isosorbide mononitrate 30 mg oral tablet, extended release: Last Dose Taken:  , 1 tab(s) orally 2 times a day  · 	sertraline 50 mg oral tablet: Last Dose Taken:  , 1 tab(s) orally once a day    Allergies    No Known Allergies    SOCIAL HISTORY:  Former smoker 20 pack year history, quit 3 years ago  Retired, worked in [employer]  Lives with daughter and her family    FAMILY HISTORY:  Family history of early CAD on his paternal side. Hx of early MI runs in that side of the family. His sister  of MI at 48. Father who  of CVA.    Vital Signs Last 24 Hrs  T(C): 36.6 (02 Sep 2020 11:28), Max: 36.8 (02 Sep 2020 04:22)  T(F): 97.9 (02 Sep 2020 11:28), Max: 98.3 (02 Sep 2020 04:22)  HR: 52 (02 Sep 2020 11:28) (50 - 72)  BP: 128/83 (02 Sep 2020 11:28) (114/56 - 130/82)  RR: 18 (02 Sep 2020 11:28) (16 - 19)  SpO2: 95% (02 Sep 2020 11:28) (95% - 100%)    Tele: SR HR 50-70s, IVR overnight    General: No distress. Comfortable.  HEENT: EOM intact.  Neck: Neck supple. JVP difficult to assess due to body habitus but appears mildly elevated. No masses  Chest: Clear to auscultation bilaterally. Occasional dry cough  CV: Regular. Normal S1 and S2. II/VI SM at axilla. Radial pulses normal. Trace BLE edema R>L  Abdomen: Obese, soft, non-distended, non-tender  Skin: No rashes or skin breakdown  Neurology: Alert and oriented times three. Sensation intact  Psych: Affect normal    LABS:                        9.2    10.15 )-----------( 449      ( 02 Sep 2020 06:44 )             33.0     09    139  |  100  |  35<H>  ----------------------------<  97  3.3<L>   |  23  |  2.53<H>    Ca    9.5      02 Sep 2020 06:44  Phos  4.8       Mg     2.3         TPro  7.0  /  Alb  3.7  /  TBili  0.6  /  DBili  x   /  AST  18  /  ALT  24  /  AlkPhos  112        RADIOLOGY & ADDITIONAL STUDIES:

## 2020-09-02 NOTE — PROVIDER CONTACT NOTE (OTHER) - BACKGROUND
patient admitted for cellulitis c/b CHF exacerbation requiring transfer to CCU. Hx CHF, CAD s/p CABG, HTN, HLD, CKD

## 2020-09-02 NOTE — PROGRESS NOTE ADULT - PROBLEM SELECTOR PLAN 7
-abx (per above)   -Wound care saw patient (8/25). Redressed wound. Recommended plastics consult.   -Plastics consulted 8/26. Recommended local wound care and outpatient follow up (Dr. Marquez [phone])  -Will reevaluate wound today -abx (per above)   -Wound care saw patient (8/25). Redressed wound. Recommended plastics consult.   -Plastics consulted 8/26. Recommended local wound care and outpatient follow up (Dr. Marquez [phone])

## 2020-09-02 NOTE — PROGRESS NOTE ADULT - PROBLEM SELECTOR PLAN 3
NARCISO on CKD III. Likely pre-renal in etiology. Cr elevated to 2.4 on admission. Baseline Cr 1.4-1.7. Improved with IVF and lasix was held. However, after CHF exacerbation requiring aggressive diuresis (8/27), patient's Cr is elevated to 2.15 on 8/28  -Cr 2.29 today - Improving   -Continue to monitor daily BMPs   -Renally dose medications. NARCISO on CKD III. Likely pre-renal in etiology. Cr elevated to 2.4 on admission. Baseline Cr 1.4-1.7. Improved with IVF and lasix was held. However, after CHF exacerbation requiring aggressive diuresis (8/27), patient's Cr is elevated to 2.15 on 8/28  -Cr elevated to 2.53 today. Likely in the setting of overdiuresis as pt's home lasix dose in 20mg qday. Will hold tomorrow AM's dose of lasix and reassess  -Continue to monitor daily BMPs   -Renally dose medications.

## 2020-09-02 NOTE — CONSULT NOTE ADULT - ASSESSMENT
Mr. Cloon is a 65 year old male with PMH significant for CAD (s/p CABG 2005 LIMA-LAD, SVG- OM2, SVG- OM1, LIMA to LAD only patient graft per cath 1/2020), ICM HFrEF (LVEF 30-35%), severe MR, Afib (on Eliquis), HTN, HLD, CKD III, CHER (on CPAP) who presented with septic shock 2/2 RLL cellulitis. He additionally has ADHF with shortness of breath and hypoxia requiring CCU admission and BiPAP likely in the setting of volume overload and severe MR. He has diuresed nearly 20lbs since admission with IV diuretics and is normotensive on low dose vasodilators however is not yet optimized on medical therapy. Exam is challenging due to his body habitus, however he continues to have symptoms consistent with elevated filling pressures. SCr gradually uptrending.

## 2020-09-02 NOTE — PROGRESS NOTE ADULT - ATTENDING COMMENTS
CHANDRA results discussed with patient. Pt with severe MR. Cardiology consulted structural heart and heart failure.   EP recommending EPS prior to study to determine if VT is inducible as pt may require ICD.  Cr uptrending. Will hold Lasix dose tomorrow AM and reassess

## 2020-09-02 NOTE — CHART NOTE - NSCHARTNOTEFT_GEN_A_CORE
Pt had 16 seconds of accelerated junctional rhythm. Pt asymptomatic and sleeping at the time. /85, HR 50, R16, 100% RA. Pt denies chest pain, SOB, palpitations, dizziness.

## 2020-09-02 NOTE — PROVIDER CONTACT NOTE (OTHER) - SITUATION
pt had 6 beats of WCT on tele. pt had up to 9beats during this admission.  AM coreg held due to paramters DBP <80 SBP <100

## 2020-09-02 NOTE — PROGRESS NOTE ADULT - PROBLEM SELECTOR PLAN 2
Combined Systolic and diastolic Heart failure. TTE (1/2020): LVEF 39%, mod MR/AR  -isosorbide dinitrate, Entresto, lasix, and coreg held on admission 2/2 hypotension and NARCISO  -Home coreg restarted 8/26.   -8/27: Patient had an RRT after desatting to 77% overnight. Found to be significant pulmonary edema on CXR. EKG showed Afib with RVR and had runs of VT on tele. Lactate 8. Patient received IV lasix 40mg, amio, and transferred to CCU and placed on BiPAP.    -CCU course (8/27): transitioned from BiPAP to NC. Bumex 2. Restarted home isordil and po lasix 40mg. Increased to IV lasix 40mg qday 8/29, then transitioned to po lasix 40mg qday.   -TTE (8/27): Worsening diastolic dysfunction. EF 35-45%. mild/mod MR. mild AS. mild/mod AR. mod-severe global LV dysfunction.   -Patient transitioned off NC. D/mery parks 8/28  -F/u cards recs: Pt will get CHANDRA today and pending results will possibly be evaluated for heart transplant. EP evaluated pt for AICD, pt has no indication for AICD/BiV. Pt will not get cath. Combined Systolic and diastolic Heart failure. TTE (1/2020): LVEF 39%, mod MR/AR  -isosorbide dinitrate, Entresto, lasix, and coreg held on admission 2/2 hypotension and NARCISO  -Home coreg restarted 8/26.   -8/27: Patient had an RRT after desatting to 77% overnight. Found to be significant pulmonary edema on CXR. EKG showed Afib with RVR and had runs of VT on tele. Lactate 8. Patient received IV lasix 40mg, amio, and transferred to CCU and placed on BiPAP.    -CCU course (8/27): transitioned from BiPAP to NC. Bumex 2. Restarted home isordil and po lasix 40mg. Increased to IV lasix 40mg qday 8/29, then transitioned to po lasix 40mg qday.   -TTE (8/27): Worsening diastolic dysfunction. EF 35-45%. mild/mod MR. mild AS. mild/mod AR. mod-severe global LV dysfunction.   -Patient transitioned off NC. D/mery parks 8/28  -F/u cards recs: CHANDRA (9/1) showed severe MR, mod AR. Structural heart and HF recs placed. EP will schedule EP study +/- ICD placement closer to discharge

## 2020-09-02 NOTE — PROGRESS NOTE ADULT - SUBJECTIVE AND OBJECTIVE BOX
EP Attending    HISTORY OF PRESENT ILLNESS:   Mr Colon is a 65yoM who presents for acute onset shortness of breath.  His EF is 35-40% chronically (1/2020 and 8/2020), due to ischemic cardiomyopathy.  He is s/p CABG and multiple subsequent PCI. He only has 1 graft open- his LIMA to LAD, and his native vessels are otherwise occluded.  Also has AFib, on apixaban, HTN, CKD, and CHER on CPAP.   He has had multiple episodes of flash pulmonary edema.    9/1- No angina, orthopnea, PND or palpitations.  He is short of breath on minimal exertion, walking across his hospital room with a walker (NYHA IIIB).  No fainting or near-fainting episodes.  telemetry after writing my note yesterday had 9 beats of NSVT.  9/2 - uneventful overnight.  had CHANDRA showing severe MR and moderate AI.  still short of breath, no angina, no fainting.    apixaban 5 milliGRAM(s) Oral two times a day  aspirin  chewable 81 milliGRAM(s) Oral daily  atorvastatin 80 milliGRAM(s) Oral at bedtime  benzocaine 15 mG/menthol 3.6 mG (Sugar-Free) Lozenge 1 Lozenge Oral three times a day PRN  carvedilol 12.5 milliGRAM(s) Oral every 12 hours  furosemide    Tablet 40 milliGRAM(s) Oral daily  isosorbide   dinitrate Tablet (ISORDIL) 10 milliGRAM(s) Oral three times a day  LORazepam     Tablet 0.5 milliGRAM(s) Oral at bedtime  pantoprazole    Tablet 40 milliGRAM(s) Oral two times a day  predniSONE   Tablet 20 milliGRAM(s) Oral daily  sertraline 50 milliGRAM(s) Oral daily                            9.2    10.15 )-----------( 449      ( 02 Sep 2020 06:44 )             33.0       09-02    139  |  100  |  35<H>  ----------------------------<  97  3.3<L>   |  23  |  2.53<H>    Ca    9.5      02 Sep 2020 06:44  Phos  4.8     09-02  Mg     2.3     09-02    TPro  7.0  /  Alb  3.7  /  TBili  0.6  /  DBili  x   /  AST  18  /  ALT  24  /  AlkPhos  112  09-02    T(C): 36.8 (09-02-20 @ 04:22), Max: 36.8 (09-02-20 @ 04:22)  HR: 63 (09-02-20 @ 05:55) (50 - 72)  BP: 121/78 (09-02-20 @ 05:00) (114/56 - 145/84)  RR: 18 (09-02-20 @ 04:22) (16 - 19)  SpO2: 95% (09-02-20 @ 05:55) (95% - 100%)  Wt(kg): --    I&O's Summary    01 Sep 2020 07:01  -  02 Sep 2020 07:00  --------------------------------------------------------  IN: 0 mL / OUT: 1150 mL / NET: -1150 mL      Appearance: Robust white male in no acute distress, but short-of-breath.  HEENT:   Normal oral mucosa, PERRL, EOMI	  Lymphatic: No lymphadenopathy , no edema  Cardiovascular: Normal S1 S2, mild JVD, Peripheral pulses palpable 2+ bilaterally  Respiratory: Lungs clear to auscultation, normal effort 	  Gastrointestinal:  Soft, Non-tender, + BS	  Skin: No rashes, No ecchymoses, No cyanosis, warm to touch  Musculoskeletal: Normal range of motion, normal strength  Psychiatry:  Mood & affect appropriate    TELEMETRY: NSR, 9 beat run of NSVT on 8/31.  Multifocal PVC's.  ECG:  	NSR, interpolated PVC's.  Echo: EF 35-40%, LV dilated to upper limit of normal, severe diastolic dysfunction, moderate Mitral regurgitation.  Cath: Prior with only LIMA to LAD patent. Native vessels occluded.	    ASSESSMENT/PLAN: 	65y Male with an ischemic cardiomyopathy, EF 35-40%, and increasing frequency of episodes of flash pulmonary edema.  Has nonsustained ventricular tachycardia on telemetry.    He is not a candidate for biventricular pacing.  Structural and CHF team consults today to determine what course of action may improve his NYHA functional class and symptoms.    Based on the MUSTT trial, he qualifies for an EP Study for VT induction, with ICD insertion if positive.  Will schedule EP study +/- ICD for closer to discharge once workup is completed, if this remains in line with his goals of care.    Wei Feliciano M.D.  Cardiac Electrophysiology    office [phone]  pager [phone]

## 2020-09-03 LAB
ALBUMIN SERPL ELPH-MCNC: 3.7 G/DL — SIGNIFICANT CHANGE UP (ref 3.3–5)
ALP SERPL-CCNC: 111 U/L — SIGNIFICANT CHANGE UP (ref 40–120)
ALT FLD-CCNC: 21 U/L — SIGNIFICANT CHANGE UP (ref 10–45)
ANION GAP SERPL CALC-SCNC: 16 MMOL/L — SIGNIFICANT CHANGE UP (ref 5–17)
ANION GAP SERPL CALC-SCNC: 16 MMOL/L — SIGNIFICANT CHANGE UP (ref 5–17)
AST SERPL-CCNC: 14 U/L — SIGNIFICANT CHANGE UP (ref 10–40)
BILIRUB SERPL-MCNC: 0.4 MG/DL — SIGNIFICANT CHANGE UP (ref 0.2–1.2)
BUN SERPL-MCNC: 32 MG/DL — HIGH (ref 7–23)
BUN SERPL-MCNC: 35 MG/DL — HIGH (ref 7–23)
CALCIUM SERPL-MCNC: 9.3 MG/DL — SIGNIFICANT CHANGE UP (ref 8.4–10.5)
CALCIUM SERPL-MCNC: 9.6 MG/DL — SIGNIFICANT CHANGE UP (ref 8.4–10.5)
CHLORIDE SERPL-SCNC: 100 MMOL/L — SIGNIFICANT CHANGE UP (ref 96–108)
CHLORIDE SERPL-SCNC: 102 MMOL/L — SIGNIFICANT CHANGE UP (ref 96–108)
CO2 SERPL-SCNC: 21 MMOL/L — LOW (ref 22–31)
CO2 SERPL-SCNC: 23 MMOL/L — SIGNIFICANT CHANGE UP (ref 22–31)
CREAT SERPL-MCNC: 2.19 MG/DL — HIGH (ref 0.5–1.3)
CREAT SERPL-MCNC: 2.36 MG/DL — HIGH (ref 0.5–1.3)
GLUCOSE SERPL-MCNC: 120 MG/DL — HIGH (ref 70–99)
GLUCOSE SERPL-MCNC: 89 MG/DL — SIGNIFICANT CHANGE UP (ref 70–99)
HCT VFR BLD CALC: 34.7 % — LOW (ref 39–50)
HGB BLD-MCNC: 9.6 G/DL — LOW (ref 13–17)
MAGNESIUM SERPL-MCNC: 2.3 MG/DL — SIGNIFICANT CHANGE UP (ref 1.6–2.6)
MCHC RBC-ENTMCNC: 23 PG — LOW (ref 27–34)
MCHC RBC-ENTMCNC: 27.7 GM/DL — LOW (ref 32–36)
MCV RBC AUTO: 83 FL — SIGNIFICANT CHANGE UP (ref 80–100)
NRBC # BLD: 0 /100 WBCS — SIGNIFICANT CHANGE UP (ref 0–0)
NT-PROBNP SERPL-SCNC: 3301 PG/ML — HIGH (ref 0–300)
PHOSPHATE SERPL-MCNC: 4.3 MG/DL — SIGNIFICANT CHANGE UP (ref 2.5–4.5)
PLATELET # BLD AUTO: 401 K/UL — HIGH (ref 150–400)
POTASSIUM SERPL-MCNC: 3.6 MMOL/L — SIGNIFICANT CHANGE UP (ref 3.5–5.3)
POTASSIUM SERPL-MCNC: 3.7 MMOL/L — SIGNIFICANT CHANGE UP (ref 3.5–5.3)
POTASSIUM SERPL-SCNC: 3.6 MMOL/L — SIGNIFICANT CHANGE UP (ref 3.5–5.3)
POTASSIUM SERPL-SCNC: 3.7 MMOL/L — SIGNIFICANT CHANGE UP (ref 3.5–5.3)
PROT SERPL-MCNC: 7.1 G/DL — SIGNIFICANT CHANGE UP (ref 6–8.3)
RBC # BLD: 4.18 M/UL — LOW (ref 4.2–5.8)
RBC # FLD: 18.8 % — HIGH (ref 10.3–14.5)
SODIUM SERPL-SCNC: 139 MMOL/L — SIGNIFICANT CHANGE UP (ref 135–145)
SODIUM SERPL-SCNC: 139 MMOL/L — SIGNIFICANT CHANGE UP (ref 135–145)
WBC # BLD: 11.09 K/UL — HIGH (ref 3.8–10.5)
WBC # FLD AUTO: 11.09 K/UL — HIGH (ref 3.8–10.5)

## 2020-09-03 PROCEDURE — 99233 SBSQ HOSP IP/OBS HIGH 50: CPT | Mod: GC

## 2020-09-03 PROCEDURE — 99233 SBSQ HOSP IP/OBS HIGH 50: CPT

## 2020-09-03 PROCEDURE — 99223 1ST HOSP IP/OBS HIGH 75: CPT

## 2020-09-03 RX ORDER — HYDRALAZINE HCL 50 MG
25 TABLET ORAL THREE TIMES A DAY
Refills: 0 | Status: DISCONTINUED | OUTPATIENT
Start: 2020-09-03 | End: 2020-09-04

## 2020-09-03 RX ORDER — FUROSEMIDE 40 MG
40 TABLET ORAL
Refills: 0 | Status: DISCONTINUED | OUTPATIENT
Start: 2020-09-03 | End: 2020-09-04

## 2020-09-03 RX ADMIN — ISOSORBIDE DINITRATE 10 MILLIGRAM(S): 5 TABLET ORAL at 13:15

## 2020-09-03 RX ADMIN — CARVEDILOL PHOSPHATE 12.5 MILLIGRAM(S): 80 CAPSULE, EXTENDED RELEASE ORAL at 05:36

## 2020-09-03 RX ADMIN — PANTOPRAZOLE SODIUM 40 MILLIGRAM(S): 20 TABLET, DELAYED RELEASE ORAL at 05:35

## 2020-09-03 RX ADMIN — PANTOPRAZOLE SODIUM 40 MILLIGRAM(S): 20 TABLET, DELAYED RELEASE ORAL at 17:28

## 2020-09-03 RX ADMIN — Medication 10 MILLIGRAM(S): at 05:35

## 2020-09-03 RX ADMIN — APIXABAN 5 MILLIGRAM(S): 2.5 TABLET, FILM COATED ORAL at 17:28

## 2020-09-03 RX ADMIN — SERTRALINE 50 MILLIGRAM(S): 25 TABLET, FILM COATED ORAL at 11:17

## 2020-09-03 RX ADMIN — Medication 40 MILLIGRAM(S): at 13:15

## 2020-09-03 RX ADMIN — ATORVASTATIN CALCIUM 80 MILLIGRAM(S): 80 TABLET, FILM COATED ORAL at 21:43

## 2020-09-03 RX ADMIN — Medication 25 MILLIGRAM(S): at 13:17

## 2020-09-03 RX ADMIN — Medication 0.5 MILLIGRAM(S): at 21:42

## 2020-09-03 RX ADMIN — Medication 25 MILLIGRAM(S): at 21:46

## 2020-09-03 RX ADMIN — Medication 81 MILLIGRAM(S): at 11:17

## 2020-09-03 RX ADMIN — APIXABAN 5 MILLIGRAM(S): 2.5 TABLET, FILM COATED ORAL at 05:36

## 2020-09-03 RX ADMIN — ISOSORBIDE DINITRATE 10 MILLIGRAM(S): 5 TABLET ORAL at 21:43

## 2020-09-03 RX ADMIN — ISOSORBIDE DINITRATE 10 MILLIGRAM(S): 5 TABLET ORAL at 05:35

## 2020-09-03 RX ADMIN — Medication 20 MILLIGRAM(S): at 05:35

## 2020-09-03 RX ADMIN — CARVEDILOL PHOSPHATE 12.5 MILLIGRAM(S): 80 CAPSULE, EXTENDED RELEASE ORAL at 17:28

## 2020-09-03 NOTE — PROGRESS NOTE ADULT - PROBLEM SELECTOR PLAN 1
- Please give lasix 40mg IV daily  - If not improving will consider RHC  - Increase hydralazine to 25mg TID and continue ISDN 10 TID, hold for SBP < 90  - continue coreg 12.5mg BID, hold for HR < 50  - Deferring ARNI/MRA at this time due to renal dysfunction  - Continue 1.5L fluid restriction  - Daily standing weight, strict I/Os  - Daily BMP, Mg. Maintain K 4-4.5 and Mg 2-2.5.

## 2020-09-03 NOTE — PROGRESS NOTE ADULT - SUBJECTIVE AND OBJECTIVE BOX
S: Denies chest pain or SOB at rest. Review of systems otherwise (-)      MEDICATIONS  (STANDING):  apixaban 5 milliGRAM(s) Oral two times a day  aspirin  chewable 81 milliGRAM(s) Oral daily  atorvastatin 80 milliGRAM(s) Oral at bedtime  carvedilol 12.5 milliGRAM(s) Oral every 12 hours  hydrALAZINE 10 milliGRAM(s) Oral three times a day  isosorbide   dinitrate Tablet (ISORDIL) 10 milliGRAM(s) Oral three times a day  LORazepam     Tablet 0.5 milliGRAM(s) Oral at bedtime  pantoprazole    Tablet 40 milliGRAM(s) Oral two times a day  predniSONE   Tablet 20 milliGRAM(s) Oral daily  sertraline 50 milliGRAM(s) Oral daily    MEDICATIONS  (PRN):  benzocaine 15 mG/menthol 3.6 mG (Sugar-Free) Lozenge 1 Lozenge Oral three times a day PRN Sore Throat      LABS:                            9.6    11.09 )-----------( 401      ( 03 Sep 2020 06:18 )             34.7     Hemoglobin: 9.6 g/dL (09-03 @ 06:18)  Hemoglobin: 9.2 g/dL (09-02 @ 06:44)  Hemoglobin: 8.7 g/dL (09-01 @ 05:45)  Hemoglobin: 8.9 g/dL (08-31 @ 06:09)  Hemoglobin: 8.3 g/dL (08-30 @ 06:35)    09-03    139  |  102  |  35<H>  ----------------------------<  89  3.6   |  21<L>  |  2.36<H>    Ca    9.6      03 Sep 2020 06:19  Phos  4.3     09-03  Mg     2.3     09-03    TPro  7.1  /  Alb  3.7  /  TBili  0.4  /  DBili  x   /  AST  14  /  ALT  21  /  AlkPhos  111  09-03    Creatinine Trend: 2.36<--, 2.37<--, 2.53<--, 2.29<--, 2.37<--, 2.35<--             09-02-20 @ 07:01  -  09-03-20 @ 07:00  --------------------------------------------------------  IN: 1077 mL / OUT: 2250 mL / NET: -1173 mL        PHYSICAL EXAM  Vital Signs Last 24 Hrs  T(C): 36.5 (03 Sep 2020 04:27), Max: 36.8 (02 Sep 2020 15:04)  T(F): 97.7 (03 Sep 2020 04:27), Max: 98.2 (02 Sep 2020 15:04)  HR: 56 (03 Sep 2020 04:27) (56 - 80)  BP: 134/75 (03 Sep 2020 04:27) (131/79 - 134/81)  BP(mean): --  RR: 18 (03 Sep 2020 04:27) (18 - 18)  SpO2: 99% (03 Sep 2020 04:27) (97% - 99%)        Gen: NAD  HEENT:  (-)icterus (-)pallor  CV: N S1 S2 1/6 KATHERYN (+)2 Pulses B/l  Resp: CTA B/L, normal effort  GI: (+) BS Soft, NT, ND  Lymph:  (-) edema, (-)obvious lymphadenopathy  Skin: Warm to touch, Normal turgor  Psych: Appropriate mood and affect      TELEMETRY: SB/SR    Echo: < from: TTE with Doppler (w/Cont) (08.27.20 @ 07:54) >  Conclusions:  1. Mitral valve not well visualized. Mitral annular  calcification. Tethered mitral valve leaflets with normal  opening. Mild moderate mitral regurgitation.  2. Calcified trileaflet aortic valve with decreased  opening. Peak transaortic valve gradient equals 12 mm Hg,  mean transaortic valve gradient equals 8 mm Hg, at least  mild AS. Limited gradient/Doppler evaluation. Mild-moderate  aortic regurgitation.  3. Endocardial visualization enhanced with intravenous  injection of Ultrasonic Enhancing Agent (Definity).  Moderate to severe  global left ventricular systolic  dysfunction. There dyssynchronous left ventricular  contraction.  4. Moderate diastolic dysfunction (Stage II). Increased  E/e'  is consistent with elevated left ventricular filling  pressure.  *** Compared with echocardiogram report of 12/13/2017,  Limited comparison.  Disatolic dysfunction is worse.    < end of copied text >    < from: Transesophageal Echocardiogram w/o TTE (09.01.20 @ 14:15) >  Conclusions:  1. Tethered mitral valve leaflets with normal opening.  Severe mitral regurgitation. There is systolic flow  reversal in the right upper pulmonary vein. By PISA,  calculated ERO=43 mmsq and regurgitant volume=73 mL (Pisa  rad 1cm, Va 34.5 cm/s, Vm 5.0 m/s,  cm)  2. Calcified trileaflet aortic valve with normal opening.  Moderate aortic regurgitation. Vena contracta=0.4 cm.  3. Normal aortic root size. (Ao: 3.6 cm at the sinuses of  Valsalva). Midlly dilated ascending aorta for BSA  (ascending aorta diameter 4.5 cm approximately 5 cm distal  to the aortic valve.  Normal size aortic arch, and  descending thoracic aorta.  Minimal atheroma.  4. Left atrial enlargement. No left atrial or left atrial  appendage thrombus. Normal left atrial appendage function  (maximum velocity>40 cm/s).  5. Moderate left ventricular systolic dysfunction.  Paradoxical septal motion consistent with prior cardiac  surgery.  6. Normal right ventricular size and function.    < end of copied text >        ASSESSMENT/PLAN: Patient is a 65 year old male with PMH of CAD s/p CABG with most recent LHC 1/2020 with no intervention, chronic systolic CHF (LVEF 39% TTE 1/2020), Afib (on Eliquis), HTN, HLD, CKD, CHER (on CPAP), and pemphigus vulgaris who presented with RLE swelling admitted with RLE cellulitis and NARCISO on CKD. Cardiology consulted for management of CAD/CHF.    - Diuresis per heart failure team (consult appreciated) - daily standing weights, strict I/Os  - Continue medical management of known CAD with systolic cardiomyopathy - ASA/Statin, Coreg, Hydralazine/Isordil  - Continue AC with Eliquis for CVA prevention  - Reviewed previous cath with interventional cardiology - no plans for repeat cath at this time  - EP follow up appreciated  - CHANDRA with severe MR  - Structural heart eval appreciated - plan to optimize fluid status and BP prior to possible repeat CHANDRA  - Check cardiac viability study  - Patient to f/u with Dr. Campbell's cardiology group after d/c     Cristi Jarvis PA-C  Pager: [phone]

## 2020-09-03 NOTE — PROGRESS NOTE ADULT - SUBJECTIVE AND OBJECTIVE BOX
Subjective:  - Walking in hallway without dyspnea  - Orthopnea improving  - mild nonproductive cough    Medications:  apixaban 5 milliGRAM(s) Oral two times a day  aspirin  chewable 81 milliGRAM(s) Oral daily  atorvastatin 80 milliGRAM(s) Oral at bedtime  benzocaine 15 mG/menthol 3.6 mG (Sugar-Free) Lozenge 1 Lozenge Oral three times a day PRN  carvedilol 12.5 milliGRAM(s) Oral every 12 hours  furosemide   Injectable 40 milliGRAM(s) IV Push two times a day  hydrALAZINE 25 milliGRAM(s) Oral three times a day  isosorbide   dinitrate Tablet (ISORDIL) 10 milliGRAM(s) Oral three times a day  LORazepam     Tablet 0.5 milliGRAM(s) Oral at bedtime  pantoprazole    Tablet 40 milliGRAM(s) Oral two times a day  predniSONE   Tablet 20 milliGRAM(s) Oral daily  sertraline 50 milliGRAM(s) Oral daily      Physical Exam:    Vitals:  Vital Signs Last 24 Hours  T(C): 36.9 (20 @ 14:00), Max: 36.9 (20 @ 14:00)  HR: 58 (20 @ 14:00) (56 - 71)  BP: 122/76 (20 @ 14:00) (122/76 - 134/81)  RR: 18 (20 @ 14:00) (18 - 18)  SpO2: 97% (20 @ 14:00) (96% - 99%)    Weight in k.4 ( @ 08:00)    I&O's Summary    02 Sep 2020 07:  -  03 Sep 2020 07:00  --------------------------------------------------------  IN: 1077 mL / OUT: 2250 mL / NET: -1173 mL    03 Sep 2020 07:  -  03 Sep 2020 16:03  --------------------------------------------------------  IN: 590 mL / OUT: 0 mL / NET: 590 mL    Tele: SB-SR 50-60 PVCs    General: No distress. Comfortable.  HEENT: EOM intact.  Neck: Neck supple. JVP difficult to assess due to body habitus but appears to be about 60lmT7T. No masses  Chest: Clear to auscultation bilaterally. Occasional dry cough  CV: Regular. Normal S1 and S2. II/VI SM at axilla. Radial pulses normal. No LE edema  Abdomen: Obese, soft, non-distended, non-tender  Skin: No rashes or skin breakdown  Neurology: Alert and oriented times three. Sensation intact  Psych: Affect normal    Labs:                        9.6    11.09 )-----------( 401      ( 03 Sep 2020 06:18 )             34.7         139  |  102  |  35<H>  ----------------------------<  89  3.6   |  21<L>  |  2.36<H>    Ca    9.6      03 Sep 2020 06:19  Phos  4.3       Mg     2.3         TPro  7.1  /  Alb  3.7  /  TBili  0.4  /  DBili  x   /  AST  14  /  ALT  21  /  AlkPhos  111      Serum Pro-Brain Natriuretic Peptide: 3301 pg/mL ( @ 06:19)

## 2020-09-03 NOTE — PROGRESS NOTE ADULT - ASSESSMENT
Mr. Colon is a 65 year old male with PMH significant for CAD (s/p CABG 2005 LIMA-LAD, SVG- OM2, SVG- OM1, LIMA to LAD only patient graft per cath 1/2020), ICM HFrEF (LVEF 30-35%), severe MR, Afib (on Eliquis), HTN, HLD, CKD III, CHER (on CPAP) who presented with septic shock 2/2 RLL cellulitis. He additionally has ADHF with shortness of breath and hypoxia requiring CCU admission and BiPAP likely in the setting of volume overload and severe MR. He has diuresed nearly 20lbs since admission with IV diuretics and is normotensive on low dose vasodilators however is not yet optimized on medical therapy. Exam is challenging due to his body habitus, however he appears to have elevated JVP. SCr relatively stable today. proBNP uptrending.

## 2020-09-03 NOTE — PROGRESS NOTE ADULT - SUBJECTIVE AND OBJECTIVE BOX
PROGRESS NOTE:   Authored by Nikki Kimura, MD, Pager [phone] [facility], [id number] [facility]     Patient is a 65y old  Male who presents with a chief complaint of RLE swelling (02 Sep 2020 14:47)      SUBJECTIVE / OVERNIGHT EVENTS:    ADDITIONAL REVIEW OF SYSTEMS:    MEDICATIONS  (STANDING):  apixaban 5 milliGRAM(s) Oral two times a day  aspirin  chewable 81 milliGRAM(s) Oral daily  atorvastatin 80 milliGRAM(s) Oral at bedtime  carvedilol 12.5 milliGRAM(s) Oral every 12 hours  hydrALAZINE 10 milliGRAM(s) Oral three times a day  isosorbide   dinitrate Tablet (ISORDIL) 10 milliGRAM(s) Oral three times a day  LORazepam     Tablet 0.5 milliGRAM(s) Oral at bedtime  pantoprazole    Tablet 40 milliGRAM(s) Oral two times a day  predniSONE   Tablet 20 milliGRAM(s) Oral daily  sertraline 50 milliGRAM(s) Oral daily    MEDICATIONS  (PRN):  benzocaine 15 mG/menthol 3.6 mG (Sugar-Free) Lozenge 1 Lozenge Oral three times a day PRN Sore Throat      CAPILLARY BLOOD GLUCOSE        I&O's Summary    02 Sep 2020 07:01  -  03 Sep 2020 07:00  --------------------------------------------------------  IN: 1077 mL / OUT: 2250 mL / NET: -1173 mL        PHYSICAL EXAM:  Vital Signs Last 24 Hrs  T(C): 36.5 (03 Sep 2020 04:27), Max: 36.8 (02 Sep 2020 15:04)  T(F): 97.7 (03 Sep 2020 04:27), Max: 98.2 (02 Sep 2020 15:04)  HR: 56 (03 Sep 2020 04:27) (52 - 80)  BP: 134/75 (03 Sep 2020 04:27) (128/83 - 134/81)  BP(mean): --  RR: 18 (03 Sep 2020 04:27) (18 - 18)  SpO2: 99% (03 Sep 2020 04:27) (95% - 99%)        General: No acute distress, well-appearing  	  Eyes: EOMI   	  ENT: MMM, no oropharyngeal lesions or erythema appreciated   	  Pulm: No increased WOB. No wheezing. CTAB  	  CV: RRR. S1&S2+. No M/R/G appreciated.   	  Abdomen: +BS. Soft, NT. No organomegaly. Distended.   	  MSK: Limited ROM RLE.   	  Extremities: No peripheral edema.   	  Neuro: A&Ox3, no focal deficits       Skin: Warm and dry. L hand wound with dressing in place.    LABS:                        9.6    11.09 )-----------( 401      ( 03 Sep 2020 06:18 )             34.7     09-03    139  |  102  |  35<H>  ----------------------------<  89  3.6   |  21<L>  |  2.36<H>    Ca    9.6      03 Sep 2020 06:19  Phos  4.3     09-03  Mg     2.3     09-03    TPro  7.1  /  Alb  3.7  /  TBili  0.4  /  DBili  x   /  AST  14  /  ALT  21  /  AlkPhos  111  09-03                RADIOLOGY & ADDITIONAL TESTS:  Results Reviewed:   Imaging Personally Reviewed:  Electrocardiogram Personally Reviewed:    COORDINATION OF CARE:  Care Discussed with Consultants/Other Providers [Y/N]:  Prior or Outpatient Records Reviewed [Y/N]: PROGRESS NOTE:   Authored by Nikki Kimura, MD, Pager [phone] [facility], [id number] [facility]     Patient is a 65y old  Male who presents with a chief complaint of RLE swelling (02 Sep 2020 14:47)      SUBJECTIVE / OVERNIGHT EVENTS: No acute events overnight. Pt seen and examined at bedside. He denies fever, chills, nausea, vomiting, cp, or sob.    ADDITIONAL REVIEW OF SYSTEMS:    MEDICATIONS  (STANDING):  apixaban 5 milliGRAM(s) Oral two times a day  aspirin  chewable 81 milliGRAM(s) Oral daily  atorvastatin 80 milliGRAM(s) Oral at bedtime  carvedilol 12.5 milliGRAM(s) Oral every 12 hours  hydrALAZINE 10 milliGRAM(s) Oral three times a day  isosorbide   dinitrate Tablet (ISORDIL) 10 milliGRAM(s) Oral three times a day  LORazepam     Tablet 0.5 milliGRAM(s) Oral at bedtime  pantoprazole    Tablet 40 milliGRAM(s) Oral two times a day  predniSONE   Tablet 20 milliGRAM(s) Oral daily  sertraline 50 milliGRAM(s) Oral daily    MEDICATIONS  (PRN):  benzocaine 15 mG/menthol 3.6 mG (Sugar-Free) Lozenge 1 Lozenge Oral three times a day PRN Sore Throat      CAPILLARY BLOOD GLUCOSE        I&O's Summary    02 Sep 2020 07:01  -  03 Sep 2020 07:00  --------------------------------------------------------  IN: 1077 mL / OUT: 2250 mL / NET: -1173 mL        PHYSICAL EXAM:  Vital Signs Last 24 Hrs  T(C): 36.5 (03 Sep 2020 04:27), Max: 36.8 (02 Sep 2020 15:04)  T(F): 97.7 (03 Sep 2020 04:27), Max: 98.2 (02 Sep 2020 15:04)  HR: 56 (03 Sep 2020 04:27) (52 - 80)  BP: 134/75 (03 Sep 2020 04:27) (128/83 - 134/81)  BP(mean): --  RR: 18 (03 Sep 2020 04:27) (18 - 18)  SpO2: 99% (03 Sep 2020 04:27) (95% - 99%)        General: No acute distress, well-appearing  	  Eyes: EOMI   	  ENT: MMM, no oropharyngeal lesions or erythema appreciated   	  Pulm: No increased WOB. No wheezing. CTAB  	  CV: RRR. S1&S2+. No M/R/G appreciated.   	  Abdomen: +BS. Soft, NT. No organomegaly. Distended.   	  MSK: Limited ROM RLE.   	  Extremities: No peripheral edema.   	  Neuro: A&Ox3, no focal deficits       Skin: Warm and dry. L hand wound with dressing in place.    LABS:                        9.6    11.09 )-----------( 401      ( 03 Sep 2020 06:18 )             34.7     09-03    139  |  102  |  35<H>  ----------------------------<  89  3.6   |  21<L>  |  2.36<H>    Ca    9.6      03 Sep 2020 06:19  Phos  4.3     09-03  Mg     2.3     09-03    TPro  7.1  /  Alb  3.7  /  TBili  0.4  /  DBili  x   /  AST  14  /  ALT  21  /  AlkPhos  111  09-03                RADIOLOGY & ADDITIONAL TESTS:  Results Reviewed:   Imaging Personally Reviewed:  Electrocardiogram Personally Reviewed:    COORDINATION OF CARE:  Care Discussed with Consultants/Other Providers [Y/N]:  Prior or Outpatient Records Reviewed [Y/N]:

## 2020-09-03 NOTE — PROGRESS NOTE ADULT - PROBLEM SELECTOR PLAN 6
-Restarted home coreg (8/26) and isordil (8/27). Entresto continues to be held -Restarted home coreg (8/26) and isordil (8/27). Entresto continues to be held  -hydralazine

## 2020-09-03 NOTE — PROGRESS NOTE ADULT - SUBJECTIVE AND OBJECTIVE BOX
Structural Heart Team      Mr Colon was seen and examined this morning.  He said at the time he had no complaints and felt "ok".  He said his R leg is feeling better and that he has been ambulating with assistance and a walker but would like to try without the walker today.  He denied chest pain/pressure and sob as well as dizziness.  He had no acute events overnight and on tele he was SR 50-80's with some PAT.        REVIEW OF SYSTEMS:    CONSTITUTIONAL: No weakness, fevers or chills  EYES/ENT: No visual changes;  No vertigo or throat pain   NECK: No pain or stiffness  RESPIRATORY: No cough, wheezing, hemoptysis; No shortness of breath  CARDIOVASCULAR: No chest pain or palpitations  GASTROINTESTINAL: No abdominal or epigastric pain. No nausea, vomiting, or hematemesis; No diarrhea or constipation. No melena or hematochezia.  GENITOURINARY: No dysuria, frequency or hematuria  NEUROLOGICAL: No numbness or weakness  SKIN: No itching, rashes      Allergies    No Known Allergies    Intolerances      Vital Signs Last 24 Hrs  T(C): 36.6 (03 Sep 2020 11:40), Max: 36.8 (02 Sep 2020 15:04)  T(F): 97.9 (03 Sep 2020 11:40), Max: 98.2 (02 Sep 2020 15:04)  HR: 58 (03 Sep 2020 12:46) (56 - 80)  BP: 125/85 (03 Sep 2020 12:46) (122/82 - 134/81)  BP(mean): --  RR: 18 (03 Sep 2020 11:40) (18 - 18)  SpO2: 97% (03 Sep 2020 12:45) (96% - 99%)    MEDICATIONS  (STANDING):  apixaban 5 milliGRAM(s) Oral two times a day  aspirin  chewable 81 milliGRAM(s) Oral daily  atorvastatin 80 milliGRAM(s) Oral at bedtime  carvedilol 12.5 milliGRAM(s) Oral every 12 hours  furosemide   Injectable 40 milliGRAM(s) IV Push two times a day  hydrALAZINE 25 milliGRAM(s) Oral three times a day  isosorbide   dinitrate Tablet (ISORDIL) 10 milliGRAM(s) Oral three times a day  LORazepam     Tablet 0.5 milliGRAM(s) Oral at bedtime  pantoprazole    Tablet 40 milliGRAM(s) Oral two times a day  predniSONE   Tablet 20 milliGRAM(s) Oral daily  sertraline 50 milliGRAM(s) Oral daily      Exam-  General: NAD  Cardiac: s1s2, RRR, II/VI systolic murmur  Pulmonary: CTA b/l, no w/r/r  Gastrointestinal: soft abdomen, nontender, nondistended, + bowel sounds throughout  Extremities: trace LE edema R>L  Neuro: A&Ox3, nonfocal 	                          9.6    11.09 )-----------( 401      ( 03 Sep 2020 06:18 )             34.7   09-03    139  |  102  |  35<H>  ----------------------------<  89  3.6   |  21<L>  |  2.36<H>    Ca    9.6      03 Sep 2020 06:19  Phos  4.3     09-03  Mg     2.3     09-03    TPro  7.1  /  Alb  3.7  /  TBili  0.4  /  DBili  x   /  AST  14  /  ALT  21  /  AlkPhos  111  09-03    I&O's Summary    02 Sep 2020 07:01  -  03 Sep 2020 07:00  --------------------------------------------------------  IN: 1077 mL / OUT: 2250 mL / NET: -1173 mL              Assessment/Plan:  Mr Colon is a 66y/o male with an extensive CAD history including CABG (2005 @ [facility] with Dr. Pena) as well as multiple stents afterwards.  He also has CHF, Afib (paroxysmal?), CKD III, and CHER (on CPAP).   Admitted through the ER with sepsis resulting from injuries sustained during a mechanical fall and treated with IV abx.  Found on echo to have MR and referred for Mitraclip  evaluation.  - good diuresis, needs optimization potentially guided by RHC  -- when optimized we will get a repeat CHANDRA  - he will also likely benefit from a viability study to assess his need for revascularization  - the plan was discussed with Mr Colon and he was in agreement     JEEVAN Bourgeois  [phone]

## 2020-09-03 NOTE — CHART NOTE - NSCHARTNOTEFT_GEN_A_CORE
Nutrition Follow Up Note  Patient seen for: nutrition follow up    Chart reviewed, events noted.    Source: pt, electronic medical record     Diet : DASH/TLC + 1500 ml fluid restriction    Patient reports: no acute GI distress, last BM 8/31    PO intake : good, pt noted with 100% po intake at meals. Pt has no additional nutrition related questions at this time, declines written nutrition education materials offered again.    Source for PO intake: pt, electronic medical record     Daily Weight in lbs: 247.7 (9-3); fluid shifts noted  250.6 (9-1)  266.7 (dosing wt)    Pertinent Medications: MEDICATIONS  (STANDING):  apixaban 5 milliGRAM(s) Oral two times a day  aspirin  chewable 81 milliGRAM(s) Oral daily  atorvastatin 80 milliGRAM(s) Oral at bedtime  carvedilol 12.5 milliGRAM(s) Oral every 12 hours  hydrALAZINE 10 milliGRAM(s) Oral three times a day  isosorbide   dinitrate Tablet (ISORDIL) 10 milliGRAM(s) Oral three times a day  LORazepam     Tablet 0.5 milliGRAM(s) Oral at bedtime  pantoprazole    Tablet 40 milliGRAM(s) Oral two times a day  predniSONE   Tablet 20 milliGRAM(s) Oral daily  sertraline 50 milliGRAM(s) Oral daily    MEDICATIONS  (PRN):  benzocaine 15 mG/menthol 3.6 mG (Sugar-Free) Lozenge 1 Lozenge Oral three times a day PRN Sore Throat    Pertinent Labs: 09-03 @ 06:19: Na 139, BUN 35<H>, Cr 2.36<H>, BG 89, K+ 3.6, Phos 4.3, Mg 2.3, Alk Phos 111, ALT/SGPT 21, AST/SGOT 14, HbA1c --  09-02 @ 15:44: Na 141, BUN 32<H>, Cr 2.37<H>, <H>, K+ 4.2, Phos --, Mg 2.4, Alk Phos --, ALT/SGPT --, AST/SGOT --, HbA1c --    Skin per nursing documentation: no pressure injuries noted  Edema: 1+ edema to b/l legs noted    Estimated Needs:   [x] no change since previous assessment  [ ] recalculated:     Previous Nutrition Diagnosis: Overweight/Obesity (BMI>40)  Nutrition Diagnosis is: ongoing, being addressed with calorie controlled diet while in-patient and reinforcement of nutrition education as able    New Nutrition Diagnosis: n/a    Recommend  1) Continue DASH/TLC therapeutic diet as indicated, fluids per team.  2) Provide/review nutrition education as indicated.    Monitoring and Evaluation:     Continue to monitor Nutritional intake, Tolerance to diet prescription, weights, labs, skin integrity    RD remains available upon request and will follow up per protocol. Elsa Leon RD, CDN Pager: [phone]

## 2020-09-03 NOTE — PROGRESS NOTE ADULT - PROBLEM SELECTOR PLAN 2
- Appreciate structural input  - Medical optimization as above  - Repeat echo to reassess once optimized.

## 2020-09-03 NOTE — PROGRESS NOTE ADULT - ATTENDING COMMENTS
No acute events reported overnight.  The patient reports that he is overall clinically doing better.  He feels that he is close to his baseline status.  He denies any cardiopulmonary complaints at rest or upon exertion.  He does not feel light-headed or dizzy.  No palpitations.  Telemetry demonstrates sinus rhythm with rates in the 50s to 80s with PAT.    --Patient is clinically doing well  Plan for uptitration of hydralazine/isosorbide.  He will be transitioned to IV diuretics.  --Recommend medical optimization with aggressive BP/HR control and diuresis.  The patients baseline weight is ~220lbs.  It is difficult based upon his physical examination to assess his volume status.  May need right heart catheterization +-Curtis to assist in diuresis.  --Once the patient is optimized he will need a repeat CHANDRA (TTE underestimates the degree of severity of his mitral regurgitation).  On CHANDRA the posterior leaflet is significantly tethered.  There is calcification on the chordae but it does not appear that he has a significant flail going into the left atrium.  The primary mechanism is functional ischemic mitral regurgitation.  --He would likely benefit from a viability study to assess need for revascularization.  His coronary anatomy is not amenable for high risk PCI.  He has left to left collateral filling to his obtuse marginal artery and posterior descending artery.  Reviewed his imaging studies with cardiac surgery although he has diffuse disease his anatomy is amenable for redo CABG.  Recommend that the study be ordered.  --If the patient is felt to be high risk and his anatomy is not suitable for surgery consideration at that time will be made for potential for MitraClip if he continues to have severe functional mitral regurgitation on repeat echo imaging.  --Recommend fluid restrict to less than 1.5L a day  --Daily standing weight  --Aim for K>4 and Mg>2  --Continue telemetry monitoring    All questions and concerns of the patient were addressed.

## 2020-09-03 NOTE — PROGRESS NOTE ADULT - ATTENDING COMMENTS
Overall feels well. Seen by CHF and structural heart team yesterday.   Started on Hydralazine.   f/u heart failure rec's regarding diuretic recommendations  Plan for viability study  Repeat CHANDRA once more euvolemic to reassess mitral valve.  EP study eventally Overall feels well. Seen by CHF and structural heart team yesterday.   Started on Hydralazine.   f/u heart failure rec's regarding diuretic recommendations  Plan for viability study  Repeat CHANDRA once more euvolemic to reassess mitral valve.  EP study eventually    d/w structural heart team

## 2020-09-03 NOTE — PROGRESS NOTE ADULT - PROBLEM SELECTOR PLAN 3
NARCISO on CKD III. Likely pre-renal in etiology. Cr elevated to 2.4 on admission. Baseline Cr 1.4-1.7. Improved with IVF and lasix was held. However, after CHF exacerbation requiring aggressive diuresis (8/27), patient's Cr is elevated to 2.15 on 8/28  -Cr elevated to 2.53 today. Likely in the setting of overdiuresis as pt's home lasix dose in 20mg qday. Will hold tomorrow AM's dose of lasix and reassess  -Continue to monitor daily BMPs   -Renally dose medications. s/p CABG   -C/w home aspirin 81mg qday + atorvastatin 80mg qday + coreg 12.5mg BID  -F/u cards recs

## 2020-09-03 NOTE — PROGRESS NOTE ADULT - PROBLEM SELECTOR PLAN 1
Likely multifactorial 2/2 cellulitis and CHF - Improved  -RLE Duplex negative for DVT (8/24)  -XR RLE (8/24) negative for fx. Soft tissue swelling/stranding around right ankle.   -S/p IV vanc/zosyn in ED. Vanc (8/24-30) completed. Combined Systolic and diastolic Heart failure. TTE (1/2020): LVEF 39%, mod MR/AR  -isosorbide dinitrate, Entresto, lasix, and coreg held on admission 2/2 hypotension and NARCISO  -Home coreg restarted 8/26.   -8/27: Patient had an RRT after desatting to 77% overnight. Found to be significant pulmonary edema on CXR. EKG showed Afib with RVR and had runs of VT on tele. Lactate 8. Patient received IV lasix 40mg, amio, and transferred to CCU and placed on BiPAP.    -CCU course (8/27): transitioned from BiPAP to NC. Bumex 2. Restarted home isordil and po lasix 40mg. Increased to IV lasix 40mg qday 8/29, then transitioned to po lasix 40mg qday.   -TTE (8/27): Worsening diastolic dysfunction. EF 35-45%. mild/mod MR. mild AS. mild/mod AR. mod-severe global LV dysfunction.   -Patient transitioned off NC. D/mery parks 8/28  -F/u cards recs: CHANDRA (9/1) showed severe MR, mod AR. Structural heart and HF recs placed. EP will schedule EP study +/- ICD placement closer to discharge  -Heart failure consulted. Lasix 40 BID IV   -Increased hydralazine to 25 TID

## 2020-09-03 NOTE — PROGRESS NOTE ADULT - ATTENDING COMMENTS
Patient seen and examined, agree with above assessment and plan as transcribed above.    - Cont IV lasix to keep net negative   - Plan for viability study today to eval if patient would benefit from revascularization  - Will review CHANDRA with team.  I suspect the mechanism of MR is complex.  Although there is some tethering I suspect the P3 scallop is partially flail  best seen on the commisural view and 2 chamber views.    John Dodge MD, [facility]  BEEPER [phone]

## 2020-09-03 NOTE — PROGRESS NOTE ADULT - SUBJECTIVE AND OBJECTIVE BOX
EP Attending    HISTORY OF PRESENT ILLNESS:   Mr Colon is a 65yoM who presents for acute onset shortness of breath.  His EF is 35-40% chronically (1/2020 and 8/2020), due to ischemic cardiomyopathy.  He is s/p CABG and multiple subsequent PCI. He only has 1 graft open- his LIMA to LAD, and his native vessels are otherwise occluded.  Also has AFib, on apixaban, HTN, CKD, and CHER on CPAP.   He has had multiple episodes of flash pulmonary edema.    9/1- No angina, orthopnea, PND or palpitations.  He is short of breath on minimal exertion, walking across his hospital room with a walker (NYHA IIIB).  No fainting or near-fainting episodes.  telemetry after writing my note yesterday had 9 beats of NSVT.  9/2 - uneventful overnight.  had CHANDRA showing severe MR and moderate AI.  still short of breath, no angina, no fainting.  9/3 - no VT on telemetry overnight. Still short of breath.  Discussed the overall plan of care re: diuresis and re-evaluation of his valve before deciding on a structural vs surgical solution.  He is upset that he will spend more time in the hospital and potentially even longer if he needs a re-do open heart procedure, but understands that an incomplete plan will result in readmission and worse CHF symptoms.    apixaban 5 milliGRAM(s) Oral two times a day  aspirin  chewable 81 milliGRAM(s) Oral daily  atorvastatin 80 milliGRAM(s) Oral at bedtime  benzocaine 15 mG/menthol 3.6 mG (Sugar-Free) Lozenge 1 Lozenge Oral three times a day PRN  carvedilol 12.5 milliGRAM(s) Oral every 12 hours  hydrALAZINE 10 milliGRAM(s) Oral three times a day  isosorbide   dinitrate Tablet (ISORDIL) 10 milliGRAM(s) Oral three times a day  LORazepam     Tablet 0.5 milliGRAM(s) Oral at bedtime  pantoprazole    Tablet 40 milliGRAM(s) Oral two times a day  predniSONE   Tablet 20 milliGRAM(s) Oral daily  sertraline 50 milliGRAM(s) Oral daily                            9.6    11.09 )-----------( 401      ( 03 Sep 2020 06:18 )             34.7       09-03    139  |  102  |  35<H>  ----------------------------<  89  3.6   |  21<L>  |  2.36<H>    Ca    9.6      03 Sep 2020 06:19  Phos  4.3     09-03  Mg     2.3     09-03    TPro  7.1  /  Alb  3.7  /  TBili  0.4  /  DBili  x   /  AST  14  /  ALT  21  /  AlkPhos  111  09-03    T(C): 36.5 (09-03-20 @ 04:27), Max: 36.8 (09-02-20 @ 15:04)  HR: 56 (09-03-20 @ 04:27) (52 - 80)  BP: 134/75 (09-03-20 @ 04:27) (128/83 - 134/81)  RR: 18 (09-03-20 @ 04:27) (18 - 18)  SpO2: 99% (09-03-20 @ 04:27) (95% - 99%)  Wt(kg): --    I&O's Summary    02 Sep 2020 07:01  -  03 Sep 2020 07:00  --------------------------------------------------------  IN: 1077 mL / OUT: 2250 mL / NET: -1173 mL        Appearance: Robust white male in no acute distress, but short-of-breath.  HEENT:   Normal oral mucosa, PERRL, EOMI	  Lymphatic: No lymphadenopathy , no edema  Cardiovascular: Normal S1 S2, mild JVD, Peripheral pulses palpable 2+ bilaterally  Respiratory: Lungs clear to auscultation, normal effort 	  Gastrointestinal:  Soft, Non-tender, + BS	  Skin: No rashes, No ecchymoses, No cyanosis, warm to touch  Musculoskeletal: Normal range of motion, normal strength  Psychiatry:  Mood & affect appropriate    TELEMETRY: NSR, 9 beat run of NSVT on 8/31.  Multifocal PVC's.   ECG:  	NSR, interpolated PVC's.  Echo: EF 35-40%, LV dilated to upper limit of normal, severe diastolic dysfunction, moderate Mitral regurgitation.  Cath: Prior with only LIMA to LAD patent. Native vessels occluded.	    ASSESSMENT/PLAN: 	65y Male with an ischemic cardiomyopathy, EF 35-40%, and increasing frequency of episodes of flash pulmonary edema.  Has nonsustained ventricular tachycardia on telemetry.    He is not a candidate for biventricular pacing.  Structural and CHF team consults have been completed and have essentially reached the same conclusions: he needs further diuresis, and it is probably going to be difficult to assess his volume status (much is in his abdomen).  The MV regurg should be re-evaluated once he is euvolemic.    I agree that a [place]-Emanuel catheter would be helpful to know when he is ready for repeat CHANDRA.  He is glad to know that he is not ill enough to require a cardiac transplant.      Based on the MUSTT trial, he qualifies for an EP Study for VT induction, with ICD insertion if positive.  However at this time, EP workup will be deferred until CHF team and Structural/Surgical teams have optimized him.  Will follow along, and formally re-evaluate prior to discharge.    Wei Feliciano M.D.  Cardiac Electrophysiology    office [phone]  pager [phone]

## 2020-09-03 NOTE — PROGRESS NOTE ADULT - ASSESSMENT
63yo male with PMH significant for CAD (s/p CABG; most recent LHC showed stable disease with no intervention), CHF (LVEF 39% TTE 1/2020), Afib (on Eliquis), HTN, HLD, CKD III, CHER (uses CPAP), and pemphigus vulgaris presenting to ED with RLE swelling. Found to be septic in ED (hypotension, leukocytosis, elevated lactate, febrile). S/p Vanc/zosyn, 2L IVF in ED. Restarted home ativan 0.5mg at bedtime. C/w home zoloft. C/w CPAP at night for CHER.     RUQ ultrasound (8/31): HSM + mild nodularity c/f cirrhosis; No ascites; No focal lesions. Patient with h/o social drinking but no ETOH abuse. Likely cardiac vs FRANCOIS vs viral hep. Hepatitis w/u negative.

## 2020-09-03 NOTE — PROGRESS NOTE ADULT - PROBLEM SELECTOR PLAN 2
Combined Systolic and diastolic Heart failure. TTE (1/2020): LVEF 39%, mod MR/AR  -isosorbide dinitrate, Entresto, lasix, and coreg held on admission 2/2 hypotension and NARCISO  -Home coreg restarted 8/26.   -8/27: Patient had an RRT after desatting to 77% overnight. Found to be significant pulmonary edema on CXR. EKG showed Afib with RVR and had runs of VT on tele. Lactate 8. Patient received IV lasix 40mg, amio, and transferred to CCU and placed on BiPAP.    -CCU course (8/27): transitioned from BiPAP to NC. Bumex 2. Restarted home isordil and po lasix 40mg. Increased to IV lasix 40mg qday 8/29, then transitioned to po lasix 40mg qday.   -TTE (8/27): Worsening diastolic dysfunction. EF 35-45%. mild/mod MR. mild AS. mild/mod AR. mod-severe global LV dysfunction.   -Patient transitioned off NC. D/mery parks 8/28  -F/u cards recs: CHANDRA (9/1) showed severe MR, mod AR. Structural heart and HF recs placed. EP will schedule EP study +/- ICD placement closer to discharge Combined Systolic and diastolic Heart failure. TTE (1/2020): LVEF 39%, mod MR/AR  -isosorbide dinitrate, Entresto, lasix, and coreg held on admission 2/2 hypotension and NARCISO  -Home coreg restarted 8/26.   -8/27: Patient had an RRT after desatting to 77% overnight. Found to be significant pulmonary edema on CXR. EKG showed Afib with RVR and had runs of VT on tele. Lactate 8. Patient received IV lasix 40mg, amio, and transferred to CCU and placed on BiPAP.    -CCU course (8/27): transitioned from BiPAP to NC. Bumex 2. Restarted home isordil and po lasix 40mg. Increased to IV lasix 40mg qday 8/29, then transitioned to po lasix 40mg qday.   -TTE (8/27): Worsening diastolic dysfunction. EF 35-45%. mild/mod MR. mild AS. mild/mod AR. mod-severe global LV dysfunction.   -Patient transitioned off NC. D/mery parks 8/28  -F/u cards recs: CHANDRA (9/1) showed severe MR, mod AR. Structural heart and HF recs placed. EP will schedule EP study +/- ICD placement closer to discharge  -lasix 40 BID IV   -increased hydralazine to 25 TID NARCISO on CKD III. Likely pre-renal in etiology. Cr elevated to 2.4 on admission. Baseline Cr 1.4-1.7. Improved with IVF and lasix was held. However, after CHF exacerbation requiring aggressive diuresis (8/27), patient's Cr is elevated to 2.15 on 8/28  -Cr elevated to 2.53 today. Likely in the setting of overdiuresis as pt's home lasix dose in 20mg qday. Will hold tomorrow AM's dose of lasix and reassess  -Continue to monitor daily BMPs   -Renally dose medications.

## 2020-09-03 NOTE — PROGRESS NOTE ADULT - PROBLEM SELECTOR PLAN 4
s/p CABG   -C/w home aspirin 81mg qday + atorvastatin 80mg qday + coreg 12.5mg BID  -F/u cards recs Severe MR on CHANDRA. Structural heart consulted.   - Plan for repeat CHANDRA once more euvolemic as MR may have been overestimated given volume overload

## 2020-09-04 LAB
ANION GAP SERPL CALC-SCNC: 15 MMOL/L — SIGNIFICANT CHANGE UP (ref 5–17)
BUN SERPL-MCNC: 36 MG/DL — HIGH (ref 7–23)
CALCIUM SERPL-MCNC: 9.9 MG/DL — SIGNIFICANT CHANGE UP (ref 8.4–10.5)
CHLORIDE SERPL-SCNC: 104 MMOL/L — SIGNIFICANT CHANGE UP (ref 96–108)
CO2 SERPL-SCNC: 24 MMOL/L — SIGNIFICANT CHANGE UP (ref 22–31)
CREAT SERPL-MCNC: 2.36 MG/DL — HIGH (ref 0.5–1.3)
GLUCOSE SERPL-MCNC: 109 MG/DL — HIGH (ref 70–99)
HCT VFR BLD CALC: 34.8 % — LOW (ref 39–50)
HGB BLD-MCNC: 9.4 G/DL — LOW (ref 13–17)
MAGNESIUM SERPL-MCNC: 2.3 MG/DL — SIGNIFICANT CHANGE UP (ref 1.6–2.6)
MCHC RBC-ENTMCNC: 22.4 PG — LOW (ref 27–34)
MCHC RBC-ENTMCNC: 27 GM/DL — LOW (ref 32–36)
MCV RBC AUTO: 83.1 FL — SIGNIFICANT CHANGE UP (ref 80–100)
NRBC # BLD: 0 /100 WBCS — SIGNIFICANT CHANGE UP (ref 0–0)
PHOSPHATE SERPL-MCNC: 4.8 MG/DL — HIGH (ref 2.5–4.5)
PLATELET # BLD AUTO: 406 K/UL — HIGH (ref 150–400)
POTASSIUM SERPL-MCNC: 4.6 MMOL/L — SIGNIFICANT CHANGE UP (ref 3.5–5.3)
POTASSIUM SERPL-SCNC: 4.6 MMOL/L — SIGNIFICANT CHANGE UP (ref 3.5–5.3)
RBC # BLD: 4.19 M/UL — LOW (ref 4.2–5.8)
RBC # FLD: 18.6 % — HIGH (ref 10.3–14.5)
SODIUM SERPL-SCNC: 143 MMOL/L — SIGNIFICANT CHANGE UP (ref 135–145)
WBC # BLD: 11.89 K/UL — HIGH (ref 3.8–10.5)
WBC # FLD AUTO: 11.89 K/UL — HIGH (ref 3.8–10.5)

## 2020-09-04 PROCEDURE — 99233 SBSQ HOSP IP/OBS HIGH 50: CPT | Mod: GC

## 2020-09-04 PROCEDURE — 78452 HT MUSCLE IMAGE SPECT MULT: CPT | Mod: 26

## 2020-09-04 PROCEDURE — 99233 SBSQ HOSP IP/OBS HIGH 50: CPT

## 2020-09-04 PROCEDURE — 99232 SBSQ HOSP IP/OBS MODERATE 35: CPT

## 2020-09-04 RX ORDER — HYDRALAZINE HCL 50 MG
35 TABLET ORAL THREE TIMES A DAY
Refills: 0 | Status: DISCONTINUED | OUTPATIENT
Start: 2020-09-04 | End: 2020-09-09

## 2020-09-04 RX ORDER — FUROSEMIDE 40 MG
40 TABLET ORAL THREE TIMES A DAY
Refills: 0 | Status: DISCONTINUED | OUTPATIENT
Start: 2020-09-04 | End: 2020-09-06

## 2020-09-04 RX ORDER — POTASSIUM CHLORIDE 20 MEQ
40 PACKET (EA) ORAL ONCE
Refills: 0 | Status: COMPLETED | OUTPATIENT
Start: 2020-09-04 | End: 2020-09-04

## 2020-09-04 RX ADMIN — CARVEDILOL PHOSPHATE 12.5 MILLIGRAM(S): 80 CAPSULE, EXTENDED RELEASE ORAL at 17:01

## 2020-09-04 RX ADMIN — Medication 40 MILLIGRAM(S): at 21:01

## 2020-09-04 RX ADMIN — PANTOPRAZOLE SODIUM 40 MILLIGRAM(S): 20 TABLET, DELAYED RELEASE ORAL at 17:01

## 2020-09-04 RX ADMIN — Medication 20 MILLIGRAM(S): at 05:05

## 2020-09-04 RX ADMIN — Medication 40 MILLIGRAM(S): at 05:04

## 2020-09-04 RX ADMIN — Medication 25 MILLIGRAM(S): at 05:09

## 2020-09-04 RX ADMIN — Medication 35 MILLIGRAM(S): at 21:01

## 2020-09-04 RX ADMIN — ISOSORBIDE DINITRATE 10 MILLIGRAM(S): 5 TABLET ORAL at 05:05

## 2020-09-04 RX ADMIN — PANTOPRAZOLE SODIUM 40 MILLIGRAM(S): 20 TABLET, DELAYED RELEASE ORAL at 05:05

## 2020-09-04 RX ADMIN — CARVEDILOL PHOSPHATE 12.5 MILLIGRAM(S): 80 CAPSULE, EXTENDED RELEASE ORAL at 05:06

## 2020-09-04 RX ADMIN — SERTRALINE 50 MILLIGRAM(S): 25 TABLET, FILM COATED ORAL at 12:38

## 2020-09-04 RX ADMIN — Medication 81 MILLIGRAM(S): at 12:38

## 2020-09-04 RX ADMIN — APIXABAN 5 MILLIGRAM(S): 2.5 TABLET, FILM COATED ORAL at 17:01

## 2020-09-04 RX ADMIN — Medication 0.5 MILLIGRAM(S): at 21:01

## 2020-09-04 RX ADMIN — ISOSORBIDE DINITRATE 10 MILLIGRAM(S): 5 TABLET ORAL at 21:01

## 2020-09-04 RX ADMIN — Medication 40 MILLIEQUIVALENT(S): at 05:05

## 2020-09-04 RX ADMIN — APIXABAN 5 MILLIGRAM(S): 2.5 TABLET, FILM COATED ORAL at 05:09

## 2020-09-04 RX ADMIN — Medication 25 MILLIGRAM(S): at 14:07

## 2020-09-04 RX ADMIN — ISOSORBIDE DINITRATE 10 MILLIGRAM(S): 5 TABLET ORAL at 14:07

## 2020-09-04 RX ADMIN — ATORVASTATIN CALCIUM 80 MILLIGRAM(S): 80 TABLET, FILM COATED ORAL at 21:01

## 2020-09-04 NOTE — PROGRESS NOTE ADULT - PROBLEM SELECTOR PLAN 8
Now resolved . initially Febrile (100.6) on admission + Leukocytosis (WBC 12.27) + Elevated lactate 2.4 + Hypotensive (SBP 100s) on admission. RLE cellulitis vs LUE wound as possible sources of infection  -s/p 2L IVF in ED. BP's improved with normalized lactate (0.7)  -s/p vanc   -Bcx (8/24) negative. Repeat (8/27) GPC in clusters.

## 2020-09-04 NOTE — PROGRESS NOTE ADULT - PROBLEM SELECTOR PLAN 7
-s/p vanc - improving    -Wound care saw patient (8/25). Redressed wound. Recommended plastics consult.   -Plastics consulted 8/26. Recommended local wound care and outpatient follow up (Dr. Marquez [phone])

## 2020-09-04 NOTE — PROGRESS NOTE ADULT - PROBLEM SELECTOR PLAN 4
Severe MR on CHANDRA (9/1)  -Structural heart recs: Plan for repeat CHANDRA once more euvolemic as MR may have been overestimated given volume overload

## 2020-09-04 NOTE — PROGRESS NOTE ADULT - PROBLEM SELECTOR PLAN 2
NARCISO on CKD III. Likely pre-renal in etiology. Cr elevated to 2.4 on admission. Baseline Cr 1.4-1.7. Improved with IVF and lasix was held. However, after CHF exacerbation requiring aggressive diuresis (8/27), patient's Cr is elevated to 2.15 on 8/28  -Cr 2.19 (9/3), c/w IV lasix 40mg BID   -Continue to monitor daily BMPs   -Renally dose medications.

## 2020-09-04 NOTE — PROGRESS NOTE ADULT - PROBLEM SELECTOR PLAN 3
- Continue ASA, beta blocker, atorvastatin  - Structural heart following for consideration of redo CABG. Results of viability study pending

## 2020-09-04 NOTE — PROGRESS NOTE ADULT - ASSESSMENT
Mr Colon is a 66y/o male with an extensive CAD history including CABG (2005 @ [facility] with Dr. Pena) as well as multiple stents afterwards.  He also has CHF, Afib (paroxysmal?), CKD III, and CHER (on CPAP).   Admitted through the ER with sepsis resulting from injuries sustained during a mechanical fall and treated with IV abx.  Structural Heart team following for evaluation of mitral regurgitation.

## 2020-09-04 NOTE — PROGRESS NOTE ADULT - SUBJECTIVE AND OBJECTIVE BOX
Subjective:  - no acute events overnight  - feels he is urinating more since resumption of diuretics  - ongoing mild nonproductive cough    Medications:  apixaban 5 milliGRAM(s) Oral two times a day  aspirin  chewable 81 milliGRAM(s) Oral daily  atorvastatin 80 milliGRAM(s) Oral at bedtime  benzocaine 15 mG/menthol 3.6 mG (Sugar-Free) Lozenge 1 Lozenge Oral three times a day PRN  carvedilol 12.5 milliGRAM(s) Oral every 12 hours  furosemide   Injectable 40 milliGRAM(s) IV Push three times a day  hydrALAZINE 35 milliGRAM(s) Oral three times a day  isosorbide   dinitrate Tablet (ISORDIL) 10 milliGRAM(s) Oral three times a day  LORazepam     Tablet 0.5 milliGRAM(s) Oral at bedtime  pantoprazole    Tablet 40 milliGRAM(s) Oral two times a day  predniSONE   Tablet 20 milliGRAM(s) Oral daily  sertraline 50 milliGRAM(s) Oral daily      Physical Exam:    Vitals:  Vital Signs Last 24 Hours  T(C): 36.9 (20 @ 12:47), Max: 36.9 (20 @ 20:12)  HR: 66 (20 @ 17:18) (55 - 74)  BP: 126/64 (20 @ 17:04) (108/68 - 129/72)  RR: 18 (20 @ 12:47) (18 - 18)  SpO2: 97% (20 @ 17:18) (94% - 99%)    Weight in k.9 ( @ 09:46)    I&O's Summary    03 Sep 2020 07  -  04 Sep 2020 07:00  --------------------------------------------------------  IN: 830 mL / OUT: 1360 mL / NET: -530 mL    04 Sep 2020 07:01  -  04 Sep 2020 18:40  --------------------------------------------------------  IN: 725 mL / OUT: 950 mL / NET: -225 mL    Tele: SB-SR 50-90 PVCs    General: No distress. Comfortable. Morbidly obese  HEENT: EOM intact.  Neck: Neck supple. JVP difficult to assess due to body habitus. No masses  Chest: Clear to auscultation bilaterally. Occasional dry cough  CV: Regular. Normal S1 and S2. II/VI SM at axilla. Radial pulses normal. No LE edema  Abdomen: soft, distended, non-tender  Skin: No rashes or skin breakdown  Neurology: Alert and oriented times three. Sensation intact  Psych: Affect normal    Labs:                        9.4    11.89 )-----------( 406      ( 04 Sep 2020 06:55 )             34.8     09-04    143  |  104  |  36<H>  ----------------------------<  109<H>  4.6   |  24  |  2.36<H>    Ca    9.9      04 Sep 2020 17:29  Phos  4.8     -  Mg     2.3     -    TPro  7.1  /  Alb  3.7  /  TBili  0.4  /  DBili  x   /  AST  14  /  ALT  21  /  AlkPhos  111  -          Serum Pro-Brain Natriuretic Peptide: 3301 pg/mL ( @ 06:19)

## 2020-09-04 NOTE — PROGRESS NOTE ADULT - ASSESSMENT
Mr. Colon is a 65 year old male with PMH significant for CAD (s/p CABG 2005 LIMA-LAD, SVG- OM2, SVG- OM1, LIMA to LAD only patient graft per cath 1/2020), ICM HFrEF (LVEF 30-35%), severe MR, Afib (on Eliquis), HTN, HLD, CKD III, CHER (on CPAP) who presented with septic shock 2/2 RLL cellulitis. He additionally has ADHF with shortness of breath and hypoxia requiring CCU admission and BiPAP likely in the setting of volume overload and severe MR. He has diuresed nearly 20lbs since admission with IV diuretics and is normotensive tolerating gradual escalation of vasodilators however is not yet optimized on medical therapy. Exam is challenging due to his body habitus, however he appears to have some degree of intravascular volume expansion in the setting of uptrending pro-BNP. Persistent renal dysfunction but relatively stable. He underwent cardiac viability study today, awaiting official results.

## 2020-09-04 NOTE — PROGRESS NOTE ADULT - SUBJECTIVE AND OBJECTIVE BOX
Patient is a 65y old  Male who presents with a chief complaint of RLE swelling (03 Sep 2020 16:02)    SUBJECTIVE / OVERNIGHT EVENTS:     OBJECTIVE:  Vital Signs Last 24 Hrs  T(C): 36.9 (04 Sep 2020 04:53), Max: 36.9 (03 Sep 2020 14:00)  T(F): 98.5 (04 Sep 2020 04:53), Max: 98.5 (03 Sep 2020 14:00)  HR: 64 (04 Sep 2020 06:14) (55 - 74)  BP: 129/72 (04 Sep 2020 04:53) (122/76 - 138/68)  BP(mean): --  RR: 18 (04 Sep 2020 04:53) (18 - 18)  SpO2: 97% (04 Sep 2020 06:14) (94% - 98%)    I&O's Summary    02 Sep 2020 07:01  -  03 Sep 2020 07:00  --------------------------------------------------------  IN: 1077 mL / OUT: 2250 mL / NET: -1173 mL    03 Sep 2020 07:01  -  04 Sep 2020 06:38  --------------------------------------------------------  IN: 830 mL / OUT: 1360 mL / NET: -530 mL      Physical Exam:       General: No acute distress, well-appearing  	  Eyes: EOMI   	  ENT: MMM, no oropharyngeal lesions or erythema appreciated   	  Pulm: No increased WOB. No wheezing. CTAB  	  CV: RRR. S1&S2+. No M/R/G appreciated.   	  Abdomen: +BS. Soft, NT. No organomegaly. Distended.   	  MSK: Good ROM  	  Extremities: No peripheral edema.   	  Neuro: A&Ox3, no focal deficits       Skin: Warm and dry. L hand wound with dressing in place.    Labs:  CAPILLARY BLOOD GLUCOSE                              9.6    11.09 )-----------( 401      ( 03 Sep 2020 06:18 )             34.7     09-03    139  |  100  |  32<H>  ----------------------------<  120<H>  3.7   |  23  |  2.19<H>    Ca    9.3      03 Sep 2020 19:01  Phos  4.3     09-03  Mg     2.3     09-03    TPro  7.1  /  Alb  3.7  /  TBili  0.4  /  DBili  x   /  AST  14  /  ALT  21  /  AlkPhos  111  09-03              Imaging Personally Reviewed:    Consultant(s) Notes Reviewed:   Care Discussed with Consultants/Other Providers:    MEDICATIONS  (STANDING):  apixaban 5 milliGRAM(s) Oral two times a day  aspirin  chewable 81 milliGRAM(s) Oral daily  atorvastatin 80 milliGRAM(s) Oral at bedtime  carvedilol 12.5 milliGRAM(s) Oral every 12 hours  furosemide   Injectable 40 milliGRAM(s) IV Push two times a day  hydrALAZINE 25 milliGRAM(s) Oral three times a day  isosorbide   dinitrate Tablet (ISORDIL) 10 milliGRAM(s) Oral three times a day  LORazepam     Tablet 0.5 milliGRAM(s) Oral at bedtime  pantoprazole    Tablet 40 milliGRAM(s) Oral two times a day  predniSONE   Tablet 20 milliGRAM(s) Oral daily  sertraline 50 milliGRAM(s) Oral daily    MEDICATIONS  (PRN):  benzocaine 15 mG/menthol 3.6 mG (Sugar-Free) Lozenge 1 Lozenge Oral three times a day PRN Sore Throat Patient is a 65y old  Male who presents with a chief complaint of RLE swelling (03 Sep 2020 16:02)    SUBJECTIVE / OVERNIGHT EVENTS: NAEON. Tele SR 50-70's. Patient examined at bedside this AM, with no subjective complaints. Denies inc SOB, CP, palpitations, peripheral edema. Patient to go for part 2 of viability study today.     OBJECTIVE:  Vital Signs Last 24 Hrs  T(C): 36.9 (04 Sep 2020 04:53), Max: 36.9 (03 Sep 2020 14:00)  T(F): 98.5 (04 Sep 2020 04:53), Max: 98.5 (03 Sep 2020 14:00)  HR: 64 (04 Sep 2020 06:14) (55 - 74)  BP: 129/72 (04 Sep 2020 04:53) (122/76 - 138/68)  BP(mean): --  RR: 18 (04 Sep 2020 04:53) (18 - 18)  SpO2: 97% (04 Sep 2020 06:14) (94% - 98%)    I&O's Summary    02 Sep 2020 07:01  -  03 Sep 2020 07:00  --------------------------------------------------------  IN: 1077 mL / OUT: 2250 mL / NET: -1173 mL    03 Sep 2020 07:01  -  04 Sep 2020 06:38  --------------------------------------------------------  IN: 830 mL / OUT: 1360 mL / NET: -530 mL      Physical Exam:       General: No acute distress, well-appearing  	  Eyes: EOMI   	  ENT: MMM, no oropharyngeal lesions or erythema appreciated   	  Pulm: No increased WOB. No wheezing. CTAB  	  CV: RRR. S1&S2+. No M/R/G appreciated.   	  Abdomen: +BS. Soft, NT. No organomegaly. Distended.   	  MSK: Good ROM  	  Extremities: No peripheral edema.   	  Neuro: A&Ox3, no focal deficits       Skin: Warm and dry. L hand wound with dressing in place.    Labs:  CAPILLARY BLOOD GLUCOSE                              9.6    11.09 )-----------( 401      ( 03 Sep 2020 06:18 )             34.7     09-03    139  |  100  |  32<H>  ----------------------------<  120<H>  3.7   |  23  |  2.19<H>    Ca    9.3      03 Sep 2020 19:01  Phos  4.3     09-03  Mg     2.3     09-03    TPro  7.1  /  Alb  3.7  /  TBili  0.4  /  DBili  x   /  AST  14  /  ALT  21  /  AlkPhos  111  09-03              Imaging Personally Reviewed:    Consultant(s) Notes Reviewed:   Care Discussed with Consultants/Other Providers:    MEDICATIONS  (STANDING):  apixaban 5 milliGRAM(s) Oral two times a day  aspirin  chewable 81 milliGRAM(s) Oral daily  atorvastatin 80 milliGRAM(s) Oral at bedtime  carvedilol 12.5 milliGRAM(s) Oral every 12 hours  furosemide   Injectable 40 milliGRAM(s) IV Push two times a day  hydrALAZINE 25 milliGRAM(s) Oral three times a day  isosorbide   dinitrate Tablet (ISORDIL) 10 milliGRAM(s) Oral three times a day  LORazepam     Tablet 0.5 milliGRAM(s) Oral at bedtime  pantoprazole    Tablet 40 milliGRAM(s) Oral two times a day  predniSONE   Tablet 20 milliGRAM(s) Oral daily  sertraline 50 milliGRAM(s) Oral daily    MEDICATIONS  (PRN):  benzocaine 15 mG/menthol 3.6 mG (Sugar-Free) Lozenge 1 Lozenge Oral three times a day PRN Sore Throat

## 2020-09-04 NOTE — PROGRESS NOTE ADULT - ATTENDING COMMENTS
No acute events.  The patient is eating lunch.  He denies any cardiopulmonary complaints at rest or upon exertion.  He has an increase in his urinary output.  No fevers, chills or sweats.  Does not feel light-headed or dizzy.  Telemetry demonstrates sinus rhythm with rates in the 50s to 70s with PACs, PVCs, triplets and couplets.    --Patient is clinically doing well  Continue hydralazine 25mg PO TID and isosorbide dinitrate 10mg PO TID.    --Continue IV lasix 40mg BID.  .  --Recommend medical optimization with aggressive BP/HR control and diuresis.  The patients baseline weight is ~220lbs.  It is difficult based upon his physical examination to assess his volume status.  May need right heart catheterization +-Chama to assist in diuresis.  --Once the patient is optimized he will need a repeat CHANDRA (TTE underestimates the degree of severity of his mitral regurgitation).  On CHANDRA the posterior leaflet is significantly tethered.  There is calcification on the chordae but it does not appear that he has a significant flail going into the left atrium.  The primary mechanism is functional ischemic mitral regurgitation.  --He is currently undergoing a viability study to assess need for revascularization.  His coronary anatomy is not amenable for high risk PCI.  He has left to left collateral filling to his obtuse marginal artery and posterior descending artery.  Reviewed his imaging studies with cardiac surgery although he has diffuse disease his anatomy is amenable for redo CABG.  Second part of study being done today.  --If the patient is felt to be high risk and his anatomy is not suitable for surgery consideration at that time will be made for potential for MitraClip if he continues to have severe functional mitral regurgitation on repeat echo imaging.  --Recommend fluid restrict to less than 1.5L a day  --Daily standing weight  --Aim for K>4 and Mg>2  --Continue telemetry monitoring    All questions and concerns of the patient were addressed.

## 2020-09-04 NOTE — PROGRESS NOTE ADULT - SUBJECTIVE AND OBJECTIVE BOX
EP Attending    HISTORY OF PRESENT ILLNESS:   Mr Colon is a 65yoM who presents for acute onset shortness of breath.  His EF is 35-40% chronically (1/2020 and 8/2020), due to ischemic cardiomyopathy.  He is s/p CABG and multiple subsequent PCI. He only has 1 graft open- his LIMA to LAD, and his native vessels are otherwise occluded.  Also has AFib, on apixaban, HTN, CKD, and CHER on CPAP.   He has had multiple episodes of flash pulmonary edema.    9/1- No angina, orthopnea, PND or palpitations.  He is short of breath on minimal exertion, walking across his hospital room with a walker (NYHA IIIB).  No fainting or near-fainting episodes.  telemetry after writing my note yesterday had 9 beats of NSVT.  9/2 - uneventful overnight.  had CHANDRA showing severe MR and moderate AI.  still short of breath, no angina, no fainting.  9/3 - no VT on telemetry overnight. Still short of breath.  Discussed the overall plan of care re: diuresis and re-evaluation of his valve before deciding on a structural vs surgical solution.  He is upset that he will spend more time in the hospital and potentially even longer if he needs a re-do open heart procedure, but understands that an incomplete plan will result in readmission and worse CHF symptoms.  9/4 - uneventful overnight. no angina, orthopnea or PND.  ambulating a little bit easier.  no VT on telemetry.    apixaban 5 milliGRAM(s) Oral two times a day  aspirin  chewable 81 milliGRAM(s) Oral daily  atorvastatin 80 milliGRAM(s) Oral at bedtime  benzocaine 15 mG/menthol 3.6 mG (Sugar-Free) Lozenge 1 Lozenge Oral three times a day PRN  carvedilol 12.5 milliGRAM(s) Oral every 12 hours  furosemide   Injectable 40 milliGRAM(s) IV Push two times a day  hydrALAZINE 25 milliGRAM(s) Oral three times a day  isosorbide   dinitrate Tablet (ISORDIL) 10 milliGRAM(s) Oral three times a day  LORazepam     Tablet 0.5 milliGRAM(s) Oral at bedtime  pantoprazole    Tablet 40 milliGRAM(s) Oral two times a day  predniSONE   Tablet 20 milliGRAM(s) Oral daily  sertraline 50 milliGRAM(s) Oral daily                          9.4    11.89 )-----------( 406      ( 04 Sep 2020 06:55 )             34.8       09-03    139  |  100  |  32<H>  ----------------------------<  120<H>  3.7   |  23  |  2.19<H>    Ca    9.3      03 Sep 2020 19:01  Phos  4.8     09-04  Mg     2.3     09-04    TPro  7.1  /  Alb  3.7  /  TBili  0.4  /  DBili  x   /  AST  14  /  ALT  21  /  AlkPhos  111  09-03    T(C): 36.9 (09-04-20 @ 04:53), Max: 36.9 (09-03-20 @ 14:00)  HR: 64 (09-04-20 @ 06:14) (55 - 74)  BP: 129/72 (09-04-20 @ 04:53) (122/76 - 138/68)  RR: 18 (09-04-20 @ 04:53) (18 - 18)  SpO2: 97% (09-04-20 @ 06:14) (94% - 98%)  Wt(kg): --    I&O's Summary    03 Sep 2020 07:01  -  04 Sep 2020 07:00  --------------------------------------------------------  IN: 830 mL / OUT: 1360 mL / NET: -530 mL    04 Sep 2020 07:01  -  04 Sep 2020 10:01  --------------------------------------------------------  IN: 375 mL / OUT: 450 mL / NET: -75 mL      Appearance: Robust white male in no acute distress, but short-of-breath.  HEENT:   Normal oral mucosa, PERRL, EOMI	  Lymphatic: No lymphadenopathy , no edema  Cardiovascular: Normal S1 S2,  Peripheral pulses palpable 2+ bilaterally  Respiratory: Lungs clear to auscultation, normal effort 	  Gastrointestinal:  Soft, Non-tender, + BS	  Skin: No rashes, No ecchymoses, No cyanosis, warm to touch  Musculoskeletal: Normal range of motion, normal strength  Psychiatry:  Mood & affect appropriate    TELEMETRY: NSR, 9 beat run of NSVT on 8/31.  Multifocal PVC's.   ECG:  	NSR, interpolated PVC's.  Echo: EF 35-40%, LV dilated to upper limit of normal, severe diastolic dysfunction, moderate Mitral regurgitation.  CHANDRA:  Tetheterd MV leaflets --> severe MR, in the setting of volume overload and high BP.  Cath: Prior with only LIMA to LAD patent. Native vessels occluded.	    ASSESSMENT/PLAN: 	65y Male with an ischemic cardiomyopathy, EF 35-40%, and increasing frequency of episodes of flash pulmonary edema.  Has nonsustained ventricular tachycardia on telemetry.    He is not a candidate for biventricular pacing.  Structural and CHF team consults have been completed and have essentially reached the same conclusions: he needs further diuresis, and it is probably going to be difficult to assess his volume status (much is in his abdomen).  The MV regurg should be re-evaluated once he is euvolemic.    I agree that a [place] catheter would be helpful to know when he is ready for repeat CHANDRA.  He is glad to know that he is not ill enough to require a cardiac transplant.    Awaiting viability study to see if he would benefit from surgical revascularization.    Based on the MUSTT trial, he qualifies for an EP Study for VT induction, with ICD insertion if positive.  However at this time, EP workup will be deferred until CHF team and Structural/Surgical teams have optimized him.  Will follow along, and formally re-evaluate prior to discharge.    Wei Feliciano M.D.  Cardiac Electrophysiology    office [phone]  pager [phone]

## 2020-09-04 NOTE — PROGRESS NOTE ADULT - PROBLEM SELECTOR PLAN 1
with severe MR on CHANDRA   Repeat CHANDRA when optimized to reassess degree of MR when euvolemic  CAD with coronary anatomy not amenable to PCI, will review imaging with Dr. Pena pending viability study for consideration of redo CABG    [id number]

## 2020-09-04 NOTE — PROGRESS NOTE ADULT - PROBLEM SELECTOR PLAN 1
Combined Systolic and diastolic Heart failure. TTE (1/2020): LVEF 39%, mod MR/AR  -C/w home coreg 12.5mg BID, isordil 10mg TID, hydral 25 TID (9/3), IV lasix 40 BID (9/2)  -8/27: Patient had an RRT after desatting to 77% overnight. Found to be significant pulmonary edema on CXR. EKG showed Afib with RVR and had runs of VT on tele. Lactate 8. Patient received IV lasix 40mg, amio, and transferred to CCU and placed on BiPAP.    -CCU course (8/27): transitioned from BiPAP to NC. Bumex 2. Restarted home isordil and po lasix 40mg. Increased to IV lasix 40mg qday 8/29, then transitioned to po lasix 40mg qday.   -TTE (8/27): Worsening diastolic dysfunction. EF 35-45%. mild/mod MR. mild AS. mild/mod AR. mod-severe global LV dysfunction.   -CHANDRA (9/1) showed severe MR, mod AR.   -Cards recs: plan for viability study. No cath planned at this time.  -EP recs: will schedule EP study +/- ICD placement closer to discharge  -Heart failure recs: c/w Lasix 40 BID IV. daily standing weights, strict I/Os.   -Structural heart recs: optimize volume status, then consider repeat CHANDRA.   -F/u cards recs Combined Systolic and diastolic Heart failure. TTE (1/2020): LVEF 39%, mod MR/AR  -C/w home coreg 12.5mg BID, isordil 10mg TID, hydral 25 TID (9/3), IV lasix 40 BID (9/2-)  -8/27: Patient had an RRT after desatting to 77% overnight. Found to be significant pulmonary edema on CXR. EKG showed Afib with RVR and had runs of VT on tele. Lactate 8. Patient received IV lasix 40mg, amio, and transferred to CCU and placed on BiPAP.    -CCU course (8/27): transitioned from BiPAP to NC. Bumex 2. Restarted home isordil and po lasix 40mg. Increased to IV lasix 40mg qday 8/29, then transitioned to po lasix 40mg qday.   -TTE (8/27): Worsening diastolic dysfunction. EF 35-45%. mild/mod MR. mild AS. mild/mod AR. mod-severe global LV dysfunction.   -CHANDRA (9/1) showed severe MR, mod AR.   -Cards recs: Viability study part 2 today. No cath planned at this time.  -EP recs: will schedule EP study +/- ICD placement closer to discharge  -Heart failure recs (9/3): c/w Lasix 40 BID IV. daily standing weights, strict I/Os.   -Structural heart recs (9/3): optimize volume status, then consider repeat CHANDRA.   -F/u cards recs

## 2020-09-04 NOTE — PROGRESS NOTE ADULT - ATTENDING COMMENTS
Underwent first part of viability study yesterday, to complete study today.   Possible redo of CABG by CTS pending viability study results.  c/w IV diuresis, dry weight 220lbs.   f/u further heart failure rec's

## 2020-09-04 NOTE — PROGRESS NOTE ADULT - SUBJECTIVE AND OBJECTIVE BOX
HPI/Interval Hx    Seen and examined in bed this morning. No acute events overnight, he is feeling well. He has been ambulating in his room and in the osorio with no reports of angina or dyspnea.       MEDICATIONS  (STANDING):  apixaban 5 milliGRAM(s) Oral two times a day  aspirin  chewable 81 milliGRAM(s) Oral daily  atorvastatin 80 milliGRAM(s) Oral at bedtime  carvedilol 12.5 milliGRAM(s) Oral every 12 hours  furosemide   Injectable 40 milliGRAM(s) IV Push two times a day  hydrALAZINE 25 milliGRAM(s) Oral three times a day  isosorbide   dinitrate Tablet (ISORDIL) 10 milliGRAM(s) Oral three times a day  LORazepam     Tablet 0.5 milliGRAM(s) Oral at bedtime  pantoprazole    Tablet 40 milliGRAM(s) Oral two times a day  predniSONE   Tablet 20 milliGRAM(s) Oral daily  sertraline 50 milliGRAM(s) Oral daily    MEDICATIONS  (PRN):  benzocaine 15 mG/menthol 3.6 mG (Sugar-Free) Lozenge 1 Lozenge Oral three times a day PRN Sore Throat      PAST MEDICAL & SURGICAL HISTORY:  CHF (congestive heart failure)  Psoriasis  HLD (hyperlipidemia)  CAD (coronary artery disease): 3 stents  Hypertension  History of coronary artery stent placement: 1 stent , 2 stents  in [facility]  S/P quadruple vessel bypass:       Review of Systems  CONSTITUTIONAL: No weakness, fevers or chills  EYES/ENT: No visual changes;  No vertigo or throat pain   NECK: No pain or stiffness  RESPIRATORY: No cough, wheezing, hemoptysis; No shortness of breath at rest  CARDIOVASCULAR: No chest pain or palpitations, orthopnea improving, LE edema improving  GASTROINTESTINAL: No abdominal or epigastric pain. No nausea, vomiting, or hematemesis; No diarrhea or constipation. No melena or hematochezia.  GENITOURINARY: No dysuria, frequency or hematuria  NEUROLOGICAL: No numbness or weakness  All other review of systems is negative unless indicated above.    Physical Exam  General: A/ox 3, No acute Distress  Neck: Supple  Cardiac: S1 S2, II/VI Systolic murmur  Pulmonary: CTAB, Breathing unlabored, No Rhonchi/Rales/Wheezing  Abdomen: Soft, Non -tender, +BS x 4 quads, obese  Extremities: No Rashes, trace BLE edema, improving  Neuro: A/o x 3, No focal deficits  Vital Signs Last 24 Hrs  T(C): 36.9 (04 Sep 2020 04:53), Max: 36.9 (03 Sep 2020 14:00)  T(F): 98.5 (04 Sep 2020 04:53), Max: 98.5 (03 Sep 2020 14:00)  HR: 64 (04 Sep 2020 06:14) (55 - 74)  BP: 129/72 (04 Sep 2020 04:53) (122/76 - 138/68)  BP(mean): --  RR: 18 (04 Sep 2020 04:53) (18 - 18)  SpO2: 97% (04 Sep 2020 06:14) (94% - 98%)                        9.4    11.89 )-----------( 406      ( 04 Sep 2020 06:55 )             34.8     09-03    139  |  100  |  32<H>  ----------------------------<  120<H>  3.7   |  23  |  2.19<H>    Ca    9.3      03 Sep 2020 19:01  Phos  4.8     09-04  Mg     2.3     09-04    TPro  7.1  /  Alb  3.7  /  TBili  0.4  /  DBili  x   /  AST  14  /  ALT  21  /  AlkPhos  111  09-03      Telemetry: NSR/SB 50-70 with PVCs    EKG  < from: 12 Lead ECG (20 @ 07:37) >  Ventricular Rate 83 BPM    Atrial Rate 83 BPM    P-R Interval 208 ms    QRS Duration 96 ms    Q-T Interval 425 ms    QTC Calculation(Bezet) 499 ms    P Axis 67 degrees    R Axis 117 degrees    T Axis -17 degrees    Diagnosis Line NORMAL SINUS RHYTHM WITH FREQUENT PREMATURE VENTRICULAR COMPLEXES  LEFT POSTERIOR FASCICULAR BLOCK  NONSPECIFIC ST ABNORMALITY  ABNORMAL QRS-T ANGLE, CONSIDER PRIMARY T WAVE ABNORMALITY  PROLONGED QT  ABNORMAL ECG  WHEN COMPARED WITH ECG OF 2020 05:08 PREMATURE VENTRICULAR COMPLEXES ARE PRESENT  Confirmed by ODESSA ABEL ([id number]) on 2020 9:37:40 PM    < end of copied text >    < from: Transesophageal Echocardiogram w/o TTE (20 @ 14:15) >  Patient name: THONY ANAYA  YOB: 1955   Age: 65 (M)   MR#: [id number]  Study Date: 2020  Location: [address] [place], [place] [id number]: Chloe Abel MD  Study quality: Technically good  Referring Physician: Cristi Bustamante MD  Blood Pressure: 131/78 mmHg  Height: 173 cm  Weight: 113 kg  BSA: 2.3 m2  ------------------------------------------------------------------------  PROCEDURE: Transesophageal (CHANDRA) was performed.  Informed  consent was first obtained for CHANDRA. The patient was sedated  - see anesthesia record.  The procedure was monitored with  automatic blood pressure monitoring, ECG tracings and pulse  oximetry. The transesophageal probe was placed in the  esophagus posterior to the heart without complications.  Patient was injected with 10 cc's of aerosolized saline.  Patient was injected with 10 cc's of aerosolized saline.  INDICATION: Nonrheumatic mitral valve disorder, unspecified  (I34.9)  ------------------------------------------------------------------------  Observations:  Mitral Valve: Tethered mitral valve leaflets with normal  opening. Severe mitral regurgitation. There is systolic  flow reversal in the right upper pulmonary vein. By PISA,  calculated ERO=43 mmsq and regurgitant volume=73 mL (Pisa  rad 1 cm, Va 34.5 cm/s, Vm 5.0 m/s,  cm) Mean  transmitral valve gradient equals 1-2 mm Hg. (HRabout  80s-90s bpm)  Aortic Valve/Aorta: Calcified trileaflet aortic valve with  normal opening. Moderate aortic regurgitation. Vena  contracta=0.4 cm.  Normal aortic root size. (Ao: 3.6 cm at the sinuses of  Valsalva). Midlly dilated ascending aorta for BSA  (ascending aorta diameter 4.5 cm approximately 5 cm distal  to the aortic valve.  Normal size aortic arch, and  descending thoracic aorta.  Minimal atheroma.  Left Atrium: Left atrial enlargement. No left atrial or  left atrial appendage thrombus. Normal left atrial  appendage function (maximum velocity>40 cm/s).  Left Ventricle: Moderate left ventricular systolic  dysfunction. Paradoxical septal motion consistent with  prior cardiac surgery.  Right Heart: Normal right atrium. Normal right ventricular  size and function. Normal tricuspid valve. Mild tricuspid  regurgitation. Normal pulmonic valve. Minimal pulmonic  regurgitation.  Pericardium/Pleura: Normal pericardium with no pericardial  effusion.  Hemodynamic: Agitated saline injection and color flow  doppler demonstrate no evidence of a patent foramen ovale.  ------------------------------------------------------------------------  Conclusions:  1. Tethered mitral valve leaflets with normal opening.  Severe mitral regurgitation. There is systolic flow  reversal in the right upper pulmonary vein. By PISA,  calculated ERO=43 mmsq and regurgitant volume=73 mL (Pisa  rad 1cm, Va 34.5 cm/s, Vm 5.0 m/s,  cm)  2. Calcified trileaflet aortic valve with normal opening.  Moderate aortic regurgitation. Vena contracta=0.4 cm.  3. Normal aortic root size. (Ao: 3.6 cm at the sinuses of  Valsalva). Midlly dilated ascending aorta for BSA  (ascending aorta diameter 4.5 cm approximately 5 cm distal  to the aortic valve.  Normal size aortic arch, and  descending thoracic aorta.  Minimal atheroma.  4. Left atrial enlargement. No left atrial or left atrial  appendage thrombus. Normal left atrial appendage function  (maximum velocity>40 cm/s).  5. Moderate left ventricular systolic dysfunction.  Paradoxical septal motion consistent with prior cardiac  surgery.  6. Normal right ventricular size and function.  ------------------------------------------------------------------------  Confirmed on  2020 - 17:07:35 by Odessa Abel M.D.  ------------------------------------------------------------------------    < end of copied text >    < from: Cardiac Cath Lab - Adult (20 @ 16:30) >  [address] [place], [place] [id number] [phone]  Cath Lab Report -- Comprehensive Report  Patient: THONY ANAYA  Study date: 2020  Account number: [id number]  MR number: [id number]  : 1955  Gender: Male  Race: W  Case Physician(s):  Rut Arriaga M.D.  Fellow:  DANTE Delgadillo M.D.  Referring Physician:  Verna Beckman M.D.  INDICATIONS: Dilated cardiomyopathy.  HISTORY: h/o CABG, only LIMA to LAD is patent as per last cath The patient  has hypertension and medication-treated dyslipidemia.  PROCEDURE:  --  Left heart catheterization.  --  Left coronary angiography.  --  Right coronary angiography.  --  LIMA graft angiography.  TECHNIQUE: The risks and alternatives of the procedures and conscious  sedation were explained to the patient and informed consent was obtained.  Cardiac catheterization performed electively.  Local anesthetic given. Left radial artery access. Local anesthetic given.  A 6 Fr. Radial Glidesheath Slender was inserted in the vessel, utilizing  the modified Seldinger technique. Left heart catheterization. A 5 Fr.  DxTerity JR 4.0 catheter was utilized. After recording ascending aortic  pressure, the catheter was advanced across the aortic valve and left  ventricular pressure was recorded. Left coronary artery angiography. The  vessel was injected utilizing a 5 Fr. DxTerity JL 3.5 catheter and  positioned in the vessel ostia under fluoroscopic guidance. Angiography  was performed in multiple projections using hand-injection of contrast.  Right coronary artery angiography. The vessel was injected utilizing a 5  Fr. DxTerity JR 4.0 catheter and positioned in the vessel ostia under  fluoroscopic guidance. Angiography was performed in multiple projections  using hand-injection of contrast. Left internal mammary graft angiography.  The vessel was injected utilizing a 4 Fr IM catheter and positioned in the  vessel ostia under fluoroscopic guidance. Angiography was performed in  multiple projections using hand-injection of contrast. RADIATION EXPOSURE:  4 min.  CONTRAST GIVEN: 55 ml Optiray.  MEDICATIONS GIVEN: Heparin, 4000 units, IV. 2% Lidocaine, 3 ml,  subcutaneously. Cardene, 400 mcg. Cardene, 400 mcg. 0.9% Normal saline, 32  ml, IV.  VENTRICLES: No left ventriculogram was performed.  CORONARY VESSELS: The coronary circulation is right dominant.  LM:   --  LM: Angiography showed mild atherosclerosis with no flow limiting  lesions.  LAD:   --  Proximal LAD: There was a 100 % stenosis. This lesion is a  chronic total occlusion.  CX:   --  Proximal circumflex: The artery was supplied by extensive  bridging collaterals. There was a 100 % stenosis.  RCA:   --  Proximal RCA: There was a 95 % stenosis.  --  Mid RCA: There was a 99 % stenosis.  --  Distal RCA: There was a 95 % stenosis. Distal vessel angiography showed  a small vessel and severe diffuse disease.  GRAFTS:   --  Graft to the LAD: The graft was a medium to large sized LIMA.  Distal vessel angiography showed a small to medium sized vessel and severe  diffuse disease.  COMPLICATIONS: No complications occurred during the cath lab visit.  DIAGNOSTIC IMPRESSIONS: Patent ROJAS to LAD  Severe diffuse disease of the native vessels, not amenable to PCI  Overall , coronary anatomy is unchanged  INTERVENTIONAL IMPRESSIONS: Patent ROJAS to LAD  Severe diffuse disease of the native vessels, not amenable to PCI  Overall , coronary anatomy is unchanged  Prepared and signed by  Rut Arriaga M.D.  Signed 2020 17:32:04  HEMODYNAMIC TABLES  Pressures:  NO PHASE  Pressures:  - HR: 78  Pressures:  - Rhythm:  Pressures:  -- Aortic Pressure (S/D/M): 139/87/110  Pressures:  -- Left Ventricle (s/edp): 135/22/--  Outputs:  NO PHASE  Outputs:  -- CALCULATIONS[age]  Outputs:  -- CALCULATIONS: Body Surface Area: 2.16  Outputs:  -- CALCULATIONS: Height in cm: 170.00  Outputs:  -- CALCULATIONS: Sex: Male  Outputs:  -- CALCULATIONS: Weight in k.10  Outputs:  -- OUTPUTS: O2 consumption: 269.95  Outputs:  -- OUTPUTS: Vo2 Indexed: 125.00  Outputs:  Post Angio  Outputs:  -- OUTPUTS: O2 consumption: 269.95  Outputs:  -- OUTPUTS: Vo2 Indexed: 125.00    < end of copied text >      Other

## 2020-09-04 NOTE — PROGRESS NOTE ADULT - PROBLEM SELECTOR PLAN 6
Patient Information     Patient Name MRN Sex Yamilet Patel [id number] Female 3/31/1927      Telephone Encounter by Madonna Carroll MD at 1/10/2017 10:20 AM     Author:  Madonna Carroll MD Service:  (none) Author Type:  Physician     Filed:  1/10/2017 10:21 AM Encounter Date:  2017 Status:  Signed     :  Madonna Carroll MD (Physician)            Patient was admitted to hospital by Dr. Nikita Le,   I have not seen the patient but agree with below orders after reading Dr. Le's discharge note.          -Restarted home coreg (8/26) and isordil (8/27). Entresto continues to be held 2/2 renal function.   -c/w hydralazine 25 TID (9/3)

## 2020-09-04 NOTE — PROGRESS NOTE ADULT - PROBLEM SELECTOR PLAN 3
s/p CABG (most recent cath with stable disease with no intervention)  -C/w home aspirin 81mg qday + atorvastatin 80mg qday + coreg 12.5mg BID  -F/u cards recs s/p CABG (most recent cath with stable disease with no intervention)  -C/w home aspirin 81mg qday + atorvastatin 80mg qday + coreg 12.5mg BID  -F/u cards recs: viability study part 2 today.

## 2020-09-04 NOTE — PROGRESS NOTE ADULT - SUBJECTIVE AND OBJECTIVE BOX
S: No complaints. Denies chest pain or SOB. Review of systems otherwise (-)      MEDICATIONS  (STANDING):  apixaban 5 milliGRAM(s) Oral two times a day  aspirin  chewable 81 milliGRAM(s) Oral daily  atorvastatin 80 milliGRAM(s) Oral at bedtime  carvedilol 12.5 milliGRAM(s) Oral every 12 hours  furosemide   Injectable 40 milliGRAM(s) IV Push two times a day  hydrALAZINE 25 milliGRAM(s) Oral three times a day  isosorbide   dinitrate Tablet (ISORDIL) 10 milliGRAM(s) Oral three times a day  LORazepam     Tablet 0.5 milliGRAM(s) Oral at bedtime  pantoprazole    Tablet 40 milliGRAM(s) Oral two times a day  predniSONE   Tablet 20 milliGRAM(s) Oral daily  sertraline 50 milliGRAM(s) Oral daily    MEDICATIONS  (PRN):  benzocaine 15 mG/menthol 3.6 mG (Sugar-Free) Lozenge 1 Lozenge Oral three times a day PRN Sore Throat      LABS:                            9.4    11.89 )-----------( 406      ( 04 Sep 2020 06:55 )             34.8     Hemoglobin: 9.4 g/dL (09-04 @ 06:55)  Hemoglobin: 9.6 g/dL (09-03 @ 06:18)  Hemoglobin: 9.2 g/dL (09-02 @ 06:44)  Hemoglobin: 8.7 g/dL (09-01 @ 05:45)  Hemoglobin: 8.9 g/dL (08-31 @ 06:09)    09-03    139  |  100  |  32<H>  ----------------------------<  120<H>  3.7   |  23  |  2.19<H>    Ca    9.3      03 Sep 2020 19:01  Phos  4.8     09-04  Mg     2.3     09-04    TPro  7.1  /  Alb  3.7  /  TBili  0.4  /  DBili  x   /  AST  14  /  ALT  21  /  AlkPhos  111  09-03    Creatinine Trend: 2.19<--, 2.36<--, 2.37<--, 2.53<--, 2.29<--, 2.37<--             09-03-20 @ 07:01  -  09-04-20 @ 07:00  --------------------------------------------------------  IN: 830 mL / OUT: 1360 mL / NET: -530 mL    09-04-20 @ 07:01  -  09-04-20 @ 10:38  --------------------------------------------------------  IN: 375 mL / OUT: 450 mL / NET: -75 mL        PHYSICAL EXAM  Vital Signs Last 24 Hrs  T(C): 36.9 (04 Sep 2020 04:53), Max: 36.9 (03 Sep 2020 14:00)  T(F): 98.5 (04 Sep 2020 04:53), Max: 98.5 (03 Sep 2020 14:00)  HR: 64 (04 Sep 2020 10:16) (55 - 74)  BP: 129/72 (04 Sep 2020 04:53) (122/76 - 138/68)  BP(mean): --  RR: 18 (04 Sep 2020 04:53) (18 - 18)  SpO2: 99% (04 Sep 2020 10:16) (94% - 99%)        Gen: NAD  HEENT:  (-)icterus (-)pallor  CV: N S1 S2 1/6 KATHERYN (+)2 Pulses B/l  Resp: CTA B/L, normal effort  GI: (+) BS Soft, NT, ND  Lymph:  (-) edema, (-)obvious lymphadenopathy  Skin: Warm to touch, Normal turgor  Psych: Appropriate mood and affect      TELEMETRY: SB/SR    Echo: < from: TTE with Doppler (w/Cont) (08.27.20 @ 07:54) >  Conclusions:  1. Mitral valve not well visualized. Mitral annular  calcification. Tethered mitral valve leaflets with normal  opening. Mild moderate mitral regurgitation.  2. Calcified trileaflet aortic valve with decreased  opening. Peak transaortic valve gradient equals 12 mm Hg,  mean transaortic valve gradient equals 8 mm Hg, at least  mild AS. Limited gradient/Doppler evaluation. Mild-moderate  aortic regurgitation.  3. Endocardial visualization enhanced with intravenous  injection of Ultrasonic Enhancing Agent (Definity).  Moderate to severe  global left ventricular systolic  dysfunction. There dyssynchronous left ventricular  contraction.  4. Moderate diastolic dysfunction (Stage II). Increased  E/e'  is consistent with elevated left ventricular filling  pressure.  *** Compared with echocardiogram report of 12/13/2017,  Limited comparison.  Disatolic dysfunction is worse.    < end of copied text >    < from: Transesophageal Echocardiogram w/o TTE (09.01.20 @ 14:15) >  Conclusions:  1. Tethered mitral valve leaflets with normal opening.  Severe mitral regurgitation. There is systolic flow  reversal in the right upper pulmonary vein. By PISA,  calculated ERO=43 mmsq and regurgitant volume=73 mL (Pisa  rad 1cm, Va 34.5 cm/s, Vm 5.0 m/s,  cm)  2. Calcified trileaflet aortic valve with normal opening.  Moderate aortic regurgitation. Vena contracta=0.4 cm.  3. Normal aortic root size. (Ao: 3.6 cm at the sinuses of  Valsalva). Midlly dilated ascending aorta for BSA  (ascending aorta diameter 4.5 cm approximately 5 cm distal  to the aortic valve.  Normal size aortic arch, and  descending thoracic aorta.  Minimal atheroma.  4. Left atrial enlargement. No left atrial or left atrial  appendage thrombus. Normal left atrial appendage function  (maximum velocity>40 cm/s).  5. Moderate left ventricular systolic dysfunction.  Paradoxical septal motion consistent with prior cardiac  surgery.  6. Normal right ventricular size and function.    < end of copied text >        ASSESSMENT/PLAN: Patient is a 65 year old male with PMH of CAD s/p CABG with most recent LHC 1/2020 with no intervention, chronic systolic CHF (LVEF 39% TTE 1/2020), Afib (on Eliquis), HTN, HLD, CKD, CHER (on CPAP), and pemphigus vulgaris who presented with RLE swelling admitted with RLE cellulitis and NARCISO on CKD. Cardiology consulted for management of CAD/CHF.    - Continue IV Diuresis per heart failure team  - daily standing weights, strict I/Os  - Continue medical management of known CAD with systolic cardiomyopathy - ASA/Statin, Coreg, Hydralazine/Isordil  - Continue AC with Eliquis for CVA prevention  - Reviewed previous cath with interventional cardiology - no plans for repeat cath at this time  - EP follow up appreciated  - CHANDRA with severe MR  - Structural heart eval appreciated - plan to optimize fluid status and BP prior to possible repeat CHANDRA  - Will review CHANDRA with team. Suspect the mechanism of MR is complex.  Although there is some tethering I suspect the P3 scallop is partially flail best seen on the commisural view and 2 chamber views.  - Follow up cardiac viability study (2nd part today) to eval if patient would benefit from revascularization  - Patient to f/u with Dr. Campbell's cardiology group after d/c     Cristi Jarvis PA-C  Pager: [phone]

## 2020-09-04 NOTE — PROGRESS NOTE ADULT - PROBLEM SELECTOR PLAN 1
- Increase Lasix to 40mg IV TID  - If not improving will consider RHC  - Increase hydralazine to 35mg TID and continue ISDN 10 TID, hold for SBP < 90  - continue coreg 12.5mg BID, hold for HR < 50  - Deferring ARNI/MRA at this time due to renal dysfunction  - Continue 1.5L fluid restriction  - Daily standing weight, strict I/Os  - Daily BMP, Mg. Maintain K 4-4.5 and Mg 2-2.5.

## 2020-09-04 NOTE — PROGRESS NOTE ADULT - ATTENDING COMMENTS
Patient seen and examined, agree with above assessment and plan as transcribed above.    - Volume status improving. as well as renal function   - Structural heart f/u noted, will repeat CHANDRA once euvolemic   - F/U viability study to help guide potential revascularization      John Dodge MD, [facility]  [place] [phone]

## 2020-09-05 LAB
ALBUMIN SERPL ELPH-MCNC: 4.2 G/DL — SIGNIFICANT CHANGE UP (ref 3.3–5)
ALP SERPL-CCNC: 104 U/L — SIGNIFICANT CHANGE UP (ref 40–120)
ALT FLD-CCNC: 19 U/L — SIGNIFICANT CHANGE UP (ref 10–45)
ANION GAP SERPL CALC-SCNC: 16 MMOL/L — SIGNIFICANT CHANGE UP (ref 5–17)
ANION GAP SERPL CALC-SCNC: 17 MMOL/L — SIGNIFICANT CHANGE UP (ref 5–17)
AST SERPL-CCNC: 15 U/L — SIGNIFICANT CHANGE UP (ref 10–40)
BILIRUB SERPL-MCNC: 0.4 MG/DL — SIGNIFICANT CHANGE UP (ref 0.2–1.2)
BUN SERPL-MCNC: 38 MG/DL — HIGH (ref 7–23)
BUN SERPL-MCNC: 39 MG/DL — HIGH (ref 7–23)
CALCIUM SERPL-MCNC: 9.5 MG/DL — SIGNIFICANT CHANGE UP (ref 8.4–10.5)
CALCIUM SERPL-MCNC: 9.8 MG/DL — SIGNIFICANT CHANGE UP (ref 8.4–10.5)
CHLORIDE SERPL-SCNC: 102 MMOL/L — SIGNIFICANT CHANGE UP (ref 96–108)
CHLORIDE SERPL-SCNC: 103 MMOL/L — SIGNIFICANT CHANGE UP (ref 96–108)
CO2 SERPL-SCNC: 23 MMOL/L — SIGNIFICANT CHANGE UP (ref 22–31)
CO2 SERPL-SCNC: 23 MMOL/L — SIGNIFICANT CHANGE UP (ref 22–31)
CREAT SERPL-MCNC: 2.25 MG/DL — HIGH (ref 0.5–1.3)
CREAT SERPL-MCNC: 2.52 MG/DL — HIGH (ref 0.5–1.3)
CULTURE RESULTS: SIGNIFICANT CHANGE UP
CULTURE RESULTS: SIGNIFICANT CHANGE UP
GLUCOSE SERPL-MCNC: 117 MG/DL — HIGH (ref 70–99)
GLUCOSE SERPL-MCNC: 79 MG/DL — SIGNIFICANT CHANGE UP (ref 70–99)
HCT VFR BLD CALC: 36.5 % — LOW (ref 39–50)
HGB BLD-MCNC: 10 G/DL — LOW (ref 13–17)
MAGNESIUM SERPL-MCNC: 2.3 MG/DL — SIGNIFICANT CHANGE UP (ref 1.6–2.6)
MCHC RBC-ENTMCNC: 22.7 PG — LOW (ref 27–34)
MCHC RBC-ENTMCNC: 27.4 GM/DL — LOW (ref 32–36)
MCV RBC AUTO: 83 FL — SIGNIFICANT CHANGE UP (ref 80–100)
NRBC # BLD: 0 /100 WBCS — SIGNIFICANT CHANGE UP (ref 0–0)
PHOSPHATE SERPL-MCNC: 4.8 MG/DL — HIGH (ref 2.5–4.5)
PLATELET # BLD AUTO: 388 K/UL — SIGNIFICANT CHANGE UP (ref 150–400)
POTASSIUM SERPL-MCNC: 3.4 MMOL/L — LOW (ref 3.5–5.3)
POTASSIUM SERPL-MCNC: 3.9 MMOL/L — SIGNIFICANT CHANGE UP (ref 3.5–5.3)
POTASSIUM SERPL-SCNC: 3.4 MMOL/L — LOW (ref 3.5–5.3)
POTASSIUM SERPL-SCNC: 3.9 MMOL/L — SIGNIFICANT CHANGE UP (ref 3.5–5.3)
PROT SERPL-MCNC: 7.7 G/DL — SIGNIFICANT CHANGE UP (ref 6–8.3)
RBC # BLD: 4.4 M/UL — SIGNIFICANT CHANGE UP (ref 4.2–5.8)
RBC # FLD: 18.4 % — HIGH (ref 10.3–14.5)
SODIUM SERPL-SCNC: 142 MMOL/L — SIGNIFICANT CHANGE UP (ref 135–145)
SODIUM SERPL-SCNC: 142 MMOL/L — SIGNIFICANT CHANGE UP (ref 135–145)
SPECIMEN SOURCE: SIGNIFICANT CHANGE UP
SPECIMEN SOURCE: SIGNIFICANT CHANGE UP
WBC # BLD: 13.78 K/UL — HIGH (ref 3.8–10.5)
WBC # FLD AUTO: 13.78 K/UL — HIGH (ref 3.8–10.5)

## 2020-09-05 PROCEDURE — 99233 SBSQ HOSP IP/OBS HIGH 50: CPT

## 2020-09-05 PROCEDURE — 99233 SBSQ HOSP IP/OBS HIGH 50: CPT | Mod: GC

## 2020-09-05 RX ORDER — POTASSIUM CHLORIDE 20 MEQ
40 PACKET (EA) ORAL ONCE
Refills: 0 | Status: COMPLETED | OUTPATIENT
Start: 2020-09-05 | End: 2020-09-05

## 2020-09-05 RX ADMIN — PANTOPRAZOLE SODIUM 40 MILLIGRAM(S): 20 TABLET, DELAYED RELEASE ORAL at 05:32

## 2020-09-05 RX ADMIN — CARVEDILOL PHOSPHATE 12.5 MILLIGRAM(S): 80 CAPSULE, EXTENDED RELEASE ORAL at 17:18

## 2020-09-05 RX ADMIN — Medication 40 MILLIGRAM(S): at 05:31

## 2020-09-05 RX ADMIN — Medication 20 MILLIGRAM(S): at 05:32

## 2020-09-05 RX ADMIN — Medication 40 MILLIGRAM(S): at 13:00

## 2020-09-05 RX ADMIN — Medication 35 MILLIGRAM(S): at 21:09

## 2020-09-05 RX ADMIN — Medication 0.5 MILLIGRAM(S): at 21:08

## 2020-09-05 RX ADMIN — PANTOPRAZOLE SODIUM 40 MILLIGRAM(S): 20 TABLET, DELAYED RELEASE ORAL at 17:19

## 2020-09-05 RX ADMIN — ATORVASTATIN CALCIUM 80 MILLIGRAM(S): 80 TABLET, FILM COATED ORAL at 21:09

## 2020-09-05 RX ADMIN — CARVEDILOL PHOSPHATE 12.5 MILLIGRAM(S): 80 CAPSULE, EXTENDED RELEASE ORAL at 05:31

## 2020-09-05 RX ADMIN — Medication 40 MILLIGRAM(S): at 21:09

## 2020-09-05 RX ADMIN — APIXABAN 5 MILLIGRAM(S): 2.5 TABLET, FILM COATED ORAL at 05:32

## 2020-09-05 RX ADMIN — Medication 81 MILLIGRAM(S): at 11:56

## 2020-09-05 RX ADMIN — Medication 40 MILLIEQUIVALENT(S): at 08:39

## 2020-09-05 RX ADMIN — ISOSORBIDE DINITRATE 10 MILLIGRAM(S): 5 TABLET ORAL at 05:32

## 2020-09-05 RX ADMIN — Medication 35 MILLIGRAM(S): at 13:00

## 2020-09-05 RX ADMIN — APIXABAN 5 MILLIGRAM(S): 2.5 TABLET, FILM COATED ORAL at 17:19

## 2020-09-05 RX ADMIN — ISOSORBIDE DINITRATE 10 MILLIGRAM(S): 5 TABLET ORAL at 13:00

## 2020-09-05 RX ADMIN — Medication 35 MILLIGRAM(S): at 05:32

## 2020-09-05 RX ADMIN — SERTRALINE 50 MILLIGRAM(S): 25 TABLET, FILM COATED ORAL at 11:56

## 2020-09-05 RX ADMIN — ISOSORBIDE DINITRATE 10 MILLIGRAM(S): 5 TABLET ORAL at 21:09

## 2020-09-05 NOTE — PROGRESS NOTE ADULT - PROBLEM SELECTOR PLAN 8
Now resolved . initially Febrile (100.6) on admission + Leukocytosis (WBC 12.27) + Elevated lactate 2.4 + Hypotensive (SBP 100s) on admission. RLE cellulitis vs LUE wound as possible sources of infection  -s/p 2L IVF in ED. BP's improved with normalized lactate (0.7)  -s/p vanc   -Bcx (8/24) negative. Repeat (8/27) GPC in clusters. Now resolved . initially Febrile (100.6) on admission + Leukocytosis (WBC 12.27) + Elevated lactate 2.4 + Hypotensive (SBP 100s) on admission. RLE cellulitis vs LUE wound as possible sources of infection  -s/p 2L IVF in ED. BP's improved with normalized lactate (0.7)  -s/p vanc   -Bcx (8/24) negative. Repeat (8/27) GPC in clusters.    #Leukocytosis  -Slowly uptrending. 13.78. Pt afebrile with no obvious infxn source. Will continue to monitor.

## 2020-09-05 NOTE — PROGRESS NOTE ADULT - PROBLEM SELECTOR PLAN 3
s/p CABG (most recent cath with stable disease with no intervention)  -C/w aspirin 81mg qday + atorvastatin 80mg qday + coreg 12.5mg BID  -Viability study (9/3-9/4): LV markedly dilated at baseline. Viability in apex, inferior, septal, anterolateral, mid to basal inferolateral heart. Partial viability in distal inferolateral heart. LVEF 47%.   -F/u cards recs

## 2020-09-05 NOTE — PROGRESS NOTE ADULT - PROBLEM SELECTOR PLAN 2
NARCISO on CKD III. Cr elevated to 2.4 on admission. Baseline Cr 1.4-1.7. Improved with IVF and lasix was held. However, after CHF exacerbation requiring aggressive diuresis (8/27), patient's Cr was elevated to 2.15 on 8/28  -Cr 2.36 (9/4)  -Continue to monitor daily BMPs   -Renally dose medications  -Continue to hold home Entresto NARCISO on CKD III. Cr elevated to 2.4 on admission. Baseline Cr 1.4-1.7. Improved with IVF and lasix was held. However, after CHF exacerbation requiring aggressive diuresis (8/27), patient's Cr was elevated to 2.15 on 8/28  -Cr 2.25 (9/5)  -Continue to monitor daily BMPs   -Renally dose medications  -Continue to hold home Entresto

## 2020-09-05 NOTE — PROGRESS NOTE ADULT - ASSESSMENT
65yo male with PMH significant for CAD (s/p CABG; most recent LHC showed stable disease with no intervention), CHF (LVEF 39% TTE 1/2020), Afib (on Eliquis), HTN, HLD, CKD III, CHER (uses CPAP), and pemphigus vulgaris presenting to ED with RLE swelling. Found to be septic in ED (hypotension, leukocytosis, elevated lactate, febrile). S/p Vanc/zosyn, 2L IVF in ED. Restarted home ativan 0.5mg at bedtime. C/w home zoloft. C/w CPAP at night for CHER.     RUQ ultrasound (8/31): HSM + mild nodularity c/f cirrhosis; No ascites; No focal lesions. Patient with h/o social drinking but no ETOH abuse. Likely cardiac vs FRANCOIS vs viral hep. Hepatitis w/u negative.

## 2020-09-05 NOTE — PROGRESS NOTE ADULT - PROBLEM SELECTOR PLAN 1
Combined Systolic and diastolic Heart failure. TTE (1/2020): LVEF 39%, mod MR/AR  -8/27: Patient had an RRT after desatting to 77% overnight. Found to be significant pulmonary edema on CXR. EKG showed Afib with RVR and had runs of VT on tele. Lactate 8. Patient received IV lasix 40mg, amio, and transferred to CCU and placed on BiPAP.    -CCU course (8/27): transitioned from BiPAP to NC. Bumex 2. Restarted home isordil and po lasix 40mg. Increased to IV lasix 40mg qday 8/29, then transitioned to po lasix 40mg qday.   -TTE (8/27): Worsening diastolic dysfunction. EF 35-45%. mild/mod MR. mild AS. mild/mod AR. mod-severe global LV dysfunction.   -CHANDRA (9/1) showed severe MR, mod AR.   -C/w coreg 12.5mg BID, isordil 10mg TID  -Cards recs: No cath planned at this time.   -EP recs: will schedule EP study +/- ICD placement closer to discharge  -Heart failure recs: Increase IV lasix to 40mg TID and Hydralazine 35mg TID  -C/w strict I/O's, daily weights, and 1.5L fluid restriction Combined Systolic and diastolic Heart failure. TTE (1/2020): LVEF 39%, mod MR/AR  -8/27: Patient had an RRT after desatting to 77% overnight. Found to be significant pulmonary edema on CXR. EKG showed Afib with RVR and had runs of VT on tele. Lactate 8. Patient received IV lasix 40mg, amio, and transferred to CCU and placed on BiPAP.    -CCU course (8/27): transitioned from BiPAP to NC. Bumex 2. Restarted home isordil and po lasix 40mg. Increased to IV lasix 40mg qday 8/29, then transitioned to po lasix 40mg qday.   -TTE (8/27): Worsening diastolic dysfunction. EF 35-45%. mild/mod MR. mild AS. mild/mod AR. mod-severe global LV dysfunction.   -CHANDRA (9/1) showed severe MR, mod AR.   -C/w coreg 12.5mg BID, isordil 10mg TID  -Cards recs: No cath planned at this time.   -EP recs: will schedule EP study +/- ICD placement closer to discharge  -Heart failure recs: Increase IV lasix to 40mg TID and Hydralazine 35mg TID (9/4)  -C/w strict I/O's, daily weights, and 1.5L fluid restriction

## 2020-09-05 NOTE — PROGRESS NOTE ADULT - PROBLEM SELECTOR PLAN 1
- Continue Lasix to 40mg IV TID for now with consideration for transition to oral diuretics tomorrow depending on his response  - If not improving will consider RHC  - Continue Coreg 12.5mg BID, hydralazine 35mg TID and ISDN 10 TID, hold for SBP < 90  - Deferring ARNI/MRA at this time due to renal dysfunction  - Continue 1.5L fluid restriction  - Daily standing weight, strict I/Os  - Daily BMP, Mg. Maintain K 4-4.5 and Mg 2-2.5.  - If Transform-HF trial is accepting enrollment, will evaluate to see if he is a suitable candidate

## 2020-09-05 NOTE — PROGRESS NOTE ADULT - PROBLEM SELECTOR PLAN 3
- Continue ASA, beta blocker, atorvastatin  - Structural heart following for consideration of redo CABG. Viability study from 9/4 with evidence of viability

## 2020-09-05 NOTE — PROGRESS NOTE ADULT - ATTENDING COMMENTS
discussed above plan with resident on rounds. patient seen and examined. no acute complaints.   labs and imaging reviewed.  viability study reviewed - viability in the apex, inferior, septal, anterolateral, and mid to basal inferolateral walls   severe MR - ?repeat imaging s/p diuresis   Structural heart f/u  c/w IV diuresis, dry weight 220lbs.   cardiology, heart failure, ep following.

## 2020-09-05 NOTE — PROGRESS NOTE ADULT - SUBJECTIVE AND OBJECTIVE BOX
Subjective:  - feels he is urinating more with increase in diuretics and notes improvement in ABD bloating  - ambulating without LH/dizziness. Denies orthopnea, PND, LE swelling and chills     Medications:  apixaban 5 milliGRAM(s) Oral two times a day  aspirin  chewable 81 milliGRAM(s) Oral daily  atorvastatin 80 milliGRAM(s) Oral at bedtime  benzocaine 15 mG/menthol 3.6 mG (Sugar-Free) Lozenge 1 Lozenge Oral three times a day PRN  carvedilol 12.5 milliGRAM(s) Oral every 12 hours  furosemide   Injectable 40 milliGRAM(s) IV Push three times a day  hydrALAZINE 35 milliGRAM(s) Oral three times a day  isosorbide   dinitrate Tablet (ISORDIL) 10 milliGRAM(s) Oral three times a day  LORazepam     Tablet 0.5 milliGRAM(s) Oral at bedtime  pantoprazole    Tablet 40 milliGRAM(s) Oral two times a day  predniSONE   Tablet 20 milliGRAM(s) Oral daily  sertraline 50 milliGRAM(s) Oral daily      Physical Exam:    Vitals:  Vital Signs Last 24 Hours  T(C): 36.8 (20 @ 12:32), Max: 36.9 (20 @ 12:47)  HR: 59 (20 @ 12:32) (59 - 66)  BP: 113/70 (20 @ 12:32) (108/68 - 126/64)  RR: 18 (20 @ 12:32) (18 - 18)  SpO2: 96% (20 @ 12:32) (95% - 100%)    Weight in k.6 ( @ 08:41)    I&O's Summary    04 Sep 2020 07:01  -  05 Sep 2020 07:00  --------------------------------------------------------  IN: 925 mL / OUT: 2250 mL / NET: -1325 mL    05 Sep 2020 07:01  -  05 Sep 2020 12:41  --------------------------------------------------------  IN: 300 mL / OUT: 1225 mL / NET: -925 mL    Tele: SR 50-60s, briefly nacho 40s overnight. PVCs, PACs    General: No distress. Comfortable. Morbidly obese  HEENT: EOM intact.  Neck: Neck supple. JVP difficult to assess due to body habitus. No masses  Chest: Clear to auscultation bilaterally. Occasional dry cough  CV: Regular. Normal S1 and S2. II/VI SM at axilla. Radial pulses normal. No LE edema  Abdomen: soft, rounded, non-tender  Skin: No rashes or skin breakdown  Neurology: Alert and oriented times three. Sensation intact  Psych: Affect normal    Labs:                        10.0   13.78 )-----------( 388      ( 05 Sep 2020 07:13 )             36.5         142  |  102  |  38<H>  ----------------------------<  79  3.4<L>   |  23  |  2.25<H>    Ca    9.5      05 Sep 2020 07:04  Phos  4.8       Mg     2.3         TPro  7.7  /  Alb  4.2  /  TBili  0.4  /  DBili  x   /  AST  15  /  ALT  19  /  AlkPhos  104  09-05          Serum Pro-Brain Natriuretic Peptide: 3301 pg/mL ( @ 06:19) Subjective:  - feels he is urinating more with increase in diuretics and notes improvement in ABD bloating  - ambulating without LH/dizziness. Denies orthopnea, PND, LE swelling and chills     Medications:  apixaban 5 milliGRAM(s) Oral two times a day  aspirin  chewable 81 milliGRAM(s) Oral daily  atorvastatin 80 milliGRAM(s) Oral at bedtime  benzocaine 15 mG/menthol 3.6 mG (Sugar-Free) Lozenge 1 Lozenge Oral three times a day PRN  carvedilol 12.5 milliGRAM(s) Oral every 12 hours  furosemide   Injectable 40 milliGRAM(s) IV Push three times a day  hydrALAZINE 35 milliGRAM(s) Oral three times a day  isosorbide   dinitrate Tablet (ISORDIL) 10 milliGRAM(s) Oral three times a day  LORazepam     Tablet 0.5 milliGRAM(s) Oral at bedtime  pantoprazole    Tablet 40 milliGRAM(s) Oral two times a day  predniSONE   Tablet 20 milliGRAM(s) Oral daily  sertraline 50 milliGRAM(s) Oral daily      Physical Exam:    Vitals:  Vital Signs Last 24 Hours  T(C): 36.8 (20 @ 12:32), Max: 36.9 (20 @ 12:47)  HR: 59 (20 @ 12:32) (59 - 66)  BP: 113/70 (20 @ 12:32) (108/68 - 126/64)  RR: 18 (20 @ 12:32) (18 - 18)  SpO2: 96% (20 @ 12:32) (95% - 100%)    Weight in k.6 ( @ 08:41)    I&O's Summary    04 Sep 2020 07:01  -  05 Sep 2020 07:00  --------------------------------------------------------  IN: 925 mL / OUT: 2250 mL / NET: -1325 mL    05 Sep 2020 07:01  -  05 Sep 2020 12:41  --------------------------------------------------------  IN: 300 mL / OUT: 1225 mL / NET: -925 mL    Tele: SR 50-60s, briefly nacho 40s overnight. PVCs, PACs    General: No distress. Comfortable. Morbidly obese  HEENT: EOM intact.  Neck: Neck supple. JVP difficult to assess due to body habitus but appears mildly elevated. No masses  Chest: Clear to auscultation bilaterally. Occasional dry cough  CV: Regular. Normal S1 and S2. II/VI SM at axilla. Radial pulses normal. No LE edema  Abdomen: soft, rounded, non-tender  Skin: No rashes or skin breakdown  Neurology: Alert and oriented times three. Sensation intact  Psych: Affect normal    Labs:                        10.0   13.78 )-----------( 388      ( 05 Sep 2020 07:13 )             36.5     -    142  |  102  |  38<H>  ----------------------------<  79  3.4<L>   |  23  |  2.25<H>    Ca    9.5      05 Sep 2020 07:04  Phos  4.8       Mg     2.3         TPro  7.7  /  Alb  4.2  /  TBili  0.4  /  DBili  x   /  AST  15  /  ALT  19  /  AlkPhos  104  09-05          Serum Pro-Brain Natriuretic Peptide: 3301 pg/mL ( @ 06:19)

## 2020-09-05 NOTE — PROGRESS NOTE ADULT - ATTENDING COMMENTS
Agree with above assessment and plan as outlined above.    - viability study noted  - will review cath with Dr. Pena to see if there are adequate targets in those areas  - ? reop CABG MV repair/replace    John Dodge MD, [facility]  [place]EEPER [phone]

## 2020-09-05 NOTE — PROGRESS NOTE ADULT - PROBLEM SELECTOR PLAN 6
-Restarted home coreg (8/26) and isordil (8/27). Entresto continues to be held 2/2 renal function.   -hydralazine increased to 35 TID, per HF (9/4)

## 2020-09-05 NOTE — PROGRESS NOTE ADULT - SUBJECTIVE AND OBJECTIVE BOX
EP Attending    HISTORY OF PRESENT ILLNESS:   Mr Colon is a 65yoM who presents for acute onset shortness of breath.  His EF is 35-40% chronically (1/2020 and 8/2020), due to ischemic cardiomyopathy.  He is s/p CABG and multiple subsequent PCI. He only has 1 graft open- his LIMA to LAD, and his native vessels are otherwise occluded.  Also has AFib, on apixaban, HTN, CKD, and CHER on CPAP.   He has had multiple episodes of flash pulmonary edema.    9/1- No angina, orthopnea, PND or palpitations.  He is short of breath on minimal exertion, walking across his hospital room with a walker (NYHA IIIB).  No fainting or near-fainting episodes.  telemetry after writing my note yesterday had 9 beats of NSVT.  9/2 - uneventful overnight.  had CHANDRA showing severe MR and moderate AI.  still short of breath, no angina, no fainting.  9/3 - no VT on telemetry overnight. Still short of breath.  Discussed the overall plan of care re: diuresis and re-evaluation of his valve before deciding on a structural vs surgical solution.  He is upset that he will spend more time in the hospital and potentially even longer if he needs a re-do open heart procedure, but understands that an incomplete plan will result in readmission and worse CHF symptoms.  9/4 - uneventful overnight. no angina, orthopnea or PND.  ambulating a little bit easier.  no VT on telemetry.  9/5 - reviewed Viability Study findings with patient.  exertional shortness of breath improving slowly.  no palpitations. no VT on telemetry.    apixaban 5 milliGRAM(s) Oral two times a day  aspirin  chewable 81 milliGRAM(s) Oral daily  atorvastatin 80 milliGRAM(s) Oral at bedtime  benzocaine 15 mG/menthol 3.6 mG (Sugar-Free) Lozenge 1 Lozenge Oral three times a day PRN  carvedilol 12.5 milliGRAM(s) Oral every 12 hours  furosemide   Injectable 40 milliGRAM(s) IV Push three times a day  hydrALAZINE 35 milliGRAM(s) Oral three times a day  isosorbide   dinitrate Tablet (ISORDIL) 10 milliGRAM(s) Oral three times a day  LORazepam     Tablet 0.5 milliGRAM(s) Oral at bedtime  pantoprazole    Tablet 40 milliGRAM(s) Oral two times a day  predniSONE   Tablet 20 milliGRAM(s) Oral daily  sertraline 50 milliGRAM(s) Oral daily                          10.0   13.78 )-----------( 388      ( 05 Sep 2020 07:13 )             36.5       09-05    142  |  102  |  38<H>  ----------------------------<  79  3.4<L>   |  23  |  2.25<H>    Ca    9.5      05 Sep 2020 07:04  Phos  4.8     09-05  Mg     2.3     09-05    TPro  7.7  /  Alb  4.2  /  TBili  0.4  /  DBili  x   /  AST  15  /  ALT  19  /  AlkPhos  104  09-05      T(C): 36.7 (09-05-20 @ 04:39), Max: 36.9 (09-04-20 @ 12:47)  HR: 65 (09-05-20 @ 07:07) (59 - 66)  BP: 120/75 (09-05-20 @ 04:39) (108/68 - 126/64)  RR: 18 (09-05-20 @ 04:39) (18 - 18)  SpO2: 96% (09-05-20 @ 07:07) (96% - 100%)  Wt(kg): --    I&O's Summary    04 Sep 2020 07:01  -  05 Sep 2020 07:00  --------------------------------------------------------  IN: 925 mL / OUT: 2250 mL / NET: -1325 mL    05 Sep 2020 07:01  -  05 Sep 2020 09:25  --------------------------------------------------------  IN: 300 mL / OUT: 400 mL / NET: -100 mL      Appearance: Robust white male in no acute distress, but short-of-breath.  HEENT:   Normal oral mucosa, PERRL, EOMI	  Lymphatic: No lymphadenopathy , no edema  Cardiovascular: Normal S1 S2,  Peripheral pulses palpable 2+ bilaterally  Respiratory: Lungs clear to auscultation, normal effort 	  Gastrointestinal:  Soft, Non-tender, + BS	  Skin: No rashes, No ecchymoses, No cyanosis, warm to touch  Musculoskeletal: Normal range of motion, normal strength  Psychiatry:  Mood & affect appropriate    TELEMETRY: NSR, 9 beat run of NSVT on 8/31.  Multifocal PVC's.   ECG:  	NSR, interpolated PVC's.  Echo: EF 35-40%, LV dilated to upper limit of normal, severe diastolic dysfunction, moderate Mitral regurgitation.  CHANDRA:  Tetheterd MV leaflets --> severe MR, in the setting of volume overload and high BP.  Cath: Prior with only LIMA to LAD patent. Native vessels occluded.	  Viability: Multiple viable segments at sites of prior SVG insertion.      ASSESSMENT/PLAN: 	65y Male with an ischemic cardiomyopathy, EF 35-40%, and increasing frequency of episodes of flash pulmonary edema.  Has nonsustained ventricular tachycardia on telemetry.    He is not a candidate for biventricular pacing.  Structural and CHF team consults have been completed: the MV regurg should be re-evaluated once he is euvolemic.  He is being diuresed with Lasix TID.  Viability study was reassuring.  Given ongoing / escalating diuresis, replace K to 4-4.5 daily.  Mg to 2.    Based on the MUSTT trial, he qualifies for an EP Study for VT induction, with ICD insertion if positive.  However at this time, EP workup will be deferred until CHF team and Structural/Surgical teams have optimized him.  Will follow along, and formally re-evaluate prior to discharge.    Wei Feliciano M.D.  Cardiac Electrophysiology    office [phone]  pager [phone]

## 2020-09-05 NOTE — PROGRESS NOTE ADULT - PROBLEM SELECTOR PLAN 2
- Appreciate structural input  - Medical optimization as above  - Repeat echo to reassess once optimized

## 2020-09-05 NOTE — PROGRESS NOTE ADULT - PROBLEM SELECTOR PLAN 7
-s/p vanc - improving    -Wound care saw patient (8/25). Redressed wound. Recommended plastics consult.   -Plastics consulted 8/26. Recommended local wound care and outpatient follow up (Dr. Marquez [phone])

## 2020-09-05 NOTE — PROGRESS NOTE ADULT - PROBLEM SELECTOR PLAN 5
-c/w Eliquis 5mg BID  -C/w coreg 12.5mg BID (8/26)  -EKG with Afib with RVR during RRT on 8/27. Now NSR. Stable on tele.

## 2020-09-05 NOTE — PROGRESS NOTE ADULT - ASSESSMENT
Mr. Colon is a 65 year old male with PMH significant for CAD (s/p CABG 2005 LIMA-LAD, SVG- OM2, SVG- OM1, LIMA to LAD only patient graft per cath 1/2020), ICM HFrEF (LVEF 30-35%), severe MR, Afib (on Eliquis), HTN, HLD, CKD III, CHER (on CPAP) who presented with septic shock 2/2 RLL cellulitis. He additionally has ADHF with shortness of breath and hypoxia requiring CCU admission and BiPAP likely in the setting of volume overload and severe MR. He has diuresed nearly 20lbs since admission with IV diuretics and is normotensive tolerating gradual escalation of vasodilators however is not yet optimized on medical therapy. Exam is challenging due to his body habitus, however he appears to have some degree of intravascular volume expansion in the setting of uptrending pro-BNP. Persistent renal dysfunction but relatively stable.

## 2020-09-05 NOTE — PROGRESS NOTE ADULT - SUBJECTIVE AND OBJECTIVE BOX
Patient is a 65y old  Male who presents with a chief complaint of RLE swelling (04 Sep 2020 18:40)    SUBJECTIVE / OVERNIGHT EVENTS:     OBJECTIVE:  Vital Signs Last 24 Hrs  T(C): 36.7 (05 Sep 2020 04:39), Max: 36.9 (04 Sep 2020 12:47)  T(F): 98 (05 Sep 2020 04:39), Max: 98.4 (04 Sep 2020 12:47)  HR: 64 (05 Sep 2020 04:39) (59 - 66)  BP: 120/75 (05 Sep 2020 04:39) (108/68 - 126/64)  BP(mean): --  RR: 18 (05 Sep 2020 04:39) (18 - 18)  SpO2: 100% (05 Sep 2020 04:39) (97% - 100%)    I&O's Summary    03 Sep 2020 07:01  -  04 Sep 2020 07:00  --------------------------------------------------------  IN: 830 mL / OUT: 1360 mL / NET: -530 mL    04 Sep 2020 07:01  -  05 Sep 2020 06:12  --------------------------------------------------------  IN: 925 mL / OUT: 2250 mL / NET: -1325 mL    Physical Examination:       General: No acute distress, well-appearing  	  Eyes: EOMI   	  ENT: MMM, no oropharyngeal lesions or erythema appreciated   	  Pulm: No increased WOB. No wheezing. CTAB  	  CV: RRR. S1&S2+. No M/R/G appreciated.   	  Abdomen: +BS. Soft, NT. No organomegaly. Distended.   	  MSK: Good ROM  	  Extremities: No peripheral edema.   	  Neuro: A&Ox3, no focal deficits       Skin: Warm and dry. L hand wound with dressing in place.      Labs:  CAPILLARY BLOOD GLUCOSE                              9.4    11.89 )-----------( 406      ( 04 Sep 2020 06:55 )             34.8     09-04    143  |  104  |  36<H>  ----------------------------<  109<H>  4.6   |  24  |  2.36<H>    Ca    9.9      04 Sep 2020 17:29  Phos  4.8     09-04  Mg     2.3     09-04    TPro  7.1  /  Alb  3.7  /  TBili  0.4  /  DBili  x   /  AST  14  /  ALT  21  /  AlkPhos  111  09-03              Imaging Personally Reviewed:    Consultant(s) Notes Reviewed:   Care Discussed with Consultants/Other Providers:    MEDICATIONS  (STANDING):  apixaban 5 milliGRAM(s) Oral two times a day  aspirin  chewable 81 milliGRAM(s) Oral daily  atorvastatin 80 milliGRAM(s) Oral at bedtime  carvedilol 12.5 milliGRAM(s) Oral every 12 hours  furosemide   Injectable 40 milliGRAM(s) IV Push three times a day  hydrALAZINE 35 milliGRAM(s) Oral three times a day  isosorbide   dinitrate Tablet (ISORDIL) 10 milliGRAM(s) Oral three times a day  LORazepam     Tablet 0.5 milliGRAM(s) Oral at bedtime  pantoprazole    Tablet 40 milliGRAM(s) Oral two times a day  predniSONE   Tablet 20 milliGRAM(s) Oral daily  sertraline 50 milliGRAM(s) Oral daily    MEDICATIONS  (PRN):  benzocaine 15 mG/menthol 3.6 mG (Sugar-Free) Lozenge 1 Lozenge Oral three times a day PRN Sore Throat Patient is a 65y old  Male who presents with a chief complaint of RLE swelling (04 Sep 2020 18:40)    SUBJECTIVE / OVERNIGHT EVENTS: NAEON. Patient urinating a lot with inc diuretics. Weight down 3lbs from yesterday. No subjective complaints. Denies CP, palpitations, inc SOB, inc peripheral edema.     OBJECTIVE:  Vital Signs Last 24 Hrs  T(C): 36.7 (05 Sep 2020 04:39), Max: 36.9 (04 Sep 2020 12:47)  T(F): 98 (05 Sep 2020 04:39), Max: 98.4 (04 Sep 2020 12:47)  HR: 64 (05 Sep 2020 04:39) (59 - 66)  BP: 120/75 (05 Sep 2020 04:39) (108/68 - 126/64)  BP(mean): --  RR: 18 (05 Sep 2020 04:39) (18 - 18)  SpO2: 100% (05 Sep 2020 04:39) (97% - 100%)    I&O's Summary    03 Sep 2020 07:01  -  04 Sep 2020 07:00  --------------------------------------------------------  IN: 830 mL / OUT: 1360 mL / NET: -530 mL    04 Sep 2020 07:01  -  05 Sep 2020 06:12  --------------------------------------------------------  IN: 925 mL / OUT: 2250 mL / NET: -1325 mL    Physical Examination:       General: No acute distress, well-appearing  	  Eyes: EOMI   	  ENT: MMM, no oropharyngeal lesions or erythema appreciated   	  Pulm: No increased WOB. No wheezing. CTAB  	  CV: RRR. S1&S2+. No M/R/G appreciated.   	  Abdomen: +BS. Soft, NT. No organomegaly. Distended.   	  MSK: Good ROM  	  Extremities: No peripheral edema.   	  Neuro: A&Ox3, no focal deficits       Skin: Warm and dry. L hand wound with dressing in place.      Labs:  CAPILLARY BLOOD GLUCOSE                              9.4    11.89 )-----------( 406      ( 04 Sep 2020 06:55 )             34.8     09-04    143  |  104  |  36<H>  ----------------------------<  109<H>  4.6   |  24  |  2.36<H>    Ca    9.9      04 Sep 2020 17:29  Phos  4.8     09-04  Mg     2.3     09-04    TPro  7.1  /  Alb  3.7  /  TBili  0.4  /  DBili  x   /  AST  14  /  ALT  21  /  AlkPhos  111  09-03              Imaging Personally Reviewed:    Consultant(s) Notes Reviewed:   Care Discussed with Consultants/Other Providers:    MEDICATIONS  (STANDING):  apixaban 5 milliGRAM(s) Oral two times a day  aspirin  chewable 81 milliGRAM(s) Oral daily  atorvastatin 80 milliGRAM(s) Oral at bedtime  carvedilol 12.5 milliGRAM(s) Oral every 12 hours  furosemide   Injectable 40 milliGRAM(s) IV Push three times a day  hydrALAZINE 35 milliGRAM(s) Oral three times a day  isosorbide   dinitrate Tablet (ISORDIL) 10 milliGRAM(s) Oral three times a day  LORazepam     Tablet 0.5 milliGRAM(s) Oral at bedtime  pantoprazole    Tablet 40 milliGRAM(s) Oral two times a day  predniSONE   Tablet 20 milliGRAM(s) Oral daily  sertraline 50 milliGRAM(s) Oral daily    MEDICATIONS  (PRN):  benzocaine 15 mG/menthol 3.6 mG (Sugar-Free) Lozenge 1 Lozenge Oral three times a day PRN Sore Throat

## 2020-09-05 NOTE — PROGRESS NOTE ADULT - SUBJECTIVE AND OBJECTIVE BOX
pt seen and examined, no complaints, ROS - .   pt used Bipap overnight , offers no complaints of chest pain           apixaban 5 milliGRAM(s) Oral two times a day  aspirin  chewable 81 milliGRAM(s) Oral daily  atorvastatin 80 milliGRAM(s) Oral at bedtime  benzocaine 15 mG/menthol 3.6 mG (Sugar-Free) Lozenge 1 Lozenge Oral three times a day PRN  carvedilol 12.5 milliGRAM(s) Oral every 12 hours  furosemide   Injectable 40 milliGRAM(s) IV Push three times a day  hydrALAZINE 35 milliGRAM(s) Oral three times a day  isosorbide   dinitrate Tablet (ISORDIL) 10 milliGRAM(s) Oral three times a day  LORazepam     Tablet 0.5 milliGRAM(s) Oral at bedtime  pantoprazole    Tablet 40 milliGRAM(s) Oral two times a day  potassium chloride    Tablet ER 40 milliEquivalent(s) Oral once  predniSONE   Tablet 20 milliGRAM(s) Oral daily  sertraline 50 milliGRAM(s) Oral daily                            10.0   13.78 )-----------( 388      ( 05 Sep 2020 07:13 )             36.5       Hemoglobin: 10.0 g/dL (09-05 @ 07:13)  Hemoglobin: 9.4 g/dL (09-04 @ 06:55)  Hemoglobin: 9.6 g/dL (09-03 @ 06:18)  Hemoglobin: 9.2 g/dL (09-02 @ 06:44)  Hemoglobin: 8.7 g/dL (09-01 @ 05:45)      09-05    142  |  102  |  38<H>  ----------------------------<  79  3.4<L>   |  23  |  2.25<H>    Ca    9.5      05 Sep 2020 07:04  Phos  4.8     09-05  Mg     2.3     09-05    TPro  7.7  /  Alb  4.2  /  TBili  0.4  /  DBili  x   /  AST  15  /  ALT  19  /  AlkPhos  104  09-05    Creatinine Trend: 2.25<--, 2.36<--, 2.19<--, 2.36<--, 2.37<--, 2.53<--    COAGS:           T(C): 36.7 (09-05-20 @ 04:39), Max: 36.9 (09-04-20 @ 12:47)  HR: 65 (09-05-20 @ 07:07) (59 - 66)  BP: 120/75 (09-05-20 @ 04:39) (108/68 - 126/64)  RR: 18 (09-05-20 @ 04:39) (18 - 18)  SpO2: 96% (09-05-20 @ 07:07) (96% - 100%)  Wt(kg): --    I&O's Summary    04 Sep 2020 07:01  -  05 Sep 2020 07:00  --------------------------------------------------------  IN: 925 mL / OUT: 2250 mL / NET: -1325 mL      Gen: NAD  HEENT:  (-)icterus (-)pallor  CV: N S1 S2 1/6 KATHERYN (+)2 Pulses B/l  Resp: CTA B/L, normal effort  GI: (+) BS Soft, NT, ND  Lymph:  (-) edema, (-)obvious lymphadenopathy  Skin: Warm to touch, Normal turgor  Psych: Appropriate mood and affect      TELEMETRY: SB/SR    Echo: < from: TTE with Doppler (w/Cont) (08.27.20 @ 07:54) >  Conclusions:  1. Mitral valve not well visualized. Mitral annular  calcification. Tethered mitral valve leaflets with normal  opening. Mild moderate mitral regurgitation.  2. Calcified trileaflet aortic valve with decreased  opening. Peak transaortic valve gradient equals 12 mm Hg,  mean transaortic valve gradient equals 8 mm Hg, at least  mild AS. Limited gradient/Doppler evaluation. Mild-moderate  aortic regurgitation.  3. Endocardial visualization enhanced with intravenous  injection of Ultrasonic Enhancing Agent (Definity).  Moderate to severe  global left ventricular systolic  dysfunction. There dyssynchronous left ventricular  contraction.  4. Moderate diastolic dysfunction (Stage II). Increased  E/e'  is consistent with elevated left ventricular filling  pressure.  *** Compared with echocardiogram report of 12/13/2017,  Limited comparison.  Disatolic dysfunction is worse.    < end of copied text >    < from: Transesophageal Echocardiogram w/o TTE (09.01.20 @ 14:15) >  Conclusions:  1. Tethered mitral valve leaflets with normal opening.  Severe mitral regurgitation. There is systolic flow  reversal in the right upper pulmonary vein. By PISA,  calculated ERO=43 mmsq and regurgitant volume=73 mL (Pisa  rad 1cm, Va 34.5 cm/s, Vm 5.0 m/s,  cm)  2. Calcified trileaflet aortic valve with normal opening.  Moderate aortic regurgitation. Vena contracta=0.4 cm.  3. Normal aortic root size. (Ao: 3.6 cm at the sinuses of  Valsalva). Midlly dilated ascending aorta for BSA  (ascending aorta diameter 4.5 cm approximately 5 cm distal  to the aortic valve.  Normal size aortic arch, and  descending thoracic aorta.  Minimal atheroma.  4. Left atrial enlargement. No left atrial or left atrial  appendage thrombus. Normal left atrial appendage function  (maximum velocity>40 cm/s).  5. Moderate left ventricular systolic dysfunction.  Paradoxical septal motion consistent with prior cardiac  surgery.  6. Normal right ventricular size and function.    < end of copied text >        ASSESSMENT/PLAN: Patient is a 65 year old male with PMH of CAD s/p CABG with most recent LHC 1/2020 with no intervention, chronic systolic CHF (LVEF 39% TTE 1/2020), Afib (on Eliquis), HTN, HLD, CKD, CHER (on CPAP), and pemphigus vulgaris who presented with RLE swelling admitted with RLE cellulitis and NARCISO on CKD. Cardiology consulted for management of CAD/CHF.    - Continue IV Diuresis per heart failure team  - daily standing weights, strict I/Os  - Continue medical management of known CAD with systolic cardiomyopathy - ASA/Statin, Coreg, Hydralazine/Isordil  - Continue AC with Eliquis for CVA prevention   - EP follow up appreciated  - CHANDRA with severe MR  - Structural heart eval follow up   - Will review CHANDRA with team. Suspect the mechanism of MR is complex.  Although there is some tethering I suspect the P3 scallop is partially flail best seen on the commisural view and 2 chamber views.  - Patient to f/u with Dr. Campbell's cardiology group after d/c pt seen and examined, no complaints, ROS - .   pt used Bipap overnight , offers no complaints of chest pain           apixaban 5 milliGRAM(s) Oral two times a day  aspirin  chewable 81 milliGRAM(s) Oral daily  atorvastatin 80 milliGRAM(s) Oral at bedtime  benzocaine 15 mG/menthol 3.6 mG (Sugar-Free) Lozenge 1 Lozenge Oral three times a day PRN  carvedilol 12.5 milliGRAM(s) Oral every 12 hours  furosemide   Injectable 40 milliGRAM(s) IV Push three times a day  hydrALAZINE 35 milliGRAM(s) Oral three times a day  isosorbide   dinitrate Tablet (ISORDIL) 10 milliGRAM(s) Oral three times a day  LORazepam     Tablet 0.5 milliGRAM(s) Oral at bedtime  pantoprazole    Tablet 40 milliGRAM(s) Oral two times a day  potassium chloride    Tablet ER 40 milliEquivalent(s) Oral once  predniSONE   Tablet 20 milliGRAM(s) Oral daily  sertraline 50 milliGRAM(s) Oral daily                            10.0   13.78 )-----------( 388      ( 05 Sep 2020 07:13 )             36.5       Hemoglobin: 10.0 g/dL (09-05 @ 07:13)  Hemoglobin: 9.4 g/dL (09-04 @ 06:55)  Hemoglobin: 9.6 g/dL (09-03 @ 06:18)  Hemoglobin: 9.2 g/dL (09-02 @ 06:44)  Hemoglobin: 8.7 g/dL (09-01 @ 05:45)      09-05    142  |  102  |  38<H>  ----------------------------<  79  3.4<L>   |  23  |  2.25<H>    Ca    9.5      05 Sep 2020 07:04  Phos  4.8     09-05  Mg     2.3     09-05    TPro  7.7  /  Alb  4.2  /  TBili  0.4  /  DBili  x   /  AST  15  /  ALT  19  /  AlkPhos  104  09-05    Creatinine Trend: 2.25<--, 2.36<--, 2.19<--, 2.36<--, 2.37<--, 2.53<--    COAGS:           T(C): 36.7 (09-05-20 @ 04:39), Max: 36.9 (09-04-20 @ 12:47)  HR: 65 (09-05-20 @ 07:07) (59 - 66)  BP: 120/75 (09-05-20 @ 04:39) (108/68 - 126/64)  RR: 18 (09-05-20 @ 04:39) (18 - 18)  SpO2: 96% (09-05-20 @ 07:07) (96% - 100%)  Wt(kg): --    I&O's Summary    04 Sep 2020 07:01  -  05 Sep 2020 07:00  --------------------------------------------------------  IN: 925 mL / OUT: 2250 mL / NET: -1325 mL      Gen: NAD  HEENT:  (-)icterus (-)pallor  CV: N S1 S2 1/6 KATHERYN (+)2 Pulses B/l  Resp: CTA B/L, normal effort  GI: (+) BS Soft, NT, ND  Lymph:  (-) edema, (-)obvious lymphadenopathy  Skin: Warm to touch, Normal turgor  Psych: Appropriate mood and affect      TELEMETRY: SB/SR    Echo: < from: TTE with Doppler (w/Cont) (08.27.20 @ 07:54) >  Conclusions:  1. Mitral valve not well visualized. Mitral annular  calcification. Tethered mitral valve leaflets with normal  opening. Mild moderate mitral regurgitation.  2. Calcified trileaflet aortic valve with decreased  opening. Peak transaortic valve gradient equals 12 mm Hg,  mean transaortic valve gradient equals 8 mm Hg, at least  mild AS. Limited gradient/Doppler evaluation. Mild-moderate  aortic regurgitation.  3. Endocardial visualization enhanced with intravenous  injection of Ultrasonic Enhancing Agent (Definity).  Moderate to severe  global left ventricular systolic  dysfunction. There dyssynchronous left ventricular  contraction.  4. Moderate diastolic dysfunction (Stage II). Increased  E/e'  is consistent with elevated left ventricular filling  pressure.  *** Compared with echocardiogram report of 12/13/2017,  Limited comparison.  Disatolic dysfunction is worse.    < end of copied text >    < from: Transesophageal Echocardiogram w/o TTE (09.01.20 @ 14:15) >  Conclusions:  1. Tethered mitral valve leaflets with normal opening.  Severe mitral regurgitation. There is systolic flow  reversal in the right upper pulmonary vein. By PISA,  calculated ERO=43 mmsq and regurgitant volume=73 mL (Pisa  rad 1cm, Va 34.5 cm/s, Vm 5.0 m/s,  cm)  2. Calcified trileaflet aortic valve with normal opening.  Moderate aortic regurgitation. Vena contracta=0.4 cm.  3. Normal aortic root size. (Ao: 3.6 cm at the sinuses of  Valsalva). Midlly dilated ascending aorta for BSA  (ascending aorta diameter 4.5 cm approximately 5 cm distal  to the aortic valve.  Normal size aortic arch, and  descending thoracic aorta.  Minimal atheroma.  4. Left atrial enlargement. No left atrial or left atrial  appendage thrombus. Normal left atrial appendage function  (maximum velocity>40 cm/s).  5. Moderate left ventricular systolic dysfunction.  Paradoxical septal motion consistent with prior cardiac  surgery.  6. Normal right ventricular size and function.    < end of copied text >        ASSESSMENT/PLAN: Patient is a 65 year old male with PMH of CAD s/p CABG with most recent LHC 1/2020 with no intervention, chronic systolic CHF (LVEF 39% TTE 1/2020), Afib (on Eliquis), HTN, HLD, CKD, CHER (on CPAP), and pemphigus vulgaris who presented with RLE swelling admitted with RLE cellulitis and NARCISO on CKD. Cardiology consulted for management of CAD/CHF.    - Continue IV Diuresis per heart failure team  - daily standing weights, strict I/Os  - Continue medical management of known CAD with systolic cardiomyopathy - ASA/Statin, Coreg, Hydralazine/Isordil  - Continue AC with Eliquis for CVA prevention   - EP follow up appreciated  - CHANDRA with severe MR  - Structural heart eval follow up   - Will review CHANDRA with team. Suspect the mechanism of MR is complex.  Although there maybe some tethering I suspect the P3 scallop is partially flail best seen on the commisural view and 2 chamber views.  - Patient to f/u with Dr. Campbell's cardiology group after d/c

## 2020-09-06 LAB
ANION GAP SERPL CALC-SCNC: 18 MMOL/L — HIGH (ref 5–17)
ANION GAP SERPL CALC-SCNC: 19 MMOL/L — HIGH (ref 5–17)
BUN SERPL-MCNC: 41 MG/DL — HIGH (ref 7–23)
BUN SERPL-MCNC: 46 MG/DL — HIGH (ref 7–23)
CALCIUM SERPL-MCNC: 9.5 MG/DL — SIGNIFICANT CHANGE UP (ref 8.4–10.5)
CALCIUM SERPL-MCNC: 9.9 MG/DL — SIGNIFICANT CHANGE UP (ref 8.4–10.5)
CHLORIDE SERPL-SCNC: 101 MMOL/L — SIGNIFICANT CHANGE UP (ref 96–108)
CHLORIDE SERPL-SCNC: 102 MMOL/L — SIGNIFICANT CHANGE UP (ref 96–108)
CO2 SERPL-SCNC: 22 MMOL/L — SIGNIFICANT CHANGE UP (ref 22–31)
CO2 SERPL-SCNC: 22 MMOL/L — SIGNIFICANT CHANGE UP (ref 22–31)
CREAT SERPL-MCNC: 2.5 MG/DL — HIGH (ref 0.5–1.3)
CREAT SERPL-MCNC: 2.6 MG/DL — HIGH (ref 0.5–1.3)
GLUCOSE SERPL-MCNC: 109 MG/DL — HIGH (ref 70–99)
GLUCOSE SERPL-MCNC: 90 MG/DL — SIGNIFICANT CHANGE UP (ref 70–99)
HCT VFR BLD CALC: 36.6 % — LOW (ref 39–50)
HGB BLD-MCNC: 10.3 G/DL — LOW (ref 13–17)
MAGNESIUM SERPL-MCNC: 2.3 MG/DL — SIGNIFICANT CHANGE UP (ref 1.6–2.6)
MCHC RBC-ENTMCNC: 23.1 PG — LOW (ref 27–34)
MCHC RBC-ENTMCNC: 28.1 GM/DL — LOW (ref 32–36)
MCV RBC AUTO: 82.1 FL — SIGNIFICANT CHANGE UP (ref 80–100)
NRBC # BLD: 0 /100 WBCS — SIGNIFICANT CHANGE UP (ref 0–0)
PHOSPHATE SERPL-MCNC: 5.7 MG/DL — HIGH (ref 2.5–4.5)
PLATELET # BLD AUTO: 343 K/UL — SIGNIFICANT CHANGE UP (ref 150–400)
POTASSIUM SERPL-MCNC: 3.2 MMOL/L — LOW (ref 3.5–5.3)
POTASSIUM SERPL-MCNC: 3.9 MMOL/L — SIGNIFICANT CHANGE UP (ref 3.5–5.3)
POTASSIUM SERPL-SCNC: 3.2 MMOL/L — LOW (ref 3.5–5.3)
POTASSIUM SERPL-SCNC: 3.9 MMOL/L — SIGNIFICANT CHANGE UP (ref 3.5–5.3)
RBC # BLD: 4.46 M/UL — SIGNIFICANT CHANGE UP (ref 4.2–5.8)
RBC # FLD: 18.7 % — HIGH (ref 10.3–14.5)
SODIUM SERPL-SCNC: 141 MMOL/L — SIGNIFICANT CHANGE UP (ref 135–145)
SODIUM SERPL-SCNC: 143 MMOL/L — SIGNIFICANT CHANGE UP (ref 135–145)
WBC # BLD: 13.46 K/UL — HIGH (ref 3.8–10.5)
WBC # FLD AUTO: 13.46 K/UL — HIGH (ref 3.8–10.5)

## 2020-09-06 PROCEDURE — 99233 SBSQ HOSP IP/OBS HIGH 50: CPT

## 2020-09-06 PROCEDURE — 99233 SBSQ HOSP IP/OBS HIGH 50: CPT | Mod: GC

## 2020-09-06 RX ORDER — FUROSEMIDE 40 MG
40 TABLET ORAL DAILY
Refills: 0 | Status: DISCONTINUED | OUTPATIENT
Start: 2020-09-07 | End: 2020-09-07

## 2020-09-06 RX ORDER — POTASSIUM CHLORIDE 20 MEQ
40 PACKET (EA) ORAL ONCE
Refills: 0 | Status: COMPLETED | OUTPATIENT
Start: 2020-09-06 | End: 2020-09-06

## 2020-09-06 RX ADMIN — APIXABAN 5 MILLIGRAM(S): 2.5 TABLET, FILM COATED ORAL at 05:17

## 2020-09-06 RX ADMIN — Medication 40 MILLIGRAM(S): at 12:56

## 2020-09-06 RX ADMIN — Medication 35 MILLIGRAM(S): at 13:00

## 2020-09-06 RX ADMIN — Medication 35 MILLIGRAM(S): at 21:10

## 2020-09-06 RX ADMIN — Medication 35 MILLIGRAM(S): at 05:18

## 2020-09-06 RX ADMIN — ISOSORBIDE DINITRATE 10 MILLIGRAM(S): 5 TABLET ORAL at 05:17

## 2020-09-06 RX ADMIN — PANTOPRAZOLE SODIUM 40 MILLIGRAM(S): 20 TABLET, DELAYED RELEASE ORAL at 05:18

## 2020-09-06 RX ADMIN — ISOSORBIDE DINITRATE 10 MILLIGRAM(S): 5 TABLET ORAL at 13:00

## 2020-09-06 RX ADMIN — PANTOPRAZOLE SODIUM 40 MILLIGRAM(S): 20 TABLET, DELAYED RELEASE ORAL at 17:21

## 2020-09-06 RX ADMIN — Medication 40 MILLIGRAM(S): at 05:17

## 2020-09-06 RX ADMIN — Medication 40 MILLIEQUIVALENT(S): at 12:55

## 2020-09-06 RX ADMIN — CARVEDILOL PHOSPHATE 12.5 MILLIGRAM(S): 80 CAPSULE, EXTENDED RELEASE ORAL at 17:20

## 2020-09-06 RX ADMIN — APIXABAN 5 MILLIGRAM(S): 2.5 TABLET, FILM COATED ORAL at 17:20

## 2020-09-06 RX ADMIN — ATORVASTATIN CALCIUM 80 MILLIGRAM(S): 80 TABLET, FILM COATED ORAL at 21:09

## 2020-09-06 RX ADMIN — ISOSORBIDE DINITRATE 10 MILLIGRAM(S): 5 TABLET ORAL at 21:10

## 2020-09-06 RX ADMIN — CARVEDILOL PHOSPHATE 12.5 MILLIGRAM(S): 80 CAPSULE, EXTENDED RELEASE ORAL at 05:17

## 2020-09-06 RX ADMIN — Medication 20 MILLIGRAM(S): at 05:17

## 2020-09-06 RX ADMIN — Medication 0.5 MILLIGRAM(S): at 21:11

## 2020-09-06 RX ADMIN — SERTRALINE 50 MILLIGRAM(S): 25 TABLET, FILM COATED ORAL at 12:55

## 2020-09-06 RX ADMIN — Medication 81 MILLIGRAM(S): at 12:55

## 2020-09-06 NOTE — PROGRESS NOTE ADULT - PROBLEM SELECTOR PLAN 3
s/p CABG (most recent cath with stable disease with no intervention)  -C/w aspirin 81mg qday + atorvastatin 80mg qday + coreg 12.5mg BID  -Viability study (9/3-9/4): LV markedly dilated at baseline. Viability in apex, inferior, septal, anterolateral, mid to basal inferolateral heart. Partial viability in distal inferolateral heart. LVEF 47%.   -F/u structural cards recs

## 2020-09-06 NOTE — PROGRESS NOTE ADULT - SUBJECTIVE AND OBJECTIVE BOX
pt seen and examined, no complaints, ROS - .       MEDICATIONS  (STANDING):  apixaban 5 milliGRAM(s) Oral two times a day  aspirin  chewable 81 milliGRAM(s) Oral daily  atorvastatin 80 milliGRAM(s) Oral at bedtime  carvedilol 12.5 milliGRAM(s) Oral every 12 hours  hydrALAZINE 35 milliGRAM(s) Oral three times a day  isosorbide   dinitrate Tablet (ISORDIL) 10 milliGRAM(s) Oral three times a day  LORazepam     Tablet 0.5 milliGRAM(s) Oral at bedtime  pantoprazole    Tablet 40 milliGRAM(s) Oral two times a day  predniSONE   Tablet 20 milliGRAM(s) Oral daily  sertraline 50 milliGRAM(s) Oral daily    MEDICATIONS  (PRN):  benzocaine 15 mG/menthol 3.6 mG (Sugar-Free) Lozenge 1 Lozenge Oral three times a day PRN Sore Throat      LABS:                            10.3   13.46 )-----------( 343      ( 06 Sep 2020 06:32 )      143  |  102  |  41<H>  ----------------------------<  90  3.2<L>   |  22  |  2.50<H>    Ca    9.9      06 Sep 2020 06:32  Phos  5.7     09-06  Mg     2.3     09-06    TPro  7.7  /  Alb  4.2  /  TBili  0.4  /  DBili  x   /  AST  15  /  ALT  19  /  AlkPhos  104  09-05    Creatinine Trend: 2.50<--, 2.52<--, 2.25<--, 2.36<--, 2.19<--, 2.36<--      PHYSICAL EXAM  Vital Signs Last 24 Hrs  T(C): 36.8 (06 Sep 2020 11:20), Max: 36.8 (06 Sep 2020 11:20)  T(F): 98.2 (06 Sep 2020 11:20), Max: 98.2 (06 Sep 2020 11:20)  HR: 59 (06 Sep 2020 17:21) (53 - 65)  BP: 111/64 (06 Sep 2020 17:21) (111/64 - 147/75)  RR: 18 (06 Sep 2020 11:20) (18 - 18)  SpO2: 95% (06 Sep 2020 17:01) (95% - 100%)        Gen: NAD  HEENT:  (-)icterus (-)pallor  CV: N S1 S2 1/6 KATHERYN (+)2 Pulses B/l  Resp: CTA B/L, normal effort  GI: (+) BS Soft, NT, ND  Lymph:  (-) edema, (-)obvious lymphadenopathy  Skin: Warm to touch, Normal turgor  Psych: Appropriate mood and affect      TELEMETRY: SB/SR    Echo: < from: TTE with Doppler (w/Cont) (08.27.20 @ 07:54) >  Conclusions:  1. Mitral valve not well visualized. Mitral annular  calcification. Tethered mitral valve leaflets with normal  opening. Mild moderate mitral regurgitation.  2. Calcified trileaflet aortic valve with decreased  opening. Peak transaortic valve gradient equals 12 mm Hg,  mean transaortic valve gradient equals 8 mm Hg, at least  mild AS. Limited gradient/Doppler evaluation. Mild-moderate  aortic regurgitation.  3. Endocardial visualization enhanced with intravenous  injection of Ultrasonic Enhancing Agent (Definity).  Moderate to severe  global left ventricular systolic  dysfunction. There dyssynchronous left ventricular  contraction.  4. Moderate diastolic dysfunction (Stage II). Increased  E/e'  is consistent with elevated left ventricular filling  pressure.  *** Compared with echocardiogram report of 12/13/2017,  Limited comparison.  Disatolic dysfunction is worse.    < end of copied text >    < from: Transesophageal Echocardiogram w/o TTE (09.01.20 @ 14:15) >  Conclusions:  1. Tethered mitral valve leaflets with normal opening.  Severe mitral regurgitation. There is systolic flow  reversal in the right upper pulmonary vein. By PISA,  calculated ERO=43 mmsq and regurgitant volume=73 mL (Pisa  rad 1cm, Va 34.5 cm/s, Vm 5.0 m/s,  cm)  2. Calcified trileaflet aortic valve with normal opening.  Moderate aortic regurgitation. Vena contracta=0.4 cm.  3. Normal aortic root size. (Ao: 3.6 cm at the sinuses of  Valsalva). Midlly dilated ascending aorta for BSA  (ascending aorta diameter 4.5 cm approximately 5 cm distal  to the aortic valve.  Normal size aortic arch, and  descending thoracic aorta.  Minimal atheroma.  4. Left atrial enlargement. No left atrial or left atrial  appendage thrombus. Normal left atrial appendage function  (maximum velocity>40 cm/s).  5. Moderate left ventricular systolic dysfunction.  Paradoxical septal motion consistent with prior cardiac  surgery.  6. Normal right ventricular size and function.    < end of copied text >        ASSESSMENT/PLAN: Patient is a 65 year old male with PMH of CAD s/p CABG with most recent C 1/2020 with no intervention, chronic systolic CHF (LVEF 39% TTE 1/2020), Afib (on Eliquis), HTN, HLD, CKD, CHER (on CPAP), and pemphigus vulgaris who presented with RLE swelling admitted with RLE cellulitis and NARCISO on CKD. Cardiology consulted for management of CAD/CHF.    - Continue IV Diuresis per heart failure team  - daily standing weights, strict I/Os  - Continue medical management of known CAD with systolic cardiomyopathy - ASA/Statin, Coreg, Hydralazine/Isordil  - Continue AC with Eliquis for CVA prevention   - EP follow up appreciated  - CHANDRA with severe MR  - Structural heart eval follow up   - Will review CHANDRA with team. Suspect the mechanism of MR is complex.  Although there maybe some tethering I suspect the P3 scallop is partially flail best seen on the commisural view and 2 chamber views.  - Patient to f/u with Dr. Campbell's cardiology group after d/c pt seen and examined, no complaints, ROS - .       MEDICATIONS  (STANDING):  apixaban 5 milliGRAM(s) Oral two times a day  aspirin  chewable 81 milliGRAM(s) Oral daily  atorvastatin 80 milliGRAM(s) Oral at bedtime  carvedilol 12.5 milliGRAM(s) Oral every 12 hours  hydrALAZINE 35 milliGRAM(s) Oral three times a day  isosorbide   dinitrate Tablet (ISORDIL) 10 milliGRAM(s) Oral three times a day  LORazepam     Tablet 0.5 milliGRAM(s) Oral at bedtime  pantoprazole    Tablet 40 milliGRAM(s) Oral two times a day  predniSONE   Tablet 20 milliGRAM(s) Oral daily  sertraline 50 milliGRAM(s) Oral daily    MEDICATIONS  (PRN):  benzocaine 15 mG/menthol 3.6 mG (Sugar-Free) Lozenge 1 Lozenge Oral three times a day PRN Sore Throat      LABS:                            10.3   13.46 )-----------( 343      ( 06 Sep 2020 06:32 )      143  |  102  |  41<H>  ----------------------------<  90  3.2<L>   |  22  |  2.50<H>    Ca    9.9      06 Sep 2020 06:32  Phos  5.7     09-06  Mg     2.3     09-06    TPro  7.7  /  Alb  4.2  /  TBili  0.4  /  DBili  x   /  AST  15  /  ALT  19  /  AlkPhos  104  09-05    Creatinine Trend: 2.50<--, 2.52<--, 2.25<--, 2.36<--, 2.19<--, 2.36<--      PHYSICAL EXAM  Vital Signs Last 24 Hrs  T(C): 36.8 (06 Sep 2020 11:20), Max: 36.8 (06 Sep 2020 11:20)  T(F): 98.2 (06 Sep 2020 11:20), Max: 98.2 (06 Sep 2020 11:20)  HR: 59 (06 Sep 2020 17:21) (53 - 65)  BP: 111/64 (06 Sep 2020 17:21) (111/64 - 147/75)  RR: 18 (06 Sep 2020 11:20) (18 - 18)  SpO2: 95% (06 Sep 2020 17:01) (95% - 100%)        Gen: NAD  HEENT:  (-)icterus (-)pallor  CV: N S1 S2 1/6 KATHERYN (+)2 Pulses B/l  Resp: CTA B/L, normal effort  GI: (+) BS Soft, NT, ND  Lymph:  (-) edema, (-)obvious lymphadenopathy  Skin: Warm to touch, Normal turgor  Psych: Appropriate mood and affect      TELEMETRY: SB/SR    Echo: < from: TTE with Doppler (w/Cont) (08.27.20 @ 07:54) >  Conclusions:  1. Mitral valve not well visualized. Mitral annular  calcification. Tethered mitral valve leaflets with normal  opening. Mild moderate mitral regurgitation.  2. Calcified trileaflet aortic valve with decreased  opening. Peak transaortic valve gradient equals 12 mm Hg,  mean transaortic valve gradient equals 8 mm Hg, at least  mild AS. Limited gradient/Doppler evaluation. Mild-moderate  aortic regurgitation.  3. Endocardial visualization enhanced with intravenous  injection of Ultrasonic Enhancing Agent (Definity).  Moderate to severe  global left ventricular systolic  dysfunction. There dyssynchronous left ventricular  contraction.  4. Moderate diastolic dysfunction (Stage II). Increased  E/e'  is consistent with elevated left ventricular filling  pressure.  *** Compared with echocardiogram report of 12/13/2017,  Limited comparison.  Disatolic dysfunction is worse.    < end of copied text >    < from: Transesophageal Echocardiogram w/o TTE (09.01.20 @ 14:15) >  Conclusions:  1. Tethered mitral valve leaflets with normal opening.  Severe mitral regurgitation. There is systolic flow  reversal in the right upper pulmonary vein. By PISA,  calculated ERO=43 mmsq and regurgitant volume=73 mL (Pisa  rad 1cm, Va 34.5 cm/s, Vm 5.0 m/s,  cm)  2. Calcified trileaflet aortic valve with normal opening.  Moderate aortic regurgitation. Vena contracta=0.4 cm.  3. Normal aortic root size. (Ao: 3.6 cm at the sinuses of  Valsalva). Midlly dilated ascending aorta for BSA  (ascending aorta diameter 4.5 cm approximately 5 cm distal  to the aortic valve.  Normal size aortic arch, and  descending thoracic aorta.  Minimal atheroma.  4. Left atrial enlargement. No left atrial or left atrial  appendage thrombus. Normal left atrial appendage function  (maximum velocity>40 cm/s).  5. Moderate left ventricular systolic dysfunction.  Paradoxical septal motion consistent with prior cardiac  surgery.  6. Normal right ventricular size and function.    < end of copied text >        ASSESSMENT/PLAN: Patient is a 65 year old male with PMH of CAD s/p CABG with most recent C 1/2020 with no intervention, chronic systolic CHF (LVEF 39% TTE 1/2020), Afib (on Eliquis), HTN, HLD, CKD, CHER (on CPAP), and pemphigus vulgaris who presented with RLE swelling admitted with RLE cellulitis and NARCISO on CKD. Cardiology consulted for management of CAD/CHF.    - Continue IV Diuresis per heart failure team  - daily standing weights, strict I/Os  - Continue medical management of known CAD with systolic cardiomyopathy - ASA/Statin, Coreg, Hydralazine/Isordil  - Continue AC with Eliquis for CVA prevention   - EP follow up appreciated  - CHANDRA with severe MR  - Structural heart eval follow up/ viability study results noted   - Will review CHANDRA with team. Suspect the mechanism of MR is complex.  Although there maybe some tethering I suspect the P3 scallop is partially flail best seen on the commisural view and 2 chamber views.  - Patient to f/u with Dr. Campbell's cardiology group after d/c

## 2020-09-06 NOTE — PROGRESS NOTE ADULT - PROBLEM SELECTOR PLAN 7
-s/p vanc - improving    -Wound care saw patient (8/25). Redressed wound. Recommended plastics consult.   -Plastics consulted 8/26. Recommended local wound care and outpatient follow up (Dr. Marquez [phone])

## 2020-09-06 NOTE — PROGRESS NOTE ADULT - SUBJECTIVE AND OBJECTIVE BOX
Subjective:  - weight reduced an additional 2 lbs. Feels unchanged from yesterday but overall well  - walking around unit without SOB    Medications:  apixaban 5 milliGRAM(s) Oral two times a day  aspirin  chewable 81 milliGRAM(s) Oral daily  atorvastatin 80 milliGRAM(s) Oral at bedtime  benzocaine 15 mG/menthol 3.6 mG (Sugar-Free) Lozenge 1 Lozenge Oral three times a day PRN  carvedilol 12.5 milliGRAM(s) Oral every 12 hours  hydrALAZINE 35 milliGRAM(s) Oral three times a day  isosorbide   dinitrate Tablet (ISORDIL) 10 milliGRAM(s) Oral three times a day  LORazepam     Tablet 0.5 milliGRAM(s) Oral at bedtime  pantoprazole    Tablet 40 milliGRAM(s) Oral two times a day  predniSONE   Tablet 20 milliGRAM(s) Oral daily  sertraline 50 milliGRAM(s) Oral daily      Physical Exam:    Vitals:  Vital Signs Last 24 Hours  T(C): 36.8 (20 @ 11:20), Max: 36.8 (20 @ 11:20)  HR: 59 (20 @ 17:21) (53 - 65)  BP: 111/64 (20 @ 17:21) (111/64 - 147/75)  RR: 18 (20 @ 11:20) (18 - 18)  SpO2: 95% (20 @ 17:01) (95% - 100%)    Weight in k.7 ( @ 07:00)    I&O's Summary    05 Sep 2020 07:  -  06 Sep 2020 07:00  --------------------------------------------------------  IN: 660 mL / OUT: 3325 mL / NET: -2665 mL    06 Sep 2020 07:  -  06 Sep 2020 17:51  --------------------------------------------------------  IN: 600 mL / OUT: 1400 mL / NET: -800 mL    Tele: SR 50-60s, briefly nacho 40s overnight. PVCs, Bigems, Trigems, Triplets    General: No distress. Comfortable. Morbidly obese  HEENT: EOM intact.  Neck: Neck supple. JVP difficult to assess due to body habitus but does not appear elevated. No masses  Chest: Clear to auscultation bilaterally  CV: Regular. Normal S1 and S2. II/VI SM at axilla. Radial pulses normal. No LE edema  Abdomen: soft, rounded, non-tender  Skin: No rashes or skin breakdown  Neurology: Alert and oriented times three. Sensation intact  Psych: Affect normal    Labs:                        10.3   13.46 )-----------( 343      ( 06 Sep 2020 06:32 )             36.6     09-    143  |  102  |  41<H>  ----------------------------<  90  3.2<L>   |  22  |  2.50<H>    Ca    9.9      06 Sep 2020 06:32  Phos  5.7     09-  Mg     2.3     -    TPro  7.7  /  Alb  4.2  /  TBili  0.4  /  DBili  x   /  AST  15  /  ALT  19  /  AlkPhos  104  09-05          Serum Pro-Brain Natriuretic Peptide: 3301 pg/mL ( @ 06:19)

## 2020-09-06 NOTE — PROGRESS NOTE ADULT - PROBLEM SELECTOR PLAN 2
NARCISO on CKD III. Cr elevated to 2.4 on admission. Baseline Cr 1.4-1.7. Improved with IVF and lasix was held. However, after CHF exacerbation requiring aggressive diuresis (8/27), patient's Cr was elevated to 2.15 on 8/28  -Cr 2.25 (9/5)  -Continue to monitor daily BMPs   -Renally dose medications  -Continue to hold home Entresto NARCISO on CKD III. Cr elevated to 2.4 on admission. Baseline Cr 1.4-1.7. Improved with IVF and lasix was held. However, after CHF exacerbation requiring aggressive diuresis (8/27), patient's Cr was elevated to 2.15 on 8/28  -Cr 2.5 (9/6); P 5.7, will continue to monitor; likely in the setting of aggressive diuresis.   -Continue to monitor daily BMPs   -Renally dose medications  -Continue to hold home Entresto

## 2020-09-06 NOTE — PROGRESS NOTE ADULT - PROBLEM SELECTOR PLAN 1
Combined Systolic and diastolic Heart failure. TTE (1/2020): LVEF 39%, mod MR/AR  -8/27: Patient had an RRT after desatting to 77% overnight. Found to be significant pulmonary edema on CXR. EKG showed Afib with RVR and had runs of VT on tele. Lactate 8. Patient received IV lasix 40mg, amio, and transferred to CCU and placed on BiPAP.    -CCU course (8/27): transitioned from BiPAP to NC. Bumex 2. Restarted home isordil and po lasix 40mg. Increased to IV lasix 40mg qday 8/29, then transitioned to po lasix 40mg qday.   -TTE (8/27): Worsening diastolic dysfunction. EF 35-45%. mild/mod MR. mild AS. mild/mod AR. mod-severe global LV dysfunction.   -CHANDRA (9/1) showed severe MR, mod AR.   -C/w coreg 12.5mg BID, isordil 10mg TID, hydralazine 35mg TID   -Cards recs: No cath planned at this time.   -EP recs: will schedule EP study +/- ICD placement closer to discharge  -Heart failure recs: C/w IV lasix 40mg TID. Possible transition to po today   -C/w strict I/O's, daily weights, and 1.5L fluid restriction Combined Systolic and diastolic Heart failure. TTE (1/2020): LVEF 39%, mod MR/AR  -8/27: Patient had an RRT after desatting to 77% overnight. Found to be significant pulmonary edema on CXR. EKG showed Afib with RVR and had runs of VT on tele. Lactate 8. Patient received IV lasix 40mg, amio, and transferred to CCU and placed on BiPAP.    -CCU course (8/27): transitioned from BiPAP to NC. Bumex 2. Restarted home isordil and po lasix 40mg. Increased to IV lasix 40mg qday 8/29, then transitioned to po lasix 40mg qday.   -TTE (8/27): Worsening diastolic dysfunction. EF 35-45%. mild/mod MR. mild AS. mild/mod AR. mod-severe global LV dysfunction.   -CHANDRA (9/1) showed severe MR, mod AR.   -C/w coreg 12.5mg BID, isordil 10mg TID, hydralazine 35mg TID   -Cards recs: No cath planned at this time.   -EP recs: will schedule EP study +/- ICD placement closer to discharge  -Heart failure recs: C/w IV lasix 40mg TID. Possible transition to po today. Will follow up.   -C/w strict I/O's, daily weights, and 1.5L fluid restriction

## 2020-09-06 NOTE — PROGRESS NOTE ADULT - PROBLEM SELECTOR PLAN 2
- Medical optimization as above  - Structural heart following for consideration of redo CABG. Viability study from 9/4 suggesting fair amount of viability

## 2020-09-06 NOTE — PROGRESS NOTE ADULT - ASSESSMENT
Mr. Colon is a 65 year old male with PMH significant for CAD (s/p CABG 2005 LIMA-LAD, SVG- OM2, SVG- OM1, LIMA to LAD only patient graft per cath 1/2020), ICM HFrEF (LVEF 30-35%), severe MR, Afib (on Eliquis), HTN, HLD, CKD III, CHER (on CPAP) who presented with septic shock 2/2 RLL cellulitis. He additionally had ADHF with shortness of breath and hypoxia requiring CCU admission for VT likely in the setting of volume overload and severe MR. He has diuresed nearly 30lbs since admission. He underwent CHANDRA on 9/1 that showed significantly tethered MV with severe MR. CTX following for his MR with consideration for redo CABG. He underwent cardiac viability study on 9/4 that suggested fair amount of viability but notably with improvement in LVEF to 47%. He appears to be near euvolemia, if not euvolemic on intermittent doses of IV Lasix. Exam is challenging due to his body habitus. He is hemodynamically stable with no further rise in SCr

## 2020-09-06 NOTE — PROGRESS NOTE ADULT - PROBLEM SELECTOR PLAN 8
Now resolved . initially Febrile (100.6) on admission + Leukocytosis (WBC 12.27) + Elevated lactate 2.4 + Hypotensive (SBP 100s) on admission. RLE cellulitis vs LUE wound as possible sources of infection  -s/p 2L IVF in ED. BP's improved with normalized lactate (0.7)  -s/p vanc   -Bcx (8/24) negative. Repeat (8/27) GPC in clusters.    #Leukocytosis  -Slowly uptrending. 13.46. Pt afebrile with no obvious infxn source. Will continue to monitor.

## 2020-09-06 NOTE — PROGRESS NOTE ADULT - PROBLEM SELECTOR PLAN 1
- Reduce Lasix to 40mg QD   - Continue Coreg 12.5mg BID, HDZN 35mg TID and ISDN 10 TID, hold for SBP < 90  - Deferring ARNI/MRA at this time due to renal dysfunction  - Continue 1.5L fluid restriction  - Daily standing weight, strict I/Os  - Daily BMP, Mg. Maintain K 4-4.5 and Mg 2-2.5.  - If Transform-[facility] trial is accepting enrollment, will evaluate to see if he is a suitable candidate

## 2020-09-06 NOTE — PROGRESS NOTE ADULT - SUBJECTIVE AND OBJECTIVE BOX
Patient is a 65y old  Male who presents with a chief complaint of RLE swelling (05 Sep 2020 12:41)    SUBJECTIVE / OVERNIGHT EVENTS:     OBJECTIVE:  Vital Signs Last 24 Hrs  T(C): 36.5 (06 Sep 2020 03:49), Max: 36.8 (05 Sep 2020 12:32)  T(F): 97.7 (06 Sep 2020 03:49), Max: 98.2 (05 Sep 2020 12:32)  HR: 62 (06 Sep 2020 06:01) (53 - 66)  BP: 138/85 (06 Sep 2020 03:49) (113/70 - 147/75)  BP(mean): --  RR: 18 (06 Sep 2020 03:49) (18 - 18)  SpO2: 97% (06 Sep 2020 06:01) (95% - 100%)    I&O's Summary    04 Sep 2020 07:01  -  05 Sep 2020 07:00  --------------------------------------------------------  IN: 925 mL / OUT: 2250 mL / NET: -1325 mL    05 Sep 2020 07:01  -  06 Sep 2020 06:47  --------------------------------------------------------  IN: 660 mL / OUT: 3325 mL / NET: -2665 mL      PHYSICAL EXAM:      General: No acute distress, well-appearing  	  Eyes: EOMI   	  ENT: MMM, no oropharyngeal lesions or erythema appreciated   	  Pulm: No increased WOB. No wheezing. CTAB  	  CV: RRR. S1&S2+. No M/R/G appreciated.   	  Abdomen: +BS. Soft, NT. No organomegaly. Distended.   	  MSK: Pt able to move all extremities spontaneously.  	  Extremities: No peripheral edema.   	  Neuro: A&Ox3, no focal deficits       Skin: Warm and dry. L hand wound with dressing in place.    Labs:  CAPILLARY BLOOD GLUCOSE                              10.3   13.46 )-----------( 343      ( 06 Sep 2020 06:32 )             36.6     09-05    142  |  103  |  39<H>  ----------------------------<  117<H>  3.9   |  23  |  2.52<H>    Ca    9.8      05 Sep 2020 17:18  Phos  4.8     09-05  Mg     2.3     09-05    TPro  7.7  /  Alb  4.2  /  TBili  0.4  /  DBili  x   /  AST  15  /  ALT  19  /  AlkPhos  104  09-05              Imaging Personally Reviewed:    Consultant(s) Notes Reviewed:   Care Discussed with Consultants/Other Providers:    MEDICATIONS  (STANDING):  apixaban 5 milliGRAM(s) Oral two times a day  aspirin  chewable 81 milliGRAM(s) Oral daily  atorvastatin 80 milliGRAM(s) Oral at bedtime  carvedilol 12.5 milliGRAM(s) Oral every 12 hours  furosemide   Injectable 40 milliGRAM(s) IV Push three times a day  hydrALAZINE 35 milliGRAM(s) Oral three times a day  isosorbide   dinitrate Tablet (ISORDIL) 10 milliGRAM(s) Oral three times a day  LORazepam     Tablet 0.5 milliGRAM(s) Oral at bedtime  pantoprazole    Tablet 40 milliGRAM(s) Oral two times a day  predniSONE   Tablet 20 milliGRAM(s) Oral daily  sertraline 50 milliGRAM(s) Oral daily    MEDICATIONS  (PRN):  benzocaine 15 mG/menthol 3.6 mG (Sugar-Free) Lozenge 1 Lozenge Oral three times a day PRN Sore Throat Patient is a 65y old  Male who presents with a chief complaint of RLE swelling (05 Sep 2020 12:41)    SUBJECTIVE / OVERNIGHT EVENTS: Tele with SB/SR 50-70's with PVCs, PACs, PAT x 2 (3.4s up to 120; and 5.6 up to 140). FREDDIE. Patient examined at bedside this AM, with no subjective complaints. He says he's been peeing a lot but denies lightheadedness, CP, palpitations, SOB, dysuria, abdominal pain, diarrhea.     OBJECTIVE:  Vital Signs Last 24 Hrs  T(C): 36.5 (06 Sep 2020 03:49), Max: 36.8 (05 Sep 2020 12:32)  T(F): 97.7 (06 Sep 2020 03:49), Max: 98.2 (05 Sep 2020 12:32)  HR: 62 (06 Sep 2020 06:01) (53 - 66)  BP: 138/85 (06 Sep 2020 03:49) (113/70 - 147/75)  BP(mean): --  RR: 18 (06 Sep 2020 03:49) (18 - 18)  SpO2: 97% (06 Sep 2020 06:01) (95% - 100%)    I&O's Summary    04 Sep 2020 07:01  -  05 Sep 2020 07:00  --------------------------------------------------------  IN: 925 mL / OUT: 2250 mL / NET: -1325 mL    05 Sep 2020 07:01  -  06 Sep 2020 06:47  --------------------------------------------------------  IN: 660 mL / OUT: 3325 mL / NET: -2665 mL      PHYSICAL EXAM:      General: No acute distress, well-appearing  	  Eyes: EOMI   	  ENT: MMM, no oropharyngeal lesions or erythema appreciated   	  Pulm: No increased WOB. No wheezing. CTAB  	  CV: RRR. S1&S2+. No M/R/G appreciated.   	  Abdomen: +BS. Soft, NT. No organomegaly. Distended.   	  MSK: Pt able to move all extremities spontaneously.  	  Extremities: No peripheral edema.   	  Neuro: A&Ox3, no focal deficits       Skin: Warm and dry. L hand wound with dressing in place.    Labs:  CAPILLARY BLOOD GLUCOSE                              10.3   13.46 )-----------( 343      ( 06 Sep 2020 06:32 )             36.6     09-05    142  |  103  |  39<H>  ----------------------------<  117<H>  3.9   |  23  |  2.52<H>    Ca    9.8      05 Sep 2020 17:18  Phos  4.8     09-05  Mg     2.3     09-05    TPro  7.7  /  Alb  4.2  /  TBili  0.4  /  DBili  x   /  AST  15  /  ALT  19  /  AlkPhos  104  09-05              Imaging Personally Reviewed:    Consultant(s) Notes Reviewed:   Care Discussed with Consultants/Other Providers:    MEDICATIONS  (STANDING):  apixaban 5 milliGRAM(s) Oral two times a day  aspirin  chewable 81 milliGRAM(s) Oral daily  atorvastatin 80 milliGRAM(s) Oral at bedtime  carvedilol 12.5 milliGRAM(s) Oral every 12 hours  furosemide   Injectable 40 milliGRAM(s) IV Push three times a day  hydrALAZINE 35 milliGRAM(s) Oral three times a day  isosorbide   dinitrate Tablet (ISORDIL) 10 milliGRAM(s) Oral three times a day  LORazepam     Tablet 0.5 milliGRAM(s) Oral at bedtime  pantoprazole    Tablet 40 milliGRAM(s) Oral two times a day  predniSONE   Tablet 20 milliGRAM(s) Oral daily  sertraline 50 milliGRAM(s) Oral daily    MEDICATIONS  (PRN):  benzocaine 15 mG/menthol 3.6 mG (Sugar-Free) Lozenge 1 Lozenge Oral three times a day PRN Sore Throat

## 2020-09-06 NOTE — PROGRESS NOTE ADULT - SUBJECTIVE AND OBJECTIVE BOX
EP Attending    HISTORY OF PRESENT ILLNESS:   Mr Colon is a 65yoM who presents for acute onset shortness of breath.  His EF is 35-40% chronically (1/2020 and 8/2020), due to ischemic cardiomyopathy.  He is s/p CABG and multiple subsequent PCI. He only has 1 graft open- his LIMA to LAD, and his native vessels are otherwise occluded.  Also has AFib, on apixaban, HTN, CKD, and CHER on CPAP.   He has had multiple episodes of flash pulmonary edema.    9/1- No angina, orthopnea, PND or palpitations.  He is short of breath on minimal exertion, walking across his hospital room with a walker (NYHA IIIB).  No fainting or near-fainting episodes.  telemetry after writing my note yesterday had 9 beats of NSVT.  9/2 - uneventful overnight.  had CHANDRA showing severe MR and moderate AI.  still short of breath, no angina, no fainting.  9/3 - no VT on telemetry overnight. Still short of breath.  Discussed the overall plan of care re: diuresis and re-evaluation of his valve before deciding on a structural vs surgical solution.  He is upset that he will spend more time in the hospital and potentially even longer if he needs a re-do open heart procedure, but understands that an incomplete plan will result in readmission and worse CHF symptoms.  9/4 - uneventful overnight. no angina, orthopnea or PND.  ambulating a little bit easier.  no VT on telemetry.  9/5 - reviewed Viability Study findings with patient.  exertional shortness of breath improving slowly.  no palpitations. no VT on telemetry.  9/6- uneventful overnight, no angina. improving orthopnea.  ambulating w/ less SOB.    apixaban 5 milliGRAM(s) Oral two times a day  aspirin  chewable 81 milliGRAM(s) Oral daily  atorvastatin 80 milliGRAM(s) Oral at bedtime  benzocaine 15 mG/menthol 3.6 mG (Sugar-Free) Lozenge 1 Lozenge Oral three times a day PRN  carvedilol 12.5 milliGRAM(s) Oral every 12 hours  furosemide   Injectable 40 milliGRAM(s) IV Push three times a day  hydrALAZINE 35 milliGRAM(s) Oral three times a day  isosorbide   dinitrate Tablet (ISORDIL) 10 milliGRAM(s) Oral three times a day  LORazepam     Tablet 0.5 milliGRAM(s) Oral at bedtime  pantoprazole    Tablet 40 milliGRAM(s) Oral two times a day  potassium chloride    Tablet ER 40 milliEquivalent(s) Oral once  predniSONE   Tablet 20 milliGRAM(s) Oral daily  sertraline 50 milliGRAM(s) Oral daily                            10.3   13.46 )-----------( 343      ( 06 Sep 2020 06:32 )             36.6       09-06    143  |  102  |  41<H>  ----------------------------<  90  3.2<L>   |  22  |  2.50<H>    Ca    9.9      06 Sep 2020 06:32  Phos  5.7     09-06  Mg     2.3     09-06    TPro  7.7  /  Alb  4.2  /  TBili  0.4  /  DBili  x   /  AST  15  /  ALT  19  /  AlkPhos  104  09-05      T(C): 36.5 (09-06-20 @ 03:49), Max: 36.8 (09-05-20 @ 12:32)  HR: 62 (09-06-20 @ 06:01) (53 - 66)  BP: 138/85 (09-06-20 @ 03:49) (113/70 - 147/75)  RR: 18 (09-06-20 @ 03:49) (18 - 18)  SpO2: 97% (09-06-20 @ 06:01) (95% - 100%)  Wt(kg): --    I&O's Summary    05 Sep 2020 07:01  -  06 Sep 2020 07:00  --------------------------------------------------------  IN: 660 mL / OUT: 3325 mL / NET: -2665 mL      Appearance: Robust white male in no acute distress, but short-of-breath.  HEENT:   Normal oral mucosa, PERRL, EOMI	  Lymphatic: No lymphadenopathy , no edema  Cardiovascular: Normal S1 S2,  Peripheral pulses palpable 2+ bilaterally  Respiratory: Lungs clear to auscultation, normal effort 	  Gastrointestinal:  Soft, Non-tender, + BS	  Skin: No rashes, No ecchymoses, No cyanosis, warm to touch  Musculoskeletal: Normal range of motion, normal strength  Psychiatry:  Mood & affect appropriate    TELEMETRY: NSR, 9 beat run of NSVT on 8/31.  Multifocal PVC's.   ECG:  	NSR, interpolated PVC's.  Echo: EF 35-40%, LV dilated to upper limit of normal, severe diastolic dysfunction, moderate Mitral regurgitation.  CHANDRA:  Tetheterd MV leaflets --> severe MR, in the setting of volume overload and high BP.  Cath: Prior with only LIMA to LAD patent. Native vessels occluded.	  Viability: Multiple viable segments at sites of prior SVG insertion.      ASSESSMENT/PLAN: 	65y Male with an ischemic cardiomyopathy, EF 35-40%, and increasing frequency of episodes of flash pulmonary edema.  Has nonsustained ventricular tachycardia on telemetry.    He is not a candidate for biventricular pacing.  Structural and CHF team consults have been completed: the MV regurg should be re-evaluated once he is euvolemic.  He is being diuresed with Lasix TID.  Viability study was reassuring.  Given ongoing / escalating diuresis, replace K to 4-4.5 daily.  Mg to 2.    Based on the MUSTT trial, he qualifies for an EP Study for VT induction, with ICD insertion if positive.  However at this time, EP workup will be deferred until CHF team and Structural/Surgical teams have optimized him.  Will follow along, and formally re-evaluate prior to discharge.    Wei Feliciano M.D.  Cardiac Electrophysiology    office [phone]  pager [phone]

## 2020-09-07 LAB
ANION GAP SERPL CALC-SCNC: 17 MMOL/L — SIGNIFICANT CHANGE UP (ref 5–17)
BUN SERPL-MCNC: 50 MG/DL — HIGH (ref 7–23)
CALCIUM SERPL-MCNC: 9.6 MG/DL — SIGNIFICANT CHANGE UP (ref 8.4–10.5)
CHLORIDE SERPL-SCNC: 102 MMOL/L — SIGNIFICANT CHANGE UP (ref 96–108)
CO2 SERPL-SCNC: 23 MMOL/L — SIGNIFICANT CHANGE UP (ref 22–31)
CREAT SERPL-MCNC: 2.64 MG/DL — HIGH (ref 0.5–1.3)
GLUCOSE SERPL-MCNC: 90 MG/DL — SIGNIFICANT CHANGE UP (ref 70–99)
HCT VFR BLD CALC: 36.3 % — LOW (ref 39–50)
HGB BLD-MCNC: 10.3 G/DL — LOW (ref 13–17)
MAGNESIUM SERPL-MCNC: 2.3 MG/DL — SIGNIFICANT CHANGE UP (ref 1.6–2.6)
MCHC RBC-ENTMCNC: 22.9 PG — LOW (ref 27–34)
MCHC RBC-ENTMCNC: 28.4 GM/DL — LOW (ref 32–36)
MCV RBC AUTO: 80.8 FL — SIGNIFICANT CHANGE UP (ref 80–100)
NRBC # BLD: 0 /100 WBCS — SIGNIFICANT CHANGE UP (ref 0–0)
PHOSPHATE SERPL-MCNC: 5.9 MG/DL — HIGH (ref 2.5–4.5)
PLATELET # BLD AUTO: 328 K/UL — SIGNIFICANT CHANGE UP (ref 150–400)
POTASSIUM SERPL-MCNC: 3.3 MMOL/L — LOW (ref 3.5–5.3)
POTASSIUM SERPL-SCNC: 3.3 MMOL/L — LOW (ref 3.5–5.3)
RBC # BLD: 4.49 M/UL — SIGNIFICANT CHANGE UP (ref 4.2–5.8)
RBC # FLD: 18.4 % — HIGH (ref 10.3–14.5)
SODIUM SERPL-SCNC: 142 MMOL/L — SIGNIFICANT CHANGE UP (ref 135–145)
WBC # BLD: 12.44 K/UL — HIGH (ref 3.8–10.5)
WBC # FLD AUTO: 12.44 K/UL — HIGH (ref 3.8–10.5)

## 2020-09-07 PROCEDURE — 99233 SBSQ HOSP IP/OBS HIGH 50: CPT

## 2020-09-07 PROCEDURE — 99233 SBSQ HOSP IP/OBS HIGH 50: CPT | Mod: GC

## 2020-09-07 RX ORDER — POTASSIUM CHLORIDE 20 MEQ
40 PACKET (EA) ORAL ONCE
Refills: 0 | Status: COMPLETED | OUTPATIENT
Start: 2020-09-07 | End: 2020-09-07

## 2020-09-07 RX ORDER — FUROSEMIDE 40 MG
40 TABLET ORAL DAILY
Refills: 0 | Status: DISCONTINUED | OUTPATIENT
Start: 2020-09-08 | End: 2020-09-16

## 2020-09-07 RX ADMIN — ISOSORBIDE DINITRATE 10 MILLIGRAM(S): 5 TABLET ORAL at 13:10

## 2020-09-07 RX ADMIN — PANTOPRAZOLE SODIUM 40 MILLIGRAM(S): 20 TABLET, DELAYED RELEASE ORAL at 05:42

## 2020-09-07 RX ADMIN — Medication 81 MILLIGRAM(S): at 13:10

## 2020-09-07 RX ADMIN — Medication 35 MILLIGRAM(S): at 13:11

## 2020-09-07 RX ADMIN — ISOSORBIDE DINITRATE 10 MILLIGRAM(S): 5 TABLET ORAL at 05:41

## 2020-09-07 RX ADMIN — APIXABAN 5 MILLIGRAM(S): 2.5 TABLET, FILM COATED ORAL at 05:41

## 2020-09-07 RX ADMIN — Medication 40 MILLIGRAM(S): at 05:41

## 2020-09-07 RX ADMIN — Medication 0.5 MILLIGRAM(S): at 21:12

## 2020-09-07 RX ADMIN — Medication 35 MILLIGRAM(S): at 21:12

## 2020-09-07 RX ADMIN — Medication 20 MILLIGRAM(S): at 05:42

## 2020-09-07 RX ADMIN — ATORVASTATIN CALCIUM 80 MILLIGRAM(S): 80 TABLET, FILM COATED ORAL at 21:12

## 2020-09-07 RX ADMIN — PANTOPRAZOLE SODIUM 40 MILLIGRAM(S): 20 TABLET, DELAYED RELEASE ORAL at 17:29

## 2020-09-07 RX ADMIN — CARVEDILOL PHOSPHATE 12.5 MILLIGRAM(S): 80 CAPSULE, EXTENDED RELEASE ORAL at 17:29

## 2020-09-07 RX ADMIN — ISOSORBIDE DINITRATE 10 MILLIGRAM(S): 5 TABLET ORAL at 21:12

## 2020-09-07 RX ADMIN — APIXABAN 5 MILLIGRAM(S): 2.5 TABLET, FILM COATED ORAL at 17:29

## 2020-09-07 RX ADMIN — CARVEDILOL PHOSPHATE 12.5 MILLIGRAM(S): 80 CAPSULE, EXTENDED RELEASE ORAL at 05:41

## 2020-09-07 RX ADMIN — SERTRALINE 50 MILLIGRAM(S): 25 TABLET, FILM COATED ORAL at 13:10

## 2020-09-07 RX ADMIN — Medication 35 MILLIGRAM(S): at 05:42

## 2020-09-07 RX ADMIN — Medication 40 MILLIEQUIVALENT(S): at 13:10

## 2020-09-07 NOTE — PROGRESS NOTE ADULT - PROBLEM SELECTOR PLAN 2
NARCISO on CKD III. Cr elevated to 2.4 on admission. Baseline Cr 1.4-1.7. Improved with IVF and lasix was held. However, after CHF exacerbation requiring aggressive diuresis (8/27), patient's Cr was elevated to 2.15 on 8/28  -Cr 2.64 (9/6); P 5.9, will continue to monitor; likely in the setting of aggressive diuresis.   -Continue to monitor daily BMPs   -Renally dose medications  -Continue to hold home Entresto NARCISO on CKD III. Cr elevated to 2.4 on admission. Baseline Cr 1.4-1.7. Improved with IVF and lasix was held. However, after CHF exacerbation requiring aggressive diuresis (8/27), patient's Cr was elevated to 2.15 on 8/28  -Cr 2.64 (9/7); P 5.9, will continue to monitor; likely in the setting of aggressive diuresis.   -Continue to monitor daily BMPs   -Renally dose medications  -Continue to hold home Entresto

## 2020-09-07 NOTE — PROGRESS NOTE ADULT - SUBJECTIVE AND OBJECTIVE BOX
Subjective:  - ambulating halls without difficulty and overall feeling well  - denies MELCHOR, LH/dizziness, orthopnea, PND, cough    Medications:  apixaban 5 milliGRAM(s) Oral two times a day  aspirin  chewable 81 milliGRAM(s) Oral daily  atorvastatin 80 milliGRAM(s) Oral at bedtime  benzocaine 15 mG/menthol 3.6 mG (Sugar-Free) Lozenge 1 Lozenge Oral three times a day PRN  carvedilol 12.5 milliGRAM(s) Oral every 12 hours  furosemide   Injectable 40 milliGRAM(s) IV Push daily  hydrALAZINE 35 milliGRAM(s) Oral three times a day  isosorbide   dinitrate Tablet (ISORDIL) 10 milliGRAM(s) Oral three times a day  LORazepam     Tablet 0.5 milliGRAM(s) Oral at bedtime  pantoprazole    Tablet 40 milliGRAM(s) Oral two times a day  predniSONE   Tablet 20 milliGRAM(s) Oral daily  sertraline 50 milliGRAM(s) Oral daily      Physical Exam:    Vitals:  Vital Signs Last 24 Hours  T(C): 36.6 (20 @ 13:09), Max: 36.8 (20 @ 20:57)  HR: 81 (20 @ 17:21) (52 - 81)  BP: 133/80 (20 @ 13:09) (109/66 - 133/80)  RR: 18 (20 @ 13:09) (17 - 18)  SpO2: 98% (20 @ 17:21) (95% - 99%)    Weight in k.1 ( @ 07:00)    I&O's Summary    06 Sep 2020 07:  -  07 Sep 2020 07:00  --------------------------------------------------------  IN: 925 mL / OUT: 2200 mL / NET: -1275 mL    07 Sep 2020 07:01  -  07 Sep 2020 17:49  --------------------------------------------------------  IN: 575 mL / OUT: 900 mL / NET: -325 mL    Tele: SR/SB 50-60s, briefly nacho 40s overnight. 15 beats AIVR, couplets, triplets    General: No distress. Comfortable. Morbidly obese  HEENT: EOM intact.  Neck: Neck supple. JVP difficult to assess due to body habitus but does not appear elevated. No masses  Chest: Clear to auscultation bilaterally  CV: Regular. Normal S1 and S2. II/VI SM at axilla. Radial pulses normal. No LE edema  Abdomen: soft, rounded, non-tender  Skin: No rashes or skin breakdown  Neurology: Alert and oriented times three. Sensation intact  Psych: Affect normal    Labs:                        10.3   12.44 )-----------( 328      ( 07 Sep 2020 05:39 )             36.3     -    142  |  102  |  50<H>  ----------------------------<  90  3.3<L>   |  23  |  2.64<H>    Ca    9.6      07 Sep 2020 05:39  Phos  5.9       Mg     2.3                 Serum Pro-Brain Natriuretic Peptide: 3301 pg/mL ( @ 06:19)

## 2020-09-07 NOTE — PROGRESS NOTE ADULT - PROBLEM SELECTOR PLAN 7
-s/p vanc - improving    -Wound care saw patient (8/25). Redressed wound. Recommended plastics consult.   -Plastics consulted 8/26. Recommended local wound care and outpatient follow up (Dr. Marquez [phone])

## 2020-09-07 NOTE — PROGRESS NOTE ADULT - SUBJECTIVE AND OBJECTIVE BOX
pt seen and examined, no complaints, ROS - .         MEDICATIONS  (STANDING):  apixaban 5 milliGRAM(s) Oral two times a day  aspirin  chewable 81 milliGRAM(s) Oral daily  atorvastatin 80 milliGRAM(s) Oral at bedtime  carvedilol 12.5 milliGRAM(s) Oral every 12 hours  furosemide   Injectable 40 milliGRAM(s) IV Push daily  hydrALAZINE 35 milliGRAM(s) Oral three times a day  isosorbide   dinitrate Tablet (ISORDIL) 10 milliGRAM(s) Oral three times a day  LORazepam     Tablet 0.5 milliGRAM(s) Oral at bedtime  pantoprazole    Tablet 40 milliGRAM(s) Oral two times a day  potassium chloride    Tablet ER 40 milliEquivalent(s) Oral once  predniSONE   Tablet 20 milliGRAM(s) Oral daily  sertraline 50 milliGRAM(s) Oral daily    MEDICATIONS  (PRN):  benzocaine 15 mG/menthol 3.6 mG (Sugar-Free) Lozenge 1 Lozenge Oral three times a day PRN Sore Throat      LABS:                            10.3   12.44 )-----------( 328      ( 07 Sep 2020 05:39 )             36.3     142  |  102  |  50<H>  ----------------------------<  90  3.3<L>   |  23  |  2.64<H>    Ca    9.6      07 Sep 2020 05:39  Phos  5.9     09-07  Mg     2.3     09-07    Creatinine Trend: 2.64<--, 2.60<--, 2.50<--, 2.52<--, 2.25<--, 2.36<--      PHYSICAL EXAM  Vital Signs Last 24 Hrs  T(C): 36.6 (07 Sep 2020 04:00), Max: 36.8 (06 Sep 2020 11:20)  T(F): 97.8 (07 Sep 2020 04:00), Max: 98.3 (06 Sep 2020 20:57)  HR: 60 (07 Sep 2020 10:20) (52 - 64)  BP: 112/68 (07 Sep 2020 05:38) (109/66 - 143/77)  BP(mean): --  RR: 17 (07 Sep 2020 04:00) (17 - 18)  SpO2: 95% (07 Sep 2020 10:20) (95% - 99%)      Gen: NAD  HEENT:  (-)icterus (-)pallor  CV: N S1 S2 1/6 KATHERYN (+)2 Pulses B/l  Resp: CTA B/L, normal effort  GI: (+) BS Soft, NT, ND  Lymph:  (-) edema, (-)obvious lymphadenopathy  Skin: Warm to touch, Normal turgor  Psych: Appropriate mood and affect      TELEMETRY: SB/SR    Echo: < from: TTE with Doppler (w/Cont) (08.27.20 @ 07:54) >  Conclusions:  1. Mitral valve not well visualized. Mitral annular  calcification. Tethered mitral valve leaflets with normal  opening. Mild moderate mitral regurgitation.  2. Calcified trileaflet aortic valve with decreased  opening. Peak transaortic valve gradient equals 12 mm Hg,  mean transaortic valve gradient equals 8 mm Hg, at least  mild AS. Limited gradient/Doppler evaluation. Mild-moderate  aortic regurgitation.  3. Endocardial visualization enhanced with intravenous  injection of Ultrasonic Enhancing Agent (Definity).  Moderate to severe  global left ventricular systolic  dysfunction. There dyssynchronous left ventricular  contraction.  4. Moderate diastolic dysfunction (Stage II). Increased  E/e'  is consistent with elevated left ventricular filling  pressure.  *** Compared with echocardiogram report of 12/13/2017,  Limited comparison.  Disatolic dysfunction is worse.    < end of copied text >    < from: Transesophageal Echocardiogram w/o TTE (09.01.20 @ 14:15) >  Conclusions:  1. Tethered mitral valve leaflets with normal opening.  Severe mitral regurgitation. There is systolic flow  reversal in the right upper pulmonary vein. By PISA,  calculated ERO=43 mmsq and regurgitant volume=73 mL (Pisa  rad 1cm, Va 34.5 cm/s, Vm 5.0 m/s,  cm)  2. Calcified trileaflet aortic valve with normal opening.  Moderate aortic regurgitation. Vena contracta=0.4 cm.  3. Normal aortic root size. (Ao: 3.6 cm at the sinuses of  Valsalva). Midlly dilated ascending aorta for BSA  (ascending aorta diameter 4.5 cm approximately 5 cm distal  to the aortic valve.  Normal size aortic arch, and  descending thoracic aorta.  Minimal atheroma.  4. Left atrial enlargement. No left atrial or left atrial  appendage thrombus. Normal left atrial appendage function  (maximum velocity>40 cm/s).  5. Moderate left ventricular systolic dysfunction.  Paradoxical septal motion consistent with prior cardiac  surgery.  6. Normal right ventricular size and function.    < end of copied text >      < from: Cardiac Viability (09.04.20 @ 14:15) >  GATED ANALYSIS:  Rest gated wall motion analysis was performed (LVEF = 47  %;LVEDV = 195 ml.), revealing moderately reduced  LV  function.  ------------------------------------------------------------------------  IMPRESSIONS:Abnormal Study  * The left ventricle was markdely dilated at baseline.  There is viability in the apex, inferior, septal,  anterolateral, and mid to basal inferolateral walls. On  delayed imaging, there is appears to be partial viability  in the distal inferolateral wall.  * Rest gated wall motion analysis was performed (LVEF = 47  %;LVEDV = 195 ml.), revealing moderately reduced  LV  function.  Conclusion:  The left ventricle was markdely dilated at baseline. There  is viability in the apex, inferior, septal, anterolateral,  and mid to basal inferolateral walls. On delayed imaging,  there also appears to be partial viability in the distal  inferolateral wall.  ------------------------------------------------------------------------  Confirmed on  9/4/2020 - 16:56:37 by Beto Norton M.D.    < end of copied text >      ASSESSMENT/PLAN: Patient is a 65 year old male with PMH of CAD s/p CABG with most recent [facility] 1/2020 with no intervention, chronic systolic CHF (LVEF 39% TTE 1/2020), Afib (on Eliquis), HTN, HLD, CKD, HCER (on CPAP), and pemphigus vulgaris who presented with RLE swelling admitted with RLE cellulitis and NARCISO on CKD. Cardiology consulted for management of CAD/CHF.    - Continue IV Diuresis per heart failure team  - daily standing weights, strict I/Os  - Continue medical management of known CAD with systolic cardiomyopathy - ASA/Statin, Coreg, Hydralazine/Isordil  - Continue AC with Eliquis for CVA prevention, if procedures planned, will need to bridge with heparin gtt  - CHANDRA with severe MR  - Structural heart eval follow up/ CHANDRA noted/ viability study results noted/ await surgical plan  - Patient to f/u with Dr. Campbell's cardiology group after d/c

## 2020-09-07 NOTE — PROGRESS NOTE ADULT - SUBJECTIVE AND OBJECTIVE BOX
EP Attending    HISTORY OF PRESENT ILLNESS:   Mr Colon is a 65yoM who presents for acute onset shortness of breath.  His EF is 35-40% chronically (1/2020 and 8/2020), due to ischemic cardiomyopathy.  He is s/p CABG and multiple subsequent PCI. He only has 1 graft open- his LIMA to LAD, and his native vessels are otherwise occluded.  Also has AFib, on apixaban, HTN, CKD, and CHER on CPAP.   He has had multiple episodes of flash pulmonary edema.    9/1- No angina, orthopnea, PND or palpitations.  He is short of breath on minimal exertion, walking across his hospital room with a walker (NYHA IIIB).  No fainting or near-fainting episodes.  telemetry after writing my note yesterday had 9 beats of NSVT.  9/2 - uneventful overnight.  had CHANDRA showing severe MR and moderate AI.  still short of breath, no angina, no fainting.  9/3 - no VT on telemetry overnight. Still short of breath.  Discussed the overall plan of care re: diuresis and re-evaluation of his valve before deciding on a structural vs surgical solution.  He is upset that he will spend more time in the hospital and potentially even longer if he needs a re-do open heart procedure, but understands that an incomplete plan will result in readmission and worse CHF symptoms.  9/4 - uneventful overnight. no angina, orthopnea or PND.  ambulating a little bit easier.  no VT on telemetry.  9/5 - reviewed Viability Study findings with patient.  exertional shortness of breath improving slowly.  no palpitations. no VT on telemetry.  9/6- uneventful overnight, no angina. improving orthopnea.  ambulating w/ less SOB.  9/7 - feels well today.  ambulating with less exertional dyspnea. expressed gratitude towards staff- the last 5 days have been worthwhile and he is feeling much better than this time last week.    apixaban 5 milliGRAM(s) Oral two times a day  aspirin  chewable 81 milliGRAM(s) Oral daily  atorvastatin 80 milliGRAM(s) Oral at bedtime  benzocaine 15 mG/menthol 3.6 mG (Sugar-Free) Lozenge 1 Lozenge Oral three times a day PRN  carvedilol 12.5 milliGRAM(s) Oral every 12 hours  furosemide   Injectable 40 milliGRAM(s) IV Push daily  hydrALAZINE 35 milliGRAM(s) Oral three times a day  isosorbide   dinitrate Tablet (ISORDIL) 10 milliGRAM(s) Oral three times a day  LORazepam     Tablet 0.5 milliGRAM(s) Oral at bedtime  pantoprazole    Tablet 40 milliGRAM(s) Oral two times a day  potassium chloride    Tablet ER 40 milliEquivalent(s) Oral once  predniSONE   Tablet 20 milliGRAM(s) Oral daily  sertraline 50 milliGRAM(s) Oral daily                            10.3   12.44 )-----------( 328      ( 07 Sep 2020 05:39 )             36.3       09-07    142  |  102  |  50<H>  ----------------------------<  90  3.3<L>   |  23  |  2.64<H>    Ca    9.6      07 Sep 2020 05:39  Phos  5.9     09-07  Mg     2.3     09-07      T(C): 36.6 (09-07-20 @ 04:00), Max: 36.8 (09-06-20 @ 11:20)  HR: 63 (09-07-20 @ 06:09) (52 - 64)  BP: 112/68 (09-07-20 @ 05:38) (109/66 - 143/77)  RR: 17 (09-07-20 @ 04:00) (17 - 18)  SpO2: 98% (09-07-20 @ 06:09) (95% - 99%)  Wt(kg): --    I&O's Summary    06 Sep 2020 07:01  -  07 Sep 2020 07:00  --------------------------------------------------------  IN: 925 mL / OUT: 2200 mL / NET: -1275 mL      Appearance: Robust white male in no acute distress, but short-of-breath.  HEENT:   Normal oral mucosa, PERRL, EOMI	  Lymphatic: No lymphadenopathy , no edema  Cardiovascular: Normal S1 S2,  Peripheral pulses palpable 2+ bilaterally  Respiratory: Lungs clear to auscultation, normal effort 	  Gastrointestinal:  Soft, Non-tender, + BS	  Skin: No rashes, No ecchymoses, No cyanosis, warm to touch  Musculoskeletal: Normal range of motion, normal strength  Psychiatry:  Mood & affect appropriate    TELEMETRY: NSR, 9 beat run of NSVT on 8/31.  Multifocal PVC's. Occasional triplets and couplets on 9/6-7.  ECG:  	NSR, interpolated PVC's.  Echo: EF 35-40%, LV dilated to upper limit of normal, severe diastolic dysfunction, moderate Mitral regurgitation.  CHANDRA:  Tetheterd MV leaflets --> severe MR, in the setting of volume overload and high BP.  Cath: Prior with only LIMA to LAD patent. Native vessels occluded.	  Viability: Multiple viable segments at sites of prior SVG insertion.      ASSESSMENT/PLAN: 	65y Male with an ischemic cardiomyopathy, EF 35-40%, and increasing frequency of episodes of flash pulmonary edema.  Has nonsustained ventricular tachycardia on telemetry.    He is not a candidate for biventricular pacing.  Structural and CHF team consults have been completed: the MV regurg should be re-evaluated once he is euvolemic.  He has diuresed well over the last 5 days or so, and is feeling symptomatically much better at rest and during ambulation.  BUN rising steadily during this time to peak of 50.    Viability study was reassuring.    Given ongoing / escalating diuresis, replace K to 4-4.5 daily.  Mg to 2.    Based on the MUSTT trial, he qualifies for an EP Study for VT induction, with ICD insertion if positive.  However at this time, EP workup will be deferred until CHF team and Structural/Surgical teams have optimized him.  Will follow along, and formally re-evaluate prior to discharge.    Wei Feliciano M.D.  Cardiac Electrophysiology    office [phone]  pager [phone]

## 2020-09-07 NOTE — PROGRESS NOTE ADULT - PROBLEM SELECTOR PLAN 8
Now resolved . initially Febrile (100.6) on admission + Leukocytosis (WBC 12.27) + Elevated lactate 2.4 + Hypotensive (SBP 100s) on admission. RLE cellulitis vs LUE wound as possible sources of infection  -s/p 2L IVF in ED. BP's improved with normalized lactate (0.7)  -s/p vanc   -Bcx (8/24) negative. Repeat (8/27) GPC in clusters.    #Leukocytosis  -12.44. Pt afebrile with no obvious infxn source. Will continue to monitor. Now resolved . initially Febrile (100.6) on admission + Leukocytosis (WBC 12.27) + Elevated lactate 2.4 + Hypotensive (SBP 100s) on admission. RLE cellulitis vs LUE wound as possible sources of infection  -s/p 2L IVF in ED. BP's improved with normalized lactate (0.7)  -s/p vanc   -Bcx (8/24) negative. Repeat (8/27) GPC in clusters.    #Leukocytosis  -12.44, down from yesterday. Pt afebrile with no obvious infxn source. Will continue to monitor.

## 2020-09-07 NOTE — PROGRESS NOTE ADULT - SUBJECTIVE AND OBJECTIVE BOX
Patient is a 65y old  Male who presents with a chief complaint of RLE swelling (07 Sep 2020 06:39)    SUBJECTIVE / OVERNIGHT EVENTS:     OBJECTIVE:  Vital Signs Last 24 Hrs  T(C): 36.6 (07 Sep 2020 04:00), Max: 36.8 (06 Sep 2020 11:20)  T(F): 97.8 (07 Sep 2020 04:00), Max: 98.3 (06 Sep 2020 20:57)  HR: 63 (07 Sep 2020 06:09) (52 - 64)  BP: 112/68 (07 Sep 2020 05:38) (109/66 - 143/77)  BP(mean): --  RR: 17 (07 Sep 2020 04:00) (17 - 18)  SpO2: 98% (07 Sep 2020 06:09) (95% - 99%)    I&O's Summary    06 Sep 2020 07:01  -  07 Sep 2020 07:00  --------------------------------------------------------  IN: 925 mL / OUT: 2200 mL / NET: -1275 mL      PHYSICAL EXAM:      General: No acute distress, well-appearing  	  Eyes: EOMI   	  ENT: MMM, no oropharyngeal lesions or erythema appreciated   	  Pulm: No increased WOB. No wheezing. CTAB  	  CV: RRR. S1&S2+. No M/R/G appreciated.   	  Abdomen: +BS. Soft, NT. No organomegaly. Distended.   	  MSK: Pt able to move all extremities spontaneously.  	  Extremities: No peripheral edema.   	  Neuro: A&Ox3, no focal deficits       Skin: Warm and dry. L hand wound with dressing in place.    Labs:  CAPILLARY BLOOD GLUCOSE                              10.3   12.44 )-----------( 328      ( 07 Sep 2020 05:39 )             36.3     09-07    142  |  102  |  50<H>  ----------------------------<  90  3.3<L>   |  23  |  2.64<H>    Ca    9.6      07 Sep 2020 05:39  Phos  5.9     09-07  Mg     2.3     09-07                Imaging Personally Reviewed:    Consultant(s) Notes Reviewed:   Care Discussed with Consultants/Other Providers:    MEDICATIONS  (STANDING):  apixaban 5 milliGRAM(s) Oral two times a day  aspirin  chewable 81 milliGRAM(s) Oral daily  atorvastatin 80 milliGRAM(s) Oral at bedtime  carvedilol 12.5 milliGRAM(s) Oral every 12 hours  furosemide   Injectable 40 milliGRAM(s) IV Push daily  hydrALAZINE 35 milliGRAM(s) Oral three times a day  isosorbide   dinitrate Tablet (ISORDIL) 10 milliGRAM(s) Oral three times a day  LORazepam     Tablet 0.5 milliGRAM(s) Oral at bedtime  pantoprazole    Tablet 40 milliGRAM(s) Oral two times a day  predniSONE   Tablet 20 milliGRAM(s) Oral daily  sertraline 50 milliGRAM(s) Oral daily    MEDICATIONS  (PRN):  benzocaine 15 mG/menthol 3.6 mG (Sugar-Free) Lozenge 1 Lozenge Oral three times a day PRN Sore Throat Patient is a 65y old  Male who presents with a chief complaint of RLE swelling (07 Sep 2020 06:39)    SUBJECTIVE / OVERNIGHT EVENTS: NAEON. Patient examined at bedside this AM, with no subjective complaints. Saw patient walking with PT with no SOB, says he's feeling much better. Denies CP, palpitations, dyspnea, peripheral edema.     OBJECTIVE:  Vital Signs Last 24 Hrs  T(C): 36.6 (07 Sep 2020 04:00), Max: 36.8 (06 Sep 2020 11:20)  T(F): 97.8 (07 Sep 2020 04:00), Max: 98.3 (06 Sep 2020 20:57)  HR: 63 (07 Sep 2020 06:09) (52 - 64)  BP: 112/68 (07 Sep 2020 05:38) (109/66 - 143/77)  BP(mean): --  RR: 17 (07 Sep 2020 04:00) (17 - 18)  SpO2: 98% (07 Sep 2020 06:09) (95% - 99%)    I&O's Summary    06 Sep 2020 07:01  -  07 Sep 2020 07:00  --------------------------------------------------------  IN: 925 mL / OUT: 2200 mL / NET: -1275 mL      PHYSICAL EXAM:      General: No acute distress, well-appearing  	  Eyes: EOMI   	  ENT: MMM, no oropharyngeal lesions or erythema appreciated   	  Pulm: No increased WOB. No wheezing. CTAB  	  CV: RRR. S1&S2+. No M/R/G appreciated.   	  Abdomen: +BS. Soft, NT. No organomegaly. Distended.   	  MSK: Pt able to move all extremities spontaneously.  	  Extremities: No peripheral edema.   	  Neuro: A&Ox3, no focal deficits       Skin: Warm and dry. L hand wound c/d.     Labs:  CAPILLARY BLOOD GLUCOSE                              10.3   12.44 )-----------( 328      ( 07 Sep 2020 05:39 )             36.3     09-07    142  |  102  |  50<H>  ----------------------------<  90  3.3<L>   |  23  |  2.64<H>    Ca    9.6      07 Sep 2020 05:39  Phos  5.9     09-07  Mg     2.3     09-07                Imaging Personally Reviewed:    Consultant(s) Notes Reviewed:   Care Discussed with Consultants/Other Providers:    MEDICATIONS  (STANDING):  apixaban 5 milliGRAM(s) Oral two times a day  aspirin  chewable 81 milliGRAM(s) Oral daily  atorvastatin 80 milliGRAM(s) Oral at bedtime  carvedilol 12.5 milliGRAM(s) Oral every 12 hours  furosemide   Injectable 40 milliGRAM(s) IV Push daily  hydrALAZINE 35 milliGRAM(s) Oral three times a day  isosorbide   dinitrate Tablet (ISORDIL) 10 milliGRAM(s) Oral three times a day  LORazepam     Tablet 0.5 milliGRAM(s) Oral at bedtime  pantoprazole    Tablet 40 milliGRAM(s) Oral two times a day  predniSONE   Tablet 20 milliGRAM(s) Oral daily  sertraline 50 milliGRAM(s) Oral daily    MEDICATIONS  (PRN):  benzocaine 15 mG/menthol 3.6 mG (Sugar-Free) Lozenge 1 Lozenge Oral three times a day PRN Sore Throat

## 2020-09-07 NOTE — PROGRESS NOTE ADULT - PROBLEM SELECTOR PLAN 1
- Transition Lasix to 40 mg PO QD  - Continue Coreg 12.5mg BID, HDZN 35mg TID and ISDN 10 TID, hold for SBP < 90  - Deferring ARNI/MRA at this time due to renal dysfunction  - Continue 1.5L fluid restriction  - Daily standing weight, strict I/Os  - Daily BMP, Mg. Maintain K 4-4.5 and Mg 2-2.5.  - If Transform-HF trial is accepting enrollment, will evaluate to see if he is a suitable candidate

## 2020-09-07 NOTE — PROGRESS NOTE ADULT - PROBLEM SELECTOR PLAN 2
- Medical optimization as above and repeat TTE  - Structural heart following for consideration of redo CABG as well. Viability study from 9/4 suggesting fair amount of viability

## 2020-09-07 NOTE — PROGRESS NOTE ADULT - ASSESSMENT
Mr. Colon is a 65 year old male with PMH significant for CAD (s/p CABG 2005 LIMA-LAD, SVG- OM2, SVG- OM1, LIMA to LAD only patient graft per cath 1/2020), ICM HFrEF (LVEF 30-35%), severe MR, Afib (on Eliquis), HTN, HLD, CKD III, CHER (on CPAP) who presented with septic shock 2/2 RLL cellulitis. He additionally had ADHF with shortness of breath and hypoxia requiring CCU admission for VT likely in the setting of volume overload and severe MR. He has diuresed nearly 30lbs since admission. He underwent CHANDRA on 9/1 that showed significantly tethered MV with severe MR. CTX following for his MR with consideration for redo CABG. He underwent cardiac viability study on 9/4 that suggested fair amount of viability but notably with improvement in LVEF to 47%. No evidence of volume expansion, although exam is challenging due to his body habitus. He is hemodynamically stable but with persistent renal dysfunction. Will transition diuretics to oral and monitor his response. Plans for TTE to assess his MR. Mr. Colon is a 65 year old male with PMH significant for CAD (s/p CABG 2005 LIMA-LAD, SVG- OM2, SVG- OM1, LIMA to LAD only patient graft per cath 1/2020), ICM HFrEF (LVEF 30-35%), severe MR, Afib (on Eliquis), HTN, HLD, CKD III, CHER (on CPAP) who presented with septic shock 2/2 RLL cellulitis. He additionally had ADHF with shortness of breath and hypoxia requiring CCU admission for VT likely in the setting of volume overload and severe MR. He has diuresed nearly 30lbs since admission. He underwent CHANDRA on 9/1 that showed significantly tethered MV with severe MR. CTX following for his MR with consideration for redo CABG. He underwent cardiac viability study on 9/4 that suggested fair amount of viability but notably with improvement in LVEF to 47%. No evidence of volume expansion, although exam is challenging due to his body habitus. He is hemodynamically stable but with persistent renal dysfunction. Will transition diuretics to oral and monitor his response. Plans for TTE to reassess his MR.

## 2020-09-07 NOTE — PROGRESS NOTE ADULT - PROBLEM SELECTOR PLAN 1
Combined Systolic and diastolic Heart failure. TTE (1/2020): LVEF 39%, mod MR/AR  -8/27: Patient had an RRT after desatting to 77% overnight. Found to be significant pulmonary edema on CXR. EKG showed Afib with RVR and had runs of VT on tele. Lactate 8. Patient received IV lasix 40mg, amio, and transferred to CCU and placed on BiPAP.    -CCU course (8/27): transitioned from BiPAP to NC. Bumex 2. Restarted home isordil and po lasix 40mg.   -TTE (8/27): Worsening diastolic dysfunction. EF 35-45%. mild/mod MR. mild AS. mild/mod AR. mod-severe global LV dysfunction.   -CHANDRA (9/1) showed severe MR, mod AR.   -C/w coreg 12.5mg BID, isordil 10mg TID, hydralazine 35mg TID   -Cards recs: No cath planned at this time.   -Heart failure recs: Transitioned patient from IV lasix 40mg TID to 40mg qday (9/6)  -EP recs: will schedule EP study +/- ICD placement closer to discharge  -C/w strict I/O's, daily weights, and 1.5L fluid restriction Combined Systolic and diastolic Heart failure. TTE (1/2020): LVEF 39%, mod MR/AR  -8/27: Patient had an RRT after desatting to 77% overnight. Found to be significant pulmonary edema on CXR. EKG showed Afib with RVR and had runs of VT on tele. Lactate 8. Patient received IV lasix 40mg, amio, and transferred to CCU and placed on BiPAP.    -CCU course (8/27): transitioned from BiPAP to NC. Bumex 2. Restarted home isordil and po lasix 40mg.   -TTE (8/27): Worsening diastolic dysfunction. EF 35-45%. mild/mod MR. mild AS. mild/mod AR. mod-severe global LV dysfunction.   -CHANDRA (9/1) showed severe MR, mod AR.   -C/w coreg 12.5mg BID, isordil 10mg TID, hydralazine 35mg TID   -Cards recs: No cath planned at this time.   -Heart failure recs: Transitioned patient from IV lasix 40mg TID to 40mg qday (9/6)  -EP recs: will schedule EP study +/- ICD placement pending optimization per SH/HF  -C/w strict I/O's, daily weights, and 1.5L fluid restriction

## 2020-09-07 NOTE — PROGRESS NOTE ADULT - ATTENDING COMMENTS
discussed above plan with resident on rounds. patient seen and examined. no acute complaints.   labs and imaging reviewed.  viability study reviewed - viability in the apex, inferior, septal, anterolateral, and mid to basal inferolateral walls   severe MR - ?repeat imaging s/p diuresis   Structural heart f/u - ?CHANDRA - will make npo p mn in case we can obtain CHANDRA tomorrow  c/w IV diuresis, dry weight 220lbs.   cardiology, heart failure, ep following.

## 2020-09-08 LAB
ANION GAP SERPL CALC-SCNC: 15 MMOL/L — SIGNIFICANT CHANGE UP (ref 5–17)
ANION GAP SERPL CALC-SCNC: 15 MMOL/L — SIGNIFICANT CHANGE UP (ref 5–17)
ANION GAP SERPL CALC-SCNC: 17 MMOL/L — SIGNIFICANT CHANGE UP (ref 5–17)
BUN SERPL-MCNC: 51 MG/DL — HIGH (ref 7–23)
BUN SERPL-MCNC: 53 MG/DL — HIGH (ref 7–23)
BUN SERPL-MCNC: 54 MG/DL — HIGH (ref 7–23)
CALCIUM SERPL-MCNC: 9.5 MG/DL — SIGNIFICANT CHANGE UP (ref 8.4–10.5)
CALCIUM SERPL-MCNC: 9.5 MG/DL — SIGNIFICANT CHANGE UP (ref 8.4–10.5)
CALCIUM SERPL-MCNC: 9.6 MG/DL — SIGNIFICANT CHANGE UP (ref 8.4–10.5)
CHLORIDE SERPL-SCNC: 103 MMOL/L — SIGNIFICANT CHANGE UP (ref 96–108)
CHLORIDE SERPL-SCNC: 103 MMOL/L — SIGNIFICANT CHANGE UP (ref 96–108)
CHLORIDE SERPL-SCNC: 104 MMOL/L — SIGNIFICANT CHANGE UP (ref 96–108)
CO2 SERPL-SCNC: 21 MMOL/L — LOW (ref 22–31)
CO2 SERPL-SCNC: 21 MMOL/L — LOW (ref 22–31)
CO2 SERPL-SCNC: 23 MMOL/L — SIGNIFICANT CHANGE UP (ref 22–31)
CREAT SERPL-MCNC: 2.55 MG/DL — HIGH (ref 0.5–1.3)
CREAT SERPL-MCNC: 2.62 MG/DL — HIGH (ref 0.5–1.3)
CREAT SERPL-MCNC: 2.64 MG/DL — HIGH (ref 0.5–1.3)
GLUCOSE SERPL-MCNC: 105 MG/DL — HIGH (ref 70–99)
GLUCOSE SERPL-MCNC: 138 MG/DL — HIGH (ref 70–99)
GLUCOSE SERPL-MCNC: 92 MG/DL — SIGNIFICANT CHANGE UP (ref 70–99)
HCT VFR BLD CALC: 35.4 % — LOW (ref 39–50)
HGB BLD-MCNC: 9.9 G/DL — LOW (ref 13–17)
MAGNESIUM SERPL-MCNC: 2.4 MG/DL — SIGNIFICANT CHANGE UP (ref 1.6–2.6)
MCHC RBC-ENTMCNC: 22.9 PG — LOW (ref 27–34)
MCHC RBC-ENTMCNC: 28 GM/DL — LOW (ref 32–36)
MCV RBC AUTO: 81.8 FL — SIGNIFICANT CHANGE UP (ref 80–100)
NRBC # BLD: 0 /100 WBCS — SIGNIFICANT CHANGE UP (ref 0–0)
PHOSPHATE SERPL-MCNC: 4.2 MG/DL — SIGNIFICANT CHANGE UP (ref 2.5–4.5)
PLATELET # BLD AUTO: 303 K/UL — SIGNIFICANT CHANGE UP (ref 150–400)
POTASSIUM SERPL-MCNC: 3.5 MMOL/L — SIGNIFICANT CHANGE UP (ref 3.5–5.3)
POTASSIUM SERPL-MCNC: 3.5 MMOL/L — SIGNIFICANT CHANGE UP (ref 3.5–5.3)
POTASSIUM SERPL-MCNC: 4.7 MMOL/L — SIGNIFICANT CHANGE UP (ref 3.5–5.3)
POTASSIUM SERPL-SCNC: 3.5 MMOL/L — SIGNIFICANT CHANGE UP (ref 3.5–5.3)
POTASSIUM SERPL-SCNC: 3.5 MMOL/L — SIGNIFICANT CHANGE UP (ref 3.5–5.3)
POTASSIUM SERPL-SCNC: 4.7 MMOL/L — SIGNIFICANT CHANGE UP (ref 3.5–5.3)
RBC # BLD: 4.33 M/UL — SIGNIFICANT CHANGE UP (ref 4.2–5.8)
RBC # FLD: 18.6 % — HIGH (ref 10.3–14.5)
SODIUM SERPL-SCNC: 140 MMOL/L — SIGNIFICANT CHANGE UP (ref 135–145)
SODIUM SERPL-SCNC: 141 MMOL/L — SIGNIFICANT CHANGE UP (ref 135–145)
SODIUM SERPL-SCNC: 141 MMOL/L — SIGNIFICANT CHANGE UP (ref 135–145)
WBC # BLD: 13.11 K/UL — HIGH (ref 3.8–10.5)
WBC # FLD AUTO: 13.11 K/UL — HIGH (ref 3.8–10.5)

## 2020-09-08 PROCEDURE — 93306 TTE W/DOPPLER COMPLETE: CPT | Mod: 26

## 2020-09-08 PROCEDURE — 99232 SBSQ HOSP IP/OBS MODERATE 35: CPT

## 2020-09-08 PROCEDURE — 99232 SBSQ HOSP IP/OBS MODERATE 35: CPT | Mod: GC

## 2020-09-08 RX ORDER — POTASSIUM CHLORIDE 20 MEQ
20 PACKET (EA) ORAL ONCE
Refills: 0 | Status: COMPLETED | OUTPATIENT
Start: 2020-09-08 | End: 2020-09-08

## 2020-09-08 RX ORDER — POTASSIUM CHLORIDE 20 MEQ
40 PACKET (EA) ORAL ONCE
Refills: 0 | Status: COMPLETED | OUTPATIENT
Start: 2020-09-08 | End: 2020-09-08

## 2020-09-08 RX ADMIN — ISOSORBIDE DINITRATE 10 MILLIGRAM(S): 5 TABLET ORAL at 13:20

## 2020-09-08 RX ADMIN — Medication 20 MILLIEQUIVALENT(S): at 05:46

## 2020-09-08 RX ADMIN — Medication 0.5 MILLIGRAM(S): at 21:39

## 2020-09-08 RX ADMIN — Medication 40 MILLIEQUIVALENT(S): at 11:13

## 2020-09-08 RX ADMIN — APIXABAN 5 MILLIGRAM(S): 2.5 TABLET, FILM COATED ORAL at 05:46

## 2020-09-08 RX ADMIN — ATORVASTATIN CALCIUM 80 MILLIGRAM(S): 80 TABLET, FILM COATED ORAL at 21:39

## 2020-09-08 RX ADMIN — Medication 81 MILLIGRAM(S): at 11:11

## 2020-09-08 RX ADMIN — Medication 35 MILLIGRAM(S): at 13:22

## 2020-09-08 RX ADMIN — Medication 40 MILLIGRAM(S): at 05:50

## 2020-09-08 RX ADMIN — ISOSORBIDE DINITRATE 10 MILLIGRAM(S): 5 TABLET ORAL at 05:47

## 2020-09-08 RX ADMIN — APIXABAN 5 MILLIGRAM(S): 2.5 TABLET, FILM COATED ORAL at 17:59

## 2020-09-08 RX ADMIN — ISOSORBIDE DINITRATE 10 MILLIGRAM(S): 5 TABLET ORAL at 21:40

## 2020-09-08 RX ADMIN — SERTRALINE 50 MILLIGRAM(S): 25 TABLET, FILM COATED ORAL at 11:12

## 2020-09-08 RX ADMIN — PANTOPRAZOLE SODIUM 40 MILLIGRAM(S): 20 TABLET, DELAYED RELEASE ORAL at 17:13

## 2020-09-08 RX ADMIN — Medication 35 MILLIGRAM(S): at 21:40

## 2020-09-08 RX ADMIN — PANTOPRAZOLE SODIUM 40 MILLIGRAM(S): 20 TABLET, DELAYED RELEASE ORAL at 05:47

## 2020-09-08 RX ADMIN — CARVEDILOL PHOSPHATE 12.5 MILLIGRAM(S): 80 CAPSULE, EXTENDED RELEASE ORAL at 05:47

## 2020-09-08 RX ADMIN — Medication 20 MILLIGRAM(S): at 05:47

## 2020-09-08 RX ADMIN — Medication 35 MILLIGRAM(S): at 05:47

## 2020-09-08 RX ADMIN — CARVEDILOL PHOSPHATE 12.5 MILLIGRAM(S): 80 CAPSULE, EXTENDED RELEASE ORAL at 17:13

## 2020-09-08 NOTE — PROGRESS NOTE ADULT - PROBLEM SELECTOR PLAN 8
Now resolved . initially Febrile (100.6) on admission + Leukocytosis (WBC 12.27) + Elevated lactate 2.4 + Hypotensive (SBP 100s) on admission. RLE cellulitis vs LUE wound as possible sources of infection  -s/p 2L IVF in ED. BP's improved with normalized lactate (0.7)  -s/p vanc   -Bcx (8/24) negative. Repeat (8/27) GPC in clusters.    #Leukocytosis  -12.44, down from yesterday. Pt afebrile with no obvious infxn source. Will continue to monitor.

## 2020-09-08 NOTE — PROGRESS NOTE ADULT - PROBLEM SELECTOR PLAN 7
-s/p vanc - improving    -Wound care saw patient (8/25). Redressed wound. Recommended plastics consult.   -Plastics consulted 8/26. Recommended local wound care and outpatient follow up (Dr. Marquez [phone])

## 2020-09-08 NOTE — PROGRESS NOTE ADULT - PROBLEM SELECTOR PLAN 4
Severe MR on CHANDRA (9/1)  -Structural heart recs: Plan for repeat CHANDRA once more euvolemic as MR may have been overestimated given volume overload Severe MR on CHANDRA (9/1)  -Structural heart recs: Plan for repeat TTE as MR may have been overestimated given volume overload Severe MR on CHANDRA (9/1)  -Structural heart recs: Repeat TTE as MR may have been overestimated given volume overload

## 2020-09-08 NOTE — PROGRESS NOTE ADULT - PROBLEM SELECTOR PLAN 1
Combined Systolic and diastolic Heart failure. TTE (1/2020): LVEF 39%, mod MR/AR  -8/27: Patient had an RRT after desatting to 77% overnight. Found to be significant pulmonary edema on CXR. EKG showed Afib with RVR and had runs of VT on tele. Lactate 8. Patient received IV lasix 40mg, amio, and transferred to CCU and placed on BiPAP.    -CCU course (8/27): transitioned from BiPAP to NC. Bumex 2. Restarted home isordil and po lasix 40mg.   -TTE (8/27): Worsening diastolic dysfunction. EF 35-45%. mild/mod MR. mild AS. mild/mod AR. mod-severe global LV dysfunction.   -CHANDRA (9/1) showed severe MR, mod AR.   -C/w coreg 12.5mg BID, isordil 10mg TID, hydralazine 35mg TID   -Cards recs: No cath planned at this time.   -Heart failure recs: Transitioned patient from IV lasix 40mg TID to 40mg qday (9/6)  -EP recs: will schedule EP study +/- ICD placement pending optimization per SH/HF  -C/w strict I/O's, daily weights, and 1.5L fluid restriction Combined Systolic and diastolic Heart failure. TTE (1/2020): LVEF 39%, mod MR/AR  -8/27: Patient had an RRT after desatting to 77% overnight. Found to be significant pulmonary edema on CXR. EKG showed Afib with RVR and had runs of VT on tele. Lactate 8. Patient received IV lasix 40mg, amio, and transferred to CCU and placed on BiPAP.    -CCU course (8/27): transitioned from BiPAP to NC. Bumex 2. Restarted home isordil and po lasix 40mg.   -TTE (8/27): Worsening diastolic dysfunction. EF 35-45%. mild/mod MR. mild AS. mild/mod AR. mod-severe global LV dysfunction.   -CHANDRA (9/1) showed severe MR, mod AR.   -C/w coreg 12.5mg BID, isordil 10mg TID, hydralazine 35mg TID   -Cards recs: No cath planned at this time.   -Heart failure recs: Transitioned patient from IV lasix 40mg qday to po lasix 40mg qday (9/8). Recommend repeat TTE.   -EP recs: will schedule EP study +/- ICD placement pending optimization per SH/HF  -C/w strict I/O's, daily weights, and 1.5L fluid restriction Combined Systolic and diastolic Heart failure. TTE (1/2020): LVEF 39%, mod MR/AR  -8/27: Patient had an RRT after desatting to 77% overnight. Found to be significant pulmonary edema on CXR. EKG showed Afib with RVR and had runs of VT on tele. Lactate 8. Patient received IV lasix 40mg, amio, and transferred to CCU and placed on BiPAP.    -CCU course (8/27): transitioned from BiPAP to NC. Bumex 2. Restarted home isordil and po lasix 40mg.   -TTE (8/27): Worsening diastolic dysfunction. EF 35-45%. mild/mod MR. mild AS. mild/mod AR. mod-severe global LV dysfunction.   -CHANDRA (9/1) showed severe MR, mod AR.   -C/w coreg 12.5mg BID, isordil 10mg TID, hydralazine 35mg TID   -Cards recs: No cath planned at this time.   -Heart failure recs: Transitioned patient from IV lasix 40mg qday to po lasix 40mg qday (9/8). TTE scheduled for today.   -EP recs: will schedule EP study +/- ICD placement pending optimization per SH/HF  -C/w strict I/O's, daily weights, and 1.5L fluid restriction

## 2020-09-08 NOTE — PROGRESS NOTE ADULT - ATTENDING COMMENTS
Agree with above assessment and plan as outlined above.    - Repeat CHANDRA   - CTS f/u with Dr. Jean Dodge MD, [facility]  BEEPER [phone]

## 2020-09-08 NOTE — PROGRESS NOTE ADULT - SUBJECTIVE AND OBJECTIVE BOX
S: Denies chest pain or SOB. Review of systems otherwise (-)      MEDICATIONS  (STANDING):  apixaban 5 milliGRAM(s) Oral two times a day  aspirin  chewable 81 milliGRAM(s) Oral daily  atorvastatin 80 milliGRAM(s) Oral at bedtime  carvedilol 12.5 milliGRAM(s) Oral every 12 hours  furosemide    Tablet 40 milliGRAM(s) Oral daily  hydrALAZINE 35 milliGRAM(s) Oral three times a day  isosorbide   dinitrate Tablet (ISORDIL) 10 milliGRAM(s) Oral three times a day  LORazepam     Tablet 0.5 milliGRAM(s) Oral at bedtime  pantoprazole    Tablet 40 milliGRAM(s) Oral two times a day  predniSONE   Tablet 20 milliGRAM(s) Oral daily  sertraline 50 milliGRAM(s) Oral daily    MEDICATIONS  (PRN):  benzocaine 15 mG/menthol 3.6 mG (Sugar-Free) Lozenge 1 Lozenge Oral three times a day PRN Sore Throat      LABS:                            9.9    13.11 )-----------( 303      ( 08 Sep 2020 06:07 )             35.4     Hemoglobin: 9.9 g/dL (09-08 @ 06:07)  Hemoglobin: 10.3 g/dL (09-07 @ 05:39)  Hemoglobin: 10.3 g/dL (09-06 @ 06:32)  Hemoglobin: 10.0 g/dL (09-05 @ 07:13)  Hemoglobin: 9.4 g/dL (09-04 @ 06:55)    09-08    140  |  104  |  54<H>  ----------------------------<  92  3.5   |  21<L>  |  2.55<H>    Ca    9.5      08 Sep 2020 06:08  Phos  4.2     09-08  Mg     2.4     09-08      Creatinine Trend: 2.55<--, 2.64<--, 2.64<--, 2.60<--, 2.50<--, 2.52<--             09-07-20 @ 07:01  -  09-08-20 @ 07:00  --------------------------------------------------------  IN: 1075 mL / OUT: 1450 mL / NET: -375 mL    09-08-20 @ 07:01  -  09-08-20 @ 11:38  --------------------------------------------------------  IN: 350 mL / OUT: 0 mL / NET: 350 mL        PHYSICAL EXAM  Vital Signs Last 24 Hrs  T(C): 36.5 (08 Sep 2020 04:00), Max: 36.7 (07 Sep 2020 20:13)  T(F): 97.7 (08 Sep 2020 04:00), Max: 98.1 (07 Sep 2020 20:13)  HR: 66 (08 Sep 2020 10:47) (57 - 91)  BP: 126/80 (08 Sep 2020 05:44) (109/67 - 138/85)  BP(mean): --  RR: 17 (08 Sep 2020 04:00) (17 - 18)  SpO2: 95% (08 Sep 2020 10:47) (95% - 100%)        Gen: NAD  HEENT:  (-)icterus (-)pallor  CV: N S1 S2 1/6 KATHERYN (+)2 Pulses B/l  Resp: CTA B/L, normal effort  GI: (+) BS Soft, NT, ND  Lymph:  (-) edema, (-)obvious lymphadenopathy  Skin: Warm to touch, Normal turgor  Psych: Appropriate mood and affect      TELEMETRY: SB/SR 50-70s, PVCs    Echo: < from: TTE with Doppler (w/Cont) (08.27.20 @ 07:54) >  Conclusions:  1. Mitral valve not well visualized. Mitral annular  calcification. Tethered mitral valve leaflets with normal  opening. Mild moderate mitral regurgitation.  2. Calcified trileaflet aortic valve with decreased  opening. Peak transaortic valve gradient equals 12 mm Hg,  mean transaortic valve gradient equals 8 mm Hg, at least  mild AS. Limited gradient/Doppler evaluation. Mild-moderate  aortic regurgitation.  3. Endocardial visualization enhanced with intravenous  injection of Ultrasonic Enhancing Agent (Definity).  Moderate to severe  global left ventricular systolic  dysfunction. There dyssynchronous left ventricular  contraction.  4. Moderate diastolic dysfunction (Stage II). Increased  E/e'  is consistent with elevated left ventricular filling  pressure.  *** Compared with echocardiogram report of 12/13/2017,  Limited comparison.  Disatolic dysfunction is worse.    < end of copied text >    < from: Transesophageal Echocardiogram w/o TTE (09.01.20 @ 14:15) >  Conclusions:  1. Tethered mitral valve leaflets with normal opening.  Severe mitral regurgitation. There is systolic flow  reversal in the right upper pulmonary vein. By PISA,  calculated ERO=43 mmsq and regurgitant volume=73 mL (Pisa  rad 1cm, Va 34.5 cm/s, Vm 5.0 m/s,  cm)  2. Calcified trileaflet aortic valve with normal opening.  Moderate aortic regurgitation. Vena contracta=0.4 cm.  3. Normal aortic root size. (Ao: 3.6 cm at the sinuses of  Valsalva). Midlly dilated ascending aorta for BSA  (ascending aorta diameter 4.5 cm approximately 5 cm distal  to the aortic valve.  Normal size aortic arch, and  descending thoracic aorta.  Minimal atheroma.  4. Left atrial enlargement. No left atrial or left atrial  appendage thrombus. Normal left atrial appendage function  (maximum velocity>40 cm/s).  5. Moderate left ventricular systolic dysfunction.  Paradoxical septal motion consistent with prior cardiac  surgery.  6. Normal right ventricular size and function.    < end of copied text >      < from: Cardiac Viability (09.04.20 @ 14:15) >  GATED ANALYSIS:  Rest gated wall motion analysis was performed (LVEF = 47  %;LVEDV = 195 ml.), revealing moderately reduced  LV  function.  ------------------------------------------------------------------------  IMPRESSIONS:Abnormal Study  * The left ventricle was markdely dilated at baseline.  There is viability in the apex, inferior, septal,  anterolateral, and mid to basal inferolateral walls. On  delayed imaging, there is appears to be partial viability  in the distal inferolateral wall.  * Rest gated wall motion analysis was performed (LVEF = 47  %;LVEDV = 195 ml.), revealing moderately reduced  LV  function.  Conclusion:  The left ventricle was markdely dilated at baseline. There  is viability in the apex, inferior, septal, anterolateral,  and mid to basal inferolateral walls. On delayed imaging,  there also appears to be partial viability in the distal  inferolateral wall.  ------------------------------------------------------------------------  Confirmed on  9/4/2020 - 16:56:37 by Beto Norton M.D.    < end of copied text >      ASSESSMENT/PLAN: Patient is a 65 year old male with PMH of CAD s/p CABG with most recent C 1/2020 with no intervention, chronic systolic CHF (LVEF 39% TTE 1/2020), Afib (on Eliquis), HTN, HLD, CKD, CHER (on CPAP), and pemphigus vulgaris who presented with RLE swelling admitted with RLE cellulitis and NARCISO on CKD. Cardiology consulted for management of CAD/CHF.    - Transitioned to PO lasix per heart failure team - daily standing weights, strict I/Os  - Continue medical management of known CAD with systolic cardiomyopathy - ASA/Statin, Coreg, Hydralazine/Isordil  - Continue AC with Eliquis for CVA prevention, if procedures planned, will need to bridge with heparin gtt  - EP eval appreciated  - CHANDRA with severe MR  - Structural heart eval follow up/ CHANDRA noted/ viability study results noted/ await surgical plan  - Repeat TTE pending  - Patient to f/u with Dr. Campbell's cardiology group after d/c      Cristi Jarvis PA-C  Pager: [phone]

## 2020-09-08 NOTE — PROGRESS NOTE ADULT - SUBJECTIVE AND OBJECTIVE BOX
EP Attending    HISTORY OF PRESENT ILLNESS:   Mr Colon is a 65yoM who presents for acute onset shortness of breath.  His EF is 35-40% chronically (1/2020 and 8/2020), due to ischemic cardiomyopathy.  He is s/p CABG and multiple subsequent PCI. He only has 1 graft open- his LIMA to LAD, and his native vessels are otherwise occluded.  Also has AFib, on apixaban, HTN, CKD, and CHER on CPAP.   He has had multiple episodes of flash pulmonary edema.    9/1- No angina, orthopnea, PND or palpitations.  He is short of breath on minimal exertion, walking across his hospital room with a walker (NYHA IIIB).  No fainting or near-fainting episodes.  telemetry after writing my note yesterday had 9 beats of NSVT.  9/2 - uneventful overnight.  had CHANDRA showing severe MR and moderate AI.  still short of breath, no angina, no fainting.  9/3 - no VT on telemetry overnight. Still short of breath.  Discussed the overall plan of care re: diuresis and re-evaluation of his valve before deciding on a structural vs surgical solution.  He is upset that he will spend more time in the hospital and potentially even longer if he needs a re-do open heart procedure, but understands that an incomplete plan will result in readmission and worse CHF symptoms.  9/4 - uneventful overnight. no angina, orthopnea or PND.  ambulating a little bit easier.  no VT on telemetry.  9/5 - reviewed Viability Study findings with patient.  exertional shortness of breath improving slowly.  no palpitations. no VT on telemetry.  9/6- uneventful overnight, no angina. improving orthopnea.  ambulating w/ less SOB.  9/7 - feels well today.  ambulating with less exertional dyspnea. expressed gratitude towards staff- the last 5 days have been worthwhile and he is feeling much better than this time last week.  9/8 - feeling well.  awaiting CHF and Surgical team followups re: viability testing and CHANDRA.  no VT on telemetry.    apixaban 5 milliGRAM(s) Oral two times a day  aspirin  chewable 81 milliGRAM(s) Oral daily  atorvastatin 80 milliGRAM(s) Oral at bedtime  benzocaine 15 mG/menthol 3.6 mG (Sugar-Free) Lozenge 1 Lozenge Oral three times a day PRN  carvedilol 12.5 milliGRAM(s) Oral every 12 hours  furosemide    Tablet 40 milliGRAM(s) Oral daily  hydrALAZINE 35 milliGRAM(s) Oral three times a day  isosorbide   dinitrate Tablet (ISORDIL) 10 milliGRAM(s) Oral three times a day  LORazepam     Tablet 0.5 milliGRAM(s) Oral at bedtime  pantoprazole    Tablet 40 milliGRAM(s) Oral two times a day  potassium chloride    Tablet ER 40 milliEquivalent(s) Oral once  predniSONE   Tablet 20 milliGRAM(s) Oral daily  sertraline 50 milliGRAM(s) Oral daily                            9.9    13.11 )-----------( 303      ( 08 Sep 2020 06:07 )             35.4       09-08    140  |  104  |  54<H>  ----------------------------<  92  3.5   |  21<L>  |  2.55<H>    Ca    9.5      08 Sep 2020 06:08  Phos  4.2     09-08  Mg     2.4     09-08      T(C): 36.5 (09-08-20 @ 04:00), Max: 36.7 (09-07-20 @ 20:13)  HR: 58 (09-08-20 @ 05:48) (57 - 91)  BP: 126/80 (09-08-20 @ 05:44) (109/67 - 138/85)  RR: 17 (09-08-20 @ 04:00) (17 - 18)  SpO2: 97% (09-08-20 @ 05:48) (95% - 100%)  Wt(kg): --    I&O's Summary    07 Sep 2020 07:01  -  08 Sep 2020 07:00  --------------------------------------------------------  IN: 1075 mL / OUT: 1450 mL / NET: -375 mL    08 Sep 2020 07:01  -  08 Sep 2020 10:21  --------------------------------------------------------  IN: 300 mL / OUT: 0 mL / NET: 300 mL      Appearance: Robust white male in no acute distress, but short-of-breath.  HEENT:   Normal oral mucosa, PERRL, EOMI	  Lymphatic: No lymphadenopathy , no edema  Cardiovascular: Normal S1 S2,  Peripheral pulses palpable 2+ bilaterally  Respiratory: Lungs clear to auscultation, normal effort 	  Gastrointestinal:  Soft, Non-tender, + BS	  Skin: No rashes, No ecchymoses, No cyanosis, warm to touch  Musculoskeletal: Normal range of motion, normal strength  Psychiatry:  Mood & affect appropriate    TELEMETRY: NSR, 9 beat run of NSVT on 8/31.  Multifocal PVC's. Occasional triplets and couplets on 9/6-7.  ECG:  	NSR, interpolated PVC's.  Echo: EF 35-40%, LV dilated to upper limit of normal, severe diastolic dysfunction, moderate Mitral regurgitation.  CHANDRA:  Tetheterd MV leaflets --> severe MR, in the setting of volume overload and high BP.  Cath: Prior with only LIMA to LAD patent. Native vessels occluded.	  Viability: Multiple viable segments at sites of prior SVG insertion.      ASSESSMENT/PLAN: 	65y Male with an ischemic cardiomyopathy, EF 35-40%, and increasing frequency of episodes of flash pulmonary edema.  Has nonsustained ventricular tachycardia on telemetry.    He is not a candidate for biventricular pacing.  Structural and CHF team consults have been completed: the MV regurg should be re-evaluated once he is euvolemic.  He has diuresed well over the last 5 days or so, and is feeling symptomatically much better at rest and during ambulation.  BUN rising steadily during this time to peak of 50.  Viability study was reassuring.  No further VT on monitor.    Given ongoing / escalating diuresis, replace K to 4-4.5 daily.  Mg to 2.    Based on the MUSTT trial, he qualifies for an EP Study for VT induction, with ICD insertion if positive.  However at this time, EP workup will be deferred until CHF team and Structural/Surgical teams have optimized him.  Will follow along, and formally re-evaluate prior to discharge.    Wei Feliciano M.D.  Cardiac Electrophysiology    office [phone]  pager [phone]

## 2020-09-08 NOTE — PROGRESS NOTE ADULT - SUBJECTIVE AND OBJECTIVE BOX
Patient is a 65y old  Male who presents with a chief complaint of RLE swelling (07 Sep 2020 17:49)    SUBJECTIVE / OVERNIGHT EVENTS:    OBJECTIVE:  Vital Signs Last 24 Hrs  T(C): 36.5 (08 Sep 2020 04:00), Max: 36.7 (07 Sep 2020 20:13)  T(F): 97.7 (08 Sep 2020 04:00), Max: 98.1 (07 Sep 2020 20:13)  HR: 58 (08 Sep 2020 05:48) (57 - 91)  BP: 126/80 (08 Sep 2020 05:44) (109/67 - 138/85)  BP(mean): --  RR: 17 (08 Sep 2020 04:00) (17 - 18)  SpO2: 97% (08 Sep 2020 05:48) (95% - 100%)    I&O's Summary    06 Sep 2020 07:01  -  07 Sep 2020 07:00  --------------------------------------------------------  IN: 925 mL / OUT: 2200 mL / NET: -1275 mL    07 Sep 2020 07:01  -  08 Sep 2020 06:44  --------------------------------------------------------  IN: 1075 mL / OUT: 1150 mL / NET: -75 mL      PHYSICAL EXAM:      General: No acute distress, well-appearing  	  Eyes: EOMI   	  ENT: MMM, no oropharyngeal lesions or erythema appreciated   	  Pulm: No increased WOB. No wheezing. CTAB  	  CV: RRR. S1&S2+. No M/R/G appreciated.   	  Abdomen: +BS. Soft, NT. No organomegaly. Distended.   	  MSK: Pt able to move all extremities spontaneously.  	  Extremities: No peripheral edema.   	  Neuro: A&Ox3, no focal deficits       Skin: Warm and dry. L hand wound c/d.     Labs:  CAPILLARY BLOOD GLUCOSE                              9.9    13.11 )-----------( 303      ( 08 Sep 2020 06:07 )             35.4     09-08    141  |  103  |  53<H>  ----------------------------<  105<H>  3.5   |  21<L>  |  2.64<H>    Ca    9.5      08 Sep 2020 01:32  Phos  5.9     09-07  Mg     2.3     09-07                Imaging Personally Reviewed:    Consultant(s) Notes Reviewed:   Care Discussed with Consultants/Other Providers:    MEDICATIONS  (STANDING):  apixaban 5 milliGRAM(s) Oral two times a day  aspirin  chewable 81 milliGRAM(s) Oral daily  atorvastatin 80 milliGRAM(s) Oral at bedtime  carvedilol 12.5 milliGRAM(s) Oral every 12 hours  furosemide    Tablet 40 milliGRAM(s) Oral daily  hydrALAZINE 35 milliGRAM(s) Oral three times a day  isosorbide   dinitrate Tablet (ISORDIL) 10 milliGRAM(s) Oral three times a day  LORazepam     Tablet 0.5 milliGRAM(s) Oral at bedtime  pantoprazole    Tablet 40 milliGRAM(s) Oral two times a day  predniSONE   Tablet 20 milliGRAM(s) Oral daily  sertraline 50 milliGRAM(s) Oral daily    MEDICATIONS  (PRN):  benzocaine 15 mG/menthol 3.6 mG (Sugar-Free) Lozenge 1 Lozenge Oral three times a day PRN Sore Throat Patient is a 65y old  Male who presents with a chief complaint of RLE swelling (07 Sep 2020 17:49)    SUBJECTIVE / OVERNIGHT EVENTS: NAEON. Telemetry SR 45-70. Pt examined at bedside this AM, no subjective complaints. Patient is scheduled to go for TTE later today. He denies CP, palpitations, SOB, peripheral edema.     OBJECTIVE:  Vital Signs Last 24 Hrs  T(C): 36.5 (08 Sep 2020 04:00), Max: 36.7 (07 Sep 2020 20:13)  T(F): 97.7 (08 Sep 2020 04:00), Max: 98.1 (07 Sep 2020 20:13)  HR: 58 (08 Sep 2020 05:48) (57 - 91)  BP: 126/80 (08 Sep 2020 05:44) (109/67 - 138/85)  BP(mean): --  RR: 17 (08 Sep 2020 04:00) (17 - 18)  SpO2: 97% (08 Sep 2020 05:48) (95% - 100%)    I&O's Summary    06 Sep 2020 07:01  -  07 Sep 2020 07:00  --------------------------------------------------------  IN: 925 mL / OUT: 2200 mL / NET: -1275 mL    07 Sep 2020 07:01  -  08 Sep 2020 06:44  --------------------------------------------------------  IN: 1075 mL / OUT: 1150 mL / NET: -75 mL      PHYSICAL EXAM:      General: No acute distress, well-appearing  	  Eyes: EOMI   	  ENT: MMM, no oropharyngeal lesions or erythema appreciated   	  Pulm: No increased WOB. No wheezing. CTAB  	  CV: RRR. S1&S2+. No M/R/G appreciated.   	  Abdomen: +BS. Soft, NT. No organomegaly. Distended.   	  MSK: Pt able to move all extremities spontaneously.  	  Extremities: No peripheral edema.   	  Neuro: A&Ox3, no focal deficits       Skin: Warm and dry. L hand wound c/d.     Labs:  CAPILLARY BLOOD GLUCOSE                              9.9    13.11 )-----------( 303      ( 08 Sep 2020 06:07 )             35.4     09-08    141  |  103  |  53<H>  ----------------------------<  105<H>  3.5   |  21<L>  |  2.64<H>    Ca    9.5      08 Sep 2020 01:32  Phos  5.9     09-07  Mg     2.3     09-07                Imaging Personally Reviewed:    Consultant(s) Notes Reviewed:   Care Discussed with Consultants/Other Providers:    MEDICATIONS  (STANDING):  apixaban 5 milliGRAM(s) Oral two times a day  aspirin  chewable 81 milliGRAM(s) Oral daily  atorvastatin 80 milliGRAM(s) Oral at bedtime  carvedilol 12.5 milliGRAM(s) Oral every 12 hours  furosemide    Tablet 40 milliGRAM(s) Oral daily  hydrALAZINE 35 milliGRAM(s) Oral three times a day  isosorbide   dinitrate Tablet (ISORDIL) 10 milliGRAM(s) Oral three times a day  LORazepam     Tablet 0.5 milliGRAM(s) Oral at bedtime  pantoprazole    Tablet 40 milliGRAM(s) Oral two times a day  predniSONE   Tablet 20 milliGRAM(s) Oral daily  sertraline 50 milliGRAM(s) Oral daily    MEDICATIONS  (PRN):  benzocaine 15 mG/menthol 3.6 mG (Sugar-Free) Lozenge 1 Lozenge Oral three times a day PRN Sore Throat

## 2020-09-08 NOTE — PROGRESS NOTE ADULT - PROBLEM SELECTOR PLAN 2
NARCISO on CKD III. Cr elevated to 2.4 on admission. Baseline Cr 1.4-1.7. Improved with IVF and lasix was held. However, after CHF exacerbation requiring aggressive diuresis (8/27), patient's Cr was elevated to 2.15 on 8/28  -Cr 2.64 (9/7); P 5.9, will continue to monitor; likely in the setting of aggressive diuresis.   -Continue to monitor daily BMPs   -Renally dose medications  -Continue to hold home Entresto NARCISO on CKD III. Cr elevated to 2.4 on admission. Baseline Cr 1.4-1.7. Improved with IVF and lasix was held. However, after CHF exacerbation requiring aggressive diuresis (8/27), patient's Cr was elevated to 2.15 on 8/28  -Cr 2.55 (9/8); P 4.2, will continue to monitor; likely in the setting of aggressive diuresis.   -Continue to monitor daily BMPs   -Renally dose medications  -Continue to hold home Entresto

## 2020-09-08 NOTE — PROGRESS NOTE ADULT - ATTENDING COMMENTS
Viability results reviewed. Will need CTS to weigh in on whether cabg re-do is feasible. May require Cardiac cath prior to possible surgical intervention.  Plan for repeat TTE today to assess MV given pt now more euvolemic. May require CHANDRA if results equivical  c/w PO diuretics. Pt's weight did increase slightly today. f/u Heart failure rec's

## 2020-09-08 NOTE — PROGRESS NOTE ADULT - SUBJECTIVE AND OBJECTIVE BOX
*****Structural Heart Team*****    Subjective:    Patient resting comfortably in bed, offering no complaints. He completed his viability study, which revealed that he did have viability.      PAST MEDICAL & SURGICAL HISTORY:  CHF (congestive heart failure)  Psoriasis  HLD (hyperlipidemia)  CAD (coronary artery disease): 3 stents  Hypertension  History of coronary artery stent placement: 1 stent 2008, 2 stents 2009 in [facility]  S/P quadruple vessel bypass: 2006        T(C): 36.5 (09-08-20 @ 04:00), Max: 36.7 (09-07-20 @ 20:13)  HR: 66 (09-08-20 @ 10:47) (57 - 91)  BP: 126/80 (09-08-20 @ 05:44) (109/67 - 138/85)  RR: 17 (09-08-20 @ 04:00) (17 - 18)  SpO2: 95% (09-08-20 @ 10:47) (95% - 100%)  Wt(kg): --  09-07 @ 07:01  -  09-08 @ 07:00  --------------------------------------------------------  IN: 1075 mL / OUT: 1450 mL / NET: -375 mL    09-08 @ 07:01  -  09-08 @ 11:29  --------------------------------------------------------  IN: 350 mL / OUT: 0 mL / NET: 350 mL      MEDICATIONS  (STANDING):  apixaban 5 milliGRAM(s) Oral two times a day  aspirin  chewable 81 milliGRAM(s) Oral daily  atorvastatin 80 milliGRAM(s) Oral at bedtime  carvedilol 12.5 milliGRAM(s) Oral every 12 hours  furosemide    Tablet 40 milliGRAM(s) Oral daily  hydrALAZINE 35 milliGRAM(s) Oral three times a day  isosorbide   dinitrate Tablet (ISORDIL) 10 milliGRAM(s) Oral three times a day  LORazepam     Tablet 0.5 milliGRAM(s) Oral at bedtime  pantoprazole    Tablet 40 milliGRAM(s) Oral two times a day  predniSONE   Tablet 20 milliGRAM(s) Oral daily  sertraline 50 milliGRAM(s) Oral daily    MEDICATIONS  (PRN):  benzocaine 15 mG/menthol 3.6 mG (Sugar-Free) Lozenge 1 Lozenge Oral three times a day PRN Sore Throat      Review of Symptoms:  Constitutional: Awake, Alert, Follows commands  Respiratory: + SOB, + MELCHOR  Cardiac: Denies CP, Denies Palpitations  Gastrointestinal: Denies Pain, Denies N/V, tolerating po intake  Vascular: Negative  Extremities: + Edema, No joint pain or swelling  Neurological: Negative  Endocrine: No heat or cold intolerance, No excessive thirst  Heme/Onc: Negative    Exam:  General: A/O x3, NAD, Follows commands  HEENT: Supple, No JVD, Trachea midline, no masses  Pulmonary: CTAB though diminished at the bases, = Chest Excursion, no accessory muscle use  Cor: S1S2, RRR, III/VI KATHERYN, No Rubs noted  ECG: SR 50-70's.  Gastrointestinal: Soft, NT/ND, + Bowel Sounds  Neuro: = motor and sensory B/L, No focal deficits  Vascular: 1+ pulses B/L, Trace edema B/L lower extremities  Extremities: No joint pain or swelling  Skin: Warm/Dry/Normal color, Normal turgor, no rashes                          9.9    13.11 )-----------( 303      ( 08 Sep 2020 06:07 )             35.4   09-08    140  |  104  |  54<H>  ----------------------------<  92  3.5   |  21<L>  |  2.55<H>    Ca    9.5      08 Sep 2020 06:08  Phos  4.2     09-08  Mg     2.4     09-08        Imaging Reviewed:    CHANDRA:    < from: Transesophageal Echocardiogram w/o TTE (09.01.20 @ 14:15) >  Observations:  Mitral Valve: Tethered mitral valve leaflets with normal  opening. Severe mitral regurgitation. There is systolic  flow reversal in the right upper pulmonary vein. By PISA,  calculated ERO=43 mmsq and regurgitant volume=73 mL (Pisa  rad 1 cm, Va 34.5 cm/s, Vm 5.0 m/s,  cm) Mean  transmitral valve gradient equals 1-2 mm Hg. (HRabout  80s-90s bpm)  Aortic Valve/Aorta: Calcified trileaflet aortic valve with  normal opening. Moderate aortic regurgitation. Vena  contracta=0.4 cm.  Normal aortic root size. (Ao: 3.6 cm at the sinuses of  Valsalva). Midlly dilated ascending aorta for BSA  (ascending aorta diameter 4.5 cm approximately 5 cm distal  to the aortic valve.  Normal size aortic arch, and  descending thoracic aorta.  Minimal atheroma.  Left Atrium: Left atrial enlargement. No left atrial or  left atrial appendage thrombus. Normal left atrial  appendage function (maximum velocity>40 cm/s).  Left Ventricle: Moderate left ventricular systolic  dysfunction. Paradoxical septal motion consistent with  prior cardiac surgery.  Right Heart: Normal right atrium. Normal right ventricular  size and function. Normal tricuspid valve. Mild tricuspid  regurgitation. Normal pulmonic valve. Minimal pulmonic  regurgitation.  Pericardium/Pleura: Normal pericardium with no pericardial  effusion.  Hemodynamic: Agitated saline injection and color flow  doppler demonstrate no evidence of a patent foramen ovale.  ------------------------------------------------------------------------  Conclusions:  1. Tethered mitral valve leaflets with normal opening.  Severe mitral regurgitation. There is systolic flow  reversal in the right upper pulmonary vein. By PISA,  calculated ERO=43 mmsq and regurgitant volume=73 mL (Pisa  rad 1cm, Va 34.5 cm/s, Vm 5.0 m/s,  cm)  2. Calcified trileaflet aortic valve with normal opening.  Moderate aortic regurgitation. Vena contracta=0.4 cm.  3. Normal aortic root size. (Ao: 3.6 cm at the sinuses of  Valsalva). Midlly dilated ascending aorta for BSA  (ascending aorta diameter 4.5 cm approximately 5 cm distal  to the aortic valve.  Normal size aortic arch, and  descending thoracic aorta.  Minimal atheroma.  4. Left atrial enlargement. No left atrial or left atrial  appendage thrombus. Normal left atrial appendage function  (maximum velocity>40 cm/s).  5. Moderate left ventricular systolic dysfunction.  Paradoxical septal motion consistent with prior cardiac  surgery.  6. Normal right ventricular size and function.    < end of copied text >    Viability Study:    < from: Cardiac Viability (09.04.20 @ 14:15) >  MPRESSIONS:Abnormal Study  * The left ventricle was markdely dilated at baseline.  There is viability in the apex, inferior, septal,  anterolateral, and mid to basal inferolateral walls. On  delayed imaging, there is appears to be partial viability  in the distal inferolateral wall.  * Rest gated wall motion analysis was performed (LVEF = 47  %;LVEDV = 195 ml.), revealing moderately reduced  LV  function.  Conclusion:  The left ventricle was markdely dilated at baseline. There  is viability in the apex, inferior, septal, anterolateral,  and mid to basal inferolateral walls. On delayed imaging,  there also appears to be partial viability in the distal  inferolateral wall.    < end of copied text >    Assesment/Plan:  66 y/o male with Severe Mitral Regurgitation, Coronary Artery Disease, Chronic Systolic Heart Failure    1.) Severe MR: TTE/CHANDRA reviewed. Degree of MR may be overestimated due to volume overload. Patient currently on diuretics and feeling better. Appears to be Secondary MR in nature. Would recommend repeat CHANDRA when in a more optimized volume status if not going for Open surgery.  2.) CAD: Discussed with Dr. Pena. He has viability on study, he is going to review his past imaging, and to see if a repeat Cardiac Catheterization needs to be done to access if there are good landing zones for repeat CABG. Per Dr. Yeung, noted blockages would be not considered for high risk PCI.  3.) Chronic Systolic Heart Failure: Monitor daily weights. Continue with furosemide and fluid restriction.  4.) Ambulate as tolerated.

## 2020-09-09 LAB
ANION GAP SERPL CALC-SCNC: 15 MMOL/L — SIGNIFICANT CHANGE UP (ref 5–17)
APPEARANCE UR: CLEAR — SIGNIFICANT CHANGE UP
BILIRUB UR-MCNC: NEGATIVE — SIGNIFICANT CHANGE UP
BUN SERPL-MCNC: 45 MG/DL — HIGH (ref 7–23)
CALCIUM SERPL-MCNC: 9.7 MG/DL — SIGNIFICANT CHANGE UP (ref 8.4–10.5)
CHLORIDE SERPL-SCNC: 102 MMOL/L — SIGNIFICANT CHANGE UP (ref 96–108)
CO2 SERPL-SCNC: 22 MMOL/L — SIGNIFICANT CHANGE UP (ref 22–31)
COLOR SPEC: SIGNIFICANT CHANGE UP
CREAT SERPL-MCNC: 2.2 MG/DL — HIGH (ref 0.5–1.3)
DIFF PNL FLD: NEGATIVE — SIGNIFICANT CHANGE UP
GLUCOSE SERPL-MCNC: 83 MG/DL — SIGNIFICANT CHANGE UP (ref 70–99)
GLUCOSE UR QL: NEGATIVE — SIGNIFICANT CHANGE UP
HCT VFR BLD CALC: 40.2 % — SIGNIFICANT CHANGE UP (ref 39–50)
HGB BLD-MCNC: 11.1 G/DL — LOW (ref 13–17)
KETONES UR-MCNC: NEGATIVE — SIGNIFICANT CHANGE UP
LEUKOCYTE ESTERASE UR-ACNC: NEGATIVE — SIGNIFICANT CHANGE UP
MAGNESIUM SERPL-MCNC: 2.2 MG/DL — SIGNIFICANT CHANGE UP (ref 1.6–2.6)
MCHC RBC-ENTMCNC: 22.9 PG — LOW (ref 27–34)
MCHC RBC-ENTMCNC: 27.6 GM/DL — LOW (ref 32–36)
MCV RBC AUTO: 82.9 FL — SIGNIFICANT CHANGE UP (ref 80–100)
NITRITE UR-MCNC: NEGATIVE — SIGNIFICANT CHANGE UP
NRBC # BLD: 0 /100 WBCS — SIGNIFICANT CHANGE UP (ref 0–0)
PH UR: 5.5 — SIGNIFICANT CHANGE UP (ref 5–8)
PHOSPHATE SERPL-MCNC: 3 MG/DL — SIGNIFICANT CHANGE UP (ref 2.5–4.5)
PLATELET # BLD AUTO: 259 K/UL — SIGNIFICANT CHANGE UP (ref 150–400)
POTASSIUM SERPL-MCNC: 4 MMOL/L — SIGNIFICANT CHANGE UP (ref 3.5–5.3)
POTASSIUM SERPL-SCNC: 4 MMOL/L — SIGNIFICANT CHANGE UP (ref 3.5–5.3)
PROT UR-MCNC: NEGATIVE — SIGNIFICANT CHANGE UP
RAPID RVP RESULT: SIGNIFICANT CHANGE UP
RBC # BLD: 4.85 M/UL — SIGNIFICANT CHANGE UP (ref 4.2–5.8)
RBC # FLD: 18.4 % — HIGH (ref 10.3–14.5)
SODIUM SERPL-SCNC: 139 MMOL/L — SIGNIFICANT CHANGE UP (ref 135–145)
SP GR SPEC: 1.01 — SIGNIFICANT CHANGE UP (ref 1.01–1.02)
UROBILINOGEN FLD QL: SIGNIFICANT CHANGE UP
WBC # BLD: 12.85 K/UL — HIGH (ref 3.8–10.5)
WBC # FLD AUTO: 12.85 K/UL — HIGH (ref 3.8–10.5)

## 2020-09-09 PROCEDURE — 71045 X-RAY EXAM CHEST 1 VIEW: CPT | Mod: 26

## 2020-09-09 PROCEDURE — 99233 SBSQ HOSP IP/OBS HIGH 50: CPT | Mod: GC

## 2020-09-09 RX ORDER — ACETAMINOPHEN 500 MG
650 TABLET ORAL ONCE
Refills: 0 | Status: COMPLETED | OUTPATIENT
Start: 2020-09-09 | End: 2020-09-09

## 2020-09-09 RX ORDER — ACETAMINOPHEN 500 MG
650 TABLET ORAL EVERY 6 HOURS
Refills: 0 | Status: DISCONTINUED | OUTPATIENT
Start: 2020-09-09 | End: 2020-09-16

## 2020-09-09 RX ORDER — ACETAMINOPHEN 500 MG
650 TABLET ORAL EVERY 6 HOURS
Refills: 0 | Status: DISCONTINUED | OUTPATIENT
Start: 2020-09-09 | End: 2020-09-09

## 2020-09-09 RX ORDER — HYDRALAZINE HCL 50 MG
50 TABLET ORAL THREE TIMES A DAY
Refills: 0 | Status: DISCONTINUED | OUTPATIENT
Start: 2020-09-09 | End: 2020-09-10

## 2020-09-09 RX ORDER — APIXABAN 2.5 MG/1
5 TABLET, FILM COATED ORAL EVERY 12 HOURS
Refills: 0 | Status: DISCONTINUED | OUTPATIENT
Start: 2020-09-09 | End: 2020-09-10

## 2020-09-09 RX ADMIN — Medication 650 MILLIGRAM(S): at 09:00

## 2020-09-09 RX ADMIN — ATORVASTATIN CALCIUM 80 MILLIGRAM(S): 80 TABLET, FILM COATED ORAL at 21:54

## 2020-09-09 RX ADMIN — APIXABAN 5 MILLIGRAM(S): 2.5 TABLET, FILM COATED ORAL at 06:11

## 2020-09-09 RX ADMIN — CARVEDILOL PHOSPHATE 12.5 MILLIGRAM(S): 80 CAPSULE, EXTENDED RELEASE ORAL at 18:25

## 2020-09-09 RX ADMIN — ISOSORBIDE DINITRATE 10 MILLIGRAM(S): 5 TABLET ORAL at 13:42

## 2020-09-09 RX ADMIN — PANTOPRAZOLE SODIUM 40 MILLIGRAM(S): 20 TABLET, DELAYED RELEASE ORAL at 18:25

## 2020-09-09 RX ADMIN — CARVEDILOL PHOSPHATE 12.5 MILLIGRAM(S): 80 CAPSULE, EXTENDED RELEASE ORAL at 06:11

## 2020-09-09 RX ADMIN — Medication 20 MILLIGRAM(S): at 06:11

## 2020-09-09 RX ADMIN — Medication 35 MILLIGRAM(S): at 06:11

## 2020-09-09 RX ADMIN — Medication 35 MILLIGRAM(S): at 13:43

## 2020-09-09 RX ADMIN — Medication 40 MILLIGRAM(S): at 06:11

## 2020-09-09 RX ADMIN — PANTOPRAZOLE SODIUM 40 MILLIGRAM(S): 20 TABLET, DELAYED RELEASE ORAL at 06:11

## 2020-09-09 RX ADMIN — Medication 650 MILLIGRAM(S): at 07:47

## 2020-09-09 RX ADMIN — SERTRALINE 50 MILLIGRAM(S): 25 TABLET, FILM COATED ORAL at 11:35

## 2020-09-09 RX ADMIN — APIXABAN 5 MILLIGRAM(S): 2.5 TABLET, FILM COATED ORAL at 18:25

## 2020-09-09 RX ADMIN — Medication 0.5 MILLIGRAM(S): at 21:54

## 2020-09-09 RX ADMIN — Medication 50 MILLIGRAM(S): at 22:00

## 2020-09-09 RX ADMIN — Medication 650 MILLIGRAM(S): at 22:00

## 2020-09-09 RX ADMIN — ISOSORBIDE DINITRATE 10 MILLIGRAM(S): 5 TABLET ORAL at 21:54

## 2020-09-09 RX ADMIN — Medication 650 MILLIGRAM(S): at 22:08

## 2020-09-09 RX ADMIN — Medication 81 MILLIGRAM(S): at 11:35

## 2020-09-09 RX ADMIN — ISOSORBIDE DINITRATE 10 MILLIGRAM(S): 5 TABLET ORAL at 06:13

## 2020-09-09 NOTE — PROGRESS NOTE ADULT - PROBLEM SELECTOR PLAN 3
- Continue ASA, beta blocker, atorvastatin - Continue ASA, beta blocker, atorvastatin  - Viability study from 9/4 suggesting fair amount of viability; possible CABG candidate, but he has multiple comorbidities and concomitant valve disease

## 2020-09-09 NOTE — PROGRESS NOTE ADULT - PROBLEM SELECTOR PLAN 1
- Pt. appears euvolemic on exam. WIll consider RHC tomorrow  - c/w Lasix to 40 mg PO QD  - Continue Coreg 12.5mg BID, HDZN 35mg TID and ISDN 10 TID, hold for SBP < 90  - Deferring ARNI/MRA at this time due to renal dysfunction  - Continue 1.5L fluid restriction  - Daily standing weight, strict I/Os  - Daily BMP, Mg. Maintain K 4-4.5 and Mg 2-2.5.  - If Transform-HF trial is accepting enrollment, will evaluate to see if he is a suitable candidate - Pt. appears euvolemic on exam. Likely needs RHC to determine next steps.  - c/w Lasix to 40 mg PO QD  - Continue Coreg 12.5mg BID  - Increase hydralazine to 50 mg TID at next dose, hold for SBP < 90.  - If SBP > 100 in AM, increase ISDN to 20 TID, hold for SBP < 90.  - Deferring ARNI/MRA at this time due to renal dysfunction  - Continue 1.5L fluid restriction  - Daily standing weight, strict I/Os  - Daily BMP, Mg. Maintain K 4-4.5 and Mg 2-2.5.  - If Transform-HF trial is accepting enrollment, will evaluate to see if he is a suitable candidate

## 2020-09-09 NOTE — PROGRESS NOTE ADULT - PROBLEM SELECTOR PLAN 1
Combined Systolic and diastolic Heart failure. TTE (1/2020): LVEF 39%, mod MR/AR  -8/27: Patient had an RRT after desatting to 77% overnight. Found to be significant pulmonary edema on CXR. EKG showed Afib with RVR and had runs of VT on tele. Lactate 8. Patient received IV lasix 40mg, amio, and transferred to CCU and placed on BiPAP.    -CCU course (8/27): transitioned from BiPAP to NC. Bumex 2. Restarted home isordil and po lasix 40mg.   -TTE (8/27): Worsening diastolic dysfunction. EF 35-45%. mild/mod MR. mild AS. mild/mod AR. mod-severe global LV dysfunction.   -CHANDRA (9/1) showed severe MR, mod AR.   -C/w coreg 12.5mg BID, isordil 10mg TID, hydralazine 35mg TID   -Cards recs: No cath planned at this time.   -Heart failure recs: Transitioned patient from IV lasix 40mg qday to po lasix 40mg qday (9/8).  -EP recs: will schedule EP study +/- ICD placement pending optimization per SH/HF  -Repeat Echo showed EF 35% and grossly moderate mitral regurgitation  -C/w strict I/O's, daily weights, and 1.5L fluid restriction Combined Systolic and diastolic Heart failure. TTE (1/2020): LVEF 39%, mod MR/AR  -8/27: Patient had an RRT after desatting to 77% overnight. Found to be significant pulmonary edema on CXR. EKG showed Afib with RVR and had runs of VT on tele. Lactate 8. Patient received IV lasix 40mg, amio, and transferred to CCU and placed on BiPAP.    -CCU course (8/27): transitioned from BiPAP to NC. Bumex 2. Restarted home isordil and po lasix 40mg.   -TTE (8/27): Worsening diastolic dysfunction. EF 35-45%. mild/mod MR. mild AS. mild/mod AR. mod-severe global LV dysfunction.   -CHANDRA (9/1) showed severe MR, mod AR.   -C/w coreg 12.5mg BID, isordil 10mg TID, hydralazine 35mg TID   -Cards recs: No cath planned at this time.   -Heart failure recs: Transitioned patient from IV lasix 40mg qday to po lasix 40mg qday (9/8).  -EP recs: will schedule EP study +/- ICD placement pending optimization per SH/HF  -Repeat Echo showed EF 35% and grossly moderate mitral regurgitation  -C/w strict I/O's, daily weights, and 1.5L fluid restriction  - Pending CHANDRA for tomorrow and NPO after midnight  - Will touch base with cardiology regarding plans

## 2020-09-09 NOTE — PROGRESS NOTE ADULT - PROBLEM SELECTOR PLAN 8
Now resolved . initially Febrile (100.6) on admission + Leukocytosis (WBC 12.27) + Elevated lactate 2.4 + Hypotensive (SBP 100s) on admission. RLE cellulitis vs LUE wound as possible sources of infection  -s/p 2L IVF in ED. BP's improved with normalized lactate (0.7)  -s/p vanc   -Bcx (8/24) negative. Repeat (8/27) GPC in clusters.    #Leukocytosis  -Pt afebrile with no obvious infxn source. Will continue to monitor. Was resolved . initially Febrile (100.6) on admission + Leukocytosis (WBC 12.27) + Elevated lactate 2.4 + Hypotensive (SBP 100s) on admission. RLE cellulitis vs LUE wound as possible sources of infection. Febrile again today to 100.6 with complaints of nasal congestion, chills and fatigue ddx includes viral syndrome given  current complaints  - Check CXR, UA, Ucx, bcx, RVP.   - Hold abx for now given lack of clear source  - Tylenol prn fever    #Leukocytosis  -Workup for fever as above

## 2020-09-09 NOTE — PROGRESS NOTE ADULT - PROBLEM SELECTOR PLAN 6
- remains elevated above baseline but stable  - adjustment of diuretics to oral as above - remains elevated above baseline but improving  - continue current lasix

## 2020-09-09 NOTE — PROGRESS NOTE ADULT - PROBLEM SELECTOR PLAN 7
-s/p vanc - improving    -Wound care saw patient (8/25). Redressed wound. Recommended plastics consult.   -Plastics consulted 8/26. Recommended local wound care and outpatient follow up (Dr. Marquez [phone])

## 2020-09-09 NOTE — PROGRESS NOTE ADULT - PROBLEM SELECTOR PLAN 2
- Medical optimization as above and repeat TTE scheduled for tomorrow  - Structural heart following for consideration of redo CABG as well. Viability study from 9/4 suggesting fair amount of viability - Medical optimization as above and repeat CHANDRA scheduled for tomorrow  - Structural heart following for consideration of MitraClip.

## 2020-09-09 NOTE — PROGRESS NOTE ADULT - ATTENDING COMMENTS
Was febrile this am, complaining of chills, fatigue, nasal congestion and SOB. Ddx includes viral syndrome given symptoms vs less likely PNA given lack of productive cough. To check CXR, Ua, ucx, bcx and RVP. Will hold abx given lack of potential bacterial focus for now as symptoms seem more c/w a viral etiology. f/u CXR.  Plan for repeat CHANDRA tomorrow to assess MV. NPO after midnight  Awaiting CTS recommendations regarding feasibility of re-doing CABG.

## 2020-09-09 NOTE — PROGRESS NOTE ADULT - PROBLEM SELECTOR PLAN 2
NARCISO on CKD III. Cr elevated to 2.4 on admission. Baseline Cr 1.4-1.7. Improved with IVF and lasix was held. However, after CHF exacerbation requiring aggressive diuresis (8/27), patient's Cr was elevated to 2.15 on 8/28  -Rising Cr, likely in the setting of aggressive diuresis.   -Continue to monitor daily BMPs   -Renally dose medications  -Continue to hold home Entresto Severe MR on CHANDRA (9/1)  -Structural heart recs: Repeat CHANDRA as MR may have been overestimated given volume overload. Planned for tomorrow and NPO after midnight

## 2020-09-09 NOTE — PROGRESS NOTE ADULT - SUBJECTIVE AND OBJECTIVE BOX
***************************************************************  Caity Lay MD, PGY1 Resident  Internal Medicine   pager: [phone]  ***************************************************************    THONY ANAYA  65y  MRN: [id number]    Patient is a 65y old  Male who presents with a chief complaint of RLE swelling (08 Sep 2020 11:38)      Subjective: no events ON. Denies fever, CP, SOB, abn pain, N/V, dysuria. Tolerating diet.      REVIEW OF SYSTEMS:    CONSTITUTIONAL: No weakness, fevers or chills  EYES/ENT: No visual changes;  No vertigo or throat pain   NECK: No pain or stiffness  RESPIRATORY: No cough, wheezing, hemoptysis; No shortness of breath  CARDIOVASCULAR: No chest pain or palpitations  GASTROINTESTINAL: No abdominal or epigastric pain. No nausea, vomiting, or hematemesis; No diarrhea or constipation. No melena or hematochezia.  GENITOURINARY: No dysuria, frequency or hematuria  NEUROLOGICAL: No numbness or weakness  SKIN: No itching, rashes      Objective:    MEDICATIONS  (STANDING):  apixaban 5 milliGRAM(s) Oral two times a day  aspirin  chewable 81 milliGRAM(s) Oral daily  atorvastatin 80 milliGRAM(s) Oral at bedtime  carvedilol 12.5 milliGRAM(s) Oral every 12 hours  furosemide    Tablet 40 milliGRAM(s) Oral daily  hydrALAZINE 35 milliGRAM(s) Oral three times a day  isosorbide   dinitrate Tablet (ISORDIL) 10 milliGRAM(s) Oral three times a day  LORazepam     Tablet 0.5 milliGRAM(s) Oral at bedtime  pantoprazole    Tablet 40 milliGRAM(s) Oral two times a day  predniSONE   Tablet 20 milliGRAM(s) Oral daily  sertraline 50 milliGRAM(s) Oral daily    MEDICATIONS  (PRN):        Vitals: Vital Signs Last 24 Hrs  T(C): 37.7 (09-09-20 @ 06:09), Max: 37.7 (09-09-20 @ 06:09)  T(F): 99.9 (09-09-20 @ 06:09), Max: 99.9 (09-09-20 @ 06:09)  HR: 86 (09-09-20 @ 06:09) (60 - 86)  BP: 145/61 (09-09-20 @ 06:09) (108/77 - 153/69)  BP(mean): --  RR: 18 (09-09-20 @ 04:02) (18 - 18)  SpO2: 98% (09-09-20 @ 06:04) (95% - 100%)            I&O's Summary    07 Sep 2020 07:01  -  08 Sep 2020 07:00  --------------------------------------------------------  IN: 1075 mL / OUT: 1450 mL / NET: -375 mL    08 Sep 2020 07:01  -  09 Sep 2020 06:48  --------------------------------------------------------  IN: 1375 mL / OUT: 1775 mL / NET: -400 mL        PHYSICAL EXAM:  GENERAL: NAD  HEAD:  Atraumatic, Normocephalic  EYES: EOMI, conjunctiva and sclera clear  CHEST/LUNG: Clear to percussion bilaterally; No rales, rhonchi, wheezing, or rubs  HEART: Regular rate and rhythm; No murmurs, rubs, or gallops  ABDOMEN: Soft, Nontender, Nondistended;   SKIN: No rashes or lesions  NERVOUS SYSTEM:  Alert & Oriented X3, no focal deficit    LABS:  09-08    141  |  103  |  51<H>  ----------------------------<  138<H>  4.7   |  23  |  2.62<H>  09-08    140  |  104  |  54<H>  ----------------------------<  92  3.5   |  21<L>  |  2.55<H>  09-08    141  |  103  |  53<H>  ----------------------------<  105<H>  3.5   |  21<L>  |  2.64<H>    Ca    9.6      08 Sep 2020 17:42  Ca    9.5      08 Sep 2020 06:08  Ca    9.5      08 Sep 2020 01:32  Phos  4.2     09-08  Mg     2.4     09-08                                                9.9    13.11 )-----------( 303      ( 08 Sep 2020 06:07 )             35.4                         10.3   12.44 )-----------( 328      ( 07 Sep 2020 05:39 )             36.3     CAPILLARY BLOOD GLUCOSE          RADIOLOGY & ADDITIONAL TESTS:    Imaging Personally Reviewed:  [x ] YES  [ ] NO    Consultants involved in case:   Consultant(s) Notes Reviewed:  [ x] YES  [ ] NO:   Care Discussed with Consultants/Other Providers [x ] YES  [ ] NO ***************************************************************  Caity Lay MD, PGY1 Resident  Internal Medicine   pager: [phone]  ***************************************************************    THONY ANAYA  65y  MRN: [id number]    Patient is a 65y old  Male who presents with a chief complaint of RLE swelling (08 Sep 2020 11:38)      Subjective:     Fever of 100.6 this AM and received tylenol 650 mg once, which improved his temperature.  Still has headache that is frontal with no radiation, rated 6-7/10.  Started to develop nasal congestion when he lies flat since yesterday, and causes mild trouble breathing.  Denies fever, CP, SOB, abn pain, N/V, dysuria. Tolerating diet.      REVIEW OF SYSTEMS:    CONSTITUTIONAL: No weakness, fevers or chills  EYES/ENT: No visual changes;  No vertigo or throat pain ; + nasal congestion  NECK: No pain or stiffness  RESPIRATORY: No cough, wheezing, hemoptysis; No shortness of breath  CARDIOVASCULAR: No chest pain or palpitations  GASTROINTESTINAL: No abdominal or epigastric pain. No nausea, vomiting, or hematemesis; No diarrhea or constipation. No melena or hematochezia.  GENITOURINARY: No dysuria, frequency or hematuria  NEUROLOGICAL: No numbness or weakness  SKIN: No itching, rashes      Objective:    MEDICATIONS  (STANDING):  apixaban 5 milliGRAM(s) Oral two times a day  aspirin  chewable 81 milliGRAM(s) Oral daily  atorvastatin 80 milliGRAM(s) Oral at bedtime  carvedilol 12.5 milliGRAM(s) Oral every 12 hours  furosemide    Tablet 40 milliGRAM(s) Oral daily  hydrALAZINE 35 milliGRAM(s) Oral three times a day  isosorbide   dinitrate Tablet (ISORDIL) 10 milliGRAM(s) Oral three times a day  LORazepam     Tablet 0.5 milliGRAM(s) Oral at bedtime  pantoprazole    Tablet 40 milliGRAM(s) Oral two times a day  predniSONE   Tablet 20 milliGRAM(s) Oral daily  sertraline 50 milliGRAM(s) Oral daily    MEDICATIONS  (PRN):        Vitals: Vital Signs Last 24 Hrs  T(C): 37.7 (09-09-20 @ 06:09), Max: 37.7 (09-09-20 @ 06:09)  T(F): 99.9 (09-09-20 @ 06:09), Max: 99.9 (09-09-20 @ 06:09)  HR: 86 (09-09-20 @ 06:09) (60 - 86)  BP: 145/61 (09-09-20 @ 06:09) (108/77 - 153/69)  BP(mean): --  RR: 18 (09-09-20 @ 04:02) (18 - 18)  SpO2: 98% (09-09-20 @ 06:04) (95% - 100%)            I&O's Summary    07 Sep 2020 07:01  -  08 Sep 2020 07:00  --------------------------------------------------------  IN: 1075 mL / OUT: 1450 mL / NET: -375 mL    08 Sep 2020 07:01  -  09 Sep 2020 06:48  --------------------------------------------------------  IN: 1375 mL / OUT: 1775 mL / NET: -400 mL        PHYSICAL EXAM:  GENERAL: NAD  HEAD:  Atraumatic, Normocephalic  CHEST/LUNG: Clear to ascultation bilaterally; No rales, rhonchi, wheezing, or rubs  HEART: Regular rate and rhythm; No murmurs, rubs, or gallops; no pitting edema on b/l LE  ABDOMEN: Soft, Nontender, Nondistended; normoactive bowel sounds  SKIN: No rashes or lesions  NERVOUS SYSTEM:  Alert & Oriented X3, no focal deficit    LABS:  09-08    141  |  103  |  51<H>  ----------------------------<  138<H>  4.7   |  23  |  2.62<H>  09-08    140  |  104  |  54<H>  ----------------------------<  92  3.5   |  21<L>  |  2.55<H>  09-08    141  |  103  |  53<H>  ----------------------------<  105<H>  3.5   |  21<L>  |  2.64<H>    Ca    9.6      08 Sep 2020 17:42  Ca    9.5      08 Sep 2020 06:08  Ca    9.5      08 Sep 2020 01:32  Phos  4.2     09-08  Mg     2.4     09-08                                                9.9    13.11 )-----------( 303      ( 08 Sep 2020 06:07 )             35.4                         10.3   12.44 )-----------( 328      ( 07 Sep 2020 05:39 )             36.3     CAPILLARY BLOOD GLUCOSE          RADIOLOGY & ADDITIONAL TESTS:    Imaging Personally Reviewed:  [x ] YES  [ ] NO    Consultants involved in case:   Consultant(s) Notes Reviewed:  [ x] YES  [ ] NO:   Care Discussed with Consultants/Other Providers [x ] YES  [ ] NO

## 2020-09-09 NOTE — PROGRESS NOTE ADULT - SUBJECTIVE AND OBJECTIVE BOX
Patient seen and examined at bedside.    Overnight Events:   No overnight events  Symptomatically improved    Current Meds:  acetaminophen   Tablet .. 650 milliGRAM(s) Oral every 6 hours PRN  apixaban 5 milliGRAM(s) Oral every 12 hours  aspirin  chewable 81 milliGRAM(s) Oral daily  atorvastatin 80 milliGRAM(s) Oral at bedtime  carvedilol 12.5 milliGRAM(s) Oral every 12 hours  furosemide    Tablet 40 milliGRAM(s) Oral daily  hydrALAZINE 35 milliGRAM(s) Oral three times a day  isosorbide   dinitrate Tablet (ISORDIL) 10 milliGRAM(s) Oral three times a day  LORazepam     Tablet 0.5 milliGRAM(s) Oral at bedtime  pantoprazole    Tablet 40 milliGRAM(s) Oral two times a day  predniSONE   Tablet 20 milliGRAM(s) Oral daily  sertraline 50 milliGRAM(s) Oral daily        Vitals:  T(F): 98.4 (09-09), Max: 100.6 (09-09)  HR: 64 (09-09) (58 - 86)  BP: 157/83 (09-09) (108/77 - 157/83)  RR: 18 (09-09)  SpO2: 97% (09-09)  I&O's Summary    08 Sep 2020 07:01  -  09 Sep 2020 07:00  --------------------------------------------------------  IN: 1375 mL / OUT: 1775 mL / NET: -400 mL        Physical Exam:  Appearance: No acute distress; well appearing  Eyes: PERRL, EOMI, pink conjunctiva  HENT: Normal oral mucosa  Cardiovascular: RRR, S1, S2, no murmurs, rubs, or gallops; no edema; no JVD  Respiratory: Clear to auscultation bilaterally  Gastrointestinal: soft, non-tender, non-distended with normal bowel sounds  Musculoskeletal: No clubbing; no joint deformity   Neurologic: Non-focal  Lymphatic: No lymphadenopathy  Psychiatry: AAOx3, mood & affect appropriate  Skin: No rashes, ecchymoses, or cyanosis                          11.1   12.85 )-----------( 259      ( 09 Sep 2020 06:51 )             40.2     09-09    139  |  102  |  45<H>  ----------------------------<  83  4.0   |  22  |  2.20<H>    Ca    9.7      09 Sep 2020 06:51  Phos  3.0     09-09  Mg     2.2     09-09            Serum Pro-Brain Natriuretic Peptide: 3301 pg/mL (09-03 @ 06:19)          New ECG(s): Personally reviewed    Echo:    Stress Testing:     Cath:    Imaging:    Interpretation of Telemetry: Patient seen and examined at bedside.    Overnight Events:   No overnight events  Symptomatically improved    Current Meds:  acetaminophen   Tablet .. 650 milliGRAM(s) Oral every 6 hours PRN  apixaban 5 milliGRAM(s) Oral every 12 hours  aspirin  chewable 81 milliGRAM(s) Oral daily  atorvastatin 80 milliGRAM(s) Oral at bedtime  carvedilol 12.5 milliGRAM(s) Oral every 12 hours  furosemide    Tablet 40 milliGRAM(s) Oral daily  hydrALAZINE 35 milliGRAM(s) Oral three times a day  isosorbide   dinitrate Tablet (ISORDIL) 10 milliGRAM(s) Oral three times a day  LORazepam     Tablet 0.5 milliGRAM(s) Oral at bedtime  pantoprazole    Tablet 40 milliGRAM(s) Oral two times a day  predniSONE   Tablet 20 milliGRAM(s) Oral daily  sertraline 50 milliGRAM(s) Oral daily        Vitals:  T(F): 98.4 (09-09), Max: 100.6 (09-09)  HR: 64 (09-09) (58 - 86)  BP: 157/83 (09-09) (108/77 - 157/83)  RR: 18 (09-09)  SpO2: 97% (09-09)  I&O's Summary    08 Sep 2020 07:01  -  09 Sep 2020 07:00  --------------------------------------------------------  IN: 1375 mL / OUT: 1775 mL / NET: -400 mL        Physical Exam:  Appearance: No acute distress; well appearing  Eyes: PERRL, EOMI, pink conjunctiva  HENT: Normal oral mucosa  Cardiovascular: RRR, S1, S2, no murmurs, rubs, or gallops; no edema; no JVD visible  Respiratory: Clear to auscultation bilaterally  Gastrointestinal: soft, non-tender, non-distended with normal bowel sounds  Musculoskeletal: No clubbing; no joint deformity   Neurologic: Non-focal  Lymphatic: No lymphadenopathy  Psychiatry: AAOx3, mood & affect appropriate  Skin: No rashes, ecchymoses, or cyanosis                          11.1   12.85 )-----------( 259      ( 09 Sep 2020 06:51 )             40.2     09-09    139  |  102  |  45<H>  ----------------------------<  83  4.0   |  22  |  2.20<H>    Ca    9.7      09 Sep 2020 06:51  Phos  3.0     09-09  Mg     2.2     09-09            Serum Pro-Brain Natriuretic Peptide: 3301 pg/mL (09-03 @ 06:19)          New ECG(s): Personally reviewed    Echo:    Stress Testing:     Cath:    Imaging:    Interpretation of Telemetry:

## 2020-09-09 NOTE — PROGRESS NOTE ADULT - SUBJECTIVE AND OBJECTIVE BOX
EP Attending    HISTORY OF PRESENT ILLNESS:   Mr Colon is a 65yoM who presents for acute onset shortness of breath.  His EF is 35-40% chronically (1/2020 and 8/2020), due to ischemic cardiomyopathy.  He is s/p CABG and multiple subsequent PCI. He only has 1 graft open- his LIMA to LAD, and his native vessels are otherwise occluded.  Also has AFib, on apixaban, HTN, CKD, and CHER on CPAP.   He has had multiple episodes of flash pulmonary edema.    9/1- No angina, orthopnea, PND or palpitations.  He is short of breath on minimal exertion, walking across his hospital room with a walker (NYHA IIIB).  No fainting or near-fainting episodes.  telemetry after writing my note yesterday had 9 beats of NSVT.  9/2 - uneventful overnight.  had CHANDRA showing severe MR and moderate AI.  still short of breath, no angina, no fainting.  9/3 - no VT on telemetry overnight. Still short of breath.  Discussed the overall plan of care re: diuresis and re-evaluation of his valve before deciding on a structural vs surgical solution.  He is upset that he will spend more time in the hospital and potentially even longer if he needs a re-do open heart procedure, but understands that an incomplete plan will result in readmission and worse CHF symptoms.  9/4 - uneventful overnight. no angina, orthopnea or PND.  ambulating a little bit easier.  no VT on telemetry.  9/5 - reviewed Viability Study findings with patient.  exertional shortness of breath improving slowly.  no palpitations. no VT on telemetry.  9/6- uneventful overnight, no angina. improving orthopnea.  ambulating w/ less SOB.  9/7 - feels well today.  ambulating with less exertional dyspnea. expressed gratitude towards staff- the last 5 days have been worthwhile and he is feeling much better than this time last week.  9/8 - feeling well.  awaiting CHF and Surgical team followups re: viability testing and CHANDRA.  no VT on telemetry.  9/9 - having fevers overnight, nose congested, short of breath.     acetaminophen   Tablet .. 650 milliGRAM(s) Oral every 6 hours PRN  apixaban 5 milliGRAM(s) Oral every 12 hours  aspirin  chewable 81 milliGRAM(s) Oral daily  atorvastatin 80 milliGRAM(s) Oral at bedtime  carvedilol 12.5 milliGRAM(s) Oral every 12 hours  furosemide    Tablet 40 milliGRAM(s) Oral daily  hydrALAZINE 35 milliGRAM(s) Oral three times a day  isosorbide   dinitrate Tablet (ISORDIL) 10 milliGRAM(s) Oral three times a day  LORazepam     Tablet 0.5 milliGRAM(s) Oral at bedtime  pantoprazole    Tablet 40 milliGRAM(s) Oral two times a day  predniSONE   Tablet 20 milliGRAM(s) Oral daily  sertraline 50 milliGRAM(s) Oral daily                            11.1   12.85 )-----------( 259      ( 09 Sep 2020 06:51 )             40.2       09-09    139  |  102  |  45<H>  ----------------------------<  83  4.0   |  22  |  2.20<H>    Ca    9.7      09 Sep 2020 06:51  Phos  3.0     09-09  Mg     2.2     09-09    T(C): 37.6 (09-09-20 @ 08:45), Max: 38.1 (09-09-20 @ 07:30)  HR: 80 (09-09-20 @ 09:48) (60 - 86)  BP: 129/62 (09-09-20 @ 07:30) (108/77 - 153/69)  RR: 18 (09-09-20 @ 07:30) (18 - 18)  SpO2: 98% (09-09-20 @ 09:48) (95% - 100%)  Wt(kg): --    I&O's Summary    08 Sep 2020 07:01  -  09 Sep 2020 07:00  --------------------------------------------------------  IN: 1375 mL / OUT: 1775 mL / NET: -400 mL    Appearance: Robust white male in no acute distress, but short-of-breath.  HEENT:   Normal oral mucosa, PERRL, EOMI	  Lymphatic: No lymphadenopathy , no edema  Cardiovascular: Normal S1 S2,  Peripheral pulses palpable 2+ bilaterally  Respiratory: Lungs clear to auscultation, normal effort 	  Gastrointestinal:  Soft, Non-tender, + BS	  Skin: No rashes, No ecchymoses, No cyanosis, warm to touch  Musculoskeletal: Normal range of motion, normal strength  Psychiatry:  Mood & affect appropriate    TELEMETRY: NSR, 9 beat run of NSVT on 8/31.  Multifocal PVC's. Occasional triplets and couplets on 9/6-7.  ECG:  	NSR, interpolated PVC's.  Echo: EF 35-40%, LV dilated to upper limit of normal, severe diastolic dysfunction, moderate Mitral regurgitation.  CHANDRA:  Tetheterd MV leaflets --> severe MR, in the setting of volume overload and high BP.  Cath: Prior with only LIMA to LAD patent. Native vessels occluded.	  Viability: Multiple viable segments at sites of prior SVG insertion.      ASSESSMENT/PLAN: 	65y Male with an ischemic cardiomyopathy, EF 35-40%, and increasing frequency of episodes of flash pulmonary edema.  Has nonsustained ventricular tachycardia on telemetry.    He is not a candidate for biventricular pacing.  Structural and CHF team consults have been completed: the MV regurg should be re-evaluated once he is euvolemic.  He has diuresed well over the last 5 days or so, and is feeling symptomatically much better at rest and during ambulation.  BUN rising steadily during this time to peak of 50.  Viability study was reassuring.  No further VT on monitor.    Given ongoing / escalating diuresis, replace K to 4-4.5 daily.  Mg to 2.  Pending workup re: fevers, and planning from structural + CT Surg + HF for next step in management.    Based on the MUSTT trial, he qualifies for an EP Study for VT induction, with ICD insertion if positive.  However at this time, EP workup will be deferred until CHF team and Structural/Surgical teams have optimized him.  Will follow along, and formally re-evaluate prior to discharge.    Wei Feliciano M.D.  Cardiac Electrophysiology    office [phone]  pager [phone]

## 2020-09-09 NOTE — PROGRESS NOTE ADULT - SUBJECTIVE AND OBJECTIVE BOX
S: Fever/chills overnight, has stuffy nose. Denies chest pain or SOB. Review of systems otherwise (-)      MEDICATIONS  (STANDING):  apixaban 5 milliGRAM(s) Oral every 12 hours  aspirin  chewable 81 milliGRAM(s) Oral daily  atorvastatin 80 milliGRAM(s) Oral at bedtime  carvedilol 12.5 milliGRAM(s) Oral every 12 hours  furosemide    Tablet 40 milliGRAM(s) Oral daily  hydrALAZINE 35 milliGRAM(s) Oral three times a day  isosorbide   dinitrate Tablet (ISORDIL) 10 milliGRAM(s) Oral three times a day  LORazepam     Tablet 0.5 milliGRAM(s) Oral at bedtime  pantoprazole    Tablet 40 milliGRAM(s) Oral two times a day  predniSONE   Tablet 20 milliGRAM(s) Oral daily  sertraline 50 milliGRAM(s) Oral daily    MEDICATIONS  (PRN):  acetaminophen   Tablet .. 650 milliGRAM(s) Oral every 6 hours PRN Temp greater or equal to 38C (100.4F)      LABS:                            11.1   12.85 )-----------( 259      ( 09 Sep 2020 06:51 )             40.2     Hemoglobin: 11.1 g/dL (09-09 @ 06:51)  Hemoglobin: 9.9 g/dL (09-08 @ 06:07)  Hemoglobin: 10.3 g/dL (09-07 @ 05:39)  Hemoglobin: 10.3 g/dL (09-06 @ 06:32)  Hemoglobin: 10.0 g/dL (09-05 @ 07:13)    09-09    139  |  102  |  45<H>  ----------------------------<  83  4.0   |  22  |  2.20<H>    Ca    9.7      09 Sep 2020 06:51  Phos  3.0     09-09  Mg     2.2     09-09      Creatinine Trend: 2.20<--, 2.62<--, 2.55<--, 2.64<--, 2.64<--, 2.60<--             09-08-20 @ 07:01  -  09-09-20 @ 07:00  --------------------------------------------------------  IN: 1375 mL / OUT: 1775 mL / NET: -400 mL        PHYSICAL EXAM  Vital Signs Last 24 Hrs  T(C): 37.6 (09 Sep 2020 08:45), Max: 38.1 (09 Sep 2020 07:30)  T(F): 99.7 (09 Sep 2020 08:45), Max: 100.6 (09 Sep 2020 07:30)  HR: 80 (09 Sep 2020 09:48) (60 - 86)  BP: 129/62 (09 Sep 2020 07:30) (108/77 - 153/69)  BP(mean): --  RR: 18 (09 Sep 2020 07:30) (18 - 18)  SpO2: 98% (09 Sep 2020 09:48) (95% - 100%)      Gen: NAD  HEENT:  (-)icterus (-)pallor  CV: N S1 S2 1/6 KATHERYN (+)2 Pulses B/l  Resp: CTA B/L, normal effort  GI: (+) BS Soft, NT, ND  Lymph:  (-) edema, (-)obvious lymphadenopathy  Skin: Warm to touch, Normal turgor  Psych: Appropriate mood and affect      TELEMETRY: SR 80-90s, PVCs    Echo: < from: TTE with Doppler (w/Cont) (08.27.20 @ 07:54) >  Conclusions:  1. Mitral valve not well visualized. Mitral annular  calcification. Tethered mitral valve leaflets with normal  opening. Mild moderate mitral regurgitation.  2. Calcified trileaflet aortic valve with decreased  opening. Peak transaortic valve gradient equals 12 mm Hg,  mean transaortic valve gradient equals 8 mm Hg, at least  mild AS. Limited gradient/Doppler evaluation. Mild-moderate  aortic regurgitation.  3. Endocardial visualization enhanced with intravenous  injection of Ultrasonic Enhancing Agent (Definity).  Moderate to severe  global left ventricular systolic  dysfunction. There dyssynchronous left ventricular  contraction.  4. Moderate diastolic dysfunction (Stage II). Increased  E/e'  is consistent with elevated left ventricular filling  pressure.  *** Compared with echocardiogram report of 12/13/2017,  Limited comparison.  Disatolic dysfunction is worse.    < end of copied text >    < from: Transesophageal Echocardiogram w/o TTE (09.01.20 @ 14:15) >  Conclusions:  1. Tethered mitral valve leaflets with normal opening.  Severe mitral regurgitation. There is systolic flow  reversal in the right upper pulmonary vein. By PISA,  calculated ERO=43 mmsq and regurgitant volume=73 mL (Pisa  rad 1cm, Va 34.5 cm/s, Vm 5.0 m/s,  cm)  2. Calcified trileaflet aortic valve with normal opening.  Moderate aortic regurgitation. Vena contracta=0.4 cm.  3. Normal aortic root size. (Ao: 3.6 cm at the sinuses of  Valsalva). Midlly dilated ascending aorta for BSA  (ascending aorta diameter 4.5 cm approximately 5 cm distal  to the aortic valve.  Normal size aortic arch, and  descending thoracic aorta.  Minimal atheroma.  4. Left atrial enlargement. No left atrial or left atrial  appendage thrombus. Normal left atrial appendage function  (maximum velocity>40 cm/s).  5. Moderate left ventricular systolic dysfunction.  Paradoxical septal motion consistent with prior cardiac  surgery.  6. Normal right ventricular size and function.    < end of copied text >      < from: Cardiac Viability (09.04.20 @ 14:15) >  GATED ANALYSIS:  Rest gated wall motion analysis was performed (LVEF = 47  %;LVEDV = 195 ml.), revealing moderately reduced  LV  function.  ------------------------------------------------------------------------  IMPRESSIONS:Abnormal Study  * The left ventricle was markdely dilated at baseline.  There is viability in the apex, inferior, septal,  anterolateral, and mid to basal inferolateral walls. On  delayed imaging, there is appears to be partial viability  in the distal inferolateral wall.  * Rest gated wall motion analysis was performed (LVEF = 47  %;LVEDV = 195 ml.), revealing moderately reduced  LV  function.  Conclusion:  The left ventricle was markdely dilated at baseline. There  is viability in the apex, inferior, septal, anterolateral,  and mid to basal inferolateral walls. On delayed imaging,  there also appears to be partial viability in the distal  inferolateral wall.  ------------------------------------------------------------------------  Confirmed on  9/4/2020 - 16:56:37 by Beto Norton M.D.    < end of copied text >      ASSESSMENT/PLAN: Patient is a 65 year old male with PMH of CAD s/p CABG with most recent [facility] 1/2020 with no intervention, chronic systolic CHF (LVEF 39% TTE 1/2020), Afib (on Eliquis), HTN, HLD, CKD, CHER (on CPAP), and pemphigus vulgaris who presented with RLE swelling admitted with RLE cellulitis and NARCISO on CKD. Cardiology consulted for management of CAD/CHF.    - Heart failure follow up - Continue PO lasix - daily standing weights, strict I/Os  - Continue medical management of known CAD with systolic cardiomyopathy - ASA/Statin, Coreg, Hydralazine/Isordil  - Continue AC with Eliquis for CVA prevention, if procedures planned, will need to bridge with heparin gtt  - EP eval noted  - CHANDRA with severe MR  - Structural heart eval follow up/ CHANDRA noted/ viability study results noted/ await surgical plan - CTS f/u with Dr. Pena  - Repeat TTE noted, pending repeat CHANDRA tomorrow  - Fever workup per medicine - f/u BCx, repeat COVID?  - Patient to f/u with Dr. Campbell's cardiology group after d/c      Cristi Jarvis PA-C  Pager: [phone]

## 2020-09-09 NOTE — PROGRESS NOTE ADULT - ASSESSMENT
65yo male with PMH significant for CAD (s/p CABG; most recent [facility] showed stable disease with no intervention), CHF (LVEF 39% TTE 1/2020), Afib (on Eliquis), HTN, HLD, CKD III, CHER (uses CPAP), and pemphigus vulgaris presenting to ED with RLE swelling. Found to be septic in ED (hypotension, leukocytosis, elevated lactate, febrile). S/p Vanc/zosyn, 2L IVF in ED. Restarted home ativan 0.5mg at bedtime. C/w home zoloft. C/w CPAP at night for CHER.     RUQ ultrasound (8/31): HSM + mild nodularity c/f cirrhosis; No ascites; No focal lesions. Patient with h/o social drinking but no ETOH abuse. Likely cardiac vs FRACNOIS vs viral hep. Hepatitis w/u negative. 63yo male with PMH significant for CAD (s/p CABG; most recent LHC showed stable disease with no intervention), CHF (LVEF 39% TTE 1/2020), Afib (on Eliquis), HTN, HLD, CKD III, CHER (uses CPAP), and pemphigus vulgaris presenting to ED with RLE swelling. Found to be septic in ED (hypotension, leukocytosis, elevated lactate, febrile). S/p Vanc/zosyn, 2L IVF in ED. Visit complicated by acute CHF exacerbation on 8/27 for which RRT was called and runs of VT on tele. S/p CCU visit and aggressive diuresis. Transferred back to floors on 8/28. Pending CHANDRA per multiple cardiology teams.

## 2020-09-09 NOTE — PROGRESS NOTE ADULT - ASSESSMENT
Mr. Colon is a 65 year old male with PMH significant for CAD (s/p CABG 2005 LIMA-LAD, SVG- OM2, SVG- OM1, LIMA to LAD only patient graft per cath 1/2020), ICM HFrEF (LVEF 30-35%), severe MR, Afib (on Eliquis), HTN, HLD, CKD III, CHER (on CPAP) who presented with septic shock 2/2 RLL cellulitis. He additionally had ADHF with shortness of breath and hypoxia requiring CCU admission for VT likely in the setting of volume overload and severe MR. He has diuresed nearly 30lbs since admission. He underwent CHANDRA on 9/1 that showed significantly tethered MV with severe MR. CTX following for his MR with consideration for redo CABG. He underwent cardiac viability study on 9/4 that suggested fair amount of viability but notably with improvement in LVEF to 47%. No evidence of volume expansion, although exam is challenging due to his body habitus. He is hemodynamically stable but with persistent renal dysfunction. Now on oral diuretics. Plans for TTE to reassess his MR.

## 2020-09-09 NOTE — PROGRESS NOTE ADULT - PROBLEM SELECTOR PLAN 4
Severe MR on CHANDRA (9/1)  -Structural heart recs: Repeat CHANDRA as MR may have been overestimated given volume overload NARCISO on CKD III. Cr elevated to 2.4 on admission. Baseline Cr 1.4-1.7. Improved with IVF and lasix was held. However, after CHF exacerbation requiring aggressive diuresis (8/27), patient's Cr was elevated to 2.15 on 8/28  -Rising Cr, likely in the setting of aggressive diuresis.   -Continue to monitor daily BMPs   -Renally dose medications  -Continue to hold home Entresto

## 2020-09-10 LAB
ANION GAP SERPL CALC-SCNC: 15 MMOL/L — SIGNIFICANT CHANGE UP (ref 5–17)
BUN SERPL-MCNC: 44 MG/DL — HIGH (ref 7–23)
CALCIUM SERPL-MCNC: 9.3 MG/DL — SIGNIFICANT CHANGE UP (ref 8.4–10.5)
CHLORIDE SERPL-SCNC: 102 MMOL/L — SIGNIFICANT CHANGE UP (ref 96–108)
CO2 SERPL-SCNC: 21 MMOL/L — LOW (ref 22–31)
CREAT SERPL-MCNC: 2.51 MG/DL — HIGH (ref 0.5–1.3)
CULTURE RESULTS: NO GROWTH — SIGNIFICANT CHANGE UP
GLUCOSE SERPL-MCNC: 85 MG/DL — SIGNIFICANT CHANGE UP (ref 70–99)
GRAM STN FLD: SIGNIFICANT CHANGE UP
HCT VFR BLD CALC: 34 % — LOW (ref 39–50)
HGB BLD-MCNC: 9.5 G/DL — LOW (ref 13–17)
MAGNESIUM SERPL-MCNC: 2.3 MG/DL — SIGNIFICANT CHANGE UP (ref 1.6–2.6)
MCHC RBC-ENTMCNC: 22.9 PG — LOW (ref 27–34)
MCHC RBC-ENTMCNC: 27.9 GM/DL — LOW (ref 32–36)
MCV RBC AUTO: 81.9 FL — SIGNIFICANT CHANGE UP (ref 80–100)
METHOD TYPE: SIGNIFICANT CHANGE UP
MSSA DNA SPEC QL NAA+PROBE: SIGNIFICANT CHANGE UP
NRBC # BLD: 0 /100 WBCS — SIGNIFICANT CHANGE UP (ref 0–0)
PHOSPHATE SERPL-MCNC: 4.3 MG/DL — SIGNIFICANT CHANGE UP (ref 2.5–4.5)
PLATELET # BLD AUTO: 222 K/UL — SIGNIFICANT CHANGE UP (ref 150–400)
POTASSIUM SERPL-MCNC: 3.5 MMOL/L — SIGNIFICANT CHANGE UP (ref 3.5–5.3)
POTASSIUM SERPL-SCNC: 3.5 MMOL/L — SIGNIFICANT CHANGE UP (ref 3.5–5.3)
RBC # BLD: 4.15 M/UL — LOW (ref 4.2–5.8)
RBC # FLD: 18.4 % — HIGH (ref 10.3–14.5)
SODIUM SERPL-SCNC: 138 MMOL/L — SIGNIFICANT CHANGE UP (ref 135–145)
SPECIMEN SOURCE: SIGNIFICANT CHANGE UP
SPECIMEN SOURCE: SIGNIFICANT CHANGE UP
WBC # BLD: 8.03 K/UL — SIGNIFICANT CHANGE UP (ref 3.8–10.5)
WBC # FLD AUTO: 8.03 K/UL — SIGNIFICANT CHANGE UP (ref 3.8–10.5)

## 2020-09-10 PROCEDURE — 99233 SBSQ HOSP IP/OBS HIGH 50: CPT | Mod: GC

## 2020-09-10 PROCEDURE — 99232 SBSQ HOSP IP/OBS MODERATE 35: CPT

## 2020-09-10 PROCEDURE — 93010 ELECTROCARDIOGRAM REPORT: CPT

## 2020-09-10 RX ORDER — HYDRALAZINE HCL 50 MG
75 TABLET ORAL THREE TIMES A DAY
Refills: 0 | Status: DISCONTINUED | OUTPATIENT
Start: 2020-09-10 | End: 2020-09-12

## 2020-09-10 RX ORDER — ISOSORBIDE DINITRATE 5 MG/1
20 TABLET ORAL THREE TIMES A DAY
Refills: 0 | Status: DISCONTINUED | OUTPATIENT
Start: 2020-09-10 | End: 2020-09-12

## 2020-09-10 RX ORDER — POTASSIUM CHLORIDE 20 MEQ
40 PACKET (EA) ORAL ONCE
Refills: 0 | Status: COMPLETED | OUTPATIENT
Start: 2020-09-10 | End: 2020-09-10

## 2020-09-10 RX ORDER — VANCOMYCIN HCL 1 G
VIAL (EA) INTRAVENOUS
Refills: 0 | Status: DISCONTINUED | OUTPATIENT
Start: 2020-09-10 | End: 2020-09-11

## 2020-09-10 RX ORDER — HYDRALAZINE HCL 50 MG
60 TABLET ORAL THREE TIMES A DAY
Refills: 0 | Status: DISCONTINUED | OUTPATIENT
Start: 2020-09-10 | End: 2020-09-10

## 2020-09-10 RX ORDER — VANCOMYCIN HCL 1 G
1250 VIAL (EA) INTRAVENOUS EVERY 24 HOURS
Refills: 0 | Status: DISCONTINUED | OUTPATIENT
Start: 2020-09-11 | End: 2020-09-11

## 2020-09-10 RX ORDER — VANCOMYCIN HCL 1 G
1250 VIAL (EA) INTRAVENOUS ONCE
Refills: 0 | Status: COMPLETED | OUTPATIENT
Start: 2020-09-10 | End: 2020-09-10

## 2020-09-10 RX ADMIN — Medication 50 MILLIGRAM(S): at 05:13

## 2020-09-10 RX ADMIN — Medication 0.5 MILLIGRAM(S): at 21:07

## 2020-09-10 RX ADMIN — PANTOPRAZOLE SODIUM 40 MILLIGRAM(S): 20 TABLET, DELAYED RELEASE ORAL at 05:13

## 2020-09-10 RX ADMIN — Medication 75 MILLIGRAM(S): at 21:07

## 2020-09-10 RX ADMIN — PANTOPRAZOLE SODIUM 40 MILLIGRAM(S): 20 TABLET, DELAYED RELEASE ORAL at 18:14

## 2020-09-10 RX ADMIN — Medication 40 MILLIEQUIVALENT(S): at 08:39

## 2020-09-10 RX ADMIN — ISOSORBIDE DINITRATE 20 MILLIGRAM(S): 5 TABLET ORAL at 21:07

## 2020-09-10 RX ADMIN — Medication 40 MILLIGRAM(S): at 05:15

## 2020-09-10 RX ADMIN — APIXABAN 5 MILLIGRAM(S): 2.5 TABLET, FILM COATED ORAL at 05:15

## 2020-09-10 RX ADMIN — Medication 166.67 MILLIGRAM(S): at 18:14

## 2020-09-10 RX ADMIN — Medication 60 MILLIGRAM(S): at 13:58

## 2020-09-10 RX ADMIN — Medication 20 MILLIGRAM(S): at 05:13

## 2020-09-10 RX ADMIN — ISOSORBIDE DINITRATE 10 MILLIGRAM(S): 5 TABLET ORAL at 05:13

## 2020-09-10 RX ADMIN — CARVEDILOL PHOSPHATE 12.5 MILLIGRAM(S): 80 CAPSULE, EXTENDED RELEASE ORAL at 05:15

## 2020-09-10 RX ADMIN — Medication 81 MILLIGRAM(S): at 11:07

## 2020-09-10 RX ADMIN — CARVEDILOL PHOSPHATE 12.5 MILLIGRAM(S): 80 CAPSULE, EXTENDED RELEASE ORAL at 18:14

## 2020-09-10 RX ADMIN — ATORVASTATIN CALCIUM 80 MILLIGRAM(S): 80 TABLET, FILM COATED ORAL at 21:07

## 2020-09-10 RX ADMIN — ISOSORBIDE DINITRATE 20 MILLIGRAM(S): 5 TABLET ORAL at 14:01

## 2020-09-10 RX ADMIN — SERTRALINE 50 MILLIGRAM(S): 25 TABLET, FILM COATED ORAL at 11:07

## 2020-09-10 NOTE — PROGRESS NOTE ADULT - SUBJECTIVE AND OBJECTIVE BOX
**********************************************  Chiquis Duarte, PGY-1  Internal Medicine   [facility] Pager #: [phone]  **********************************************     Patient is a 65y old  Male who presents with a chief complaint of RLE swelling (10 Sep 2020 06:53)    SUBJECTIVE / OVERNIGHT EVENTS:     OBJECTIVE:  Vital Signs Last 24 Hrs  T(C): 36.8 (10 Sep 2020 04:32), Max: 37.6 (09 Sep 2020 08:45)  T(F): 98.3 (10 Sep 2020 04:32), Max: 99.7 (09 Sep 2020 08:45)  HR: 62 (10 Sep 2020 06:01) (58 - 84)  BP: 112/65 (10 Sep 2020 05:17) (110/60 - 157/83)  BP(mean): --  RR: 18 (10 Sep 2020 04:32) (18 - 18)  SpO2: 99% (10 Sep 2020 06:01) (95% - 100%)    I&O's Summary    09 Sep 2020 07:01  -  10 Sep 2020 07:00  --------------------------------------------------------  IN: 460 mL / OUT: 950 mL / NET: -490 mL      PHYSICAL EXAM:  GENERAL: NAD  HEAD:  Atraumatic, Normocephalic  CHEST/LUNG: Clear to ascultation bilaterally; No rales, rhonchi, wheezing, or rubs  HEART: Regular rate and rhythm; No murmurs, rubs, or gallops; no pitting edema on b/l LE  ABDOMEN: Soft, Nontender, Nondistended; normoactive bowel sounds  SKIN: No rashes or lesions  NERVOUS SYSTEM:  Alert & Oriented X3, no focal deficit    Labs:  CAPILLARY BLOOD GLUCOSE                              9.5    8.03  )-----------( 222      ( 10 Sep 2020 06:30 )             34.0     09-10    138  |  102  |  44<H>  ----------------------------<  85  3.5   |  21<L>  |  2.51<H>    Ca    9.3      10 Sep 2020 06:30  Phos  4.3     09-10  Mg     2.3     09-10              Urinalysis Basic - ( 09 Sep 2020 20:40 )    Color: Light Yellow / Appearance: Clear / S.015 / pH: x  Gluc: x / Ketone: Negative  / Bili: Negative / Urobili: <2 mg/dL   Blood: x / Protein: Negative / Nitrite: Negative   Leuk Esterase: Negative / RBC: x / WBC x   Sq Epi: x / Non Sq Epi: x / Bacteria: x      Imaging Personally Reviewed:    Consultant(s) Notes Reviewed:   Care Discussed with Consultants/Other Providers:    MEDICATIONS  (STANDING):  apixaban 5 milliGRAM(s) Oral every 12 hours  aspirin  chewable 81 milliGRAM(s) Oral daily  atorvastatin 80 milliGRAM(s) Oral at bedtime  carvedilol 12.5 milliGRAM(s) Oral every 12 hours  furosemide    Tablet 40 milliGRAM(s) Oral daily  hydrALAZINE 50 milliGRAM(s) Oral three times a day  isosorbide   dinitrate Tablet (ISORDIL) 10 milliGRAM(s) Oral three times a day  LORazepam     Tablet 0.5 milliGRAM(s) Oral at bedtime  pantoprazole    Tablet 40 milliGRAM(s) Oral two times a day  predniSONE   Tablet 20 milliGRAM(s) Oral daily  sertraline 50 milliGRAM(s) Oral daily    MEDICATIONS  (PRN):  acetaminophen   Tablet .. 650 milliGRAM(s) Oral every 6 hours PRN Temp greater or equal to 38C (100.4F) **********************************************  Chiquis Mckeeed, PGY-1  Internal Medicine   [facility] Pager #: [phone]  **********************************************     Patient is a 65y old  Male who presents with a chief complaint of RLE swelling (10 Sep 2020 06:53)    SUBJECTIVE / OVERNIGHT EVENTS: Patient had a headache overnight, improved with Tylenol. Cardiology examined pt this AM and decided on RHC with subsequent CHANDRA. However, since patient had taken his eliquis this AM, RHC and CHANDRA were postponed to tomorrow. Spoke with patient's daughter and updated her. Examined at bedside this AM. Patient frustrated by all of the testing and having to stay in the hospital, but otherwise denies fever/chills, CP, palpitations, SOB, n/v/d.     OBJECTIVE:  Vital Signs Last 24 Hrs  T(C): 36.8 (10 Sep 2020 04:32), Max: 37.6 (09 Sep 2020 08:45)  T(F): 98.3 (10 Sep 2020 04:32), Max: 99.7 (09 Sep 2020 08:45)  HR: 62 (10 Sep 2020 06:01) (58 - 84)  BP: 112/65 (10 Sep 2020 05:17) (110/60 - 157/83)  BP(mean): --  RR: 18 (10 Sep 2020 04:32) (18 - 18)  SpO2: 99% (10 Sep 2020 06:01) (95% - 100%)    I&O's Summary    09 Sep 2020 07:01  -  10 Sep 2020 07:00  --------------------------------------------------------  IN: 460 mL / OUT: 950 mL / NET: -490 mL      PHYSICAL EXAM:  GENERAL: NAD  HEAD:  Atraumatic, Normocephalic  CHEST/LUNG: Clear to ascultation bilaterally; No rales, rhonchi, wheezing, or rubs  HEART: Regular rate and rhythm; No murmurs, rubs, or gallops; no pitting edema on b/l LE  ABDOMEN: Soft, Nontender, Nondistended; normoactive bowel sounds  SKIN: No rashes or lesions  NERVOUS SYSTEM:  Alert & Oriented X3, no focal deficit    Labs:  CAPILLARY BLOOD GLUCOSE                              9.5    8.03  )-----------( 222      ( 10 Sep 2020 06:30 )             34.0     09-10    138  |  102  |  44<H>  ----------------------------<  85  3.5   |  21<L>  |  2.51<H>    Ca    9.3      10 Sep 2020 06:30  Phos  4.3     09-10  Mg     2.3     09-10              Urinalysis Basic - ( 09 Sep 2020 20:40 )    Color: Light Yellow / Appearance: Clear / S.015 / pH: x  Gluc: x / Ketone: Negative  / Bili: Negative / Urobili: <2 mg/dL   Blood: x / Protein: Negative / Nitrite: Negative   Leuk Esterase: Negative / RBC: x / WBC x   Sq Epi: x / Non Sq Epi: x / Bacteria: x      Imaging Personally Reviewed:    Consultant(s) Notes Reviewed:   Care Discussed with Consultants/Other Providers:    MEDICATIONS  (STANDING):  apixaban 5 milliGRAM(s) Oral every 12 hours  aspirin  chewable 81 milliGRAM(s) Oral daily  atorvastatin 80 milliGRAM(s) Oral at bedtime  carvedilol 12.5 milliGRAM(s) Oral every 12 hours  furosemide    Tablet 40 milliGRAM(s) Oral daily  hydrALAZINE 50 milliGRAM(s) Oral three times a day  isosorbide   dinitrate Tablet (ISORDIL) 10 milliGRAM(s) Oral three times a day  LORazepam     Tablet 0.5 milliGRAM(s) Oral at bedtime  pantoprazole    Tablet 40 milliGRAM(s) Oral two times a day  predniSONE   Tablet 20 milliGRAM(s) Oral daily  sertraline 50 milliGRAM(s) Oral daily    MEDICATIONS  (PRN):  acetaminophen   Tablet .. 650 milliGRAM(s) Oral every 6 hours PRN Temp greater or equal to 38C (100.4F)

## 2020-09-10 NOTE — PROGRESS NOTE ADULT - SUBJECTIVE AND OBJECTIVE BOX
Patient seen and examined at bedside.    Overnight Events:   Yesterday had fever  Pt. comfortable no complaints today.     Current Meds:  acetaminophen   Tablet .. 650 milliGRAM(s) Oral every 6 hours PRN  aspirin  chewable 81 milliGRAM(s) Oral daily  atorvastatin 80 milliGRAM(s) Oral at bedtime  carvedilol 12.5 milliGRAM(s) Oral every 12 hours  furosemide    Tablet 40 milliGRAM(s) Oral daily  hydrALAZINE 60 milliGRAM(s) Oral three times a day  isosorbide   dinitrate Tablet (ISORDIL) 20 milliGRAM(s) Oral three times a day  LORazepam     Tablet 0.5 milliGRAM(s) Oral at bedtime  pantoprazole    Tablet 40 milliGRAM(s) Oral two times a day  predniSONE   Tablet 20 milliGRAM(s) Oral daily  sertraline 50 milliGRAM(s) Oral daily        Vitals:  T(F): 98 (09-10), Max: 98.3 (09-10)  HR: 71 (09-10) (53 - 84)  BP: 126/81 (09-10) (110/60 - 157/83)  RR: 18 (09-10)  SpO2: 98% (09-10)  I&O's Summary    09 Sep 2020 07:01  -  10 Sep 2020 07:00  --------------------------------------------------------  IN: 460 mL / OUT: 950 mL / NET: -490 mL    10 Sep 2020 07:01  -  10 Sep 2020 14:22  --------------------------------------------------------  IN: 50 mL / OUT: 975 mL / NET: -925 mL        Physical Exam:  Appearance: No acute distress; well appearing  Eyes: PERRL, EOMI, pink conjunctiva  HENT: Normal oral mucosa  Cardiovascular: RRR, S1, S2, no murmurs, rubs, or gallops; no edema; no JVD  Respiratory: Clear to auscultation bilaterally  Gastrointestinal: soft, non-tender, non-distended with normal bowel sounds  Musculoskeletal: No clubbing; no joint deformity   Neurologic: Non-focal  Lymphatic: No lymphadenopathy  Psychiatry: AAOx3, mood & affect appropriate  Skin: No rashes, ecchymoses, or cyanosis                          9.5    8.03  )-----------( 222      ( 10 Sep 2020 06:30 )             34.0     09-10    138  |  102  |  44<H>  ----------------------------<  85  3.5   |  21<L>  |  2.51<H>    Ca    9.3      10 Sep 2020 06:30  Phos  4.3     09-10  Mg     2.3     09-10                    New ECG(s): Personally reviewed    Echo:    Stress Testing:     Cath:    Imaging:    Interpretation of Telemetry:  sinus rhyth,

## 2020-09-10 NOTE — PROGRESS NOTE ADULT - ATTENDING COMMENTS
Plan for RHC was not communicated to medical team yesterday, thus Eliquis was not held and testing delayed. To hold Eliquis tonight and tomorrow am for RHC and swan placement. Will get CHANDRA after RHC. Isordil and Hydralazine increased as per CHF rec's. Plan to increase hydral further tonight if SBP remains above 100  Awaiting CTS rec's re: re-do cabg

## 2020-09-10 NOTE — PROGRESS NOTE ADULT - SUBJECTIVE AND OBJECTIVE BOX
S: Denies chest pain or SOB. Review of systems otherwise (-)      MEDICATIONS  (STANDING):  apixaban 5 milliGRAM(s) Oral every 12 hours  aspirin  chewable 81 milliGRAM(s) Oral daily  atorvastatin 80 milliGRAM(s) Oral at bedtime  carvedilol 12.5 milliGRAM(s) Oral every 12 hours  furosemide    Tablet 40 milliGRAM(s) Oral daily  hydrALAZINE 50 milliGRAM(s) Oral three times a day  isosorbide   dinitrate Tablet (ISORDIL) 20 milliGRAM(s) Oral three times a day  LORazepam     Tablet 0.5 milliGRAM(s) Oral at bedtime  pantoprazole    Tablet 40 milliGRAM(s) Oral two times a day  predniSONE   Tablet 20 milliGRAM(s) Oral daily  sertraline 50 milliGRAM(s) Oral daily    MEDICATIONS  (PRN):  acetaminophen   Tablet .. 650 milliGRAM(s) Oral every 6 hours PRN Temp greater or equal to 38C (100.4F)      LABS:                            9.5    8.03  )-----------( 222      ( 10 Sep 2020 06:30 )             34.0     Hemoglobin: 9.5 g/dL (09-10 @ 06:30)  Hemoglobin: 11.1 g/dL (09-09 @ 06:51)  Hemoglobin: 9.9 g/dL (09-08 @ 06:07)  Hemoglobin: 10.3 g/dL (09-07 @ 05:39)  Hemoglobin: 10.3 g/dL (09-06 @ 06:32)    09-10    138  |  102  |  44<H>  ----------------------------<  85  3.5   |  21<L>  |  2.51<H>    Ca    9.3      10 Sep 2020 06:30  Phos  4.3     09-10  Mg     2.3     09-10      Creatinine Trend: 2.51<--, 2.20<--, 2.62<--, 2.55<--, 2.64<--, 2.64<--             09-09-20 @ 07:01  -  09-10-20 @ 07:00  --------------------------------------------------------  IN: 460 mL / OUT: 950 mL / NET: -490 mL    09-10-20 @ 07:01  -  09-10-20 @ 12:27  --------------------------------------------------------  IN: 50 mL / OUT: 450 mL / NET: -400 mL        PHYSICAL EXAM  Vital Signs Last 24 Hrs  T(C): 36.7 (10 Sep 2020 11:16), Max: 36.8 (10 Sep 2020 04:32)  T(F): 98 (10 Sep 2020 11:16), Max: 98.3 (10 Sep 2020 04:32)  HR: 55 (10 Sep 2020 11:16) (53 - 84)  BP: 116/73 (10 Sep 2020 11:16) (110/60 - 157/83)  BP(mean): --  RR: 18 (10 Sep 2020 11:16) (18 - 18)  SpO2: 98% (10 Sep 2020 11:16) (96% - 100%)        Gen: NAD  HEENT:  (-)icterus (-)pallor  CV: N S1 S2 1/6 KATHERYN (+)2 Pulses B/l  Resp: CTA B/L, normal effort  GI: (+) BS Soft, NT, ND  Lymph:  (-) edema, (-)obvious lymphadenopathy  Skin: Warm to touch, Normal turgor  Psych: Appropriate mood and affect      TELEMETRY: SB/SR 50-70s, PVC    Echo: < from: TTE with Doppler (w/Cont) (08.27.20 @ 07:54) >  Conclusions:  1. Mitral valve not well visualized. Mitral annular  calcification. Tethered mitral valve leaflets with normal  opening. Mild moderate mitral regurgitation.  2. Calcified trileaflet aortic valve with decreased  opening. Peak transaortic valve gradient equals 12 mm Hg,  mean transaortic valve gradient equals 8 mm Hg, at least  mild AS. Limited gradient/Doppler evaluation. Mild-moderate  aortic regurgitation.  3. Endocardial visualization enhanced with intravenous  injection of Ultrasonic Enhancing Agent (Definity).  Moderate to severe  global left ventricular systolic  dysfunction. There dyssynchronous left ventricular  contraction.  4. Moderate diastolic dysfunction (Stage II). Increased  E/e'  is consistent with elevated left ventricular filling  pressure.  *** Compared with echocardiogram report of 12/13/2017,  Limited comparison.  Disatolic dysfunction is worse.    < end of copied text >    < from: Transesophageal Echocardiogram w/o TTE (09.01.20 @ 14:15) >  Conclusions:  1. Tethered mitral valve leaflets with normal opening.  Severe mitral regurgitation. There is systolic flow  reversal in the right upper pulmonary vein. By PISA,  calculated ERO=43 mmsq and regurgitant volume=73 mL (Pisa  rad 1cm, Va 34.5 cm/s, Vm 5.0 m/s,  cm)  2. Calcified trileaflet aortic valve with normal opening.  Moderate aortic regurgitation. Vena contracta=0.4 cm.  3. Normal aortic root size. (Ao: 3.6 cm at the sinuses of  Valsalva). Midlly dilated ascending aorta for BSA  (ascending aorta diameter 4.5 cm approximately 5 cm distal  to the aortic valve.  Normal size aortic arch, and  descending thoracic aorta.  Minimal atheroma.  4. Left atrial enlargement. No left atrial or left atrial  appendage thrombus. Normal left atrial appendage function  (maximum velocity>40 cm/s).  5. Moderate left ventricular systolic dysfunction.  Paradoxical septal motion consistent with prior cardiac  surgery.  6. Normal right ventricular size and function.    < end of copied text >      < from: Cardiac Viability (09.04.20 @ 14:15) >  GATED ANALYSIS:  Rest gated wall motion analysis was performed (LVEF = 47  %;LVEDV = 195 ml.), revealing moderately reduced  LV  function.  ------------------------------------------------------------------------  IMPRESSIONS:Abnormal Study  * The left ventricle was markdely dilated at baseline.  There is viability in the apex, inferior, septal,  anterolateral, and mid to basal inferolateral walls. On  delayed imaging, there is appears to be partial viability  in the distal inferolateral wall.  * Rest gated wall motion analysis was performed (LVEF = 47  %;LVEDV = 195 ml.), revealing moderately reduced  LV  function.  Conclusion:  The left ventricle was markdely dilated at baseline. There  is viability in the apex, inferior, septal, anterolateral,  and mid to basal inferolateral walls. On delayed imaging,  there also appears to be partial viability in the distal  inferolateral wall.  ------------------------------------------------------------------------  Confirmed on  9/4/2020 - 16:56:37 by Beto Norton M.D.    < end of copied text >      ASSESSMENT/PLAN: Patient is a 65 year old male with PMH of CAD s/p CABG with most recent LHC 1/2020 with no intervention, chronic systolic CHF (LVEF 39% TTE 1/2020), Afib (on Eliquis), HTN, HLD, CKD, CHER (on CPAP), and pemphigus vulgaris who presented with RLE swelling admitted with RLE cellulitis and NARCISO on CKD. Cardiology consulted for management of CAD/CHF.    - Heart failure follow up - Continue PO lasix - daily standing weights, strict I/Os  - Continue medical management of known CAD with systolic cardiomyopathy - ASA/Statin, Coreg, Hydralazine/Isordil  - Continue AC with Eliquis for CVA prevention, if procedures planned, will need to bridge with heparin gtt  - EP follow up  - CHANDRA with severe MR  - Structural heart eval follow up/ CHANDRA noted/ viability study results noted/ await surgical plan - CTS f/u with Dr. Pena  - Repeat TTE noted, pending repeat CHANDRA potentially today however plan for RHC today first to better optimize  - Fever workup per medicine - f/u BCx/Ucx  - Patient to f/u with Dr. Campbell's cardiology group after d/c      Crsiti Jarvis PA-C  Pager: [phone]

## 2020-09-10 NOTE — PROGRESS NOTE ADULT - SUBJECTIVE AND OBJECTIVE BOX
EP Attending    HISTORY OF PRESENT ILLNESS:   Mr Colon is a 65yoM who presents for acute onset shortness of breath.  His EF is 35-40% chronically (1/2020 and 8/2020), due to ischemic cardiomyopathy.  He is s/p CABG and multiple subsequent PCI. He only has 1 graft open- his LIMA to LAD, and his native vessels are otherwise occluded.  Also has AFib, on apixaban, HTN, CKD, and CHER on CPAP.   He has had multiple episodes of flash pulmonary edema.    9/1- No angina, orthopnea, PND or palpitations.  He is short of breath on minimal exertion, walking across his hospital room with a walker (NYHA IIIB).  No fainting or near-fainting episodes.  telemetry after writing my note yesterday had 9 beats of NSVT.  9/2 - uneventful overnight.  had CHANDRA showing severe MR and moderate AI.  still short of breath, no angina, no fainting.  9/3 - no VT on telemetry overnight. Still short of breath.  Discussed the overall plan of care re: diuresis and re-evaluation of his valve before deciding on a structural vs surgical solution.  He is upset that he will spend more time in the hospital and potentially even longer if he needs a re-do open heart procedure, but understands that an incomplete plan will result in readmission and worse CHF symptoms.  9/4 - uneventful overnight. no angina, orthopnea or PND.  ambulating a little bit easier.  no VT on telemetry.  9/5 - reviewed Viability Study findings with patient.  exertional shortness of breath improving slowly.  no palpitations. no VT on telemetry.  9/6- uneventful overnight, no angina. improving orthopnea.  ambulating w/ less SOB.  9/7 - feels well today.  ambulating with less exertional dyspnea. expressed gratitude towards staff- the last 5 days have been worthwhile and he is feeling much better than this time last week.  9/8 - feeling well.  awaiting CHF and Surgical team followups re: viability testing and CHANDRA.  no VT on telemetry.  9/9 - having fevers overnight, nose congested, short of breath.   9/10 - uneventful overnight, less congested, no fever, and feeling better.    acetaminophen   Tablet .. 650 milliGRAM(s) Oral every 6 hours PRN  aspirin  chewable 81 milliGRAM(s) Oral daily  atorvastatin 80 milliGRAM(s) Oral at bedtime  carvedilol 12.5 milliGRAM(s) Oral every 12 hours  furosemide    Tablet 40 milliGRAM(s) Oral daily  hydrALAZINE 60 milliGRAM(s) Oral three times a day  isosorbide   dinitrate Tablet (ISORDIL) 20 milliGRAM(s) Oral three times a day  LORazepam     Tablet 0.5 milliGRAM(s) Oral at bedtime  pantoprazole    Tablet 40 milliGRAM(s) Oral two times a day  predniSONE   Tablet 20 milliGRAM(s) Oral daily  sertraline 50 milliGRAM(s) Oral daily                        9.5    8.03  )-----------( 222      ( 10 Sep 2020 06:30 )             34.0       09-10    138  |  102  |  44<H>  ----------------------------<  85  3.5   |  21<L>  |  2.51<H>    Ca    9.3      10 Sep 2020 06:30  Phos  4.3     09-10  Mg     2.3     09-10      T(C): 36.7 (09-10-20 @ 11:16), Max: 36.8 (09-10-20 @ 04:32)  HR: 71 (09-10-20 @ 13:45) (53 - 84)  BP: 126/81 (09-10-20 @ 13:45) (110/60 - 157/83)  RR: 18 (09-10-20 @ 11:16) (18 - 18)  SpO2: 98% (09-10-20 @ 11:16) (96% - 100%)  Wt(kg): --    I&O's Summary    09 Sep 2020 07:01  -  10 Sep 2020 07:00  --------------------------------------------------------  IN: 460 mL / OUT: 950 mL / NET: -490 mL    10 Sep 2020 07:01  -  10 Sep 2020 15:56  --------------------------------------------------------  IN: 50 mL / OUT: 975 mL / NET: -925 mL      Appearance: Robust white male in no acute distress, but short-of-breath.  HEENT:   Normal oral mucosa, PERRL, EOMI	  Lymphatic: No lymphadenopathy , no edema  Cardiovascular: Normal S1 S2,  Peripheral pulses palpable 2+ bilaterally  Respiratory: Lungs clear to auscultation, normal effort 	  Gastrointestinal:  Soft, Non-tender, + BS	  Skin: No rashes, No ecchymoses, No cyanosis, warm to touch  Musculoskeletal: Normal range of motion, normal strength  Psychiatry:  Mood & affect appropriate    TELEMETRY: NSR, 9 beat run of NSVT on 8/31.  Multifocal PVC's. Occasional triplets and couplets on 9/6-7.  ECG:  	NSR, interpolated PVC's.  Echo: EF 35-40%, LV dilated to upper limit of normal, severe diastolic dysfunction, moderate Mitral regurgitation.  CHANDRA:  Tetheterd MV leaflets --> severe MR, in the setting of volume overload and high BP.  Cath: Prior with only LIMA to LAD patent. Native vessels occluded.	  Viability: Multiple viable segments at sites of prior SVG insertion.      ASSESSMENT/PLAN: 	65y Male with an ischemic cardiomyopathy, EF 35-40%, and increasing frequency of episodes of flash pulmonary edema.  Has nonsustained ventricular tachycardia on telemetry.    He is not a candidate for biventricular pacing.  Structural and CHF team consults have been completed: the MV regurg should be re-evaluated once he is euvolemic.  He has diuresed well and is feeling symptomatically much better at rest and during ambulation.   Viability study was reassuring.  No further VT on monitor.    RHC and CHANDRA scheduled for Friday.  Given ongoing / escalating diuresis, replace K to 4-4.5 daily.  Mg to 2.    Based on the MUSTT trial, he qualifies for an EP Study for VT induction, with ICD insertion if positive.  However at this time, EP workup will be deferred until CHF team and Structural/Surgical teams have optimized him.  Will follow along, and formally re-evaluate prior to discharge.    Wei Feliciano M.D.  Cardiac Electrophysiology    office [phone]  pager [phone]

## 2020-09-10 NOTE — PROGRESS NOTE ADULT - PROBLEM SELECTOR PLAN 4
NARCISO on CKD III. Cr elevated to 2.4 on admission. Baseline Cr 1.4-1.7.  -Continue to hold home Entresto   -Continue to monitor daily BMPs   -Improving now that lasix has been reduced

## 2020-09-10 NOTE — PROGRESS NOTE ADULT - SUBJECTIVE AND OBJECTIVE BOX
Structural Heart Team          REVIEW OF SYSTEMS:    CONSTITUTIONAL: No weakness, fevers or chills  EYES/ENT: No visual changes;  No vertigo or throat pain   NECK: No pain or stiffness  RESPIRATORY: No cough, wheezing, hemoptysis; No shortness of breath  CARDIOVASCULAR: No chest pain or palpitations  GASTROINTESTINAL: No abdominal or epigastric pain. No nausea, vomiting, or hematemesis; No diarrhea or constipation. No melena or hematochezia.  GENITOURINARY: No dysuria, frequency or hematuria  NEUROLOGICAL: No numbness or weakness  SKIN: No itching, rashes      Allergies    No Known Allergies    Intolerances      Vital Signs Last 24 Hrs  T(C): 36.7 (10 Sep 2020 11:16), Max: 36.8 (10 Sep 2020 04:32)  T(F): 98 (10 Sep 2020 11:16), Max: 98.3 (10 Sep 2020 04:32)  HR: 55 (10 Sep 2020 11:16) (53 - 84)  BP: 116/73 (10 Sep 2020 11:16) (110/60 - 157/83)  BP(mean): --  RR: 18 (10 Sep 2020 11:16) (18 - 18)  SpO2: 98% (10 Sep 2020 11:16) (96% - 100%)    MEDICATIONS  (STANDING):  apixaban 5 milliGRAM(s) Oral every 12 hours  aspirin  chewable 81 milliGRAM(s) Oral daily  atorvastatin 80 milliGRAM(s) Oral at bedtime  carvedilol 12.5 milliGRAM(s) Oral every 12 hours  furosemide    Tablet 40 milliGRAM(s) Oral daily  hydrALAZINE 50 milliGRAM(s) Oral three times a day  isosorbide   dinitrate Tablet (ISORDIL) 20 milliGRAM(s) Oral three times a day  LORazepam     Tablet 0.5 milliGRAM(s) Oral at bedtime  pantoprazole    Tablet 40 milliGRAM(s) Oral two times a day  predniSONE   Tablet 20 milliGRAM(s) Oral daily  sertraline 50 milliGRAM(s) Oral daily      Exam-  General: NAD  Cardiac: s1s2, RRR, II/VI systolic murmur  Pulmonary: CTA b/l, no w/r/r  Gastrointestinal: soft abdomen, nontender, nondistended, + bowel sounds throughout  Extremities: trace LE edema R>L  Neuro: A&Ox3, nonfocal                           9.5    8.03  )-----------( 222      ( 10 Sep 2020 06:30 )             34.0   09-10    138  |  102  |  44<H>  ----------------------------<  85  3.5   |  21<L>  |  2.51<H>    Ca    9.3      10 Sep 2020 06:30  Phos  4.3     09-10  Mg     2.3     09-10      I&O's Summary    09 Sep 2020 07:01  -  10 Sep 2020 07:00  --------------------------------------------------------  IN: 460 mL / OUT: 950 mL / NET: -490 mL    10 Sep 2020 07:01  -  10 Sep 2020 12:04  --------------------------------------------------------  IN: 50 mL / OUT: 450 mL / NET: -400 mL              Assessment/Plan:  Mr Colon is a 64y/o male with an extensive CAD history including CABG (2005 @ [facility] with Dr. Pena) as well as multiple stents afterwards.  He also has CHF, Afib (paroxysmal?), CKD III, and CHER (on CPAP).   Admitted through the ER with sepsis resulting from injuries sustained during a mechanical fall and treated with IV abx.  Found on echo to have MR and referred for Mitraclip  evaluation.  - severe MR by previous CHANDRA  -- will repeat CHANDRA to assess severity of MR in more volume optimized setting  - CAD: he has viability as per study.  Awaiting Dr. Pena input re: anatomic suitability for bypass.  - plan to have RHC and Benjy placement today for better monitoring of effectiveness of medical therapy       JEEVAN Bourgeois  [phone]

## 2020-09-10 NOTE — PROGRESS NOTE ADULT - ASSESSMENT
65yo male with PMH significant for CAD (s/p CABG; most recent LHC showed stable disease with no intervention), CHF (LVEF 39% TTE 1/2020), Afib (on Eliquis), HTN, HLD, CKD III, CHER (uses CPAP), and pemphigus vulgaris presenting to ED with RLE swelling. Found to be septic in ED (hypotension, leukocytosis, elevated lactate, febrile). S/p Vanc/zosyn, 2L IVF in ED. Visit complicated by acute CHF exacerbation on 8/27 for which RRT was called and runs of VT on tele. S/p CCU visit and aggressive diuresis. Transferred back to floors on 8/28. Pending CHANDRA per multiple cardiology teams.

## 2020-09-10 NOTE — PROGRESS NOTE ADULT - PROBLEM SELECTOR PLAN 3
- Continue ASA, beta blocker, atorvastatin  - Viability study from 9/4 suggesting fair amount of viability; possible CABG candidate, but he has multiple comorbidities and concomitant valve disease

## 2020-09-10 NOTE — PROGRESS NOTE ADULT - PROBLEM SELECTOR PLAN 8
Was resolved . initially Febrile (100.6) on admission + Leukocytosis (WBC 12.27) + Elevated lactate 2.4 + Hypotensive (SBP 100s) on admission. RLE cellulitis vs LUE wound as possible sources of infection.   -9/9: Febrile again to 100.6 with complaints of nasal congestion, chills and fatigue ddx includes viral syndrome given current complaints  -CXR and RVP (9/9) wnl   -F/u Bcx/Ucx  -Tylenol prn fever    #Leukocytosis - Improving   -Continue to monitor daily CBC Was resolved . initially Febrile (100.6) on admission + Leukocytosis (WBC 12.27) + Elevated lactate 2.4 + Hypotensive (SBP 100s) on admission. RLE cellulitis vs LUE wound as possible sources of infection.   -9/9: Febrile again to 100.6 with complaints of nasal congestion, chills and fatigue ddx includes viral syndrome given current complaints  -CXR and RVP (9/9) wnl   -F/u Ucx  -Bcx (9/9): 1 bottle positive for GPC. Pt afebrile x 24hrs. Repeat bcx ordered (9/10).   -Tylenol prn fever

## 2020-09-10 NOTE — CHART NOTE - NSCHARTNOTEFT_GEN_A_CORE
Nutrition Follow Up Note  Patient seen for: nutrition follow up    Chart reviewed, events noted.    Source: pt, electronic medical record     Diet : DASH/TLC + 1500 ml fluid restriction    Patient reports: no acute GI distress, last BM 9/7.    PO intake : good, pt noted with 100% po intake at meals. Pt has no additional nutrition related questions at this time, declines written nutrition education materials.    Source for PO intake: pt, electronic medical record     Daily Weight in lbs: 247.7 (9-3); fluid shifts noted  250.6 (9-1)  266.7 (dosing wt)    Pertinent Medications: MEDICATIONS  (STANDING):  aspirin  chewable 81 milliGRAM(s) Oral daily  atorvastatin 80 milliGRAM(s) Oral at bedtime  carvedilol 12.5 milliGRAM(s) Oral every 12 hours  furosemide    Tablet 40 milliGRAM(s) Oral daily  hydrALAZINE 60 milliGRAM(s) Oral three times a day  isosorbide   dinitrate Tablet (ISORDIL) 20 milliGRAM(s) Oral three times a day  LORazepam     Tablet 0.5 milliGRAM(s) Oral at bedtime  pantoprazole    Tablet 40 milliGRAM(s) Oral two times a day  predniSONE   Tablet 20 milliGRAM(s) Oral daily  sertraline 50 milliGRAM(s) Oral daily    MEDICATIONS  (PRN):  acetaminophen   Tablet .. 650 milliGRAM(s) Oral every 6 hours PRN Temp greater or equal to 38C (100.4F)    Pertinent Labs: 09-10 @ 06:30: Na 138, BUN 44<H>, Cr 2.51<H>, BG 85, K+ 3.5, Phos 4.3, Mg 2.3, Alk Phos --, ALT/SGPT --, AST/SGOT --, HbA1c --    Skin per nursing documentation: no pressure injuries noted  Edema: none noted    Estimated Needs:   [x] no change since previous assessment  [ ] recalculated:     Previous Nutrition Diagnosis: Overweight/Obesity (BMI>40)  Nutrition Diagnosis is: ongoing, being addressed with calorie controlled diet while in-patient and reinforcement of nutrition education as needed    New Nutrition Diagnosis: n/a    Recommend  1) Continue DASH/TLC therapeutic diet as indicated, fluids per team.  2) Provide/review nutrition education as indicated.    Monitoring and Evaluation:     Continue to monitor Nutritional intake, Tolerance to diet prescription, weights, labs, skin integrity    RD remains available upon request and will follow up per protocol. Elsa Leon RD, CDN Pager: [phone].

## 2020-09-10 NOTE — PROGRESS NOTE ADULT - PROBLEM SELECTOR PLAN 2
- Medical optimization as above and repeat CHANDRA rescheduled for tomorrow  - Structural heart following for consideration of MitraClip.

## 2020-09-10 NOTE — PROGRESS NOTE ADULT - PROBLEM SELECTOR PLAN 2
Severe MR on CHANDRA (9/1)  -Structural heart recs: Repeat CHANDRA as MR may have been overestimated given volume overload.

## 2020-09-10 NOTE — PROGRESS NOTE ADULT - PROBLEM SELECTOR PLAN 6
-Restarted home coreg (8/26) and isordil (8/27). Entresto continues to be held 2/2 renal function.   -hydralazine increased to 50 TID, per HF (9/9) -Restarted home coreg (8/26) and isordil (8/27). Entresto continues to be held 2/2 renal function.   -hydralazine and isordil increased per HF (9/10)

## 2020-09-10 NOTE — PROGRESS NOTE ADULT - PROBLEM SELECTOR PLAN 10
DVT: on Eliquis  Diet: DASH diet. NPO for repeat CHANDRA today DVT: Eliquis held for [facility] tomorrow   Diet: DASH diet. NPO at [place] for CHANDRA tomorrow

## 2020-09-10 NOTE — PROGRESS NOTE ADULT - ATTENDING COMMENTS
No acute events reported overnight.  The patient denies any cardiopulmonary complaints at rest.  Denies any fevers, chills, sweats, light-headed sensation or dizziness.  No palpitations.  Notes some dyspnea upon exertion.  Exam is unchanged compared to the prior day.  Indications for the right heart catheterization/Dawson-Emanuel catheter placement were discussed.  Benefits and risks were reviewed.  Risks include but are not limited to infection, bleeding, arrhythmia, TIA/stroke, vascular injury, need for urgent surgery and death.  The patient is aware that the purpose of the procedure is to try to medical optimize him.  Following medical optimization a repeat CHANDRA will be performed to reassess degree of valvular disease (mitral/aortic insuffiency).  The patient is being assessed for potential redo CABG +-mitral/aortic.  If felt to be prohibitive risk the patient will then be further assessed for mitraclip.

## 2020-09-10 NOTE — PROGRESS NOTE ADULT - PROBLEM SELECTOR PLAN 1
- Pt. appears euvolemic on exam. Likely needs RHC to determine next steps.  - Would hold eliquis today and plan for tomorrow  - c/w Lasix to 40 mg PO QD  - Continue Coreg 12.5mg BID  - Increase hydralazine to 60 mg TID at next dose, hold for SBP < 90.  - If SBP > 90 at 10PM dose would increase to 75mg TID hold for SBP < 90  - c/w ISDN to 20 TID, hold for SBP < 90.  - Deferring ARNI/MRA at this time due to renal dysfunction  - Continue 1.5L fluid restriction  - Daily standing weight, strict I/Os  - Daily BMP, Mg. Maintain K 4-4.5 and Mg 2-2.5.  - If Transform-HF trial is accepting enrollment, will evaluate to see if he is a suitable candidate

## 2020-09-10 NOTE — PROGRESS NOTE ADULT - PROBLEM SELECTOR PLAN 7
-s/p vanc - improving    -Wound care saw patient (8/25). Redressed wound. Recommended plastics consult.   -Plastics consulted 8/26. Recommended local wound care and outpatient follow up (Dr. Marquez [phone])

## 2020-09-10 NOTE — PROGRESS NOTE ADULT - PROBLEM SELECTOR PLAN 1
Combined Systolic and diastolic Heart failure. TTE (1/2020): LVEF 39%, mod MR/AR  -8/27: Patient had an RRT after desatting to 77% overnight. Found to be significant pulmonary edema on CXR. EKG showed Afib with RVR and had runs of VT on tele. Lactate 8. Patient received IV lasix 40mg, amio, and transferred to CCU and placed on BiPAP.    -CCU course (8/27): transitioned from BiPAP to NC. Bumex 2. Restarted home isordil and po lasix 40mg.   -TTE (8/27): Worsening diastolic dysfunction. EF 35-45%. mild/mod MR. mild AS. mild/mod AR. mod-severe global LV dysfunction.   -CHANDRA (9/1) showed severe MR, mod AR.   -Repeat TTE (9/8) showed EF 35% and grossly moderate mitral regurgitation  -C/w coreg 12.5mg BID, isordil 10mg TID  -Cards recs: No cath planned at this time. Repeat CHANDRA.  -Heart failure recs: C/w Lasix 40mg po qday + Inc Hydralazine to 50mg TID.   -EP recs: will schedule EP study +/- ICD placement pending optimization per SH/HF  -C/w strict I/O's, daily weights, and 1.5L fluid restriction  -Pending repeat CHANDRA (9/10) Combined Systolic and diastolic Heart failure. TTE (1/2020): LVEF 39%, mod MR/AR  -8/27: Patient had an RRT after desatting to 77% overnight. Found to be significant pulmonary edema on CXR. EKG showed Afib with RVR and had runs of VT on tele. Lactate 8. Patient received IV lasix 40mg, amio, and transferred to CCU and placed on BiPAP.    -CCU course (8/27): transitioned from BiPAP to NC. Bumex 2. Restarted home isordil and po lasix 40mg.   -TTE (8/27): Worsening diastolic dysfunction. EF 35-45%. mild/mod MR. mild AS. mild/mod AR. mod-severe global LV dysfunction.   -CHANDRA (9/1) showed severe MR, mod AR.   -Repeat TTE (9/8) showed EF 35% and grossly moderate mitral regurgitation  -C/w coreg 12.5mg BID, isordil 10mg TID  -Heart failure recs: C/w Lasix 40mg po qday + Inc Hydralazine to 60mg TID for 2PM today and ask NF to increase to 75mg TID if his BP can tolerate (hold SBP <90) + Increase isordil to 20mg TID. Plan to do RHC and CHANDRA tomorrow. Hold anticoagulation and make pt NPO at [facility].   -EP recs: will schedule EP study +/- ICD placement pending optimization per SH/HF  -C/w strict I/O's, daily weights, and 1.5L fluid restriction

## 2020-09-11 ENCOUNTER — TRANSCRIPTION ENCOUNTER (OUTPATIENT)
Age: 65
End: 2020-09-11

## 2020-09-11 DIAGNOSIS — R78.81 BACTEREMIA: ICD-10-CM

## 2020-09-11 LAB
ANION GAP SERPL CALC-SCNC: 17 MMOL/L — SIGNIFICANT CHANGE UP (ref 5–17)
BUN SERPL-MCNC: 49 MG/DL — HIGH (ref 7–23)
CALCIUM SERPL-MCNC: 9.1 MG/DL — SIGNIFICANT CHANGE UP (ref 8.4–10.5)
CHLORIDE SERPL-SCNC: 103 MMOL/L — SIGNIFICANT CHANGE UP (ref 96–108)
CO2 SERPL-SCNC: 20 MMOL/L — LOW (ref 22–31)
CREAT SERPL-MCNC: 2.27 MG/DL — HIGH (ref 0.5–1.3)
GLUCOSE SERPL-MCNC: 101 MG/DL — HIGH (ref 70–99)
HCT VFR BLD CALC: 33.7 % — LOW (ref 39–50)
HGB BLD-MCNC: 9.5 G/DL — LOW (ref 13–17)
MAGNESIUM SERPL-MCNC: 2.3 MG/DL — SIGNIFICANT CHANGE UP (ref 1.6–2.6)
MCHC RBC-ENTMCNC: 22.9 PG — LOW (ref 27–34)
MCHC RBC-ENTMCNC: 28.2 GM/DL — LOW (ref 32–36)
MCV RBC AUTO: 81.4 FL — SIGNIFICANT CHANGE UP (ref 80–100)
NRBC # BLD: 0 /100 WBCS — SIGNIFICANT CHANGE UP (ref 0–0)
PHOSPHATE SERPL-MCNC: 3.5 MG/DL — SIGNIFICANT CHANGE UP (ref 2.5–4.5)
PLATELET # BLD AUTO: 207 K/UL — SIGNIFICANT CHANGE UP (ref 150–400)
POTASSIUM SERPL-MCNC: 3.5 MMOL/L — SIGNIFICANT CHANGE UP (ref 3.5–5.3)
POTASSIUM SERPL-SCNC: 3.5 MMOL/L — SIGNIFICANT CHANGE UP (ref 3.5–5.3)
RBC # BLD: 4.14 M/UL — LOW (ref 4.2–5.8)
RBC # FLD: 18.5 % — HIGH (ref 10.3–14.5)
SODIUM SERPL-SCNC: 140 MMOL/L — SIGNIFICANT CHANGE UP (ref 135–145)
WBC # BLD: 9.16 K/UL — SIGNIFICANT CHANGE UP (ref 3.8–10.5)
WBC # FLD AUTO: 9.16 K/UL — SIGNIFICANT CHANGE UP (ref 3.8–10.5)

## 2020-09-11 PROCEDURE — 99233 SBSQ HOSP IP/OBS HIGH 50: CPT | Mod: GC

## 2020-09-11 PROCEDURE — 93010 ELECTROCARDIOGRAM REPORT: CPT

## 2020-09-11 PROCEDURE — 99223 1ST HOSP IP/OBS HIGH 75: CPT

## 2020-09-11 PROCEDURE — 93451 RIGHT HEART CATH: CPT | Mod: 26

## 2020-09-11 RX ORDER — FUROSEMIDE 40 MG
1 TABLET ORAL
Qty: 0 | Refills: 0 | DISCHARGE

## 2020-09-11 RX ORDER — TICAGRELOR 90 MG/1
1 TABLET ORAL
Qty: 0 | Refills: 0 | DISCHARGE

## 2020-09-11 RX ORDER — SACUBITRIL AND VALSARTAN 24; 26 MG/1; MG/1
1 TABLET, FILM COATED ORAL
Qty: 0 | Refills: 0 | DISCHARGE

## 2020-09-11 RX ORDER — APIXABAN 2.5 MG/1
1 TABLET, FILM COATED ORAL
Qty: 60 | Refills: 0
Start: 2020-09-11 | End: 2020-10-10

## 2020-09-11 RX ORDER — CEFAZOLIN SODIUM 1 G
2000 VIAL (EA) INJECTION ONCE
Refills: 0 | Status: COMPLETED | OUTPATIENT
Start: 2020-09-11 | End: 2020-09-11

## 2020-09-11 RX ORDER — SPIRONOLACTONE 25 MG/1
25 TABLET, FILM COATED ORAL DAILY
Refills: 0 | Status: DISCONTINUED | OUTPATIENT
Start: 2020-09-12 | End: 2020-09-16

## 2020-09-11 RX ORDER — CEFAZOLIN SODIUM 1 G
2000 VIAL (EA) INJECTION EVERY 12 HOURS
Refills: 0 | Status: DISCONTINUED | OUTPATIENT
Start: 2020-09-11 | End: 2020-09-16

## 2020-09-11 RX ORDER — FUROSEMIDE 40 MG
1 TABLET ORAL
Qty: 30 | Refills: 0
Start: 2020-09-11 | End: 2020-10-10

## 2020-09-11 RX ORDER — CEFAZOLIN SODIUM 1 G
VIAL (EA) INJECTION
Refills: 0 | Status: DISCONTINUED | OUTPATIENT
Start: 2020-09-11 | End: 2020-09-11

## 2020-09-11 RX ORDER — CEPHALEXIN 500 MG
2 CAPSULE ORAL
Qty: 180 | Refills: 0
Start: 2020-09-11 | End: 2020-10-10

## 2020-09-11 RX ORDER — CARVEDILOL PHOSPHATE 80 MG/1
1 CAPSULE, EXTENDED RELEASE ORAL
Qty: 60 | Refills: 0
Start: 2020-09-11 | End: 2020-10-10

## 2020-09-11 RX ORDER — SPIRONOLACTONE 25 MG/1
1 TABLET, FILM COATED ORAL
Qty: 30 | Refills: 0
Start: 2020-09-11 | End: 2020-10-10

## 2020-09-11 RX ORDER — SPIRONOLACTONE 25 MG/1
25 TABLET, FILM COATED ORAL ONCE
Refills: 0 | Status: COMPLETED | OUTPATIENT
Start: 2020-09-11 | End: 2020-09-11

## 2020-09-11 RX ORDER — ISOSORBIDE MONONITRATE 60 MG/1
1 TABLET, EXTENDED RELEASE ORAL
Qty: 0 | Refills: 0 | DISCHARGE

## 2020-09-11 RX ORDER — POTASSIUM CHLORIDE 20 MEQ
40 PACKET (EA) ORAL ONCE
Refills: 0 | Status: COMPLETED | OUTPATIENT
Start: 2020-09-11 | End: 2020-09-11

## 2020-09-11 RX ORDER — HYDRALAZINE HCL 50 MG
3 TABLET ORAL
Qty: 270 | Refills: 0
Start: 2020-09-11 | End: 2020-10-10

## 2020-09-11 RX ORDER — CEFAZOLIN SODIUM 1 G
2000 VIAL (EA) INJECTION EVERY 8 HOURS
Refills: 0 | Status: DISCONTINUED | OUTPATIENT
Start: 2020-09-11 | End: 2020-09-11

## 2020-09-11 RX ORDER — ISOSORBIDE DINITRATE 5 MG/1
1 TABLET ORAL
Qty: 90 | Refills: 0
Start: 2020-09-11 | End: 2020-10-10

## 2020-09-11 RX ADMIN — PANTOPRAZOLE SODIUM 40 MILLIGRAM(S): 20 TABLET, DELAYED RELEASE ORAL at 21:20

## 2020-09-11 RX ADMIN — Medication 75 MILLIGRAM(S): at 14:11

## 2020-09-11 RX ADMIN — Medication 100 MILLIGRAM(S): at 22:30

## 2020-09-11 RX ADMIN — Medication 40 MILLIEQUIVALENT(S): at 08:26

## 2020-09-11 RX ADMIN — ISOSORBIDE DINITRATE 20 MILLIGRAM(S): 5 TABLET ORAL at 21:21

## 2020-09-11 RX ADMIN — Medication 100 MILLIGRAM(S): at 11:02

## 2020-09-11 RX ADMIN — ISOSORBIDE DINITRATE 20 MILLIGRAM(S): 5 TABLET ORAL at 05:31

## 2020-09-11 RX ADMIN — PANTOPRAZOLE SODIUM 40 MILLIGRAM(S): 20 TABLET, DELAYED RELEASE ORAL at 05:31

## 2020-09-11 RX ADMIN — SPIRONOLACTONE 25 MILLIGRAM(S): 25 TABLET, FILM COATED ORAL at 14:11

## 2020-09-11 RX ADMIN — ATORVASTATIN CALCIUM 80 MILLIGRAM(S): 80 TABLET, FILM COATED ORAL at 21:20

## 2020-09-11 RX ADMIN — Medication 75 MILLIGRAM(S): at 05:31

## 2020-09-11 RX ADMIN — ISOSORBIDE DINITRATE 20 MILLIGRAM(S): 5 TABLET ORAL at 14:11

## 2020-09-11 RX ADMIN — Medication 20 MILLIGRAM(S): at 05:31

## 2020-09-11 RX ADMIN — CARVEDILOL PHOSPHATE 12.5 MILLIGRAM(S): 80 CAPSULE, EXTENDED RELEASE ORAL at 05:31

## 2020-09-11 RX ADMIN — CARVEDILOL PHOSPHATE 12.5 MILLIGRAM(S): 80 CAPSULE, EXTENDED RELEASE ORAL at 21:20

## 2020-09-11 RX ADMIN — Medication 81 MILLIGRAM(S): at 08:26

## 2020-09-11 RX ADMIN — SERTRALINE 50 MILLIGRAM(S): 25 TABLET, FILM COATED ORAL at 08:26

## 2020-09-11 RX ADMIN — Medication 75 MILLIGRAM(S): at 21:21

## 2020-09-11 RX ADMIN — Medication 0.5 MILLIGRAM(S): at 21:22

## 2020-09-11 RX ADMIN — Medication 40 MILLIGRAM(S): at 05:31

## 2020-09-11 NOTE — PROGRESS NOTE ADULT - ATTENDING COMMENTS
No further fevers, bcx resulted yesterday growing staph with speciation to MSSA today. Vancomycin changed to ancef given culture date. Given new bacteremia will consult LOAR Candelario was held in preparation for RHC with potential swan placement today. As of this AM it was still planned  Following medical optimization a repeat CHANDRA will be performed to reassess degree of valvular disease (mitral/aortic insufficiency) to assess assessed for potential redo CABG +-mitral/aortic replacement  Medications continue to be titrated as per heart failure rec's

## 2020-09-11 NOTE — DISCHARGE NOTE PROVIDER - PROVIDER TOKENS
PROVIDER:[TOKEN:[[id number]],FOLLOWUP:[1 month]],PROVIDER:[TOKEN:[[id number]],FOLLOWUP:[2 weeks]],PROVIDER:[TOKEN:[[id number]],FOLLOWUP:[2 weeks]] PROVIDER:[TOKEN:[[id number]],FOLLOWUP:[1 month]],PROVIDER:[TOKEN:[[id number]],FOLLOWUP:[2 weeks]],PROVIDER:[TOKEN:[[id number]],SCHEDULEDAPPT:[09/23/2020],SCHEDULEDAPPTTIME:[03:00 PM]],PROVIDER:[TOKEN:[[id number]],FOLLOWUP:[1 month]],PROVIDER:[TOKEN:[[id number]],FOLLOWUP:[2 weeks]],PROVIDER:[TOKEN:[[id number]],FOLLOWUP:[1-3 days]]

## 2020-09-11 NOTE — DISCHARGE NOTE PROVIDER - CARE PROVIDERS DIRECT ADDRESSES
,DirectAddress_Unknown,DirectAddress_Unknown,DirectAddress_Unknown ,DirectAddress_Unknown,DirectAddress_Unknown,[email or web address],DirectAddress_Unknown,DirectAddress_Unknown

## 2020-09-11 NOTE — PROGRESS NOTE ADULT - PROBLEM SELECTOR PLAN 6
-Restarted home coreg (8/26) and isordil (8/27). Entresto continues to be held 2/2 renal function.   -hydralazine and isordil increased per HF (9/10) -c/w Eliquis 5mg BID  -C/w coreg 12.5mg BID (8/26)  -EKG with Afib with RVR during RRT on 8/27. Now NSR. Stable on tele.

## 2020-09-11 NOTE — PROGRESS NOTE ADULT - PROBLEM SELECTOR PLAN 1
- Pt. appears euvolemic on exam.   - Plan for RHC today.  - Would hold eliquis today and plan for today  - c/w Lasix to 40 mg PO QD  - Continue Coreg 12.5mg BID  - c/w hydralazine 75mg TID hold for SBP < 90  - c/w ISDN to 20 TID, hold for SBP < 90.  - Deferring ARNI/MRA at this time due to renal dysfunction  - Continue 1.5L fluid restriction  - Daily standing weight, strict I/Os  - Daily BMP, Mg. Maintain K 4-4.5 and Mg 2-2.5.  - If Transform-HF trial is accepting enrollment, will evaluate to see if he is a suitable candidate - Pt. appears euvolemic on exam.   - Plan for RHC today.  - Would hold eliquis today and plan for today  - c/w Lasix to 40 mg PO QD  - Continue Coreg 12.5mg BID  - c/w hydralazine 75mg TID hold for SBP < 90  - c/w ISDN to 20 TID, hold for SBP < 90.  - Awaiting rhc cath numbers to assist with adjustment of above  - Deferring ARNI/MRA at this time due to renal dysfunction  - Continue 1.5L fluid restriction  - Daily standing weight, strict I/Os  - Daily BMP, Mg. Maintain K 4-4.5 and Mg 2-2.5.  - If Transform-HF trial is accepting enrollment, will evaluate to see if he is a suitable candidate - Pt. appears euvolemic on exam.   - Plan for RHC today.  - Would hold eliquis today and plan for today  - c/w Lasix to 40 mg PO QD  - Continue Coreg 12.5mg BID  - c/w hydralazine 75mg TID hold for SBP < 90  - c/w ISDN to 20 TID, hold for SBP < 90.  - Would add spironalactone 25mg daily with first dose this afternoon  - Awaiting rhc cath numbers to assist with adjustment of above  - Deferring ARNI/MRA at this time due to renal dysfunction  - Continue 1.5L fluid restriction  - Daily standing weight, strict I/Os  - Daily BMP, Mg. Maintain K 4-4.5 and Mg 2-2.5.  - If Transform-HF trial is accepting enrollment, will evaluate to see if he is a suitable candidate - Pt. appears euvolemic on exam.   - Plan for RHC today.  - Would hold eliquis today and plan for today  - c/w Lasix to 40 mg PO QD  - Continue Coreg 12.5mg BID  - c/w hydralazine 75mg TID hold for SBP < 90  - c/w ISDN to 20 TID, hold for SBP < 90.  - Would add spironolactone 25mg daily with first dose this afternoon  - Awaiting rhc cath numbers to assist with adjustment of above  - Deferring ARNI/MRA at this time due to renal dysfunction  - Continue 1.5L fluid restriction  - Daily standing weight, strict I/Os  - Daily BMP, Mg. Maintain K 4-4.5 and Mg 2-2.5.  - If Transform-HF trial is accepting enrollment, will evaluate to see if he is a suitable candidate

## 2020-09-11 NOTE — CONSULT NOTE ADULT - ASSESSMENT
ASSESSMENT/RECOMMENDATIONS:  64 yo male with PMH significant for CAD (s/p CABG; most recent LHC showed stable disease with no intervention), CHF (LVEF 39% TTE 1/2020), Afib (on Eliquis), HTN, HLD, CKD III, CHER (uses CPAP), and pemphigus vulgaris presenting to ED with RLE swelling. Found to be septic in ED (hypotension, leukocytosis, elevated lactate, febrile). S/p Vanc/zosyn, 2L IVF in ED. Visit complicated by acute CHF exacerbation on 8/27 for which RRT was called and runs of VT on tele. S/p CCU visit and aggressive diuresis. Transferred back to floors on 8/28. Pending CHANDRA per multiple cardiology teams.    Pt admitted for RLE cellulitis and fever=100.6 on 8/24. He is treated with 7 days of IV vancomycin, the RLE cellulitis is now resolved.  His course was complicated by cardiac complications, including RRT and transferred to CCU for diuretics. Currently he has no SOB or cough.  He spiked fever T=100.6 on 9/9 with chills. BCx drawn, now resulted to be MSSA, on Cefazolin now.  On exam, NAD, oral no obvious infection, no phlebitis, no skin breakdown, no cellulitis, lungs are clear, no murmur.   WBC downtrending now, from 12(9/9) to 9(9/11)  Cr>2    #MSSA bacteremia:  - c/w Cefazolin     #PENDING RECS. PLEASE WAIT FOR FINAL RECS AFTER DISCUSSION WITH ATTENDING#    Cyn Garcia MD, PGY4  Fellow, Infectious Diseases  Pager: [phone]  If no response, after 5pm and on Weekends: Call [phone] ASSESSMENT/RECOMMENDATIONS:  64 yo male with PMH significant for CAD (s/p CABG; most recent LHC showed stable disease with no intervention), CHF (LVEF 39% TTE 1/2020), Afib (on Eliquis), HTN, HLD, CKD III, CHER (uses CPAP), and pemphigus vulgaris presenting to ED with RLE swelling. Found to be septic in ED (hypotension, leukocytosis, elevated lactate, febrile). S/p Vanc/zosyn, 2L IVF in ED. Visit complicated by acute CHF exacerbation on 8/27 for which RRT was called and runs of VT on tele. S/p CCU visit and aggressive diuresis. Transferred back to floors on 8/28. Pending CHANDRA per multiple cardiology teams.    Pt admitted for RLE cellulitis and fever=100.6 on 8/24. He is treated with 7 days of IV vancomycin, the RLE cellulitis is now resolved.  His course was complicated by cardiac complications, including RRT and transferred to CCU for diuretics. Currently he has no SOB or cough.  He spiked fever T=100.6 on 9/9 with chills. BCx drawn, now resulted to be MSSA, on Cefazolin now.  On exam, NAD, oral no obvious infection, no phlebitis, no skin breakdown, no cellulitis, lungs are clear, no murmur.   WBC downtrending now, from 12(9/9) to 9(9/11)  Cr>2  Possible source is left forearm IV phlebitis, it's removed and getting better now    #MSSA bacteremia:  - Decrease Cefazolin to 2g q12h  - Follow up repeat BCx (9/11)  - TTE 9/8 no vegatation  - Cardiology would like to perform CHANDRA with severe MR evaluation, can look for vegetation too  - Duration of antibiotics should be 4 weeks after the first negative BCx if without IE. If there is vegetations should do 6 weeks.  - If pt insisted on AMA, can prescribe Keflex PO 1g q8h, however it's not supported by guidelines    Cyn Garcia MD, PGY4  Fellow, Infectious Diseases  Pager: [phone]  If no response, after 5pm and on Weekends: Call [phone]    d/w Dr. James Edward conveyed to primary team

## 2020-09-11 NOTE — PROGRESS NOTE ADULT - SUBJECTIVE AND OBJECTIVE BOX
Patient seen and examined at bedside.    Overnight Events:   Blood cultures returned from 9/9 positive for staph aureus  Given vancomycin now switched to cefazolin.   Pt. has no complaints this morning.   Tolerated increases in medications overnight  Not complaining of chest pain or shortness of breath    Current Meds:  acetaminophen   Tablet .. 650 milliGRAM(s) Oral every 6 hours PRN  aspirin  chewable 81 milliGRAM(s) Oral daily  atorvastatin 80 milliGRAM(s) Oral at bedtime  carvedilol 12.5 milliGRAM(s) Oral every 12 hours  ceFAZolin   IVPB      ceFAZolin   IVPB 2000 milliGRAM(s) IV Intermittent every 8 hours  furosemide    Tablet 40 milliGRAM(s) Oral daily  hydrALAZINE 75 milliGRAM(s) Oral three times a day  isosorbide   dinitrate Tablet (ISORDIL) 20 milliGRAM(s) Oral three times a day  LORazepam     Tablet 0.5 milliGRAM(s) Oral at bedtime  pantoprazole    Tablet 40 milliGRAM(s) Oral two times a day  predniSONE   Tablet 20 milliGRAM(s) Oral daily  sertraline 50 milliGRAM(s) Oral daily        Vitals:  T(F): 97.8 (09-11), Max: 98.2 (09-10)  HR: 60 (09-11) (53 - 74)  BP: 110/63 (09-11) (110/63 - 151/84)  RR: 18 (09-11)  SpO2: 99% (09-11)  I&O's Summary    10 Sep 2020 07:01  -  11 Sep 2020 07:00  --------------------------------------------------------  IN: 565 mL / OUT: 1600 mL / NET: -1035 mL    11 Sep 2020 07:01  -  11 Sep 2020 11:14  --------------------------------------------------------  IN: 0 mL / OUT: 400 mL / NET: -400 mL        Physical Exam:  Appearance: No acute distress; well appearing  Eyes: PERRL, EOMI, pink conjunctiva  HENT: Normal oral mucosa  Cardiovascular: RRR, S1, S2, no murmurs, rubs, or gallops; no edema; no JVD  Respiratory: Clear to auscultation bilaterally  Gastrointestinal: soft, non-tender, non-distended with normal bowel sounds  Musculoskeletal: No clubbing; no joint deformity   Neurologic: Non-focal  Lymphatic: No lymphadenopathy  Psychiatry: AAOx3, mood & affect appropriate  Skin: No rashes, ecchymoses, or cyanosis                          9.5    9.16  )-----------( 207      ( 11 Sep 2020 05:51 )             33.7     09-11    140  |  103  |  49<H>  ----------------------------<  101<H>  3.5   |  20<L>  |  2.27<H>    Ca    9.1      11 Sep 2020 05:51  Phos  3.5     09-11  Mg     2.3     09-11                    New ECG(s): Personally reviewed    Echo:    Stress Testing:     Cath:    Imaging:    Interpretation of Telemetry:

## 2020-09-11 NOTE — PROGRESS NOTE ADULT - PROBLEM SELECTOR PLAN 7
-s/p vanc - improving    -Wound care saw patient (8/25). Redressed wound. Recommended plastics consult.   -Plastics consulted 8/26. Recommended local wound care and outpatient follow up (Dr. Marquez [phone]) -Restarted home coreg (8/26) and isordil (8/27). Entresto continues to be held 2/2 renal function.   -hydralazine and isordil increased per HF (9/10)

## 2020-09-11 NOTE — PROGRESS NOTE ADULT - PROBLEM SELECTOR PLAN 4
NARCISO on CKD III. Cr elevated to 2.4 on admission. Baseline Cr 1.4-1.7.  -Continue to hold home Entresto   -Continue to monitor daily BMPs   -Improving now that lasix has been reduced Initially admitted with sepsis 2/2 RLE cellulitis, s/p 7 day course of vancomycin with resolution. Was febrile on 9/9 with bcx being positive for MSSA. Source not entirely clear at this time as RLE is now improved  Bcx (9/9): 1 bottle positive for Staph aureus. Pt afebrile. Repeat bcx ordered (9/10). Vanc started. Bcx shows MSSA, will discontinue vanc, start cefazolin, and consult ID.  - c/w Ancef for MSSA bacteremia  - f/u repeat bcx

## 2020-09-11 NOTE — PROGRESS NOTE ADULT - PROBLEM SELECTOR PLAN 8
Was resolved . initially Febrile (100.6) on admission + Leukocytosis (WBC 12.27) + Elevated lactate 2.4 + Hypotensive (SBP 100s) on admission. RLE cellulitis vs LUE wound as possible sources of infection.   -9/9: Febrile again to 100.6 with complaints of nasal congestion, chills and fatigue ddx includes viral syndrome given current complaints  -CXR and RVP (9/9) wnl   -F/u Ucx  -Bcx (9/9): 1 bottle positive for GPC. Pt afebrile x 24hrs. Repeat bcx ordered (9/10).   -Tylenol prn fever Was resolved . initially Febrile (100.6) on admission + Leukocytosis (WBC 12.27) + Elevated lactate 2.4 + Hypotensive (SBP 100s) on admission. RLE cellulitis vs LUE wound as possible sources of infection.   -9/9: Febrile again to 100.6 with complaints of nasal congestion, chills and fatigue ddx includes viral syndrome given current complaints  -CXR and RVP (9/9) wnl   -F/u Ucx  -Bcx (9/9): 1 bottle positive for Staph aureus. Pt afebrile. Repeat bcx ordered (9/10). Vanc started (9/10-)    -Tylenol prn fever Was resolved . initially Febrile (100.6) on admission + Leukocytosis (WBC 12.27) + Elevated lactate 2.4 + Hypotensive (SBP 100s) on admission. RLE cellulitis vs LUE wound as possible sources of infection.   -9/9: Febrile again to 100.6 with complaints of nasal congestion, chills and fatigue ddx includes viral syndrome given current complaints  -CXR and RVP (9/9) wnl   -F/u Ucx  -Bcx (9/9): 1 bottle positive for Staph aureus. Pt afebrile. Repeat bcx ordered (9/10). Vanc started. Bcx shows MSSA, will discontinue vanc, start cefazolin, and consult ID.   -Tylenol prn fever -s/p vanc - improving    -Wound care saw patient (8/25). Redressed wound. Recommended plastics consult.   -Plastics consulted 8/26. Recommended local wound care and outpatient follow up (Dr. Marquez [phone])

## 2020-09-11 NOTE — PROGRESS NOTE ADULT - ATTENDING COMMENTS
Staph bacteremia detected in 1 of 2 blood cultures, started on Cephazolin.   Had RHC late afternoon: RA , PA/46/27/35, PCW 22 (no sig v-wave), CO/CI 6.1/2.73, PA 62%, Ao 99%, /75/100. SVR 1193 dsc, PVR 2.13 [place].    Please add spironolactone 25 mg po daily today given hypokalemia.  Increase hydralazine to 85 mg for next dose and then to 100 mg TID starting tomorrow AM. Hold if SBP < 90.  He will need CHANDRA on Monday morning to rule-out endocarditis and to reassess his valvular disease.  Hold Eliquis until PICC line placed for home antibiotics. Ideally, this would be placed tomorrow AM so he can resume the Eliquis immediately following.

## 2020-09-11 NOTE — DISCHARGE NOTE PROVIDER - NSDCCPTREATMENT_GEN_ALL_CORE_FT
PRINCIPAL PROCEDURE  Procedure: Insertion of tunneled central venous catheter in patient older than 5 years of age  Findings and Treatment:

## 2020-09-11 NOTE — DISCHARGE NOTE PROVIDER - CARE PROVIDER_API CALL
Gerald Marquez  PLASTIC SURGERY  [address] [place] [address]  Phone: [phone]  Fax: [phone]  Follow Up Time: 1 month    Charan Mckeon  CARDIOVASCULAR DISEASE  [address] [facility], [address] [address]  Phone: [phone]  Fax: [phone]  Follow Up Time: 2 weeks    Sky Mclean  CARDIOLOGY  [address] [facility], [address] [address]  Phone: [phone]  Fax: [phone]  Follow Up Time: 2 weeks Gerald Marquez  PLASTIC SURGERY  [address] [place] [address]  Phone: [phone]  Fax: [phone]  Follow Up Time: 1 month    Charan Mckeon  CARDIOVASCULAR DISEASE  [address] [facility], [address]  [place] [address]  Phone: [phone]  Fax: [phone]  Follow Up Time: 2 weeks    Gato Leiva (MD; PhD)  Adv Heart Fail Trnsplnt Cardio; Cardiovascular Disease; Internal Medicine  [address] [place] [address]  Phone: [phone]  Fax: [phone]  Scheduled Appointment: 09/23/2020 03:00 PM    Orion Ramirez  INFECTIOUS DISEASE  [address] [place] [address]  Phone: [phone]  Fax: [phone]  Follow Up Time: 1 month    Elliot Mayen  INTERNAL MEDICINE  [address], [address] [place] [address]  Phone: [phone]  Fax: [phone]  Follow Up Time: 2 weeks    Doug Coffman)  Cardiac Electrophysiology; Cardiovascular Disease; Nuclear Cardiology  [address] [facility], [address]  [place] [address]  Phone: [phone]  Fax: [phone]  Follow Up Time: 1-3 days

## 2020-09-11 NOTE — PROGRESS NOTE ADULT - ASSESSMENT
63yo male with PMH significant for CAD (s/p CABG; most recent LHC showed stable disease with no intervention), CHF (LVEF 39% TTE 1/2020), Afib (on Eliquis), HTN, HLD, CKD III, CHER (uses CPAP), and pemphigus vulgaris presenting to ED with RLE swelling. Found to be septic in ED (hypotension, leukocytosis, elevated lactate, febrile). S/p Vanc/zosyn, 2L IVF in ED. Visit complicated by acute CHF exacerbation on 8/27 for which RRT was called and runs of VT on tele. S/p CCU visit and aggressive diuresis. Transferred back to floors on 8/28. Pending CHANDRA per multiple cardiology teams. 65yo male with PMH significant for CAD (s/p CABG; most recent LHC showed stable disease with no intervention), CHF (LVEF 39% TTE 1/2020), Afib (on Eliquis), HTN, HLD, CKD III, CHER (uses CPAP), and pemphigus vulgaris presenting to ED with RLE swelling. Found to be septic in ED (hypotension, leukocytosis, elevated lactate, febrile). S/p Vanc/zosyn, 2L IVF in ED. Visit complicated by acute CHF exacerbation on 8/27 for which RRT was called and runs of VT on tele. S/p CCU visit and aggressive diuresis. Transferred back to floors on 8/28. Pending RHC/CHANDRA per multiple cardiology teams for MV clip/repeat CABG planning

## 2020-09-11 NOTE — PROGRESS NOTE ADULT - PROBLEM SELECTOR PLAN 10
DVT: Eliquis held for [facility] tomorrow   Diet: DASH diet. NPO at [facility] for CHANDRA tomorrow DVT: Eliquis held for [facility]  Diet: DASH diet. NPO for CHANDRA

## 2020-09-11 NOTE — DISCHARGE NOTE PROVIDER - NSDCCPCAREPLAN_GEN_ALL_CORE_FT
PRINCIPAL DISCHARGE DIAGNOSIS  Diagnosis: Wound of left upper extremity, initial encounter  Assessment and Plan of Treatment:       SECONDARY DISCHARGE DIAGNOSES  Diagnosis: Fever  Assessment and Plan of Treatment:     Diagnosis: Leg swelling  Assessment and Plan of Treatment: PRINCIPAL DISCHARGE DIAGNOSIS  Diagnosis: Cellulitis of right lower extremity  Assessment and Plan of Treatment: You were admitted with fevers and swelling/pain in your right leg. You received IV antibiotics with improvement in your symptoms.      SECONDARY DISCHARGE DIAGNOSES  Diagnosis: Worsening heart failure  Assessment and Plan of Treatment: During your hospitalization, you had an acute episode of flash pulmonary edema, for which you had to have a brief stay at the cardiac ICU while they aggressively diuresed you. You were transferred back to the floor team after 1 day. Heart failure continued to follow you and repeated imaging of your heart, which showed worsening heart failure. It required drastic adjustments to be made to your heart failure medications. Repeat imaging after all of the images showed that your heart failure function was a little better. You were evaluated by [facility] specialists and cardiothoracic surgeons, who decided to not pursue a repeat CABG or Mitraclip at this time. Heart failure will continue to see you frequently as an outpatient and make recommendations.    Diagnosis: MSSA bacteremia  Assessment and Plan of Treatment: Your hospital course was complicated by a recurrence of your fevers. Blood cultures showed that MSSA was in your blood (9/9). Infectious disease doctors were consulted and recommended continuing IV antibiotics for 4 weeks. To be able to take this medication at home, you had to have a central line (Roth catheter placed). When you go home, the infusion company will come and help you with your evening dose of abx.    Diagnosis: Ventricular tachycardia, nonsustained  Assessment and Plan of Treatment: When you had your episode of flash pulmonary edema, your heart went into a dangerous rhythm so the electrophysiologists were consulted. They wanted to do a study to see if you're a candidate for an ICD. However, since you want to go home and declined an inpatient procedure, you need to set up an outpatient EP study with Dr. Meghna BESS.    Diagnosis: Wound of left upper extremity  Assessment and Plan of Treatment: When you came to the hospital, you had a wound on the back of your left hand. You said you had obtained the wound after a fall and it had not been healing well. A plastic surgeon, Dr. Marquez, evaluated your hand and recommended following up with him as an outpatient.    Diagnosis: Acute kidney injury superimposed on chronic kidney disease  Assessment and Plan of Treatment: Your kidney function was worse than your baseline when you came in to the hospital. We assumed this could be from dehydration or because of your infection/fevers. It slowly started to get better but when you had the episode of flash pulmonary edema. You had to be aggressively diuresed to get all of the extra fluid off. This worsened your kidney function temporarily but when we stopped the aggressive diuresis, your kidney function stabilized. Please follow up with your nephrologist, Dr. Mayen as an outpatient. Dr. Wills, his colleague, saw you in the hospital.

## 2020-09-11 NOTE — PROGRESS NOTE ADULT - PROBLEM SELECTOR PLAN 1
Combined Systolic and diastolic Heart failure. TTE (1/2020): LVEF 39%, mod MR/AR  -8/27: Patient had an RRT after desatting to 77% overnight. Found to be significant pulmonary edema on CXR. EKG showed Afib with RVR and had runs of VT on tele. Lactate 8. Patient received IV lasix 40mg, amio, and transferred to CCU and placed on BiPAP.    -CCU course (8/27): transitioned from BiPAP to NC. Bumex 2. Restarted home isordil and po lasix 40mg.   -TTE (8/27): Worsening diastolic dysfunction. EF 35-45%. mild/mod MR. mild AS. mild/mod AR. mod-severe global LV dysfunction.   -CHANDRA (9/1) showed severe MR, mod AR.   -Repeat TTE (9/8) showed EF 35% and grossly moderate mitral regurgitation  -C/w coreg 12.5mg BID, isordil 10mg TID  -Heart failure recs: C/w Lasix 40mg po qday + Inc Hydralazine to 60mg TID for 2PM today and ask NF to increase to 75mg TID if his BP can tolerate (hold SBP <90) + Increase isordil to 20mg TID. Plan to do RHC and CHANDRA tomorrow. Hold anticoagulation and make pt NPO at [facility].   -EP recs: will schedule EP study +/- ICD placement pending optimization per SH/HF  -C/w strict I/O's, daily weights, and 1.5L fluid restriction Combined Systolic and diastolic Heart failure. TTE (1/2020): LVEF 39%, mod MR/AR  -8/27: RRT for flash pulmonary edema. Transferred to CCU.   -CCU course (8/27): Aggressive diuresis  -TTE (8/27): Worsening diastolic dysfunction. EF 35-45%. mild/mod MR. mild AS. mild/mod AR. mod-severe global LV dysfunction.   -CHANDRA (9/1) showed severe MR, mod AR.   -Repeat TTE (9/8) showed EF 35% and grossly moderate mitral regurgitation  -C/w coreg 12.5mg BID, isordil 20mg TID, Hydralazine 75mg TID  -Heart failure recs: C/w Lasix 40mg po qday. Plan to do RHC with swan placement and CHANDRA today.   -EP recs: will schedule EP study +/- ICD placement pending optimization per SH/HF  -C/w strict I/O's, daily weights, and 1.5L fluid restriction

## 2020-09-11 NOTE — DISCHARGE NOTE PROVIDER - HOSPITAL COURSE
65 year old male with PMH significant for CAD (s/p CABG; most recent LHC showed stable disease with no intervention), CHF (LVEF 39% TTE 1/2020), Afib (on Eliquis), HTN, HLD, CKD III, CHER (on CPAP), and pemphigus vulgaris presenting to ED with RLE swelling. Pt had a mechanical fall 1 month ago where he landed on his right leg and left hand. He didn't have any difficulty ambulating so he didn't seek medical evaluation at that time. However, since then he said he had a L hand wound, which has not been healing and is now draining pus. Pt not sure of last tetanus vaccine. 1 week ago, he noticed RLE swelling, which has been progressively getting worse. He notes erythema, progressive swelling, and pain with limited ankle mobility. He has had difficulty ambulating. Additionally, he is c/o sob but feels that this is chronic, reporting baseline SOB and MELCHOR. He denies fevers, CP, wheezing, cough, nausea, vomiting, abdominal pain.        ED course: Admission vitals, T 100.6, HR 75, /55, RR 22, Sat 95%. Labs s/f leukocytosis (12.27), INR 2.16, Trop 65, Cr 2.34, BNP 2301. Lactate 2.4. COVID negative. UA negative. CXR wnl. Bedside echo showed indeterminate IVC volume status. XR RLE showed no fx but areas of soft tissue swelling/stranding around right ankle. Limited RLE Duplex scan negative for DVT. Patient received 2L IVF for worsening hypotension (SBP as low as 80's). Repeat lactate 0.7. Improved BPs (SBPs 100's). S/p IV vanc/zosyn for sepsis likely 2/2 to hand wound with purulent drainage vs RLE cellulitis.         S/p IV vancomycin (8/24-30) with improvement in cellulitis. Hospital course complicated by ADHF with shortness of breath and hypoxia requiring CCU admission for VT likely in the setting of volume overload and severe MR. He has diuresed nearly 30lbs since admission. He underwent CHANDRA on 9/1 that showed significantly tethered MV with severe MR. CTX following for his MR with consideration for redo CABG. He underwent cardiac viability study on 9/4 that suggested fair amount of viability but notably with improvement in LVEF to 47%. No evidence of volume expansion, although exam is challenging due to his body habitus. He is hemodynamically stable but with persistent renal dysfunction. Now on oral diuretics. S/p right heart cath 9/11 with results showing ______. Plans for TTE to reassess his MR and possible endocarditis on monday. Blood cultures from 9/9 with MSSA, ID consulted, started on cefazolin for 4 week course. 65 year old male with PMH significant for CAD (s/p CABG 2005 @ [facility] with Concepcion Caldwell LAD, SVG OM2, OM1; most recent LHC showed stable disease with only LIMA patent; no intervention), CHF (LVEF 39% TTE 1/2020), Afib (on Eliquis), HTN, HLD, CKD III, CHER (on CPAP), and pemphigus vulgaris presenting to ED with RLE swelling. Pt had a mechanical fall 1 month ago where he landed on his right leg and left hand. He didn't have any difficulty ambulating so he didn't seek medical evaluation at that time. However, since then he said he had a L hand wound, which has not been healing and is now draining pus. Pt not sure of last tetanus vaccine. 1 week ago, he noticed RLE swelling, which has been progressively getting worse. He notes erythema, progressive swelling, and pain with limited ankle mobility. He has had difficulty ambulating. Additionally, he is c/o sob but feels that this is chronic, reporting baseline SOB and MELCHOR. He denies fevers, CP, wheezing, cough, nausea, vomiting, abdominal pain.    ED course: Admission vitals, T 100.6, HR 75, /55, RR 22, Sat 95%. Labs s/f leukocytosis (12.27), INR 2.16, Trop 65, Cr 2.34, BNP 2301. Lactate 2.4. COVID negative. UA negative. CXR wnl. Bedside echo showed indeterminate IVC volume status. XR RLE showed no fx but areas of soft tissue swelling/stranding around right ankle. Limited RLE Duplex scan negative for DVT. Patient received 2L IVF for worsening hypotension (SBP as low as 80's). Repeat lactate 0.7. Improved BPs (SBPs 100's). S/p IV vanc/zosyn for sepsis likely 2/2 to hand wound with purulent drainage vs RLE cellulitis.     Hospital Course: Patient was admitted with RLE cellulitis (S/p IV vancomycin 8/24-30) with improvement in cellulitis. Hospital course complicated by ADHF with shortness of breath and hypoxia requiring brief CCU admission for VT likely in the setting of volume overload. TTE showed worsening diastolic dysfunction without adequate visualization of MV. He underwent CHANDRA on 9/1 that showed significantly tethered MV with severe MR, which was thought to be related to volume overload. He was aggressively diuresed, nearly 30lbs since admission. Heart failure uptitrated patient's HF meds. Patient will continue with lasix 40mg qday, spironolactone 25mg qday, hydralazine 100mg TID, ISDN 40mg ID, and coreg 18.75mg BID as an outpatient. After these changes, patient underwent RHC, which showed improved volume status. TTE showed EF 35%. CHANDRA showed mod-severe MR. Structural heart evaluated patient and decided patient does not currently need repeat CABG or     #Non-sustained VT: EP follow patient after episode of ADHF with VT, which required brief CCU stay. Recommended VT induction study prior to discharge, however, patient declined so he will follow up as an outpatient with Dr. Coffman.     #MSSA Bacteremia: Pt found to be febrile 9/9. Bcx grew MSSA so ID was consulted. Recommended cefazolin x 4 weeks. Patient had a Roth central line placed as he was a poor candidate for PICC line due to his renal dysfunction. Repeat Bcx (9/10) negative. CHANDRA without MV vegetations. He will continue abx as outpatient and follow up with ID.     #NARCISO on CKD IV: Pt's Cr was elevated to 2.34 on admission. Baseline was previously 1.4-1.7. It improved with cellulitis. However, after patient's ADHF episode, patient was aggressively diuresed for several days, increasing creatinine to as high as 2.6. Once patient's lasix was reduced to po qday, his creatinine stabilized to 2.3-2.4 (presumably his new baseline).     #L Hand Wound: On admission, patient had a L hand wound which he had obtained after a fall previously and had poor wound healing. Plastic Surgery evaluated his hand and recommended wound care and outpatient follow up.     Patient hemodynamically stable upon discharge to home. PT recommended home with PT so patient provided with script for PT. Patient to follow up with Dr. Coffman for VT induction study ASAP, with Dr. Leiva for HF, Dr. Mckeon his cardiologist, Dr. Raimrez for ID, Dr. Mayen his nephrologist, and Dr. Marquez for his L hand. He will continue with cefazolin for 1 month with weekly CBC/BMP to be faxed to Dr. Ramirez. 65 year old male with PMH significant for CAD (s/p CABG 2005 @ [facility] with Concepcion Caldwell LAD, SVG OM2, OM1; most recent LHC showed stable disease with only LIMA patent; no intervention), CHF (LVEF 39% TTE 1/2020), Afib (on Eliquis), HTN, HLD, CKD III, CHER (on CPAP), and pemphigus vulgaris presenting to ED with RLE swelling. Pt had a mechanical fall 1 month ago where he landed on his right leg and left hand. He didn't have any difficulty ambulating so he didn't seek medical evaluation at that time. However, since then he said he had a L hand wound, which has not been healing and is now draining pus. Pt not sure of last tetanus vaccine. 1 week ago, he noticed RLE swelling, which has been progressively getting worse. He notes erythema, progressive swelling, and pain with limited ankle mobility. He has had difficulty ambulating. Additionally, he is c/o sob but feels that this is chronic, reporting baseline SOB and MELCHOR. He denies fevers, CP, wheezing, cough, nausea, vomiting, abdominal pain.    ED course: Admission vitals, T 100.6, HR 75, /55, RR 22, Sat 95%. Labs s/f leukocytosis (12.27), INR 2.16, Trop 65, Cr 2.34, BNP 2301. Lactate 2.4. COVID negative. UA negative. CXR wnl. Bedside echo showed indeterminate IVC volume status. XR RLE showed no fx but areas of soft tissue swelling/stranding around right ankle. Limited RLE Duplex scan negative for DVT. Patient received 2L IVF for worsening hypotension (SBP as low as 80's). Repeat lactate 0.7. Improved BPs (SBPs 100's). S/p IV vanc/zosyn for sepsis likely 2/2 to hand wound with purulent drainage vs RLE cellulitis.     Hospital Course: Patient was admitted with RLE cellulitis (S/p IV vancomycin 8/24-30) with improvement in cellulitis. Hospital course complicated by ADHF with shortness of breath and hypoxia requiring brief CCU admission for VT likely in the setting of volume overload. TTE showed worsening diastolic dysfunction without adequate visualization of MV. He underwent CHANDRA on 9/1 that showed significantly tethered MV with severe MR, which was thought to be related to volume overload. He was aggressively diuresed, nearly 30lbs since admission. Heart failure uptitrated patient's HF meds. Patient will continue with lasix 40mg qday, spironolactone 25mg qday, hydralazine 100mg TID, ISDN 40mg ID, and coreg 18.75mg BID as an outpatient. After these changes, patient underwent RHC, which showed improved volume status. TTE showed EF 35%. CHANDRA showed mod-severe MR. Structural heart evaluated patient and decided patient does not currently need repeat CABG or     #Non-sustained VT: EP follow patient after episode of ADHF with VT, which required brief CCU stay. Recommended VT induction study prior to discharge, however, patient declined so he will follow up as an outpatient with Dr. Coffman.     #MSSA Bacteremia: Pt found to be febrile 9/9. Bcx grew MSSA so ID was consulted. Recommended cefazolin x 4 weeks. Patient had a Roth central line placed as he was a poor candidate for PICC line due to his renal dysfunction. Repeat Bcx (9/10) negative. CHANDRA without MV vegetations. He will continue abx as outpatient and follow up with ID.     #NARCISO on CKD IV: Pt's Cr was elevated to 2.34 on admission. Baseline was previously 1.4-1.7. It improved with cellulitis. However, after patient's ADHF episode, patient was aggressively diuresed for several days, increasing creatinine to as high as 2.6. Once patient's lasix was reduced to po qday, his creatinine stabilized to 2.3-2.4 (presumably his new baseline). Of note, PTH 87, likely 2/2 CKD. Patient to follow up with nephrology as outpatient. Currently asymptomatic.     #L Hand Wound: On admission, patient had a L hand wound which he had obtained after a fall previously and had poor wound healing. Plastic Surgery evaluated his hand and recommended wound care and outpatient follow up.     Patient hemodynamically stable upon discharge to home. PT recommended home with PT so patient provided with script for PT. Patient to follow up with Dr. Coffman for VT induction study ASAP, with Dr. Leiva for HF, Dr. Mckeon his cardiologist, Dr. Ramirez for ID, Dr. Mayen his nephrologist, and Dr. Marquez for his L hand. He will continue with cefazolin for 1 month with weekly CBC/BMP to be faxed to Dr. Ramirez. 65 year old male with PMH significant for CAD (s/p CABG 2005 @ [facility] with Concepcion Caldwell LAD, SVG OM2, OM1; most recent LHC showed stable disease with only LIMA patent; no intervention), CHF (LVEF 39% TTE 1/2020), Afib (on Eliquis), HTN, HLD, CKD III, CHER (on CPAP), and pemphigus vulgaris presenting to ED with RLE swelling. Pt had a mechanical fall 1 month ago where he landed on his right leg and left hand. He didn't have any difficulty ambulating so he didn't seek medical evaluation at that time. However, since then he said he had a L hand wound, which has not been healing and is now draining pus. Pt not sure of last tetanus vaccine. 1 week ago, he noticed RLE swelling, which has been progressively getting worse. He notes erythema, progressive swelling, and pain with limited ankle mobility. He has had difficulty ambulating. Additionally, he is c/o sob but feels that this is chronic, reporting baseline SOB and MELCHOR. He denies fevers, CP, wheezing, cough, nausea, vomiting, abdominal pain.    ED course: Admission vitals, T 100.6, HR 75, /55, RR 22, Sat 95%. Labs s/f leukocytosis (12.27), INR 2.16, Trop 65, Cr 2.34, BNP 2301. Lactate 2.4. COVID negative. UA negative. CXR wnl. Bedside echo showed indeterminate IVC volume status. XR RLE showed no fx but areas of soft tissue swelling/stranding around right ankle. Limited RLE Duplex scan negative for DVT. Patient received 2L IVF for worsening hypotension (SBP as low as 80's). Repeat lactate 0.7. Improved BPs (SBPs 100's). S/p IV vanc/zosyn for sepsis likely 2/2 to hand wound with purulent drainage vs RLE cellulitis.     Hospital Course: Patient was admitted with RLE cellulitis (S/p IV vancomycin 8/24-30) with improvement in cellulitis. Hospital course complicated by ADHF with shortness of breath and hypoxia requiring brief CCU admission for VT likely in the setting of volume overload. TTE showed worsening diastolic dysfunction without adequate visualization of MV. He underwent CHANDRA on 9/1 that showed significantly tethered MV with severe MR, which was thought to be related to volume overload. He was aggressively diuresed, nearly 30lbs since admission. Heart failure uptitrated patient's HF meds. Patient will continue with lasix 40mg qday, spironolactone 25mg qday, hydralazine 100mg TID, ISDN 40mg ID, and coreg 18.75mg BID as an outpatient. After these changes, patient underwent RHC, which showed improved volume status. TTE showed EF 35%. CHANDRA showed mod-severe MR. Structural heart evaluated patient and decided patient does not currently need repeat CABG or     #Non-sustained VT: EP follow patient after episode of ADHF with VT, which required brief CCU stay. Recommended VT induction study prior to discharge, however, patient declined so he will follow up as an outpatient with Dr. Coffman.     #MSSA Bacteremia: Pt found to be febrile 9/9. Bcx grew MSSA so ID was consulted. Recommended cefazolin x 4 weeks. Patient had a Roth central line placed as he was a poor candidate for PICC line due to his renal dysfunction. Repeat Bcx (9/10) negative. CHANDRA without MV vegetations. He will continue abx as outpatient and follow up with ID.     #NARCISO on CKD IV: Pt's Cr was elevated to 2.34 on admission. Baseline was previously 1.4-1.7. It improved with cellulitis. However, after patient's ADHF episode, patient was aggressively diuresed for several days, increasing creatinine to as high as 2.6. Once patient's lasix was reduced to po qday, his creatinine stabilized to 2.3-2.4 (presumably his new baseline). Of note, PTH 87, likely 2/2 CKD. Patient to follow up with nephrology as outpatient. Currently asymptomatic.     #L Hand Wound: On admission, patient had a L hand wound which he had obtained after a fall previously and had poor wound healing. Plastic Surgery evaluated his hand and recommended wound care and outpatient follow up.     Patient hemodynamically stable upon discharge to home. PT recommended home with PT so patient provided with script for PT. Patient to follow up with Dr. Coffman for VT induction study ASAP, with Dr. Leiva for HF, Dr. Mckeon his cardiologist, Dr. Ramirez for ID, Dr. Mayen his nephrologist, and Dr. Marquez for his L hand. He will continue with cefazolin for 1 month with weekly CBC/BMP to be faxed to Dr. Ramirez.

## 2020-09-11 NOTE — PROVIDER CONTACT NOTE (OTHER) - BACKGROUND
Pt admitted for unspecified open wound of left upper arm. Pt has hx of a fib, CHF, CKD, CHER. Pt has hx of AIVR during this admission

## 2020-09-11 NOTE — PROGRESS NOTE ADULT - ASSESSMENT
Mr. Colon is a 65 year old male with PMH significant for CAD (s/p CABG 2005 LIMA-LAD, SVG- OM2, SVG- OM1, LIMA to LAD only patient graft per cath 1/2020), ICM HFrEF (LVEF 30-35%), severe MR, Afib (on Eliquis), HTN, HLD, CKD III, CHER (on CPAP) who presented with septic shock 2/2 RLL cellulitis. He additionally had ADHF with shortness of breath and hypoxia requiring CCU admission for VT likely in the setting of volume overload and severe MR. He has diuresed nearly 30lbs since admission. He underwent CHANDRA on 9/1 that showed significantly tethered MV with severe MR. CTX following for his MR with consideration for redo CABG. He underwent cardiac viability study on 9/4 that suggested fair amount of viability but notably with improvement in LVEF to 47%. No evidence of volume expansion, although exam is challenging due to his body habitus. He is hemodynamically stable but with persistent renal dysfunction. Now on oral diuretics. Plans for TTE to reassess his MR. Mr. Colon is a 65 year old male with PMH significant for CAD (s/p CABG 2005 LIMA-LAD, SVG- OM2, SVG- OM1, LIMA to LAD only patient graft per cath 1/2020), ICM HFrEF (LVEF 30-35%), severe MR, Afib (on Eliquis), HTN, HLD, CKD III, CHER (on CPAP) who presented with septic shock 2/2 RLL cellulitis. He additionally had ADHF with shortness of breath and hypoxia requiring CCU admission for VT likely in the setting of volume overload and severe MR. He has diuresed nearly 30lbs since admission. He underwent CHANDRA on 9/1 that showed significantly tethered MV with severe MR. CTX following for his MR with consideration for redo CABG. He underwent cardiac viability study on 9/4 that suggested fair amount of viability but notably with improvement in LVEF to 47%. No evidence of volume expansion, although exam is challenging due to his body habitus. He is hemodynamically stable but with persistent renal dysfunction. Now on oral diuretics. Plans for TTE to reassess his MR and possible endocarditis on monday,

## 2020-09-11 NOTE — DISCHARGE NOTE PROVIDER - NSDCMRMEDTOKEN_GEN_ALL_CORE_FT
ALPRAZolam 0.5 mg oral tablet: 1 tab(s) orally 2 times a day  aspirin 81 mg oral tablet, chewable: 1 tab(s) orally once a day  atorvastatin 80 mg oral tablet: 1 tab(s) orally once a day (at bedtime)  pantoprazole 40 mg oral delayed release tablet: 1 tab(s) orally 2 times a day (before meals)  predniSONE 20 mg oral tablet: 1 tab(s) orally once a day  Repatha 140 mg/mL subcutaneous solution: 1 milliliter(s) subcutaneous every 2 weeks  sertraline 50 mg oral tablet: 1 tab(s) orally once a day ALPRAZolam 0.5 mg oral tablet: 1 tab(s) orally 2 times a day  aspirin 81 mg oral tablet, chewable: 1 tab(s) orally once a day  atorvastatin 80 mg oral tablet: 1 tab(s) orally once a day (at bedtime)  CBC + Diff and BMP qWeekly : Please get weekly CBC+Diff and BMP and fax to Dr. Ramirez at [phone].  1 application intravenously once a week.   ceFAZolin 2 g intravenous injection: 2 gram(s) intravenous every 12 hours for MSSA Bacteremia.     Total 4 weeks (9/10-10-8).   pantoprazole 40 mg oral delayed release tablet: 1 tab(s) orally 2 times a day (before meals)  predniSONE 20 mg oral tablet: 1 tab(s) orally once a day  Repatha 140 mg/mL subcutaneous solution: 1 milliliter(s) subcutaneous every 2 weeks  sertraline 50 mg oral tablet: 1 tab(s) orally once a day   ALPRAZolam 0.5 mg oral tablet: 1 tab(s) orally 2 times a day  aspirin 81 mg oral tablet, chewable: 1 tab(s) orally once a day  atorvastatin 80 mg oral tablet: 1 tab(s) orally once a day (at bedtime)  CBC + Diff and BMP qWeekly : Please get weekly CBC+Diff and BMP and fax to Dr. Ramirez at [phone].  1 application intravenously once a week.   ceFAZolin 2 g intravenous injection: 2 gram(s) intravenous every 12 hours for MSSA Bacteremia.     Total 4 weeks (9/10-10-8).   Eliquis 5 mg oral tablet: 1 tab(s) orally 2 times a day  pantoprazole 40 mg oral delayed release tablet: 1 tab(s) orally 2 times a day (before meals)  predniSONE 20 mg oral tablet: 1 tab(s) orally once a day  Repatha 140 mg/mL subcutaneous solution: 1 milliliter(s) subcutaneous every 2 weeks  sertraline 50 mg oral tablet: 1 tab(s) orally once a day   ALPRAZolam 0.5 mg oral tablet: 1 tab(s) orally 2 times a day  aspirin 81 mg oral tablet, chewable: 1 tab(s) orally once a day  atorvastatin 80 mg oral tablet: 1 tab(s) orally once a day (at bedtime)  CBC + Diff and BMP qWeekly : Please get weekly CBC+Diff and BMP and fax to Dr. Ramirez at [phone].  7 application intravenously once a week.   ceFAZolin 2 g intravenous injection: 2 gram(s) intravenous every 12 hours for MSSA Bacteremia.     Total 4 weeks (9/10-10-8).   Coreg 6.25 mg oral tablet: 3 tab(s) orally 2 times a day  Eliquis 5 mg oral tablet: 1 tab(s) orally 2 times a day  furosemide 40 mg oral tablet: 1 tab(s) orally once a day  hydrALAZINE 100 mg oral tablet: 1 tab(s) orally 3 times a day  Isordil Titradose 40 mg oral tablet: 1 tab(s) orally 3 times a day  pantoprazole 40 mg oral delayed release tablet: 1 tab(s) orally 2 times a day (before meals)  predniSONE 20 mg oral tablet: 1 tab(s) orally once a day  Repatha 140 mg/mL subcutaneous solution: 1 milliliter(s) subcutaneous every 2 weeks  sertraline 50 mg oral tablet: 1 tab(s) orally once a day  spironolactone 25 mg oral tablet: 1 tab(s) orally once a day   ALPRAZolam 0.5 mg oral tablet: 1 tab(s) orally 2 times a day  aspirin 81 mg oral tablet, chewable: 1 tab(s) orally once a day  atorvastatin 80 mg oral tablet: 1 tab(s) orally once a day (at bedtime)  CBC + Diff and BMP qWeekly : Please get weekly CBC+Diff and BMP and fax to Dr. Ramirez at [phone].  9 application intravenously once a week.   ceFAZolin 2 g intravenous injection: 2 gram(s) intravenous every 12 hours for MSSA Bacteremia.     Total 4 weeks (9/10-10-8).   Coreg 6.25 mg oral tablet: 3 tab(s) orally 2 times a day  Eliquis 5 mg oral tablet: 1 tab(s) orally 2 times a day  Eliquis 5 mg oral tablet: 1 tab(s) orally 2 times a day   furosemide 40 mg oral tablet: 1 tab(s) orally once a day  hydrALAZINE 100 mg oral tablet: 1 tab(s) orally 3 times a day  Isordil Titradose 40 mg oral tablet: 1 tab(s) orally 3 times a day  pantoprazole 40 mg oral delayed release tablet: 1 tab(s) orally 2 times a day (before meals)  predniSONE 20 mg oral tablet: 1 tab(s) orally once a day  Repatha 140 mg/mL subcutaneous solution: 1 milliliter(s) subcutaneous every 2 weeks  sertraline 50 mg oral tablet: 1 tab(s) orally once a day  spironolactone 25 mg oral tablet: 1 tab(s) orally once a day

## 2020-09-11 NOTE — CONSULT NOTE ADULT - SUBJECTIVE AND OBJECTIVE BOX
Patient is a 65y old  Male who presents with a chief complaint of RLE swelling (11 Sep 2020 11:14)    HPI:  65 year old male with PMH significant for CAD (s/p CABG; most recent LHC showed stable disease with no intervention), CHF (LVEF 39% TTE 2020), Afib (on Eliquis), HTN, HLD, CKD III, CHER (on CPAP), and pemphigus vulgaris presenting to ED with RLE swelling. Pt had a mechanical fall 1 month ago where he landed on his right leg and left hand. He didn't have any difficulty ambulating so he didn't seek medical evaluation at that time. However, since then he said he had a L hand wound, which has not been healing and is now draining pus. Pt not sure of last tetanus vaccine. 1 week ago, he noticed RLE swelling, which has been progressively getting worse. He notes erythema, progressive swelling, and pain with limited ankle mobility. He has had difficulty ambulating. Additionally, he is c/o sob but feels that this is chronic, reporting baseline SOB and MELCHOR. He denies fevers, CP, wheezing, cough, nausea, vomiting, abdominal pain.    ED course: Admission vitals, T 100.6, HR 75, /55, RR 22, Sat 95%. Labs s/f leukocytosis (12.27), INR 2.16, Trop 65, Cr 2.34, BNP 2301. Lactate 2.4. COVID negative. UA negative. CXR wnl. Bedside echo showed indeterminate IVC volume status. XR RLE showed no fx but areas of soft tissue swelling/stranding around right ankle. Limited RLE Duplex scan negative for DVT. Patient received 2L IVF for worsening hypotension (SBP as low as 80's). Repeat lactate 0.7. Improved BPs (SBPs 100's). S/p IV vanc/zosyn for sepsis likely 2/2 to hand wound with purulent drainage vs RLE cellulitis.     Interval history: Patient was admitted to medicine service. Seen and examined at bedside in ED. Patient doing well, c/o RLE pain with movement/palpation. SOB at baseline. Afebrile at time of exam. (24 Aug 2020 19:35)     Pt admitted for RLE cellulitis and fever=100.6 on . He is treated with 7 days of IV vancomycin, the RLE cellulitis is now resolved.  His course was complicated by cardiac complications, including RRT and transferred to CCU for diuretics. Currently he has no SOB or cough.  He spiked fever T=100.6 on  with chills. BCx drawn, now resulted to be MSSA, on Cefazolin now.  On exam, NAD, oral no obvious infection, no phlebitis, no skin breakdown, no cellulitis, lungs are clear, no murmur.            prior hospital charts reviewed [V]  primary team notes reviewed [V]  other consultant notes reviewed [V]    PAST MEDICAL & SURGICAL HISTORY:  CHF (congestive heart failure)  Psoriasis  HLD (hyperlipidemia)  CAD (coronary artery disease): 3 stents  Hypertension  History of coronary artery stent placement: 1 stent , 2 stents  in [facility]  S/P quadruple vessel bypass:       SOCIAL HISTORY:    - Smoked 1PPD for 30 years. Quit 3 years ago.  - Occasionally EtOH, denies alcoholics  - Denies [place]  - Lives with daughter. No pet. No recent travel  - Used to work as air conditioner control    FAMILY HISTORY:  father-MI and stroke  mother-kidney failing, not on HD    Allergies  No Known Allergies    ANTIMICROBIALS:  ceFAZolin   IVPB    ceFAZolin   IVPB 2000 every 8 hours      ANTIMICROBIALS (past 90 days):  MEDICATIONS  (STANDING):    ceFAZolin   IVPB   100 mL/Hr IV Intermittent (20 @ 11:02)    piperacillin/tazobactam IVPB.   200 mL/Hr IV Intermittent (20 @ 14:20)    vancomycin  IVPB   250 mL/Hr IV Intermittent (20 @ 14:19)    vancomycin  IVPB   250 mL/Hr IV Intermittent (20 @ 13:10)   250 mL/Hr IV Intermittent (20 @ 13:08)    vancomycin  IVPB   250 mL/Hr IV Intermittent (20 @ 23:11)   250 mL/Hr IV Intermittent (20 @ 11:55)   250 mL/Hr IV Intermittent (20 @ 23:58)    vancomycin  IVPB   250 mL/Hr IV Intermittent (20 @ 01:20)    vancomycin  IVPB   250 mL/Hr IV Intermittent (20 @ 00:30)    vancomycin  IVPB   166.67 mL/Hr IV Intermittent (09-10-20 @ 18:14)        OTHER MEDS:   MEDICATIONS  (STANDING):  acetaminophen   Tablet .. 650 every 6 hours PRN  aspirin  chewable 81 daily  atorvastatin 80 at bedtime  carvedilol 12.5 every 12 hours  furosemide    Tablet 40 daily  hydrALAZINE 75 three times a day  isosorbide   dinitrate Tablet (ISORDIL) 20 three times a day  LORazepam     Tablet 0.5 at bedtime  pantoprazole    Tablet 40 two times a day  predniSONE   Tablet 20 daily  sertraline 50 daily      REVIEW OF SYSTEMS  [  ] ROS unobtainable because:    [V  ] All other systems negative except as noted below:	    Constitutional:  [V ] fever [ V] chills  [ ] weight loss  [ ] weakness  Skin:  [ ] rash [ ] phlebitis	  Eyes: [ ] icterus [ ] pain  [ ] discharge	  ENMT: [ ] sore throat  [ ] thrush [ ] ulcers [ ] exudates  Respiratory: [ ] dyspnea [ ] hemoptysis [ ] cough [ ] sputum	  Cardiovascular:  [ ] chest pain [ ] palpitations [ ] edema	  Gastrointestinal:  [ ] nausea [ ] vomiting [ ] diarrhea [ ] constipation [ ] pain	  Genitourinary:  [ ] dysuria [ ] frequency [ ] hematuria [ ] discharge [ ] flank pain  [ ] incontinence  Musculoskeletal:  [ ] myalgias [ ] arthralgias [ ] arthritis  [ ] back pain  Neurological:  [ ] headache [ ] seizures  [ ] confusion/altered mental status  Psychiatric:  [ ] anxiety [ ] depression	  Hematology/Lymphatics:  [ ] lymphadenopathy  Endocrine:  [ ] adrenal [ ] thyroid  Allergic/Immunologic:	 [ ] transplant [ ] seasonal    VITALS:  Vital Signs Last 24 Hrs  T(F): 97.8 (20 @ 11:20), Max: 100.6 (20 @ 07:30)    Vital Signs Last 24 Hrs  HR: 66 (20 @ 11:20) (55 - 74)  BP: 113/69 (20 @ 11:20) (110/63 - 151/84)  RR: 18 (20 @ 11:20)  SpO2: 96% (20 @ 11:20) (96% - 100%)  Wt(kg): --    EXAM:  GA: NAD  HEENT: oral cavity no lesion  CV: bradycardia, no RMG  Lungs: CTAB  Abd: BS+, soft, nontender, no rebounding pain  Ext: no edema  Skin: no skin breakdown  IV: no phlebitis    Labs:                        9.5    9.16  )-----------( 207      ( 11 Sep 2020 05:51 )             33.7         140  |  103  |  49<H>  ----------------------------<  101<H>  3.5   |  20<L>  |  2.27<H>    Ca    9.1      11 Sep 2020 05:51  Phos  3.5       Mg     2.3             WBC Trend:  WBC Count: 9.16 (20 @ 05:51)  WBC Count: 8.03 (09-10-20 @ 06:30)  WBC Count: 12.85 (20 @ 06:51)  WBC Count: 13.11 (20 @ 06:07)      Auto Neutrophil #: 7.90 K/uL (20 @ 06:09)  Auto Neutrophil #: 13.15 K/uL (20 @ 04:02)  Auto Neutrophil #: 10.23 K/uL (20 @ 13:45)  Auto Neutrophil #: 16.98 K/uL (19 @ 03:30)  Auto Neutrophil #: 18.39 K/uL (19 @ 13:00)      Creatine Trend:  Creatinine, Serum: 2.27 ()  Creatinine, Serum: 2.51 (09-10)  Creatinine, Serum: 2.20 ()  Creatinine, Serum: 2.62 ()    Urinalysis Basic - ( 09 Sep 2020 20:40 )    Color: Light Yellow / Appearance: Clear / S.015 / pH: x  Gluc: x / Ketone: Negative  / Bili: Negative / Urobili: <2 mg/dL   Blood: x / Protein: Negative / Nitrite: Negative   Leuk Esterase: Negative / RBC: x / WBC x   Sq Epi: x / Non Sq Epi: x / Bacteria: x        MICROBIOLOGY:    Culture - Urine (collected 09 Sep 2020 18:27)  Source: .Urine Clean Catch (Midstream)  Final Report:    No growth    Culture - Blood (collected 09 Sep 2020 16:33)  Source: .Blood Blood-Venous  Preliminary Report:    No growth to date.    Culture - Blood (collected 09 Sep 2020 16:33)  Source: .Blood Blood-Peripheral  Preliminary Report:    Growth in aerobic bottle: Gram Positive Cocci in Clusters    "Due to technical problems, Proteus sp. will Not be reported as part of    the BCID panel until further notice"    ***Blood Panel PCR results on this specimen are available    approximately 3 hours after the Gram stain result.***    Gram stain, PCR, and/or culture results may not always    correspond due to difference in methodologies.    ************************************************************    This PCR assay was performed using [employer].    The following targets are tested for: Enterococcus,    vancomycin resistant enterococci, Listeria monocytogenes,    coagulase negative staphylococci, S. aureus,    methicillin resistant S. aureus, Streptococcus agalactiae    (Group B), S. pneumoniae, S.pyogenes (Group A),    Acinetobacter baumannii, Enterobacter cloacae, E. coli,    Klebsiella oxytoca, K. pneumoniae, Proteus sp.,    Serratia marcescens, Haemophilus influenzae,    Neisseria meningitidis, Pseudomonas aeruginosa, Candida    albicans, C. glabrata, C krusei, C parapsilosis,    C. tropicalis and the KPC resistance gene.  Organism: Blood Culture PCR  Organism: Blood Culture PCR    Sensitivities:      -  Staphylococcus aureus: Detec Any isolate of Staphylococcus aureus from a blood culture is NOT considered a contaminant.      Method Type: PCR    Culture - Blood (collected 31 Aug 2020 09:39)  Source: .Blood Blood-Venous  Final Report:    No Growth Final    Culture - Blood (collected 31 Aug 2020 09:39)  Source: .Blood Blood-Peripheral  Final Report:    No Growth Final    Culture - Blood (collected 27 Aug 2020 07:01)  Source: .Blood Blood-Peripheral  Final Report:    Growth in aerobic bottle: Staphylococcus epidermidis    Coag Negative Staphylococcus    Single set isolate, possible contaminant. Contact    Microbiology if susceptibility testing clinically    indicated.    Culture - Blood (collected 27 Aug 2020 07:01)  Source: .Blood Blood-Peripheral  Final Report:    Growth in anaerobic bottle: Staphylococcus hominis Coag Negative    Staphylococcus    Single set isolate, possible contaminant. Contact    Microbiology if susceptibility testing clinically    indicated.    "Due to technical problems, Proteus sp. will Not be reported as part of    the BCID panel until further notice"    ***Blood Panel PCR results on this specimen are available    approximately 3 hours after the Gram stain result.***    Gram stain, PCR, and/or culture results may not always    correspond due to difference in methodologies.    ************************************************************    This PCR assay was performed using [employer].    The following targets are tested for: Enterococcus,    vancomycin resistant enterococci, Listeria monocytogenes,    coagulase negative staphylococci, S. aureus,    methicillin resistant S. aureus, Streptococcus agalactiae    (Group B), S. pneumoniae, S. pyogenes (Group A),    Acinetobacter baumannii, Enterobacter cloacae, E. coli,    Klebsiella oxytoca, K. pneumoniae, Proteus sp.,    Serratia marcescens, Haemophilus influenzae,    Neisseria meningitidis, Pseudomonas aeruginosa, Candida    albicans, C. glabrata, C krusei, C parapsilosis,    C. tropicalis and the KPC resistance gene.  Organism: Blood Culture PCR  Organism: Blood Culture PCR    Sensitivities:      -  Coagulase negative Staphylococcus: Detec      Method Type: PCR    Culture - Urine (collected 25 Aug 2020 00:51)  Source: .Urine Clean Catch (Midstream)  Final Report:    No growth    Culture - Blood (collected 24 Aug 2020 17:07)  Source: .Blood Blood-Peripheral  Final Report:    No Growth Final    Culture - Blood (collected 24 Aug 2020 17:07)  Source: .Blood Blood-Peripheral  Final Report:    No Growth Final        Rapid RVP Result: NotDetec ( @ 13:54)      OTHER TESTS:  COVID-19 PCR: NotDetec (20 @ 13:45)  COVID-19 IgG Antibody Index: 4.15 Index (20 @ 09:39)    Ferritin, Serum: 151 ( @ 09:41)  Procalcitonin, Serum: 0.37 ( @ 06:49)    A1C with Estimated Average Glucose Result: 5.5 % (20 @ 11:07)      RADIOLOGY:  imaging below personally reviewed    < from: Xray Chest 1 View- PORTABLE-Urgent (20 @ 09:41) >  IMPRESSION:    Median sternotomy     No pneumothorax. The lungs are clear.    < end of copied text >    < from: US Duplex Venous Lower Ext Ltd, Right (ED) (20 @ 16:36) >    INTERPRETATION:  Focused imaging of the right lower extremity was performed using gray scale compression technique for evaluation of deep venous thrombosis.  The femoral region was scanned from the common femoral vein proximal to the greater saphenous vein insertion distally to the femoral/deep femoral vein junction.  The popliteal region was scanned from the popliteal vein distally to the trifurcation.  There is no evidence of proximal deep venous thrombosis.  Calf vein thrombosis and focal segmentaldeep venous thrombosis cannot be excluded.  The patient should have a repeat lower extremity ultrasound in 5-7 days.    IMPRESSION:  No Evidence of proximal deep vein thrombosis in the right  lower extremity.    < end of copied text > Patient is a 65y old  Male who presents with a chief complaint of RLE swelling (11 Sep 2020 11:14)    HPI:  65 year old male with PMH significant for CAD (s/p CABG; most recent LHC showed stable disease with no intervention), CHF (LVEF 39% TTE 2020), Afib (on Eliquis), HTN, HLD, CKD III, CHER (on CPAP), and pemphigus vulgaris presenting to ED with RLE swelling. Pt had a mechanical fall 1 month ago where he landed on his right leg and left hand. He didn't have any difficulty ambulating so he didn't seek medical evaluation at that time. However, since then he said he had a L hand wound, which has not been healing and is now draining pus. Pt not sure of last tetanus vaccine. 1 week ago, he noticed RLE swelling, which has been progressively getting worse. He notes erythema, progressive swelling, and pain with limited ankle mobility. He has had difficulty ambulating. Additionally, he is c/o sob but feels that this is chronic, reporting baseline SOB and MELCHOR. He denies fevers, CP, wheezing, cough, nausea, vomiting, abdominal pain.    ED course: Admission vitals, T 100.6, HR 75, /55, RR 22, Sat 95%. Labs s/f leukocytosis (12.27), INR 2.16, Trop 65, Cr 2.34, BNP 2301. Lactate 2.4. COVID negative. UA negative. CXR wnl. Bedside echo showed indeterminate IVC volume status. XR RLE showed no fx but areas of soft tissue swelling/stranding around right ankle. Limited RLE Duplex scan negative for DVT. Patient received 2L IVF for worsening hypotension (SBP as low as 80's). Repeat lactate 0.7. Improved BPs (SBPs 100's). S/p IV vanc/zosyn for sepsis likely 2/2 to hand wound with purulent drainage vs RLE cellulitis.     Interval history: Patient was admitted to medicine service. Seen and examined at bedside in ED. Patient doing well, c/o RLE pain with movement/palpation. SOB at baseline. Afebrile at time of exam. (24 Aug 2020 19:35)     Pt admitted for RLE cellulitis and fever=100.6 on . He is treated with 7 days of IV vancomycin, the RLE cellulitis is now resolved.  His course was complicated by cardiac complications, including RRT and transferred to CCU for diuretics. Currently he has no SOB or cough.  He spiked fever T=100.6 on  with chills. BCx drawn, now resulted to be MSSA, on Cefazolin now.  On exam, NAD, oral no obvious infection, no phlebitis, no skin breakdown, no cellulitis, lungs are clear, no murmur.    prior hospital charts reviewed [V]  primary team notes reviewed [V]  other consultant notes reviewed [V]    PAST MEDICAL & SURGICAL HISTORY:  CHF (congestive heart failure)  Psoriasis  HLD (hyperlipidemia)  CAD (coronary artery disease): 3 stents  Hypertension  History of coronary artery stent placement: 1 stent , 2 stents  in [facility]  S/P quadruple vessel bypass:       SOCIAL HISTORY:    - Smoked 1PPD for 30 years. Quit 3 years ago.  - Occasionally EtOH, denies alcoholics  - Denies [place]  - Lives with daughter. No pet. No recent travel  - Used to work as air conditioner control    FAMILY HISTORY:  father-MI and stroke  mother-kidney failing, not on HD    Allergies  No Known Allergies    ANTIMICROBIALS:  ceFAZolin   IVPB    ceFAZolin   IVPB 2000 every 8 hours      ANTIMICROBIALS (past 90 days):  MEDICATIONS  (STANDING):    ceFAZolin   IVPB   100 mL/Hr IV Intermittent (20 @ 11:02)    piperacillin/tazobactam IVPB.   200 mL/Hr IV Intermittent (20 @ 14:20)    vancomycin  IVPB   250 mL/Hr IV Intermittent (20 @ 14:19)    vancomycin  IVPB   250 mL/Hr IV Intermittent (20 @ 13:10)   250 mL/Hr IV Intermittent (20 @ 13:08)    vancomycin  IVPB   250 mL/Hr IV Intermittent (20 @ 23:11)   250 mL/Hr IV Intermittent (20 @ 11:55)   250 mL/Hr IV Intermittent (20 @ 23:58)    vancomycin  IVPB   250 mL/Hr IV Intermittent (20 @ 01:20)    vancomycin  IVPB   250 mL/Hr IV Intermittent (20 @ 00:30)    vancomycin  IVPB   166.67 mL/Hr IV Intermittent (09-10-20 @ 18:14)        OTHER MEDS:   MEDICATIONS  (STANDING):  acetaminophen   Tablet .. 650 every 6 hours PRN  aspirin  chewable 81 daily  atorvastatin 80 at bedtime  carvedilol 12.5 every 12 hours  furosemide    Tablet 40 daily  hydrALAZINE 75 three times a day  isosorbide   dinitrate Tablet (ISORDIL) 20 three times a day  LORazepam     Tablet 0.5 at bedtime  pantoprazole    Tablet 40 two times a day  predniSONE   Tablet 20 daily  sertraline 50 daily      REVIEW OF SYSTEMS  [  ] ROS unobtainable because:    [V  ] All other systems negative except as noted below:	    Constitutional:  [V ] fever [ V] chills  [ ] weight loss  [ ] weakness  Skin:  [ ] rash [ ] phlebitis	  Eyes: [ ] icterus [ ] pain  [ ] discharge	  ENMT: [ ] sore throat  [ ] thrush [ ] ulcers [ ] exudates  Respiratory: [ ] dyspnea [ ] hemoptysis [ ] cough [ ] sputum	  Cardiovascular:  [ ] chest pain [ ] palpitations [ ] edema	  Gastrointestinal:  [ ] nausea [ ] vomiting [ ] diarrhea [ ] constipation [ ] pain	  Genitourinary:  [ ] dysuria [ ] frequency [ ] hematuria [ ] discharge [ ] flank pain  [ ] incontinence  Musculoskeletal:  [ ] myalgias [ ] arthralgias [ ] arthritis  [ ] back pain  Neurological:  [ ] headache [ ] seizures  [ ] confusion/altered mental status  Psychiatric:  [ ] anxiety [ ] depression	  Hematology/Lymphatics:  [ ] lymphadenopathy  Endocrine:  [ ] adrenal [ ] thyroid  Allergic/Immunologic:	 [ ] transplant [ ] seasonal    VITALS:  Vital Signs Last 24 Hrs  T(F): 97.8 (20 @ 11:20), Max: 100.6 (20 @ 07:30)    Vital Signs Last 24 Hrs  HR: 66 (20 @ 11:20) (55 - 74)  BP: 113/69 (20 @ 11:20) (110/63 - 151/84)  RR: 18 (20 @ 11:20)  SpO2: 96% (20 @ 11:20) (96% - 100%)  Wt(kg): --    EXAM:  GA: NAD  HEENT: oral cavity no lesion  CV: bradycardia, no RMG  Lungs: CTAB  Abd: BS+, soft, nontender, no rebounding pain  Ext: no edema  Skin: no skin breakdown  IV: no phlebitis    Labs:                        9.5    9.16  )-----------( 207      ( 11 Sep 2020 05:51 )             33.7         140  |  103  |  49<H>  ----------------------------<  101<H>  3.5   |  20<L>  |  2.27<H>    Ca    9.1      11 Sep 2020 05:51  Phos  3.5       Mg     2.3             WBC Trend:  WBC Count: 9.16 (20 @ 05:51)  WBC Count: 8.03 (09-10-20 @ 06:30)  WBC Count: 12.85 (20 @ 06:51)  WBC Count: 13.11 (20 @ 06:07)      Auto Neutrophil #: 7.90 K/uL (20 @ 06:09)  Auto Neutrophil #: 13.15 K/uL (20 @ 04:02)  Auto Neutrophil #: 10.23 K/uL (20 @ 13:45)  Auto Neutrophil #: 16.98 K/uL (19 @ 03:30)  Auto Neutrophil #: 18.39 K/uL (19 @ 13:00)      Creatine Trend:  Creatinine, Serum: 2.27 ()  Creatinine, Serum: 2.51 (09-10)  Creatinine, Serum: 2.20 ()  Creatinine, Serum: 2.62 ()    Urinalysis Basic - ( 09 Sep 2020 20:40 )    Color: Light Yellow / Appearance: Clear / S.015 / pH: x  Gluc: x / Ketone: Negative  / Bili: Negative / Urobili: <2 mg/dL   Blood: x / Protein: Negative / Nitrite: Negative   Leuk Esterase: Negative / RBC: x / WBC x   Sq Epi: x / Non Sq Epi: x / Bacteria: x        MICROBIOLOGY:    Culture - Urine (collected 09 Sep 2020 18:27)  Source: .Urine Clean Catch (Midstream)  Final Report:    No growth    Culture - Blood (collected 09 Sep 2020 16:33)  Source: .Blood Blood-Venous  Preliminary Report:    No growth to date.    Culture - Blood (collected 09 Sep 2020 16:33)  Source: .Blood Blood-Peripheral  Preliminary Report:    Growth in aerobic bottle: Gram Positive Cocci in Clusters    "Due to technical problems, Proteus sp. will Not be reported as part of    the BCID panel until further notice"    ***Blood Panel PCR results on this specimen are available    approximately 3 hours after the Gram stain result.***    Gram stain, PCR, and/or culture results may not always    correspond due to difference in methodologies.    ************************************************************    This PCR assay was performed using [employer].    The following targets are tested for: Enterococcus,    vancomycin resistant enterococci, Listeria monocytogenes,    coagulase negative staphylococci, S. aureus,    methicillin resistant S. aureus, Streptococcus agalactiae    (Group B), S. pneumoniae, S.pyogenes (Group A),    Acinetobacter baumannii, Enterobacter cloacae, E. coli,    Klebsiella oxytoca, K. pneumoniae, Proteus sp.,    Serratia marcescens, Haemophilus influenzae,    Neisseria meningitidis, Pseudomonas aeruginosa, Candida    albicans, C. glabrata, C krusei, C parapsilosis,    C. tropicalis and the KPC resistance gene.  Organism: Blood Culture PCR  Organism: Blood Culture PCR    Sensitivities:      -  Staphylococcus aureus: Detec Any isolate of Staphylococcus aureus from a blood culture is NOT considered a contaminant.      Method Type: PCR    Culture - Blood (collected 31 Aug 2020 09:39)  Source: .Blood Blood-Venous  Final Report:    No Growth Final    Culture - Blood (collected 31 Aug 2020 09:39)  Source: .Blood Blood-Peripheral  Final Report:    No Growth Final    Culture - Blood (collected 27 Aug 2020 07:01)  Source: .Blood Blood-Peripheral  Final Report:    Growth in aerobic bottle: Staphylococcus epidermidis    Coag Negative Staphylococcus    Single set isolate, possible contaminant. Contact    Microbiology if susceptibility testing clinically    indicated.    Culture - Blood (collected 27 Aug 2020 07:01)  Source: .Blood Blood-Peripheral  Final Report:    Growth in anaerobic bottle: Staphylococcus hominis Coag Negative    Staphylococcus    Single set isolate, possible contaminant. Contact    Microbiology if susceptibility testing clinically    indicated.    "Due to technical problems, Proteus sp. will Not be reported as part of    the BCID panel until further notice"    ***Blood Panel PCR results on this specimen are available    approximately 3 hours after the Gram stain result.***    Gram stain, PCR, and/or culture results may not always    correspond due to difference in methodologies.    ************************************************************    This PCR assay was performed using [employer].    The following targets are tested for: Enterococcus,    vancomycin resistant enterococci, Listeria monocytogenes,    coagulase negative staphylococci, S. aureus,    methicillin resistant S. aureus, Streptococcus agalactiae    (Group B), S. pneumoniae, S. pyogenes (Group A),    Acinetobacter baumannii, Enterobacter cloacae, E. coli,    Klebsiella oxytoca, K. pneumoniae, Proteus sp.,    Serratia marcescens, Haemophilus influenzae,    Neisseria meningitidis, Pseudomonas aeruginosa, Candida    albicans, C. glabrata, C krusei, C parapsilosis,    C. tropicalis and the KPC resistance gene.  Organism: Blood Culture PCR  Organism: Blood Culture PCR    Sensitivities:      -  Coagulase negative Staphylococcus: Detec      Method Type: PCR    Culture - Urine (collected 25 Aug 2020 00:51)  Source: .Urine Clean Catch (Midstream)  Final Report:    No growth    Culture - Blood (collected 24 Aug 2020 17:07)  Source: .Blood Blood-Peripheral  Final Report:    No Growth Final    Culture - Blood (collected 24 Aug 2020 17:07)  Source: .Blood Blood-Peripheral  Final Report:    No Growth Final        Rapid RVP Result: NotDetec ( @ 13:54)      OTHER TESTS:  COVID-19 PCR: NotDetec (20 @ 13:45)  COVID-19 IgG Antibody Index: 4.15 Index (20 @ 09:39)    Ferritin, Serum: 151 ( @ 09:41)  Procalcitonin, Serum: 0.37 ( @ 06:49)    A1C with Estimated Average Glucose Result: 5.5 % (20 @ 11:07)      RADIOLOGY:  imaging below personally reviewed    < from: Xray Chest 1 View- PORTABLE-Urgent (20 @ 09:41) >  IMPRESSION:    Median sternotomy     No pneumothorax. The lungs are clear.    < end of copied text >    < from: US Duplex Venous Lower Ext Ltd, Right (ED) (20 @ 16:36) >    INTERPRETATION:  Focused imaging of the right lower extremity was performed using gray scale compression technique for evaluation of deep venous thrombosis.  The femoral region was scanned from the common femoral vein proximal to the greater saphenous vein insertion distally to the femoral/deep femoral vein junction.  The popliteal region was scanned from the popliteal vein distally to the trifurcation.  There is no evidence of proximal deep venous thrombosis.  Calf vein thrombosis and focal segmentaldeep venous thrombosis cannot be excluded.  The patient should have a repeat lower extremity ultrasound in 5-7 days.    IMPRESSION:  No Evidence of proximal deep vein thrombosis in the right  lower extremity.    < end of copied text > Patient is a 65y old  Male who presents with a chief complaint of RLE swelling (11 Sep 2020 11:14)    HPI:  65 year old male with PMH significant for CAD (s/p CABG; most recent LHC showed stable disease with no intervention), CHF (LVEF 39% TTE 2020), Afib (on Eliquis), HTN, HLD, CKD III, CHER (on CPAP), and pemphigus vulgaris presenting to ED with RLE swelling. Pt had a mechanical fall 1 month ago where he landed on his right leg and left hand. He didn't have any difficulty ambulating so he didn't seek medical evaluation at that time. However, since then he said he had a L hand wound, which has not been healing and is now draining pus. Pt not sure of last tetanus vaccine. 1 week ago, he noticed RLE swelling, which has been progressively getting worse. He notes erythema, progressive swelling, and pain with limited ankle mobility. He has had difficulty ambulating. Additionally, he is c/o sob but feels that this is chronic, reporting baseline SOB and MELCHOR. He denies fevers, CP, wheezing, cough, nausea, vomiting, abdominal pain.    ED course: Admission vitals, T 100.6, HR 75, /55, RR 22, Sat 95%. Labs s/f leukocytosis (12.27), INR 2.16, Trop 65, Cr 2.34, BNP 2301. Lactate 2.4. COVID negative. UA negative. CXR wnl. Bedside echo showed indeterminate IVC volume status. XR RLE showed no fx but areas of soft tissue swelling/stranding around right ankle. Limited RLE Duplex scan negative for DVT. Patient received 2L IVF for worsening hypotension (SBP as low as 80's). Repeat lactate 0.7. Improved BPs (SBPs 100's). S/p IV vanc/zosyn for sepsis likely 2/2 to hand wound with purulent drainage vs RLE cellulitis.     Interval history: Patient was admitted to medicine service. Seen and examined at bedside in ED. Patient doing well, c/o RLE pain with movement/palpation. SOB at baseline. Afebrile at time of exam. (24 Aug 2020 19:35)     Pt admitted for RLE cellulitis and fever=100.6 on . He is treated with 7 days of IV vancomycin, the RLE cellulitis is now resolved.  His course was complicated by cardiac complications, including RRT and transferred to CCU for diuretics. Currently he has no SOB or cough.  He spiked fever T=100.6 on  with chills. BCx drawn, now resulted to be MSSA, on Cefazolin now.  On exam, NAD, oral no obvious infection, no phlebitis, no skin breakdown, no cellulitis, lungs are clear, no murmur.  He c/o left forearm extensor site near wrist with mild tenderness but getting better.    prior hospital charts reviewed [V]  primary team notes reviewed [V]  other consultant notes reviewed [V]    PAST MEDICAL & SURGICAL HISTORY:  CHF (congestive heart failure)  Psoriasis  HLD (hyperlipidemia)  CAD (coronary artery disease): 3 stents  Hypertension  History of coronary artery stent placement: 1 stent , 2 stents  in [facility]  S/P quadruple vessel bypass:       SOCIAL HISTORY:    - Smoked 1PPD for 30 years. Quit 3 years ago.  - Occasionally EtOH, denies alcoholics  - Denies IL  - Lives with daughter. No pet. No recent travel  - Used to work as air conditioner control    FAMILY HISTORY:  father-MI and stroke  mother-kidney failing, not on HD    Allergies  No Known Allergies    ANTIMICROBIALS:  ceFAZolin   IVPB    ceFAZolin   IVPB 2000 every 8 hours      ANTIMICROBIALS (past 90 days):  MEDICATIONS  (STANDING):    ceFAZolin   IVPB   100 mL/Hr IV Intermittent (20 @ 11:02)    piperacillin/tazobactam IVPB.   200 mL/Hr IV Intermittent (20 @ 14:20)    vancomycin  IVPB   250 mL/Hr IV Intermittent (20 @ 14:19)    vancomycin  IVPB   250 mL/Hr IV Intermittent (20 @ 13:10)   250 mL/Hr IV Intermittent (20 @ 13:08)    vancomycin  IVPB   250 mL/Hr IV Intermittent (20 @ 23:11)   250 mL/Hr IV Intermittent (20 @ 11:55)   250 mL/Hr IV Intermittent (20 @ 23:58)    vancomycin  IVPB   250 mL/Hr IV Intermittent (20 @ 01:20)    vancomycin  IVPB   250 mL/Hr IV Intermittent (20 @ 00:30)    vancomycin  IVPB   166.67 mL/Hr IV Intermittent (09-10-20 @ 18:14)        OTHER MEDS:   MEDICATIONS  (STANDING):  acetaminophen   Tablet .. 650 every 6 hours PRN  aspirin  chewable 81 daily  atorvastatin 80 at bedtime  carvedilol 12.5 every 12 hours  furosemide    Tablet 40 daily  hydrALAZINE 75 three times a day  isosorbide   dinitrate Tablet (ISORDIL) 20 three times a day  LORazepam     Tablet 0.5 at bedtime  pantoprazole    Tablet 40 two times a day  predniSONE   Tablet 20 daily  sertraline 50 daily      REVIEW OF SYSTEMS  [  ] ROS unobtainable because:    [V  ] All other systems negative except as noted below:	    Constitutional:  [V ] fever [ V] chills  [ ] weight loss  [ ] weakness  Skin:  [ ] rash [ ] phlebitis	  Eyes: [ ] icterus [ ] pain  [ ] discharge	  ENMT: [ ] sore throat  [ ] thrush [ ] ulcers [ ] exudates  Respiratory: [ ] dyspnea [ ] hemoptysis [ ] cough [ ] sputum	  Cardiovascular:  [ ] chest pain [ ] palpitations [ ] edema	  Gastrointestinal:  [ ] nausea [ ] vomiting [ ] diarrhea [ ] constipation [ ] pain	  Genitourinary:  [ ] dysuria [ ] frequency [ ] hematuria [ ] discharge [ ] flank pain  [ ] incontinence  Musculoskeletal:  [ ] myalgias [ ] arthralgias [ ] arthritis  [ ] back pain  Neurological:  [ ] headache [ ] seizures  [ ] confusion/altered mental status  Psychiatric:  [ ] anxiety [ ] depression	  Hematology/Lymphatics:  [ ] lymphadenopathy  Endocrine:  [ ] adrenal [ ] thyroid  Allergic/Immunologic:	 [ ] transplant [ ] seasonal    VITALS:  Vital Signs Last 24 Hrs  T(F): 97.8 (20 @ 11:20), Max: 100.6 (20 @ 07:30)    Vital Signs Last 24 Hrs  HR: 66 (20 @ 11:20) (55 - 74)  BP: 113/69 (20 @ 11:20) (110/63 - 151/84)  RR: 18 (20 @ 11:20)  SpO2: 96% (20 @ 11:20) (96% - 100%)  Wt(kg): --    EXAM:  GA: NAD  HEENT: oral cavity no lesion  CV: bradycardia, no RMG  Lungs: CTAB  Abd: BS+, soft, nontender, no rebounding pain  Ext: no edema  Skin: no skin breakdown  IV: left forearm extensor site near wrist with mild tenderness but getting better.    Labs:                        9.5    9.16  )-----------( 207      ( 11 Sep 2020 05:51 )             33.7         140  |  103  |  49<H>  ----------------------------<  101<H>  3.5   |  20<L>  |  2.27<H>    Ca    9.1      11 Sep 2020 05:51  Phos  3.5       Mg     2.3             WBC Trend:  WBC Count: 9.16 (20 @ 05:51)  WBC Count: 8.03 (09-10-20 @ 06:30)  WBC Count: 12.85 (20 @ 06:51)  WBC Count: 13.11 (20 @ 06:07)      Auto Neutrophil #: 7.90 K/uL (20 @ 06:09)  Auto Neutrophil #: 13.15 K/uL (20 @ 04:02)  Auto Neutrophil #: 10.23 K/uL (20 @ 13:45)  Auto Neutrophil #: 16.98 K/uL (19 @ 03:30)  Auto Neutrophil #: 18.39 K/uL (19 @ 13:00)      Creatine Trend:  Creatinine, Serum: 2.27 ()  Creatinine, Serum: 2.51 (09-10)  Creatinine, Serum: 2.20 ()  Creatinine, Serum: 2.62 ()    Urinalysis Basic - ( 09 Sep 2020 20:40 )    Color: Light Yellow / Appearance: Clear / S.015 / pH: x  Gluc: x / Ketone: Negative  / Bili: Negative / Urobili: <2 mg/dL   Blood: x / Protein: Negative / Nitrite: Negative   Leuk Esterase: Negative / RBC: x / WBC x   Sq Epi: x / Non Sq Epi: x / Bacteria: x        MICROBIOLOGY:    Culture - Urine (collected 09 Sep 2020 18:27)  Source: .Urine Clean Catch (Midstream)  Final Report:    No growth    Culture - Blood (collected 09 Sep 2020 16:33)  Source: .Blood Blood-Venous  Preliminary Report:    No growth to date.    Culture - Blood (collected 09 Sep 2020 16:33)  Source: .Blood Blood-Peripheral  Preliminary Report:    Growth in aerobic bottle: Gram Positive Cocci in Clusters    "Due to technical problems, Proteus sp. will Not be reported as part of    the BCID panel until further notice"    ***Blood Panel PCR results on this specimen are available    approximately 3 hours after the Gram stain result.***    Gram stain, PCR, and/or culture results may not always    correspond due to difference in methodologies.    ************************************************************    This PCR assay was performed using [employer].    The following targets are tested for: Enterococcus,    vancomycin resistant enterococci, Listeria monocytogenes,    coagulase negative staphylococci, S. aureus,    methicillin resistant S. aureus, Streptococcus agalactiae    (Group B), S. pneumoniae, S.pyogenes (Group A),    Acinetobacter baumannii, Enterobacter cloacae, E. coli,    Klebsiella oxytoca, K. pneumoniae, Proteus sp.,    Serratia marcescens, Haemophilus influenzae,    Neisseria meningitidis, Pseudomonas aeruginosa, Candida    albicans, C. glabrata, C krusei, C parapsilosis,    C. tropicalis and the KPC resistance gene.  Organism: Blood Culture PCR  Organism: Blood Culture PCR    Sensitivities:      -  Staphylococcus aureus: Detec Any isolate of Staphylococcus aureus from a blood culture is NOT considered a contaminant.      Method Type: PCR    Culture - Blood (collected 31 Aug 2020 09:39)  Source: .Blood Blood-Venous  Final Report:    No Growth Final    Culture - Blood (collected 31 Aug 2020 09:39)  Source: .Blood Blood-Peripheral  Final Report:    No Growth Final    Culture - Blood (collected 27 Aug 2020 07:01)  Source: .Blood Blood-Peripheral  Final Report:    Growth in aerobic bottle: Staphylococcus epidermidis    Coag Negative Staphylococcus    Single set isolate, possible contaminant. Contact    Microbiology if susceptibility testing clinically    indicated.    Culture - Blood (collected 27 Aug 2020 07:01)  Source: .Blood Blood-Peripheral  Final Report:    Growth in anaerobic bottle: Staphylococcus hominis Coag Negative    Staphylococcus    Single set isolate, possible contaminant. Contact    Microbiology if susceptibility testing clinically    indicated.    "Due to technical problems, Proteus sp. will Not be reported as part of    the BCID panel until further notice"    ***Blood Panel PCR results on this specimen are available    approximately 3 hours after the Gram stain result.***    Gram stain, PCR, and/or culture results may not always    correspond due to difference in methodologies.    ************************************************************    This PCR assay was performed using [employer].    The following targets are tested for: Enterococcus,    vancomycin resistant enterococci, Listeria monocytogenes,    coagulase negative staphylococci, S. aureus,    methicillin resistant S. aureus, Streptococcus agalactiae    (Group B), S. pneumoniae, S. pyogenes (Group A),    Acinetobacter baumannii, Enterobacter cloacae, E. coli,    Klebsiella oxytoca, K. pneumoniae, Proteus sp.,    Serratia marcescens, Haemophilus influenzae,    Neisseria meningitidis, Pseudomonas aeruginosa, Candida    albicans, C. glabrata, C krusei, C parapsilosis,    C. tropicalis and the KPC resistance gene.  Organism: Blood Culture PCR  Organism: Blood Culture PCR    Sensitivities:      -  Coagulase negative Staphylococcus: Detec      Method Type: PCR    Culture - Urine (collected 25 Aug 2020 00:51)  Source: .Urine Clean Catch (Midstream)  Final Report:    No growth    Culture - Blood (collected 24 Aug 2020 17:07)  Source: .Blood Blood-Peripheral  Final Report:    No Growth Final    Culture - Blood (collected 24 Aug 2020 17:07)  Source: .Blood Blood-Peripheral  Final Report:    No Growth Final        Rapid RVP Result: NotDetec ( @ 13:54)      OTHER TESTS:  COVID-19 PCR: NotDetec (20 @ 13:45)  COVID-19 IgG Antibody Index: 4.15 Index (20 @ 09:39)    Ferritin, Serum: 151 ( @ 09:41)  Procalcitonin, Serum: 0.37 ( @ 06:49)    A1C with Estimated Average Glucose Result: 5.5 % (20 @ 11:07)      RADIOLOGY:  imaging below personally reviewed    < from: TTE with Doppler (w/Cont) (20 @ 12:58) >  Conclusions:  Technically difficult, limited study to evaluate the LV and  mitral valve.  1. Mitral annular calcification. Tethered mitral valve  leaflets. Grossly moderate mitral regurgitation.  2. Endocardial visualization enhanced with intravenous  injection of Ultrasonic Enhancing Agent (Definity).  Moderate segmental left ventricular systolic dysfunction.  EF approximately 35%. The basal to mid inferoseptal,  lateral, inferolateral, and apical walls are akinetic. No  left ventricular thrombus. Paradoxical septal motion  consistent with prior cardiac surgery.    < end of copied text >  < from: Xray Chest 1 View- PORTABLE-Urgent (20 @ 09:41) >  IMPRESSION:    Median sternotomy     No pneumothorax. The lungs are clear.    < end of copied text >    < from: US Duplex Venous Lower Ext Ltd, Right (ED) (20 @ 16:36) >    INTERPRETATION:  Focused imaging of the right lower extremity was performed using gray scale compression technique for evaluation of deep venous thrombosis.  The femoral region was scanned from the common femoral vein proximal to the greater saphenous vein insertion distally to the femoral/deep femoral vein junction.  The popliteal region was scanned from the popliteal vein distally to the trifurcation.  There is no evidence of proximal deep venous thrombosis.  Calf vein thrombosis and focal segmentaldeep venous thrombosis cannot be excluded.  The patient should have a repeat lower extremity ultrasound in 5-7 days.    IMPRESSION:  No Evidence of proximal deep vein thrombosis in the right  lower extremity.    < end of copied text >

## 2020-09-11 NOTE — PROGRESS NOTE ADULT - PROBLEM SELECTOR PLAN 5
-c/w Eliquis 5mg BID  -C/w coreg 12.5mg BID (8/26)  -EKG with Afib with RVR during RRT on 8/27. Now NSR. Stable on tele. NARCISO on CKD III. Cr elevated to 2.4 on admission. Baseline Cr 1.4-1.7.  -Continue to hold home Entresto   -Continue to monitor daily BMPs   -Improving now that lasix has been reduced

## 2020-09-11 NOTE — PROGRESS NOTE ADULT - SUBJECTIVE AND OBJECTIVE BOX
**********************************************  Chiquis Duarte, PGY-1  Internal Medicine   [facility] Pager #: [phone]  **********************************************     Patient is a 65y old  Male who presents with a chief complaint of RLE swelling (11 Sep 2020 06:41)    SUBJECTIVE / OVERNIGHT EVENTS:     OBJECTIVE:  Vital Signs Last 24 Hrs  T(C): 36.6 (11 Sep 2020 04:10), Max: 36.8 (10 Sep 2020 18:11)  T(F): 97.8 (11 Sep 2020 04:10), Max: 98.2 (10 Sep 2020 18:11)  HR: 60 (11 Sep 2020 06:09) (53 - 74)  BP: 110/63 (11 Sep 2020 05:33) (110/63 - 151/84)  BP(mean): --  RR: 18 (11 Sep 2020 04:10) (18 - 18)  SpO2: 99% (11 Sep 2020 06:09) (96% - 100%)    I&O's Summary    09 Sep 2020 07:01  -  10 Sep 2020 07:00  --------------------------------------------------------  IN: 460 mL / OUT: 950 mL / NET: -490 mL    10 Sep 2020 07:01  -  11 Sep 2020 06:57  --------------------------------------------------------  IN: 565 mL / OUT: 1600 mL / NET: -1035 mL      PHYSICAL EXAM:  GENERAL: NAD  HEAD:  Atraumatic, Normocephalic  CHEST/LUNG: Clear to ascultation bilaterally; No rales, rhonchi, wheezing, or rubs  HEART: Regular rate and rhythm; No murmurs, rubs, or gallops; no pitting edema on b/l LE  ABDOMEN: Soft, Nontender, Nondistended; normoactive bowel sounds  SKIN: No rashes or lesions  NERVOUS SYSTEM:  Alert & Oriented X3, no focal deficit    Labs:  CAPILLARY BLOOD GLUCOSE                              9.5    9.16  )-----------( 207      ( 11 Sep 2020 05:51 )             33.7     09-11    140  |  103  |  49<H>  ----------------------------<  101<H>  3.5   |  20<L>  |  2.27<H>    Ca    9.1      11 Sep 2020 05:51  Phos  3.5       Mg     2.3     11              Culture - Urine (collected 09 Sep 2020 18:27)  Source: .Urine Clean Catch (Midstream)  Final Report (10 Sep 2020 16:08):    No growth    Culture - Blood (collected 09 Sep 2020 16:33)  Source: .Blood Blood-Venous  Preliminary Report (10 Sep 2020 17:00):    No growth to date.    Culture - Blood (collected 09 Sep 2020 16:33)  Source: .Blood Blood-Peripheral  Gram Stain (10 Sep 2020 14:26):    Growth in aerobic bottle: Gram Positive Cocci in Clusters  Preliminary Report (10 Sep 2020 14:26):    Growth in aerobic bottle: Gram Positive Cocci in Clusters    "Due to technical problems, Proteus sp. will Not be reported as part of    the BCID panel until further notice"    ***Blood Panel PCR results on this specimen are available    approximately 3 hours after the Gram stain result.***    Gram stain, PCR, and/or culture results may not always    correspond due to difference in methodologies.    ************************************************************    This PCR assay was performed using [employer].    The following targets are tested for: Enterococcus,    vancomycin resistant enterococci, Listeria monocytogenes,    coagulase negative staphylococci, S. aureus,    methicillin resistant S. aureus, Streptococcus agalactiae    (Group B), S. pneumoniae, S.pyogenes (Group A),    Acinetobacter baumannii, Enterobacter cloacae, E. coli,    Klebsiella oxytoca, K. pneumoniae, Proteus sp.,    Serratia marcescens, Haemophilus influenzae,    Neisseria meningitidis, Pseudomonas aeruginosa, Candida    albicans, C. glabrata, C krusei, C parapsilosis,    C. tropicalis and the KPC resistance gene.  Organism: Blood Culture PCR (10 Sep 2020 16:37)  Organism: Blood Culture PCR (10 Sep 2020 16:37)      Urinalysis Basic - ( 09 Sep 2020 20:40 )    Color: Light Yellow / Appearance: Clear / S.015 / pH: x  Gluc: x / Ketone: Negative  / Bili: Negative / Urobili: <2 mg/dL   Blood: x / Protein: Negative / Nitrite: Negative   Leuk Esterase: Negative / RBC: x / WBC x   Sq Epi: x / Non Sq Epi: x / Bacteria: x      Imaging Personally Reviewed:    Consultant(s) Notes Reviewed:   Care Discussed with Consultants/Other Providers:    MEDICATIONS  (STANDING):  aspirin  chewable 81 milliGRAM(s) Oral daily  atorvastatin 80 milliGRAM(s) Oral at bedtime  carvedilol 12.5 milliGRAM(s) Oral every 12 hours  furosemide    Tablet 40 milliGRAM(s) Oral daily  hydrALAZINE 75 milliGRAM(s) Oral three times a day  isosorbide   dinitrate Tablet (ISORDIL) 20 milliGRAM(s) Oral three times a day  LORazepam     Tablet 0.5 milliGRAM(s) Oral at bedtime  pantoprazole    Tablet 40 milliGRAM(s) Oral two times a day  potassium chloride    Tablet ER 40 milliEquivalent(s) Oral once  predniSONE   Tablet 20 milliGRAM(s) Oral daily  sertraline 50 milliGRAM(s) Oral daily  vancomycin  IVPB      vancomycin  IVPB 1250 milliGRAM(s) IV Intermittent every 24 hours    MEDICATIONS  (PRN):  acetaminophen   Tablet .. 650 milliGRAM(s) Oral every 6 hours PRN Temp greater or equal to 38C (100.4F) **********************************************  Chiquis Duarte, PGY-1  Internal Medicine   [facility] Pager #: [phone]  **********************************************     Patient is a 65y old  Male who presents with a chief complaint of RLE swelling (11 Sep 2020 06:41)    SUBJECTIVE / OVERNIGHT EVENTS: NAEON. Patient NPO since MN for CHANDRA today and anticoagulation held for RHC today. Examined at bedside this AM, pt was very upset about getting conflicting updates on whether or not he's going for RHC/CHANDRA today. Told patient the up to date plan. Otherwise, patient feeling well. Denies CP, palpitations, SOB, inc peripheral edema, fever/chills, n/v/d. Also denies increased pain/swelling/drainage from left hand wound or RLE.     OBJECTIVE:  Vital Signs Last 24 Hrs  T(C): 36.6 (11 Sep 2020 04:10), Max: 36.8 (10 Sep 2020 18:11)  T(F): 97.8 (11 Sep 2020 04:10), Max: 98.2 (10 Sep 2020 18:11)  HR: 60 (11 Sep 2020 06:09) (53 - 74)  BP: 110/63 (11 Sep 2020 05:33) (110/63 - 151/84)  BP(mean): --  RR: 18 (11 Sep 2020 04:10) (18 - 18)  SpO2: 99% (11 Sep 2020 06:09) (96% - 100%)    I&O's Summary    09 Sep 2020 07:01  -  10 Sep 2020 07:00  --------------------------------------------------------  IN: 460 mL / OUT: 950 mL / NET: -490 mL    10 Sep 2020 07:01  -  11 Sep 2020 06:57  --------------------------------------------------------  IN: 565 mL / OUT: 1600 mL / NET: -1035 mL      PHYSICAL EXAM:  GENERAL: NAD  HEAD:  Atraumatic, Normocephalic  CHEST/LUNG: Clear to ascultation bilaterally; No rales, rhonchi, wheezing, or rubs  HEART: Regular rate and rhythm; No murmurs, rubs, or gallops; no pitting edema on b/l LE  ABDOMEN: Soft, Nontender, Nondistended; normoactive bowel sounds  SKIN: No rashes or lesions  NERVOUS SYSTEM:  Alert & Oriented X3, no focal deficit    Labs:  CAPILLARY BLOOD GLUCOSE                              9.5    9.16  )-----------( 207      ( 11 Sep 2020 05:51 )             33.7     09-11    140  |  103  |  49<H>  ----------------------------<  101<H>  3.5   |  20<L>  |  2.27<H>    Ca    9.1      11 Sep 2020 05:51  Phos  3.5     -11  Mg     2.3     09-11              Culture - Urine (collected 09 Sep 2020 18:27)  Source: .Urine Clean Catch (Midstream)  Final Report (10 Sep 2020 16:08):    No growth    Culture - Blood (collected 09 Sep 2020 16:33)  Source: .Blood Blood-Venous  Preliminary Report (10 Sep 2020 17:00):    No growth to date.    Culture - Blood (collected 09 Sep 2020 16:33)  Source: .Blood Blood-Peripheral  Gram Stain (10 Sep 2020 14:26):    Growth in aerobic bottle: Gram Positive Cocci in Clusters  Preliminary Report (10 Sep 2020 14:26):    Growth in aerobic bottle: Gram Positive Cocci in Clusters    "Due to technical problems, Proteus sp. will Not be reported as part of    the BCID panel until further notice"    ***Blood Panel PCR results on this specimen are available    approximately 3 hours after the Gram stain result.***    Gram stain, PCR, and/or culture results may not always    correspond due to difference in methodologies.    ************************************************************    This PCR assay was performed using [employer].    The following targets are tested for: Enterococcus,    vancomycin resistant enterococci, Listeria monocytogenes,    coagulase negative staphylococci, S. aureus,    methicillin resistant S. aureus, Streptococcus agalactiae    (Group B), S. pneumoniae, S.pyogenes (Group A),    Acinetobacter baumannii, Enterobacter cloacae, E. coli,    Klebsiella oxytoca, K. pneumoniae, Proteus sp.,    Serratia marcescens, Haemophilus influenzae,    Neisseria meningitidis, Pseudomonas aeruginosa, Candida    albicans, C. glabrata, C krusei, C parapsilosis,    C. tropicalis and the KPC resistance gene.  Organism: Blood Culture PCR (10 Sep 2020 16:37)  Organism: Blood Culture PCR (10 Sep 2020 16:37)      Urinalysis Basic - ( 09 Sep 2020 20:40 )    Color: Light Yellow / Appearance: Clear / S.015 / pH: x  Gluc: x / Ketone: Negative  / Bili: Negative / Urobili: <2 mg/dL   Blood: x / Protein: Negative / Nitrite: Negative   Leuk Esterase: Negative / RBC: x / WBC x   Sq Epi: x / Non Sq Epi: x / Bacteria: x      Imaging Personally Reviewed:    Consultant(s) Notes Reviewed:   Care Discussed with Consultants/Other Providers:    MEDICATIONS  (STANDING):  aspirin  chewable 81 milliGRAM(s) Oral daily  atorvastatin 80 milliGRAM(s) Oral at bedtime  carvedilol 12.5 milliGRAM(s) Oral every 12 hours  furosemide    Tablet 40 milliGRAM(s) Oral daily  hydrALAZINE 75 milliGRAM(s) Oral three times a day  isosorbide   dinitrate Tablet (ISORDIL) 20 milliGRAM(s) Oral three times a day  LORazepam     Tablet 0.5 milliGRAM(s) Oral at bedtime  pantoprazole    Tablet 40 milliGRAM(s) Oral two times a day  potassium chloride    Tablet ER 40 milliEquivalent(s) Oral once  predniSONE   Tablet 20 milliGRAM(s) Oral daily  sertraline 50 milliGRAM(s) Oral daily  vancomycin  IVPB      vancomycin  IVPB 1250 milliGRAM(s) IV Intermittent every 24 hours    MEDICATIONS  (PRN):  acetaminophen   Tablet .. 650 milliGRAM(s) Oral every 6 hours PRN Temp greater or equal to 38C (100.4F)

## 2020-09-11 NOTE — PROGRESS NOTE ADULT - SUBJECTIVE AND OBJECTIVE BOX
S: Denies chest pain or SOB. Review of systems otherwise (-)      MEDICATIONS  (STANDING):  aspirin  chewable 81 milliGRAM(s) Oral daily  atorvastatin 80 milliGRAM(s) Oral at bedtime  carvedilol 12.5 milliGRAM(s) Oral every 12 hours  ceFAZolin   IVPB      ceFAZolin   IVPB 2000 milliGRAM(s) IV Intermittent once  ceFAZolin   IVPB 2000 milliGRAM(s) IV Intermittent every 8 hours  furosemide    Tablet 40 milliGRAM(s) Oral daily  hydrALAZINE 75 milliGRAM(s) Oral three times a day  isosorbide   dinitrate Tablet (ISORDIL) 20 milliGRAM(s) Oral three times a day  LORazepam     Tablet 0.5 milliGRAM(s) Oral at bedtime  pantoprazole    Tablet 40 milliGRAM(s) Oral two times a day  predniSONE   Tablet 20 milliGRAM(s) Oral daily  sertraline 50 milliGRAM(s) Oral daily    MEDICATIONS  (PRN):  acetaminophen   Tablet .. 650 milliGRAM(s) Oral every 6 hours PRN Temp greater or equal to 38C (100.4F)      LABS:                            9.5    9.16  )-----------( 207      ( 11 Sep 2020 05:51 )             33.7     Hemoglobin: 9.5 g/dL (09-11 @ 05:51)  Hemoglobin: 9.5 g/dL (09-10 @ 06:30)  Hemoglobin: 11.1 g/dL (09-09 @ 06:51)  Hemoglobin: 9.9 g/dL (09-08 @ 06:07)  Hemoglobin: 10.3 g/dL (09-07 @ 05:39)    09-11    140  |  103  |  49<H>  ----------------------------<  101<H>  3.5   |  20<L>  |  2.27<H>    Ca    9.1      11 Sep 2020 05:51  Phos  3.5     09-11  Mg     2.3     09-11      Creatinine Trend: 2.27<--, 2.51<--, 2.20<--, 2.62<--, 2.55<--, 2.64<--             09-10-20 @ 07:01  -  09-11-20 @ 07:00  --------------------------------------------------------  IN: 565 mL / OUT: 1600 mL / NET: -1035 mL    09-11-20 @ 07:01  -  09-11-20 @ 10:38  --------------------------------------------------------  IN: 0 mL / OUT: 400 mL / NET: -400 mL        PHYSICAL EXAM  Vital Signs Last 24 Hrs  T(C): 36.6 (11 Sep 2020 04:10), Max: 36.8 (10 Sep 2020 18:11)  T(F): 97.8 (11 Sep 2020 04:10), Max: 98.2 (10 Sep 2020 18:11)  HR: 60 (11 Sep 2020 06:09) (53 - 74)  BP: 110/63 (11 Sep 2020 05:33) (110/63 - 151/84)  BP(mean): --  RR: 18 (11 Sep 2020 04:10) (18 - 18)  SpO2: 99% (11 Sep 2020 06:09) (96% - 100%)      Gen: NAD  HEENT:  (-)icterus (-)pallor  CV: N S1 S2 1/6 KATHERYN (+)2 Pulses B/l  Resp: CTA B/L, normal effort  GI: (+) BS Soft, NT, ND  Lymph:  (-) edema, (-)obvious lymphadenopathy  Skin: Warm to touch, Normal turgor  Psych: Appropriate mood and affect      TELEMETRY: SB/SR 50-80    Echo: < from: TTE with Doppler (w/Cont) (08.27.20 @ 07:54) >  Conclusions:  1. Mitral valve not well visualized. Mitral annular  calcification. Tethered mitral valve leaflets with normal  opening. Mild moderate mitral regurgitation.  2. Calcified trileaflet aortic valve with decreased  opening. Peak transaortic valve gradient equals 12 mm Hg,  mean transaortic valve gradient equals 8 mm Hg, at least  mild AS. Limited gradient/Doppler evaluation. Mild-moderate  aortic regurgitation.  3. Endocardial visualization enhanced with intravenous  injection of Ultrasonic Enhancing Agent (Definity).  Moderate to severe  global left ventricular systolic  dysfunction. There dyssynchronous left ventricular  contraction.  4. Moderate diastolic dysfunction (Stage II). Increased  E/e'  is consistent with elevated left ventricular filling  pressure.  *** Compared with echocardiogram report of 12/13/2017,  Limited comparison.  Disatolic dysfunction is worse.    < end of copied text >    < from: Transesophageal Echocardiogram w/o TTE (09.01.20 @ 14:15) >  Conclusions:  1. Tethered mitral valve leaflets with normal opening.  Severe mitral regurgitation. There is systolic flow  reversal in the right upper pulmonary vein. By PISA,  calculated ERO=43 mmsq and regurgitant volume=73 mL (Pisa  rad 1cm, Va 34.5 cm/s, Vm 5.0 m/s,  cm)  2. Calcified trileaflet aortic valve with normal opening.  Moderate aortic regurgitation. Vena contracta=0.4 cm.  3. Normal aortic root size. (Ao: 3.6 cm at the sinuses of  Valsalva). Midlly dilated ascending aorta for BSA  (ascending aorta diameter 4.5 cm approximately 5 cm distal  to the aortic valve.  Normal size aortic arch, and  descending thoracic aorta.  Minimal atheroma.  4. Left atrial enlargement. No left atrial or left atrial  appendage thrombus. Normal left atrial appendage function  (maximum velocity>40 cm/s).  5. Moderate left ventricular systolic dysfunction.  Paradoxical septal motion consistent with prior cardiac  surgery.  6. Normal right ventricular size and function.    < end of copied text >      < from: Cardiac Viability (09.04.20 @ 14:15) >  GATED ANALYSIS:  Rest gated wall motion analysis was performed (LVEF = 47  %;LVEDV = 195 ml.), revealing moderately reduced  LV  function.  ------------------------------------------------------------------------  IMPRESSIONS:Abnormal Study  * The left ventricle was markdely dilated at baseline.  There is viability in the apex, inferior, septal,  anterolateral, and mid to basal inferolateral walls. On  delayed imaging, there is appears to be partial viability  in the distal inferolateral wall.  * Rest gated wall motion analysis was performed (LVEF = 47  %;LVEDV = 195 ml.), revealing moderately reduced  LV  function.  Conclusion:  The left ventricle was markdely dilated at baseline. There  is viability in the apex, inferior, septal, anterolateral,  and mid to basal inferolateral walls. On delayed imaging,  there also appears to be partial viability in the distal  inferolateral wall.  ------------------------------------------------------------------------  Confirmed on  9/4/2020 - 16:56:37 by Beto Norton M.D.    < end of copied text >      ASSESSMENT/PLAN: Patient is a 65 year old male with PMH of CAD s/p CABG with most recent C 1/2020 with no intervention, chronic systolic CHF (LVEF 39% TTE 1/2020), Afib (on Eliquis), HTN, HLD, CKD, CHER (on CPAP), and pemphigus vulgaris who presented with RLE swelling admitted with RLE cellulitis and NARCISO on CKD. Cardiology consulted for management of CAD/CHF.    - Heart failure follow up - Continue PO lasix - daily standing weights, strict I/Os  - Continue medical management of known CAD with systolic cardiomyopathy - ASA/Statin, Coreg, Hydralazine/Isordil  - AC with Eliquis for CVA prevention on hold for procedures  - EP follow up  - CHANDRA with severe MR  - Structural heart eval follow up/ CHANDRA noted/ viability study results noted/ await surgical plan - CTS f/u with Dr. Pena  - Fever workup/Abx per medicine - BCx with mary, f/u repeat BCx  - Pending RHC today, eventual CHANDRA once optimized  - Patient to f/u with Dr. Campbell's cardiology group after d/c      Cristi Jarvis PA-C  Pager: [phone]

## 2020-09-11 NOTE — PROGRESS NOTE ADULT - SUBJECTIVE AND OBJECTIVE BOX
EP ATTENDING    tele: NSR, no events    he denies palpitations, syncope, nor angina, ROS otherwise -, dyspnea improved    acetaminophen   Tablet .. 650 milliGRAM(s) Oral every 6 hours PRN  aspirin  chewable 81 milliGRAM(s) Oral daily  atorvastatin 80 milliGRAM(s) Oral at bedtime  carvedilol 12.5 milliGRAM(s) Oral every 12 hours  furosemide    Tablet 40 milliGRAM(s) Oral daily  hydrALAZINE 75 milliGRAM(s) Oral three times a day  isosorbide   dinitrate Tablet (ISORDIL) 20 milliGRAM(s) Oral three times a day  LORazepam     Tablet 0.5 milliGRAM(s) Oral at bedtime  pantoprazole    Tablet 40 milliGRAM(s) Oral two times a day  predniSONE   Tablet 20 milliGRAM(s) Oral daily  sertraline 50 milliGRAM(s) Oral daily  vancomycin  IVPB      vancomycin  IVPB 1250 milliGRAM(s) IV Intermittent every 24 hours                            9.5    9.16  )-----------( 207      ( 11 Sep 2020 05:51 )             33.7       09-11    140  |  103  |  49<H>  ----------------------------<  101<H>  3.5   |  20<L>  |  2.27<H>    Ca    9.1      11 Sep 2020 05:51  Phos  3.5     09-11  Mg     2.3     09-11              T(C): 36.6 (09-11-20 @ 04:10), Max: 36.8 (09-10-20 @ 18:11)  HR: 60 (09-11-20 @ 06:09) (53 - 74)  BP: 110/63 (09-11-20 @ 05:33) (110/63 - 151/84)  RR: 18 (09-11-20 @ 04:10) (18 - 18)  SpO2: 99% (09-11-20 @ 06:09) (96% - 100%)  Wt(kg): --    I&O's Summary    09 Sep 2020 07:01  -  10 Sep 2020 07:00  --------------------------------------------------------  IN: 460 mL / OUT: 950 mL / NET: -490 mL    10 Sep 2020 07:01  -  11 Sep 2020 06:43  --------------------------------------------------------  IN: 565 mL / OUT: 1600 mL / NET: -1035 mL    A&O x 3  neurologically intact  JVP difficult to assess  RRR, no murmurs  CTAB  soft nt/nd  no c/c/e      A/P) He is a 64 y/o male PMH CAD s/p CABG (2005) with severe LV dysfunction (LVEF 35% on repeat echo 9/8/2020), NSVT, PAF, HTN, hyperlipidemia, CKD, CHER, a/w septic shock from cellulitis in setting of severe CHF decompensation. EP called for NSVT and ICD evaluation. He is also being worked up for possible redo CABG/MVR.    -medical therapy for CHF as per CHF and cardiology  -RHC today to assess hemodynamics. Repeat CHANDRA on hold as per CHF team until hemodynamics are optimized  -restart a/c for PAF once RHC completed  -final EP reccs regarding EPS +/- ICD pending final TTE/CHANDRA results once patient is optimized from a CHF perspective  -if patient does somehow go for redo CABG/MVR would also ligation TRISTA appendage and perform a MAZE  -f/u with his cardiologist Dr. Charan Mckeon after discharge      Doug Coffman M.D., [facility]  Cardiac Electrophysiology  [facility]  [address] [place] [address], [address] [place] [address]  [email or web address]    office [phone]  pager [phone]

## 2020-09-11 NOTE — PROGRESS NOTE ADULT - SUBJECTIVE AND OBJECTIVE BOX
Structural Heart Team      HPI:  65 year old male with PMH significant for CAD (s/p CABG; most recent LHC showed stable disease with no intervention), CHF (LVEF 39% TTE 1/2020), Afib (on Eliquis), HTN, HLD, CKD III, CHER (on CPAP), and pemphigus vulgaris presenting to ED with RLE swelling. Pt had a mechanical fall 1 month ago where he landed on his right leg and left hand. He didn't have any difficulty ambulating so he didn't seek medical evaluation at that time. However, since then he said he had a L hand wound, which has not been healing and is now draining pus. Pt not sure of last tetanus vaccine. 1 week ago, he noticed RLE swelling, which has been progressively getting worse. He notes erythema, progressive swelling, and pain with limited ankle mobility. He has had difficulty ambulating. Additionally, he is c/o sob but feels that this is chronic, reporting baseline SOB and MELCHOR. He denies fevers, CP, wheezing, cough, nausea, vomiting, abdominal pain.    ED course: Admission vitals, T 100.6, HR 75, /55, RR 22, Sat 95%. Labs s/f leukocytosis (12.27), INR 2.16, Trop 65, Cr 2.34, BNP 2301. Lactate 2.4. COVID negative. UA negative. CXR wnl. Bedside echo showed indeterminate IVC volume status. XR RLE showed no fx but areas of soft tissue swelling/stranding around right ankle. Limited RLE Duplex scan negative for DVT. Patient received 2L IVF for worsening hypotension (SBP as low as 80's). Repeat lactate 0.7. Improved BPs (SBPs 100's). S/p IV vanc/zosyn for sepsis likely 2/2 to hand wound with purulent drainage vs RLE cellulitis.     Interval history: Patient was admitted to medicine service. Seen and examined at bedside in ED. Patient doing well, c/o RLE pain with movement/palpation. SOB at baseline. Afebrile at time of exam. (24 Aug 2020 19:35)      REVIEW OF SYSTEMS:    CONSTITUTIONAL: No weakness, fevers or chills  EYES/ENT: No visual changes;  No vertigo or throat pain   NECK: No pain or stiffness  RESPIRATORY: No cough, wheezing, hemoptysis; No shortness of breath  CARDIOVASCULAR: No chest pain or palpitations  GASTROINTESTINAL: No abdominal or epigastric pain. No nausea, vomiting, or hematemesis; No diarrhea or constipation. No melena or hematochezia.  GENITOURINARY: No dysuria, frequency or hematuria  NEUROLOGICAL: No numbness or weakness  SKIN: No itching, rashes      Allergies    No Known Allergies    Intolerances      Vital Signs Last 24 Hrs  T(C): 36.8 (11 Sep 2020 14:55), Max: 37.1 (11 Sep 2020 14:03)  T(F): 98.3 (11 Sep 2020 14:55), Max: 98.7 (11 Sep 2020 14:03)  HR: 58 (11 Sep 2020 14:55) (55 - 70)  BP: 115/76 (11 Sep 2020 14:55) (110/63 - 151/84)  BP(mean): --  RR: 18 (11 Sep 2020 14:55) (18 - 18)  SpO2: 95% (11 Sep 2020 14:55) (95% - 100%)    MEDICATIONS  (STANDING):  aspirin  chewable 81 milliGRAM(s) Oral daily  atorvastatin 80 milliGRAM(s) Oral at bedtime  carvedilol 12.5 milliGRAM(s) Oral every 12 hours  ceFAZolin   IVPB 2000 milliGRAM(s) IV Intermittent every 12 hours  furosemide    Tablet 40 milliGRAM(s) Oral daily  hydrALAZINE 75 milliGRAM(s) Oral three times a day  isosorbide   dinitrate Tablet (ISORDIL) 20 milliGRAM(s) Oral three times a day  LORazepam     Tablet 0.5 milliGRAM(s) Oral at bedtime  pantoprazole    Tablet 40 milliGRAM(s) Oral two times a day  predniSONE   Tablet 20 milliGRAM(s) Oral daily  sertraline 50 milliGRAM(s) Oral daily      Exam-  General: NAD  Cor: s1s2,   EKG:  Pulm:   Gastointestinal: soft, nontender, nondistended, +bowel sounds  Extremities:   Groin:  Neuro: A&Ox3, nonfocal                          9.5    9.16  )-----------( 207      ( 11 Sep 2020 05:51 )             33.7   09-11    140  |  103  |  49<H>  ----------------------------<  101<H>  3.5   |  20<L>  |  2.27<H>    Ca    9.1      11 Sep 2020 05:51  Phos  3.5     09-11  Mg     2.3     09-11      I&O's Summary    10 Sep 2020 07:01  -  11 Sep 2020 07:00  --------------------------------------------------------  IN: 565 mL / OUT: 1600 mL / NET: -1035 mL    11 Sep 2020 07:01  -  11 Sep 2020 17:54  --------------------------------------------------------  IN: 150 mL / OUT: 1300 mL / NET: -1150 mL              Assessment/Plan:        - Discharge plan: follow up with   in one week and follow up with Structural Heart Team in one month.  Echo will be done at 1 month follow up visit.  Plan discussed with patient     JEEVAN Bourgeois  [phone] Structural Heart Team      Mr Colon was seen and examined earlier this afternoon.  He was upset at the time, given that he had just been told about being bacteremic and the possible cause being a phlebitis.  He was agreeable, though, to proceed with the [facility].         REVIEW OF SYSTEMS:    CONSTITUTIONAL: No weakness, fevers or chills  EYES/ENT: No visual changes;  No vertigo or throat pain   NECK: No pain or stiffness  RESPIRATORY: No cough, wheezing, hemoptysis; No shortness of breath  CARDIOVASCULAR: No chest pain or palpitations  GASTROINTESTINAL: No abdominal or epigastric pain. No nausea, vomiting, or hematemesis; No diarrhea or constipation. No melena or hematochezia.  GENITOURINARY: No dysuria, frequency or hematuria  NEUROLOGICAL: No numbness or weakness  SKIN: No itching, rashes      Allergies    No Known Allergies    Intolerances      Vital Signs Last 24 Hrs  T(C): 36.8 (11 Sep 2020 14:55), Max: 37.1 (11 Sep 2020 14:03)  T(F): 98.3 (11 Sep 2020 14:55), Max: 98.7 (11 Sep 2020 14:03)  HR: 58 (11 Sep 2020 14:55) (55 - 70)  BP: 115/76 (11 Sep 2020 14:55) (110/63 - 151/84)  BP(mean): --  RR: 18 (11 Sep 2020 14:55) (18 - 18)  SpO2: 95% (11 Sep 2020 14:55) (95% - 100%)    MEDICATIONS  (STANDING):  aspirin  chewable 81 milliGRAM(s) Oral daily  atorvastatin 80 milliGRAM(s) Oral at bedtime  carvedilol 12.5 milliGRAM(s) Oral every 12 hours  ceFAZolin   IVPB 2000 milliGRAM(s) IV Intermittent every 12 hours  furosemide    Tablet 40 milliGRAM(s) Oral daily  hydrALAZINE 75 milliGRAM(s) Oral three times a day  isosorbide   dinitrate Tablet (ISORDIL) 20 milliGRAM(s) Oral three times a day  LORazepam     Tablet 0.5 milliGRAM(s) Oral at bedtime  pantoprazole    Tablet 40 milliGRAM(s) Oral two times a day  predniSONE   Tablet 20 milliGRAM(s) Oral daily  sertraline 50 milliGRAM(s) Oral daily      Exam-  General: NAD  Cardiac: s1s2, RRR, II/VI systolic murmur  Pulmonary: CTA b/l, no w/r/r  Gastrointestinal: soft abdomen, nontender, nondistended, + bowel sounds throughout  Extremities: trace LE edema R>L  Neuro: A&Ox3, nonfocal                           9.5    9.16  )-----------( 207      ( 11 Sep 2020 05:51 )             33.7   11    140  |  103  |  49<H>  ----------------------------<  101<H>  3.5   |  20<L>  |  2.27<H>    Ca    9.1      11 Sep 2020 05:51  Phos  3.5       Mg     2.3           I&O's Summary    10 Sep 2020 07:  -  11 Sep 2020 07:00  --------------------------------------------------------  IN: 565 mL / OUT: 1600 mL / NET: -1035 mL    11 Sep 2020 07:  -  11 Sep 2020 17:54  --------------------------------------------------------  IN: 150 mL / OUT: 1300 mL / NET: -1150 mL      < from: Cardiac Cath Lab - Adult (20 @ 17:19) >  INDICATIONS: Acute on chronic diastolic congestive heart failure.  Mitral regurgitation  PROCEDURE:  --  Right heart catheterization.  TECHNIQUE: The risks and alternatives of the procedures and conscious  sedation were explained to the patient and informed consent was obtained.  Cardiac catheterization performed urgently.  Local anesthetic given. Right brachial vein access. Right heart  catheterization. The procedure was performed utilizing a catheter.  RADIATION EXPOSURE: 1.4 min.  MEDICATIONS GIVEN: Fentanyl, 25 mcg, IV.  COMPLICATIONS: There were no complications.  DIAGNOSTIC IMPRESSIONS: Elevated right atrial pressure (mRA 9mmHg)  Mild to moderate isolated post-capillary pulmonary hypertension (sPAP  46mmHg, dPAP 27mmHg, mPAP 35mmHg)  PCWP = 22mmHg (no significant V wave)  PAsat = 62.2% // Room air sat = 99% (no supplemental oxygen)  DPG = 5mmHg  PVR = 2.13Wu  SBP = 138/75/100  Susana CO // CI = 6.09l/min // 2.7l/min/m2  SVR = 1,195.40 dynes-sec/cm5  DIAGNOSTIC RECOMMENDATIONS: Keep right arm straight for 4 hours following  removal of sheath  Continue aggressive medical management  Findings discussed with Dr. Leiva  INTERVENTIONAL IMPRESSIONS: Elevated right atrial pressure (mRA 9mmHg)  Mild to moderate isolated post-capillary pulmonary hypertension (sPAP  46mmHg, dPAP 27mmHg, mPAP 35mmHg)  PCWP = 22mmHg (no significant V wave)  PAsat = 62.2% // Room air sat = 99% (nosupplemental oxygen)  DPG = 5mmHg  PVR = 2.13Wu  SBP = 138/75/100  Susana CO // CI = 6.09l/min // 2.7l/min/m2  SVR = 1,195.40 dynes-sec/cm5  INTERVENTIONAL RECOMMENDATIONS: Keep right arm straight for 4 hours  following removal of sheath  Continue aggressive medical management  Findings discussed with Dr. Leiva  Prepared and signed by  Boone Yeung MD  Signed 2020 18:08:57  HEMODYNAMIC TABLES  Pressures:  Baseline  Pressures:  - HR: 64  Pressures:  - Rhythm:  Pressures:  -- Pulmonary Artery (S/D/M): 46/27/35  Pressures:  -- Pulmonary Capillary Wedge: 24/23/22  Pressures:  -- Right Atrium (a/v/M): 13/10/9  Pressures:  -- Right Ventricle (s/edp): 46/10/--  O2 Sats:  Baseline  O2 Sats:  - HR: 64  O2 Sats:  - Rhythm:  O2 Sats:  -- AO: 9.8/99/13.19  O2 Sats:  -- PA: 9.8/62.2/8.29  Outputs:  Baseline  Outputs:  -- CALCULATIONS[age]  Outputs:  -- CALCULATIONS: Body Surface Area: 2.23  Outputs:  -- CALCULATIONS: Height in cm: 173.00  Outputs:  -- CALCULATIONS: Sex: Male  Outputs:  -- CALCULATIONS: Weight in k.10  Outputs:  -- OUTPUTS: Blood Oxygen Difference: 4.90  Outputs:  -- OUTPUTS: CO by Susana: 6.09  Outputs:  -- OUTPUTS: Susana cardiac index: 2.73  Outputs:  -- OUTPUTS: O2 consumption: 298.82  Outputs:  -- OUTPUTS: Vo2 Indexed: 133.98  Outputs:  -- RESISTANCES: Pulmonary vascular index (dsc): 380.63  Outputs:  -- RESISTANCES: Pulmonary vascular index (Wood Units): 4.76  Outputs:  -- RESISTANCES: Pulmonary vascular resistance (dsc): 170.66  Outputs:  -- RESISTANCES: Pulmonary vascular resistance (Wood Units): 2.13  Outputs:  -- RESISTANCES: Right ventricular stroke work: 35.91  Outputs:  -- RESISTANCES: Right ventricular stroke work index: 16.10  Outputs:  -- RESISTANCES: Total pulmonary index (dsc): 1024.78  Outputs:  -- RESISTANCES: Total pulmonary index (Wood Units): 12.81  Outputs:  -- RESISTANCES: Total pulmonary resistance (dsc): 459.47  Outputs:  -- RESISTANCES: Total pulmonary resistance (Wood Units): 5.74  Outputs:  -- SHUNTS: Qs Indexed: 2.73  Outputs:  -- SHUNTS: Systemic flow: 6.09    < end of copied text >          Assessment/Plan:  Mr Colon is a 64y/o male with an extensive CAD history including CABG ( @ [facility] with Dr. Pena) as well as multiple stents afterwards.  He also has CHF, Afib (paroxysmal?), CKD III, and CHER (on CPAP).   Admitted through the ER with sepsis resulting from injuries sustained during a mechanical fall and treated with IV abx.  Found on echo to have MR and referred for Mitraclip  evaluation.  - RHC done today   -- as per Dr. Yeung, not suggestive of severe MR   - cont aggressive medical management, no Structural Heart intervention at this time       JEEVAN Bourgeois  [phone]

## 2020-09-11 NOTE — PROGRESS NOTE ADULT - PROBLEM SELECTOR PLAN 2
- Medical optimization as above and repeat CHANDRA rescheduled likely next week  - Structural heart following for consideration of MitraClip. - Medical optimization as above and repeat CHANDRA Monday  - Structural heart following for consideration of MitraClip.

## 2020-09-11 NOTE — PHARMACOTHERAPY INTERVENTION NOTE - COMMENTS
THONY ANAYA, 65y Male with blood cultures found to be positive for Staphylococcus aureus, was started on vancomycin IV.    Recommendation(s):  1) Per [employer] PCR result, patient has MSSA since mecA gene not detected. Would suggest D/C vancomycin and start cefazolin for MSSA bacteremia  2) Repeat blood cultures were ordered yesterday, would get repeat cultures Q48 until clearance  3) Consider pathogenicity of staph aureus bacteremia, would consider obtaining ID consult to r/o possible foci of infection    With kind regards,  Mary DeeD  Infectious Diseases Clinical Pharmacist  .

## 2020-09-11 NOTE — CONSULT NOTE ADULT - ATTENDING COMMENTS
Patient seen and examined, agree with above assessment and plan as transcribed above.    - check echo  - not grossly fluid overloaded, lower extremity edema likely due to cellulitis  - Abx  per MEd  - cont magy Dodge MD, [facility]  BEEPER [phone]
Above noted   Fall, about 1 wk ago ,injuring right leg and left hand, in a patient on AC  Advise hand consult- probable hematoma dorsum left hand
65M with fever, MSSA bacteremia   -suspect MSSA from phlebitis on left forearm area- pt claims he had an IV there which became swollen and painful 2-3 days ago - currently area with some swelling but not red   -needs IV abx for treatment - 4 weeks ancef ideally   -lower Ancef 2 gm iv q12  -planned for CHANDRA  -pt annoyed and wants to leave - if leaving, will need to sign out AMA and can try keflex 1 gm po TID but this is not the standard of care  -f/u repeat bcx    D/w daughter over phone also - wants to take pt home    Orion Ramirez  Attending Physician   Division of Infectious Disease  Pager #[phone]  After 5pm/weekend or no response, call #[phone]    Please call the ID service [phone] with questions or concerns over the weekend.
--Recommend medical optimization with aggressive BP/HR control and diuresis.  The patients baseline weight is ~220lbs.  It is difficult based upon his physical examination to assess his volume status.  May need right heart catheterization +-Ragland to assist in diuresis.  --Once the patient is optimized he will need a repeat CHANDRA (TTE underestimates the degree of severity of his mitral regurgitation)  --He would likely benefit from a viability study to assess need for revascularization.  His coronary anatomy is not amenable for high risk PCI.  He has left to left collateral filling to his obtuse marginal artery and posterior descending artery.  Reviewed his imaging studies with cardiac surgery although he has diffuse disease his anatomy is amenable for redo CABG.  --If the patient is felt to be high risk and his anatomy is not suitable for surgery consideration at that time will be made for potential for MitraClip if he continues to have severe functional mitral regurgitation on repeat echo imaging.  --Recommend fluid restrict to less than 1.5L a day  --Daily standing weight  --Aim for K>4 and Mg>2  --Continue telemetry monitoring    All questions and concerns of the patient were addressed.    EKG, laboratory studies and imaging/catheterization/echo studies were personally reviewed.
Briefly, 65 year old M with h/o notable for CAD s/p CABG 2005 (LIMA-LAD, SVG- OM2, SVG- OM1, LIMA to LAD only patient graft per cath 1/2020), ICM/HFrEF (LVEF 30-35%), prior cardiac arrest in 12/19, Afib (on Eliquis), HTN, HLD, CKD III, CHER (on CPAP), and pemphigus vulgaris presented to ED 8/24 with RLE swelling after a mechanical fall 1 month prior. Had noted fevers for 1 week prior to admission. Of note, patient reports 6 months prior his weight was 220 and he has since gained weight up to 250 pounds despite poor appetite. He had progressive swelling, pain. Noted to be febrile with a leukocytosis when he was admitted. Dopplers negative. Received fluids for hypotension and several of his cardiac meds were held. On hospital day 3, he had acute respiratory distress with hypoxia and VT on tele. Transferred to CCU where he was diuresed and put on Bipap with improvement.     He subsequently had a TTE which showed EF approx 30-35% (personally reviewed), LVEDD 5.6 cm, mild/mod MR. mild AS. mild/mod AR. CHANDRA done for further evaluation of mitral valve as prior TTE had shown mod-severe MR. CHANDRA revealed LVEF 30-35%, nl RV size and function, severe MR, mod AI, mild TR but BP was notably 150s.     We were consulted on whether he's a transplant candidate which he does not seem to need at this time. He has stage C HF, NYHA class II-III and may be volume overloaded still but is normotensive with further room to tolerate neurohormonal agents. In respect to his severe MR, the CHANDRA was done on a suboptimal regimen and his BP was elevated at the time.   - continue IV diuresis; possible dry weight 220 pounds  - add hydral as above  - repeat TTE when more optimized and euvolemic  - will consider RHC if worsening renal function as body habitus makes volume status difficulty to determine  - may need viability testing as there is a question if he would benefit from revascularization (although TTE suggests he has hypokinetic inferior and lateral wall)

## 2020-09-12 LAB
-  AMPICILLIN/SULBACTAM: SIGNIFICANT CHANGE UP
-  CEFAZOLIN: SIGNIFICANT CHANGE UP
-  CLINDAMYCIN: SIGNIFICANT CHANGE UP
-  ERYTHROMYCIN: SIGNIFICANT CHANGE UP
-  GENTAMICIN: SIGNIFICANT CHANGE UP
-  OXACILLIN: SIGNIFICANT CHANGE UP
-  RIFAMPIN: SIGNIFICANT CHANGE UP
-  TETRACYCLINE: SIGNIFICANT CHANGE UP
-  TRIMETHOPRIM/SULFAMETHOXAZOLE: SIGNIFICANT CHANGE UP
-  VANCOMYCIN: SIGNIFICANT CHANGE UP
ANION GAP SERPL CALC-SCNC: 19 MMOL/L — HIGH (ref 5–17)
BUN SERPL-MCNC: 44 MG/DL — HIGH (ref 7–23)
CALCIUM SERPL-MCNC: 9.3 MG/DL — SIGNIFICANT CHANGE UP (ref 8.4–10.5)
CHLORIDE SERPL-SCNC: 103 MMOL/L — SIGNIFICANT CHANGE UP (ref 96–108)
CO2 SERPL-SCNC: 17 MMOL/L — LOW (ref 22–31)
CREAT SERPL-MCNC: 2.22 MG/DL — HIGH (ref 0.5–1.3)
GLUCOSE SERPL-MCNC: 208 MG/DL — HIGH (ref 70–99)
GRAM STN FLD: SIGNIFICANT CHANGE UP
HCT VFR BLD CALC: 35.6 % — LOW (ref 39–50)
HGB BLD-MCNC: 10 G/DL — LOW (ref 13–17)
MAGNESIUM SERPL-MCNC: 2.3 MG/DL — SIGNIFICANT CHANGE UP (ref 1.6–2.6)
MCHC RBC-ENTMCNC: 23.2 PG — LOW (ref 27–34)
MCHC RBC-ENTMCNC: 28.1 GM/DL — LOW (ref 32–36)
MCV RBC AUTO: 82.6 FL — SIGNIFICANT CHANGE UP (ref 80–100)
METHOD TYPE: SIGNIFICANT CHANGE UP
NRBC # BLD: 0 /100 WBCS — SIGNIFICANT CHANGE UP (ref 0–0)
PHOSPHATE SERPL-MCNC: 3.5 MG/DL — SIGNIFICANT CHANGE UP (ref 2.5–4.5)
PLATELET # BLD AUTO: 218 K/UL — SIGNIFICANT CHANGE UP (ref 150–400)
POTASSIUM SERPL-MCNC: 3.8 MMOL/L — SIGNIFICANT CHANGE UP (ref 3.5–5.3)
POTASSIUM SERPL-SCNC: 3.8 MMOL/L — SIGNIFICANT CHANGE UP (ref 3.5–5.3)
RBC # BLD: 4.31 M/UL — SIGNIFICANT CHANGE UP (ref 4.2–5.8)
RBC # FLD: 18.6 % — HIGH (ref 10.3–14.5)
SODIUM SERPL-SCNC: 139 MMOL/L — SIGNIFICANT CHANGE UP (ref 135–145)
WBC # BLD: 9.08 K/UL — SIGNIFICANT CHANGE UP (ref 3.8–10.5)
WBC # FLD AUTO: 9.08 K/UL — SIGNIFICANT CHANGE UP (ref 3.8–10.5)

## 2020-09-12 PROCEDURE — 99233 SBSQ HOSP IP/OBS HIGH 50: CPT | Mod: GC

## 2020-09-12 PROCEDURE — 99233 SBSQ HOSP IP/OBS HIGH 50: CPT

## 2020-09-12 PROCEDURE — 99232 SBSQ HOSP IP/OBS MODERATE 35: CPT

## 2020-09-12 RX ORDER — POTASSIUM CHLORIDE 20 MEQ
40 PACKET (EA) ORAL ONCE
Refills: 0 | Status: COMPLETED | OUTPATIENT
Start: 2020-09-12 | End: 2020-09-12

## 2020-09-12 RX ORDER — ISOSORBIDE DINITRATE 5 MG/1
30 TABLET ORAL THREE TIMES A DAY
Refills: 0 | Status: DISCONTINUED | OUTPATIENT
Start: 2020-09-12 | End: 2020-09-13

## 2020-09-12 RX ADMIN — PANTOPRAZOLE SODIUM 40 MILLIGRAM(S): 20 TABLET, DELAYED RELEASE ORAL at 16:28

## 2020-09-12 RX ADMIN — SPIRONOLACTONE 25 MILLIGRAM(S): 25 TABLET, FILM COATED ORAL at 06:00

## 2020-09-12 RX ADMIN — CARVEDILOL PHOSPHATE 12.5 MILLIGRAM(S): 80 CAPSULE, EXTENDED RELEASE ORAL at 20:06

## 2020-09-12 RX ADMIN — Medication 81 MILLIGRAM(S): at 11:54

## 2020-09-12 RX ADMIN — ISOSORBIDE DINITRATE 30 MILLIGRAM(S): 5 TABLET ORAL at 16:28

## 2020-09-12 RX ADMIN — Medication 100 MILLIGRAM(S): at 12:09

## 2020-09-12 RX ADMIN — Medication 20 MILLIGRAM(S): at 06:00

## 2020-09-12 RX ADMIN — ISOSORBIDE DINITRATE 30 MILLIGRAM(S): 5 TABLET ORAL at 21:21

## 2020-09-12 RX ADMIN — ISOSORBIDE DINITRATE 20 MILLIGRAM(S): 5 TABLET ORAL at 06:00

## 2020-09-12 RX ADMIN — Medication 40 MILLIGRAM(S): at 06:00

## 2020-09-12 RX ADMIN — Medication 100 MILLIGRAM(S): at 22:26

## 2020-09-12 RX ADMIN — CARVEDILOL PHOSPHATE 12.5 MILLIGRAM(S): 80 CAPSULE, EXTENDED RELEASE ORAL at 06:00

## 2020-09-12 RX ADMIN — Medication 40 MILLIEQUIVALENT(S): at 17:00

## 2020-09-12 RX ADMIN — SERTRALINE 50 MILLIGRAM(S): 25 TABLET, FILM COATED ORAL at 11:54

## 2020-09-12 RX ADMIN — ATORVASTATIN CALCIUM 80 MILLIGRAM(S): 80 TABLET, FILM COATED ORAL at 21:20

## 2020-09-12 RX ADMIN — PANTOPRAZOLE SODIUM 40 MILLIGRAM(S): 20 TABLET, DELAYED RELEASE ORAL at 06:00

## 2020-09-12 RX ADMIN — Medication 0.5 MILLIGRAM(S): at 21:19

## 2020-09-12 RX ADMIN — Medication 75 MILLIGRAM(S): at 06:00

## 2020-09-12 NOTE — PROGRESS NOTE ADULT - SUBJECTIVE AND OBJECTIVE BOX
S: Denies chest pain or SOB. Review of systems otherwise (-)         acetaminophen   Tablet .. 650 milliGRAM(s) Oral every 6 hours PRN  aspirin  chewable 81 milliGRAM(s) Oral daily  atorvastatin 80 milliGRAM(s) Oral at bedtime  carvedilol 12.5 milliGRAM(s) Oral every 12 hours  ceFAZolin   IVPB 2000 milliGRAM(s) IV Intermittent every 12 hours  furosemide    Tablet 40 milliGRAM(s) Oral daily  hydrALAZINE 85 milliGRAM(s) Oral three times a day  isosorbide   dinitrate Tablet (ISORDIL) 30 milliGRAM(s) Oral three times a day  LORazepam     Tablet 0.5 milliGRAM(s) Oral at bedtime  pantoprazole    Tablet 40 milliGRAM(s) Oral two times a day  predniSONE   Tablet 20 milliGRAM(s) Oral daily  sertraline 50 milliGRAM(s) Oral daily                            10.0   9.08  )-----------( 218      ( 12 Sep 2020 09:33 )             35.6       09-12    139  |  103  |  44<H>  ----------------------------<  208<H>  3.8   |  17<L>  |  2.22<H>    Ca    9.3      12 Sep 2020 09:33  Phos  3.5     09-12  Mg     2.3     09-12              T(C): 36.7 (09-12-20 @ 20:25), Max: 36.7 (09-12-20 @ 20:25)  HR: 85 (09-12-20 @ 20:25) (69 - 98)  BP: 138/75 (09-12-20 @ 20:25) (117/72 - 158/90)  RR: 18 (09-12-20 @ 20:25) (18 - 18)  SpO2: 98% (09-12-20 @ 20:25) (98% - 99%)  Wt(kg): --    I&O's Summary    11 Sep 2020 07:01  -  12 Sep 2020 07:00  --------------------------------------------------------  IN: 600 mL / OUT: 2300 mL / NET: -1700 mL    12 Sep 2020 07:01  -  12 Sep 2020 20:32  --------------------------------------------------------  IN: 480 mL / OUT: 1000 mL / NET: -520 mL        Gen: NAD  HEENT:  (-)icterus (-)pallor  CV: N S1 S2 1/6 KATHERYN (+)2 Pulses B/l  Resp: CTA B/L, normal effort  GI: (+) BS Soft, NT, ND  Lymph:  (-) edema, (-)obvious lymphadenopathy  Skin: Warm to touch, Normal turgor  Psych: Appropriate mood and affect      TELEMETRY: SB/SR 50-80    Echo: < from: TTE with Doppler (w/Cont) (08.27.20 @ 07:54) >  Conclusions:  1. Mitral valve not well visualized. Mitral annular  calcification. Tethered mitral valve leaflets with normal  opening. Mild moderate mitral regurgitation.  2. Calcified trileaflet aortic valve with decreased  opening. Peak transaortic valve gradient equals 12 mm Hg,  mean transaortic valve gradient equals 8 mm Hg, at least  mild AS. Limited gradient/Doppler evaluation. Mild-moderate  aortic regurgitation.  3. Endocardial visualization enhanced with intravenous  injection of Ultrasonic Enhancing Agent (Definity).  Moderate to severe  global left ventricular systolic  dysfunction. There dyssynchronous left ventricular  contraction.  4. Moderate diastolic dysfunction (Stage II). Increased  E/e'  is consistent with elevated left ventricular filling  pressure.  *** Compared with echocardiogram report of 12/13/2017,  Limited comparison.  Disatolic dysfunction is worse.    < end of copied text >    < from: Transesophageal Echocardiogram w/o TTE (09.01.20 @ 14:15) >  Conclusions:  1. Tethered mitral valve leaflets with normal opening.  Severe mitral regurgitation. There is systolic flow  reversal in the right upper pulmonary vein. By PISA,  calculated ERO=43 mmsq and regurgitant volume=73 mL (Pisa  rad 1cm, Va 34.5 cm/s, Vm 5.0 m/s,  cm)  2. Calcified trileaflet aortic valve with normal opening.  Moderate aortic regurgitation. Vena contracta=0.4 cm.  3. Normal aortic root size. (Ao: 3.6 cm at the sinuses of  Valsalva). Midlly dilated ascending aorta for BSA  (ascending aorta diameter 4.5 cm approximately 5 cm distal  to the aortic valve.  Normal size aortic arch, and  descending thoracic aorta.  Minimal atheroma.  4. Left atrial enlargement. No left atrial or left atrial  appendage thrombus. Normal left atrial appendage function  (maximum velocity>40 cm/s).  5. Moderate left ventricular systolic dysfunction.  Paradoxical septal motion consistent with prior cardiac  surgery.  6. Normal right ventricular size and function.    < end of copied text >      < from: Cardiac Viability (09.04.20 @ 14:15) >  GATED ANALYSIS:  Rest gated wall motion analysis was performed (LVEF = 47  %;LVEDV = 195 ml.), revealing moderately reduced  LV  function.  ------------------------------------------------------------------------  IMPRESSIONS:Abnormal Study  * The left ventricle was markdely dilated at baseline.  There is viability in the apex, inferior, septal,  anterolateral, and mid to basal inferolateral walls. On  delayed imaging, there is appears to be partial viability  in the distal inferolateral wall.  * Rest gated wall motion analysis was performed (LVEF = 47  %;LVEDV = 195 ml.), revealing moderately reduced  LV  function.  Conclusion:  The left ventricle was markdely dilated at baseline. There  is viability in the apex, inferior, septal, anterolateral,  and mid to basal inferolateral walls. On delayed imaging,  there also appears to be partial viability in the distal  inferolateral wall.  ------------------------------------------------------------------------  Confirmed on  9/4/2020 - 16:56:37 by Beto Norton M.D.    < end of copied text >      ASSESSMENT/PLAN: Patient is a 65 year old male with PMH of CAD s/p CABG with most recent LHC 1/2020 with no intervention, chronic systolic CHF (LVEF 39% TTE 1/2020), Afib (on Eliquis), HTN, HLD, CKD, CHER (on CPAP), and pemphigus vulgaris who presented with RLE swelling admitted with RLE cellulitis and NARCISO on CKD. Cardiology consulted for management of CAD/CHF.    - Heart failure follow up - Continue PO lasix - daily standing weights, strict I/Os  - Continue medical management of known CAD with systolic cardiomyopathy - ASA/Statin, Coreg, Hydralazine/Isordil  - AC with Eliquis for CVA prevention on hold for procedures - restart when procedures completed and ok with primary team   - EP follow up  - CHANDRA with severe MR  - Structural heart eval follow up/ CHANDRA noted/ viability study results noted/ await surgical plan - CTS f/u with Dr. Pena  - Fever workup/Abx per medicine - BCx with staph, f/u repeat BCx  - RHC performed  - CHANDRA monday   - Patient to f/u with Dr. Campbell's cardiology group after d/c      Klaus Garcia MD

## 2020-09-12 NOTE — PROGRESS NOTE ADULT - SUBJECTIVE AND OBJECTIVE BOX
*****Structural Heart Team*****    Subjective:  No acute events reported overnight.  He reports that he did not sleep well.  He denies any chest pain/tightness/discomfort, fevers, chills, sweats, light-headed sensation, dizziness or palpitations.  No abdominal pain.  No pain in his right arm puncture site.    Review of Systems  14 point review of systems is negative except what is described in the history of present illness    Telemetry  Sinus rhythm with rates int he 50s to 80s with APCs and PVCs    Physical Examination  General: NAD, alert and oriented times three, appropraite affect,  JVD cannot be appreciated, supple, no lymphadneopathy  Cardiac: s1s2, RRR, II/VI systolic murmur  Pulmonary: CTA b/l, no w/r/r  Gastrointestinal: soft abdomen, nontender, nondistended, + bowel sounds throughout  Extremities: trace LE edema R>L  Neuro: Nonfocal, moving all extremities spontaneousy  Right arm puncture site is clean/dry/intact  Groin erythema bilaterally  T(C): 36.7 (09-12-20 @ 20:25), Max: 36.7 (09-12-20 @ 20:25)  HR: 85 (09-12-20 @ 20:25) (69 - 98)  BP: 138/75 (09-12-20 @ 20:25) (119/78 - 158/90)  RR: 18 (09-12-20 @ 20:25) (18 - 18)  SpO2: 98% (09-12-20 @ 20:25) (98% - 99%)  Wt(kg): --  09-11 @ 07:01  -  09-12 @ 07:00  --------------------------------------------------------  IN: 600 mL / OUT: 2300 mL / NET: -1700 mL    09-12 @ 07:01  -  09-12 @ 21:17  --------------------------------------------------------  IN: 480 mL / OUT: 1000 mL / NET: -520 mL      MEDICATIONS  (STANDING):  aspirin  chewable 81 milliGRAM(s) Oral daily  atorvastatin 80 milliGRAM(s) Oral at bedtime  carvedilol 12.5 milliGRAM(s) Oral every 12 hours  ceFAZolin   IVPB 2000 milliGRAM(s) IV Intermittent every 12 hours  furosemide    Tablet 40 milliGRAM(s) Oral daily  hydrALAZINE 85 milliGRAM(s) Oral three times a day  isosorbide   dinitrate Tablet (ISORDIL) 30 milliGRAM(s) Oral three times a day  LORazepam     Tablet 0.5 milliGRAM(s) Oral at bedtime  pantoprazole    Tablet 40 milliGRAM(s) Oral two times a day  predniSONE   Tablet 20 milliGRAM(s) Oral daily  sertraline 50 milliGRAM(s) Oral daily    MEDICATIONS  (PRN):  acetaminophen   Tablet .. 650 milliGRAM(s) Oral every 6 hours PRN Temp greater or equal to 38C (100.4F)    Home Medications:  ALPRAZolam 0.5 mg oral tablet: 1 tab(s) orally 2 times a day (24 Aug 2020 18:25)  aspirin 81 mg oral tablet, chewable: 1 tab(s) orally once a day (24 Aug 2020 18:22)  atorvastatin 80 mg oral tablet: 1 tab(s) orally once a day (at bedtime) (24 Aug 2020 18:25)  predniSONE 20 mg oral tablet: 1 tab(s) orally once a day (11 Sep 2020 18:12)  Repatha 140 mg/mL subcutaneous solution: 1 milliliter(s) subcutaneous every 2 weeks (24 Aug 2020 18:25)  sertraline 50 mg oral tablet: 1 tab(s) orally once a day (24 Aug 2020 18:27)                        10.0   9.08  )-----------( 218      ( 12 Sep 2020 09:33 )             35.6   09-12    139  |  103  |  44<H>  ----------------------------<  208<H>  3.8   |  17<L>  |  2.22<H>    Ca    9.3      12 Sep 2020 09:33  Phos  3.5     09-12  Mg     2.3     09-12    Assessment/Plan:  Mr Colon is a 66y/o male with an extensive CAD history including CABG (2005 @ [facility] with Dr. Pena) as well as multiple stents afterwards.  He also has CHF, Afib (paroxysmal?), CKD III, and CHER (on CPAP).   Admitted through the ER with sepsis resulting from injuries sustained during a mechanical fall and treated with IV abx.  Found on echo to have MR and referred for Mitraclip  evaluation.  -- Findings from RHC were discussed with the patient.  Appreciate assistance of heart failure team/aggressive medical management.  -- Plan for patient to get a CHANDRA on Monday to reassess valvular regurgitation (MR/AI) and to rule out endocarditis.  Viability study from 9/4 suggesting fair amount of viability; possible CABG candidate, but he has multiple comorbidities and concomitant valve disease.  Based upon findings will need to determine next step in terms of management redo-CABG verus further assessment for MCS.  If felt to be prohibitive risk may consider MitraClip.   -- Resume Eliquis if would not interfere with PICC line insertion that is needed for 4 weeks of antibiotic administration.  -- Patient should be heparin drip with close monitoring of PTT.    -- Continue uptitration of hydralazine, coreg and ISDN  -- Continue Lasix 40 mg PO daily & Spironolactone 25 mg po daily  -- Continue Coreg 12.5 mg BID with morning and evening meal.  -- Continue telemetry monitoring    All questions and concerns of the patient were addressed.    Findings and plan discussed with Dr. Leiva

## 2020-09-12 NOTE — PROGRESS NOTE ADULT - PROBLEM SELECTOR PLAN 1
- Currently with low RAP and elevated LVEDP, but responsive to afterload reduction.  - Increase hydralazine to 85 mg for evening dose and further increase to 100 mg po TID for AM dose; hold for SBP < 90  - Continue Lasix 40 mg PO daily & Spironolactone 25 mg po daily  - Please dose Coreg 12.5 mg BID with morning and evening meals. Separate from the hydral and ISDN to avoid hypotension. Hold SBP < 90 or HR < 55.  - Increase ISDN to 30 mg TID starting with midday dose, hold for SBP < 90.  - Deferring ARNI/MRA at this time due to renal dysfunction  - Continue 1.5L fluid restriction  - Daily standing weight, strict I/Os  - Daily BMP, Mg. Maintain K 4-4.5 and Mg 2-2.5.

## 2020-09-12 NOTE — PROGRESS NOTE ADULT - PROBLEM SELECTOR PLAN 5
- Currently SR  - Continue to hold Eliquis for placement of PICC line.  - Please discuss if bridging therapy is needed with primary cardiologist

## 2020-09-12 NOTE — PROGRESS NOTE ADULT - PROBLEM SELECTOR PLAN 4
- Initially admitted with sepsis 2/2 RLE cellulitis, s/p 7 day course of vancomycin with resolution.   - Was febrile on 9/9 with bcx being positive for MSSA. Source not entirely clear at this time as RLE is now improved  - c/w Ancef for MSSA bacteremia, per ID, needs 4 weeks   - repeat BCx 9/10: NGTD   - discussed with ID, needs to have negative BCx for 72 hrs prior to PICC line placement

## 2020-09-12 NOTE — PROGRESS NOTE ADULT - PROBLEM SELECTOR PLAN 3
- Continue ASA, beta blocker, atorvastatin  - Viability study from 9/4 suggesting fair amount of viability; possible CABG candidate, but he has multiple comorbidities and concomitant valve disease  - Continue to optimize where possible and reassess as outpatient given need for 4 weeks of IV antibiotics

## 2020-09-12 NOTE — PROGRESS NOTE ADULT - PROBLEM SELECTOR PLAN 8
-s/p vanc - improving    -Wound care saw patient (8/25). Redressed wound. Recommended plastics consult.   -Plastics consulted 8/26. Recommended local wound care and outpatient follow up (Dr. Marquez [phone])

## 2020-09-12 NOTE — PROGRESS NOTE ADULT - SUBJECTIVE AND OBJECTIVE BOX
Subjective:    Medications:  acetaminophen   Tablet .. 650 milliGRAM(s) Oral every 6 hours PRN  aspirin  chewable 81 milliGRAM(s) Oral daily  atorvastatin 80 milliGRAM(s) Oral at bedtime  carvedilol 12.5 milliGRAM(s) Oral every 12 hours  ceFAZolin   IVPB 2000 milliGRAM(s) IV Intermittent every 12 hours  furosemide    Tablet 40 milliGRAM(s) Oral daily  hydrALAZINE 75 milliGRAM(s) Oral three times a day  isosorbide   dinitrate Tablet (ISORDIL) 20 milliGRAM(s) Oral three times a day  LORazepam     Tablet 0.5 milliGRAM(s) Oral at bedtime  pantoprazole    Tablet 40 milliGRAM(s) Oral two times a day  predniSONE   Tablet 20 milliGRAM(s) Oral daily  sertraline 50 milliGRAM(s) Oral daily    Vitals:  T(C): 36.7 (20 @ 18:06), Max: 37.1 (20 @ 14:03)  HR: 73 (20 @ 21:41) (58 - 77)  BP: 122/78 (20 @ 21:41) (113/69 - 152/91)  RR: 18 (20 @ 21:41) (14 - 18)  SpO2: 99% (20 @ 21:41) (95% - 99%)    Current VS:  Afeb, HR 63, /87, 99% RA    Daily     Daily Weight in k.5 (12 Sep 2020 08:00)   111.6 kg      I&O's Summary    11 Sep 2020 07:01  -  12 Sep 2020 07:00  --------------------------------------------------------  IN: 150 mL / OUT: 1300 mL / NET: -1150 mL    12 Sep 2020 07:01  -  12 Sep 2020 11:02  --------------------------------------------------------  IN: 240 mL / OUT: 500 mL / NET: -260 mL    Additional U/O 1000 mL documented in paper chart for last 12h    Physical Exam:  Appearance: No Acute Distress  HEENT: JVP 6-8 cm H2O, no HJR  Cardiovascular: RRR, Normal S1 S2, No murmurs/rubs/gallops  Respiratory: Clear to auscultation bilaterally  Gastrointestinal: soft, non-tender, non-distended	  Skin: no skin lesions  Neurologic: Non-focal  Extremities: No LE edema, warm and well perfused  Psychiatry: A & O x 3, Mood & affect appropriate      Labs:                        10.0   9.08  )-----------( 218      ( 12 Sep 2020 09:33 )             35.6     09-12    139  |  103  |  44<H>  ----------------------------<  208<H>  3.8   |  17<L>  |  2.22<H>    Ca    9.3      12 Sep 2020 09:33  Phos  3.5     09-12  Mg     2.3     09-12    TELEMETRY: SR, 50-80, PVC, PAC     Subjective: doing well, no complaints    Medications:  acetaminophen   Tablet .. 650 milliGRAM(s) Oral every 6 hours PRN  aspirin  chewable 81 milliGRAM(s) Oral daily  atorvastatin 80 milliGRAM(s) Oral at bedtime  carvedilol 12.5 milliGRAM(s) Oral every 12 hours  ceFAZolin   IVPB 2000 milliGRAM(s) IV Intermittent every 12 hours  furosemide    Tablet 40 milliGRAM(s) Oral daily  hydrALAZINE 75 milliGRAM(s) Oral three times a day  isosorbide   dinitrate Tablet (ISORDIL) 20 milliGRAM(s) Oral three times a day  LORazepam     Tablet 0.5 milliGRAM(s) Oral at bedtime  pantoprazole    Tablet 40 milliGRAM(s) Oral two times a day  predniSONE   Tablet 20 milliGRAM(s) Oral daily  sertraline 50 milliGRAM(s) Oral daily    Vitals:  T(C): 36.7 (20 @ 18:06), Max: 37.1 (20 @ 14:03)  HR: 73 (20 @ 21:41) (58 - 77)  BP: 122/78 (20 @ 21:41) (113/69 - 152/91)  RR: 18 (20 @ 21:41) (14 - 18)  SpO2: 99% (20 @ 21:41) (95% - 99%)    Current VS:  Afeb, HR 63, /87, 99% RA    Daily     Daily Weight in k.5 (12 Sep 2020 08:00)   111.6 kg      I&O's Summary    11 Sep 2020 07:  -  12 Sep 2020 07:00  --------------------------------------------------------  IN: 150 mL / OUT: 1300 mL / NET: -1150 mL    12 Sep 2020 07:01  -  12 Sep 2020 11:02  --------------------------------------------------------  IN: 240 mL / OUT: 500 mL / NET: -260 mL    Additional U/O 1000 mL documented in paper chart for last 12h    Physical Exam:  Appearance: No Acute Distress  HEENT: JVP 6-8 cm H2O, no HJR  Cardiovascular: RRR, Normal S1 S2, No murmurs/rubs/gallops  Respiratory: Clear to auscultation bilaterally  Gastrointestinal: soft, non-tender, non-distended	  Skin: no skin lesions  Neurologic: Non-focal  Extremities: No LE edema, warm and well perfused  Psychiatry: A & O x 3, Mood & affect appropriate      Labs:                        10.0   9.08  )-----------( 218      ( 12 Sep 2020 09:33 )             35.6     09-12    139  |  103  |  44<H>  ----------------------------<  208<H>  3.8   |  17<L>  |  2.22<H>    Ca    9.3      12 Sep 2020 09:33  Phos  3.5     -12  Mg     2.3     -12    TELEMETRY: SR, 50-80, PVC, PAC

## 2020-09-12 NOTE — PROGRESS NOTE ADULT - SUBJECTIVE AND OBJECTIVE BOX
EP   tele: NSR, no events    he denies palpitations, syncope, nor angina, ROS otherwise -, dyspnea improved          acetaminophen   Tablet .. 650 milliGRAM(s) Oral every 6 hours PRN  aspirin  chewable 81 milliGRAM(s) Oral daily  atorvastatin 80 milliGRAM(s) Oral at bedtime  carvedilol 12.5 milliGRAM(s) Oral every 12 hours  ceFAZolin   IVPB 2000 milliGRAM(s) IV Intermittent every 12 hours  furosemide    Tablet 40 milliGRAM(s) Oral daily  hydrALAZINE 75 milliGRAM(s) Oral three times a day  isosorbide   dinitrate Tablet (ISORDIL) 20 milliGRAM(s) Oral three times a day  LORazepam     Tablet 0.5 milliGRAM(s) Oral at bedtime  pantoprazole    Tablet 40 milliGRAM(s) Oral two times a day  predniSONE   Tablet 20 milliGRAM(s) Oral daily  sertraline 50 milliGRAM(s) Oral daily                            10.0   9.08  )-----------( 218      ( 12 Sep 2020 09:33 )             35.6       Hemoglobin: 10.0 g/dL (09-12 @ 09:33)  Hemoglobin: 9.5 g/dL (09-11 @ 05:51)  Hemoglobin: 9.5 g/dL (09-10 @ 06:30)  Hemoglobin: 11.1 g/dL (09-09 @ 06:51)  Hemoglobin: 9.9 g/dL (09-08 @ 06:07)      09-12    139  |  103  |  44<H>  ----------------------------<  208<H>  3.8   |  17<L>  |  2.22<H>    Ca    9.3      12 Sep 2020 09:33  Phos  3.5     09-12  Mg     2.3     09-12      Creatinine Trend: 2.22<--, 2.27<--, 2.51<--, 2.20<--, 2.62<--, 2.55<--    COAGS:           T(C): 36.7 (09-11-20 @ 18:06), Max: 37.1 (09-11-20 @ 14:03)  HR: 73 (09-11-20 @ 21:41) (58 - 77)  BP: 122/78 (09-11-20 @ 21:41) (113/69 - 152/91)  RR: 18 (09-11-20 @ 21:41) (14 - 18)  SpO2: 99% (09-11-20 @ 21:41) (95% - 99%)  Wt(kg): --    I&O's Summary    11 Sep 2020 07:01  -  12 Sep 2020 07:00  --------------------------------------------------------  IN: 150 mL / OUT: 1300 mL / NET: -1150 mL    12 Sep 2020 07:01  -  12 Sep 2020 10:32  --------------------------------------------------------  IN: 240 mL / OUT: 500 mL / NET: -260 mL        A&O x 3  neurologically intact  JVP difficult to assess  RRR, no murmurs  CTAB  soft nt/nd  no c/c/e      A/P) He is a 64 y/o male PMH CAD s/p CABG (2005) with severe LV dysfunction (LVEF 35% on repeat echo 9/8/2020), NSVT, PAF, HTN, hyperlipidemia, CKD, CHER, a/w septic shock from cellulitis in setting of severe CHF decompensation. EP called for NSVT and ICD evaluation. He is also being worked up for possible redo CABG/MVR.    -medical therapy for CHF as per CHF and cardiology  -RHC today to assess hemodynamics. Repeat CHANDRA on hold as per CHF team until hemodynamics are optimized  -restart a/c for PAF once RHC completed  -final EP reccs regarding EPS +/- ICD pending final TTE/CHANDRA results once patient is optimized from a CHF perspective  -if patient does somehow go for redo CABG/MVR would also ligation TRISTA appendage and perform a MAZE  -f/u with his cardiologist Dr. Charan Mckeon after discharge

## 2020-09-12 NOTE — PROGRESS NOTE ADULT - PROBLEM SELECTOR PROBLEM 5
Spoke to [place] pt's wife and she stated that he is scheduled with Dr Gayla Burgos after thanksgiving  They need records faxed recent ov notes, bcg treatments, path results, and cytology for continuity of care we referred pt there  Tr Burgos Fax: [phone]  Will forward to Medical records  Acute kidney injury

## 2020-09-12 NOTE — PROGRESS NOTE ADULT - ASSESSMENT
65yo male with PMH significant for CAD (s/p CABG; most recent LHC showed stable disease with no intervention), CHF (LVEF 39% TTE 1/2020), Afib (on Eliquis), HTN, HLD, CKD III, CHER (uses CPAP), and pemphigus vulgaris presenting to ED with RLE swelling, admit for acute CHF exacerbation on 8/27, s/p RRT and CCU admission. Transferred back to floors on 8/28. Pend RHC/CHANDRA per multiple cardiology teams for MV clip/repeat CABG planning

## 2020-09-12 NOTE — PROGRESS NOTE ADULT - SUBJECTIVE AND OBJECTIVE BOX
Frank Huntley, PGY2  Pager: [phone] ([facility]), [phone] ([facility])   After 7: Night Float pager    Patient is a 65y old  Male who presents with a chief complaint of RLE swelling (12 Sep 2020 10:28)      SUBJECTIVE / OVERNIGHT EVENTS: no acute event overnight, sinus tele HR 60s-70s overnight,  + BM this morning. Denies fever, chills, CP, SOB.      MEDICATIONS  (STANDING):  aspirin  chewable 81 milliGRAM(s) Oral daily  atorvastatin 80 milliGRAM(s) Oral at bedtime  carvedilol 12.5 milliGRAM(s) Oral every 12 hours  ceFAZolin   IVPB 2000 milliGRAM(s) IV Intermittent every 12 hours  furosemide    Tablet 40 milliGRAM(s) Oral daily  hydrALAZINE 75 milliGRAM(s) Oral three times a day  isosorbide   dinitrate Tablet (ISORDIL) 20 milliGRAM(s) Oral three times a day  LORazepam     Tablet 0.5 milliGRAM(s) Oral at bedtime  pantoprazole    Tablet 40 milliGRAM(s) Oral two times a day  predniSONE   Tablet 20 milliGRAM(s) Oral daily  sertraline 50 milliGRAM(s) Oral daily    MEDICATIONS  (PRN):  acetaminophen   Tablet .. 650 milliGRAM(s) Oral every 6 hours PRN Temp greater or equal to 38C (100.4F)      Vital Signs Last 24 Hrs  T(C): 36.7 (11 Sep 2020 18:06), Max: 37.1 (11 Sep 2020 14:03)  T(F): 98 (11 Sep 2020 18:06), Max: 98.7 (11 Sep 2020 14:03)  HR: 73 (11 Sep 2020 21:41) (58 - 77)  BP: 122/78 (11 Sep 2020 21:41) (113/69 - 152/91)  BP(mean): --  RR: 18 (11 Sep 2020 21:41) (14 - 18)  SpO2: 99% (11 Sep 2020 21:41) (95% - 99%)  CAPILLARY BLOOD GLUCOSE        I&O's Summary    11 Sep 2020 07:01  -  12 Sep 2020 07:00  --------------------------------------------------------  IN: 150 mL / OUT: 1300 mL / NET: -1150 mL    12 Sep 2020 07:01  -  12 Sep 2020 10:51  --------------------------------------------------------  IN: 240 mL / OUT: 500 mL / NET: -260 mL        PHYSICAL EXAM:  GENERAL: NAD, well-developed  EYES: EOMI, PERRLA, conjunctiva and sclera clear  NECK:  No JVD  CHEST/LUNG: CTABL ; No wheeze  HEART: RRR; No murmurs  ABDOMEN: Soft, Nontender, Nondistended; Bowel sounds present  EXTREMITIES:  2+ Peripheral Pulses, No edema  MUSCULOSKEL:   PSYCH: AAOx   NEUROLOGY: CN 2-12 grossly intact, strength, sensory,   SKIN: No rashes or lesions. No sacral ulcer    LABS:                        10.0   9.08  )-----------( 218      ( 12 Sep 2020 09:33 )             35.6     09-12    139  |  103  |  44<H>  ----------------------------<  208<H>  3.8   |  17<L>  |  2.22<H>    Ca    9.3      12 Sep 2020 09:33  Phos  3.5     09-12  Mg     2.3     09-12     Frank Huntley, PGY2  Pager: [phone] ([facility]), [phone] ([facility])   After 7: Night Float pager    Patient is a 65y old  Male who presents with a chief complaint of RLE swelling (12 Sep 2020 10:28)      SUBJECTIVE / OVERNIGHT EVENTS: no acute event overnight, sinus tele HR 60s-70s overnight,  + BM this morning. Denies fever, chills, CP, SOB.      MEDICATIONS  (STANDING):  aspirin  chewable 81 milliGRAM(s) Oral daily  atorvastatin 80 milliGRAM(s) Oral at bedtime  carvedilol 12.5 milliGRAM(s) Oral every 12 hours  ceFAZolin   IVPB 2000 milliGRAM(s) IV Intermittent every 12 hours  furosemide    Tablet 40 milliGRAM(s) Oral daily  hydrALAZINE 75 milliGRAM(s) Oral three times a day  isosorbide   dinitrate Tablet (ISORDIL) 20 milliGRAM(s) Oral three times a day  LORazepam     Tablet 0.5 milliGRAM(s) Oral at bedtime  pantoprazole    Tablet 40 milliGRAM(s) Oral two times a day  predniSONE   Tablet 20 milliGRAM(s) Oral daily  sertraline 50 milliGRAM(s) Oral daily    MEDICATIONS  (PRN):  acetaminophen   Tablet .. 650 milliGRAM(s) Oral every 6 hours PRN Temp greater or equal to 38C (100.4F)      Vital Signs Last 24 Hrs  T(C): 36.7 (11 Sep 2020 18:06), Max: 37.1 (11 Sep 2020 14:03)  T(F): 98 (11 Sep 2020 18:06), Max: 98.7 (11 Sep 2020 14:03)  HR: 73 (11 Sep 2020 21:41) (58 - 77)  BP: 122/78 (11 Sep 2020 21:41) (113/69 - 152/91)  BP(mean): --  RR: 18 (11 Sep 2020 21:41) (14 - 18)  SpO2: 99% (11 Sep 2020 21:41) (95% - 99%)  CAPILLARY BLOOD GLUCOSE        I&O's Summary    11 Sep 2020 07:01  -  12 Sep 2020 07:00  --------------------------------------------------------  IN: 150 mL / OUT: 1300 mL / NET: -1150 mL    12 Sep 2020 07:01  -  12 Sep 2020 10:51  --------------------------------------------------------  IN: 240 mL / OUT: 500 mL / NET: -260 mL    PHYSICAL EXAM:  GENERAL: NAD, on RA    EYES: EOMI, PERRLA, conjunctiva and sclera clear  NECK:  No JVD  CHEST/LUNG: CTABL ; No wheeze  HEART: RRR; no murmur   ABDOMEN: Soft, Nontender, Nondistended; Bowel sounds present  EXTREMITIES:  2+ Peripheral Pulses, 1+ pitting LE edema b/l   MUSCULOSKEL:  no joint swelling and deformity    PSYCH: AAOx 3   NEUROLOGY: no focal deficits, moves all 4 extremities    SKIN: No rashes or lesions. No sacral ulcer    LABS:                        10.0   9.08  )-----------( 218      ( 12 Sep 2020 09:33 )             35.6     09-12    139  |  103  |  44<H>  ----------------------------<  208<H>  3.8   |  17<L>  |  2.22<H>    Ca    9.3      12 Sep 2020 09:33  Phos  3.5     09-12  Mg     2.3     09-12    ec< from: TTE with Doppler (w/Cont) (09.08.20 @ 12:58) >  Conclusions:  Technically difficult, limited study to evaluate the LV and  mitral valve.  1. Mitral annular calcification. Tethered mitral valve  leaflets. Grossly moderate mitral regurgitation.  2. Endocardial visualization enhanced with intravenous  injection of Ultrasonic Enhancing Agent (Definity).  Moderate segmental left ventricular systolic dysfunction.  EF approximately 35%. The basal to mid inferoseptal,  lateral, inferolateral, and apical walls are akinetic. No  left ventricular thrombus. Paradoxical septal motion  consistent with prior cardiac surgery.    < end of copied text >

## 2020-09-12 NOTE — PROGRESS NOTE ADULT - PROBLEM SELECTOR PLAN 7
-Restarted home coreg (8/26) and isordil (8/27). Entresto continues to be held 2/2 renal function.   -hydralazine and isordil increased per HF (9/10)

## 2020-09-12 NOTE — PROGRESS NOTE ADULT - ATTENDING COMMENTS
No overnight events. No complaints, Feels well:     MSSA Bacteremia possible from thrombophlebitis:  -c/w cefazolin 2gram q12  -repeat blood cx NGTD, afebrile  -asking ID today if can place PICC line  -CHANDRA monday r/o vegetations    HFrEF: EF 35%, moderate MR  -per CHF: Increase hydralazine to 85 mg TID, 100mg TID by kathy AM and Increase ISDN to 30 mg TID   - Continue Lasix 40 mg PO daily, coreg 12.5mg bid, Spironolactone 25 mg po daily   -CHANDRA monday, CTS following  -monitor/repletes lytes    CAD: c/w asa, other cardiac meds. CTS following    Afib: eliquis on hold for PICC line placement  -c/w coreg  -tele

## 2020-09-12 NOTE — PROGRESS NOTE ADULT - ASSESSMENT
Mr. Colon is a 65 year old male with PMH significant for CAD (s/p CABG 2005 LIMA-LAD, SVG- OM2, SVG- OM1, LIMA to LAD only patient graft per cath 1/2020), ICM HFrEF (LVEF 30-35%), severe MR, Afib (on Eliquis), HTN, HLD, CKD III, CHER (on CPAP) who presented with septic shock 2/2 RLL cellulitis. He additionally had ADHF with shortness of breath and hypoxia requiring CCU admission for VT likely in the setting of volume overload and severe MR. He has diuresed nearly 30lbs since admission. He underwent CHANDRA on 9/1 that showed significantly tethered MV with severe MR. CTX following for his MR with consideration for redo CABG. He underwent cardiac viability study on 9/4 that suggested fair amount of viability but notably with improvement in LVEF to 47%. No evidence of volume expansion, although exam is challenging due to his body habitus. He is hemodynamically stable but with persistent renal dysfunction. Now on oral diuretics. Will need CHANDRA on Monday to reassess his MR and rule-out endocarditis given Staph Aureus bacteremia.     9/11 RHC: RA , PA/46/27/35, PCW 22 (no sig v-wave), CO/CI 6.1/2.73, PA 62%, Ao 99%, /75/100. SVR 1193 dsc, PVR 2.13 [place].

## 2020-09-12 NOTE — PROGRESS NOTE ADULT - ATTENDING COMMENTS
discussed w/ PA.  agree w/ plan.  [facility] suggests he has room to be diuresed some more.  if timing of coronary or valve interventions/surgery is to be delayed until resolution of infection, AND EF remains <40%, I would like to do his VT induction EP study prior to discharge.  If positive, he should get a LifeVest WCD as bridge to definitive dual chamber ICD implant.

## 2020-09-12 NOTE — PROGRESS NOTE ADULT - PROBLEM SELECTOR PLAN 8
- Started on Cefazolin by ID with plans for 4 weeks of IV therapy  - Will need PICC line today  - CHANDRA on Monday - Started on Cefazolin by ID with plans for 4 weeks of IV therapy  - Will need PICC line for outpatient Abx  - CHANDRA on Monday. NPO after midnight.

## 2020-09-12 NOTE — PROGRESS NOTE ADULT - PROBLEM SELECTOR PLAN 1
Combined Systolic and diastolic Heart failure. TTE (1/2020): LVEF 39%, mod MR/AR  -8/27: RRT for flash pulmonary edema. Transferred to CCU.   -CCU course (8/27): Aggressive diuresis  -TTE (8/27): Worsening diastolic dysfunction. EF 35-45%. mild/mod MR. mild AS. mild/mod AR. mod-severe global LV dysfunction.   -CHANDRA (9/1) showed severe MR, mod AR.   -Repeat TTE (9/8) showed EF 35% and grossly moderate mitral regurgitation  -C/w coreg 12.5mg BID, isordil 20mg TID, Hydralazine 75mg TID  -Heart failure recs: C/w Lasix 40mg po qday. Plan to do RHC with swan placement and CHANDRA today.   -EP recs: will schedule EP study +/- ICD placement pending optimization per SH/HF  -C/w strict I/O's, daily weights, and 1.5L fluid restriction

## 2020-09-13 LAB
ANION GAP SERPL CALC-SCNC: 17 MMOL/L — SIGNIFICANT CHANGE UP (ref 5–17)
APTT BLD: 135.3 SEC — CRITICAL HIGH (ref 27.5–35.5)
BUN SERPL-MCNC: 37 MG/DL — HIGH (ref 7–23)
CALCIUM SERPL-MCNC: 9.8 MG/DL — SIGNIFICANT CHANGE UP (ref 8.4–10.5)
CHLORIDE SERPL-SCNC: 103 MMOL/L — SIGNIFICANT CHANGE UP (ref 96–108)
CO2 SERPL-SCNC: 20 MMOL/L — LOW (ref 22–31)
CREAT SERPL-MCNC: 2.08 MG/DL — HIGH (ref 0.5–1.3)
CULTURE RESULTS: SIGNIFICANT CHANGE UP
GLUCOSE SERPL-MCNC: 151 MG/DL — HIGH (ref 70–99)
HCT VFR BLD CALC: 34.8 % — LOW (ref 39–50)
HCT VFR BLD CALC: 37.5 % — LOW (ref 39–50)
HGB BLD-MCNC: 10.5 G/DL — LOW (ref 13–17)
HGB BLD-MCNC: 9.8 G/DL — LOW (ref 13–17)
MAGNESIUM SERPL-MCNC: 2.4 MG/DL — SIGNIFICANT CHANGE UP (ref 1.6–2.6)
MCHC RBC-ENTMCNC: 22.8 PG — LOW (ref 27–34)
MCHC RBC-ENTMCNC: 22.9 PG — LOW (ref 27–34)
MCHC RBC-ENTMCNC: 28 GM/DL — LOW (ref 32–36)
MCHC RBC-ENTMCNC: 28.2 GM/DL — LOW (ref 32–36)
MCV RBC AUTO: 80.9 FL — SIGNIFICANT CHANGE UP (ref 80–100)
MCV RBC AUTO: 81.9 FL — SIGNIFICANT CHANGE UP (ref 80–100)
NRBC # BLD: 0 /100 WBCS — SIGNIFICANT CHANGE UP (ref 0–0)
NRBC # BLD: 0 /100 WBCS — SIGNIFICANT CHANGE UP (ref 0–0)
ORGANISM # SPEC MICROSCOPIC CNT: SIGNIFICANT CHANGE UP
PHOSPHATE SERPL-MCNC: 2.8 MG/DL — SIGNIFICANT CHANGE UP (ref 2.5–4.5)
PLATELET # BLD AUTO: 224 K/UL — SIGNIFICANT CHANGE UP (ref 150–400)
PLATELET # BLD AUTO: 226 K/UL — SIGNIFICANT CHANGE UP (ref 150–400)
POTASSIUM SERPL-MCNC: 4.4 MMOL/L — SIGNIFICANT CHANGE UP (ref 3.5–5.3)
POTASSIUM SERPL-SCNC: 4.4 MMOL/L — SIGNIFICANT CHANGE UP (ref 3.5–5.3)
RBC # BLD: 4.3 M/UL — SIGNIFICANT CHANGE UP (ref 4.2–5.8)
RBC # BLD: 4.58 M/UL — SIGNIFICANT CHANGE UP (ref 4.2–5.8)
RBC # FLD: 18.8 % — HIGH (ref 10.3–14.5)
RBC # FLD: 18.8 % — HIGH (ref 10.3–14.5)
SODIUM SERPL-SCNC: 140 MMOL/L — SIGNIFICANT CHANGE UP (ref 135–145)
SPECIMEN SOURCE: SIGNIFICANT CHANGE UP
WBC # BLD: 11.48 K/UL — HIGH (ref 3.8–10.5)
WBC # BLD: 9.84 K/UL — SIGNIFICANT CHANGE UP (ref 3.8–10.5)
WBC # FLD AUTO: 11.48 K/UL — HIGH (ref 3.8–10.5)
WBC # FLD AUTO: 9.84 K/UL — SIGNIFICANT CHANGE UP (ref 3.8–10.5)

## 2020-09-13 PROCEDURE — 99233 SBSQ HOSP IP/OBS HIGH 50: CPT | Mod: GC

## 2020-09-13 PROCEDURE — 99233 SBSQ HOSP IP/OBS HIGH 50: CPT

## 2020-09-13 RX ORDER — POTASSIUM CHLORIDE 20 MEQ
20 PACKET (EA) ORAL ONCE
Refills: 0 | Status: COMPLETED | OUTPATIENT
Start: 2020-09-13 | End: 2020-09-13

## 2020-09-13 RX ORDER — ISOSORBIDE DINITRATE 5 MG/1
40 TABLET ORAL THREE TIMES A DAY
Refills: 0 | Status: DISCONTINUED | OUTPATIENT
Start: 2020-09-13 | End: 2020-09-16

## 2020-09-13 RX ORDER — HEPARIN SODIUM 5000 [USP'U]/ML
10000 INJECTION INTRAVENOUS; SUBCUTANEOUS EVERY 6 HOURS
Refills: 0 | Status: DISCONTINUED | OUTPATIENT
Start: 2020-09-13 | End: 2020-09-14

## 2020-09-13 RX ORDER — HEPARIN SODIUM 5000 [USP'U]/ML
5000 INJECTION INTRAVENOUS; SUBCUTANEOUS EVERY 6 HOURS
Refills: 0 | Status: DISCONTINUED | OUTPATIENT
Start: 2020-09-13 | End: 2020-09-14

## 2020-09-13 RX ORDER — CARVEDILOL PHOSPHATE 80 MG/1
12.5 CAPSULE, EXTENDED RELEASE ORAL
Refills: 0 | Status: DISCONTINUED | OUTPATIENT
Start: 2020-09-13 | End: 2020-09-14

## 2020-09-13 RX ORDER — NYSTATIN CREAM 100000 [USP'U]/G
1 CREAM TOPICAL
Refills: 0 | Status: DISCONTINUED | OUTPATIENT
Start: 2020-09-13 | End: 2020-09-16

## 2020-09-13 RX ORDER — HYDRALAZINE HCL 50 MG
100 TABLET ORAL THREE TIMES A DAY
Refills: 0 | Status: DISCONTINUED | OUTPATIENT
Start: 2020-09-13 | End: 2020-09-16

## 2020-09-13 RX ORDER — HEPARIN SODIUM 5000 [USP'U]/ML
INJECTION INTRAVENOUS; SUBCUTANEOUS
Qty: 25000 | Refills: 0 | Status: DISCONTINUED | OUTPATIENT
Start: 2020-09-13 | End: 2020-09-14

## 2020-09-13 RX ADMIN — Medication 81 MILLIGRAM(S): at 11:34

## 2020-09-13 RX ADMIN — Medication 20 MILLIEQUIVALENT(S): at 11:33

## 2020-09-13 RX ADMIN — Medication 100 MILLIGRAM(S): at 11:33

## 2020-09-13 RX ADMIN — ISOSORBIDE DINITRATE 40 MILLIGRAM(S): 5 TABLET ORAL at 21:01

## 2020-09-13 RX ADMIN — Medication 20 MILLIGRAM(S): at 05:42

## 2020-09-13 RX ADMIN — CARVEDILOL PHOSPHATE 12.5 MILLIGRAM(S): 80 CAPSULE, EXTENDED RELEASE ORAL at 05:42

## 2020-09-13 RX ADMIN — ISOSORBIDE DINITRATE 30 MILLIGRAM(S): 5 TABLET ORAL at 05:41

## 2020-09-13 RX ADMIN — Medication 0.5 MILLIGRAM(S): at 21:05

## 2020-09-13 RX ADMIN — SPIRONOLACTONE 25 MILLIGRAM(S): 25 TABLET, FILM COATED ORAL at 05:42

## 2020-09-13 RX ADMIN — PANTOPRAZOLE SODIUM 40 MILLIGRAM(S): 20 TABLET, DELAYED RELEASE ORAL at 17:32

## 2020-09-13 RX ADMIN — ATORVASTATIN CALCIUM 80 MILLIGRAM(S): 80 TABLET, FILM COATED ORAL at 21:01

## 2020-09-13 RX ADMIN — HEPARIN SODIUM 0 UNIT(S)/HR: 5000 INJECTION INTRAVENOUS; SUBCUTANEOUS at 22:07

## 2020-09-13 RX ADMIN — ISOSORBIDE DINITRATE 30 MILLIGRAM(S): 5 TABLET ORAL at 14:08

## 2020-09-13 RX ADMIN — CARVEDILOL PHOSPHATE 12.5 MILLIGRAM(S): 80 CAPSULE, EXTENDED RELEASE ORAL at 17:32

## 2020-09-13 RX ADMIN — HEPARIN SODIUM 1800 UNIT(S)/HR: 5000 INJECTION INTRAVENOUS; SUBCUTANEOUS at 23:09

## 2020-09-13 RX ADMIN — Medication 40 MILLIGRAM(S): at 05:42

## 2020-09-13 RX ADMIN — Medication 100 MILLIGRAM(S): at 14:07

## 2020-09-13 RX ADMIN — SERTRALINE 50 MILLIGRAM(S): 25 TABLET, FILM COATED ORAL at 11:33

## 2020-09-13 RX ADMIN — Medication 100 MILLIGRAM(S): at 22:17

## 2020-09-13 RX ADMIN — Medication 100 MILLIGRAM(S): at 21:05

## 2020-09-13 RX ADMIN — HEPARIN SODIUM 2200 UNIT(S)/HR: 5000 INJECTION INTRAVENOUS; SUBCUTANEOUS at 15:21

## 2020-09-13 RX ADMIN — PANTOPRAZOLE SODIUM 40 MILLIGRAM(S): 20 TABLET, DELAYED RELEASE ORAL at 05:41

## 2020-09-13 NOTE — PROGRESS NOTE ADULT - ATTENDING COMMENTS
discussed and agree w PA.  as ICD will have to wait at least 4 weeks to finish antibiotics, wish to schedule EP study for VT induction while inpatient.  if positive, gets a LifeVest WCD as bridge-therapy to eventual ICD implant.

## 2020-09-13 NOTE — PROGRESS NOTE ADULT - PROBLEM SELECTOR PLAN 7
-Restarted home coreg (8/26) and isordil (8/27). Entresto continues to be held 2/2 renal function.   -c/w hydralazine and isordil

## 2020-09-13 NOTE — PROGRESS NOTE ADULT - PROBLEM SELECTOR PLAN 2
- Medical optimization as above and repeat CHANDRA Monday  - Structural heart following  - No significant v-wave on PCW tracing suggesting MR has sig improved with afterload reduction and diuresis

## 2020-09-13 NOTE — PROGRESS NOTE ADULT - PROBLEM SELECTOR PLAN 1
Combined Systolic and diastolic Heart failure. TTE (1/2020): LVEF 39%, mod MR/AR  -8/27: RRT for flash pulmonary edema. Transferred to CCU.   -CCU course (8/27): Noninvasive ventilation weaned to NC. Aggressive diuresis.  -TTE (8/27): Worsening diastolic dysfunction. EF 35-45%. mild/mod MR. mild AS. mild/mod AR. mod-severe global LV dysfunction.   -CHANDRA (9/1) showed severe MR, mod AR.   -Repeat TTE (9/8) showed EF 35% and grossly moderate mitral regurgitation  -RHC (9/11): RA , PA/46/27/35, PCW 22 (no sig v-wave), CO/CI 6.1/2.73, PA 62%, Ao 99%, /75/100. SVR 1193 dsc, PVR 2.13 [place]  -C/w coreg 12.5mg BID (timings changed to with AM/PM meals), spironolactone 25mg qday  -Heart failure recs: C/w Lasix 40mg po qday. Increase hydralazine to 100mg TID and isordil to 30mg TID. Repeat CHANDRA scheduled for 9/14  -EP recs: will schedule EP study +/- ICD placement pending repeat CHANDRA. If pt has a redo CABG/MVR, they would also want ligation TRISTA appendage and to perform a MAZE.  -C/w strict I/O's, daily weights, and 1.5L fluid restriction

## 2020-09-13 NOTE — PROGRESS NOTE ADULT - SUBJECTIVE AND OBJECTIVE BOX
S: Denies chest pain or SOB. Review of systems otherwise (-)      acetaminophen   Tablet .. 650 milliGRAM(s) Oral every 6 hours PRN  aspirin  chewable 81 milliGRAM(s) Oral daily  atorvastatin 80 milliGRAM(s) Oral at bedtime  carvedilol 12.5 milliGRAM(s) Oral two times a day  ceFAZolin   IVPB 2000 milliGRAM(s) IV Intermittent every 12 hours  furosemide    Tablet 40 milliGRAM(s) Oral daily  hydrALAZINE 100 milliGRAM(s) Oral three times a day  isosorbide   dinitrate Tablet (ISORDIL) 30 milliGRAM(s) Oral three times a day  LORazepam     Tablet 0.5 milliGRAM(s) Oral at bedtime  pantoprazole    Tablet 40 milliGRAM(s) Oral two times a day  predniSONE   Tablet 20 milliGRAM(s) Oral daily  sertraline 50 milliGRAM(s) Oral daily                            10.5   9.84  )-----------( 226      ( 13 Sep 2020 11:50 )             37.5       09-13    140  |  103  |  37<H>  ----------------------------<  151<H>  4.4   |  20<L>  |  2.08<H>    Ca    9.8      13 Sep 2020 11:50  Phos  2.8     09-13  Mg     2.4     09-13              T(C): 37.2 (09-13-20 @ 11:17), Max: 37.2 (09-13-20 @ 11:17)  HR: 60 (09-13-20 @ 11:44) (58 - 98)  BP: 128/74 (09-13-20 @ 11:17) (118/64 - 158/90)  RR: 18 (09-13-20 @ 11:17) (18 - 18)  SpO2: 99% (09-13-20 @ 11:44) (96% - 100%)  Wt(kg): --    I&O's Summary    12 Sep 2020 07:01  -  13 Sep 2020 07:00  --------------------------------------------------------  IN: 530 mL / OUT: 1525 mL / NET: -995 mL    13 Sep 2020 07:01  -  13 Sep 2020 13:48  --------------------------------------------------------  IN: 480 mL / OUT: 1500 mL / NET: -1020 mL        Gen: NAD  HEENT:  (-)icterus (-)pallor  CV: N S1 S2 1/6 KATHERYN (+)2 Pulses B/l  Resp: CTA B/L, normal effort  GI: (+) BS Soft, NT, ND  Lymph:  (-) edema, (-)obvious lymphadenopathy  Skin: Warm to touch, Normal turgor  Psych: Appropriate mood and affect      TELEMETRY: SR, PVC's    Echo: < from: TTE with Doppler (w/Cont) (08.27.20 @ 07:54) >  Conclusions:  1. Mitral valve not well visualized. Mitral annular  calcification. Tethered mitral valve leaflets with normal  opening. Mild moderate mitral regurgitation.  2. Calcified trileaflet aortic valve with decreased  opening. Peak transaortic valve gradient equals 12 mm Hg,  mean transaortic valve gradient equals 8 mm Hg, at least  mild AS. Limited gradient/Doppler evaluation. Mild-moderate  aortic regurgitation.  3. Endocardial visualization enhanced with intravenous  injection of Ultrasonic Enhancing Agent (Definity).  Moderate to severe  global left ventricular systolic  dysfunction. There dyssynchronous left ventricular  contraction.  4. Moderate diastolic dysfunction (Stage II). Increased  E/e'  is consistent with elevated left ventricular filling  pressure.  *** Compared with echocardiogram report of 12/13/2017,  Limited comparison.  Disatolic dysfunction is worse.    < end of copied text >    < from: Transesophageal Echocardiogram w/o TTE (09.01.20 @ 14:15) >  Conclusions:  1. Tethered mitral valve leaflets with normal opening.  Severe mitral regurgitation. There is systolic flow  reversal in the right upper pulmonary vein. By PISA,  calculated ERO=43 mmsq and regurgitant volume=73 mL (Pisa  rad 1cm, Va 34.5 cm/s, Vm 5.0 m/s,  cm)  2. Calcified trileaflet aortic valve with normal opening.  Moderate aortic regurgitation. Vena contracta=0.4 cm.  3. Normal aortic root size. (Ao: 3.6 cm at the sinuses of  Valsalva). Midlly dilated ascending aorta for BSA  (ascending aorta diameter 4.5 cm approximately 5 cm distal  to the aortic valve.  Normal size aortic arch, and  descending thoracic aorta.  Minimal atheroma.  4. Left atrial enlargement. No left atrial or left atrial  appendage thrombus. Normal left atrial appendage function  (maximum velocity>40 cm/s).  5. Moderate left ventricular systolic dysfunction.  Paradoxical septal motion consistent with prior cardiac  surgery.  6. Normal right ventricular size and function.    < end of copied text >      < from: Cardiac Viability (09.04.20 @ 14:15) >  GATED ANALYSIS:  Rest gated wall motion analysis was performed (LVEF = 47  %;LVEDV = 195 ml.), revealing moderately reduced  LV  function.  ------------------------------------------------------------------------  IMPRESSIONS:Abnormal Study  * The left ventricle was markdely dilated at baseline.  There is viability in the apex, inferior, septal,  anterolateral, and mid to basal inferolateral walls. On  delayed imaging, there is appears to be partial viability  in the distal inferolateral wall.  * Rest gated wall motion analysis was performed (LVEF = 47  %;LVEDV = 195 ml.), revealing moderately reduced  LV  function.  Conclusion:  The left ventricle was markdely dilated at baseline. There  is viability in the apex, inferior, septal, anterolateral,  and mid to basal inferolateral walls. On delayed imaging,  there also appears to be partial viability in the distal  inferolateral wall.  ------------------------------------------------------------------------  Confirmed on  9/4/2020 - 16:56:37 by Beto Errol, M.D.    < end of copied text >      ASSESSMENT/PLAN: Patient is a 65 year old male with PMH of CAD s/p CABG with most recent LHC 1/2020 with no intervention, chronic systolic CHF (LVEF 39% TTE 1/2020), Afib (on Eliquis), HTN, HLD, CKD, CHER (on CPAP), and pemphigus vulgaris who presented with RLE swelling admitted with RLE cellulitis and NARCISO on CKD. Cardiology consulted for management of CAD/CHF.    - Heart failure follow up - Continue PO lasix - daily standing weights, strict I/Os  - Continue medical management of known CAD with systolic cardiomyopathy - ASA/Statin, Coreg, Hydralazine/Isordil  - AC with Eliquis for CVA prevention on hold for procedures - restart when procedures completed and ok with primary team   - EP follow up  - CHANDRA with severe MR  - Structural heart eval follow up/ CHANDRA noted/ viability study results noted/ await surgical plan - CTS f/u with Dr. Pena  - Fever workup/Abx per medicine - BCx with staph, f/u repeat BCx  - RHC performed  - NPO past MN for CHANDRA Tomorrow   - Patient to f/u with Dr. Campbell's cardiology group after d/c      Klaus Garcia MD

## 2020-09-13 NOTE — PROGRESS NOTE ADULT - ATTENDING COMMENTS
No overnight events. No complaints, Feels well    MSSA Bacteremia possible from thrombophlebitis:  -c/w cefazolin 2gram q12  -repeat blood cx NGTD, afebrile  -per ID, can place picc line today, however given elevated cr need IR/renal clearance, thus will start heparin ggt for now, monitor PTT  -CHANDRA monday r/o vegetations    HFrEF: EF 35%, moderate MR  -per CHF: Increase hydralazine to 100mg TID c/w ISDN 30 mg TID   - Continue Lasix 40 mg PO daily, coreg 12.5mg bid, Spironolactone 25 mg po daily   -CHANDRA monday,   -CTS following for possible redo CABG/MVR/ligation TRISTA appendage and perform a MAZE  -monitor/repletes lytes    CAD: c/w asa, other cardiac meds. CTS    Afib:   -start heparin ggt given need for renal/IR clearance for PICC line  -c/w coreg  -tele, EP following

## 2020-09-13 NOTE — PROGRESS NOTE ADULT - PROBLEM SELECTOR PLAN 8
- Started on Cefazolin by ID with plans for 4 weeks of IV therapy  - Will need PICC line for outpatient Abx  - CHANDRA on Monday. NPO after midnight.

## 2020-09-13 NOTE — PROGRESS NOTE ADULT - SUBJECTIVE AND OBJECTIVE BOX
Subjective: feels well. No orthopnea, no MELCHOR    Medications:  acetaminophen   Tablet .. 650 milliGRAM(s) Oral every 6 hours PRN  aspirin  chewable 81 milliGRAM(s) Oral daily  atorvastatin 80 milliGRAM(s) Oral at bedtime  carvedilol 12.5 milliGRAM(s) Oral two times a day  ceFAZolin   IVPB 2000 milliGRAM(s) IV Intermittent every 12 hours  furosemide    Tablet 40 milliGRAM(s) Oral daily  heparin   Injectable 97236 Unit(s) IV Push every 6 hours PRN  heparin   Injectable 5000 Unit(s) IV Push every 6 hours PRN  heparin  Infusion.  Unit(s)/Hr IV Continuous <Continuous>  hydrALAZINE 100 milliGRAM(s) Oral three times a day  isosorbide   dinitrate Tablet (ISORDIL) 30 milliGRAM(s) Oral three times a day  LORazepam     Tablet 0.5 milliGRAM(s) Oral at bedtime  pantoprazole    Tablet 40 milliGRAM(s) Oral two times a day  predniSONE   Tablet 20 milliGRAM(s) Oral daily  sertraline 50 milliGRAM(s) Oral daily    Vitals:  T(C): 37.2 (20 @ 11:17), Max: 37.2 (20 @ 11:17)  HR: 63 (20 @ 14:03) (58 - 98)  BP: 121/72 (20 @ 14:03) (118/64 - 158/90)  RR: 18 (20 @ 11:17) (18 - 18)  SpO2: 99% (20 @ 11:44) (96% - 100%)    Daily     Daily Weight in k.5 (13 Sep 2020 07:44)   111.5 kg        I&O's Summary    12 Sep 2020 07:  -  13 Sep 2020 07:00  --------------------------------------------------------  IN: 530 mL / OUT: 1525 mL / NET: -995 mL    13 Sep 2020 07:  -  13 Sep 2020 15:31  --------------------------------------------------------  IN: 480 mL / OUT: 1500 mL / NET: -1020 mL        Physical Exam:  Appearance: No Acute Distress  HEENT: JVP not visible  Cardiovascular: RRR, Normal S1 S2, No murmurs/rubs/gallops  Respiratory: Clear to auscultation bilaterally  Gastrointestinal: soft, non-tender, non-distended	  Skin: no skin lesions  Neurologic: Non-focal  Extremities: No LE edema, warm and well perfused  Psychiatry: A & O x 3, Mood & affect appropriate      Labs:                        10.5   9.84  )-----------( 226      ( 13 Sep 2020 11:50 )             37.5     -13    140  |  103  |  37<H>  ----------------------------<  151<H>  4.4   |  20<L>  |  2.08<H>    Ca    9.8      13 Sep 2020 11:50  Phos  2.8       Mg     2.4           TELEMETRY: SR/SB, lowest HR 45, PAC, PVC

## 2020-09-13 NOTE — PROGRESS NOTE ADULT - SUBJECTIVE AND OBJECTIVE BOX
**********************************************  Chiquis Duarte, PGY-1  Internal Medicine   [facility] Pager #: [phone]  **********************************************     Patient is a 65y old  Male who presents with a chief complaint of RLE swelling (13 Sep 2020 09:40)    SUBJECTIVE / OVERNIGHT EVENTS: NAEON. Patient examined at bedside this AM, with no subjective complaints. Denies CP, palpitations, SOB, peripheral edema, fever/chills, n/v/d.     OBJECTIVE:  Vital Signs Last 24 Hrs  T(C): 36.7 (13 Sep 2020 04:14), Max: 36.7 (12 Sep 2020 20:25)  T(F): 98 (13 Sep 2020 04:14), Max: 98.1 (12 Sep 2020 20:25)  HR: 70 (13 Sep 2020 06:49) (59 - 98)  BP: 136/84 (13 Sep 2020 05:39) (118/64 - 158/90)  BP(mean): --  RR: 18 (13 Sep 2020 04:14) (18 - 18)  SpO2: 96% (13 Sep 2020 06:49) (96% - 100%)    I&O's Summary    12 Sep 2020 07:01  -  13 Sep 2020 07:00  --------------------------------------------------------  IN: 530 mL / OUT: 1525 mL / NET: -995 mL      PHYSICAL EXAM:  GENERAL: NAD  HEAD:  Atraumatic, Normocephalic  CHEST/LUNG: Clear to ascultation bilaterally; No rales, rhonchi, wheezing, or rubs  HEART: Regular rate and rhythm; No murmurs, rubs, or gallops; no pitting edema on b/l LE  ABDOMEN: Soft, Nontender, Nondistended; normoactive bowel sounds  SKIN: No rashes or lesions  NERVOUS SYSTEM:  Alert & Oriented X3, no focal deficit      Labs:  CAPILLARY BLOOD GLUCOSE                              10.0   9.08  )-----------( 218      ( 12 Sep 2020 09:33 )             35.6     09-12    139  |  103  |  44<H>  ----------------------------<  208<H>  3.8   |  17<L>  |  2.22<H>    Ca    9.3      12 Sep 2020 09:33  Phos  3.5     09-12  Mg     2.3     09-12              Culture - Blood (collected 10 Sep 2020 21:15)  Source: .Blood Blood-Peripheral  Preliminary Report (11 Sep 2020 22:01):    No growth to date.    Culture - Blood (collected 10 Sep 2020 21:09)  Source: .Blood Blood-Venous  Preliminary Report (11 Sep 2020 22:01):    No growth to date.        Imaging Personally Reviewed:    Consultant(s) Notes Reviewed:   Care Discussed with Consultants/Other Providers:    MEDICATIONS  (STANDING):  aspirin  chewable 81 milliGRAM(s) Oral daily  atorvastatin 80 milliGRAM(s) Oral at bedtime  carvedilol 12.5 milliGRAM(s) Oral two times a day  ceFAZolin   IVPB 2000 milliGRAM(s) IV Intermittent every 12 hours  furosemide    Tablet 40 milliGRAM(s) Oral daily  hydrALAZINE 100 milliGRAM(s) Oral three times a day  isosorbide   dinitrate Tablet (ISORDIL) 30 milliGRAM(s) Oral three times a day  LORazepam     Tablet 0.5 milliGRAM(s) Oral at bedtime  pantoprazole    Tablet 40 milliGRAM(s) Oral two times a day  potassium chloride    Tablet ER 20 milliEquivalent(s) Oral once  predniSONE   Tablet 20 milliGRAM(s) Oral daily  sertraline 50 milliGRAM(s) Oral daily    MEDICATIONS  (PRN):  acetaminophen   Tablet .. 650 milliGRAM(s) Oral every 6 hours PRN Temp greater or equal to 38C (100.4F)

## 2020-09-13 NOTE — PROGRESS NOTE ADULT - PROBLEM SELECTOR PLAN 4
-Initially admitted with sepsis 2/2 RLE cellulitis, s/p 7 day course of vancomycin with resolution.   -Was febrile on 9/9 with bcx being positive for MSSA  -ID consulted: c/w Ancef for MSSA bacteremia; needs 4 weeks from negative bcx   -repeat BCx 9/10: NGTD   -Pending PICC line placement. Unable to be done on weekend as patient's GFR is too low. Will need to consult IR tomorrow AM. Consent signed and in pt chart.   -Eliquis continuing to be held in the mean time. Will consider starting heparin gtt.

## 2020-09-13 NOTE — PROGRESS NOTE ADULT - SUBJECTIVE AND OBJECTIVE BOX
EP   tele: NSR, no events    he denies palpitations, syncope, nor angina, ROS otherwise -, dyspnea improved          acetaminophen   Tablet .. 650 milliGRAM(s) Oral every 6 hours PRN  aspirin  chewable 81 milliGRAM(s) Oral daily  atorvastatin 80 milliGRAM(s) Oral at bedtime  carvedilol 12.5 milliGRAM(s) Oral two times a day  ceFAZolin   IVPB 2000 milliGRAM(s) IV Intermittent every 12 hours  furosemide    Tablet 40 milliGRAM(s) Oral daily  hydrALAZINE 100 milliGRAM(s) Oral three times a day  isosorbide   dinitrate Tablet (ISORDIL) 30 milliGRAM(s) Oral three times a day  LORazepam     Tablet 0.5 milliGRAM(s) Oral at bedtime  pantoprazole    Tablet 40 milliGRAM(s) Oral two times a day  predniSONE   Tablet 20 milliGRAM(s) Oral daily  sertraline 50 milliGRAM(s) Oral daily                            10.0   9.08  )-----------( 218      ( 12 Sep 2020 09:33 )             35.6       Hemoglobin: 10.0 g/dL (09-12 @ 09:33)  Hemoglobin: 9.5 g/dL (09-11 @ 05:51)  Hemoglobin: 9.5 g/dL (09-10 @ 06:30)  Hemoglobin: 11.1 g/dL (09-09 @ 06:51)      09-12    139  |  103  |  44<H>  ----------------------------<  208<H>  3.8   |  17<L>  |  2.22<H>    Ca    9.3      12 Sep 2020 09:33  Phos  3.5     09-12  Mg     2.3     09-12      Creatinine Trend: 2.22<--, 2.27<--, 2.51<--, 2.20<--, 2.62<--, 2.55<--    COAGS:           T(C): 36.7 (09-13-20 @ 04:14), Max: 36.7 (09-12-20 @ 20:25)  HR: 70 (09-13-20 @ 06:49) (59 - 98)  BP: 136/84 (09-13-20 @ 05:39) (118/64 - 158/90)  RR: 18 (09-13-20 @ 04:14) (18 - 18)  SpO2: 96% (09-13-20 @ 06:49) (96% - 100%)  Wt(kg): --    I&O's Summary    12 Sep 2020 07:01  -  13 Sep 2020 07:00  --------------------------------------------------------  IN: 530 mL / OUT: 1525 mL / NET: -995 mL    A&O x 3  neurologically intact  JVP difficult to assess  RRR, no murmurs  CTAB  soft nt/nd  no c/c/e      A/P) He is a 66 y/o male PMH CAD s/p CABG (2005) with severe LV dysfunction (LVEF 35% on repeat echo 9/8/2020), NSVT, PAF, HTN, hyperlipidemia, CKD, CHER, a/w septic shock from cellulitis in setting of severe CHF decompensation. EP called for NSVT and ICD evaluation. He is also being worked up for possible redo CABG/MVR.    -medical therapy for CHF as per CHF and cardiology  -final EP reccs regarding EPS +/- ICD pending final TTE/CHANDRA results once patient is optimized from a CHF perspective  -if patient does somehow go for redo CABG/MVR would also ligation TRISTA appendage and perform a MAZE  -f/u with his cardiologist Dr. Charan Mckeon after discharge

## 2020-09-13 NOTE — PROGRESS NOTE ADULT - PROBLEM SELECTOR PLAN 8
-s/p vanc - improving    -Wound care saw patient (8/25). Redressed wound. Recommended plastics consult.   -Plastics consulted 8/26. Recommended local wound care and outpatient follow up (Dr. Marquez [phone])

## 2020-09-13 NOTE — PROGRESS NOTE ADULT - PROBLEM SELECTOR PLAN 6
-c/w Eliquis 5mg BID - Held for RHC and now pending PICC placement   -C/w coreg 12.5mg BID (8/26)  -EKG with Afib with RVR during RRT on 8/27. Now NSR. Stable on tele.

## 2020-09-13 NOTE — PROGRESS NOTE ADULT - PROBLEM SELECTOR PLAN 5
- Currently SR  - Continue to hold Eliquis for placement of PICC line.  - Please discuss if bridging therapy is needed with primary cardiologist; patient denies any history of stroke or embolic event

## 2020-09-13 NOTE — PROGRESS NOTE ADULT - PROBLEM SELECTOR PLAN 1
- Currently with low RAP and elevated LVEDP, but responsive to afterload reduction.  - Continue hydralazine 100 mg po TID; hold for SBP < 90  - Increase ISDN to 40 mg po TID; hold for SBP < 90  - Continue Lasix 40 mg PO daily & Spironolactone 25 mg po daily  - Please dose Coreg 12.5 mg BID with morning and evening meals. Separate from the hydral and ISDN to avoid hypotension. Hold SBP < 90 or HR < 55.  - Deferring ARNI/MRA at this time due to renal dysfunction  - Continue 1.5L fluid restriction

## 2020-09-14 LAB
ANION GAP SERPL CALC-SCNC: 15 MMOL/L — SIGNIFICANT CHANGE UP (ref 5–17)
APTT BLD: 131.7 SEC — CRITICAL HIGH (ref 27.5–35.5)
APTT BLD: 76.1 SEC — HIGH (ref 27.5–35.5)
BUN SERPL-MCNC: 40 MG/DL — HIGH (ref 7–23)
CALCIUM SERPL-MCNC: 9.1 MG/DL — SIGNIFICANT CHANGE UP (ref 8.4–10.5)
CHLORIDE SERPL-SCNC: 101 MMOL/L — SIGNIFICANT CHANGE UP (ref 96–108)
CO2 SERPL-SCNC: 22 MMOL/L — SIGNIFICANT CHANGE UP (ref 22–31)
CREAT SERPL-MCNC: 2.26 MG/DL — HIGH (ref 0.5–1.3)
CULTURE RESULTS: SIGNIFICANT CHANGE UP
GLUCOSE SERPL-MCNC: 95 MG/DL — SIGNIFICANT CHANGE UP (ref 70–99)
HCT VFR BLD CALC: 32.7 % — LOW (ref 39–50)
HGB BLD-MCNC: 9.4 G/DL — LOW (ref 13–17)
MAGNESIUM SERPL-MCNC: 2.2 MG/DL — SIGNIFICANT CHANGE UP (ref 1.6–2.6)
MCHC RBC-ENTMCNC: 23.3 PG — LOW (ref 27–34)
MCHC RBC-ENTMCNC: 28.7 GM/DL — LOW (ref 32–36)
MCV RBC AUTO: 80.9 FL — SIGNIFICANT CHANGE UP (ref 80–100)
NRBC # BLD: 0 /100 WBCS — SIGNIFICANT CHANGE UP (ref 0–0)
PHOSPHATE SERPL-MCNC: 3.5 MG/DL — SIGNIFICANT CHANGE UP (ref 2.5–4.5)
PLATELET # BLD AUTO: 213 K/UL — SIGNIFICANT CHANGE UP (ref 150–400)
POTASSIUM SERPL-MCNC: 3.5 MMOL/L — SIGNIFICANT CHANGE UP (ref 3.5–5.3)
POTASSIUM SERPL-SCNC: 3.5 MMOL/L — SIGNIFICANT CHANGE UP (ref 3.5–5.3)
RBC # BLD: 4.04 M/UL — LOW (ref 4.2–5.8)
RBC # FLD: 18.6 % — HIGH (ref 10.3–14.5)
SODIUM SERPL-SCNC: 138 MMOL/L — SIGNIFICANT CHANGE UP (ref 135–145)
SPECIMEN SOURCE: SIGNIFICANT CHANGE UP
WBC # BLD: 12.1 K/UL — HIGH (ref 3.8–10.5)
WBC # FLD AUTO: 12.1 K/UL — HIGH (ref 3.8–10.5)

## 2020-09-14 PROCEDURE — 99233 SBSQ HOSP IP/OBS HIGH 50: CPT

## 2020-09-14 PROCEDURE — 99232 SBSQ HOSP IP/OBS MODERATE 35: CPT

## 2020-09-14 PROCEDURE — 93320 DOPPLER ECHO COMPLETE: CPT | Mod: 26

## 2020-09-14 PROCEDURE — 93312 ECHO TRANSESOPHAGEAL: CPT | Mod: 26

## 2020-09-14 PROCEDURE — 93325 DOPPLER ECHO COLOR FLOW MAPG: CPT | Mod: 26

## 2020-09-14 PROCEDURE — 99233 SBSQ HOSP IP/OBS HIGH 50: CPT | Mod: GC

## 2020-09-14 RX ORDER — CARVEDILOL PHOSPHATE 80 MG/1
18.75 CAPSULE, EXTENDED RELEASE ORAL
Refills: 0 | Status: DISCONTINUED | OUTPATIENT
Start: 2020-09-14 | End: 2020-09-16

## 2020-09-14 RX ORDER — HEPARIN SODIUM 5000 [USP'U]/ML
1400 INJECTION INTRAVENOUS; SUBCUTANEOUS
Qty: 25000 | Refills: 0 | Status: DISCONTINUED | OUTPATIENT
Start: 2020-09-14 | End: 2020-09-15

## 2020-09-14 RX ORDER — POTASSIUM CHLORIDE 20 MEQ
40 PACKET (EA) ORAL ONCE
Refills: 0 | Status: COMPLETED | OUTPATIENT
Start: 2020-09-14 | End: 2020-09-14

## 2020-09-14 RX ADMIN — PANTOPRAZOLE SODIUM 40 MILLIGRAM(S): 20 TABLET, DELAYED RELEASE ORAL at 17:16

## 2020-09-14 RX ADMIN — SPIRONOLACTONE 25 MILLIGRAM(S): 25 TABLET, FILM COATED ORAL at 05:35

## 2020-09-14 RX ADMIN — ISOSORBIDE DINITRATE 40 MILLIGRAM(S): 5 TABLET ORAL at 21:45

## 2020-09-14 RX ADMIN — ISOSORBIDE DINITRATE 40 MILLIGRAM(S): 5 TABLET ORAL at 05:35

## 2020-09-14 RX ADMIN — SERTRALINE 50 MILLIGRAM(S): 25 TABLET, FILM COATED ORAL at 11:18

## 2020-09-14 RX ADMIN — Medication 40 MILLIEQUIVALENT(S): at 11:18

## 2020-09-14 RX ADMIN — Medication 100 MILLIGRAM(S): at 17:17

## 2020-09-14 RX ADMIN — Medication 100 MILLIGRAM(S): at 11:17

## 2020-09-14 RX ADMIN — ISOSORBIDE DINITRATE 40 MILLIGRAM(S): 5 TABLET ORAL at 17:17

## 2020-09-14 RX ADMIN — Medication 100 MILLIGRAM(S): at 05:35

## 2020-09-14 RX ADMIN — HEPARIN SODIUM 0 UNIT(S)/HR: 5000 INJECTION INTRAVENOUS; SUBCUTANEOUS at 05:32

## 2020-09-14 RX ADMIN — CARVEDILOL PHOSPHATE 12.5 MILLIGRAM(S): 80 CAPSULE, EXTENDED RELEASE ORAL at 05:35

## 2020-09-14 RX ADMIN — Medication 100 MILLIGRAM(S): at 21:45

## 2020-09-14 RX ADMIN — PANTOPRAZOLE SODIUM 40 MILLIGRAM(S): 20 TABLET, DELAYED RELEASE ORAL at 05:35

## 2020-09-14 RX ADMIN — Medication 81 MILLIGRAM(S): at 11:18

## 2020-09-14 RX ADMIN — HEPARIN SODIUM 1400 UNIT(S)/HR: 5000 INJECTION INTRAVENOUS; SUBCUTANEOUS at 17:19

## 2020-09-14 RX ADMIN — CARVEDILOL PHOSPHATE 12.5 MILLIGRAM(S): 80 CAPSULE, EXTENDED RELEASE ORAL at 17:18

## 2020-09-14 RX ADMIN — HEPARIN SODIUM 1400 UNIT(S)/HR: 5000 INJECTION INTRAVENOUS; SUBCUTANEOUS at 06:35

## 2020-09-14 RX ADMIN — Medication 20 MILLIGRAM(S): at 05:34

## 2020-09-14 RX ADMIN — ATORVASTATIN CALCIUM 80 MILLIGRAM(S): 80 TABLET, FILM COATED ORAL at 21:45

## 2020-09-14 RX ADMIN — NYSTATIN CREAM 1 APPLICATION(S): 100000 CREAM TOPICAL at 17:27

## 2020-09-14 RX ADMIN — NYSTATIN CREAM 1 APPLICATION(S): 100000 CREAM TOPICAL at 05:39

## 2020-09-14 RX ADMIN — Medication 40 MILLIGRAM(S): at 05:35

## 2020-09-14 RX ADMIN — Medication 0.5 MILLIGRAM(S): at 21:48

## 2020-09-14 RX ADMIN — Medication 100 MILLIGRAM(S): at 22:42

## 2020-09-14 NOTE — CONSULT NOTE ADULT - REASON FOR ADMISSION
RLE swelling

## 2020-09-14 NOTE — PROGRESS NOTE ADULT - SUBJECTIVE AND OBJECTIVE BOX
EP   tele: NSR, no events    he denies palpitations, syncope, nor angina.  breathing easily.     acetaminophen   Tablet .. 650 milliGRAM(s) Oral every 6 hours PRN  aspirin  chewable 81 milliGRAM(s) Oral daily  atorvastatin 80 milliGRAM(s) Oral at bedtime  carvedilol 12.5 milliGRAM(s) Oral two times a day  ceFAZolin   IVPB 2000 milliGRAM(s) IV Intermittent every 12 hours  furosemide    Tablet 40 milliGRAM(s) Oral daily  hydrALAZINE 100 milliGRAM(s) Oral three times a day  isosorbide   dinitrate Tablet (ISORDIL) 40 milliGRAM(s) Oral three times a day  LORazepam     Tablet 0.5 milliGRAM(s) Oral at bedtime  nystatin Powder 1 Application(s) Topical two times a day  pantoprazole    Tablet 40 milliGRAM(s) Oral two times a day  predniSONE   Tablet 20 milliGRAM(s) Oral daily  sertraline 50 milliGRAM(s) Oral daily  spironolactone 25 milliGRAM(s) Oral daily                            9.4    12.10 )-----------( 213      ( 14 Sep 2020 04:57 )             32.7       09-14    138  |  101  |  40<H>  ----------------------------<  95  3.5   |  22  |  2.26<H>    Ca    9.1      14 Sep 2020 04:57  Phos  3.5     09-14  Mg     2.2     09-14    T(C): 36.6 (09-14-20 @ 12:47), Max: 36.7 (09-13-20 @ 20:27)  HR: 63 (09-14-20 @ 12:47) (61 - 88)  BP: 117/73 (09-14-20 @ 12:47) (112/67 - 137/73)  RR: 18 (09-14-20 @ 12:47) (18 - 20)  SpO2: 97% (09-14-20 @ 12:47) (96% - 99%)  Wt(kg): --    I&O's Summary    13 Sep 2020 07:01  -  14 Sep 2020 07:00  --------------------------------------------------------  IN: 1224 mL / OUT: 2900 mL / NET: -1676 mL    14 Sep 2020 07:01  -  14 Sep 2020 14:17  --------------------------------------------------------  IN: 0 mL / OUT: 600 mL / NET: -600 mL    A&O x 3  neurologically intact  JVP difficult to assess  RRR, no murmurs  CTAB  soft nt/nd  no c/c/e      A/P) He is a 64 y/o male PMH CAD s/p CABG (2005) with severe LV dysfunction (LVEF 35% on repeat echo 9/8/2020), NSVT, PAF, HTN, hyperlipidemia, CKD, CHER, a/w septic shock from cellulitis in setting of severe CHF decompensation. EP called for NSVT and ICD evaluation. He is also being worked up for possible redo CABG/MVR.    -Awaiting published plans from structural + surgery.  If no coronary revascularization, he deserves an EP study for VT induction prior to discharge.  LifeVest if Vt inducible, until he is able to come off of antibiotics and have a dual chamber ICD implanted.  -f/u with his cardiologist Dr. Charan Mckeon after discharge    Wei Feliciano M.D.  Cardiac Electrophysiology  [phone]

## 2020-09-14 NOTE — PROVIDER CONTACT NOTE (CRITICAL VALUE NOTIFICATION) - ACTION/TREATMENT ORDERED:
Hepain nomogram followed. Hep gtt held for an hour than to resume at the new rate of 1400units. MD aware and agrees.
MD aware &coming to assess pt. Will continue to monitor.
MD agrees to the heparin nomogram as follows: Hold heparin gtt being infused at 2200units  for 60 minutes and to resume hep gtt at new rate of 1800 unit per nomogram. Orders followed as approved
No new orders now. will continue to monitor the pt.

## 2020-09-14 NOTE — PROGRESS NOTE ADULT - PROBLEM SELECTOR PLAN 2
- Will augment medical therapy/afterload reduction as above  - Structural heart following  - No significant v-wave on PCW tracing suggesting MR has sig improved with afterload reduction and diuresis

## 2020-09-14 NOTE — CONSULT NOTE ADULT - NSHPATTENDINGPLANDISCUSS_GEN_ALL_CORE
team
SAILAJA (Dr. Duarte)
cardiology/Dr. Dodge, heart failure/Dr. Arredondo and structural heart disease study

## 2020-09-14 NOTE — CONSULT NOTE ADULT - SUBJECTIVE AND OBJECTIVE BOX
[facility]   MD YENNY PIERCE DO ANAM SIDDIQUI ANGELA WONG, PA    TEL:  OFFICE: [phone]  DR BARNHART CELL: [phone]  DR. STEVENSON CELL: [phone]  DR. GOODWIN CELL: [phone]  YOANNA CUNNINGHAM CELL: [phone]    From 5pm-7am answering service [phone]    HPI:  64 yo male with PMH significant for CAD (s/p CABG; most recent LHC showed stable disease with no intervention), CHF (LVEF 39% TTE 2020), Afib (on Eliquis), HTN, HLD, CKD III, CHER (uses CPAP), and pemphigus vulgaris presenting to ED with RLE swelling . Found to be septic in ED (hypotension, leukocytosis, elevated lactate, febrile) likely RLE cellulitis + bacteremia. Visit complicated by acute CHF exacerbation on  for which RRT was called and runs of VT on tele. S/p CCU visit and aggressive diuresis. Transferred back to floors on . s/p CHANDRA today  nephrology consulted for renal failure. pt follows up with dr. barnhart in office       Allergies:  No Known Allergies      PAST MEDICAL & SURGICAL HISTORY:  CHF (congestive heart failure)    Psoriasis    HLD (hyperlipidemia)    CAD (coronary artery disease)  3 stents    Hypertension    History of coronary artery stent placement  1 stent , 2 stents 2009 in [facility]    S/P quadruple vessel bypass  2006        Home Medications Reviewed    Hospital Medications:   MEDICATIONS  (STANDING):  aspirin  chewable 81 milliGRAM(s) Oral daily  atorvastatin 80 milliGRAM(s) Oral at bedtime  carvedilol 12.5 milliGRAM(s) Oral two times a day  ceFAZolin   IVPB 2000 milliGRAM(s) IV Intermittent every 12 hours  furosemide    Tablet 40 milliGRAM(s) Oral daily  heparin  Infusion. 1400 Unit(s)/Hr (14 mL/Hr) IV Continuous <Continuous>  hydrALAZINE 100 milliGRAM(s) Oral three times a day  isosorbide   dinitrate Tablet (ISORDIL) 40 milliGRAM(s) Oral three times a day  LORazepam     Tablet 0.5 milliGRAM(s) Oral at bedtime  nystatin Powder 1 Application(s) Topical two times a day  pantoprazole    Tablet 40 milliGRAM(s) Oral two times a day  predniSONE   Tablet 20 milliGRAM(s) Oral daily  sertraline 50 milliGRAM(s) Oral daily  spironolactone 25 milliGRAM(s) Oral daily      SOCIAL HISTORY:  Denies ETOh, Smoking,     FAMILY HISTORY:  Family history of cerebrovascular accident (CVA)    Family history of early CAD  father who  of CVA.        REVIEW OF SYSTEMS:  CONSTITUTIONAL: No weakness, fevers or chills  EYES/ENT: No visual changes;  No vertigo or throat pain   NECK: No pain or stiffness  RESPIRATORY: No cough, wheezing, hemoptysis; No shortness of breath  CARDIOVASCULAR: No chest pain or palpitations.  GASTROINTESTINAL: No abdominal or epigastric pain. No nausea, vomiting, or hematemesis; No diarrhea or constipation. No melena or hematochezia.  GENITOURINARY: No dysuria, frequency, foamy urine, urinary urgency, incontinence or hematuria  NEUROLOGICAL: No numbness or weakness  SKIN: No itching, burning, rashes, or lesions   VASCULAR: No bilateral lower extremity edema.   All other review of systems is negative unless indicated above.    VITALS:  T(F): 97.8 (20 @ 15:33), Max: 98.1 (20 @ 20:27)  HR: 77 (20 @ 17:14)  BP: 145/71 (20 @ 17:14)  RR: 18 (20 @ 15:33)  SpO2: 95% (20 @ 15:36)  Wt(kg): --     @ 07:  -   @ 07:00  --------------------------------------------------------  IN: 1224 mL / OUT: 2900 mL / NET: -1676 mL     @ 07:01  -   @ 17:40  --------------------------------------------------------  IN: 0 mL / OUT: 600 mL / NET: -600 mL      Height (cm): 172.7 ( @ 12:47)  Weight (kg): 121 ( @ 12:47)  BMI (kg/m2): 40.6 ( @ 12:47)  BSA (m2): 2.31 ( @ 12:47)    PHYSICAL EXAM:  Constitutional: NAD  HEENT: anicteric sclera, oropharynx clear, MMM  Neck: No JVD  Respiratory: CTAB, no wheezes, rales or rhonchi  Cardiovascular: S1, S2, RRR  Gastrointestinal: BS+, soft, NT/ND  Extremities: No cyanosis or clubbing. No peripheral edema  Neurological: A/O x 3, no focal deficits  Psychiatric: Normal mood, normal affect  : No CVA tenderness. No parks.   Skin: No rashes  Vascular Access:    LABS:      138  |  101  |  40<H>  ----------------------------<  95  3.5   |  22  |  2.26<H>    Ca    9.1      14 Sep 2020 04:57  Phos  3.5       Mg     2.2           Creatinine Trend: 2.26 <--, 2.08 <--, 2.22 <--, 2.27 <--, 2.51 <--, 2.20 <--, 2.62 <--, 2.55 <--, 2.64 <--                        9.4    12.10 )-----------( 213      ( 14 Sep 2020 04:57 )             32.7     Urine Studies:  Urinalysis Basic - ( 09 Sep 2020 20:40 )    Color: Light Yellow / Appearance: Clear / S.015 / pH:   Gluc:  / Ketone: Negative  / Bili: Negative / Urobili: <2 mg/dL   Blood:  / Protein: Negative / Nitrite: Negative   Leuk Esterase: Negative / RBC:  / WBC    Sq Epi:  / Non Sq Epi:  / Bacteria:           RADIOLOGY & ADDITIONAL STUDIES:

## 2020-09-14 NOTE — PROGRESS NOTE ADULT - PROBLEM SELECTOR PLAN 1
- Please increase coreg to 18.75mg BID Separate from the hydral and ISDN to avoid hypotension. Hold SBP < 90 or HR < 55.  - Continue hydralazine 100 mg po TID; hold for SBP < 90  - Increase ISDN to 40 mg po TID; hold for SBP < 90  - Continue Lasix 40 mg PO daily & Spironolactone 25 mg po daily   - Deferring ARNI/MRA at this time due to renal dysfunction  - Continue 1.5L fluid restriction

## 2020-09-14 NOTE — PROGRESS NOTE ADULT - ATTENDING COMMENTS
65yrs, CAD. s/p CABG 2005. Javier LAD, SVG OM2, OM1. Most recent angiogram showed only LIMA patent.   LVEF 30-35%. Severe MR. Afib on eloquis. CKD Cr 2.3  Admitted 8.24: septic shock from cellulitis. SOB, hypoxia. ADHF.  CCU for VT.   CHANDRA 9.1: tethered MV with severe MR. mod LV dysfunction. paradoxical septal. normal RV size function. severe mR. mod AI. mild TR.   TTE: 9.8: LVEF 35%, normal RV. mod MR. mild mod AI. LVEDD 5.9  Viablility LVEF 47%. dilated. viability apex, inf, septal, ant lat. mid to basal inf lat.  Diuresed 30lb.   Staph aureus bacteremia 9.9. 1 X bottle. cefazolin IV. f/u cultures negative.   CHANDRA 9.14: mild LV dysfunction. normal RV. mod MR with controlled BP. mild-mod AI. dilated 5.0 cm distal to Av. normal root. athermo in aortic arch and descending.   meds; coreg 12.5 bid, HDZN 100 tid, ISDN 40 tid, lasix 40, asa, atorva 80. pred 20 (pemphus). cefazolin, heparin ().   afeb, 70-110SR, 112/-137/,   I/O: -1300  09-14    138  |  101  |  40<H>  ----------------------------<  95  3.5   |  22  |  2.26<H>  Cr 2.26, 2.0, 2.2 (admission 2.3)   Ca    9.1      14 Sep 2020 04:57  Phos  3.5     09-14  Mg     2.2     09-14                          9.4    12.10 )-----------( 213      ( 14 Sep 2020 04:57 )             32.7 65yrs, CAD. s/p CABG 2005. Javier LAD, SVG OM2, OM1. Most recent angiogram showed only LIMA patent.   LVEF 30-35%. Severe MR. Afib on eloquis. CKD Cr 2.3  Admitted 8.24: septic shock from cellulitis. SOB, hypoxia. ADHF.  CCU for VT.   CHANDRA 9.1: tethered MV with severe MR. mod LV dysfunction. paradoxical septal. normal RV size function. severe mR. mod AI. mild TR.   TTE: 9.8: LVEF 35%, normal RV. mod MR. mild mod AI. LVEDD 5.9  Viablility LVEF 47%. dilated. viability apex, inf, septal, ant lat. mid to basal inf lat.  Diuresed 30lb.   Staph aureus bacteremia 9.9. 1 X bottle. cefazolin IV. f/u cultures negative.   CHANDRA 9.14: mild LV dysfunction. normal RV. mod MR with controlled BP. mild-mod AI. dilated 5.0 cm distal to Av. normal root. athermo in aortic arch and descending.   meds; coreg 12.5 bid, HDZN 100 tid, ISDN 40 tid, lasix 40, asa, atorva 80. pred 20 (pemphus). cefazolin, heparin ().   afeb, 70-110SR, 112/-137/,   I/O: -1300  09-14    138  |  101  |  40<H>  ----------------------------<  95  3.5   |  22  |  2.26<H>  Cr 2.26, 2.0, 2.2 (admission 2.3)   Ca    9.1      14 Sep 2020 04:57  Phos  3.5     09-14  Mg     2.2     09-14                          9.4    12.10 )-----------( 213      ( 14 Sep 2020 04:57 )             32.7  Stable from a HF perspective. No vegetations, MR and LV function improved.   Increase coreg 18.75 bid.  d/c planning for carmen and close f/u Dr Leiva.  Should return to [facility] as outpatient: can steriod be weaned.   Tobias Aguilar

## 2020-09-14 NOTE — PROGRESS NOTE ADULT - PROBLEM SELECTOR PLAN 8
-s/p vanc - improved  -Wound care saw patient (8/25). Redressed wound. Recommended plastics consult.   -Plastics consulted 8/26. Recommended local wound care and outpatient follow up (Dr. Marquez [phone])

## 2020-09-14 NOTE — PROVIDER CONTACT NOTE (CRITICAL VALUE NOTIFICATION) - SITUATION
aptt 131.7
Blood culture drawn on 8/27/2020 Positive on preliminary result- growth in aerobic bottle , gram positive cocci in clusters.
aptt 135
blood cx done 8/27/20 preliminary  growth in anaerobic bottle gram + cocci in clusters
lab called with critical result: Blood culture 09/09: gram positive cocci in clusters in aerobic bottle

## 2020-09-14 NOTE — PROGRESS NOTE ADULT - PROBLEM SELECTOR PLAN 4
-Initially admitted with sepsis 2/2 RLE cellulitis, s/p 7 day course of vancomycin with resolution.   -Was febrile on 9/9 with bcx being positive for MSSA  -ID consulted: c/w Ancef for MSSA bacteremia; needs 4 weeks from negative bcx   -repeat BCx 9/10: NGTD   -Pending PICC line placement. Consulted IR and Nephro due to NARCISO. Consent signed and in pt chart. -Initially admitted with sepsis 2/2 RLE cellulitis, s/p 7 day course of vancomycin with resolution.   -Was febrile on 9/9 with bcx being positive for MSSA  -ID consulted: c/w Ancef for MSSA bacteremia; needs 4 weeks from negative bcx   -repeat BCx 9/10: NGTD   -IR consulted for PICC line. Nephro recommending Roth catheter due to worsening kidney function. Re-consulted IR. Will speak to ID.

## 2020-09-14 NOTE — PROGRESS NOTE ADULT - ATTENDING COMMENTS
Orion Ramirez  Attending Physician   Division of Infectious Disease  Pager #[phone]  After 5pm/weekend or no response, call #[phone]

## 2020-09-14 NOTE — PROVIDER CONTACT NOTE (CRITICAL VALUE NOTIFICATION) - RECOMMENDATIONS
MD notified of the critical value
MD notified of the recommendation
Will continue to monitor the patient.

## 2020-09-14 NOTE — CONSULT NOTE ADULT - CONSULT REQUESTED DATE/TIME
02-Sep-2020
02-Sep-2020 14:49
11-Sep-2020 11:48
14-Sep-2020 09:05
14-Sep-2020 17:40
25-Aug-2020 13:04
26-Aug-2020 13:07
31-Aug-2020 14:09

## 2020-09-14 NOTE — PROGRESS NOTE ADULT - ASSESSMENT
ASSESSMENT/RECOMMENDATIONS:  66 yo male with PMH significant for CAD (s/p CABG; most recent LHC showed stable disease with no intervention), CHF (LVEF 39% TTE 1/2020), Afib (on Eliquis), HTN, HLD, CKD III, CHER (uses CPAP), and pemphigus vulgaris presenting to ED with RLE swelling. Found to be septic in ED (hypotension, leukocytosis, elevated lactate, febrile). S/p Vanc/zosyn, 2L IVF in ED. Visit complicated by acute CHF exacerbation on 8/27 for which RRT was called and runs of VT on tele. S/p CCU visit and aggressive diuresis. Transferred back to floors on 8/28. Pending CHANDRA per multiple cardiology teams.    Pt admitted for RLE cellulitis and fever=100.6 on 8/24. He is treated with 7 days of IV vancomycin, the RLE cellulitis is now resolved.  His course was complicated by cardiac complications, including RRT and transferred to CCU for diuretics. Currently he has no SOB or cough.  He spiked fever T=100.6 on 9/9 with chills. BCx drawn, now resulted to be MSSA, on Cefazolin now.  On exam, NAD, oral no obvious infection, no phlebitis, no skin breakdown, no cellulitis, lungs are clear, no murmur.   WBC downtrending now, from 12(9/9) to 9(9/11)  Cr>2  Possible source is left forearm IV phlebitis, it's removed and getting better now    #MSSA bacteremia:  - cont Cefazolin to 2g q12h  - Follow up repeat BCx (9/11)  - TTE 9/8 no vegatation  - planned CHANDRA/right heart cath  - 4 weeks iv abx

## 2020-09-14 NOTE — CONSULT NOTE ADULT - PROVIDER SPECIALTY LIST ADULT
Electrophysiology
Cardiology
Heart Failure
Structural Heart
Infectious Disease
Intervent Radiology
Nephrology
Wound Care

## 2020-09-14 NOTE — CONSULT NOTE ADULT - ASSESSMENT
64 yo male with PMH significant for CAD (s/p CABG; most recent LHC showed stable disease with no intervention), CHF (LVEF 39% TTE 1/2020), Afib (on Eliquis), HTN, HLD, CKD III, CHER (uses CPAP), and pemphigus vulgaris presenting to ED with RLE swelling 8/24. Found to be septic in ED (hypotension, leukocytosis, elevated lactate, febrile) likely RLE cellulitis + bacteremia. Visit complicated by acute CHF exacerbation on 8/27 for which RRT was called and runs of VT on tele. S/p CCU visit and aggressive diuresis. Transferred back to floors on 8/28. s/p CHANDRA today    CKD (chronic kidney disease) stage 3-4  baseline likely ~ 2.2-2.5  renal function fluctuating slightly sec to chf  renal function stable  avoid nephrotoxic agents  monitor bmp      Essential hypertension.    controlled  monitor    MSSA bacteremia:  on Cefazolin to 2g q12h  will need PICC for discharge planning  patient is CKD 4, recommending carmen catheter to perserv vasculature for future dialysis access     CKD-MBD  check pth level  monitor phos and calcium daily

## 2020-09-14 NOTE — PROGRESS NOTE ADULT - SUBJECTIVE AND OBJECTIVE BOX
S: No complaints. Denies chest pain or SOB. Review of systems otherwise (-)      MEDICATIONS  (STANDING):  aspirin  chewable 81 milliGRAM(s) Oral daily  atorvastatin 80 milliGRAM(s) Oral at bedtime  carvedilol 12.5 milliGRAM(s) Oral two times a day  ceFAZolin   IVPB 2000 milliGRAM(s) IV Intermittent every 12 hours  furosemide    Tablet 40 milliGRAM(s) Oral daily  hydrALAZINE 100 milliGRAM(s) Oral three times a day  isosorbide   dinitrate Tablet (ISORDIL) 40 milliGRAM(s) Oral three times a day  LORazepam     Tablet 0.5 milliGRAM(s) Oral at bedtime  nystatin Powder 1 Application(s) Topical two times a day  pantoprazole    Tablet 40 milliGRAM(s) Oral two times a day  predniSONE   Tablet 20 milliGRAM(s) Oral daily  sertraline 50 milliGRAM(s) Oral daily  spironolactone 25 milliGRAM(s) Oral daily    MEDICATIONS  (PRN):  acetaminophen   Tablet .. 650 milliGRAM(s) Oral every 6 hours PRN Temp greater or equal to 38C (100.4F)      LABS:                            9.4    12.10 )-----------( 213      ( 14 Sep 2020 04:57 )             32.7     Hemoglobin: 9.4 g/dL (09-14 @ 04:57)  Hemoglobin: 9.8 g/dL (09-13 @ 21:25)  Hemoglobin: 10.5 g/dL (09-13 @ 11:50)  Hemoglobin: 10.0 g/dL (09-12 @ 09:33)  Hemoglobin: 9.5 g/dL (09-11 @ 05:51)    09-14    138  |  101  |  40<H>  ----------------------------<  95  3.5   |  22  |  2.26<H>    Ca    9.1      14 Sep 2020 04:57  Phos  3.5     09-14  Mg     2.2     09-14      Creatinine Trend: 2.26<--, 2.08<--, 2.22<--, 2.27<--, 2.51<--, 2.20<--   PTT - ( 14 Sep 2020 08:56 )  PTT:76.1 sec          09-13-20 @ 07:01  -  09-14-20 @ 07:00  --------------------------------------------------------  IN: 1224 mL / OUT: 2900 mL / NET: -1676 mL    09-14-20 @ 07:01  -  09-14-20 @ 14:18  --------------------------------------------------------  IN: 0 mL / OUT: 600 mL / NET: -600 mL        PHYSICAL EXAM  Vital Signs Last 24 Hrs  T(C): 36.6 (14 Sep 2020 12:47), Max: 36.7 (13 Sep 2020 20:27)  T(F): 97.8 (14 Sep 2020 11:16), Max: 98.1 (13 Sep 2020 20:27)  HR: 63 (14 Sep 2020 12:47) (61 - 88)  BP: 117/73 (14 Sep 2020 12:47) (112/67 - 137/73)  BP(mean): --  RR: 18 (14 Sep 2020 12:47) (18 - 20)  SpO2: 97% (14 Sep 2020 12:47) (96% - 99%)      Gen: NAD  HEENT:  (-)icterus (-)pallor  CV: N S1 S2 1/6 KATHERYN (+)2 Pulses B/l  Resp: CTA B/L, normal effort  GI: (+) BS Soft, NT, ND  Lymph:  (-) edema, (-)obvious lymphadenopathy  Skin: Warm to touch, Normal turgor  Psych: Appropriate mood and affect      TELEMETRY: SB/SR 50-90    Echo: < from: TTE with Doppler (w/Cont) (08.27.20 @ 07:54) >  Conclusions:  1. Mitral valve not well visualized. Mitral annular  calcification. Tethered mitral valve leaflets with normal  opening. Mild moderate mitral regurgitation.  2. Calcified trileaflet aortic valve with decreased  opening. Peak transaortic valve gradient equals 12 mm Hg,  mean transaortic valve gradient equals 8 mm Hg, at least  mild AS. Limited gradient/Doppler evaluation. Mild-moderate  aortic regurgitation.  3. Endocardial visualization enhanced with intravenous  injection of Ultrasonic Enhancing Agent (Definity).  Moderate to severe  global left ventricular systolic  dysfunction. There dyssynchronous left ventricular  contraction.  4. Moderate diastolic dysfunction (Stage II). Increased  E/e'  is consistent with elevated left ventricular filling  pressure.  *** Compared with echocardiogram report of 12/13/2017,  Limited comparison.  Disatolic dysfunction is worse.    < end of copied text >    < from: Transesophageal Echocardiogram w/o TTE (09.01.20 @ 14:15) >  Conclusions:  1. Tethered mitral valve leaflets with normal opening.  Severe mitral regurgitation. There is systolic flow  reversal in the right upper pulmonary vein. By PISA,  calculated ERO=43 mmsq and regurgitant volume=73 mL (Pisa  rad 1cm, Va 34.5 cm/s, Vm 5.0 m/s,  cm)  2. Calcified trileaflet aortic valve with normal opening.  Moderate aortic regurgitation. Vena contracta=0.4 cm.  3. Normal aortic root size. (Ao: 3.6 cm at the sinuses of  Valsalva). Midlly dilated ascending aorta for BSA  (ascending aorta diameter 4.5 cm approximately 5 cm distal  to the aortic valve.  Normal size aortic arch, and  descending thoracic aorta.  Minimal atheroma.  4. Left atrial enlargement. No left atrial or left atrial  appendage thrombus. Normal left atrial appendage function  (maximum velocity>40 cm/s).  5. Moderate left ventricular systolic dysfunction.  Paradoxical septal motion consistent with prior cardiac  surgery.  6. Normal right ventricular size and function.    < end of copied text >      < from: Cardiac Viability (09.04.20 @ 14:15) >  GATED ANALYSIS:  Rest gated wall motion analysis was performed (LVEF = 47  %;LVEDV = 195 ml.), revealing moderately reduced  LV  function.  ------------------------------------------------------------------------  IMPRESSIONS:Abnormal Study  * The left ventricle was markdely dilated at baseline.  There is viability in the apex, inferior, septal,  anterolateral, and mid to basal inferolateral walls. On  delayed imaging, there is appears to be partial viability  in the distal inferolateral wall.  * Rest gated wall motion analysis was performed (LVEF = 47  %;LVEDV = 195 ml.), revealing moderately reduced  LV  function.  Conclusion:  The left ventricle was markdely dilated at baseline. There  is viability in the apex, inferior, septal, anterolateral,  and mid to basal inferolateral walls. On delayed imaging,  there also appears to be partial viability in the distal  inferolateral wall.  ------------------------------------------------------------------------  Confirmed on  9/4/2020 - 16:56:37 by Beto Norton M.D.    < end of copied text >      ASSESSMENT/PLAN: Patient is a 65 year old male with PMH of CAD s/p CABG with most recent LHC 1/2020 with no intervention, chronic systolic CHF (LVEF 39% TTE 1/2020), Afib (on Eliquis), HTN, HLD, CKD, CHER (on CPAP), and pemphigus vulgaris who presented with RLE swelling admitted with RLE cellulitis and NARCISO on CKD. Cardiology consulted for management of CAD/CHF.    - Heart failure follow up - Continue PO lasix - daily standing weights, strict I/Os  - Continue medical management of known CAD with systolic cardiomyopathy - ASA/Statin, Coreg, Hydralazine/Isordil  - AC with hep gtt for CVA prevention while Eliquis on hold for procedures - restart when procedures completed and ok with primary team   - EP follow up  - Previous CHANDRA with severe MR  - Structural heart eval follow up/ CHANDRA noted/ viability study results noted/ await surgical plan - CTS f/u with Dr. Pena  - Fever workup/Abx per medicine - BCx with staph, repeat negative, pending PICC  - RHC performed  - NPO for CHANDRA today  - Patient to f/u with Dr. Campbell's cardiology group after d/c      Cristi Jarvis PA-C  Pager: [phone]

## 2020-09-14 NOTE — PROGRESS NOTE ADULT - PROBLEM SELECTOR PLAN 8
- Started on Cefazolin by ID with plans for 4 weeks of IV therapy  - Will need PICC/Roth line for outpatient Abx  - CHANDRA without evidence of endocarditis. He does not have an ICD

## 2020-09-14 NOTE — PROGRESS NOTE ADULT - SUBJECTIVE AND OBJECTIVE BOX
Subjective:    Medications:  acetaminophen   Tablet .. 650 milliGRAM(s) Oral every 6 hours PRN  aspirin  chewable 81 milliGRAM(s) Oral daily  atorvastatin 80 milliGRAM(s) Oral at bedtime  carvedilol 12.5 milliGRAM(s) Oral two times a day  ceFAZolin   IVPB 2000 milliGRAM(s) IV Intermittent every 12 hours  furosemide    Tablet 40 milliGRAM(s) Oral daily  hydrALAZINE 100 milliGRAM(s) Oral three times a day  isosorbide   dinitrate Tablet (ISORDIL) 40 milliGRAM(s) Oral three times a day  LORazepam     Tablet 0.5 milliGRAM(s) Oral at bedtime  nystatin Powder 1 Application(s) Topical two times a day  pantoprazole    Tablet 40 milliGRAM(s) Oral two times a day  predniSONE   Tablet 20 milliGRAM(s) Oral daily  sertraline 50 milliGRAM(s) Oral daily      Physical Exam:    Vitals:  Vital Signs Last 24 Hours  T(C): 36.6 (20 @ 11:16), Max: 36.7 (20 @ 20:27)  HR: 63 (20 @ 11:16) (60 - 88)  BP: 117/73 (20 @ 11:16) (112/67 - 137/73)  RR: 18 (20 @ 11:16) (18 - 20)  SpO2: 97% (20 @ 11:16) (96% - 99%)    Weight in k.8 ( @ 08:00)    I&O's Summary    13 Sep 2020 07:  -  14 Sep 2020 07:00  --------------------------------------------------------  IN: 1224 mL / OUT: 2900 mL / NET: -1676 mL    14 Sep 2020 07:  -  14 Sep 2020 11:32  --------------------------------------------------------  IN: 0 mL / OUT: 600 mL / NET: -600 mL        Tele:    General: No distress. Comfortable.  HEENT: EOM intact.  Neck: Neck supple. JVP not elevated. No masses  Chest: Clear to auscultation bilaterally  CV: Normal S1 and S2. No murmurs, rub, or gallops. Radial pulses normal.  Abdomen: Soft, non-distended, non-tender  Skin: No rashes or skin breakdown  Neurology: Alert and oriented times three. Sensation intact  Psych: Affect normal    Labs:                        9.4    12.10 )-----------( 213      ( 14 Sep 2020 04:57 )             32.7     09-14    138  |  101  |  40<H>  ----------------------------<  95  3.5   |  22  |  2.26<H>    Ca    9.1      14 Sep 2020 04:57  Phos  3.5     -14  Mg     2.2     09-14      PTT - ( 14 Sep 2020 08:56 )  PTT:76.1 sec               Subjective:  - Overall feeling well, eager to go home  - s/p CHANDRA today  - Denies SOB    Medications:  acetaminophen   Tablet .. 650 milliGRAM(s) Oral every 6 hours PRN  aspirin  chewable 81 milliGRAM(s) Oral daily  atorvastatin 80 milliGRAM(s) Oral at bedtime  carvedilol 12.5 milliGRAM(s) Oral two times a day  ceFAZolin   IVPB 2000 milliGRAM(s) IV Intermittent every 12 hours  furosemide    Tablet 40 milliGRAM(s) Oral daily  hydrALAZINE 100 milliGRAM(s) Oral three times a day  isosorbide   dinitrate Tablet (ISORDIL) 40 milliGRAM(s) Oral three times a day  LORazepam     Tablet 0.5 milliGRAM(s) Oral at bedtime  nystatin Powder 1 Application(s) Topical two times a day  pantoprazole    Tablet 40 milliGRAM(s) Oral two times a day  predniSONE   Tablet 20 milliGRAM(s) Oral daily  sertraline 50 milliGRAM(s) Oral daily      Physical Exam:    Vitals:  Vital Signs Last 24 Hours  T(C): 36.6 (20 @ 11:16), Max: 36.7 (20 @ 20:27)  HR: 63 (20 @ 11:16) (60 - 88)  BP: 117/73 (20 @ 11:16) (112/67 - 137/73)  RR: 18 (20 @ 11:16) (18 - 20)  SpO2: 97% (20 @ 11:16) (96% - 99%)    Weight in k.8 ( @ 08:00)    I&O's Summary    13 Sep 2020 07:  -  14 Sep 2020 07:00  --------------------------------------------------------  IN: 1224 mL / OUT: 2900 mL / NET: -1676 mL    14 Sep 2020 07  -  14 Sep 2020 11:32  --------------------------------------------------------  IN: 0 mL / OUT: 600 mL / NET: -600 mL    Tele: SR     General: No distress. Comfortable.  HEENT: EOM intact.  Neck: Neck supple. JVP not elevated. No masses  Chest: Clear to auscultation bilaterally  CV: Regular. Normal S1 and S2. No murmurs, rub, or gallops. Radial pulses normal. No LE edema  Abdomen: Soft, non-distended, non-tender  Skin: No rashes or skin breakdown  Neurology: Alert and oriented times three. Sensation intact  Psych: Affect normal    Labs:                        9.4    12.10 )-----------( 213      ( 14 Sep 2020 04:57 )             32.7     09-14    138  |  101  |  40<H>  ----------------------------<  95  3.5   |  22  |  2.26<H>    Ca    9.1      14 Sep 2020 04:57  Phos  3.5     -14  Mg     2.2     09-14      PTT - ( 14 Sep 2020 08:56 )  PTT:76.1 sec

## 2020-09-14 NOTE — PROGRESS NOTE ADULT - PROBLEM SELECTOR PLAN 4
- Reasonably controlled, but with room to further optimize GDMT for HF/MR  - Increase in coreg as above

## 2020-09-14 NOTE — PROVIDER CONTACT NOTE (CRITICAL VALUE NOTIFICATION) - ASSESSMENT
Patient alert and oriented x 4. On heparin gtt pending CHANDRA in am
Patient alert and oriented x 4. VSS. Denies CP/SOB/Palpitations. Aptt resulted at 135. Heparin nomogram recommendations followed.
Pt is A&Ox4 denies pain/ discomfort/ dizziness/ SOB/ chills/ weakness. Pt has been afebrile
VSS. Patient A & O x 4. No fever. no discomfort voiced.
pt is alert and oriented with no c/o pain. pt afebrile overnight see v/s flowsheet

## 2020-09-14 NOTE — CONSULT NOTE ADULT - ASSESSMENT
Assessment/Plan:   65y Male with CAD s/p CABG, CHF (LVEF 39% TTE 1/2020), Afib (on Eliquis), HTN, HLD, CKD III, CHER (uses CPAP), and pemphigus vulgaris here with MSSA bacteremia requiring long term abx referred for central venous access.  - Plan for tunneled central venous catheter placement 9/15  - NPO after midnight.  - Hold heparin gtt in AM prior to procedure  - Place IR procedure order    Discussed with Dr. Blake, IR Attending.

## 2020-09-14 NOTE — PROGRESS NOTE ADULT - SUBJECTIVE AND OBJECTIVE BOX
THONY ANAYA 65y [id number]    Patient is a 65y old  Male who presents with a chief complaint of RLE swelling (14 Sep 2020 11:32)      Follow Up/CC:  ID following for    Interval History/ROS:    Allergies    No Known Allergies    Intolerances        ANTIMICROBIALS:  ceFAZolin   IVPB 2000 every 12 hours      MEDICATIONS  (STANDING):  aspirin  chewable 81 milliGRAM(s) Oral daily  atorvastatin 80 milliGRAM(s) Oral at bedtime  carvedilol 12.5 milliGRAM(s) Oral two times a day  ceFAZolin   IVPB 2000 milliGRAM(s) IV Intermittent every 12 hours  furosemide    Tablet 40 milliGRAM(s) Oral daily  hydrALAZINE 100 milliGRAM(s) Oral three times a day  isosorbide   dinitrate Tablet (ISORDIL) 40 milliGRAM(s) Oral three times a day  LORazepam     Tablet 0.5 milliGRAM(s) Oral at bedtime  nystatin Powder 1 Application(s) Topical two times a day  pantoprazole    Tablet 40 milliGRAM(s) Oral two times a day  predniSONE   Tablet 20 milliGRAM(s) Oral daily  sertraline 50 milliGRAM(s) Oral daily    MEDICATIONS  (PRN):  acetaminophen   Tablet .. 650 milliGRAM(s) Oral every 6 hours PRN Temp greater or equal to 38C (100.4F)        Vital Signs Last 24 Hrs  T(C): 36.6 (14 Sep 2020 12:47), Max: 36.7 (13 Sep 2020 20:27)  T(F): 97.8 (14 Sep 2020 11:16), Max: 98.1 (13 Sep 2020 20:27)  HR: 63 (14 Sep 2020 12:47) (61 - 88)  BP: 117/73 (14 Sep 2020 12:47) (112/67 - 137/73)  BP(mean): --  RR: 18 (14 Sep 2020 12:47) (18 - 20)  SpO2: 97% (14 Sep 2020 12:47) (96% - 99%)    CBC Full  -  ( 14 Sep 2020 04:57 )  WBC Count : 12.10 K/uL  RBC Count : 4.04 M/uL  Hemoglobin : 9.4 g/dL  Hematocrit : 32.7 %  Platelet Count - Automated : 213 K/uL  Mean Cell Volume : 80.9 fl  Mean Cell Hemoglobin : 23.3 pg  Mean Cell Hemoglobin Concentration : 28.7 gm/dL  Auto Neutrophil # : x  Auto Lymphocyte # : x  Auto Monocyte # : x  Auto Eosinophil # : x  Auto Basophil # : x  Auto Neutrophil % : x  Auto Lymphocyte % : x  Auto Monocyte % : x  Auto Eosinophil % : x  Auto Basophil % : x    09-14    138  |  101  |  40<H>  ----------------------------<  95  3.5   |  22  |  2.26<H>    Ca    9.1      14 Sep 2020 04:57  Phos  3.5     09-14  Mg     2.2     09-14            MICROBIOLOGY:  .Blood Blood-Peripheral  09-10-20   No growth to date.  --  --      .Blood Blood-Venous  09-10-20   No growth to date.  --  --      .Urine Clean Catch (Midstream)  09-09-20   No growth  --  --      .Blood Blood-Peripheral  09-09-20   Growth in aerobic and anaerobic bottles: Staphylococcus aureus  "Due to technical problems, Proteus sp. will Not be reported as part of  the BCID panel until further notice"  ***Blood Panel PCR results on this specimen are available  approximately 3 hours after the Gram stain result.***  Gram stain, PCR, and/or culture results may not always  correspond due to difference in methodologies.  ************************************************************  This PCR assay was performed using [employer].  The following targets are tested for: Enterococcus,  vancomycin resistant enterococci, Listeria monocytogenes,  coagulase negative staphylococci, S. aureus,  methicillin resistant S. aureus, Streptococcus agalactiae  (Group B), S. pneumoniae, S. pyogenes (Group A),  Acinetobacter baumannii, Enterobacter cloacae, E. coli,  Klebsiella oxytoca, K. pneumoniae, Proteus sp.,  Serratia marcescens, Haemophilus influenzae,  Neisseria meningitidis, Pseudomonas aeruginosa, Candida  albicans, C. glabrata, C krusei, C parapsilosis,  C. tropicalis and the KPC resistance gene.  --  Blood Culture PCR  Staphylococcus aureus      .Blood Blood-Peripheral  08-31-20   No Growth Final  --  --      .Blood Blood-Peripheral  08-27-20   Growth in anaerobic bottle: Staphylococcus hominis Coag Negative  Staphylococcus  Single set isolate, possible contaminant. Contact  Microbiology if susceptibility testing clinically  indicated.  "Due to technical problems, Proteus sp. will Not be reported as part of  the BCID panel until further notice"  ***Blood Panel PCR results on this specimen are available  approximately 3 hours after the Gram stain result.***  Gram stain, PCR, and/or culture results may not always  correspond due to difference in methodologies.  ************************************************************  This PCR assay was performed using [employer].  The following targets are tested for: Enterococcus,  vancomycin resistant enterococci, Listeria monocytogenes,  coagulase negative staphylococci, S. aureus,  methicillin resistant S. aureus, Streptococcus agalactiae  (Group B), S. pneumoniae, S. pyogenes (Group A),  Acinetobacter baumannii, Enterobacter cloacae, E. coli,  Klebsiella oxytoca, K. pneumoniae, Proteus sp.,  Serratia marcescens, Haemophilus influenzae,  Neisseria meningitidis, Pseudomonas aeruginosa, Candida  albicans, C. glabrata, C krusei, C parapsilosis,  C. tropicalis and the KPC resistance gene.  --  Blood Culture PCR      .Urine Clean Catch (Midstream)  08-25-20   No growth  --  --      .Blood Blood-Peripheral  08-24-20   No Growth Final  --  --        COVID-19 Antibody - for prior infection screening (08.25.20 @ 09:39)   COVID-19 IgG Antibody Index: 4.15: [employer] CMIA   Negative Result <= 1.39 Index   Positive Result >= 1.40 Index Index   COVID-19 IgG Antibody Interpretation: Positive: This test has not been reviewed by the FDA by the standard review   Rapid RVP Result: NotDetec (09-09 @ 13:54)          RADIOLOGY    < from: Xray Chest 1 View- PORTABLE-Urgent (Xray Chest 1 View- PORTABLE-Urgent .) (09.09.20 @ 09:41) >  Median sternotomy     No pneumothorax. The lungs are clear.    < end of copied text >

## 2020-09-14 NOTE — PROVIDER CONTACT NOTE (CRITICAL VALUE NOTIFICATION) - BACKGROUND
Admittted  to ED with RLE swelling x 1 week. In ED, hypotensive and febrile. Elevated WBC's and lactate. S/p 2L IVF, IV Tylenol, and IV vanc/zosyn in ED.  Awaiting CHANDRA R/O pericarditis and PICC placement
Admitted for unspecified open wound of left upper arm.
Patient admitted for RLE swelling x 1 week. S/P 2L IVF, IV tylenol and IV Vanco/Zosyn. Pending CHANDRA and NPO afterMN
Pt admitted for cellulitis/ CHF. Pt has hx of CAD, CABG, A Fib
pt is here for cellulitis and currently on vanco IV therapy

## 2020-09-14 NOTE — PROGRESS NOTE ADULT - ATTENDING COMMENTS
Patient s/p CHANDRA on 9/14 with moderate to severe MR- will need recs from structural heart team. Cards/ CHF team following. MSSA bacteremia- on ancef x 4 weeks from negative blood culture 9/10. Wll need carmen catheter placed. PT consulted.    Salima Alanis D.O.  Hospitalist Pager # [phone]

## 2020-09-14 NOTE — PROGRESS NOTE ADULT - ASSESSMENT
63yo male with PMH significant for CAD (s/p CABG; most recent LHC showed stable disease with no intervention), CHF (LVEF 39% TTE 1/2020), Afib (on Eliquis), HTN, HLD, CKD III, CHER (uses CPAP), and pemphigus vulgaris presenting to ED with RLE swelling, admit for acute CHF exacerbation on 8/27, s/p RRT and CCU admission. Transferred back to floors on 8/28. Pend CHANDRA per multiple cardiology teams for MV clip/repeat CABG planning

## 2020-09-14 NOTE — PROGRESS NOTE ADULT - PROBLEM SELECTOR PLAN 1
Combined Systolic and diastolic Heart failure. TTE (1/2020): LVEF 39%, mod MR/AR  -8/27: RRT for flash pulmonary edema. Transferred to CCU for 1 day.   -TTE (8/27): Worsening diastolic dysfunction. EF 35-45%. mild/mod MR. mild AS. mild/mod AR. mod-severe global LV dysfunction.   -CHANDRA (9/1) showed severe MR, mod AR.   -Repeat TTE (9/8) showed EF 35% and grossly moderate mitral regurgitation  -RHC (9/11): RA , PA/46/27/35, PCW 22 (no sig v-wave), CO/CI 6.1/2.73, PA 62%, Ao 99%, /75/100. SVR 1193 dsc, PVR 2.13 Palacios  -C/w coreg 12.5mg BID (with AM/PM meals), spironolactone 25mg qday, hydralazing 100mg TID  -Heart failure recs: C/w Lasix 40mg po qday. Increase isordil to 40mg TID. Repeat CHANDRA scheduled for 9/14  -EP recs: will schedule EP study +/- ICD placement pending repeat CHANDRA. If pt has a redo CABG/MVR, they would also want ligation TRISTA appendage and to perform a MAZE.  -C/w strict I/O's, daily weights, and 1.5L fluid restriction Combined Systolic and diastolic Heart failure. TTE (1/2020): LVEF 39%, mod MR/AR  -8/27: RRT for flash pulmonary edema. Transferred to CCU for 1 day.   -TTE (8/27): Worsening diastolic dysfunction. EF 35-45%. mild/mod MR. mild AS. mild/mod AR. mod-severe global LV dysfunction.   -CHANDRA (9/1) showed severe MR, mod AR.   -Repeat TTE (9/8) showed EF 35% and grossly moderate mitral regurgitation  -RHC (9/11): RA , PA/46/27/35, PCW 22 (no sig v-wave), CO/CI 6.1/2.73, PA 62%, Ao 99%, /75/100. SVR 1193 dsc, PVR 2.13 [place]  -C/w coreg 12.5mg BID (with AM/PM meals), spironolactone 25mg qday, hydralazing 100mg TID  -Heart failure recs: C/w Lasix 40mg po qday. Increase isordil to 40mg TID. Repeat CHANDRA today  -EP recs: will schedule EP study +/- ICD placement pending  recs. If pt has a redo CABG/MVR, they would also want ligation TRISTA appendage and to perform a MAZE.  -C/w strict I/O's, daily weights, and 1.5L fluid restriction

## 2020-09-14 NOTE — PROGRESS NOTE ADULT - SUBJECTIVE AND OBJECTIVE BOX
**********************************************  Chiquis Duarte, PGY-1  Internal Medicine   [facility] Pager #: [phone]  **********************************************     Patient is a 65y old  Male who presents with a chief complaint of RLE swelling (13 Sep 2020 15:30)    SUBJECTIVE / OVERNIGHT EVENTS:     OBJECTIVE:  Vital Signs Last 24 Hrs  T(C): 36.4 (14 Sep 2020 04:40), Max: 37.2 (13 Sep 2020 11:17)  T(F): 97.6 (14 Sep 2020 04:40), Max: 98.9 (13 Sep 2020 11:17)  HR: 68 (14 Sep 2020 05:10) (58 - 81)  BP: 125/65 (14 Sep 2020 04:40) (112/67 - 137/73)  BP(mean): --  RR: 18 (14 Sep 2020 04:40) (18 - 20)  SpO2: 98% (14 Sep 2020 05:10) (96% - 99%)    I&O's Summary    12 Sep 2020 07:01  -  13 Sep 2020 07:00  --------------------------------------------------------  IN: 530 mL / OUT: 1525 mL / NET: -995 mL    13 Sep 2020 07:01  -  14 Sep 2020 06:43  --------------------------------------------------------  IN: 710 mL / OUT: 2300 mL / NET: -1590 mL      PHYSICAL EXAM:  GENERAL: NAD  HEAD:  Atraumatic, Normocephalic  CHEST/LUNG: Clear to ascultation bilaterally; No rales, rhonchi, wheezing, or rubs  HEART: Regular rate and rhythm; No murmurs, rubs, or gallops; no pitting edema on b/l LE  ABDOMEN: Soft, Nontender, Nondistended; normoactive bowel sounds  SKIN: No rashes or lesions  NERVOUS SYSTEM:  Alert & Oriented X3, no focal deficit      Labs:  CAPILLARY BLOOD GLUCOSE                              9.4    12.10 )-----------( 213      ( 14 Sep 2020 04:57 )             32.7     09-14    138  |  101  |  40<H>  ----------------------------<  95  3.5   |  22  |  2.26<H>    Ca    9.1      14 Sep 2020 04:57  Phos  3.5     09-14  Mg     2.2     09-14      PTT - ( 14 Sep 2020 04:57 )  PTT:131.7 sec          Imaging Personally Reviewed:    Consultant(s) Notes Reviewed:   Care Discussed with Consultants/Other Providers:    MEDICATIONS  (STANDING):  aspirin  chewable 81 milliGRAM(s) Oral daily  atorvastatin 80 milliGRAM(s) Oral at bedtime  carvedilol 12.5 milliGRAM(s) Oral two times a day  ceFAZolin   IVPB 2000 milliGRAM(s) IV Intermittent every 12 hours  furosemide    Tablet 40 milliGRAM(s) Oral daily  heparin  Infusion.  Unit(s)/Hr (22 mL/Hr) IV Continuous <Continuous>  hydrALAZINE 100 milliGRAM(s) Oral three times a day  isosorbide   dinitrate Tablet (ISORDIL) 40 milliGRAM(s) Oral three times a day  LORazepam     Tablet 0.5 milliGRAM(s) Oral at bedtime  nystatin Powder 1 Application(s) Topical two times a day  pantoprazole    Tablet 40 milliGRAM(s) Oral two times a day  predniSONE   Tablet 20 milliGRAM(s) Oral daily  sertraline 50 milliGRAM(s) Oral daily    MEDICATIONS  (PRN):  acetaminophen   Tablet .. 650 milliGRAM(s) Oral every 6 hours PRN Temp greater or equal to 38C (100.4F)  heparin   Injectable 93390 Unit(s) IV Push every 6 hours PRN For aPTT less than 40  heparin   Injectable 5000 Unit(s) IV Push every 6 hours PRN For aPTT between 40 - 57   **********************************************  Chiquis Duarte, PGY-1  Internal Medicine   [facility] Pager #: [phone]  **********************************************     Patient is a 65y old  Male who presents with a chief complaint of RLE swelling (13 Sep 2020 15:30)    SUBJECTIVE / OVERNIGHT EVENTS: NAEON. Pt NPO for CHANDRA today. Pt examined at bedside this AM, with no subjective complaints. Denies CP, palpitations, SOB, inc peripheral edema.     OBJECTIVE:  Vital Signs Last 24 Hrs  T(C): 36.4 (14 Sep 2020 04:40), Max: 37.2 (13 Sep 2020 11:17)  T(F): 97.6 (14 Sep 2020 04:40), Max: 98.9 (13 Sep 2020 11:17)  HR: 68 (14 Sep 2020 05:10) (58 - 81)  BP: 125/65 (14 Sep 2020 04:40) (112/67 - 137/73)  BP(mean): --  RR: 18 (14 Sep 2020 04:40) (18 - 20)  SpO2: 98% (14 Sep 2020 05:10) (96% - 99%)    I&O's Summary    12 Sep 2020 07:01  -  13 Sep 2020 07:00  --------------------------------------------------------  IN: 530 mL / OUT: 1525 mL / NET: -995 mL    13 Sep 2020 07:01  -  14 Sep 2020 06:43  --------------------------------------------------------  IN: 710 mL / OUT: 2300 mL / NET: -1590 mL      PHYSICAL EXAM:  GENERAL: NAD  HEAD:  Atraumatic, Normocephalic  CHEST/LUNG: Clear to ascultation bilaterally; No rales, rhonchi, wheezing, or rubs  HEART: Regular rate and rhythm; No murmurs, rubs, or gallops; no pitting edema on b/l LE  ABDOMEN: Soft, Nontender, Nondistended; normoactive bowel sounds  SKIN: No rashes or lesions  NERVOUS SYSTEM:  Alert & Oriented X3, no focal deficit      Labs:  CAPILLARY BLOOD GLUCOSE                              9.4    12.10 )-----------( 213      ( 14 Sep 2020 04:57 )             32.7     09-14    138  |  101  |  40<H>  ----------------------------<  95  3.5   |  22  |  2.26<H>    Ca    9.1      14 Sep 2020 04:57  Phos  3.5     09-14  Mg     2.2     09-14      PTT - ( 14 Sep 2020 04:57 )  PTT:131.7 sec          Imaging Personally Reviewed:    Consultant(s) Notes Reviewed:   Care Discussed with Consultants/Other Providers:    MEDICATIONS  (STANDING):  aspirin  chewable 81 milliGRAM(s) Oral daily  atorvastatin 80 milliGRAM(s) Oral at bedtime  carvedilol 12.5 milliGRAM(s) Oral two times a day  ceFAZolin   IVPB 2000 milliGRAM(s) IV Intermittent every 12 hours  furosemide    Tablet 40 milliGRAM(s) Oral daily  heparin  Infusion.  Unit(s)/Hr (22 mL/Hr) IV Continuous <Continuous>  hydrALAZINE 100 milliGRAM(s) Oral three times a day  isosorbide   dinitrate Tablet (ISORDIL) 40 milliGRAM(s) Oral three times a day  LORazepam     Tablet 0.5 milliGRAM(s) Oral at bedtime  nystatin Powder 1 Application(s) Topical two times a day  pantoprazole    Tablet 40 milliGRAM(s) Oral two times a day  predniSONE   Tablet 20 milliGRAM(s) Oral daily  sertraline 50 milliGRAM(s) Oral daily    MEDICATIONS  (PRN):  acetaminophen   Tablet .. 650 milliGRAM(s) Oral every 6 hours PRN Temp greater or equal to 38C (100.4F)  heparin   Injectable 43025 Unit(s) IV Push every 6 hours PRN For aPTT less than 40  heparin   Injectable 5000 Unit(s) IV Push every 6 hours PRN For aPTT between 40 - 57

## 2020-09-14 NOTE — PROGRESS NOTE ADULT - PROBLEM SELECTOR PLAN 6
-c/w Eliquis 5mg BID - Held for RHC and now pending PICC placement. Hep gtt started (9/13)   -C/w coreg 12.5mg BID (8/26)  -EKG with Afib with RVR during RRT on 8/27. Now NSR. Stable on tele. -c/w Eliquis 5mg BID - Held for RHC and now pending PICC placement. Hep gtt started (9/13). Held due to supratherapeutic PTT. will restart after CHANDRA.   -C/w coreg 12.5mg BID (8/26)  -EKG with Afib with RVR during RRT on 8/27. Now NSR. Stable on tele.

## 2020-09-14 NOTE — PROGRESS NOTE ADULT - PROBLEM SELECTOR PLAN 6
- remains elevated, but stable  - continue current medications Nausea and vomiting Small bowel obstruction

## 2020-09-14 NOTE — PROGRESS NOTE ADULT - ASSESSMENT
Mr. Colon is a 65 year old male with PMH significant for CAD (s/p CABG 2005 LIMA-LAD, SVG- OM2, SVG- OM1, LIMA to LAD only patient graft per cath 1/2020), ICM HFrEF (LVEF 30-35%), severe MR, Afib (on Eliquis), HTN, HLD, CKD III, CHER (on CPAP) who presented with septic shock 2/2 RLL cellulitis. He additionally had ADHF with shortness of breath and hypoxia requiring CCU admission for VT likely in the setting of volume overload and severe MR. He has diuresed nearly 30lbs since admission. He underwent CHANDRA on 9/1 that showed significantly tethered MV with severe MR. CTX following for his MR with consideration for redo CABG. He underwent cardiac viability study on 9/4 that suggested fair amount of viability but notably with improvement in LVEF to 47%. No evidence of volume expansion, although exam is challenging due to his body habitus. He is hemodynamically stable but with persistent renal dysfunction. Now on oral diuretics. Blood cultures from 9/11 negative on IV antibiotic therapy. CHANDRA done today shows mild LV systolic dysfuncion, nl RV size and function, moderate MR when BP well controlled, mild-mod AR. He is still hypertensive at times with SBP into the 140s.     9/11 RHC: RA , PA/46/27/35, PCW 22 (no sig v-wave), CO/CI 6.1/2.73, PA 62%, Ao 99%, /75/100. SVR 1193 dsc, PVR 2.13 [place]acios.

## 2020-09-14 NOTE — PROGRESS NOTE ADULT - PROBLEM SELECTOR PLAN 5
- Currently SR  - Continue to hold Eliquis for placement of Roth line.  - Please discuss if bridging therapy is needed with primary cardiologist; patient denies any history of stroke or embolic event

## 2020-09-14 NOTE — CONSULT NOTE ADULT - SUBJECTIVE AND OBJECTIVE BOX
Vascular & Interventional Radiology Consult Note    Evaluate for Procedure: Tunneled central venous catheter placement.    HPI: 65y Male with CAD (s/p CABG; most recent LHC showed stable disease with no intervention), CHF (LVEF 39% TTE 1/2020), Afib (on Eliquis), HTN, HLD, CKD III, CHER (uses CPAP), and pemphigus vulgaris presenting to ED with RLE swelling 8/24. Found to be septic in ED (hypotension, leukocytosis, elevated lactate, febrile) likely RLE cellulitis + MSSA bacteremia requiring long term abx referred for central venous access.    Allergies:   Medications (Abx/Cardiac/Anticoagulation/Blood Products)    aspirin  chewable: 81 milliGRAM(s) Oral (09-14 @ 11:18)  carvedilol: 12.5 milliGRAM(s) Oral (09-13 @ 05:42)  carvedilol: 12.5 milliGRAM(s) Oral (09-14 @ 17:18)  ceFAZolin   IVPB: 100 mL/Hr IV Intermittent (09-14 @ 11:17)  furosemide    Tablet: 40 milliGRAM(s) Oral (09-14 @ 05:35)  heparin  Infusion.: 1400 Unit(s)/Hr IV Continuous (09-14 @ 05:32)  heparin  Infusion.: 1400 Unit(s)/Hr IV Continuous (09-14 @ 16:12)  hydrALAZINE: 100 milliGRAM(s) Oral (09-14 @ 17:17)  hydrALAZINE: 85 milliGRAM(s) Oral (09-13 @ 05:41)  isosorbide   dinitrate Tablet (ISORDIL): 30 milliGRAM(s) Oral (09-13 @ 14:08)  isosorbide   dinitrate Tablet (ISORDIL): 40 milliGRAM(s) Oral (09-14 @ 17:17)  spironolactone: 25 milliGRAM(s) Oral (09-14 @ 05:35)    Data:  172.7  121  T(C): 36.6  HR: 77  BP: 145/71  RR: 18  SpO2: 95%    -WBC 12.10 / HgB 9.4 / Hct 32.7 / Plt 213  -Na 138 / Cl 101 / BUN 40 / Glucose 95  -K 3.5 / CO2 22 / Cr 2.26  -ALT -- / Alk Phos -- / T.Bili --

## 2020-09-15 LAB
ANION GAP SERPL CALC-SCNC: 15 MMOL/L — SIGNIFICANT CHANGE UP (ref 5–17)
APTT BLD: 50.3 SEC — HIGH (ref 27.5–35.5)
APTT BLD: 92.9 SEC — HIGH (ref 27.5–35.5)
BASOPHILS # BLD AUTO: 0.06 K/UL — SIGNIFICANT CHANGE UP (ref 0–0.2)
BASOPHILS NFR BLD AUTO: 0.5 % — SIGNIFICANT CHANGE UP (ref 0–2)
BUN SERPL-MCNC: 40 MG/DL — HIGH (ref 7–23)
CALCIUM SERPL-MCNC: 9.6 MG/DL — SIGNIFICANT CHANGE UP (ref 8.4–10.5)
CHLORIDE SERPL-SCNC: 101 MMOL/L — SIGNIFICANT CHANGE UP (ref 96–108)
CO2 SERPL-SCNC: 22 MMOL/L — SIGNIFICANT CHANGE UP (ref 22–31)
CREAT SERPL-MCNC: 2.21 MG/DL — HIGH (ref 0.5–1.3)
CULTURE RESULTS: SIGNIFICANT CHANGE UP
CULTURE RESULTS: SIGNIFICANT CHANGE UP
EOSINOPHIL # BLD AUTO: 0.27 K/UL — SIGNIFICANT CHANGE UP (ref 0–0.5)
EOSINOPHIL NFR BLD AUTO: 2.4 % — SIGNIFICANT CHANGE UP (ref 0–6)
GLUCOSE SERPL-MCNC: 86 MG/DL — SIGNIFICANT CHANGE UP (ref 70–99)
HCT VFR BLD CALC: 32.5 % — LOW (ref 39–50)
HCT VFR BLD CALC: 33.7 % — LOW (ref 39–50)
HGB BLD-MCNC: 9.4 G/DL — LOW (ref 13–17)
HGB BLD-MCNC: 9.7 G/DL — LOW (ref 13–17)
IMM GRANULOCYTES NFR BLD AUTO: 0.8 % — SIGNIFICANT CHANGE UP (ref 0–1.5)
LYMPHOCYTES # BLD AUTO: 2.41 K/UL — SIGNIFICANT CHANGE UP (ref 1–3.3)
LYMPHOCYTES # BLD AUTO: 21.5 % — SIGNIFICANT CHANGE UP (ref 13–44)
MAGNESIUM SERPL-MCNC: 2.4 MG/DL — SIGNIFICANT CHANGE UP (ref 1.6–2.6)
MCHC RBC-ENTMCNC: 23.4 PG — LOW (ref 27–34)
MCHC RBC-ENTMCNC: 23.4 PG — LOW (ref 27–34)
MCHC RBC-ENTMCNC: 28.8 GM/DL — LOW (ref 32–36)
MCHC RBC-ENTMCNC: 28.9 GM/DL — LOW (ref 32–36)
MCV RBC AUTO: 80.8 FL — SIGNIFICANT CHANGE UP (ref 80–100)
MCV RBC AUTO: 81.4 FL — SIGNIFICANT CHANGE UP (ref 80–100)
MONOCYTES # BLD AUTO: 0.86 K/UL — SIGNIFICANT CHANGE UP (ref 0–0.9)
MONOCYTES NFR BLD AUTO: 7.7 % — SIGNIFICANT CHANGE UP (ref 2–14)
NEUTROPHILS # BLD AUTO: 7.53 K/UL — HIGH (ref 1.8–7.4)
NEUTROPHILS NFR BLD AUTO: 67.1 % — SIGNIFICANT CHANGE UP (ref 43–77)
NRBC # BLD: 0 /100 WBCS — SIGNIFICANT CHANGE UP (ref 0–0)
NRBC # BLD: 0 /100 WBCS — SIGNIFICANT CHANGE UP (ref 0–0)
PHOSPHATE SERPL-MCNC: 4.5 MG/DL — SIGNIFICANT CHANGE UP (ref 2.5–4.5)
PLATELET # BLD AUTO: 201 K/UL — SIGNIFICANT CHANGE UP (ref 150–400)
PLATELET # BLD AUTO: 217 K/UL — SIGNIFICANT CHANGE UP (ref 150–400)
POTASSIUM SERPL-MCNC: 3.5 MMOL/L — SIGNIFICANT CHANGE UP (ref 3.5–5.3)
POTASSIUM SERPL-SCNC: 3.5 MMOL/L — SIGNIFICANT CHANGE UP (ref 3.5–5.3)
RBC # BLD: 4.02 M/UL — LOW (ref 4.2–5.8)
RBC # BLD: 4.14 M/UL — LOW (ref 4.2–5.8)
RBC # FLD: 18.6 % — HIGH (ref 10.3–14.5)
RBC # FLD: 18.7 % — HIGH (ref 10.3–14.5)
SODIUM SERPL-SCNC: 138 MMOL/L — SIGNIFICANT CHANGE UP (ref 135–145)
SPECIMEN SOURCE: SIGNIFICANT CHANGE UP
SPECIMEN SOURCE: SIGNIFICANT CHANGE UP
WBC # BLD: 11.03 K/UL — HIGH (ref 3.8–10.5)
WBC # BLD: 11.22 K/UL — HIGH (ref 3.8–10.5)
WBC # FLD AUTO: 11.03 K/UL — HIGH (ref 3.8–10.5)
WBC # FLD AUTO: 11.22 K/UL — HIGH (ref 3.8–10.5)

## 2020-09-15 PROCEDURE — 99233 SBSQ HOSP IP/OBS HIGH 50: CPT

## 2020-09-15 PROCEDURE — 77001 FLUOROGUIDE FOR VEIN DEVICE: CPT | Mod: 26

## 2020-09-15 PROCEDURE — 99233 SBSQ HOSP IP/OBS HIGH 50: CPT | Mod: GC

## 2020-09-15 PROCEDURE — 76937 US GUIDE VASCULAR ACCESS: CPT | Mod: 26

## 2020-09-15 PROCEDURE — 99232 SBSQ HOSP IP/OBS MODERATE 35: CPT

## 2020-09-15 PROCEDURE — 36558 INSERT TUNNELED CV CATH: CPT

## 2020-09-15 RX ORDER — POTASSIUM CHLORIDE 20 MEQ
40 PACKET (EA) ORAL ONCE
Refills: 0 | Status: COMPLETED | OUTPATIENT
Start: 2020-09-15 | End: 2020-09-15

## 2020-09-15 RX ORDER — APIXABAN 2.5 MG/1
5 TABLET, FILM COATED ORAL EVERY 12 HOURS
Refills: 0 | Status: DISCONTINUED | OUTPATIENT
Start: 2020-09-15 | End: 2020-09-16

## 2020-09-15 RX ORDER — SODIUM CHLORIDE 9 MG/ML
10 INJECTION INTRAMUSCULAR; INTRAVENOUS; SUBCUTANEOUS
Refills: 0 | Status: DISCONTINUED | OUTPATIENT
Start: 2020-09-15 | End: 2020-09-16

## 2020-09-15 RX ORDER — CHLORHEXIDINE GLUCONATE 213 G/1000ML
1 SOLUTION TOPICAL
Refills: 0 | Status: DISCONTINUED | OUTPATIENT
Start: 2020-09-15 | End: 2020-09-16

## 2020-09-15 RX ADMIN — ATORVASTATIN CALCIUM 80 MILLIGRAM(S): 80 TABLET, FILM COATED ORAL at 21:05

## 2020-09-15 RX ADMIN — Medication 100 MILLIGRAM(S): at 23:04

## 2020-09-15 RX ADMIN — CARVEDILOL PHOSPHATE 18.75 MILLIGRAM(S): 80 CAPSULE, EXTENDED RELEASE ORAL at 17:13

## 2020-09-15 RX ADMIN — HEPARIN SODIUM 1700 UNIT(S)/HR: 5000 INJECTION INTRAVENOUS; SUBCUTANEOUS at 00:31

## 2020-09-15 RX ADMIN — Medication 40 MILLIGRAM(S): at 05:31

## 2020-09-15 RX ADMIN — Medication 100 MILLIGRAM(S): at 05:31

## 2020-09-15 RX ADMIN — NYSTATIN CREAM 1 APPLICATION(S): 100000 CREAM TOPICAL at 17:14

## 2020-09-15 RX ADMIN — CARVEDILOL PHOSPHATE 18.75 MILLIGRAM(S): 80 CAPSULE, EXTENDED RELEASE ORAL at 05:31

## 2020-09-15 RX ADMIN — APIXABAN 5 MILLIGRAM(S): 2.5 TABLET, FILM COATED ORAL at 17:27

## 2020-09-15 RX ADMIN — SERTRALINE 50 MILLIGRAM(S): 25 TABLET, FILM COATED ORAL at 13:13

## 2020-09-15 RX ADMIN — ISOSORBIDE DINITRATE 40 MILLIGRAM(S): 5 TABLET ORAL at 05:32

## 2020-09-15 RX ADMIN — Medication 81 MILLIGRAM(S): at 13:13

## 2020-09-15 RX ADMIN — Medication 40 MILLIEQUIVALENT(S): at 10:06

## 2020-09-15 RX ADMIN — Medication 20 MILLIGRAM(S): at 05:31

## 2020-09-15 RX ADMIN — PANTOPRAZOLE SODIUM 40 MILLIGRAM(S): 20 TABLET, DELAYED RELEASE ORAL at 17:13

## 2020-09-15 RX ADMIN — ISOSORBIDE DINITRATE 40 MILLIGRAM(S): 5 TABLET ORAL at 21:05

## 2020-09-15 RX ADMIN — PANTOPRAZOLE SODIUM 40 MILLIGRAM(S): 20 TABLET, DELAYED RELEASE ORAL at 05:31

## 2020-09-15 RX ADMIN — Medication 0.5 MILLIGRAM(S): at 21:08

## 2020-09-15 RX ADMIN — Medication 100 MILLIGRAM(S): at 10:02

## 2020-09-15 RX ADMIN — SPIRONOLACTONE 25 MILLIGRAM(S): 25 TABLET, FILM COATED ORAL at 05:31

## 2020-09-15 RX ADMIN — ISOSORBIDE DINITRATE 40 MILLIGRAM(S): 5 TABLET ORAL at 13:13

## 2020-09-15 RX ADMIN — Medication 100 MILLIGRAM(S): at 13:13

## 2020-09-15 RX ADMIN — Medication 100 MILLIGRAM(S): at 21:05

## 2020-09-15 NOTE — PROGRESS NOTE ADULT - SUBJECTIVE AND OBJECTIVE BOX
EP   tele: NSR, no events  he denies palpitations, syncope, nor angina.  breathing easily.  "starting to lose my mind here in the hospital, I dont want to stay here anymore".     acetaminophen   Tablet .. 650 milliGRAM(s) Oral every 6 hours PRN  aspirin  chewable 81 milliGRAM(s) Oral daily  atorvastatin 80 milliGRAM(s) Oral at bedtime  carvedilol 18.75 milliGRAM(s) Oral two times a day  ceFAZolin   IVPB 2000 milliGRAM(s) IV Intermittent every 12 hours  furosemide    Tablet 40 milliGRAM(s) Oral daily  hydrALAZINE 100 milliGRAM(s) Oral three times a day  isosorbide   dinitrate Tablet (ISORDIL) 40 milliGRAM(s) Oral three times a day  LORazepam     Tablet 0.5 milliGRAM(s) Oral at bedtime  nystatin Powder 1 Application(s) Topical two times a day  pantoprazole    Tablet 40 milliGRAM(s) Oral two times a day  predniSONE   Tablet 20 milliGRAM(s) Oral daily  sertraline 50 milliGRAM(s) Oral daily  spironolactone 25 milliGRAM(s) Oral daily                            9.7    11.22 )-----------( 201      ( 15 Sep 2020 07:14 )             33.7       09-15    138  |  101  |  40<H>  ----------------------------<  86  3.5   |  22  |  2.21<H>    Ca    9.6      15 Sep 2020 07:14  Phos  4.5     09-15  Mg     2.4     09-15    T(C): 36.8 (09-15-20 @ 09:51), Max: 36.8 (09-15-20 @ 09:51)  HR: 60 (09-15-20 @ 09:51) (60 - 88)  BP: 110/67 (09-15-20 @ 09:51) (110/67 - 145/71)  RR: 18 (09-15-20 @ 09:51) (18 - 18)  SpO2: 96% (09-15-20 @ 09:51) (95% - 100%)  Wt(kg): --    I&O's Summary    14 Sep 2020 07:01  -  15 Sep 2020 07:00  --------------------------------------------------------  IN: 530 mL / OUT: 1650 mL / NET: -1120 mL      A&O x 3  neurologically intact  JVP difficult to assess  RRR, no murmurs  CTAB  soft nt/nd  no c/c/e      A/P) He is a 64 y/o male PMH CAD s/p CABG (2005) with severe LV dysfunction (LVEF 35% on repeat echo 9/8/2020), NSVT, PAF, HTN, hyperlipidemia, CKD, CHER, a/w septic shock from cellulitis in setting of severe CHF decompensation. EP called for NSVT and ICD evaluation. He is also being worked up for possible redo CABG/MVR.    -Awaiting tunneled catheter for IV antibiotics prior to discharge.  -Recommended we proceed with EP study for VT induction, and possible LifeVest Rx --> ICD in the future.  He was previously agreeable but now does not want to stay in the hospital.  Ideally, could book this short procedure for tomorrow.  If he refuses inpatient study, I would like this to happen ASA as an outpatient.  Explained the risks of sudden cardiac arrest while awaiting pertinent cardiac procedures, as well as the benefit of potentially finding out that he doesnt need an ICD if the EP study comes up negative.  -f/u with his cardiologist Dr. Charan Mckeon after discharge    Wei Feliciano M.D.  Cardiac Electrophysiology  [phone]          EP   tele: NSR, no events  he denies palpitations, syncope, nor angina.  breathing easily.  "starting to lose my mind here in the hospital, I dont want to stay here anymore".     acetaminophen   Tablet .. 650 milliGRAM(s) Oral every 6 hours PRN  aspirin  chewable 81 milliGRAM(s) Oral daily  atorvastatin 80 milliGRAM(s) Oral at bedtime  carvedilol 18.75 milliGRAM(s) Oral two times a day  ceFAZolin   IVPB 2000 milliGRAM(s) IV Intermittent every 12 hours  furosemide    Tablet 40 milliGRAM(s) Oral daily  hydrALAZINE 100 milliGRAM(s) Oral three times a day  isosorbide   dinitrate Tablet (ISORDIL) 40 milliGRAM(s) Oral three times a day  LORazepam     Tablet 0.5 milliGRAM(s) Oral at bedtime  nystatin Powder 1 Application(s) Topical two times a day  pantoprazole    Tablet 40 milliGRAM(s) Oral two times a day  predniSONE   Tablet 20 milliGRAM(s) Oral daily  sertraline 50 milliGRAM(s) Oral daily  spironolactone 25 milliGRAM(s) Oral daily                            9.7    11.22 )-----------( 201      ( 15 Sep 2020 07:14 )             33.7       09-15    138  |  101  |  40<H>  ----------------------------<  86  3.5   |  22  |  2.21<H>    Ca    9.6      15 Sep 2020 07:14  Phos  4.5     09-15  Mg     2.4     09-15    T(C): 36.8 (09-15-20 @ 09:51), Max: 36.8 (09-15-20 @ 09:51)  HR: 60 (09-15-20 @ 09:51) (60 - 88)  BP: 110/67 (09-15-20 @ 09:51) (110/67 - 145/71)  RR: 18 (09-15-20 @ 09:51) (18 - 18)  SpO2: 96% (09-15-20 @ 09:51) (95% - 100%)  Wt(kg): --    I&O's Summary    14 Sep 2020 07:01  -  15 Sep 2020 07:00  --------------------------------------------------------  IN: 530 mL / OUT: 1650 mL / NET: -1120 mL      A&O x 3  neurologically intact  JVP difficult to assess  RRR, no murmurs  CTAB  soft nt/nd  no c/c/e      A/P) He is a 64 y/o male PMH CAD s/p CABG (2005) with severe LV dysfunction (LVEF 35% on repeat echo 9/8/2020), NSVT, PAF, HTN, hyperlipidemia, CKD, CHER, a/w septic shock from cellulitis in setting of severe CHF decompensation. EP called for NSVT and ICD evaluation. He is also being worked up for possible redo CABG/MVR.    -Awaiting tunneled catheter for IV antibiotics prior to discharge.  -Recommended we proceed with EP study for VT induction, and possible LifeVest Rx --> ICD in the future.  He was previously agreeable but now does not want to stay in the hospital.  Ideally, could book this short procedure for tomorrow.  If he refuses inpatient study, I would like this to happen ASAP as an outpatient.  Explained the risks of sudden cardiac arrest while awaiting pertinent cardiac procedures, as well as the benefit of potentially finding out   that he doesnt need an ICD if the EP study comes up negative.    He is pending discharge at this time.  EP followup given for Dr Coffman to schedule VT induction EP study as outpatient.  If he is still here on Thursday, a case slot has been held for 8AM for this procedure.  Otherwise, discharge planning pending home antibiotics.    -f/u with his cardiologist Dr. Charan Mckeon after discharge    Wei Feliciano M.D.  Cardiac Electrophysiology  [phone]

## 2020-09-15 NOTE — PROGRESS NOTE ADULT - ATTENDING COMMENTS
Patient s/p CHANDRA on 9/14 with moderate to severe MR- discussed with Dr. Yeung-  outpatient f/u. Will need a VT induction 9/16 and possible life vest fitting (if patient agrees). IF refuses, will need study asap as outpatient (per EP). Patient with MSSA bacteremia on ancef x 4 weeks from negative blood culture 9/10. s/p carmen catheter placement. Weekly cbc and bmp. DC planning for home in 24- 48 hours.    Salima Alanis D.O.  Hospitalist Pager # [phone]

## 2020-09-15 NOTE — PROGRESS NOTE ADULT - PROBLEM SELECTOR PLAN 6
-c/w Eliquis 5mg BID - held for RHC/Roth catheter placement   -Inc coreg 18.75mg BID per HF (9/15)   -EKG with Afib with RVR during RRT on 8/27. Now NSR. Stable on tele.

## 2020-09-15 NOTE — PROGRESS NOTE ADULT - PROBLEM SELECTOR PLAN 8
-s/p vanc - improved  -Wound care saw patient (8/25). Redressed wound. Recommended plastics consult.   -Plastics consulted 8/26. Recommended local wound care and outpatient follow up (Dr. Marquez [phone])

## 2020-09-15 NOTE — PROGRESS NOTE ADULT - PROBLEM SELECTOR PLAN 4
Febrile on 9/9; bcx (9/9) being positive for MSSA  -ID consulted: c/w Ancef for MSSA bacteremia; needs 4 weeks from negative bcx   -repeat BCx 9/10: NGTD   -IR to place Roth catheter as patient's poor renal function makes him a poor candidate for PICC line

## 2020-09-15 NOTE — PROGRESS NOTE ADULT - SUBJECTIVE AND OBJECTIVE BOX
Structural Heart Team    Mr Colon was seen and examined earlier this morning.  He was sitting on the edge of the bed and had no medical complaints.  He denied sob, chest pain/discomfort, sob and dizziness.  He was frustrated at the amount of time he has been in the hospital and is eager to be discharged.  He had no acute events overnight.        REVIEW OF SYSTEMS:    CONSTITUTIONAL: No weakness, fevers or chills  EYES/ENT: No visual changes;  No vertigo or throat pain   NECK: No pain or stiffness  RESPIRATORY: No cough, wheezing, hemoptysis; No shortness of breath  CARDIOVASCULAR: No chest pain or palpitations  GASTROINTESTINAL: No abdominal or epigastric pain. No nausea, vomiting, or hematemesis; No diarrhea or constipation. No melena or hematochezia.  GENITOURINARY: No dysuria, frequency or hematuria  NEUROLOGICAL: No numbness or weakness  SKIN: No itching, rashes      Allergies    No Known Allergies    Intolerances      Vital Signs Last 24 Hrs  T(C): 36.8 (15 Sep 2020 09:51), Max: 36.8 (15 Sep 2020 09:51)  T(F): 98.3 (15 Sep 2020 09:51), Max: 98.3 (15 Sep 2020 09:51)  HR: 60 (15 Sep 2020 09:51) (60 - 88)  BP: 110/67 (15 Sep 2020 09:51) (110/67 - 145/71)  BP(mean): --  RR: 18 (15 Sep 2020 09:51) (18 - 18)  SpO2: 96% (15 Sep 2020 09:51) (95% - 100%)    MEDICATIONS  (STANDING):  aspirin  chewable 81 milliGRAM(s) Oral daily  atorvastatin 80 milliGRAM(s) Oral at bedtime  carvedilol 18.75 milliGRAM(s) Oral two times a day  ceFAZolin   IVPB 2000 milliGRAM(s) IV Intermittent every 12 hours  furosemide    Tablet 40 milliGRAM(s) Oral daily  hydrALAZINE 100 milliGRAM(s) Oral three times a day  isosorbide   dinitrate Tablet (ISORDIL) 40 milliGRAM(s) Oral three times a day  LORazepam     Tablet 0.5 milliGRAM(s) Oral at bedtime  nystatin Powder 1 Application(s) Topical two times a day  pantoprazole    Tablet 40 milliGRAM(s) Oral two times a day  predniSONE   Tablet 20 milliGRAM(s) Oral daily  sertraline 50 milliGRAM(s) Oral daily  spironolactone 25 milliGRAM(s) Oral daily      Exam-  General: NAD  Cardiac: s1s2, RRR, II/VI systolic murmur  Pulmonary: CTA b/l, no w/r/r  Gastrointestinal: soft abdomen, nontender, nondistended, + bowel sounds throughout  Extremities: trace LE edema   Neuro: A&Ox3, nonfocal                           9.7    11.22 )-----------( 201      ( 15 Sep 2020 07:14 )             33.7   09-15    138  |  101  |  40<H>  ----------------------------<  86  3.5   |  22  |  2.21<H>    Ca    9.6      15 Sep 2020 07:14  Phos  4.5     09-15  Mg     2.4     09-15    PTT - ( 15 Sep 2020 07:14 )  PTT:92.9 sec  I&O's Summary    14 Sep 2020 07:01  -  15 Sep 2020 07:00  --------------------------------------------------------  IN: 530 mL / OUT: 1650 mL / NET: -1120 mL      < from: Transesophageal Echocardiogram w/o TTE (09.14.20 @ 13:03) >  Observations:  Mitral Valve: Tethered mitral valve leaflets with normal  opening. Moderate mitral regurgitation seen at systolic  BP<140 mmHg. Moderate-severe mitral regurgitation seen at  higher BPs. By PISA, calculated ERO 26 mmsq and regurgitant  volume 48 mL (Pisa rad 0.9 cm, Va 26 cm/s, Vm 5.0 m/s, VTI  182 cm) 3D vena contracta=0.39 cmsq. There is systolic  blunting without reversal seen in the pulmonary veins.  Aortic Valve/Aorta: Calcified trileaflet aortic valve with  normal opening. Mild-moderate aortic regurgitation. Vena  contracta 0.4 cm.  Normal aortic root size. (Ao: 3.7 cm at the sinuses of  Valsalva). Mild ascending aorta dilatation (ascending aorta  diameterabout 4.4 cm approximately 5 cm distal to the  aortic valve) Normal size aortic arch and descending  thoracic aorta. Mild to moderate atheroma noted in aortic  arch/descending aorta.  Left Atrium:Left atrial enlargement. No left atrial or  left atrial appendage thrombus. Normal left atrial  appendage function (maximum velocity>40 cm/s).  Left Ventricle: Mild left ventricular systolic dysfunction.  The septum is hypokinetic. Left ventricular enlargement.  Right Heart: Normal right atrium. Normal right ventricular  size and function. Normal tricuspid valve. Minimal  tricuspid regurgitation. Normal pulmonic valve. No pulmonic  regurgitation.  Pericardium/Pleura: Normal pericardium with no pericardial  effusion.  Hemodynamic: Agitated saline injection and color flow  doppler demonstrate no evidence of a patent foramen ovale.  ------------------------------------------------------------------------  Conclusions:  1. Tethered mitral valve leaflets with normal opening.  Moderate mitral regurgitation seen at systolic BP<140 mmHg.  Moderate-severe mitral regurgitation seen at higher BPs. By  PISA, calculated ERO 26 mmsq and regurgitant volume 48 mL  (Pisa rad 0.9 cm, Va 26 cm/s, Vm 5.0 m/s,  cm) 3D  vena contracta=0.39 cmsq. There is systolic blunting  without reversal seen in the pulmonary veins.  2. Calcified trileaflet aortic valve with normal opening.  Mild-moderate aortic regurgitation. Vena contracta 0.4 cm.  3. Left atrial enlargement. No left atrial or left atrial  appendage thrombus. Normal left atrial appendage function  (maximum velocity>40 cm/s).  4. Mild left ventricular systolic dysfunction. The septum  is hypokinetic.  5. Normal right ventricular size and function.  6. Normal tricuspid valve. Minimal tricuspid regurgitation.  7. No vegetations seen.  *** Compared with transesophageal echocardiogram of  9/1/2020, degree of mitral regurgitation is improved on  today's study. Pulmonary vein systolic flow reversal was  seen on the prior study.    < end of copied text >          Assessment/Plan:  Mr Colon is a 64y/o male with an extensive CAD history including CABG (2005 @ [facility] with Dr. Pena) as well as multiple stents afterwards.  He also has CHF, Afib (paroxysmal?), CKD III, and CHER (on CPAP).   Admitted through the ER with sepsis resulting from injuries sustained during a mechanical fall and treated with IV abx.    - plan for Roth cath today  - repeat CHANDRA done yesterday demonstrated moderate MR   -- no Structural Heart intervention   - no surgical intervention at this time    JEEVAN Bourgeois  [phone]

## 2020-09-15 NOTE — PROGRESS NOTE ADULT - SUBJECTIVE AND OBJECTIVE BOX
[facility]   MD CRYS PIERCE MD RUORU WONG, PA    TEL:  OFFICE: [phone]  DR LEE CELL: [phone]  NIKA CUNNINGHAM CELL: [phone]  DR. GOODWIN CELL: [phone]  DR. STEVENSON CELL: [phone]    FROM 5 PM - 7 AM PLEASE CALL ANSWERING SERVICE: [phone]    RENAL FOLLOW UP NOTE  --------------------------------------------------------------------------------  HPI:      Pt seen and examined at bedside.   Denies SOB, chest pain     PAST HISTORY  --------------------------------------------------------------------------------  No significant changes to PMH, PSH, FHx, SHx, unless otherwise noted    ALLERGIES & MEDICATIONS  --------------------------------------------------------------------------------  Allergies    No Known Allergies    Intolerances      Standing Inpatient Medications  aspirin  chewable 81 milliGRAM(s) Oral daily  atorvastatin 80 milliGRAM(s) Oral at bedtime  carvedilol 18.75 milliGRAM(s) Oral two times a day  ceFAZolin   IVPB 2000 milliGRAM(s) IV Intermittent every 12 hours  furosemide    Tablet 40 milliGRAM(s) Oral daily  hydrALAZINE 100 milliGRAM(s) Oral three times a day  isosorbide   dinitrate Tablet (ISORDIL) 40 milliGRAM(s) Oral three times a day  LORazepam     Tablet 0.5 milliGRAM(s) Oral at bedtime  nystatin Powder 1 Application(s) Topical two times a day  pantoprazole    Tablet 40 milliGRAM(s) Oral two times a day  potassium chloride    Tablet ER 40 milliEquivalent(s) Oral once  predniSONE   Tablet 20 milliGRAM(s) Oral daily  sertraline 50 milliGRAM(s) Oral daily  spironolactone 25 milliGRAM(s) Oral daily    PRN Inpatient Medications  acetaminophen   Tablet .. 650 milliGRAM(s) Oral every 6 hours PRN      REVIEW OF SYSTEMS  --------------------------------------------------------------------------------  General: no fever  CVS: no chest pain  RESP: no sob, no cough  ABD: no abdominal pain  : no dysuria,  MSK: no edema     VITALS/PHYSICAL EXAM  --------------------------------------------------------------------------------  T(C): 36.6 (09-15-20 @ 04:14), Max: 36.6 (09-14-20 @ 11:16)  HR: 64 (09-15-20 @ 06:27) (62 - 88)  BP: 125/70 (09-15-20 @ 04:14) (117/73 - 145/71)  RR: 18 (09-15-20 @ 04:14) (18 - 18)  SpO2: 99% (09-15-20 @ 06:27) (95% - 100%)  Wt(kg): --  Height (cm): 172.7 (09-14-20 @ 12:47)  Weight (kg): 121 (09-14-20 @ 12:47)  BMI (kg/m2): 40.6 (09-14-20 @ 12:47)  BSA (m2): 2.31 (09-14-20 @ 12:47)      09-14-20 @ 07:01  -  09-15-20 @ 07:00  --------------------------------------------------------  IN: 530 mL / OUT: 1650 mL / NET: -1120 mL      Physical Exam:  	Gen: NAD  	HEENT: MMM  	Pulm: CTA B/L  	CV: S1S2  	Abd: Soft, +BS  	Ext: No LE edema B/L                      Neuro: Awake alert  	Skin: Warm and Dry   	Vascular access: no hd catheter           no  charly  LABS/STUDIES  --------------------------------------------------------------------------------              9.7    11.22 >-----------<  201      [09-15-20 @ 07:14]              33.7     138  |  101  |  40  ----------------------------<  86      [09-15-20 @ 07:14]  3.5   |  22  |  2.21        Ca     9.6     [09-15-20 @ 07:14]      Mg     2.4     [09-15-20 @ 07:14]      Phos  4.5     [09-15-20 @ 07:14]        PTT: 92.9       [09-15-20 @ 07:14]      Creatinine Trend:  SCr 2.21 [09-15 @ 07:14]  SCr 2.26 [09-14 @ 04:57]  SCr 2.08 [09-13 @ 11:50]  SCr 2.22 [09-12 @ 09:33]  SCr 2.27 [09-11 @ 05:51]    Urinalysis - [09-09-20 @ 20:40]      Color Light Yellow / Appearance Clear / SG 1.015 / pH 5.5      Gluc Negative / Ketone Negative  / Bili Negative / Urobili <2 mg/dL       Blood Negative / Protein Negative / Leuk Est Negative / Nitrite Negative      RBC  / WBC  / Hyaline  / Gran  / Sq Epi  / Non Sq Epi  / Bacteria       Iron 26, TIBC 293, %sat 9      [08-30-20 @ 09:43]  Ferritin 151      [08-30-20 @ 09:41]  HbA1c 6.1      [12-28-19 @ 03:30]  TSH 3.87      [08-30-20 @ 09:41]    HBsAg Nonreact      [09-01-20 @ 23:46]

## 2020-09-15 NOTE — PROGRESS NOTE ADULT - PROBLEM SELECTOR PLAN 5
Cr elevated to 2.4 on admission. Baseline Cr 1.4-1.7 prior to this hospitalization.   -Continue to hold home Entresto   -Continue to monitor daily BMPs   -Improving now that lasix has been reduced  -Nephrology consulted for PICC clearance. Saying 2.2-2.5 may be patient's new baseline. CKD IV.   -Serum PTH ordered per Nephro

## 2020-09-15 NOTE — PROGRESS NOTE ADULT - ASSESSMENT
ASSESSMENT/RECOMMENDATIONS:  66 yo male with PMH significant for CAD (s/p CABG; most recent LHC showed stable disease with no intervention), CHF (LVEF 39% TTE 1/2020), Afib (on Eliquis), HTN, HLD, CKD III, CHER (uses CPAP), and pemphigus vulgaris presenting to ED with RLE swelling. Found to be septic in ED (hypotension, leukocytosis, elevated lactate, febrile). S/p Vanc/zosyn, 2L IVF in ED. Visit complicated by acute CHF exacerbation on 8/27 for which RRT was called and runs of VT on tele. S/p CCU visit and aggressive diuresis. Transferred back to floors on 8/28. Pending CHANDRA per multiple cardiology teams.    Pt admitted for RLE cellulitis and fever=100.6 on 8/24. He is treated with 7 days of IV vancomycin, the RLE cellulitis is now resolved.  His course was complicated by cardiac complications, including RRT and transferred to CCU for diuretics. Currently he has no SOB or cough.  He spiked fever T=100.6 on 9/9 with chills. BCx drawn, now resulted to be MSSA, on Cefazolin now.  On exam, NAD, oral no obvious infection, no phlebitis, no skin breakdown, no cellulitis, lungs are clear, no murmur.   WBC downtrending now, from 12(9/9) to 9(9/11)  Cr>2  Possible source is left forearm IV phlebitis, it's removed and getting better now    #MSSA bacteremia:  - cont Cefazolin to 2g q12h  - Follow up repeat BCx (9/11)  - TTE 9/8 no vegatation  - CHANDRA negative for IE   - 4 weeks iv abx - day 6 of 28  - weekly cbc, BUn, creatinine - fax [phone]  - surveillance bcx post iv abx as outpatient   - planned for Roth placement

## 2020-09-15 NOTE — PROGRESS NOTE ADULT - PROBLEM SELECTOR PLAN 3
s/p CABG   -C/w aspirin 81mg qday + atorvastatin 80mg qday   -Viability study (9/3-9/4): LV markedly dilated at baseline. Viability in apex, inferior, septal, anterolateral, mid to basal inferolateral heart. Partial viability in distal inferolateral heart. LVEF 47%.   -Coreg inc to 18.75mg BID per HF recs (9/15)   -F/u structural cards recs

## 2020-09-15 NOTE — PROGRESS NOTE ADULT - ATTENDING COMMENTS
Orion Ramirez  Attending Physician   Division of Infectious Disease  Pager #[phone]  After 5pm/weekend or no response, call #[phone]

## 2020-09-15 NOTE — PROGRESS NOTE ADULT - ATTENDING COMMENTS
The patient overall is clinically doing much better compared to his state when he first got admitted.  Notes significant reduction in bilateral lower extremity swelling.  No significant dsypnea upon exertion.  No chest pain/tightness/discomfort, fevers, chills or sweats.  He is in sinus rhythm with rates in the 50s to 70s with PVCs.    --Reviewed CHANDRA findings with the patient.  His mitral regurgitation has lessened following aggressive uptitration of blood pressure medication and improvement in his volume status.  He does not have significant aortic valve regurgitation.  The patient is not felt to be an appropriate candidate for redo-CABG.  At this time it is recommended that he be treated aggressively medically.  No plan for MitraClip procedure.  If the patient was to decompensate in the near future despite aggressive medical management due to his young age he will be assessed by the heart failure team for advanced therapies (ie VAD, transplant).  --He is being evaluated by the EP service.  ?[facility]  --Plan for placement of Roth for 4 weeks of antibiotics.    All questions and concerns of the patient were addressed.

## 2020-09-15 NOTE — PROGRESS NOTE ADULT - SUBJECTIVE AND OBJECTIVE BOX
S: Denies chest pain or SOB. Review of systems otherwise (-)      MEDICATIONS  (STANDING):  aspirin  chewable 81 milliGRAM(s) Oral daily  atorvastatin 80 milliGRAM(s) Oral at bedtime  carvedilol 18.75 milliGRAM(s) Oral two times a day  ceFAZolin   IVPB 2000 milliGRAM(s) IV Intermittent every 12 hours  chlorhexidine 4% Liquid 1 Application(s) Topical <User Schedule>  furosemide    Tablet 40 milliGRAM(s) Oral daily  hydrALAZINE 100 milliGRAM(s) Oral three times a day  isosorbide   dinitrate Tablet (ISORDIL) 40 milliGRAM(s) Oral three times a day  LORazepam     Tablet 0.5 milliGRAM(s) Oral at bedtime  nystatin Powder 1 Application(s) Topical two times a day  pantoprazole    Tablet 40 milliGRAM(s) Oral two times a day  predniSONE   Tablet 20 milliGRAM(s) Oral daily  sertraline 50 milliGRAM(s) Oral daily  spironolactone 25 milliGRAM(s) Oral daily    MEDICATIONS  (PRN):  acetaminophen   Tablet .. 650 milliGRAM(s) Oral every 6 hours PRN Temp greater or equal to 38C (100.4F)  sodium chloride 0.9% lock flush 10 milliLiter(s) IV Push every 1 hour PRN Pre/post blood products, medications, blood draw, and to maintain line patency      LABS:                            9.7    11.22 )-----------( 201      ( 15 Sep 2020 07:14 )             33.7     Hemoglobin: 9.7 g/dL (09-15 @ 07:14)  Hemoglobin: 9.4 g/dL (09-14 @ 23:50)  Hemoglobin: 9.4 g/dL (09-14 @ 04:57)  Hemoglobin: 9.8 g/dL (09-13 @ 21:25)  Hemoglobin: 10.5 g/dL (09-13 @ 11:50)    09-15    138  |  101  |  40<H>  ----------------------------<  86  3.5   |  22  |  2.21<H>    Ca    9.6      15 Sep 2020 07:14  Phos  4.5     09-15  Mg     2.4     09-15      Creatinine Trend: 2.21<--, 2.26<--, 2.08<--, 2.22<--, 2.27<--, 2.51<--   PTT - ( 15 Sep 2020 07:14 )  PTT:92.9 sec          09-14-20 @ 07:01  -  09-15-20 @ 07:00  --------------------------------------------------------  IN: 530 mL / OUT: 1650 mL / NET: -1120 mL    09-15-20 @ 07:01  -  09-15-20 @ 16:12  --------------------------------------------------------  IN: 300 mL / OUT: 1400 mL / NET: -1100 mL        PHYSICAL EXAM  Vital Signs Last 24 Hrs  T(C): 36.6 (15 Sep 2020 12:45), Max: 36.8 (15 Sep 2020 09:51)  T(F): 97.9 (15 Sep 2020 12:45), Max: 98.3 (15 Sep 2020 09:51)  HR: 80 (15 Sep 2020 14:15) (60 - 83)  BP: 114/75 (15 Sep 2020 14:15) (105/66 - 145/71)  BP(mean): --  RR: 18 (15 Sep 2020 14:15) (18 - 18)  SpO2: 96% (15 Sep 2020 14:15) (96% - 100%)      Gen: NAD  HEENT:  (-)icterus (-)pallor  CV: N S1 S2 1/6 KATHERYN (+)2 Pulses B/l  Resp: CTA B/L, normal effort  GI: (+) BS Soft, NT, ND  Lymph:  (-) edema, (-)obvious lymphadenopathy  Skin: Warm to touch, Normal turgor  Psych: Appropriate mood and affect      TELEMETRY: SB/SR 50-70s, PVCs    Echo: < from: TTE with Doppler (w/Cont) (08.27.20 @ 07:54) >  Conclusions:  1. Mitral valve not well visualized. Mitral annular  calcification. Tethered mitral valve leaflets with normal  opening. Mild moderate mitral regurgitation.  2. Calcified trileaflet aortic valve with decreased  opening. Peak transaortic valve gradient equals 12 mm Hg,  mean transaortic valve gradient equals 8 mm Hg, at least  mild AS. Limited gradient/Doppler evaluation. Mild-moderate  aortic regurgitation.  3. Endocardial visualization enhanced with intravenous  injection of Ultrasonic Enhancing Agent (Definity).  Moderate to severe  global left ventricular systolic  dysfunction. There dyssynchronous left ventricular  contraction.  4. Moderate diastolic dysfunction (Stage II). Increased  E/e'  is consistent with elevated left ventricular filling  pressure.  *** Compared with echocardiogram report of 12/13/2017,  Limited comparison.  Disatolic dysfunction is worse.    < end of copied text >    < from: Transesophageal Echocardiogram w/o TTE (09.01.20 @ 14:15) >  Conclusions:  1. Tethered mitral valve leaflets with normal opening.  Severe mitral regurgitation. There is systolic flow  reversal in the right upper pulmonary vein. By PISA,  calculated ERO=43 mmsq and regurgitant volume=73 mL (Pisa  rad 1cm, Va 34.5 cm/s, Vm 5.0 m/s,  cm)  2. Calcified trileaflet aortic valve with normal opening.  Moderate aortic regurgitation. Vena contracta=0.4 cm.  3. Normal aortic root size. (Ao: 3.6 cm at the sinuses of  Valsalva). Midlly dilated ascending aorta for BSA  (ascending aorta diameter 4.5 cm approximately 5 cm distal  to the aortic valve.  Normal size aortic arch, and  descending thoracic aorta.  Minimal atheroma.  4. Left atrial enlargement. No left atrial or left atrial  appendage thrombus. Normal left atrial appendage function  (maximum velocity>40 cm/s).  5. Moderate left ventricular systolic dysfunction.  Paradoxical septal motion consistent with prior cardiac  surgery.  6. Normal right ventricular size and function.    < end of copied text >      < from: Cardiac Viability (09.04.20 @ 14:15) >  GATED ANALYSIS:  Rest gated wall motion analysis was performed (LVEF = 47  %;LVEDV = 195 ml.), revealing moderately reduced  LV  function.  ------------------------------------------------------------------------  IMPRESSIONS:Abnormal Study  * The left ventricle was markdely dilated at baseline.  There is viability in the apex, inferior, septal,  anterolateral, and mid to basal inferolateral walls. On  delayed imaging, there is appears to be partial viability  in the distal inferolateral wall.  * Rest gated wall motion analysis was performed (LVEF = 47  %;LVEDV = 195 ml.), revealing moderately reduced  LV  function.  Conclusion:  The left ventricle was markdely dilated at baseline. There  is viability in the apex, inferior, septal, anterolateral,  and mid to basal inferolateral walls. On delayed imaging,  there also appears to be partial viability in the distal  inferolateral wall.  ------------------------------------------------------------------------  Confirmed on  9/4/2020 - 16:56:37 by Beto Norton M.D.    < end of copied text >      < from: Transesophageal Echocardiogram w/o TTE (09.14.20 @ 13:03) >  Conclusions:  1. Tethered mitral valve leaflets with normal opening.  Moderate mitral regurgitation seen at systolic BP<140 mmHg.  Moderate-severe mitral regurgitation seen at higher BPs. By  PISA, calculated ERO 26 mmsq and regurgitant volume 48 mL  (Pisa rad 0.9 cm, Va 26 cm/s, Vm 5.0 m/s,  cm) 3D  vena contracta=0.39 cmsq. There is systolic blunting  without reversal seen in the pulmonary veins.  2. Calcified trileaflet aortic valve with normal opening.  Mild-moderate aortic regurgitation. Vena contracta 0.4 cm.  3. Left atrial enlargement. No left atrial or left atrial  appendage thrombus. Normal left atrial appendage function  (maximum velocity>40 cm/s).  4. Mild left ventricular systolic dysfunction. The septum  is hypokinetic.  5. Normal right ventricular size and function.  6. Normal tricuspid valve. Minimal tricuspid regurgitation.  7. No vegetations seen.  *** Compared with transesophageal echocardiogram of  9/1/2020, degree of mitral regurgitation is improved on  today's study. Pulmonary vein systolic flow reversal was  seen on the prior study.    < end of copied text >      ASSESSMENT/PLAN: Patient is a 65 year old male with PMH of CAD s/p CABG with most recent LHC 1/2020 with no intervention, chronic systolic CHF (LVEF 39% TTE 1/2020), Afib (on Eliquis), HTN, HLD, CKD, CHER (on CPAP), and pemphigus vulgaris who presented with RLE swelling admitted with RLE cellulitis and NARCISO on CKD. Cardiology consulted for management of CAD/CHF.    - Heart failure follow up - Continue PO lasix - daily standing weights, strict I/Os  - Continue medical management of known CAD with systolic cardiomyopathy - ASA/Statin, Coreg, Hydralazine/Isordil  - Resume AC with Eliquis for CVA prevention when ok with primary team   - Abx per ID - BCx with staph, repeat negative, s/p tunneled catheter for abx  - RHC performed  - Repeat CHANDRA noted with mod MR, structural heart f/u noted - no surgical intervention  - EP f/u appreciated - rec EP study but patient does not want to stay for it  - No further inpatient cardiac w/u planned  - D/C planning per primary team  - Patient to f/u with his cardiologist Dr. Mckeon after d/c      Cristi Jarvis PA-C  Pager: [phone]

## 2020-09-15 NOTE — PROGRESS NOTE ADULT - PROBLEM SELECTOR PLAN 1
Combined Systolic and diastolic Heart failure. TTE (1/2020): LVEF 39%, mod MR/AR  -8/27: RRT for flash pulmonary edema. Transferred to CCU for 1 day.   -TTE (8/27): Worsening diastolic dysfunction. EF 35-45%. mild/mod MR. mild AS. mild/mod AR. mod-severe global LV dysfunction.   -CHANDRA (9/1) showed severe MR, mod AR.   -Repeat TTE (9/8) showed EF 35% and grossly moderate mitral regurgitation  -RHC (9/11): RA , PA/46/27/35, PCW 22 (no sig v-wave), CO/CI 6.1/2.73, PA 62%, Ao 99%, /75/100. SVR 1193 dsc, PVR 2.13 [place]  -CHANDRA (9/14): Mod-severe MR, worsened with inc pressures. No vegetations.   -C/w spironolactone 25mg qday, hydralazine 100mg TID, ISDN 40mg TID, and lasix 40mg qday   -Heart failure recs: Inc coreg to 18.75mg BID  -EP recs: Recommending EP study prior to discharge. If patient is not amenable, recommend ASAP outpatient EP study.   -C/w strict I/O's, daily weights, and 1.5L fluid restriction

## 2020-09-15 NOTE — PROCEDURE NOTE - NSPROCDETAILS_GEN_ALL_CORE
sterile dressing applied/sterile technique, catheter placed/lumen(s) aspirated and flushed/ultrasound guidance/guidewire recovered

## 2020-09-15 NOTE — PROGRESS NOTE ADULT - SUBJECTIVE AND OBJECTIVE BOX
**********************************************  Chiquis Duarte, PGY-1  Internal Medicine   [facility] Pager #: [phone]  **********************************************     Patient is a 65y old  Male who presents with a chief complaint of RLE swelling (15 Sep 2020 11:15)    SUBJECTIVE / OVERNIGHT EVENTS: NAEON. Patient scheduled to get Roth catheter this AM, NPO and holding hep gtt his AM. Examined at bedside and patient is very upset after finding out he may have to stay in the hospital longer for an EP study. He's not amenable to staying but told him we'll revisit the topic after his IR procedure. Otherwise, denies CP, palpitations, SOB.     OBJECTIVE:  Vital Signs Last 24 Hrs  T(C): 36.8 (15 Sep 2020 09:51), Max: 36.8 (15 Sep 2020 09:51)  T(F): 98.3 (15 Sep 2020 09:51), Max: 98.3 (15 Sep 2020 09:51)  HR: 60 (15 Sep 2020 09:51) (60 - 88)  BP: 110/67 (15 Sep 2020 09:51) (110/67 - 145/71)  BP(mean): --  RR: 18 (15 Sep 2020 09:51) (18 - 18)  SpO2: 96% (15 Sep 2020 09:51) (95% - 100%)    I&O's Summary    14 Sep 2020 07:01  -  15 Sep 2020 07:00  --------------------------------------------------------  IN: 530 mL / OUT: 1650 mL / NET: -1120 mL      PHYSICAL EXAM:  GENERAL: NAD  HEAD:  Atraumatic, Normocephalic  CHEST/LUNG: Clear to ascultation bilaterally; No rales, rhonchi, wheezing, or rubs  HEART: Regular rate and rhythm; No murmurs, rubs, or gallops; no pitting edema on b/l LE  ABDOMEN: Soft, Nontender, Nondistended; normoactive bowel sounds  SKIN: No rashes or lesions  NERVOUS SYSTEM:  Alert & Oriented X3, no focal deficit      Labs:  CAPILLARY BLOOD GLUCOSE                              9.7    11.22 )-----------( 201      ( 15 Sep 2020 07:14 )             33.7     09-15    138  |  101  |  40<H>  ----------------------------<  86  3.5   |  22  |  2.21<H>    Ca    9.6      15 Sep 2020 07:14  Phos  4.5     09-15  Mg     2.4     09-15      PTT - ( 15 Sep 2020 07:14 )  PTT:92.9 sec          Imaging Personally Reviewed:    Consultant(s) Notes Reviewed:   Care Discussed with Consultants/Other Providers:    MEDICATIONS  (STANDING):  aspirin  chewable 81 milliGRAM(s) Oral daily  atorvastatin 80 milliGRAM(s) Oral at bedtime  carvedilol 18.75 milliGRAM(s) Oral two times a day  ceFAZolin   IVPB 2000 milliGRAM(s) IV Intermittent every 12 hours  furosemide    Tablet 40 milliGRAM(s) Oral daily  hydrALAZINE 100 milliGRAM(s) Oral three times a day  isosorbide   dinitrate Tablet (ISORDIL) 40 milliGRAM(s) Oral three times a day  LORazepam     Tablet 0.5 milliGRAM(s) Oral at bedtime  nystatin Powder 1 Application(s) Topical two times a day  pantoprazole    Tablet 40 milliGRAM(s) Oral two times a day  predniSONE   Tablet 20 milliGRAM(s) Oral daily  sertraline 50 milliGRAM(s) Oral daily  spironolactone 25 milliGRAM(s) Oral daily    MEDICATIONS  (PRN):  acetaminophen   Tablet .. 650 milliGRAM(s) Oral every 6 hours PRN Temp greater or equal to 38C (100.4F)

## 2020-09-15 NOTE — PRE PROCEDURE NOTE - PRE PROCEDURE EVALUATION
Interventional Radiology Pre-Procedure Note    This is a 65y Male PMH significant for CAD (s/p CABG), CHF, Afib (on Eliquis), presents with tunneled catheter placement for long term antibiotics for RLE cellulitis + MSSA bacteremia.      PAST MEDICAL & SURGICAL HISTORY:  CHF (congestive heart failure)    Psoriasis    HLD (hyperlipidemia)    CAD (coronary artery disease)  3 stents    Hypertension    History of coronary artery stent placement  1 stent 2008, 2 stents 2009 in [facility]    S/P quadruple vessel bypass  2006       Vital Signs Last 24 Hrs  T(C): 36.8 (15 Sep 2020 09:51), Max: 36.8 (15 Sep 2020 09:51)  T(F): 98.3 (15 Sep 2020 09:51), Max: 98.3 (15 Sep 2020 09:51)  HR: 60 (15 Sep 2020 09:51) (60 - 88)  BP: 110/67 (15 Sep 2020 09:51) (110/67 - 145/71)  RR: 18 (15 Sep 2020 09:51) (18 - 18)  SpO2: 96% (15 Sep 2020 09:51) (95% - 100%)    Allergies: Allergies    No Known Allergies    Intolerances        Physical Exam:     Labs:                         9.7    11.22 )-----------( 201      ( 15 Sep 2020 07:14 )             33.7     09-15    138  |  101  |  40<H>  ----------------------------<  86  3.5   |  22  |  2.21<H>    Ca    9.6      15 Sep 2020 07:14  Phos  4.5     09-15  Mg     2.4     09-15      PTT - ( 15 Sep 2020 07:14 )  PTT:92.9 sec    Informed consent obtained. All questions and concerns have been addressed at this time.     Plan: Roth catheter placement for long-term antibx for MSSA bacteremia.

## 2020-09-15 NOTE — PROGRESS NOTE ADULT - PROBLEM SELECTOR PLAN 2
Severe MR on CHANDRA (9/1). Improved to mod-severe on repeat CHANDRA (9/14)   -F/u  recs for future surgical planning (repeat CABG vs Mitrclip)

## 2020-09-15 NOTE — PROGRESS NOTE ADULT - PROBLEM SELECTOR PLAN 10
DVT: Eliquis held initially for RHC and now pending Roth placement. Hep gtt in the meantime, held this AM for IR procedure.   Diet: DASH diet. NPO for IR procedure.  Dispo: PT recommending home with PT. Will provide patient with PT script upon discharge.

## 2020-09-15 NOTE — PROGRESS NOTE ADULT - ATTENDING COMMENTS
Patient seen and examined, agree with above assessment and plan as transcribed above.    - EP f/u appreciated  - Refusing EP study  - Abx per ID    John Dodge MD, [facility]  [place] [phone]

## 2020-09-15 NOTE — PROGRESS NOTE ADULT - ASSESSMENT
63yo male with PMH significant for CAD (s/p CABG; most recent LHC showed stable disease with no intervention), CHF (LVEF 39% TTE 1/2020), Afib (on Eliquis), HTN, HLD, CKD III, CHER (uses CPAP), and pemphigus vulgaris presenting to ED with RLE swelling, admit for acute CHF exacerbation on 8/27, s/p RRT and CCU admission. Transferred back to floors on 8/28. Pending Roth catheter placement by IR for d/c on longterm abx for MSSA bacteremia. Awaiting  recs for future surgical planning.

## 2020-09-15 NOTE — PROGRESS NOTE ADULT - SUBJECTIVE AND OBJECTIVE BOX
THONY ANAYA 65y MRN-[id number]    Patient is a 65y old  Male who presents with a chief complaint of RLE swelling (15 Sep 2020 11:15)      Follow Up/CC:  ID following for bacteremia    Interval History/ROS: no fever    Allergies    No Known Allergies    Intolerances        ANTIMICROBIALS:  ceFAZolin   IVPB 2000 every 12 hours      MEDICATIONS  (STANDING):  aspirin  chewable 81 milliGRAM(s) Oral daily  atorvastatin 80 milliGRAM(s) Oral at bedtime  carvedilol 18.75 milliGRAM(s) Oral two times a day  ceFAZolin   IVPB 2000 milliGRAM(s) IV Intermittent every 12 hours  chlorhexidine 4% Liquid 1 Application(s) Topical <User Schedule>  furosemide    Tablet 40 milliGRAM(s) Oral daily  hydrALAZINE 100 milliGRAM(s) Oral three times a day  isosorbide   dinitrate Tablet (ISORDIL) 40 milliGRAM(s) Oral three times a day  LORazepam     Tablet 0.5 milliGRAM(s) Oral at bedtime  nystatin Powder 1 Application(s) Topical two times a day  pantoprazole    Tablet 40 milliGRAM(s) Oral two times a day  predniSONE   Tablet 20 milliGRAM(s) Oral daily  sertraline 50 milliGRAM(s) Oral daily  spironolactone 25 milliGRAM(s) Oral daily    MEDICATIONS  (PRN):  acetaminophen   Tablet .. 650 milliGRAM(s) Oral every 6 hours PRN Temp greater or equal to 38C (100.4F)  sodium chloride 0.9% lock flush 10 milliLiter(s) IV Push every 1 hour PRN Pre/post blood products, medications, blood draw, and to maintain line patency        Vital Signs Last 24 Hrs  T(C): 36.6 (15 Sep 2020 12:45), Max: 36.8 (15 Sep 2020 09:51)  T(F): 97.9 (15 Sep 2020 12:45), Max: 98.3 (15 Sep 2020 09:51)  HR: 67 (15 Sep 2020 13:15) (60 - 88)  BP: 105/66 (15 Sep 2020 13:15) (105/66 - 145/71)  BP(mean): --  RR: 18 (15 Sep 2020 13:15) (18 - 18)  SpO2: 96% (15 Sep 2020 13:15) (95% - 100%)    CBC Full  -  ( 15 Sep 2020 07:14 )  WBC Count : 11.22 K/uL  RBC Count : 4.14 M/uL  Hemoglobin : 9.7 g/dL  Hematocrit : 33.7 %  Platelet Count - Automated : 201 K/uL  Mean Cell Volume : 81.4 fl  Mean Cell Hemoglobin : 23.4 pg  Mean Cell Hemoglobin Concentration : 28.8 gm/dL  Auto Neutrophil # : 7.53 K/uL  Auto Lymphocyte # : 2.41 K/uL  Auto Monocyte # : 0.86 K/uL  Auto Eosinophil # : 0.27 K/uL  Auto Basophil # : 0.06 K/uL  Auto Neutrophil % : 67.1 %  Auto Lymphocyte % : 21.5 %  Auto Monocyte % : 7.7 %  Auto Eosinophil % : 2.4 %  Auto Basophil % : 0.5 %    09-15    138  |  101  |  40<H>  ----------------------------<  86  3.5   |  22  |  2.21<H>    Ca    9.6      15 Sep 2020 07:14  Phos  4.5     09-15  Mg     2.4     09-15            MICROBIOLOGY:  .Blood Blood-Peripheral  09-10-20   No growth to date.  --  --      .Blood Blood-Venous  09-10-20   No growth to date.  --  --      .Urine Clean Catch (Midstream)  09-09-20   No growth  --  --      .Blood Blood-Peripheral  09-09-20   Growth in aerobic and anaerobic bottles: Staphylococcus aureus  "Due to technical problems, Proteus sp. will Not be reported as part of  the BCID panel until further notice"  ***Blood Panel PCR results on this specimen are available  approximately 3 hours after the Gram stain result.***  Gram stain, PCR, and/or culture results may not always  correspond due to difference in methodologies.  ************************************************************  This PCR assay was performed using [employer].  The following targets are tested for: Enterococcus,  vancomycin resistant enterococci, Listeria monocytogenes,  coagulase negative staphylococci, S. aureus,  methicillin resistant S. aureus, Streptococcus agalactiae  (Group B), S. pneumoniae, S. pyogenes (Group A),  Acinetobacter baumannii, Enterobacter cloacae, E. coli,  Klebsiella oxytoca, K. pneumoniae, Proteus sp.,  Serratia marcescens, Haemophilus influenzae,  Neisseria meningitidis, Pseudomonas aeruginosa, Candida  albicans, C. glabrata, C krusei, C parapsilosis,  C. tropicalis and the KPC resistance gene.  --  Blood Culture PCR  Staphylococcus aureus      .Blood Blood-Peripheral  08-31-20   No Growth Final  --  --      .Blood Blood-Peripheral  08-27-20   Growth in anaerobic bottle: Staphylococcus hominis Coag Negative  Staphylococcus  Single set isolate, possible contaminant. Contact  Microbiology if susceptibility testing clinically  indicated.  "Due to technical problems, Proteus sp. will Not be reported as part of  the BCID panel until further notice"  ***Blood Panel PCR results on this specimen are available  approximately 3 hours after the Gram stain result.***  Gram stain, PCR, and/or culture results may not always  correspond due to difference in methodologies.  ************************************************************  This PCR assay was performed using [employer].  The following targets are tested for: Enterococcus,  vancomycin resistant enterococci, Listeria monocytogenes,  coagulase negative staphylococci, S. aureus,  methicillin resistant S. aureus, Streptococcus agalactiae  (Group B), S. pneumoniae, S. pyogenes (Group A),  Acinetobacter baumannii, Enterobacter cloacae, E. coli,  Klebsiella oxytoca, K. pneumoniae, Proteus sp.,  Serratia marcescens, Haemophilus influenzae,  Neisseria meningitidis, Pseudomonas aeruginosa, Candida  albicans, C. glabrata, C krusei, C parapsilosis,  C. tropicalis and the KPC resistance gene.  --  Blood Culture PCR      .Urine Clean Catch (Midstream)  08-25-20   No growth  --  --      .Blood Blood-Peripheral  08-24-20   No Growth Final  --  --    Rapid RVP Result: NotDetec (09-09 @ 13:54)        COVID-19 Antibody - for prior infection screening (08.25.20 @ 09:39)   COVID-19 IgG Antibody Index: 4.15: [employer] CMIA   Negative Result <= 1.39 Index   Positive Result >= 1.40 Index Index   COVID-19 IgG Antibody Interpretation: Positive: This test has not been reviewed by the FDA by the standard review   RADIOLOGY    < from: Xray Chest 1 View- PORTABLE-Urgent (Xray Chest 1 View- PORTABLE-Urgent .) (09.09.20 @ 09:41) >  Median sternotomy     No pneumothorax. The lungs are clear.      < end of copied text >

## 2020-09-16 ENCOUNTER — TRANSCRIPTION ENCOUNTER (OUTPATIENT)
Age: 65
End: 2020-09-16

## 2020-09-16 VITALS
TEMPERATURE: 99 F | DIASTOLIC BLOOD PRESSURE: 79 MMHG | HEART RATE: 68 BPM | SYSTOLIC BLOOD PRESSURE: 115 MMHG | RESPIRATION RATE: 18 BRPM | OXYGEN SATURATION: 97 %

## 2020-09-16 LAB
ANION GAP SERPL CALC-SCNC: 16 MMOL/L — SIGNIFICANT CHANGE UP (ref 5–17)
BUN SERPL-MCNC: 46 MG/DL — HIGH (ref 7–23)
CALCIUM SERPL-MCNC: 9.1 MG/DL — SIGNIFICANT CHANGE UP (ref 8.4–10.5)
CALCIUM SERPL-MCNC: 9.6 MG/DL — SIGNIFICANT CHANGE UP (ref 8.4–10.5)
CHLORIDE SERPL-SCNC: 99 MMOL/L — SIGNIFICANT CHANGE UP (ref 96–108)
CO2 SERPL-SCNC: 21 MMOL/L — LOW (ref 22–31)
CREAT SERPL-MCNC: 2.36 MG/DL — HIGH (ref 0.5–1.3)
GLUCOSE SERPL-MCNC: 94 MG/DL — SIGNIFICANT CHANGE UP (ref 70–99)
HCT VFR BLD CALC: 33.6 % — LOW (ref 39–50)
HGB BLD-MCNC: 9.4 G/DL — LOW (ref 13–17)
MAGNESIUM SERPL-MCNC: 2.4 MG/DL — SIGNIFICANT CHANGE UP (ref 1.6–2.6)
MCHC RBC-ENTMCNC: 22.7 PG — LOW (ref 27–34)
MCHC RBC-ENTMCNC: 28 GM/DL — LOW (ref 32–36)
MCV RBC AUTO: 81.2 FL — SIGNIFICANT CHANGE UP (ref 80–100)
NRBC # BLD: 0 /100 WBCS — SIGNIFICANT CHANGE UP (ref 0–0)
PHOSPHATE SERPL-MCNC: 3.9 MG/DL — SIGNIFICANT CHANGE UP (ref 2.5–4.5)
PLATELET # BLD AUTO: 200 K/UL — SIGNIFICANT CHANGE UP (ref 150–400)
POTASSIUM SERPL-MCNC: 3.8 MMOL/L — SIGNIFICANT CHANGE UP (ref 3.5–5.3)
POTASSIUM SERPL-SCNC: 3.8 MMOL/L — SIGNIFICANT CHANGE UP (ref 3.5–5.3)
PTH-INTACT FLD-MCNC: 87 PG/ML — HIGH (ref 15–65)
RBC # BLD: 4.14 M/UL — LOW (ref 4.2–5.8)
RBC # FLD: 18.6 % — HIGH (ref 10.3–14.5)
SODIUM SERPL-SCNC: 136 MMOL/L — SIGNIFICANT CHANGE UP (ref 135–145)
WBC # BLD: 10.62 K/UL — HIGH (ref 3.8–10.5)
WBC # FLD AUTO: 10.62 K/UL — HIGH (ref 3.8–10.5)

## 2020-09-16 PROCEDURE — 90715 TDAP VACCINE 7 YRS/> IM: CPT

## 2020-09-16 PROCEDURE — 84132 ASSAY OF SERUM POTASSIUM: CPT

## 2020-09-16 PROCEDURE — 84443 ASSAY THYROID STIM HORMONE: CPT

## 2020-09-16 PROCEDURE — 83540 ASSAY OF IRON: CPT

## 2020-09-16 PROCEDURE — 82728 ASSAY OF FERRITIN: CPT

## 2020-09-16 PROCEDURE — 82803 BLOOD GASES ANY COMBINATION: CPT

## 2020-09-16 PROCEDURE — 85045 AUTOMATED RETICULOCYTE COUNT: CPT

## 2020-09-16 PROCEDURE — 76937 US GUIDE VASCULAR ACCESS: CPT

## 2020-09-16 PROCEDURE — 77001 FLUOROGUIDE FOR VEIN DEVICE: CPT

## 2020-09-16 PROCEDURE — 78452 HT MUSCLE IMAGE SPECT MULT: CPT

## 2020-09-16 PROCEDURE — 83605 ASSAY OF LACTIC ACID: CPT

## 2020-09-16 PROCEDURE — A9505: CPT

## 2020-09-16 PROCEDURE — U0003: CPT

## 2020-09-16 PROCEDURE — 84145 PROCALCITONIN (PCT): CPT

## 2020-09-16 PROCEDURE — 84100 ASSAY OF PHOSPHORUS: CPT

## 2020-09-16 PROCEDURE — 83970 ASSAY OF PARATHORMONE: CPT

## 2020-09-16 PROCEDURE — 82746 ASSAY OF FOLIC ACID SERUM: CPT

## 2020-09-16 PROCEDURE — 85018 HEMOGLOBIN: CPT

## 2020-09-16 PROCEDURE — 84484 ASSAY OF TROPONIN QUANT: CPT

## 2020-09-16 PROCEDURE — 93005 ELECTROCARDIOGRAM TRACING: CPT | Mod: XU

## 2020-09-16 PROCEDURE — 87340 HEPATITIS B SURFACE AG IA: CPT

## 2020-09-16 PROCEDURE — C8929: CPT

## 2020-09-16 PROCEDURE — C1769: CPT

## 2020-09-16 PROCEDURE — 82310 ASSAY OF CALCIUM: CPT

## 2020-09-16 PROCEDURE — 99239 HOSP IP/OBS DSCHRG MGMT >30: CPT

## 2020-09-16 PROCEDURE — 93320 DOPPLER ECHO COMPLETE: CPT

## 2020-09-16 PROCEDURE — 82550 ASSAY OF CK (CPK): CPT

## 2020-09-16 PROCEDURE — 85027 COMPLETE CBC AUTOMATED: CPT

## 2020-09-16 PROCEDURE — 85025 COMPLETE CBC W/AUTO DIFF WBC: CPT

## 2020-09-16 PROCEDURE — 96374 THER/PROPH/DIAG INJ IV PUSH: CPT | Mod: XU

## 2020-09-16 PROCEDURE — 73562 X-RAY EXAM OF KNEE 3: CPT

## 2020-09-16 PROCEDURE — 36558 INSERT TUNNELED CV CATH: CPT

## 2020-09-16 PROCEDURE — C1894: CPT

## 2020-09-16 PROCEDURE — 87150 DNA/RNA AMPLIFIED PROBE: CPT

## 2020-09-16 PROCEDURE — 73502 X-RAY EXAM HIP UNI 2-3 VIEWS: CPT

## 2020-09-16 PROCEDURE — 93451 RIGHT HEART CATH: CPT

## 2020-09-16 PROCEDURE — 86705 HEP B CORE ANTIBODY IGM: CPT

## 2020-09-16 PROCEDURE — 82962 GLUCOSE BLOOD TEST: CPT

## 2020-09-16 PROCEDURE — 86769 SARS-COV-2 COVID-19 ANTIBODY: CPT

## 2020-09-16 PROCEDURE — 97530 THERAPEUTIC ACTIVITIES: CPT

## 2020-09-16 PROCEDURE — 80053 COMPREHEN METABOLIC PANEL: CPT

## 2020-09-16 PROCEDURE — 73590 X-RAY EXAM OF LOWER LEG: CPT

## 2020-09-16 PROCEDURE — 0225U NFCT DS DNA&RNA 21 SARSCOV2: CPT

## 2020-09-16 PROCEDURE — 99232 SBSQ HOSP IP/OBS MODERATE 35: CPT

## 2020-09-16 PROCEDURE — 93971 EXTREMITY STUDY: CPT

## 2020-09-16 PROCEDURE — 73610 X-RAY EXAM OF ANKLE: CPT

## 2020-09-16 PROCEDURE — 99285 EMERGENCY DEPT VISIT HI MDM: CPT | Mod: 25

## 2020-09-16 PROCEDURE — 93325 DOPPLER ECHO COLOR FLOW MAPG: CPT

## 2020-09-16 PROCEDURE — 97116 GAIT TRAINING THERAPY: CPT

## 2020-09-16 PROCEDURE — 82330 ASSAY OF CALCIUM: CPT

## 2020-09-16 PROCEDURE — 87186 SC STD MICRODIL/AGAR DIL: CPT

## 2020-09-16 PROCEDURE — 73552 X-RAY EXAM OF FEMUR 2/>: CPT

## 2020-09-16 PROCEDURE — 85014 HEMATOCRIT: CPT

## 2020-09-16 PROCEDURE — 82947 ASSAY GLUCOSE BLOOD QUANT: CPT

## 2020-09-16 PROCEDURE — 81001 URINALYSIS AUTO W/SCOPE: CPT

## 2020-09-16 PROCEDURE — C1889: CPT

## 2020-09-16 PROCEDURE — 71045 X-RAY EXAM CHEST 1 VIEW: CPT

## 2020-09-16 PROCEDURE — 97161 PT EVAL LOW COMPLEX 20 MIN: CPT

## 2020-09-16 PROCEDURE — 93312 ECHO TRANSESOPHAGEAL: CPT

## 2020-09-16 PROCEDURE — 85610 PROTHROMBIN TIME: CPT

## 2020-09-16 PROCEDURE — 80202 ASSAY OF VANCOMYCIN: CPT

## 2020-09-16 PROCEDURE — 86709 HEPATITIS A IGM ANTIBODY: CPT

## 2020-09-16 PROCEDURE — 83880 ASSAY OF NATRIURETIC PEPTIDE: CPT

## 2020-09-16 PROCEDURE — 76700 US EXAM ABDOM COMPLETE: CPT

## 2020-09-16 PROCEDURE — 80048 BASIC METABOLIC PNL TOTAL CA: CPT

## 2020-09-16 PROCEDURE — 87040 BLOOD CULTURE FOR BACTERIA: CPT

## 2020-09-16 PROCEDURE — 51701 INSERT BLADDER CATHETER: CPT

## 2020-09-16 PROCEDURE — 97110 THERAPEUTIC EXERCISES: CPT

## 2020-09-16 PROCEDURE — 84295 ASSAY OF SERUM SODIUM: CPT

## 2020-09-16 PROCEDURE — 94660 CPAP INITIATION&MGMT: CPT

## 2020-09-16 PROCEDURE — 82553 CREATINE MB FRACTION: CPT

## 2020-09-16 PROCEDURE — 82435 ASSAY OF BLOOD CHLORIDE: CPT

## 2020-09-16 PROCEDURE — 83550 IRON BINDING TEST: CPT

## 2020-09-16 PROCEDURE — 81003 URINALYSIS AUTO W/O SCOPE: CPT

## 2020-09-16 PROCEDURE — 73130 X-RAY EXAM OF HAND: CPT

## 2020-09-16 PROCEDURE — 83036 HEMOGLOBIN GLYCOSYLATED A1C: CPT

## 2020-09-16 PROCEDURE — 93308 TTE F-UP OR LMTD: CPT

## 2020-09-16 PROCEDURE — C1751: CPT

## 2020-09-16 PROCEDURE — 85730 THROMBOPLASTIN TIME PARTIAL: CPT

## 2020-09-16 PROCEDURE — 83735 ASSAY OF MAGNESIUM: CPT

## 2020-09-16 PROCEDURE — 96375 TX/PRO/DX INJ NEW DRUG ADDON: CPT | Mod: XU

## 2020-09-16 PROCEDURE — 87086 URINE CULTURE/COLONY COUNT: CPT

## 2020-09-16 PROCEDURE — 82607 VITAMIN B-12: CPT

## 2020-09-16 RX ORDER — SPIRONOLACTONE 25 MG/1
1 TABLET, FILM COATED ORAL
Qty: 30 | Refills: 0
Start: 2020-09-16 | End: 2020-10-15

## 2020-09-16 RX ORDER — CARVEDILOL PHOSPHATE 80 MG/1
3 CAPSULE, EXTENDED RELEASE ORAL
Qty: 180 | Refills: 0
Start: 2020-09-16 | End: 2020-10-15

## 2020-09-16 RX ORDER — ISOSORBIDE DINITRATE 5 MG/1
1 TABLET ORAL
Qty: 90 | Refills: 0
Start: 2020-09-16 | End: 2020-10-15

## 2020-09-16 RX ORDER — PANTOPRAZOLE SODIUM 20 MG/1
1 TABLET, DELAYED RELEASE ORAL
Qty: 60 | Refills: 0

## 2020-09-16 RX ORDER — FUROSEMIDE 40 MG
1 TABLET ORAL
Qty: 30 | Refills: 0
Start: 2020-09-16 | End: 2020-10-15

## 2020-09-16 RX ORDER — EVOLOCUMAB 140 MG/ML
1 INJECTION, SOLUTION SUBCUTANEOUS
Qty: 0 | Refills: 0 | DISCHARGE

## 2020-09-16 RX ORDER — HYDRALAZINE HCL 50 MG
1 TABLET ORAL
Qty: 90 | Refills: 0
Start: 2020-09-16 | End: 2020-10-15

## 2020-09-16 RX ORDER — PANTOPRAZOLE SODIUM 20 MG/1
1 TABLET, DELAYED RELEASE ORAL
Qty: 60 | Refills: 0
Start: 2020-09-16 | End: 2020-10-15

## 2020-09-16 RX ORDER — ATORVASTATIN CALCIUM 80 MG/1
1 TABLET, FILM COATED ORAL
Qty: 0 | Refills: 0 | DISCHARGE

## 2020-09-16 RX ORDER — APIXABAN 2.5 MG/1
1 TABLET, FILM COATED ORAL
Qty: 0 | Refills: 0 | DISCHARGE
Start: 2020-09-16

## 2020-09-16 RX ORDER — ISOSORBIDE DINITRATE 5 MG/1
40 TABLET ORAL
Qty: 180 | Refills: 0
Start: 2020-09-16 | End: 2020-10-15

## 2020-09-16 RX ORDER — APIXABAN 2.5 MG/1
1 TABLET, FILM COATED ORAL
Qty: 60 | Refills: 0
Start: 2020-09-16 | End: 2020-10-15

## 2020-09-16 RX ORDER — CEFAZOLIN SODIUM 1 G
2 VIAL (EA) INJECTION
Qty: 92 | Refills: 0
Start: 2020-09-16 | End: 2020-10-08

## 2020-09-16 RX ORDER — ALPRAZOLAM 0.25 MG
1 TABLET ORAL
Qty: 0 | Refills: 0 | DISCHARGE

## 2020-09-16 RX ADMIN — Medication 20 MILLIGRAM(S): at 05:08

## 2020-09-16 RX ADMIN — SPIRONOLACTONE 25 MILLIGRAM(S): 25 TABLET, FILM COATED ORAL at 05:09

## 2020-09-16 RX ADMIN — Medication 40 MILLIGRAM(S): at 05:08

## 2020-09-16 RX ADMIN — PANTOPRAZOLE SODIUM 40 MILLIGRAM(S): 20 TABLET, DELAYED RELEASE ORAL at 05:08

## 2020-09-16 RX ADMIN — SERTRALINE 50 MILLIGRAM(S): 25 TABLET, FILM COATED ORAL at 12:44

## 2020-09-16 RX ADMIN — APIXABAN 5 MILLIGRAM(S): 2.5 TABLET, FILM COATED ORAL at 05:08

## 2020-09-16 RX ADMIN — Medication 81 MILLIGRAM(S): at 12:44

## 2020-09-16 RX ADMIN — Medication 100 MILLIGRAM(S): at 10:06

## 2020-09-16 NOTE — PROGRESS NOTE ADULT - PROBLEM SELECTOR PLAN 1
Combined Systolic and diastolic Heart failure. TTE (1/2020): LVEF 39%, mod MR/AR  -8/27: RRT for flash pulmonary edema. Transferred to CCU for 1 day.   -TTE (8/27): Worsening diastolic dysfunction. EF 35-45%. mild/mod MR. mild AS. mild/mod AR. mod-severe global LV dysfunction.   -CHANDRA (9/1) showed severe MR, mod AR.   -Repeat TTE (9/8) showed EF 35% and grossly moderate mitral regurgitation  -RHC (9/11): RA , PA/46/27/35, PCW 22 (no sig v-wave), CO/CI 6.1/2.73, PA 62%, Ao 99%, /75/100. SVR 1193 dsc, PVR 2.13 [place]  -CHANDRA (9/14): Mod-severe MR, worsened with inc pressures. No vegetations.   -C/w spironolactone 25mg qday, hydralazine 100mg TID, ISDN 40mg TID, and lasix 40mg qday + coreg 18.75mg BID  -Heart failure recs: Pending recs prior to discharge   -EP recs: Patient to schedule outpatient EP VT induction study with Dr. Coffman as outpatient ASAP to evaluate need for Lifevest and future ICD   -C/w strict I/O's, daily weights, and 1.5L fluid restriction Combined Systolic and diastolic Heart failure. TTE (1/2020): LVEF 39%, mod MR/AR  -8/27: RRT for flash pulmonary edema. Transferred to CCU for 1 day.   -TTE (8/27): Worsening diastolic dysfunction. EF 35-45%. mild/mod MR. mild AS. mild/mod AR. mod-severe global LV dysfunction.   -CHANDRA (9/1) showed severe MR, mod AR.   -Repeat TTE (9/8) showed EF 35% and grossly moderate mitral regurgitation  -RHC (9/11): RA , PA/46/27/35, PCW 22 (no sig v-wave), CO/CI 6.1/2.73, PA 62%, Ao 99%, /75/100. SVR 1193 dsc, PVR 2.13 [place]  -CHANDRA (9/14): Mod-severe MR, worsened with inc pressures. No vegetations.   -C/w spironolactone 25mg qday, hydralazine 100mg TID, ISDN 40mg TID, and lasix 40mg qday + coreg 18.75mg BID  -Heart failure recs: Set up o/p appointment. C/w current meds. Enrolled in Transform-HF trial.   -EP recs: Patient to schedule outpatient EP VT induction study with Dr. Coffman as outpatient ASAP to evaluate need for Lifevest and future ICD   -C/w strict I/O's, daily weights, and 1.5L fluid restriction

## 2020-09-16 NOTE — PROGRESS NOTE ADULT - ASSESSMENT
64yo male with PMH significant for CAD (s/p CABG 2005 @ [facility] with Dr. Pena) as well as multiple stents afterwards, HFrEF (LVEF 35%with mod-severe MR 9/20), Afib (on Eliquis), HTN, HLD, CKD IV (new baseline 2-2-2.5), CHER (uses CPAP), and pemphigus vulgaris initially admitted for RLE cellulitis, treated with vancomycin and resolved. Course c/b episode of flash pulmonary edema on 8/27 with brief stay in CCU. Subsequent TTE showed worsening diastolic dysfunction with severe MR, which prompted re-evaluation of patient's structural disease. Patient was volume optimized by HF with drastic changes to his HF medication regimen. Re-imaging showed moderate-severe disease. Pt's course was further c/b MSSA bacteremia, for which he had a Roth catheter placed (9/15) and will continue with IV abx x 1 month as outpatient and will be re-evaluated for further surgical recs as outpatient. Additionally, patient to undergo EP study as outpatient. 66yo male with PMH significant for CAD (s/p CABG 2005 @ [facility] with Dr. Pena) as well as multiple stents afterwards, HFrEF (LVEF 35%with mod-severe MR 9/20), Afib (on Eliquis), HTN, HLD, CKD IV (new baseline 2-2-2.5), CHER (uses CPAP), and pemphigus vulgaris initially admitted for RLE cellulitis, treated with vancomycin and resolved. Course c/b episode of flash pulmonary edema on 8/27 with brief stay in CCU. Subsequent TTE showed worsening diastolic dysfunction with severe MR, which prompted re-evaluation of patient's structural disease. Patient was volume optimized by HF with drastic changes to his HF medication regimen. Re-imaging showed moderate-severe MR. Structural heart say patient is not candidate for re-do CABG and does not need mitraclip at this time. Recommend continued medical management.  Pt's course was further c/b MSSA bacteremia, for which he had a Roth catheter placed (9/15) and will continue with IV abx x 1 month as outpatient. Additionally, patient to undergo EP study as outpatient.

## 2020-09-16 NOTE — PROGRESS NOTE ADULT - PROBLEM SELECTOR PLAN 3
s/p CABG   -C/w aspirin 81mg qday + atorvastatin 80mg qday   -Viability study (9/3-9/4): LV markedly dilated at baseline. Viability in apex, inferior, septal, anterolateral, mid to basal inferolateral heart. Partial viability in distal inferolateral heart. LVEF 47%.   -Coreg inc to 18.75mg BID per HF recs (9/15)   -F/u structural cards recs s/p CABG   -C/w aspirin 81mg qday + atorvastatin 80mg qday   -Viability study (9/3-9/4): LV markedly dilated at baseline. Viability in apex, inferior, septal, anterolateral, mid to basal inferolateral heart. Partial viability in distal inferolateral heart. LVEF 47%.   -Coreg inc to 18.75mg BID per HF recs (9/15)

## 2020-09-16 NOTE — PROGRESS NOTE ADULT - GASTROINTESTINAL DETAILS
no rigidity/no guarding/soft/nontender
no guarding/nontender/soft/no distention/no rigidity
nontender/soft/no distention/no rigidity/no guarding

## 2020-09-16 NOTE — PROGRESS NOTE ADULT - ASSESSMENT
66 yo male with PMH significant for CAD (s/p CABG; most recent LHC showed stable disease with no intervention), CHF (LVEF 39% TTE 1/2020), Afib (on Eliquis), HTN, HLD, CKD III, CHER (uses CPAP), and pemphigus vulgaris presenting to ED with RLE swelling 8/24. Found to be septic in ED (hypotension, leukocytosis, elevated lactate, febrile) likely RLE cellulitis + bacteremia. Visit complicated by acute CHF exacerbation on 8/27 for which RRT was called and runs of VT on tele. S/p CCU visit and aggressive diuresis. Transferred back to floors on 8/28. s/p CHANDRA today    CKD (chronic kidney disease) stage 3-4  baseline likely ~ 2.2-2.5  renal function fluctuating slightly sec to chf  renal function stable  avoid nephrotoxic agents  monitor bmp      Essential hypertension.    controlled  monitor    MSSA bacteremia:  on Cefazolin to 2g q12h  patient is CKD 4, s/p  carmen catheter to perserv vasculature for future dialysis access     CKD-MBD  check pth level  monitor phos and calcium daily

## 2020-09-16 NOTE — PROGRESS NOTE ADULT - SUBJECTIVE AND OBJECTIVE BOX
**********************************************  Chiquis Duarte, PGY-1  Internal Medicine   [facility] Pager #: [phone]  **********************************************     Patient is a 65y old  Male who presents with a chief complaint of RLE swelling (16 Sep 2020 07:00)    SUBJECTIVE / OVERNIGHT EVENTS:     OBJECTIVE:  Vital Signs Last 24 Hrs  T(C): 36.8 (16 Sep 2020 04:23), Max: 36.8 (15 Sep 2020 09:51)  T(F): 98.3 (16 Sep 2020 04:23), Max: 98.3 (15 Sep 2020 09:51)  HR: 60 (16 Sep 2020 06:05) (60 - 83)  BP: 129/69 (16 Sep 2020 04:23) (105/66 - 138/75)  BP(mean): --  RR: 18 (16 Sep 2020 04:23) (18 - 19)  SpO2: 98% (16 Sep 2020 06:05) (95% - 99%)    I&O's Summary    15 Sep 2020 07:01  -  16 Sep 2020 07:00  --------------------------------------------------------  IN: 1070 mL / OUT: 3050 mL / NET: -1980 mL      PHYSICAL EXAM:  GENERAL: NAD  HEAD:  Atraumatic, Normocephalic  CHEST/LUNG: Clear to ascultation bilaterally; No rales, rhonchi, wheezing, or rubs  HEART: Regular rate and rhythm; No murmurs, rubs, or gallops; no pitting edema on b/l LE  ABDOMEN: Soft, Nontender, Nondistended; normoactive bowel sounds  SKIN: No rashes or lesions  NERVOUS SYSTEM:  Alert & Oriented X3, no focal deficit    Labs:  CAPILLARY BLOOD GLUCOSE                              9.4    10.62 )-----------( 200      ( 16 Sep 2020 06:47 )             33.6     09-16    136  |  99  |  46<H>  ----------------------------<  94  3.8   |  21<L>  |  2.36<H>    Ca    9.6      16 Sep 2020 06:47  Phos  3.9     09-16  Mg     2.4     09-16      PTT - ( 15 Sep 2020 07:14 )  PTT:92.9 sec          Imaging Personally Reviewed:    Consultant(s) Notes Reviewed:   Care Discussed with Consultants/Other Providers:    MEDICATIONS  (STANDING):  apixaban 5 milliGRAM(s) Oral every 12 hours  aspirin  chewable 81 milliGRAM(s) Oral daily  atorvastatin 80 milliGRAM(s) Oral at bedtime  carvedilol 18.75 milliGRAM(s) Oral two times a day  ceFAZolin   IVPB 2000 milliGRAM(s) IV Intermittent every 12 hours  chlorhexidine 4% Liquid 1 Application(s) Topical <User Schedule>  furosemide    Tablet 40 milliGRAM(s) Oral daily  hydrALAZINE 100 milliGRAM(s) Oral three times a day  isosorbide   dinitrate Tablet (ISORDIL) 40 milliGRAM(s) Oral three times a day  LORazepam     Tablet 0.5 milliGRAM(s) Oral at bedtime  nystatin Powder 1 Application(s) Topical two times a day  pantoprazole    Tablet 40 milliGRAM(s) Oral two times a day  predniSONE   Tablet 20 milliGRAM(s) Oral daily  sertraline 50 milliGRAM(s) Oral daily  spironolactone 25 milliGRAM(s) Oral daily    MEDICATIONS  (PRN):  acetaminophen   Tablet .. 650 milliGRAM(s) Oral every 6 hours PRN Temp greater or equal to 38C (100.4F)  sodium chloride 0.9% lock flush 10 milliLiter(s) IV Push every 1 hour PRN Pre/post blood products, medications, blood draw, and to maintain line patency   **********************************************  Chiquis Duarte, PGY-1  Internal Medicine   [facility] Pager #: [phone]  **********************************************     Patient is a 65y old  Male who presents with a chief complaint of RLE swelling (16 Sep 2020 07:00)    SUBJECTIVE / OVERNIGHT EVENTS: NAEON. Patient examined at bedside this AM, no subjective complaints. Pt excited to go home. Continuing to decline inpatient induction study. Risks/benefits explained to patient in detail. Pt expresses understanding. Otherwise, denies CP, palpitations, SOB, inc peripheral edema.     OBJECTIVE:  Vital Signs Last 24 Hrs  T(C): 36.8 (16 Sep 2020 04:23), Max: 36.8 (15 Sep 2020 09:51)  T(F): 98.3 (16 Sep 2020 04:23), Max: 98.3 (15 Sep 2020 09:51)  HR: 60 (16 Sep 2020 06:05) (60 - 83)  BP: 129/69 (16 Sep 2020 04:23) (105/66 - 138/75)  BP(mean): --  RR: 18 (16 Sep 2020 04:23) (18 - 19)  SpO2: 98% (16 Sep 2020 06:05) (95% - 99%)    I&O's Summary    15 Sep 2020 07:01  -  16 Sep 2020 07:00  --------------------------------------------------------  IN: 1070 mL / OUT: 3050 mL / NET: -1980 mL      PHYSICAL EXAM:  GENERAL: NAD  HEAD:  Atraumatic, Normocephalic  CHEST/LUNG: Clear to ascultation bilaterally; No rales, rhonchi, wheezing, or rubs  HEART: Regular rate and rhythm; No murmurs, rubs, or gallops; no pitting edema on b/l LE  ABDOMEN: Soft, Nontender, Nondistended; normoactive bowel sounds  SKIN: No rashes or lesions  NERVOUS SYSTEM:  Alert & Oriented X3, no focal deficit    Labs:  CAPILLARY BLOOD GLUCOSE                              9.4    10.62 )-----------( 200      ( 16 Sep 2020 06:47 )             33.6     09-16    136  |  99  |  46<H>  ----------------------------<  94  3.8   |  21<L>  |  2.36<H>    Ca    9.6      16 Sep 2020 06:47  Phos  3.9     09-16  Mg     2.4     09-16      PTT - ( 15 Sep 2020 07:14 )  PTT:92.9 sec          Imaging Personally Reviewed:    Consultant(s) Notes Reviewed:   Care Discussed with Consultants/Other Providers:    MEDICATIONS  (STANDING):  apixaban 5 milliGRAM(s) Oral every 12 hours  aspirin  chewable 81 milliGRAM(s) Oral daily  atorvastatin 80 milliGRAM(s) Oral at bedtime  carvedilol 18.75 milliGRAM(s) Oral two times a day  ceFAZolin   IVPB 2000 milliGRAM(s) IV Intermittent every 12 hours  chlorhexidine 4% Liquid 1 Application(s) Topical <User Schedule>  furosemide    Tablet 40 milliGRAM(s) Oral daily  hydrALAZINE 100 milliGRAM(s) Oral three times a day  isosorbide   dinitrate Tablet (ISORDIL) 40 milliGRAM(s) Oral three times a day  LORazepam     Tablet 0.5 milliGRAM(s) Oral at bedtime  nystatin Powder 1 Application(s) Topical two times a day  pantoprazole    Tablet 40 milliGRAM(s) Oral two times a day  predniSONE   Tablet 20 milliGRAM(s) Oral daily  sertraline 50 milliGRAM(s) Oral daily  spironolactone 25 milliGRAM(s) Oral daily    MEDICATIONS  (PRN):  acetaminophen   Tablet .. 650 milliGRAM(s) Oral every 6 hours PRN Temp greater or equal to 38C (100.4F)  sodium chloride 0.9% lock flush 10 milliLiter(s) IV Push every 1 hour PRN Pre/post blood products, medications, blood draw, and to maintain line patency

## 2020-09-16 NOTE — PROGRESS NOTE ADULT - ASSESSMENT
ASSESSMENT/RECOMMENDATIONS:  66 yo male with PMH significant for CAD (s/p CABG; most recent LHC showed stable disease with no intervention), CHF (LVEF 39% TTE 1/2020), Afib (on Eliquis), HTN, HLD, CKD III, CHER (uses CPAP), and pemphigus vulgaris presenting to ED with RLE swelling. Found to be septic in ED (hypotension, leukocytosis, elevated lactate, febrile). S/p Vanc/zosyn, 2L IVF in ED. Visit complicated by acute CHF exacerbation on 8/27 for which RRT was called and runs of VT on tele. S/p CCU visit and aggressive diuresis. Transferred back to floors on 8/28. Pending CHANDRA per multiple cardiology teams.    Pt admitted for RLE cellulitis and fever=100.6 on 8/24. He is treated with 7 days of IV vancomycin, the RLE cellulitis is now resolved.  His course was complicated by cardiac complications, including RRT and transferred to CCU for diuretics. Currently he has no SOB or cough.  He spiked fever T=100.6 on 9/9 with chills. BCx drawn, now resulted to be MSSA, on Cefazolin now.  On exam, NAD, oral no obvious infection, no phlebitis, no skin breakdown, no cellulitis, lungs are clear, no murmur.   WBC downtrending now, from 12(9/9) to 9(9/11)  Cr>2  Possible source is left forearm IV phlebitis, it's removed and getting better now    #MSSA bacteremia:  - cont Cefazolin to 2g q12h  - Follow up repeat BCx (9/11)  - TTE 9/8 no vegatation  - CHANDRA negative for IE   - 4 weeks iv abx - day 7 of 28  - weekly cbc, BUn, creatinine - fax [phone]  - surveillance bcx post iv abx as outpatient   - planned for Roth placement

## 2020-09-16 NOTE — PROGRESS NOTE ADULT - NSHPATTENDINGPLANDISCUSS_GEN_ALL_CORE
team
team
heart failure/Dr. Collado, cardiac surgery/Dr. Pena and internal medicine/Dr. Alanis
heart failure/Dr. Leiva and structural heart team.
internal medicine team, cardiology team/Dr. Dodge and structural heart team/Dr. Riddle.
structural heart team
Rob Dodge & Gamal, and primary team
primary team
primary team
Structural heart team
primary team
Rob Dodge & Gamal, and primary team
pgy1,2
Dr. Duarte PGY1
PGY1, 2
Structural heart at bedside
R1

## 2020-09-16 NOTE — DISCHARGE NOTE NURSING/CASE MANAGEMENT/SOCIAL WORK - PATIENT PORTAL LINK FT
You can access the FollowMyHealth Patient Portal offered by [facility] by registering at the following website: [email or web address]. By joining [employer]’s FollowMyHealth portal, you will also be able to view your health information using other applications (apps) compatible with our system.

## 2020-09-16 NOTE — PROGRESS NOTE ADULT - PROBLEM SELECTOR PLAN 10
DVT: Eliquis held initially for RHC and now pending Roth placement. Hep gtt in the meantime, held this AM for IR procedure.   Diet: DASH diet. NPO for IR procedure.  Dispo: PT recommending home with PT. Will provide patient with PT script upon discharge. DVT: Eliquis   Diet: DASH diet  Dispo: PT recommending home with PT. Will provide patient with PT script upon discharge.

## 2020-09-16 NOTE — CHART NOTE - NSCHARTNOTEFT_GEN_A_CORE
Nutrition Follow Up Note  Patient seen for: nutrition follow up    Chart reviewed, events noted.    Source: pt, electronic medical record     Diet : DASH/TLC + 1500 ml fluid restriction    Patient reports: no acute GI distress, last BM 9/14.    PO intake : good, pt noted with ongoing ~100% po intake at meals. Pt has no additional nutrition related questions at this time, declines written nutrition education materials.    Source for PO intake: pt, electronic medical record     Daily Weight in lbs: 244.4 (9-16); fluid shifts noted  247.7 (9-3)  250.6 (9-1)  266.7 (dosing wt)    Pertinent Medications: MEDICATIONS  (STANDING):  apixaban 5 milliGRAM(s) Oral every 12 hours  aspirin  chewable 81 milliGRAM(s) Oral daily  atorvastatin 80 milliGRAM(s) Oral at bedtime  carvedilol 18.75 milliGRAM(s) Oral two times a day  ceFAZolin   IVPB 2000 milliGRAM(s) IV Intermittent every 12 hours  chlorhexidine 4% Liquid 1 Application(s) Topical <User Schedule>  furosemide    Tablet 40 milliGRAM(s) Oral daily  hydrALAZINE 100 milliGRAM(s) Oral three times a day  isosorbide   dinitrate Tablet (ISORDIL) 40 milliGRAM(s) Oral three times a day  LORazepam     Tablet 0.5 milliGRAM(s) Oral at bedtime  nystatin Powder 1 Application(s) Topical two times a day  pantoprazole    Tablet 40 milliGRAM(s) Oral two times a day  predniSONE   Tablet 20 milliGRAM(s) Oral daily  sertraline 50 milliGRAM(s) Oral daily  spironolactone 25 milliGRAM(s) Oral daily    MEDICATIONS  (PRN):  acetaminophen   Tablet .. 650 milliGRAM(s) Oral every 6 hours PRN Temp greater or equal to 38C (100.4F)  sodium chloride 0.9% lock flush 10 milliLiter(s) IV Push every 1 hour PRN Pre/post blood products, medications, blood draw, and to maintain line patency    Pertinent Labs: 09-16 @ 06:47: Na 136, BUN 46<H>, Cr 2.36<H>, BG 94, K+ 3.8, Phos 3.9, Mg 2.4    Skin per nursing documentation: no pressure injuries noted  Edema: none noted    Estimated Needs:   [x] no change since previous assessment  [ ] recalculated:     Previous Nutrition Diagnosis: Overweight/Obesity (BMI>40)  Nutrition Diagnosis is: ongoing, being addressed with calorie controlled diet while in-patient and reinforcement of nutrition education as needed    New Nutrition Diagnosis: n/a    Recommend  1) Continue DASH/TLC therapeutic diet as indicated, fluids per team.  2) Provide/review nutrition education as indicated.    Monitoring and Evaluation:     Continue to monitor Nutritional intake, Tolerance to diet prescription, weights, labs, skin integrity    RD remains available upon request and will follow up per protocol. Elsa Leon RD, CDN Pager: [phone].

## 2020-09-16 NOTE — PROGRESS NOTE ADULT - SUBJECTIVE AND OBJECTIVE BOX
EP   tele: NSR, no events  he denies palpitations, syncope, nor angina.  breathing easily.  pending discharge (today?).  we discussed plan for followup - he will see Dr Mckeon as scheduled ,but also has a followup with Dr Coffman at [address].  He has a moderately reduced LV EF and has spontaneous nonsustained VT on telemetry.  He is not a high risk patient for sudden cardiac arrest, but he is at elevated risk and needs further procedures to determine if he should have a defibrillator implanted.  At this time there is no plan for surgical intervention on his CAD or his Mitral Valve, and he requires at least 4 weeks of antibiotics for MSSA bacteremia.  He agrees to outpatient followup to set up a VT induction EP study with possible LifeVest prescription as an outcome.  A transvenous defibrillator would be delayed until infection is cleared.  acetaminophen   Tablet .. 650 milliGRAM(s) Oral every 6 hours PRN  apixaban 5 milliGRAM(s) Oral every 12 hours  aspirin  chewable 81 milliGRAM(s) Oral daily  atorvastatin 80 milliGRAM(s) Oral at bedtime  carvedilol 18.75 milliGRAM(s) Oral two times a day  ceFAZolin   IVPB 2000 milliGRAM(s) IV Intermittent every 12 hours  chlorhexidine 4% Liquid 1 Application(s) Topical <User Schedule>  furosemide    Tablet 40 milliGRAM(s) Oral daily  hydrALAZINE 100 milliGRAM(s) Oral three times a day  isosorbide   dinitrate Tablet (ISORDIL) 40 milliGRAM(s) Oral three times a day  LORazepam     Tablet 0.5 milliGRAM(s) Oral at bedtime  nystatin Powder 1 Application(s) Topical two times a day  pantoprazole    Tablet 40 milliGRAM(s) Oral two times a day  predniSONE   Tablet 20 milliGRAM(s) Oral daily  sertraline 50 milliGRAM(s) Oral daily  sodium chloride 0.9% lock flush 10 milliLiter(s) IV Push every 1 hour PRN  spironolactone 25 milliGRAM(s) Oral daily                          9.4    10.62 )-----------( 200      ( 16 Sep 2020 06:47 )             33.6       09-16    136  |  99  |  46<H>  ----------------------------<  94  3.8   |  21<L>  |  2.36<H>    Ca    9.6      16 Sep 2020 06:47  Phos  3.9     09-16  Mg     2.4     09-16      T(C): 37.1 (09-16-20 @ 11:24), Max: 37.1 (09-16-20 @ 11:24)  HR: 68 (09-16-20 @ 11:24) (60 - 83)  BP: 115/79 (09-16-20 @ 11:24) (105/66 - 138/75)  RR: 18 (09-16-20 @ 11:24) (18 - 19)  SpO2: 97% (09-16-20 @ 11:24) (95% - 99%)  Wt(kg): --    I&O's Summary    15 Sep 2020 07:01  -  16 Sep 2020 07:00  --------------------------------------------------------  IN: 1070 mL / OUT: 3050 mL / NET: -1980 mL    16 Sep 2020 07:01  -  16 Sep 2020 11:31  --------------------------------------------------------  IN: 240 mL / OUT: 400 mL / NET: -160 mL      A&O x 3  neurologically intact  JVP difficult to assess  RRR, no murmurs  CTAB  soft nt/nd  no c/c/e      A/P) He is a 64 y/o male PMH CAD s/p CABG (2005) with severe LV dysfunction (LVEF 35% on repeat echo 9/8/2020), NSVT, PAF, HTN, hyperlipidemia, CKD, CHER, a/w septic shock from cellulitis in setting of severe CHF decompensation. EP called for NSVT and ICD evaluation. He is also being worked up for possible redo CABG/MVR.    -Recommended we proceed with EP study for VT induction, and possible LifeVest Rx --> ICD.  He is not willing to stay in the hospital (the case is booked for Thurs 9/17 if he is still here).  Explained the risks of sudden cardiac arrest while awaiting pertinent cardiac procedures, as well as the benefit of potentially finding out that he doesnt need an ICD if the EP study comes up negative.    He is pending discharge and has followup scheduled with Dr Coffman for Thurs 10/8 @ 220 PM.  f/u with his cardiologist Dr. Charan Mckeon after discharge    Wei Feliciano M.D.  Cardiac Electrophysiology  [phone]          EP   tele: NSR, no events  he denies palpitations, syncope, nor angina.  breathing easily.  pending discharge (today?).  we discussed plan for followup - he will see Dr Mckeon as scheduled ,but also has a followup with Dr Coffman at [address].  He has a moderately reduced LV EF and has spontaneous nonsustained VT on telemetry.  He is not a high risk patient for sudden cardiac arrest, but he is at elevated risk and needs further procedures to determine if he should have a defibrillator implanted.  At this time there is no plan for surgical intervention on his CAD or his Mitral Valve, and he requires at least 4 weeks of antibiotics for MSSA bacteremia.  He agrees to outpatient followup to set up a VT induction EP study with possible LifeVest prescription as an outcome.  A transvenous defibrillator would be delayed until infection is cleared.  acetaminophen   Tablet .. 650 milliGRAM(s) Oral every 6 hours PRN  apixaban 5 milliGRAM(s) Oral every 12 hours  aspirin  chewable 81 milliGRAM(s) Oral daily  atorvastatin 80 milliGRAM(s) Oral at bedtime  carvedilol 18.75 milliGRAM(s) Oral two times a day  ceFAZolin   IVPB 2000 milliGRAM(s) IV Intermittent every 12 hours  chlorhexidine 4% Liquid 1 Application(s) Topical <User Schedule>  furosemide    Tablet 40 milliGRAM(s) Oral daily  hydrALAZINE 100 milliGRAM(s) Oral three times a day  isosorbide   dinitrate Tablet (ISORDIL) 40 milliGRAM(s) Oral three times a day  LORazepam     Tablet 0.5 milliGRAM(s) Oral at bedtime  nystatin Powder 1 Application(s) Topical two times a day  pantoprazole    Tablet 40 milliGRAM(s) Oral two times a day  predniSONE   Tablet 20 milliGRAM(s) Oral daily  sertraline 50 milliGRAM(s) Oral daily  sodium chloride 0.9% lock flush 10 milliLiter(s) IV Push every 1 hour PRN  spironolactone 25 milliGRAM(s) Oral daily                          9.4    10.62 )-----------( 200      ( 16 Sep 2020 06:47 )             33.6       09-16    136  |  99  |  46<H>  ----------------------------<  94  3.8   |  21<L>  |  2.36<H>    Ca    9.6      16 Sep 2020 06:47  Phos  3.9     09-16  Mg     2.4     09-16      T(C): 37.1 (09-16-20 @ 11:24), Max: 37.1 (09-16-20 @ 11:24)  HR: 68 (09-16-20 @ 11:24) (60 - 83)  BP: 115/79 (09-16-20 @ 11:24) (105/66 - 138/75)  RR: 18 (09-16-20 @ 11:24) (18 - 19)  SpO2: 97% (09-16-20 @ 11:24) (95% - 99%)  Wt(kg): --    I&O's Summary    15 Sep 2020 07:01  -  16 Sep 2020 07:00  --------------------------------------------------------  IN: 1070 mL / OUT: 3050 mL / NET: -1980 mL    16 Sep 2020 07:01  -  16 Sep 2020 11:31  --------------------------------------------------------  IN: 240 mL / OUT: 400 mL / NET: -160 mL      A&O x 3  neurologically intact  JVP difficult to assess  RRR, no murmurs  CTAB  soft nt/nd  no c/c/e    TTE:  EF 35% with multiple wall motion abnormalities, on 9/8/2020  Myocardial viability study with viability of muliple LV walls.    A/P) He is a 64 y/o male PMH CAD s/p CABG (2005) with severe LV dysfunction (LVEF 35% on repeat echo 9/8/2020), NSVT, PAF, HTN, hyperlipidemia, CKD, CHER, a/w septic shock from cellulitis in setting of severe CHF decompensation. EP called for NSVT and ICD evaluation. He is also being worked up for possible redo CABG/MVR.    -Recommended we proceed with EP study for VT induction, and possible LifeVest Rx --> ICD.  He is not willing to stay in the hospital (the case is booked for Thurs 9/17 if he is still here).  Explained the risks of sudden cardiac arrest while awaiting pertinent cardiac procedures, as well as the benefit of potentially finding out that he doesnt need an ICD if the EP study comes up negative.    He is pending discharge and has followup scheduled with Dr Coffman for Thurs 10/8 @ 220 PM.  f/u with his cardiologist Dr. Charan Mckeon after discharge    Wei Feliciano M.D.  Cardiac Electrophysiology  [phone]

## 2020-09-16 NOTE — PROGRESS NOTE ADULT - PROBLEM SELECTOR PLAN 5
Cr elevated to 2.4 on admission. Baseline Cr 1.4-1.7 prior to this hospitalization.   -Continue to hold home Entresto   -Continue to monitor daily BMPs   -Improving now that lasix has been reduced  -Nephrology consulted for PICC clearance. Saying 2.2-2.5 may be patient's new baseline. CKD IV.   -Serum PTH ordered per Nephro Cr elevated to 2.4 on admission. Baseline Cr 1.4-1.7 prior to this hospitalization.   -Continue to hold home Entresto   -Continue to monitor daily BMPs   -Improving now that lasix has been reduced  -Nephrology consulted for PICC clearance. Saying 2.2-2.5 may be patient's new baseline. CKD IV.

## 2020-09-16 NOTE — PROGRESS NOTE ADULT - PROBLEM SELECTOR PLAN 4
Febrile on 9/9; bcx (9/9) being positive for MSSA  -ID consulted: c/w Ancef for MSSA bacteremia; needs 4 weeks from negative bcx   -repeat BCx 9/10: NGTD   -IR to place Roth catheter as patient's poor renal function makes him a poor candidate for PICC line Febrile on 9/9; bcx (9/9) being positive for MSSA  -ID consulted: c/w Ancef for MSSA bacteremia; needs 4 weeks from negative bcx   -repeat BCx 9/10: NGTD   -S/p Roth catheter placement by IR (9/15)

## 2020-09-16 NOTE — PROGRESS NOTE ADULT - SUBJECTIVE AND OBJECTIVE BOX
THONY ANAYA 65y [id number]    Patient is a 65y old  Male who presents with a chief complaint of RLE swelling (16 Sep 2020 15:00)      Follow Up/CC:  ID following for bacteremia    Interval History/ROS: no fever    Allergies    No Known Allergies    Intolerances        ANTIMICROBIALS:  ceFAZolin   IVPB 2000 every 12 hours      MEDICATIONS  (STANDING):  apixaban 5 milliGRAM(s) Oral every 12 hours  aspirin  chewable 81 milliGRAM(s) Oral daily  atorvastatin 80 milliGRAM(s) Oral at bedtime  carvedilol 18.75 milliGRAM(s) Oral two times a day  ceFAZolin   IVPB 2000 milliGRAM(s) IV Intermittent every 12 hours  chlorhexidine 4% Liquid 1 Application(s) Topical <User Schedule>  furosemide    Tablet 40 milliGRAM(s) Oral daily  hydrALAZINE 100 milliGRAM(s) Oral three times a day  isosorbide   dinitrate Tablet (ISORDIL) 40 milliGRAM(s) Oral three times a day  LORazepam     Tablet 0.5 milliGRAM(s) Oral at bedtime  nystatin Powder 1 Application(s) Topical two times a day  pantoprazole    Tablet 40 milliGRAM(s) Oral two times a day  predniSONE   Tablet 20 milliGRAM(s) Oral daily  sertraline 50 milliGRAM(s) Oral daily  spironolactone 25 milliGRAM(s) Oral daily    MEDICATIONS  (PRN):  acetaminophen   Tablet .. 650 milliGRAM(s) Oral every 6 hours PRN Temp greater or equal to 38C (100.4F)  sodium chloride 0.9% lock flush 10 milliLiter(s) IV Push every 1 hour PRN Pre/post blood products, medications, blood draw, and to maintain line patency        Vital Signs Last 24 Hrs  T(C): 37.1 (16 Sep 2020 11:24), Max: 37.1 (16 Sep 2020 11:24)  T(F): 98.8 (16 Sep 2020 11:24), Max: 98.8 (16 Sep 2020 11:24)  HR: 68 (16 Sep 2020 11:24) (60 - 77)  BP: 115/79 (16 Sep 2020 11:24) (115/79 - 138/75)  BP(mean): --  RR: 18 (16 Sep 2020 11:24) (18 - 19)  SpO2: 97% (16 Sep 2020 11:24) (95% - 98%)    CBC Full  -  ( 16 Sep 2020 06:47 )  WBC Count : 10.62 K/uL  RBC Count : 4.14 M/uL  Hemoglobin : 9.4 g/dL  Hematocrit : 33.6 %  Platelet Count - Automated : 200 K/uL  Mean Cell Volume : 81.2 fl  Mean Cell Hemoglobin : 22.7 pg  Mean Cell Hemoglobin Concentration : 28.0 gm/dL  Auto Neutrophil # : x  Auto Lymphocyte # : x  Auto Monocyte # : x  Auto Eosinophil # : x  Auto Basophil # : x  Auto Neutrophil % : x  Auto Lymphocyte % : x  Auto Monocyte % : x  Auto Eosinophil % : x  Auto Basophil % : x    09-16    136  |  99  |  46<H>  ----------------------------<  94  3.8   |  21<L>  |  2.36<H>    Ca    9.6      16 Sep 2020 06:47  Phos  3.9     09-16  Mg     2.4     09-16            MICROBIOLOGY:  .Blood Blood-Peripheral  09-10-20   No Growth Final  --  --      .Blood Blood-Venous  09-10-20   No Growth Final  --  --      .Urine Clean Catch (Midstream)  09-09-20   No growth  --  --      .Blood Blood-Peripheral  09-09-20   Growth in aerobic and anaerobic bottles: Staphylococcus aureus  "Due to technical problems, Proteus sp. will Not be reported as part of  the BCID panel until further notice"  ***Blood Panel PCR results on this specimen are available  approximately 3 hours after the Gram stain result.***  Gram stain, PCR, and/or culture results may not always  correspond due to difference in methodologies.  ************************************************************  This PCR assay was performed using [employer].  The following targets are tested for: Enterococcus,  vancomycin resistant enterococci, Listeria monocytogenes,  coagulase negative staphylococci, S. aureus,  methicillin resistant S. aureus, Streptococcus agalactiae  (Group B), S. pneumoniae, S. pyogenes (Group A),  Acinetobacter baumannii, Enterobacter cloacae, E. coli,  Klebsiella oxytoca, K. pneumoniae, Proteus sp.,  Serratia marcescens, Haemophilus influenzae,  Neisseria meningitidis, Pseudomonas aeruginosa, Candida  albicans, C. glabrata, C krusei, C parapsilosis,  C. tropicalis and the KPC resistance gene.  --  Blood Culture PCR  Staphylococcus aureus      .Blood Blood-Peripheral  08-31-20   No Growth Final  --  --      .Blood Blood-Peripheral  08-27-20   Growth in anaerobic bottle: Staphylococcus hominis Coag Negative  Staphylococcus  Single set isolate, possible contaminant. Contact  Microbiology if susceptibility testing clinically  indicated.  "Due to technical problems, Proteus sp. will Not be reported as part of  the BCID panel until further notice"  ***Blood Panel PCR results on this specimen are available  approximately 3 hours after the Gram stain result.***  Gram stain, PCR, and/or culture results may not always  correspond due to difference in methodologies.  ************************************************************  This PCR assay was performed using [employer].  The following targets are tested for: Enterococcus,  vancomycin resistant enterococci, Listeria monocytogenes,  coagulase negative staphylococci, S. aureus,  methicillin resistant S. aureus, Streptococcus agalactiae  (Group B), S. pneumoniae, S. pyogenes (Group A),  Acinetobacter baumannii, Enterobacter cloacae, E. coli,  Klebsiella oxytoca, K. pneumoniae, Proteus sp.,  Serratia marcescens, Haemophilus influenzae,  Neisseria meningitidis, Pseudomonas aeruginosa, Candida  albicans, C. glabrata, C krusei, C parapsilosis,  C. tropicalis and the KPC resistance gene.  --  Blood Culture PCR      .Urine Clean Catch (Midstream)  08-25-20   No growth  --  --      .Blood Blood-Peripheral  08-24-20   No Growth Final  --  --      COVID-19 IgG Antibody Index: 4.15: [employer] CMIA   Negative Result <= 1.39 Index   Positive Result >= 1.40 Index Index   COVID-19 IgG Antibody Interpretation: Positive: This test has not been reviewed by the FDA by the standard review   process. It has been authorized for emergency use by the FDA. [facility]       RADIOLOGY    < from: Xray Chest 1 View- PORTABLE-Urgent (Xray Chest 1 View- PORTABLE-Urgent .) (09.09.20 @ 09:41) >  Median sternotomy     No pneumothorax. The lungs are clear.    < end of copied text >

## 2020-09-16 NOTE — PROGRESS NOTE ADULT - PROBLEM SELECTOR PLAN 9
-c/w home prednisone 20mg qday

## 2020-09-16 NOTE — PROGRESS NOTE ADULT - ATTENDING COMMENTS
Patient ready for discharge. Outpatient f/u EP, cards, rheum, renal.   1 hour spent in preparation of discharge    Salima Alanis D.O.  Hospitalist Pager # [phone]

## 2020-09-16 NOTE — PROGRESS NOTE ADULT - ATTENDING COMMENTS
Orion Ramirez  Attending Physician   Division of Infectious Disease  Pager #[phone]  After 5pm/weekend or no response, call #[phone]

## 2020-09-16 NOTE — PROGRESS NOTE ADULT - PROVIDER SPECIALTY LIST ADULT
Cardiology
Electrophysiology
Heart Failure
Infectious Disease
Internal Medicine
Nephrology
Nephrology
Plastic Surgery
SICU
Structural Heart
SERAFIN
Cardiology
Electrophysiology
Structural Heart
Electrophysiology
Electrophysiology
Heart Failure
Structural Heart
Internal Medicine
Internal Medicine

## 2020-09-16 NOTE — PROGRESS NOTE ADULT - REASON FOR ADMISSION
RLE swelling

## 2020-09-16 NOTE — PROGRESS NOTE ADULT - PROBLEM SELECTOR PROBLEM 9
Pemphigus vulgaris

## 2020-09-16 NOTE — PROGRESS NOTE ADULT - PROBLEM SELECTOR PLAN 8
-s/p vanc - improved  -Wound care saw patient (8/25). Redressed wound. Recommended plastics consult.   -Plastics consulted 8/26. Recommended local wound care and outpatient follow up (Dr. Marquez [phone])

## 2020-09-16 NOTE — PROGRESS NOTE ADULT - SUBJECTIVE AND OBJECTIVE BOX
[facility]   MD CRYS PIERCE MD RUORU WONG, PA    TEL:  OFFICE: [phone]  DR LEE CELL: [phone]  NIKA CUNNINGHAM CELL: [phone]  DR. GOODWIN CELL: [phone]  DR. STEVENSON CELL: [phone]    FROM 5 PM - 7 AM PLEASE CALL ANSWERING SERVICE: [phone]    RENAL FOLLOW UP NOTE--Date of Service 09-16-20 @ 09:13  --------------------------------------------------------------------------------  HPI:      Pt seen and examined at bedside.   Denies SOB, chest pain     PAST HISTORY  --------------------------------------------------------------------------------  No significant changes to PMH, PSH, FHx, SHx, unless otherwise noted    ALLERGIES & MEDICATIONS  --------------------------------------------------------------------------------  Allergies    No Known Allergies    Intolerances      Standing Inpatient Medications  apixaban 5 milliGRAM(s) Oral every 12 hours  aspirin  chewable 81 milliGRAM(s) Oral daily  atorvastatin 80 milliGRAM(s) Oral at bedtime  carvedilol 18.75 milliGRAM(s) Oral two times a day  ceFAZolin   IVPB 2000 milliGRAM(s) IV Intermittent every 12 hours  chlorhexidine 4% Liquid 1 Application(s) Topical <User Schedule>  furosemide    Tablet 40 milliGRAM(s) Oral daily  hydrALAZINE 100 milliGRAM(s) Oral three times a day  isosorbide   dinitrate Tablet (ISORDIL) 40 milliGRAM(s) Oral three times a day  LORazepam     Tablet 0.5 milliGRAM(s) Oral at bedtime  nystatin Powder 1 Application(s) Topical two times a day  pantoprazole    Tablet 40 milliGRAM(s) Oral two times a day  predniSONE   Tablet 20 milliGRAM(s) Oral daily  sertraline 50 milliGRAM(s) Oral daily  spironolactone 25 milliGRAM(s) Oral daily    PRN Inpatient Medications  acetaminophen   Tablet .. 650 milliGRAM(s) Oral every 6 hours PRN  sodium chloride 0.9% lock flush 10 milliLiter(s) IV Push every 1 hour PRN      REVIEW OF SYSTEMS  --------------------------------------------------------------------------------  General: no fever  CVS: no chest pain  RESP: no sob, no cough  ABD: no abdominal pain  : no dysuria,  MSK: no edema     VITALS/PHYSICAL EXAM  --------------------------------------------------------------------------------  T(C): 36.8 (09-16-20 @ 04:23), Max: 36.8 (09-15-20 @ 09:51)  HR: 60 (09-16-20 @ 06:05) (60 - 83)  BP: 129/69 (09-16-20 @ 04:23) (105/66 - 138/75)  RR: 18 (09-16-20 @ 04:23) (18 - 19)  SpO2: 98% (09-16-20 @ 06:05) (95% - 99%)  Wt(kg): --  Height (cm): 172.7 (09-14-20 @ 12:47)  Weight (kg): 121 (09-14-20 @ 12:47)  BMI (kg/m2): 40.6 (09-14-20 @ 12:47)  BSA (m2): 2.31 (09-14-20 @ 12:47)      09-15-20 @ 07:01  -  09-16-20 @ 07:00  --------------------------------------------------------  IN: 1070 mL / OUT: 3050 mL / NET: -1980 mL      Physical Exam:  	Gen: NAD  	HEENT: MMM  	Pulm: CTA B/L  	CV: S1S2  	Abd: Soft, +BS  	Ext: No LE edema B/L                      Neuro: Awake alrt  	Skin: Warm and Dry   	Vascular access: no hd catheter           no  parks  LABS/STUDIES  --------------------------------------------------------------------------------              9.4    10.62 >-----------<  200      [09-16-20 @ 06:47]              33.6     136  |  99  |  46  ----------------------------<  94      [09-16-20 @ 06:47]  3.8   |  21  |  2.36        Ca     9.6     [09-16-20 @ 06:47]      Mg     2.4     [09-16-20 @ 06:47]      Phos  3.9     [09-16-20 @ 06:47]        PTT: 92.9       [09-15-20 @ 07:14]      Creatinine Trend:  SCr 2.36 [09-16 @ 06:47]  SCr 2.21 [09-15 @ 07:14]  SCr 2.26 [09-14 @ 04:57]  SCr 2.08 [09-13 @ 11:50]  SCr 2.22 [09-12 @ 09:33]    Urinalysis - [09-09-20 @ 20:40]      Color Light Yellow / Appearance Clear / SG 1.015 / pH 5.5      Gluc Negative / Ketone Negative  / Bili Negative / Urobili <2 mg/dL       Blood Negative / Protein Negative / Leuk Est Negative / Nitrite Negative      RBC  / WBC  / Hyaline  / Gran  / Sq Epi  / Non Sq Epi  / Bacteria       Iron 26, TIBC 293, %sat 9      [08-30-20 @ 09:43]  Ferritin 151      [08-30-20 @ 09:41]  HbA1c 6.1      [12-28-19 @ 03:30]  TSH 3.87      [08-30-20 @ 09:41]    HBsAg Nonreact      [09-01-20 @ 23:46]

## 2020-09-16 NOTE — PROGRESS NOTE ADULT - SUBJECTIVE AND OBJECTIVE BOX
S: Denies chest pain or SOB. Review of systems otherwise (-)    MEDICATIONS  (STANDING):  apixaban 5 milliGRAM(s) Oral every 12 hours  aspirin  chewable 81 milliGRAM(s) Oral daily  atorvastatin 80 milliGRAM(s) Oral at bedtime  carvedilol 18.75 milliGRAM(s) Oral two times a day  ceFAZolin   IVPB 2000 milliGRAM(s) IV Intermittent every 12 hours  chlorhexidine 4% Liquid 1 Application(s) Topical <User Schedule>  furosemide    Tablet 40 milliGRAM(s) Oral daily  hydrALAZINE 100 milliGRAM(s) Oral three times a day  isosorbide   dinitrate Tablet (ISORDIL) 40 milliGRAM(s) Oral three times a day  LORazepam     Tablet 0.5 milliGRAM(s) Oral at bedtime  nystatin Powder 1 Application(s) Topical two times a day  pantoprazole    Tablet 40 milliGRAM(s) Oral two times a day  predniSONE   Tablet 20 milliGRAM(s) Oral daily  sertraline 50 milliGRAM(s) Oral daily  spironolactone 25 milliGRAM(s) Oral daily    MEDICATIONS  (PRN):  acetaminophen   Tablet .. 650 milliGRAM(s) Oral every 6 hours PRN Temp greater or equal to 38C (100.4F)  sodium chloride 0.9% lock flush 10 milliLiter(s) IV Push every 1 hour PRN Pre/post blood products, medications, blood draw, and to maintain line patency      LABS:                      9.4    10.62 )-----------( 200      ( 16 Sep 2020 06:47 )             33.6     136  |  99  |  46<H>  ----------------------------<  94  3.8   |  21<L>  |  2.36<H>    Ca    9.6      16 Sep 2020 06:47  Phos  3.9     09-16  Mg     2.4     09-16    Creatinine Trend: 2.36<--, 2.21<--, 2.26<--, 2.08<--, 2.22<--, 2.27<--     PHYSICAL EXAM  Vital Signs Last 24 Hrs  T(C): 37.1 (16 Sep 2020 11:24), Max: 37.1 (16 Sep 2020 11:24)  T(F): 98.8 (16 Sep 2020 11:24), Max: 98.8 (16 Sep 2020 11:24)  HR: 68 (16 Sep 2020 11:24) (60 - 77)  BP: 115/79 (16 Sep 2020 11:24) (115/79 - 138/75)  RR: 18 (16 Sep 2020 11:24) (18 - 19)  SpO2: 97% (16 Sep 2020 11:24) (95% - 98%)      Gen: NAD  HEENT:  (-)icterus (-)pallor  CV: N S1 S2 1/6 KATHERYN (+)2 Pulses B/l  Resp: CTA B/L, normal effort  GI: (+) BS Soft, NT, ND  Lymph:  (-) edema, (-)obvious lymphadenopathy  Skin: Warm to touch, Normal turgor  Psych: Appropriate mood and affect      TELEMETRY: SB/SR 50-70s, PVCs    Echo: < from: TTE with Doppler (w/Cont) (08.27.20 @ 07:54) >  Conclusions:  1. Mitral valve not well visualized. Mitral annular  calcification. Tethered mitral valve leaflets with normal  opening. Mild moderate mitral regurgitation.  2. Calcified trileaflet aortic valve with decreased  opening. Peak transaortic valve gradient equals 12 mm Hg,  mean transaortic valve gradient equals 8 mm Hg, at least  mild AS. Limited gradient/Doppler evaluation. Mild-moderate  aortic regurgitation.  3. Endocardial visualization enhanced with intravenous  injection of Ultrasonic Enhancing Agent (Definity).  Moderate to severe  global left ventricular systolic  dysfunction. There dyssynchronous left ventricular  contraction.  4. Moderate diastolic dysfunction (Stage II). Increased  E/e'  is consistent with elevated left ventricular filling  pressure.  *** Compared with echocardiogram report of 12/13/2017,  Limited comparison.  Disatolic dysfunction is worse.    < end of copied text >    < from: Transesophageal Echocardiogram w/o TTE (09.01.20 @ 14:15) >  Conclusions:  1. Tethered mitral valve leaflets with normal opening.  Severe mitral regurgitation. There is systolic flow  reversal in the right upper pulmonary vein. By PISA,  calculated ERO=43 mmsq and regurgitant volume=73 mL (Pisa  rad 1cm, Va 34.5 cm/s, Vm 5.0 m/s,  cm)  2. Calcified trileaflet aortic valve with normal opening.  Moderate aortic regurgitation. Vena contracta=0.4 cm.  3. Normal aortic root size. (Ao: 3.6 cm at the sinuses of  Valsalva). Midlly dilated ascending aorta for BSA  (ascending aorta diameter 4.5 cm approximately 5 cm distal  to the aortic valve.  Normal size aortic arch, and  descending thoracic aorta.  Minimal atheroma.  4. Left atrial enlargement. No left atrial or left atrial  appendage thrombus. Normal left atrial appendage function  (maximum velocity>40 cm/s).  5. Moderate left ventricular systolic dysfunction.  Paradoxical septal motion consistent with prior cardiac  surgery.  6. Normal right ventricular size and function.    < end of copied text >      < from: Cardiac Viability (09.04.20 @ 14:15) >  GATED ANALYSIS:  Rest gated wall motion analysis was performed (LVEF = 47  %;LVEDV = 195 ml.), revealing moderately reduced  LV  function.  ------------------------------------------------------------------------  IMPRESSIONS:Abnormal Study  * The left ventricle was markdely dilated at baseline.  There is viability in the apex, inferior, septal,  anterolateral, and mid to basal inferolateral walls. On  delayed imaging, there is appears to be partial viability  in the distal inferolateral wall.  * Rest gated wall motion analysis was performed (LVEF = 47  %;LVEDV = 195 ml.), revealing moderately reduced  LV  function.  Conclusion:  The left ventricle was markdely dilated at baseline. There  is viability in the apex, inferior, septal, anterolateral,  and mid to basal inferolateral walls. On delayed imaging,  there also appears to be partial viability in the distal  inferolateral wall.  ------------------------------------------------------------------------  Confirmed on  9/4/2020 - 16:56:37 by Beto Norton M.D.    < end of copied text >      < from: Transesophageal Echocardiogram w/o TTE (09.14.20 @ 13:03) >  Conclusions:  1. Tethered mitral valve leaflets with normal opening.  Moderate mitral regurgitation seen at systolic BP<140 mmHg.  Moderate-severe mitral regurgitation seen at higher BPs. By  PISA, calculated ERO 26 mmsq and regurgitant volume 48 mL  (Pisa rad 0.9 cm, Va 26 cm/s, Vm 5.0 m/s,  cm) 3D  vena contracta=0.39 cmsq. There is systolic blunting  without reversal seen in the pulmonary veins.  2. Calcified trileaflet aortic valve with normal opening.  Mild-moderate aortic regurgitation. Vena contracta 0.4 cm.  3. Left atrial enlargement. No left atrial or left atrial  appendage thrombus. Normal left atrial appendage function  (maximum velocity>40 cm/s).  4. Mild left ventricular systolic dysfunction. The septum  is hypokinetic.  5. Normal right ventricular size and function.  6. Normal tricuspid valve. Minimal tricuspid regurgitation.  7. No vegetations seen.  *** Compared with transesophageal echocardiogram of  9/1/2020, degree of mitral regurgitation is improved on  today's study. Pulmonary vein systolic flow reversal was  seen on the prior study.    < end of copied text >      ASSESSMENT/PLAN: Patient is a 65 year old male with PMH of CAD s/p CABG with most recent LHC 1/2020 with no intervention, chronic systolic CHF (LVEF 39% TTE 1/2020), Afib (on Eliquis), HTN, HLD, CKD, CHER (on CPAP), and pemphigus vulgaris who presented with RLE swelling admitted with RLE cellulitis and NARCISO on CKD. Cardiology consulted for management of CAD/CHF.    - Heart failure follow up - Continue PO lasix - daily standing weights, strict I/Os  - Continue medical management of known CAD with systolic cardiomyopathy - ASA/Statin, Coreg, Hydralazine/Isordil  - Resume AC with Eliquis for CVA prevention when ok with primary team   - Abx per ID - BCx with staph, repeat negative, s/p tunneled catheter for abx  - RHC performed  - Repeat CHANDRA noted with mod MR, structural heart f/u noted - no surgical intervention  - EP f/u appreciated - rec EP study but patient does not want to stay for it  - No further inpatient cardiac w/u planned  - D/C planning per primary team  - Patient to f/u with his cardiologist Dr. Mckeon after d/c

## 2020-09-16 NOTE — CHART NOTE - NSCHARTNOTESELECT_GEN_ALL_CORE
Nutrition Services
Transfer Note
Bedside TTE/Event Note
Clinical Trial Enrollment/Event Note
Event Note
Event Note/Sepsis
Nutrition Services
Transfer Note

## 2020-09-16 NOTE — PROGRESS NOTE ADULT - PROBLEM SELECTOR PLAN 2
Severe MR on CHANDRA (9/1). Improved to mod-severe on repeat CHANDRA (9/14)   -F/u  recs for future surgical planning (repeat CABG vs Mitrclip) Severe MR on CHANDRA (9/1). Improved to mod-severe on repeat CHANDRA (9/14)   - final recs: Patient has improved disease after aggressive uptitration of blood pressure medication and improvement in his volume status so he is not appropriate candidate for re-do CABG at this time or Mitraclip.

## 2020-09-23 ENCOUNTER — APPOINTMENT (OUTPATIENT)
Dept: HEART FAILURE | Facility: CLINIC | Age: 65
End: 2020-09-23

## 2020-09-23 RX ORDER — PANTOPRAZOLE 40 MG/1
40 TABLET, DELAYED RELEASE ORAL
Refills: 0 | Status: ACTIVE | COMMUNITY
Start: 2020-09-23

## 2020-09-23 RX ORDER — SPIRONOLACTONE 25 MG/1
25 TABLET ORAL DAILY
Qty: 30 | Refills: 3 | Status: ACTIVE | COMMUNITY
Start: 2020-09-23

## 2020-09-23 RX ORDER — APIXABAN 5 MG/1
5 TABLET, FILM COATED ORAL TWICE DAILY
Refills: 0 | Status: ACTIVE | COMMUNITY
Start: 2020-09-23

## 2020-09-23 RX ORDER — ISOSORBIDE DINITRATE 20 MG/1
20 TABLET ORAL
Refills: 0 | Status: ACTIVE | COMMUNITY
Start: 2020-09-23

## 2020-09-23 RX ORDER — HYDRALAZINE HYDROCHLORIDE 100 MG/1
100 TABLET ORAL
Qty: 90 | Refills: 3 | Status: ACTIVE | COMMUNITY
Start: 2020-09-23

## 2020-09-23 RX ORDER — CARVEDILOL 6.25 MG/1
6.25 TABLET, FILM COATED ORAL
Refills: 0 | Status: ACTIVE | COMMUNITY
Start: 2020-09-23

## 2020-09-23 RX ORDER — FUROSEMIDE 40 MG/1
40 TABLET ORAL
Qty: 90 | Refills: 3 | Status: ACTIVE | COMMUNITY
Start: 2020-09-23

## 2020-10-07 NOTE — ED PROVIDER NOTE - DATE/TIME 5
Medication cannot be filled at [facility] since patient insurance is no longer accepted there. Please send medication to;    Brandi: [address] [place] [address], [address]    Phone: [phone]    Please advise. 24-Aug-2020 17:50

## 2020-12-22 NOTE — H&P ADULT - NSHPPOADEEPVENOUSTHROMB_GEN_A_CORE
1. Oral means of nutrition/ hydration/ medication contraindicated given abovementioned pharyngeal phase deficits 2. Defer to MD for nutritional plan of care/ goals of care discussion with patient and family PO appears contraindicated at this time, consideration for short term alternate means of nutrition/hydration/medication suspected 1. PO remains contraindicated, continue NGT feeds to meet nutrition/hydration/medication needs 2. Consideration for goals of care/nutritional plan of care conversation with family

## 2021-01-04 NOTE — PROGRESS NOTE ADULT - PROVIDER SPECIALTY LIST ADULT
Gastroenterology Rituxan Pregnancy And Lactation Text: This medication is Pregnancy Category C and it isn't know if it is safe during pregnancy. It is unknown if this medication is excreted in breast milk but similar antibodies are known to be excreted.

## 2021-01-08 NOTE — PHYSICAL THERAPY INITIAL EVALUATION ADULT - WEIGHT-BEARING RESTRICTIONS: SIT/STAND, REHAB EVAL
You can access the FollowMyHealth Patient Portal offered by [facility] by registering at the following website: [email or web address]. By joining [employer]’s FollowMyHealth portal, you will also be able to view your health information using other applications (apps) compatible with our system. full weight-bearing

## 2021-01-22 NOTE — ED PROCEDURE NOTE - CPROC ED TRACHE INTUB DETAIL1
Difficult/crash intubation (see additional details section). Patient was pre-oxygenated. An endotracheal tube (ETT) was placed through the vocal cords into the trachea.  ETT position was confirmed by auscultation of bilateral breath sounds to all lung fields. ETCO2 level was appropriate./Patient connected to ventilator with settings as ordered./Difficult/crash intubation (see additional details section). Graft Donor Site Bandage (Optional-Leave Blank If You Don't Want In Note): Steri-strips and a pressure bandage were applied to the donor site.

## 2021-05-30 NOTE — ED ADULT TRIAGE NOTE - CCCP TRG CHIEF CMPLNT
shortness of breath

## 2021-06-29 NOTE — H&P ADULT - PROBLEM SELECTOR PLAN 1
Hpi Title: Evaluation of Skin Lesions How Severe Are Your Spot(S)?: moderate Family Member: Mother Patient with prior nuclear stress test with EF 46% with reported hypokinesis  Possible baseline systolic heart failure  Patient's symptomology suggestive of acute HF in setting of elevated BNP  Reviewed cardiology note. Continue with IV diuresis with Lasix 80 BID  Monitor I/O  Daily weights  CHeck TTE  Primary day team to f/u with outpatient cardiologist for collateral information and/or consultation. F/U with house cardiology team for need for continued management. Patient with prior nuclear stress test with EF 46% with reported hypokinesis  Possible baseline systolic heart failure  Patient's symptomology suggestive of acute HF in setting of elevated BNP  Reviewed cardiology note. Continue with IV diuresis with Lasix 80 BID  Monitor I/O- patient urinated ~1000 cc overnight  Daily weights  Patient states feels better with BiPAP vs NC. Will continue for now.  Primary day team to re-eval patient's symptoms in the AM and consider adding diet if patient with no need for BiPAP during the day  Check TTE  Monitor on tele  Trend CE  Primary day team to f/u with outpatient cardiologist for collateral information and/or consultation. F/U with house cardiology team for need for continued management.

## 2021-09-24 NOTE — CONSULT NOTE ADULT - PROBLEM/RECOMMENDATION-1
DISPLAY PLAN FREE TEXT
DISPLAY PLAN FREE TEXT
Purse String (Intermediate) Text: Given the location of the defect and the characteristics of the surrounding skin a purse string intermediate closure was deemed most appropriate.  Undermining was performed circumfirentially around the surgical defect.  A purse string suture was then placed and tightened.

## 2021-12-07 NOTE — PATIENT PROFILE ADULT. - HEALTHCARE INFORMATION NEEDED, PROFILE
[] : Fellow [FreeTextEntry3] : I was physically present for the key portions of the evaluation and management service provided.  I agree with the history and physical, and plan which I have reviewed and edited where appropriate.\par \par  none

## 2022-03-27 NOTE — H&P ADULT. - MS EXT PE MLT D E PC
75 year old male with past medical history of meningioma, advanced dementia, and seizure disorder on anti-epileptics presenting with SVT rates of 160-180 in the setting fo sepsis of unknown etiology. Given amiodarone and the rhythm broke into SR. CT chest negative for PE. pertinent labs include severe hyponatremia, indeterminate hs-trop ~50s downtrending, pBNP ~2500, normal CK-MB, negative blood cultures, normal ammonia, leukocytosis ~13. pro-cecelia 0.44. HDS on abx.     Start metoprolol tartrate 12.5 mg BID (alternatively IV metoprolol 5 mg q6h) and up-titrate as tolerated.   Continue workup for infectious etiology.   Pending ECHO.   Discontinue A/C as he had SVT rather than AF.     Andre Powell MD, MPH, LACHELLE, RPVI, [facility]  Inpatient Cardiovascular Specialist; Candi Cruz [facility], [facility]  ; [employer] at [facility]  message: [email or web address] [phone] or Microsoft Teams (text preferred and/or call)  email: [email or web address] Cardi[facility] on-service contact/call information, go to [email or web address] and use [email or web address]" to login.  Outpatient Cardiology appointments, call  [phone] to arrange with a colleague; I do not have outpatient Cardiology clinic.
no cyanosis/no pitting edema b/l below the knee

## 2022-05-18 NOTE — DISCHARGE NOTE ADULT - NS AS DC AMI BB YN
Final Size Statement: The size of the lesion after curettage was Size Of Lesion In Cm: 2.1 Concentration (Mg/Ml Or Millions Of Plaque Forming Units/Cc): 0.01 Accession # (Optional): [id number] Anesthesia Volume In Cc: 3 Add Ability To Document Additional Intralesional Injection: No Size Of Lesion After Curettage: 2.3 What Was Performed First?: Curettage Body Location Override (Optional - Billing Will Still Be Based On Selected Body Map Location If Applicable): left lateral deltoid Biopsy Photograph Reviewed: Yes Bill As A Line Item Or As Units: Line Item Consent was obtained from the patient. The risks, benefits and alternatives to therapy were discussed in detail. Specifically, the risks of infection, scarring, bleeding, prolonged wound healing, nerve injury, incomplete removal, allergy to anesthesia and recurrence were addressed. Alternatives to ED&C, such as: surgical removal and XRT were also discussed.  Prior to the procedure, the treatment site was clearly identified and confirmed by the patient. All components of Universal Protocol/PAUSE Rule completed. Anesthesia Type: 0.5% lidocaine with 1:200,000 epinephrine and a 1:10 solution of 8.4% sodium bicarbonate Post-Care Instructions: I reviewed with the patient in detail post-care instructions. Patient is to keep the area dry for 48 hours, and not to engage in any swimming until the area is healed. Should the patient develop any fevers, chills, bleeding, severe pain patient will contact the office immediately. Additional Information: (Optional): The wound was cleaned, and a pressure dressing was applied.  The patient received detailed post-op instructions. Cautery Type: aluminum chloride and electrodesiccation Total Volume (Ccs): 1 Detail Level: Detailed yes

## 2022-08-11 NOTE — PROGRESS NOTE ADULT - PROBLEM SELECTOR PROBLEM 1
CHF (congestive heart failure) Purse String (Intermediate) Text: Given the location of the defect and the characteristics of the surrounding skin a purse string intermediate closure was deemed most appropriate.  Undermining was performed circumfirentially around the surgical defect.  A purse string suture was then placed and tightened.

## 2022-09-19 NOTE — PROGRESS NOTE ADULT - ATTENDING COMMENTS
CHANDRA deferred in favor of obtaining hemodynamics first to ensure he is optimized before re-assessing his MV.  Eliquis given this morning so will do RHC Friday afternoon. If hemodynamics are compromised, will leave PAC in place to optimize him in CCU.  Increase hydralazine to 60 mg for next dose and then to 75 mg for evening dose if SBP > 90.  Replete KCl. English

## 2022-11-18 NOTE — PATIENT PROFILE ADULT. - PRESSURE ULCER(S)
Name: Bacilio Lara      : 1963      MRN: [id number]  Encounter Provider: Nadia Velazco MD  Encounter Date: 2022   Encounter department: [place] [id number]  IFG (impaired fasting glucose)  Assessment & Plan:  Chronic asymptomatic fair control encouraged patient to continue with the low carb diet important lose weight    Orders:  -     CBC and differential; Future  -     Comprehensive metabolic panel; Future  -     Lipid panel; Future  -     TSH, 3rd generation with Free T4 reflex; Future    2  Essential hypertension  Assessment & Plan:  Chronic asymptomatic fair control continue with the low-salt diet and increase physical activity    Orders:  -     CBC and differential; Future  -     Comprehensive metabolic panel; Future  -     Lipid panel; Future  -     TSH, 3rd generation with Free T4 reflex; Future    3  Mixed hyperlipidemia  Assessment & Plan:  Chronic asymptomatic fair control continue atorvastatin 20 mg once a day low-fat diet review with the patient    Orders:  -     CBC and differential; Future  -     Comprehensive metabolic panel; Future  -     Lipid panel; Future  -     TSH, 3rd generation with Free T4 reflex; Future    4  Polyp of colon, unspecified part of colon, unspecified type  Assessment & Plan:  History of colon polyp patient due for colonoscopy scheduled for 1 in 2023             Subjective      Patient here follow-up with a chronic condition patient compliant with the medication tolerated well without side effect also he compliant with the low carb low-salt diet recent blood work review    Review of Systems   Constitutional: Negative for chills and fever  HENT: Negative for ear pain and sore throat  Eyes: Negative for pain and visual disturbance  Respiratory: Negative for cough and shortness of breath  Cardiovascular: Negative for chest pain and palpitations     Gastrointestinal: Negative for abdominal pain and vomiting  Genitourinary: Negative for dysuria and hematuria  Musculoskeletal: Negative for arthralgias and back pain  Skin: Negative for color change and rash  Neurological: Negative for seizures and syncope  All other systems reviewed and are negative  Current Outpatient Medications on File Prior to Visit   Medication Sig   • atorvastatin (LIPITOR) 20 mg tablet TAKE 1 TABLET DAILY   • Multiple Vitamin (MULTI-VITAMIN DAILY PO) Take 1 capsule by mouth   • Testosterone 12 5 MG/ACT (1%) GEL 2 pumps daily       Objective     /80 (BP Location: Left arm, Patient Position: Sitting, Cuff Size: Large)   Pulse 60   Temp (!) 97 1 °F (36 2 °C) (Tympanic)   Resp 16   Ht 5' 11" (1 803 m)   Wt 96 6 kg (213 lb)   SpO2 95%   BMI 29 71 kg/m²     Physical Exam  Vitals and nursing note reviewed  Constitutional:       General: He is not in acute distress  Appearance: He is well-developed and well-nourished  He is not diaphoretic  HENT:      Head: Normocephalic  Right Ear: External ear normal       Left Ear: External ear normal    Eyes:      General:         Right eye: No discharge  Left eye: No discharge  Extraocular Movements: EOM normal       Conjunctiva/sclera: Conjunctivae normal    Neck:      Vascular: No JVD  Cardiovascular:      Rate and Rhythm: Normal rate and regular rhythm  Heart sounds: Normal heart sounds  No murmur heard  No gallop  Pulmonary:      Effort: Pulmonary effort is normal  No respiratory distress  Breath sounds: Normal breath sounds  No stridor  No wheezing or rales  Chest:      Chest wall: No tenderness  Abdominal:      General: There is no distension  Palpations: Abdomen is soft  There is no mass  Tenderness: There is no abdominal tenderness  There is no rebound  Musculoskeletal:         General: No tenderness or edema  Cervical back: Normal range of motion and neck supple     Lymphadenopathy:      Cervical: No cervical adenopathy  Skin:     General: Skin is warm  Findings: No erythema or rash  Neurological:      Mental Status: He is alert and oriented to person, place, and time         Dahlia Connor MD no

## 2022-12-23 NOTE — DISCHARGE NOTE ADULT - PRINCIPAL DIAGNOSIS
----- Message from Spencer Jacobo III, MD sent at 12/23/2022 10:26 AM CST -----  6 month recheck  FOLLOW-UP AS RECOMMENDED   NSTEMI (non-ST elevated myocardial infarction)

## 2023-01-26 NOTE — DISCHARGE NOTE ADULT - MEDICATION SUMMARY - MEDICATIONS TO TAKE
I will START or STAY ON the medications listed below when I get home from the hospital:    aspirin 325 mg oral tablet  -- 1 tab(s) by mouth once a day  -- Indication: For NSTEMI (non-ST elevated myocardial infarction)    nitroglycerin  --     -- Indication: For NSTEMI (non-ST elevated myocardial infarction)    heparin 100 units/mL-D5% intravenous solution  --  intravenous   -- Indication: For NSTEMI (non-ST elevated myocardial infarction)    atorvastatin 80 mg oral tablet  -- 1 tab(s) by mouth once a day (at bedtime)  -- Indication: For NSTEMI (non-ST elevated myocardial infarction)    clopidogrel 75 mg oral tablet  -- 1 tab(s) by mouth once a day  -- Indication: For NSTEMI (non-ST elevated myocardial infarction)    carvedilol 25 mg oral tablet  -- 1 tab(s) by mouth every 12 hours  -- Indication: For Hypertensive emergency O-T Plasty Text: The defect edges were debeveled with a #15 scalpel blade.  Given the location of the defect, shape of the defect and the proximity to free margins an O-T plasty was deemed most appropriate.  Using a sterile surgical marker, an appropriate O-T plasty was drawn incorporating the defect and placing the expected incisions within the relaxed skin tension lines where possible.    The area thus outlined was incised deep to adipose tissue with a #15 scalpel blade.  The skin margins were undermined to an appropriate distance in all directions utilizing iris scissors.

## 2023-07-03 NOTE — PROGRESS NOTE ADULT - PROBLEM/PLAN-4
DISPLAY PLAN FREE TEXT Nsaids Pregnancy And Lactation Text: These medications are considered safe up to 30 weeks gestation. It is excreted in breast milk.

## 2023-07-25 NOTE — PROGRESS NOTE ADULT - SUBJECTIVE AND OBJECTIVE BOX
THONY ANAYA    HPI:  This is a patient with CKD, does not know the baseline, HTN, Suspected CHER, CHF and CAD. He was admitted with Decompensated CHF and had NARCISO on CKD. Started on IV Furosemide, changed to PO today. He feels better but is mildly dyspneic.  HEALTH ISSUES - PROBLEM Dx:  NARCISO (acute kidney injury): NARCISO (acute kidney injury)  Hypokalemia: Hypokalemia  Acute on chronic congestive heart failure, unspecified congestive heart failure type: Acute on chronic congestive heart failure, unspecified congestive heart failure type  Essential hypertension: Essential hypertension  Medication management: Medication management  Prophylactic measure: Prophylactic measure  CKD (chronic kidney disease) stage 3, GFR 30-59 ml/min: CKD (chronic kidney disease) stage 3, GFR 30-59 ml/min  Hypertension: Hypertension  Leukocytosis: Leukocytosis  Arrhythmia: Arrhythmia  CAD (coronary artery disease): CAD (coronary artery disease)  Chest pain: Chest pain  Acute decompensated heart failure: Acute decompensated heart failure        MEDICATIONS  (STANDING):  amLODIPine   Tablet 5 milliGRAM(s) Oral daily  aspirin enteric coated 81 milliGRAM(s) Oral daily  atorvastatin 80 milliGRAM(s) Oral at bedtime  carvedilol 25 milliGRAM(s) Oral every 12 hours  furosemide    Tablet 80 milliGRAM(s) Oral two times a day  heparin  Injectable 5000 Unit(s) SubCutaneous every 12 hours  influenza   Vaccine 0.5 milliLiter(s) IntraMuscular once  isosorbide   mononitrate ER Tablet (IMDUR) 60 milliGRAM(s) Oral daily  pantoprazole    Tablet 40 milliGRAM(s) Oral two times a day before meals  potassium chloride    Tablet ER 40 milliEquivalent(s) Oral once  potassium chloride   Powder 40 milliEquivalent(s) Oral once  sertraline 50 milliGRAM(s) Oral daily  ticagrelor 90 milliGRAM(s) Oral two times a day    MEDICATIONS  (PRN):  ALPRAZolam 0.5 milliGRAM(s) Oral every 12 hours PRN anxiety    Vital Signs Last 24 Hrs  T(C): 36.9 (17 Dec 2017 04:53), Max: 37.2 (16 Dec 2017 14:41)  T(F): 98.5 (17 Dec 2017 04:53), Max: 98.9 (16 Dec 2017 14:41)  HR: 72 (17 Dec 2017 06:58) (60 - 72)  BP: 148/82 (17 Dec 2017 04:53) (116/69 - 148/82)  BP(mean): --  RR: 18 (17 Dec 2017 04:53) (16 - 18)  SpO2: 98% (17 Dec 2017 06:58) (97% - 100%)  Daily     Daily Weight in k.7 (17 Dec 2017 07:23)    PHYSICAL EXAM:  Constitutional:  He appears comfortable and not distressed. Not diaphoretic. Obese.    Neck:  The thyroid is normal. Trachea is midline.     Breasts: Normal examination. No gynacomastia.    Respiratory: The lungs are clear to auscultation. No dullness and expansion is normal.    Cardiovascular: S1 and S2 are normal. No mummurs, rubs or gallops are present.    Gastrointestinal: The abdomen is soft. No tenderness is present. No masses are present. Bowel sounds are normal.    Genitourinary: The bladder is not distended. No CVA tenderness is present.    Extremities: No edema is noted. No deformities are present.    Neurological: Cognition is normal. Tone, power and sensation are normal.    Skin: No lesions are seen  or palpated.    Lymph Nodes: No lymphadenopathy is present.    Psychiatric: Mood is appropriate. No hallucinations or flight of ideas are noted.                              12.6   11.10 )-----------( 153      ( 17 Dec 2017 08:50 )             41.1         144  |  97  |  32<H>  ----------------------------<  96  3.4<L>   |  26  |  2.26<H>    Ca    9.4      17 Dec 2017 08:50  Phos  3.8     -  Mg     2.1         TPro  6.9  /  Alb  3.8  /  TBili  0.9  /  DBili  x   /  AST  36  /  ALT  59<H>  /  AlkPhos  111      Urine Microscopic-Add On (NC) (17 @ 05:53)    Red Blood Cell - Urine: 0-2 /HPF    Bacteria: Few /HPF    Epithelial Cells: Occasional /HPF    White Blood Cell - Urine: 0-2 /HPF    Hyaline Casts: 0-2    Protein, Urine: 30 mg/dL (17 @ 05:53)    < from: US Abdomen Complete (12.15.17 @ 14:25) >  Right kidney: 12 cm. No hydronephrosis.        Left kidney: 13 cm.  No hydronephrosis. Multiple cysts measuring up to   3.5 cm.    Ascites: None.      < end of copied text > within functional limits

## 2024-02-20 NOTE — H&P ADULT - GASTROINTESTINAL
Render Risk Assessment In Note?: no Additional Notes: **WIFE CALLED LATER IN DAY TO REPORT BACK WHAT VET ADVISED OF THEIR ANIMALS WHO ARE ALSO ITCHING** dx scabies.\\nwhile no evidence of scabies seen on the patient we sent in ivermectin cream for him to do full treatment Detail Level: Detailed detailed exam details…

## 2024-03-05 NOTE — PATIENT PROFILE ADULT. - VISION (WITH CORRECTIVE LENSES IF THE PATIENT USUALLY WEARS THEM):
Normal vision: sees adequately in most situations; can see medication labels, newsprint 04-Mar-2024 14:17

## 2024-12-24 NOTE — PROGRESS NOTE ADULT - PROBLEM SELECTOR PLAN 3
"albSubjective:      Patient ID: Daniel Hernandez III is a 32 y.o. male.    Vitals:  height is 6' 2" (1.88 m) and weight is 159 kg (350 lb 6.7 oz) (abnormal). His oral temperature is 98.7 °F (37.1 °C). His blood pressure is 114/88 and his pulse is 88. His respiration is 16 and oxygen saturation is 96%.     Chief Complaint: Cough    31 yo male advised that his cough, head and chest congestion, pnd, irritated throat, wheezing started 2 weeks ago. Has asthma. Pt states that he's taken OTC meds and albuterol. Not clearing. No known sick contacts. No fever or chills.     Cough  This is a new problem. The current episode started 1 to 4 weeks ago. The problem has been gradually worsening. The problem occurs constantly. Associated symptoms include ear congestion, nasal congestion, postnasal drip, a sore throat and wheezing. Pertinent negatives include no chest pain, chills, ear pain, fever, headaches, heartburn, hemoptysis, myalgias, rash, rhinorrhea, shortness of breath, sweats or weight loss. Nothing aggravates the symptoms. He has tried a beta-agonist inhaler for the symptoms. The treatment provided mild relief. His past medical history is significant for asthma and pneumonia. There is no history of bronchiectasis, bronchitis, COPD, emphysema or environmental allergies.       Constitution: Positive for fatigue. Negative for chills and fever.   HENT:  Positive for postnasal drip and sore throat. Negative for ear pain.    Cardiovascular:  Negative for chest pain.   Eyes: Negative.    Respiratory:  Positive for cough and wheezing. Negative for bloody sputum and shortness of breath.    Gastrointestinal: Negative.  Negative for heartburn.   Genitourinary: Negative.    Musculoskeletal:  Negative for muscle ache.   Skin:  Negative for rash.   Allergic/Immunologic: Negative for environmental allergies.   Neurological:  Negative for headaches.      Objective:     Physical Exam   Constitutional: He is oriented to person, place, and " time. No distress.   HENT:   Head: Normocephalic.   Ears:   Right Ear: Tympanic membrane, external ear and ear canal normal.   Left Ear: Tympanic membrane, external ear and ear canal normal.   Nose: Nose normal. No purulent discharge or sinus tenderness. No epistaxis.   Mouth/Throat: Mucous membranes are moist. No oropharyngeal exudate or posterior oropharyngeal erythema. Oropharynx is clear.   Eyes: Conjunctivae are normal. Pupils are equal, round, and reactive to light.   Neck: Neck supple.   Cardiovascular: Normal rate, regular rhythm, normal heart sounds and normal pulses.   Pulmonary/Chest: Effort normal. No stridor. No respiratory distress. He has wheezes (end expiratory bilaterally, no accessory muscle use.). He has no rhonchi. He has no rales.         Comments: Intermittent cough.    Abdominal: Normal appearance. He exhibits no distension. Soft. There is no abdominal tenderness.   Musculoskeletal: Normal range of motion.         General: Normal range of motion.   Lymphadenopathy:     He has no cervical adenopathy.   Neurological: no focal deficit. He is alert and oriented to person, place, and time.   Skin: Skin is warm and no rash.         Comments: Normal turgor   Psychiatric: His behavior is normal. Mood, judgment and thought content normal.   Nursing note and vitals reviewed.      Assessment:     1. Exacerbation of asthma, unspecified asthma severity, unspecified whether persistent    2. Cough, unspecified type    3. Chest congestion    4. Acute lower respiratory infection        Plan:       Exacerbation of asthma, unspecified asthma severity, unspecified whether persistent    Cough, unspecified type  -     XR CHEST PA AND LATERAL; Future; Expected date: 12/24/2024    Chest congestion  -     XR CHEST PA AND LATERAL; Future; Expected date: 12/24/2024    Acute lower respiratory infection    Other orders  -     ipratropium 0.02 % nebulizer solution 0.5 mg  -     albuterol nebulizer solution 2.5 mg  -      albuterol (VENTOLIN HFA) 90 mcg/actuation inhaler; Inhale 2 puffs into the lungs every 4 (four) hours as needed for Wheezing or Shortness of Breath (persistent cough). Rescue  Dispense: 18 g; Refill: 1  -     methylPREDNISolone (MEDROL DOSEPACK) 4 mg tablet; use as directed  Dispense: 21 each; Refill: 0  -     azithromycin (Z-SCOTTY) 250 MG tablet; Take 2 tablets by mouth on day 1; Take 1 tablet by mouth on days 2-5  Dispense: 6 tablet; Refill: 0    Review of patient's allergies indicates:  No Known Allergies    SUMMARY: See hpi. Clinically second infections/complications from original infection. Supportive measures. Duoneb neb treatment today with great result and improvement of symptoms. Continue with above.  Immediate medical provider evaluation if continues. See below. Addendum below.     Patient Instructions   Thank you for allowing our team to take care of you today.  Your diagnosis is acute respiratory infection with asthma exacerbation.   Awaiting chest xray result from radiologist. If positive, our office will contact you.   Assume you are contagious so be careful around those around you.   Supportive care.  Symptom management as appropriate like the recommendations below.   If any symptoms continue or worsen, get an immediate medical provider/ER evaluation.  Followup here as needed.     SYMPTOM MEDICATIONS IF NEEDED AND APPROPRIATE:    You may gargle with warm salt water 4 times a day and as needed.     Drink plenty of fluids.    Make sure you are getting rest.    You can use cough drops or lozenges to soothe your sore throat.     You can vitamin C, D, zinc (example Emergen-C) to help boost your immune system.    Cough/congestion medication as needed and appropriate.     Albuterol inhaler 2 puffs every 4 to 6 hours.    Medrol steroid pack.    Zithromax antibiotic.     Nasal saline spray to keep nasal passages moist.    Humidifier can be used to keep moisture in the air.     Acetaminophen (Tylenol) can be  alternated with Ibuprofen (Motrin, Advil) ever four hours if no allergy or restriction for fever/aches/pains.       ADDENDUM: Radiology report returned later with no acute abnormality. Called and discussed with patient. Continue current treatment regimen.                controlled.

## 2025-01-10 NOTE — PROGRESS NOTE ADULT - PROBLEM SELECTOR PLAN 7
-abx (per above)   -Wound care saw patient (8/25). Redressed wound. Recommended plastics consult.   -Plastics consulted 8/26. Recommended local wound care and outpatient follow up (Dr. Marquez [phone]) No

## 2025-06-18 NOTE — PATIENT PROFILE ADULT - NSPROGENSOURCEINFO_GEN_A_NUR
"Request for medication refill:    Medication Name: blood glucose (NO BRAND SPECIFIED) lancets standard     blood glucose (NO BRAND SPECIFIED) test strip     Providers if patient needs an appointment and you are willing to give a one month supply please refill for one month and  send a MyChart using \".SMILLIMITEDREFILL\" .Or route chart to \"[facility] \" . And use the phrase \" SMIRXFOLLOWUP\"To call patient and inform of limited refill and providers request to make an appointment. (Giving one month refill in non controlled medications is strongly recommended before denial)    If refill has been denied, meaning absolutely no refills without visit, please complete the smart phrase \".SMIRXREFUSE\" and route it to the \"P [facility] MED REFILLS\"  pool to inform the patient and the pharmacy.    Arlette Elizabeth, CMA      " family

## 2025-06-24 NOTE — PROGRESS NOTE ADULT - PROBLEM SELECTOR PLAN 4
continue diuresis per cardiology  s/p angiogram mucocyst x 2 given
AMA - over 35 years of age

## 2025-07-01 NOTE — SWALLOW VFSS/MBS ASSESSMENT ADULT - RECOMMENDED CONSISTENCY
Session ID: [id number]  Session Duration: 4 minutes  Language: Macedonian   ID: #[id number]   Name: Shantell 1. Soft Solids and Thin Liquids   2. Follow Safe Swallow Guidelines: Small/Single Cup Sips, Second Swallow After Each Sip/Bite, No Straw, Upright Position During PO consumption and Maintain Upright Position 30 Minutes Post PO Consumption  3. Maintain Good Oral Hygiene  4. Aspiration Precautions
